# Patient Record
Sex: FEMALE | Race: BLACK OR AFRICAN AMERICAN | Employment: FULL TIME | URBAN - METROPOLITAN AREA
[De-identification: names, ages, dates, MRNs, and addresses within clinical notes are randomized per-mention and may not be internally consistent; named-entity substitution may affect disease eponyms.]

---

## 2017-05-04 DIAGNOSIS — I10 ESSENTIAL HYPERTENSION: ICD-10-CM

## 2017-05-04 RX ORDER — VALSARTAN AND HYDROCHLOROTHIAZIDE 80; 12.5 MG/1; MG/1
TABLET, FILM COATED ORAL
Qty: 90 TAB | Refills: 0 | Status: SHIPPED | OUTPATIENT
Start: 2017-05-04 | End: 2017-12-07 | Stop reason: SDUPTHER

## 2017-05-04 RX ORDER — PROPRANOLOL HYDROCHLORIDE 80 MG/1
CAPSULE, EXTENDED RELEASE ORAL
Qty: 180 CAP | Refills: 0 | Status: SHIPPED | OUTPATIENT
Start: 2017-05-04 | End: 2017-12-07 | Stop reason: SDUPTHER

## 2017-05-04 NOTE — TELEPHONE ENCOUNTER
She was in the office in December and is due for follow in June-this appt needs to be scheduled with Dr Collins

## 2017-06-19 ENCOUNTER — OFFICE VISIT (OUTPATIENT)
Dept: FAMILY MEDICINE CLINIC | Age: 52
End: 2017-06-19

## 2017-06-19 VITALS
TEMPERATURE: 98.5 F | DIASTOLIC BLOOD PRESSURE: 82 MMHG | SYSTOLIC BLOOD PRESSURE: 127 MMHG | HEIGHT: 68 IN | OXYGEN SATURATION: 98 % | HEART RATE: 81 BPM | RESPIRATION RATE: 18 BRPM

## 2017-06-19 DIAGNOSIS — M50.20 CERVICAL HERNIATED DISC: ICD-10-CM

## 2017-06-19 DIAGNOSIS — F51.04 CHRONIC INSOMNIA: ICD-10-CM

## 2017-06-19 DIAGNOSIS — I10 ESSENTIAL HYPERTENSION: ICD-10-CM

## 2017-06-19 DIAGNOSIS — M54.2 NECK PAIN: ICD-10-CM

## 2017-06-19 DIAGNOSIS — R07.89 OTHER CHEST PAIN: ICD-10-CM

## 2017-06-19 DIAGNOSIS — Z82.49 FAMILY HISTORY OF HEART DISEASE IN FEMALE FAMILY MEMBER BEFORE AGE 65: ICD-10-CM

## 2017-06-19 DIAGNOSIS — G43.019 INTRACTABLE MIGRAINE WITHOUT AURA AND WITHOUT STATUS MIGRAINOSUS: ICD-10-CM

## 2017-06-19 DIAGNOSIS — E66.9 OBESITY, CLASS II, BMI 35-39.9: ICD-10-CM

## 2017-06-19 DIAGNOSIS — M54.12 RADICULOPATHY, CERVICAL: Primary | ICD-10-CM

## 2017-06-19 RX ORDER — CLINDAMYCIN HYDROCHLORIDE 150 MG/1
CAPSULE ORAL EVERY 6 HOURS
COMMUNITY
End: 2017-12-07 | Stop reason: ALTCHOICE

## 2017-06-19 RX ORDER — DEXTROMETHORPHAN HYDROBROMIDE, GUAIFENESIN 5; 100 MG/5ML; MG/5ML
1300 LIQUID ORAL
COMMUNITY
End: 2021-07-26

## 2017-06-19 RX ORDER — ZOLPIDEM TARTRATE 10 MG/1
TABLET ORAL
Qty: 30 TAB | Refills: 5 | Status: SHIPPED | OUTPATIENT
Start: 2017-06-19 | End: 2017-12-07 | Stop reason: SINTOL

## 2017-06-19 NOTE — PROGRESS NOTES
Chief Complaint   Patient presents with    Blood Pressure Check    Medication Refill    Medication Evaluation     Gabapentin; caused pain in her legs, and caused her eye to twitch; pt stopped taking.  Neck Pain     3 bulging disks; fell in April 2017; would like an MRI to have neck looked at to see if it has gotten worse    Stiff Neck    Tingling     left and right arm; occurs more in left arm; happened after she fell.  Request For New Medication     muscle relaxer     1. Have you been to the ER, urgent care clinic since your last visit? Hospitalized since your last visit? No    2. Have you seen or consulted any other health care providers outside of the [address] [place] [address] since your last visit? Include any pap smears or colon screening. No    In the event something were to happen to you and you were unable to speak on your behalf, do you have an Advance Directive/ Living Will in place stating your wishes? NO    If yes, do we have a copy on file NO    If no, would you like information:   Pt offered and accepted. Pt refused to have weight taken today.

## 2017-06-19 NOTE — ACP (ADVANCE CARE PLANNING)
Discussed ACP with patient. [facility] an [facility]. Patient prefers to read it on her own. Declines referral to [facility] team at this time. Patient will bring document to her next office visit or attach it to her MyChart record.

## 2017-06-19 NOTE — MR AVS SNAPSHOT
Visit Information Date & Time Provider Department Dept. Phone Encounter #  
 6/19/2017 11:45 AM Refugio Hawkins DO [facility] [phone] [id number] Follow-up Instructions Return in about 3 months (around 9/19/2017) for referral follow up, neck pain, L arm pain, chest pain. Upcoming Health Maintenance Date Due Hepatitis C Screening 7/31/2017* BREAST CANCER SCRN MAMMOGRAM 7/31/2017* PAP AKA CERVICAL CYTOLOGY 7/31/2017* FOBT Q 1 YEAR AGE 50-75 7/31/2017* INFLUENZA AGE 9 TO ADULT 8/1/2017 DTaP/Tdap/Td series (3 - Td) 10/9/2026 *Topic was postponed. The date shown is not the original due date. Allergies as of 6/19/2017  Review Complete On: 6/19/2017 By: Refugio Hawkins DO Severity Noted Reaction Type Reactions Pcn [Penicillins] High 05/27/2010    Hives Celebrex [Celecoxib]  06/19/2017    Other (comments) TIGHT SQUEEZING IN CHEST 
CHEST ACHED Erythromycin  02/22/2011    Nausea and Vomiting Milk Prot-turm-pepper-pineappl  06/20/2014    Other (comments) Multiple food allergies Neurontin [Gabapentin]  06/19/2017    Other (comments) 300 mg SEVERE LEG PAIN 
EYE TWITCHING  
 Nsaids (Non-steroidal Anti-inflammatory Drug)  02/22/2011    Other (comments) GI irritation Current Immunizations  Reviewed on 6/19/2017 Name Date Influenza Vaccine 10/30/2015 Influenza Vaccine Whole 10/1/2010 TDAP Vaccine 8/10/2012 Td, Adsorbed 10/9/2016  6:23 PM  
  
 Reviewed by Refugio Hawkins DO on 6/19/2017 at  2:00 PM  
 Reviewed by Refugio Hawkins DO on 6/19/2017 at  2:06 PM  
You Were Diagnosed With   
  
 Codes Comments Radiculopathy, cervical    -  Primary ICD-10-CM: M54.12 
ICD-9-CM: 723.4 L>R with L arm weakness, BL hand tingling, worse since fall forward in [employer] 04/2017 Other chest pain     ICD-10-CM: R07.89 ICD-9-CM: 786.59   
 Intractable migraine without aura and without status migrainosus     ICD-10-CM: G43.019 
ICD-9-CM: 346.11 stable with Propranolol Essential hypertension     ICD-10-CM: I10 
ICD-9-CM: 401.9 Neck pain     ICD-10-CM: M54.2 ICD-9-CM: 723.1 right with cervical rotation to R, worse since fall in [employer] 04/2017 Cervical herniated disc     ICD-10-CM: M50.20 ICD-9-CM: 722.0 x 3, flared since fall in [place] Chronic insomnia     ICD-10-CM: F51.04 
ICD-9-CM: 780.52 due to Night Eating Disorder vs other Obesity, Class II, BMI 35-39.9 (HCC)     ICD-10-CM: E66.9 ICD-9-CM: 278.00 Family history of heart disease in female family member before age 72     ICD-10-CM: Z80.55 
ICD-9-CM: V17.49 Vitals BP Pulse Temp Resp Height(growth percentile) SpO2  
 127/82 (BP 1 Location: Left arm, BP Patient Position: Sitting) 81 98.5 °F (36.9 °C) (Oral) 18 5' 7.99\" (1.727 m) 98% OB Status Smoking Status Menopause Never Smoker Vitals History Preferred Pharmacy Pharmacy Name Phone [id number], [place] - [address] [phone] Your Updated Medication List  
  
   
This list is accurate as of: 6/19/17  2:14 PM.  Always use your most recent med list.  
  
  
  
  
 alum-mag hydroxide-simeth 200-200-20 mg/5 mL Susp Commonly known as:  MYLANTA Take 30 mL by mouth every four (4) hours as needed. clindamycin 150 mg capsule Commonly known as:  CLEOCIN Take  by mouth every six (6) hours. ergocalciferol 50,000 unit capsule Commonly known as:  ERGOCALCIFEROL  
TAKE 1 CAPSULE BY MOUTH EVERY WEEK  Indications: VITAMIN D DEFICIENCY (HIGH DOSE THERAPY)  
  
 omeprazole 20 mg capsule Commonly known as:  PRILOSEC  
TAKE 1 CAPSULE BY MOUTH TWICE DAILY  Indications: HEARTBURN  
  
 propranolol LA 80 mg SR capsule Commonly known as:  INDERAL LA  
 TAKE 1 TO 2 CAPSULES BY MOUTH EVERY DAY FOR HIGH BLOOD PRESSURE  
  
 TYLENOL ARTHRITIS PAIN 650 mg CR tablet Generic drug:  acetaminophen Take 650 mg by mouth every six (6) hours as needed for Pain.  
  
 valsartan-hydroCHLOROthiazide 80-12.5 mg per tablet Commonly known as:  DIOVAN-HCT  
TAKE 1 TABLET BY MOUTH EVERY DAY FOR BLOOD PRESSURE  
  
 zolpidem 10 mg tablet Commonly known as:  AMBIEN  
TAKE 1 TABLET BY MOUTH EVERY NIGHT AT BEDTIME AS NEEDED FOR INSOMNIA  Indications: SLEEP-ONSET INSOMNIA Prescriptions Printed Refills  
 zolpidem (AMBIEN) 10 mg tablet 5 Sig: TAKE 1 TABLET BY MOUTH EVERY NIGHT AT BEDTIME AS NEEDED FOR INSOMNIA  Indications: SLEEP-ONSET INSOMNIA Class: Print We Performed the Following REFERRAL TO CARDIOLOGY [TMV25 Custom] Comments:  
 [address] [place], [address] [address] [address] Recurrent chest pain, strong fam hx heart disease Follow-up Instructions Return in about 3 months (around 9/19/2017) for referral follow up, neck pain, L arm pain, chest pain. Referral Information Referral ID Referred By Referred To  
  
 [id number] Manoj Vicente [address] [place] [address] Phone: [phone] Fax: [phone] Visits Status Start Date End Date 1 New Request 6/19/17 6/19/18 If your referral has a status of pending review or denied, additional information will be sent to support the outcome of this decision. Introducing [facility]! Dear Coby Kind: Thank you for requesting a [employer] account. Our records indicate that you already have an active [employer] account. You can access your account anytime at [email or web address] [email or web address] Did you know that you can access your hospital and ER discharge instructions at any time in [employer]? You can also review all of your test results from your hospital stay or ER visit. Additional Information If you have questions, please visit the Frequently Asked Questions section of the [employer] website at [email or web address] Remember, [employer] is NOT to be used for urgent needs. For medical emergencies, dial 911. Now available from your iPhone and Android! Please provide this summary of care documentation to your next provider. Your primary care clinician is listed as Demetris Walhs. If you have any questions after today's visit, please call [phone].

## 2017-06-30 RX ORDER — CYCLOBENZAPRINE HCL 10 MG
5 TABLET ORAL
Qty: 30 TAB | Refills: 0 | Status: SHIPPED | OUTPATIENT
Start: 2017-06-30 | End: 2017-12-07 | Stop reason: ALTCHOICE

## 2017-12-07 ENCOUNTER — OFFICE VISIT (OUTPATIENT)
Dept: FAMILY MEDICINE CLINIC | Age: 52
End: 2017-12-07

## 2017-12-07 VITALS
OXYGEN SATURATION: 97 % | HEART RATE: 74 BPM | RESPIRATION RATE: 16 BRPM | HEIGHT: 67 IN | BODY MASS INDEX: 45.99 KG/M2 | DIASTOLIC BLOOD PRESSURE: 88 MMHG | SYSTOLIC BLOOD PRESSURE: 134 MMHG | TEMPERATURE: 98.6 F | WEIGHT: 293 LBS

## 2017-12-07 DIAGNOSIS — E55.9 VITAMIN D DEFICIENCY: ICD-10-CM

## 2017-12-07 DIAGNOSIS — J30.9 ALLERGIC CONJUNCTIVITIS AND RHINITIS, BILATERAL: ICD-10-CM

## 2017-12-07 DIAGNOSIS — D50.9 IRON DEFICIENCY ANEMIA, UNSPECIFIED IRON DEFICIENCY ANEMIA TYPE: ICD-10-CM

## 2017-12-07 DIAGNOSIS — M79.672 HEEL PAIN, BILATERAL: ICD-10-CM

## 2017-12-07 DIAGNOSIS — N93.8 DUB (DYSFUNCTIONAL UTERINE BLEEDING): ICD-10-CM

## 2017-12-07 DIAGNOSIS — R73.02 IMPAIRED GLUCOSE TOLERANCE: ICD-10-CM

## 2017-12-07 DIAGNOSIS — F51.04 CHRONIC INSOMNIA: ICD-10-CM

## 2017-12-07 DIAGNOSIS — Z12.39 BREAST CANCER SCREENING: ICD-10-CM

## 2017-12-07 DIAGNOSIS — M79.671 HEEL PAIN, BILATERAL: ICD-10-CM

## 2017-12-07 DIAGNOSIS — E66.9 OBESITY, CLASS II, BMI 35-39.9: ICD-10-CM

## 2017-12-07 DIAGNOSIS — Z11.59 NEED FOR HEPATITIS C SCREENING TEST: ICD-10-CM

## 2017-12-07 DIAGNOSIS — E78.00 ELEVATED CHOLESTEROL: ICD-10-CM

## 2017-12-07 DIAGNOSIS — I10 ESSENTIAL HYPERTENSION: ICD-10-CM

## 2017-12-07 DIAGNOSIS — N30.00 ACUTE CYSTITIS WITHOUT HEMATURIA: ICD-10-CM

## 2017-12-07 DIAGNOSIS — E04.2 MULTIPLE THYROID NODULES: ICD-10-CM

## 2017-12-07 DIAGNOSIS — Z78.9 ASPIRIN INTOLERANCE: ICD-10-CM

## 2017-12-07 DIAGNOSIS — R07.89 OTHER CHEST PAIN: ICD-10-CM

## 2017-12-07 DIAGNOSIS — K29.00 OTHER ACUTE GASTRITIS WITHOUT HEMORRHAGE: ICD-10-CM

## 2017-12-07 DIAGNOSIS — I10 ESSENTIAL HYPERTENSION: Primary | ICD-10-CM

## 2017-12-07 DIAGNOSIS — T50.905A ADVERSE EFFECT OF DRUG, INITIAL ENCOUNTER: ICD-10-CM

## 2017-12-07 DIAGNOSIS — H10.13 ALLERGIC CONJUNCTIVITIS AND RHINITIS, BILATERAL: ICD-10-CM

## 2017-12-07 RX ORDER — VALSARTAN AND HYDROCHLOROTHIAZIDE 80; 12.5 MG/1; MG/1
1 TABLET, FILM COATED ORAL DAILY
Qty: 90 TAB | Refills: 3 | Status: SHIPPED | OUTPATIENT
Start: 2017-12-07 | End: 2019-04-22 | Stop reason: SDUPTHER

## 2017-12-07 RX ORDER — PROPRANOLOL HYDROCHLORIDE 80 MG/1
80 CAPSULE, EXTENDED RELEASE ORAL 2 TIMES DAILY
Qty: 180 CAP | Refills: 3 | Status: SHIPPED | OUTPATIENT
Start: 2017-12-07 | End: 2019-04-22 | Stop reason: SDUPTHER

## 2017-12-07 NOTE — PROGRESS NOTES
Chief Complaint   Patient presents with    Hypertension     Patient is here for a follow up for her blood presure check     Swelling     Patient stated that she has noticed water retention all over her body upon waking up     1. Have you been to the ER, urgent care clinic since your last visit? Hospitalized since your last visit? No     2. Have you seen or consulted any other health care providers outside of the [address] [place] [address] since your last visit? Include any pap smears or colon screening.   No     Visit Vitals    BP (!) 143/97 (BP 1 Location: Right arm, BP Patient Position: Sitting)    Pulse 74    Temp 98.6 °F (37 °C) (Oral)    Resp 16    Ht 5' 7\" (1.702 m)    SpO2 97%       134/88- Left arm Sitting - Manual Check

## 2017-12-07 NOTE — PATIENT INSTRUCTIONS
Advance Care Planning: Care Instructions  Your Care Instructions    It can be hard to live with an illness that cannot be cured. But if your health is getting worse, you may want to make decisions about end-of-life care. Planning for the end of your life does not mean that you are giving up. It is a way to make sure that your wishes are met. Clearly stating your wishes can make it easier for your loved ones. Making plans while you are still able may also ease your mind and make your final days less stressful and more meaningful. Follow-up care is a key part of your treatment and safety. Be sure to make and go to all appointments, and call your doctor if you are having problems. It's also a good idea to know your test results and keep a list of the medicines you take. What can you do to plan for the end of life? · You can bring these issues up with your doctor. You do not need to wait until your doctor starts the conversation. You might start with \"I would not be willing to live with . Sidney Musa \" When you complete this sentence it helps your doctor understand your wishes. · Talk openly and honestly with your doctor. This is the best way to understand the decisions you will need to make as your health changes. Know that you can always change your mind. · Ask your doctor about commonly used life-support measures. These include tube feedings, breathing machines, and fluids given through a vein (IV). Understanding these treatments will help you decide whether you want them. · You may choose to have these life-supporting treatments for a limited time. This allows a trial period to see whether they will help you. You may also decide that you want your doctor to take only certain measures to keep you alive. It is important to spell out these conditions so that your doctor and family understand them. · Talk to your doctor about how long you are likely to live.  He or she may be able to give you an idea of what usually happens with your specific illness. · Think about preparing papers that state your wishes. This way there will not be any confusion about what you want. You can change your instructions at any time. Which papers should you prepare? Advance directives are legal papers that tell doctors how you want to be cared for at the end of your life. You do not need a  to write these papers. Ask your doctor or your state health department for information on how to write your advance directives. They may have the forms for each of these types of papers. Make sure your doctor has a copy of these on file, and give a copy to a family member or close friend. · Consider a do-not-resuscitate order (DNR). This order asks that no extra treatments be done if your heart stops or you stop breathing. Extra treatments may include cardiopulmonary resuscitation (CPR), electrical shock to restart your heart, or a machine to breathe for you. If you decide to have a DNR order, ask your doctor to explain and write it. Place the order in your home where everyone can easily see it. · Consider a living will. A living will explains your wishes about life support and other treatments at the end of your life if you become unable to speak for yourself. Living [place] tell doctors to use or not use treatments that would keep you alive. You must have one or two witnesses or a notary present when you sign this form. · Consider a durable power of  for health care. This allows you to name a person to make decisions about your care if you are not able to. Most people ask a close friend or family member. Talk to this person about the kinds of treatments you want and those that you do not want. Make sure this person understands your wishes. These legal papers are simple to change. Tell your doctor what you want to change, and ask him or her to make a note in your medical file. Give your family updated copies of the papers.   Where can you learn more?  Go to http://sierra-ghanshyam.info/. Enter P184 in the search box to learn more about \"Advance Care Planning: Care Instructions. \"  Current as of: September 24, 2016  Content Version: 11.4  © 8110-8424 [employer]. Care instructions adapted under license by [employer] (which disclaims liability or warranty for this information). If you have questions about a medical condition or this instruction, always ask your healthcare professional. [address] any warranty or liability for your use of this information. Achilles Tendon: Exercises  Your Care Instructions  Here are some examples of exercises for your achilles tendon. Start each exercise slowly. Ease off the exercise if you start to have pain. Your doctor or physical therapist will tell you when you can start these exercises and which ones will work best for you. How to do the exercises  Toe stretch    1. Sit in a chair, and extend your affected leg so that your heel is on the floor. 2. With your hand, reach down and pull your big toe up and back. Pull toward your ankle and away from the floor. 3. Hold the position for at least 15 to 30 seconds. 4. Repeat 2 to 4 times a session, several times a day. Calf-plantar fascia stretch    1. Sit with your legs extended and knees straight. 2. Place a towel around your foot just under the toes. 3. Hold each end of the towel in each hand, with your hands above your knees. 4. Pull back with the towel so that your foot stretches toward you. 5. Hold the position for at least 15 to 30 seconds. 6. Repeat 2 to 4 times a session, up to 5 sessions a day. Floor stretch    1. Stand about 2 feet from a wall, and place your hands on the wall at about shoulder height. Or you can stand behind a chair, placing your hands on the back of it for balance. 2. Step back with the leg you want to stretch.  Keep the leg straight, and press your heel into the floor with your toe turned slightly in.  3. Lean forward, and bend your other leg slightly. Feel the stretch in the Achilles tendon of your back leg. Hold for at least 15 to 30 seconds. 4. Repeat 2 to 4 times a session, up to 5 sessions a day. Stair stretch    1. Stand with the balls of both feet on the edge of a step or curb (or a medium-sized phone book). With at least one hand, hold onto something solid for balance, such as a banister or handrail. 2. Keeping your affected leg straight, slowly let that heel hang down off of the step or curb until you feel a stretch in the back of your calf and/or Achilles area. Some of your weight should still be on the other leg. 3. Hold this position for at least 15 to 30 seconds. 4. Repeat 2 to 4 times a session, up to 5 times a day or whenever your Achilles tendon starts to feel tight. This stretch can also be done with your knee slightly bent. Strength exercise    1. This exercise will get you started on building strength after an Achilles tendon injury. Your doctor or physical therapist can help you move on to more challenging exercises as you heal and get stronger. 2. Stand on a step with your heel off the edge of the step. Hold on to a handrail or wall for balance. 3. Push up on your toes, then slowly count to 10 as you lower yourself back down until your heel is below the step. If it hurts to push up on your toes, try putting most of your weight on your other foot as you push up, or try using your arms to help you. If you can't do this exercise without causing pain, stop the exercise and talk to your doctor. 4. Repeat the exercise 8 to 12 times, half with the knee straight and half with the knee bent. Follow-up care is a key part of your treatment and safety. Be sure to make and go to all appointments, and call your doctor if you are having problems. It's also a good idea to know your test results and keep a list of the medicines you take.   Where can you learn more?  Go to http://sierra-ghanshyam.info/. Enter Q631 in the search box to learn more about \"Achilles Tendon: Exercises. \"  Current as of: March 21, 2017  Content Version: 11.4  © 9621-5611 [employer]. Care instructions adapted under license by [employer] (which disclaims liability or warranty for this information). If you have questions about a medical condition or this instruction, always ask your healthcare professional. [address] any warranty or liability for your use of this information. Advise patient to start taking Over The Counter allergy medication (Allegra, Zyrtec, Claritin, Xyzal or Alavert) daily. If you have itchy, watery eyes you can try OTC Zaditor or Zyrtec Allergy Eye drops. If you have a stuffy nose, please try OTC Flonase, Flonase Sensimist, Rhinocort, or Nasacort AQ 2 squirts up each nostril once a day (adults) or 1 squirt up each nostril once a day (children). Use allergy free, dye free, fragrance free products on your body and hair. After being outdoors, brush hair vigorously, wash face and arms, rinse nostrils with a nasal saline spray and consider changing your clothing. Dust furniture frequently and wear a mask while doing it. Vacuum floors weekly. Remove stuffed animals or extra pillows from the bed. Clean bedding in hot water weekly. Sometimes allergies can be so severe that 2 nasal sprays and 2 pills may be needed to control symptoms. Allergies: Care Instructions  Your Care Instructions    Allergies occur when your body's defense system (immune system) overreacts to certain substances. The immune system treats a harmless substance as if it were a harmful germ or virus. Many things can cause this overreaction, including pollens, medicine, food, dust, animal dander, and mold. Allergies can be mild or severe. Mild allergies can be managed with home treatment.  But medicine may be needed to prevent problems. Managing your allergies is an important part of staying healthy. Your doctor may suggest that you have allergy testing to help find out what is causing your allergies. When you know what things trigger your symptoms, you can avoid them. This can prevent allergy symptoms and other health problems. For severe allergies that cause reactions that affect your whole body (anaphylactic reactions), your doctor may prescribe a shot of epinephrine to carry with you in case you have a severe reaction. Learn how to give yourself the shot and keep it with you at all times. Make sure it is not . Follow-up care is a key part of your treatment and safety. Be sure to make and go to all appointments, and call your doctor if you are having problems. It's also a good idea to know your test results and keep a list of the medicines you take. How can you care for yourself at home? · If you have been told by your doctor that dust or dust mites are causing your allergy, decrease the dust around your bed:  [facility] [employer] sheets, pillowcases, and other bedding in hot water every week. ¨ Use dust-proof covers for pillows, duvets, and mattresses. Avoid plastic covers because they tear easily and do not \"breathe. \" Wash as instructed on the label. ¨ Do not use any blankets and pillows that you do not need. ¨ Use blankets that you can wash in your washing machine. ¨ Consider removing drapes and carpets, which attract and hold dust, from your bedroom. · If you are allergic to house dust and mites, do not use home humidifiers. Your doctor can suggest ways you can control dust and mites. · Look for signs of cockroaches. Cockroaches cause allergic reactions. Use cockroach baits to get rid of them. Then, clean your home well. Cockroaches like areas where grocery bags, newspapers, empty bottles, or cardboard boxes are stored. Do not keep these inside your home, and keep trash and food containers sealed.  Seal off any spots where cockroaches might enter your home. · If you are allergic to mold, get rid of furniture, rugs, and drapes that smell musty. Check for mold in the bathroom. · If you are allergic to outdoor pollen or mold spores, use air-conditioning. Change or clean all filters every month. Keep windows closed. · If you are allergic to pollen, stay inside when pollen counts are high. Use a vacuum  with a HEPA filter or a double-thickness filter at least two times each week. · Stay inside when air pollution is bad. Avoid paint fumes, perfumes, and other strong odors. · Avoid conditions that make your allergies worse. Stay away from smoke. Do not smoke or let anyone else smoke in your house. Do not use fireplaces or wood-burning stoves. · If you are allergic to your pets, change the air filter in your furnace every month. Use high-efficiency filters. · If you are allergic to pet dander, keep pets outside or out of your bedroom. Old carpet and cloth furniture can hold a lot of animal dander. You may need to replace them. When should you call for help? Give an epinephrine shot if:  ? · You think you are having a severe allergic reaction. ? · You have symptoms in more than one body area, such as mild nausea and an itchy mouth. ? After giving an epinephrine shot call 911, even if you feel better. ?Call 911 if:  ? · You have symptoms of a severe allergic reaction. These may include:  ¨ Sudden raised, red areas (hives) all over your body. ¨ Swelling of the throat, mouth, lips, or tongue. ¨ Trouble breathing. ¨ Passing out (losing consciousness). Or you may feel very lightheaded or suddenly feel weak, confused, or restless. ? · You have been given an epinephrine shot, even if you feel better. ?Call your doctor now or seek immediate medical care if:  ? · You have symptoms of an allergic reaction, such as:  ¨ A rash or hives (raised, red areas on the skin). ¨ Itching. ¨ Swelling. ¨ Belly pain, nausea, or vomiting. ?Watch closely for changes in your health, and be sure to contact your doctor if:  ? · You do not get better as expected. Where can you learn more? Go to http://sierra-ghanshyam.info/. Enter V399 in the search box to learn more about \"Allergies: Care Instructions. \"  Current as of: September 29, 2016  Content Version: 11.4  © 3734-2855 [employer]. Care instructions adapted under license by [employer] (which disclaims liability or warranty for this information). If you have questions about a medical condition or this instruction, always ask your healthcare professional. [address] any warranty or liability for your use of this information. Managing Your Allergies: Care Instructions  Your Care Instructions    Managing your allergies is an important part of staying healthy. Your doctor will help you find out what may be causing the allergies. Common causes of allergy symptoms are house dust and dust mites, animal dander, mold, and pollen. As soon as you know what triggers your symptoms, try to reduce your exposure to your triggers. This can help prevent allergy symptoms, asthma, and other health problems. Ask your doctor about allergy medicine or immunotherapy. These treatments may help reduce or prevent allergy symptoms. Follow-up care is a key part of your treatment and safety. Be sure to make and go to all appointments, and call your doctor if you are having problems. It's also a good idea to know your test results and keep a list of the medicines you take. How can you care for yourself at home? · If you think that dust or dust mites are causing your allergies:  ¨ Wash sheets, pillowcases, and other bedding every week in hot water. ¨ Use airtight, dust-proof covers for pillows, duvets, and mattresses. Avoid plastic covers, because they tend to tear quickly and do not \"breathe. \" Wash according to the instructions.   ¨ Remove extra blankets and pillows that you don't need. ¨ Use blankets that are machine-washable. ¨ Don't use home humidifiers. They can help mites live longer. · Use air-conditioning. Change or clean all filters every month. Keep windows closed. Use high-efficiency air filters. Don't use window or attic fans, which draw dust into the air. · If you're allergic to pet dander, keep pets outside or, at the very least, out of your bedroom. Old carpet and cloth-covered furniture can hold a lot of animal dander. You may need to replace them. · Look for signs of cockroaches. Use cockroach baits to get rid of them. Then clean your home well. · If you're allergic to mold, don't keep indoor plants, because molds can grow in soil. Get rid of furniture, rugs, and drapes that smell musty. Check for mold in the bathroom. · If you're allergic to pollen, stay inside when pollen counts are high. · Don't smoke or let anyone else smoke in your house. Don't use fireplaces or wood-burning stoves. Avoid paint fumes, perfumes, and other strong odors. When should you call for help? Give an epinephrine shot if:  ? · You think you are having a severe allergic reaction. ? After giving an epinephrine shot call 911, even if you feel better. ?Call 911 if:  ? · You have symptoms of a severe allergic reaction. These may include:  ¨ Sudden raised, red areas (hives) all over your body. ¨ Swelling of the throat, mouth, lips, or tongue. ¨ Trouble breathing. ¨ Passing out (losing consciousness). Or you may feel very lightheaded or suddenly feel weak, confused, or restless. ? · You have been given an epinephrine shot, even if you feel better. ?Call your doctor now or seek immediate medical care if:  ? · You have symptoms of an allergic reaction, such as:  ¨ A rash or hives (raised, red areas on the skin). ¨ Itching. ¨ Swelling. ¨ Belly pain, nausea, or vomiting. ? Watch closely for changes in your health, and be sure to contact your doctor if:  ? · Your allergies get worse. ? · You need help controlling your allergies. ? · You have questions about allergy testing. ? · You do not get better as expected. Where can you learn more? Go to http://sierra-ghanshyam.info/. Enter L249 in the search box to learn more about \"Managing Your Allergies: Care Instructions. \"  Current as of: September 29, 2016  Content Version: 11.4  © 4186-5869 [email or web address]. Care instructions adapted under license by [employer] (which disclaims liability or warranty for this information). If you have questions about a medical condition or this instruction, always ask your healthcare professional. [address] any warranty or liability for your use of this information.

## 2017-12-07 NOTE — MR AVS SNAPSHOT
Visit Information Date & Time Provider Department Dept. Phone Encounter #  
 12/7/2017  2:30 PM Tj Hernandez DO [facility] [phone] [id number] Follow-up Instructions Return in about 6 months (around 6/7/2018) for bp, results, weight. Upcoming Health Maintenance Date Due Hepatitis C Screening 4/20/2018* BREAST CANCER SCRN MAMMOGRAM 4/20/2018* PAP AKA CERVICAL CYTOLOGY 4/20/2018* COLONOSCOPY 3/31/2024 DTaP/Tdap/Td series (3 - Td) 10/9/2026 *Topic was postponed. The date shown is not the original due date. Allergies as of 12/7/2017  Review Complete On: 12/7/2017 By: Tj Hernandez DO Severity Noted Reaction Type Reactions Doxylamine Succinate High 12/07/2017    Swelling 12.5-25 MG 
HANDS, FACE/PERIORAL, FEET Pcn [Penicillins] High 05/27/2010    Hives Ambien [Zolpidem]  11/11/2014    Nausea and Vomiting, Other (comments) 5 mg with Ativan 0.5 mg  
TOO GROGGY, SLEPT 24 HOURS 
7.5 MG FORGOT SHE WAS SLEEP EATING Aspirin  12/07/2017    Nausea Only Celebrex [Celecoxib]  06/19/2017    Other (comments) TIGHT SQUEEZING IN CHEST 
CHEST ACHED Erythromycin  02/22/2011    Nausea and Vomiting Milk Prot-turm-pepper-pineappl  06/20/2014    Other (comments) Multiple food allergies Neurontin [Gabapentin]  06/19/2017    Other (comments) 300 mg SEVERE LEG PAIN 
EYE TWITCHING  
 Nsaids (Non-steroidal Anti-inflammatory Drug)  02/22/2011    Other (comments) GI irritation Current Immunizations  Reviewed on 12/7/2017 Name Date Influenza Vaccine 10/31/2017, 10/30/2015 Influenza Vaccine Whole 10/1/2010 TDAP Vaccine 8/10/2012 Td, Adsorbed 10/9/2016  6:23 PM  
  
 Reviewed by Tj Hernandez DO on 12/7/2017 at  3:47 PM  
You Were Diagnosed With   
  
 Codes Comments  Essential hypertension    -  Primary ICD-10-CM: I10 
 ICD-9-CM: 401.9 worse due to stress vs sleep vs missed bp meds vs other Elevated cholesterol     ICD-10-CM: E78.00 ICD-9-CM: 272.0   
 DUB (dysfunctional uterine bleeding)     ICD-10-CM: N93.8 ICD-9-CM: 626.8 due to perimenopause vs stress Iron deficiency anemia, unspecified iron deficiency anemia type     ICD-10-CM: D50.9 ICD-9-CM: 280.9 Heel pain, bilateral     ICD-10-CM: M79.671, U83.100 ICD-9-CM: 729.5 Allergic conjunctivitis and rhinitis, bilateral     ICD-10-CM: H10.13, J30.9 ICD-9-CM: 372.05, 477.9 Impaired glucose tolerance     ICD-10-CM: R73.02 
ICD-9-CM: 790.22 Acute cystitis without hematuria     ICD-10-CM: N30.00 ICD-9-CM: 595.0 Vitamin D deficiency     ICD-10-CM: E55.9 ICD-9-CM: 268.9 Obesity, Class II, BMI 35-39.9     ICD-10-CM: E66.9 ICD-9-CM: 278.00 Multiple thyroid nodules     ICD-10-CM: E04.2 ICD-9-CM: 241.1 Other chest pain     ICD-10-CM: R07.89 ICD-9-CM: 786.59 no recurrence since last ov Aspirin intolerance     ICD-10-CM: Z78.9 ICD-9-CM: 995.27, E935.3 Chronic insomnia     ICD-10-CM: F51.04 
ICD-9-CM: 780.52 Need for hepatitis C screening test     ICD-10-CM: Z11.59 
ICD-9-CM: V73.89 Breast cancer screening     ICD-10-CM: Z12.31 
ICD-9-CM: V76.10 Adverse effect of drug, initial encounter     ICD-10-CM: T88. 7XXA ICD-9-CM: E947.9 equate sleep aid. resolved without recurrence Other acute gastritis without hemorrhage     ICD-10-CM: K29.00 ICD-9-CM: 535.00 Essential hypertension     ICD-10-CM: I10 
ICD-9-CM: 401.9 stable Vitals BP Pulse Temp Resp Height(growth percentile) SpO2  
 (!) 143/97 (BP 1 Location: Right arm, BP Patient Position: Sitting) 74 98.6 °F (37 °C) (Oral) 16 5' 7\" (1.702 m) 97% OB Status Smoking Status Menopause Never Smoker Vitals History Preferred Pharmacy Pharmacy Name Phone [id number]- [place], [place] - [address] [facility] [place] [place] [phone] Your Updated Medication List  
  
   
This list is accurate as of: 12/7/17  4:04 PM.  Always use your most recent med list.  
  
  
  
  
 alum-mag hydroxide-simeth 200-200-20 mg/5 mL Susp Commonly known as:  MYLANTA Take 30 mL by mouth every four (4) hours as needed. omeprazole 20 mg capsule Commonly known as:  PRILOSEC  
TAKE 1 CAPSULE BY MOUTH TWICE DAILY  Indications: HEARTBURN  
  
 propranolol LA 80 mg SR capsule Commonly known as:  INDERAL LA Take 1 Cap by mouth two (2) times a day. Indications: MIGRAINE PREVENTION  
  
 TYLENOL ARTHRITIS PAIN 650 mg Tber Generic drug:  acetaminophen Take 650 mg by mouth every six (6) hours as needed for Pain.  
  
 valsartan-hydroCHLOROthiazide 80-12.5 mg per tablet Commonly known as:  DIOVAN-HCT Take 1 Tab by mouth daily. Indications: hypertension Prescriptions Sent to Pharmacy Refills  
 propranolol LA (INDERAL LA) 80 mg SR capsule 3 Sig: Take 1 Cap by mouth two (2) times a day. Indications: MIGRAINE PREVENTION Class: Normal  
 Pharmacy: [facility]/pharmacy [address] [place] [address] [address] [facility] Ph #: [phone] Route: Oral  
 valsartan-hydroCHLOROthiazide (DIOVAN-HCT) 80-12.5 mg per tablet 3 Sig: Take 1 Tab by mouth daily. Indications: hypertension Class: Normal  
 Pharmacy: [facility]/pharmacy [address] [place] [address] [address] [facility] Ph #: [phone] Route: Oral  
  
We Performed the Following CBC W/O DIFF [87304 CPT(R)] CULTURE, URINE [id number] CPT(R)] HEMOGLOBIN A1C WITH EAG [60228 CPT(R)] LIPID PANEL [68131 CPT(R)] METABOLIC PANEL, COMPREHENSIVE [53988 CPT(R)] MICROALBUMIN, UR, RAND W/ MICROALBUMIN/CREA RATIO [id number] CPT(R)] MICROALBUMIN, UR, RAND [id number] CPT(R)] REFERRAL TO PODIATRY [REF90 Custom] TSH 3RD GENERATION [07585 CPT(R)] URINALYSIS W/ RFLX MICROSCOPIC [56630 CPT(R)] VITAMIN D, 25 HYDROXY [id number] CPT(R)] Follow-up Instructions Return in about 6 months (around 6/7/2018) for bp, results, weight. To-Do List   
 12/07/2017 Lab:  CBC W/O DIFF   
  
 12/07/2017 Lab:  HEMOGLOBIN A1C WITH EAG   
  
 12/07/2017 Lab:  IRON   
  
 12/07/2017 Lab:  LIPID PANEL   
  
 12/07/2017 Lab:  METABOLIC PANEL, COMPREHENSIVE   
  
 12/07/2017 Lab:  TSH 3RD GENERATION   
  
 12/07/2017 Lab:  VITAMIN D, 25 HYDROXY   
  
 01/07/2018 Lab:  HEPATITIS C AB   
  
 01/07/2018 Imaging:  TERI MAMMO BI SCREENING INCL CAD Referral Information Referral ID Referred By Referred To  
  
 [id number] Manoj Orourke, [address] [place] [address],[address] [place] [address] [place], [address] Phone: [phone] Fax: [phone] Visits Status Start Date End Date 1 New Request 12/7/17 12/7/18 If your referral has a status of pending review or denied, additional information will be sent to support the outcome of this decision. Patient Instructions Advance Care Planning: Care Instructions Your Care Instructions It can be hard to live with an illness that cannot be cured. But if your health is getting worse, you may want to make decisions about end-of-life care. Planning for the end of your life does not mean that you are giving up. It is a way to make sure that your wishes are met. Clearly stating your wishes can make it easier for your loved ones. Making plans while you are still able may also ease your mind and make your final days less stressful and more meaningful. Follow-up care is a key part of your treatment and safety. Be sure to make and go to all appointments, and call your doctor if you are having problems. It's also a good idea to know your test results and keep a list of the medicines you take. What can you do to plan for the end of life? · You can bring these issues up with your doctor. You do not need to wait until your doctor starts the conversation. You might start with \"I would not be willing to live with . Bijal Rm Bijal Rm Bijal Rm \" When you complete this sentence it helps your doctor understand your wishes. · Talk openly and honestly with your doctor. This is the best way to understand the decisions you will need to make as your health changes. Know that you can always change your mind. · Ask your doctor about commonly used life-support measures. These include tube feedings, breathing machines, and fluids given through a vein (IV). Understanding these treatments will help you decide whether you want them. · You may choose to have these life-supporting treatments for a limited time. This allows a trial period to see whether they will help you. You may also decide that you want your doctor to take only certain measures to keep you alive. It is important to spell out these conditions so that your doctor and family understand them. · Talk to your doctor about how long you are likely to live. He or she may be able to give you an idea of what usually happens with your specific illness. · Think about preparing papers that state your wishes. This way there will not be any confusion about what you want. You can change your instructions at any time. Which papers should you prepare? Advance directives are legal papers that tell doctors how you want to be cared for at the end of your life. You do not need a  to write these papers. Ask your doctor or your state health department for information on how to write your advance directives. They may have the forms for each of these types of papers. Make sure your doctor has a copy of these on file, and give a copy to a family member or close friend. · Consider a do-not-resuscitate order (DNR).  This order asks that no extra treatments be done if your heart stops or you stop breathing. Extra treatments may include cardiopulmonary resuscitation (CPR), electrical shock to restart your heart, or a machine to breathe for you. If you decide to have a DNR order, ask your doctor to explain and write it. Place the order in your home where everyone can easily see it. · Consider a living will. A living will explains your wishes about life support and other treatments at the end of your life if you become unable to speak for yourself. Living [place] tell doctors to use or not use treatments that would keep you alive. You must have one or two witnesses or a notary present when you sign this form. · Consider a durable power of  for health care. This allows you to name a person to make decisions about your care if you are not able to. Most people ask a close friend or family member. Talk to this person about the kinds of treatments you want and those that you do not want. Make sure this person understands your wishes. These legal papers are simple to change. Tell your doctor what you want to change, and ask him or her to make a note in your medical file. Give your family updated copies of the papers. Where can you learn more? Go to http://sierra-ghanshyam.info/. Enter P184 in the search box to learn more about \"Advance Care Planning: Care Instructions. \" Current as of: September 24, 2016 Content Version: 11.4 © 1793-7306 [employer]. Care instructions adapted under license by [employer] (which disclaims liability or warranty for this information). If you have questions about a medical condition or this instruction, always ask your healthcare professional. [place] [address] any warranty or liability for your use of this information. Achilles Tendon: Exercises Your Care Instructions Here are some examples of exercises for your achilles tendon.  Start each exercise slowly. Ease off the exercise if you start to have pain. Your doctor or physical therapist will tell you when you can start these exercises and which ones will work best for you. How to do the exercises Toe stretch 1. Sit in a chair, and extend your affected leg so that your heel is on the floor. 2. With your hand, reach down and pull your big toe up and back. Pull toward your ankle and away from the floor. 3. Hold the position for at least 15 to 30 seconds. 4. Repeat 2 to 4 times a session, several times a day. Calf-plantar fascia stretch 1. Sit with your legs extended and knees straight. 2. Place a towel around your foot just under the toes. 3. Hold each end of the towel in each hand, with your hands above your knees. 4. Pull back with the towel so that your foot stretches toward you. 5. Hold the position for at least 15 to 30 seconds. 6. Repeat 2 to 4 times a session, up to 5 sessions a day. Floor stretch 1. Stand about 2 feet from a wall, and place your hands on the wall at about shoulder height. Or you can stand behind a chair, placing your hands on the back of it for balance. 2. Step back with the leg you want to stretch. Keep the leg straight, and press your heel into the floor with your toe turned slightly in. 
3. Lean forward, and bend your other leg slightly. Feel the stretch in the Achilles tendon of your back leg. Hold for at least 15 to 30 seconds. 4. Repeat 2 to 4 times a session, up to 5 sessions a day. Stair stretch 1. Stand with the balls of both feet on the edge of a step or curb (or a medium-sized phone book). With at least one hand, hold onto something solid for balance, such as a banister or handrail. 2. Keeping your affected leg straight, slowly let that heel hang down off of the step or curb until you feel a stretch in the back of your calf and/or Achilles area. Some of your weight should still be on the other leg. 3. Hold this position for at least 15 to 30 seconds. 4. Repeat 2 to 4 times a session, up to 5 times a day or whenever your Achilles tendon starts to feel tight. This stretch can also be done with your knee slightly bent. Strength exercise 1. This exercise will get you started on building strength after an Achilles tendon injury. Your doctor or physical therapist can help you move on to more challenging exercises as you heal and get stronger. 2. Stand on a step with your heel off the edge of the step. Hold on to a handrail or wall for balance. 3. Push up on your toes, then slowly count to 10 as you lower yourself back down until your heel is below the step. If it hurts to push up on your toes, try putting most of your weight on your other foot as you push up, or try using your arms to help you. If you can't do this exercise without causing pain, stop the exercise and talk to your doctor. 4. Repeat the exercise 8 to 12 times, half with the knee straight and half with the knee bent. Follow-up care is a key part of your treatment and safety. Be sure to make and go to all appointments, and call your doctor if you are having problems. It's also a good idea to know your test results and keep a list of the medicines you take. Where can you learn more? Go to http://sierra-ghanshyam.info/. Enter I423 in the search box to learn more about \"Achilles Tendon: Exercises. \" Current as of: March 21, 2017 Content Version: 11.4 © 1419-7838 [employer]. Care instructions adapted under license by [employer] (which disclaims liability or warranty for this information). If you have questions about a medical condition or this instruction, always ask your healthcare professional. [address] any warranty or liability for your use of this information.  
Advise patient to start taking Over The Counter allergy medication (Allegra, Zyrtec, Claritin, Xyzal or Alavert) daily. If you have itchy, watery eyes you can try OTC Zaditor or Zyrtec Allergy Eye drops. If you have a stuffy nose, please try OTC Flonase, Flonase Sensimist, Rhinocort, or Nasacort AQ 2 squirts up each nostril once a day (adults) or 1 squirt up each nostril once a day (children). Use allergy free, dye free, fragrance free products on your body and hair. After being outdoors, brush hair vigorously, wash face and arms, rinse nostrils with a nasal saline spray and consider changing your clothing. Dust furniture frequently and wear a mask while doing it. Vacuum floors weekly. Remove stuffed animals or extra pillows from the bed. Clean bedding in hot water weekly. Sometimes allergies can be so severe that 2 nasal sprays and 2 pills may be needed to control symptoms. Allergies: Care Instructions Your Care Instructions Allergies occur when your body's defense system (immune system) overreacts to certain substances. The immune system treats a harmless substance as if it were a harmful germ or virus. Many things can cause this overreaction, including pollens, medicine, food, dust, animal dander, and mold. Allergies can be mild or severe. Mild allergies can be managed with home treatment. But medicine may be needed to prevent problems. Managing your allergies is an important part of staying healthy. Your doctor may suggest that you have allergy testing to help find out what is causing your allergies. When you know what things trigger your symptoms, you can avoid them. This can prevent allergy symptoms and other health problems. For severe allergies that cause reactions that affect your whole body (anaphylactic reactions), your doctor may prescribe a shot of epinephrine to carry with you in case you have a severe reaction. Learn how to give yourself the shot and keep it with you at all times. Make sure it is not . Follow-up care is a key part of your treatment and safety. Be sure to make and go to all appointments, and call your doctor if you are having problems. It's also a good idea to know your test results and keep a list of the medicines you take. How can you care for yourself at home? · If you have been told by your doctor that dust or dust mites are causing your allergy, decrease the dust around your bed: 
[facility] sheets, pillowcases, and other bedding in hot water every week. ¨ Use dust-proof covers for pillows, duvets, and mattresses. Avoid plastic covers because they tear easily and do not \"breathe. \" Wash as instructed on the label. ¨ Do not use any blankets and pillows that you do not need. ¨ Use blankets that you can wash in your washing machine. ¨ Consider removing drapes and carpets, which attract and hold dust, from your bedroom. · If you are allergic to house dust and mites, do not use home humidifiers. Your doctor can suggest ways you can control dust and mites. · Look for signs of cockroaches. Cockroaches cause allergic reactions. Use cockroach baits to get rid of them. Then, clean your home well. Cockroaches like areas where grocery bags, newspapers, empty bottles, or cardboard boxes are stored. Do not keep these inside your home, and keep trash and food containers sealed. Seal off any spots where cockroaches might enter your home. · If you are allergic to mold, get rid of furniture, rugs, and drapes that smell musty. Check for mold in the bathroom. · If you are allergic to outdoor pollen or mold spores, use air-conditioning. Change or clean all filters every month. Keep windows closed. · If you are allergic to pollen, stay inside when pollen counts are high. Use a vacuum  with a HEPA filter or a double-thickness filter at least two times each week. · Stay inside when air pollution is bad. Avoid paint fumes, perfumes, and other strong odors. · Avoid conditions that make your allergies worse. Stay away from smoke. Do not smoke or let anyone else smoke in your house. Do not use fireplaces or wood-burning stoves. · If you are allergic to your pets, change the air filter in your furnace every month. Use high-efficiency filters. · If you are allergic to pet dander, keep pets outside or out of your bedroom. Old carpet and cloth furniture can hold a lot of animal dander. You may need to replace them. When should you call for help? Give an epinephrine shot if: 
? · You think you are having a severe allergic reaction. ? · You have symptoms in more than one body area, such as mild nausea and an itchy mouth. ? After giving an epinephrine shot call 911, even if you feel better. ?Call 911 if: 
? · You have symptoms of a severe allergic reaction. These may include: 
¨ Sudden raised, red areas (hives) all over your body. ¨ Swelling of the throat, mouth, lips, or tongue. ¨ Trouble breathing. ¨ Passing out (losing consciousness). Or you may feel very lightheaded or suddenly feel weak, confused, or restless. ? · You have been given an epinephrine shot, even if you feel better. ?Call your doctor now or seek immediate medical care if: 
? · You have symptoms of an allergic reaction, such as: ¨ A rash or hives (raised, red areas on the skin). ¨ Itching. ¨ Swelling. ¨ Belly pain, nausea, or vomiting. ? Watch closely for changes in your health, and be sure to contact your doctor if: 
? · You do not get better as expected. Where can you learn more? Go to http://sierra-ghanshyam.info/. Enter B877 in the search box to learn more about \"Allergies: Care Instructions. \" Current as of: September 29, 2016 Content Version: 11.4 © 6764-0974 [employer]. Care instructions adapted under license by [employer] (which disclaims liability or warranty for this information).  If you have questions about a medical condition or this instruction, always ask your healthcare professional. [address] any warranty or liability for your use of this information. Managing Your Allergies: Care Instructions Your Care Instructions Managing your allergies is an important part of staying healthy. Your doctor will help you find out what may be causing the allergies. Common causes of allergy symptoms are house dust and dust mites, animal dander, mold, and pollen. As soon as you know what triggers your symptoms, try to reduce your exposure to your triggers. This can help prevent allergy symptoms, asthma, and other health problems. Ask your doctor about allergy medicine or immunotherapy. These treatments may help reduce or prevent allergy symptoms. Follow-up care is a key part of your treatment and safety. Be sure to make and go to all appointments, and call your doctor if you are having problems. It's also a good idea to know your test results and keep a list of the medicines you take. How can you care for yourself at home? · If you think that dust or dust mites are causing your allergies: 
¨ Wash sheets, pillowcases, and other bedding every week in hot water. ¨ Use airtight, dust-proof covers for pillows, duvets, and mattresses. Avoid plastic covers, because they tend to tear quickly and do not \"breathe. \" Wash according to the instructions. ¨ Remove extra blankets and pillows that you don't need. ¨ Use blankets that are machine-washable. ¨ Don't use home humidifiers. They can help mites live longer. · Use air-conditioning. Change or clean all filters every month. Keep windows closed. Use high-efficiency air filters. Don't use window or attic fans, which draw dust into the air. · If you're allergic to pet dander, keep pets outside or, at the very least, out of your bedroom. Old carpet and cloth-covered furniture can hold a lot of animal dander. You may need to replace them. · Look for signs of cockroaches. Use cockroach baits to get rid of them. Then clean your home well. · If you're allergic to mold, don't keep indoor plants, because molds can grow in soil. Get rid of furniture, rugs, and drapes that smell musty. Check for mold in the bathroom. · If you're allergic to pollen, stay inside when pollen counts are high. · Don't smoke or let anyone else smoke in your house. Don't use fireplaces or wood-burning stoves. Avoid paint fumes, perfumes, and other strong odors. When should you call for help? Give an epinephrine shot if: 
? · You think you are having a severe allergic reaction. ? After giving an epinephrine shot call 911, even if you feel better. ?Call 911 if: 
? · You have symptoms of a severe allergic reaction. These may include: 
¨ Sudden raised, red areas (hives) all over your body. ¨ Swelling of the throat, mouth, lips, or tongue. ¨ Trouble breathing. ¨ Passing out (losing consciousness). Or you may feel very lightheaded or suddenly feel weak, confused, or restless. ? · You have been given an epinephrine shot, even if you feel better. ?Call your doctor now or seek immediate medical care if: 
? · You have symptoms of an allergic reaction, such as: ¨ A rash or hives (raised, red areas on the skin). ¨ Itching. ¨ Swelling. ¨ Belly pain, nausea, or vomiting. ? Watch closely for changes in your health, and be sure to contact your doctor if: 
? · Your allergies get worse. ? · You need help controlling your allergies. ? · You have questions about allergy testing. ? · You do not get better as expected. Where can you learn more? Go to http://sierra-ghanshyam.info/. Enter L249 in the search box to learn more about \"Managing Your Allergies: Care Instructions. \" Current as of: September 29, 2016 Content Version: 11.4 © 5707-5880 [employer], [employer].  Care instructions adapted under license by [address] (which disclaims liability or warranty for this information). If you have questions about a medical condition or this instruction, always ask your healthcare professional. [address] any warranty or liability for your use of this information. Introducing [facility]! Dear Willa Magallanes: Thank you for requesting a [employer] account. Our records indicate that you already have an active [employer] account. You can access your account anytime at https://[employer]. [email or web address] Did you know that you can access your hospital and ER discharge instructions at any time in [employer]? You can also review all of your test results from your hospital stay or ER visit. Additional Information If you have questions, please visit the Frequently Asked Questions section of the [employer] website at [email or web address] Remember, [employer] is NOT to be used for urgent needs. For medical emergencies, dial 911. Now available from your iPhone and Android! Please provide this summary of care documentation to your next provider. Your primary care clinician is listed as Claire Saab. If you have any questions after today's visit, please call [phone].

## 2017-12-07 NOTE — PROGRESS NOTES
HISTORY OF Gavin Christianson is a 46 y.o. female presents with Blood Pressure Check; Swelling (Patient stated that she has noticed water retention all over her body upon waking up); Referral Follow Up; Labs; Foot Pain; Sleep Problem; and Medication Refill    Agree with nurse note. Hypertensive pt with elevated cholesterol, impaired glucose tolerance, vit d deficiency, multiple thyroid nodules, aspirin intolerance, and hx of iron deficiency anemia presents to the office with a BP of 143/97. For BP, she takes Diovan-HCT 80-12.5 mg daily and Propanolol  mg prn. She admits to missing some doses of Diovan-HCT. She has refused to be weighed since 05/2016. At her last visit, she had chest pain but this has not recurred. She did not follow up with the cardiology as referred. She has urinary leakage with coughing and sneezing. She had a UTI within the last 6 months. The patient denies fever, chills, abdominal pain, blood in the urine, pain with urination, increased frequency, back pain or other associated symptoms. She had a cold about 2 months ago and has a lingering cough. She also has nasal congestion and itchy eyes. She has been taking vinegar in the mornings and that helps her to cough up phlegm. Her BL heels are sore x weeks. Worse with flat feet. She is active and on her feet at work. She wonders if gaining weight has contributed. Pt with chronic insomnia. She stopped Ambien 10 mg because it caused her to forget that she slept ate. She now takes Equate brand Doxylamine instead. She is concerned because she wakes up with hand, feet, and eye swelling in the mornings. Stressors: grandchildren living with her now, 1 y.o was recently dx'd with autism, and 3 w.o. Grandson    She started her menses this past week and prior had not had once since 05/2017. Her flow is heavy.      Health Maintenance    Pt's most recent colonoscopy was on 03/31/14 with GI, Dr. Chelita Carney [facility].    Written by caty Ceja, as dictated by Dr. Diego Bello DO.    ROS    Review of Systems negative except as noted above in HPI. ALLERGIES:    Allergies   Allergen Reactions    Doxylamine Succinate Swelling     12.5-25 MG  HANDS, FACE/PERIORAL, FEET    Pcn [Penicillins] Hives    Ambien [Zolpidem] Nausea and Vomiting and Other (comments)     5 mg with Ativan 0.5 mg   TOO GROGGY, SLEPT 24 HOURS  7.5 MG FORGOT SHE WAS SLEEP EATING    Aspirin Nausea Only    Celebrex [Celecoxib] Other (comments)     TIGHT SQUEEZING IN CHEST  CHEST ACHED    Erythromycin Nausea and Vomiting    Milk Prot-Turm-Pepper-Pineappl Other (comments)     Multiple food allergies    Neurontin [Gabapentin] Other (comments)     300 mg  SEVERE LEG PAIN  EYE TWITCHING    Nsaids (Non-Steroidal Anti-Inflammatory Drug) Other (comments)     GI irritation         CURRENT MEDICATIONS:    Outpatient Prescriptions Marked as Taking for the 12/7/17 encounter (Office Visit) with Yvonne Mancia DO   Medication Sig Dispense Refill    propranolol LA (INDERAL LA) 80 mg SR capsule Take 1 Cap by mouth two (2) times a day. Indications: MIGRAINE PREVENTION 180 Cap 3    valsartan-hydroCHLOROthiazide (DIOVAN-HCT) 80-12.5 mg per tablet Take 1 Tab by mouth daily. Indications: hypertension 90 Tab 3    acetaminophen (TYLENOL ARTHRITIS PAIN) 650 mg CR tablet Take 650 mg by mouth every six (6) hours as needed for Pain.  omeprazole (PRILOSEC) 20 mg capsule TAKE 1 CAPSULE BY MOUTH TWICE DAILY  Indications: HEARTBURN 60 Cap 5       PAST MEDICAL HISTORY:    Past Medical History:   Diagnosis Date    Allergic rhinitis 5/27/2010    Cervical disc herniation 01/2010    C3-4, C4-5, C5-6. Hemangioma body of C5. Dr. John Gamboa.  Chest pain 08/2011, 02/2012    Dr. Ada Zacarias. Dr. Varsha Geller; denies CP as of 3/27/14    Chronic lower back pain 2010, 03/2016    thoracic DDD and spondylosis. DDD L3-S1. Dr. Kerri Burton. Major Person. Dr. Edmundo Thurston, [facility]. Dr. Sara Guerrero.  Esophagitis, reflux 4/3/2011    Gallstone     Gastritis 2010    Dr. Radha Bryan.  Gastrointestinal food allergy 2014    Multiple. Dr. Charissa Smiley.    Heart palpitations 2012    DUE TO PAC'S AND PVC'S. Dr. Salud Dominguez.  Hiatal hernia 4/3/2011    HTN (hypertension)     Impaired glucose tolerance 10/15/2010    Incidental lung nodule 13    LLL 3.9 mm, RML 3.1 mm;  as of 3/27/14 per pt stable w/o growth    Insomnia     Iron deficiency anemia 2010    Irritable bowel syndrome (IBS) 2014    Dr. Kristin Dobson.    Knee pain     Right. due to severe OA. / chipped bone     Metatarsal bone fracture     Left fifth.  Migraine 2011    Dr. Anaid Salguero, cervical 2010    Left. Dr. Kylee Hutchinson.  Shoulder pain, right     due to [address] X 2. Dr. Ramiro Crews.  Thyroid nodule     Dr. Jose F Bazzi    Toe fracture, left     fifth toe.  Triangular fibrocartilage complex tear     Dr. Dario Ash. Left. PAST SURGICAL HISTORY:    Past Surgical History:   Procedure Laterality Date    HX  SECTION  1995    x 1    HX CHOLECYSTECTOMY      HX COLONOSCOPY  14    Dr. Shakeel Sánchez; 14    due to abnormal PAP.  HX DILATION AND CURETTAGE      due to miscarriage.  HX ENDOSCOPY      HX ORTHOPAEDIC Right 2016    LUMBAR L4-5, L5-S1 ELLY.   Dr. Олег Mccurdy     [address]:    Family History   Problem Relation Age of Onset    Diabetes Mother     Heart Disease Mother 58     2 stents    Hypertension Mother     Diabetes Father     Stroke Maternal Grandmother     Cancer Maternal Uncle      prostate       SOCIAL HISTORY:    Social History     Social History    Marital status:      Spouse name: N/A    Number of children: 3    Years of education: N/A     Occupational History    Medical assistant [employer]     Social History Main Topics    Smoking status: Never Smoker    Smokeless tobacco: Never Used    Alcohol use No    Drug use: No    Sexual activity: Yes     Partners: Male     Other Topics Concern    None     Social History Narrative       IMMUNIZATIONS:    Immunization History   Administered Date(s) Administered    Influenza Vaccine 10/30/2015, 10/31/2017    Influenza Vaccine Whole 10/01/2010    TDAP Vaccine 08/10/2012    Td, Adsorbed 10/09/2016         PHYSICAL EXAMINATION    Vital Signs    Visit Vitals    BP (!) 143/97 (BP 1 Location: Right arm, BP Patient Position: Sitting)    Pulse 74    Temp 98.6 °F (37 °C) (Oral)    Resp 16    Ht 5' 7\" (1.702 m)    SpO2 97%       Weight Metrics 12/7/2017 7/20/2016 5/19/2016 3/16/2016 12/16/2015 9/8/2015 6/3/2015   Weight - - 248 lb 12.8 oz 247 lb 265 lb 3.2 oz 267 lb 268 lb   BMI - - 37.84 kg/m2 37.56 kg/m2 40.33 kg/m2 40.61 kg/m2 40.76 kg/m2       General appearance - Well nourished. Well appearing. Well developed. No acute distress. Obese. Head - Normocephalic. Atraumatic. Eyes - pupils equal and reactive. Extraocular eye movements intact. Sclera anicteric. Mildly injected sclera. Ears - Hearing is grossly normal bilaterally. Nose - normal and patent. No polyps noted. No erythema. No discharge. Mouth - mucous membranes with adequate moisture. Posterior pharynx normal with cobblestone appearance. No erythema, white exudate or obstruction. Neck - supple. Midline trachea. No carotid bruits noted bilaterally. No thyromegaly noted. Chest - clear to auscultation bilaterally anteriorly and posteriorly. No wheezes. No rales or rhonchi. Breath sounds are symmetrical bilaterally. Unlabored respirations. Heart - normal rate. Regular rhythm. Normal S1, S2.  3/6 murmur noted. No rubs, clicks or gallops noted. Abdomen - soft and distended. No masses or organomegaly. No rebound, rigidity or guarding. Bowel sounds normal x 4 quadrants. No tenderness noted. Neurological - awake, alert and oriented to person, place, and time and event. Cranial nerves II through XII intact. Clear speech. Muscle strength is +5/5 x 4 extremities. Sensation is intact to light touch bilaterally. Steady gait. Heme/Lymph - peripheral pulses normal x 4 extremities. No peripheral edema is noted. Musculoskeletal - Intact x 4 extremities. Full ROM x 4 extremities. Mild BL heel pain with movement. Pain on palpation along achilles insertion and at BL calves. Back exam - normal range of motion. No pain on palpation of the spinous processes in the cervical, thoracic, lumbar, sacral regions. No CVA tenderness. Skin - no rashes, erythema, ecchymosis, lacerations, abrasions, suspicious moles noted  Psychological -   normal behavior, dress and thought processes. Good insight. Good eye contact. Normal affect. Appropriate mood. Normal speech. DATA REVIEWED    Lab Results   Component Value Date/Time    WBC 3.9 12/08/2016 08:46 AM    Hemoglobin (POC) 12.9 02/03/2015 02:40 PM    HGB 13.1 12/08/2016 08:46 AM    Hematocrit (POC) 38 02/03/2015 02:40 PM    HCT 38.9 12/08/2016 08:46 AM    PLATELET 159 79/97/6362 08:46 AM    MCV 87 12/08/2016 08:46 AM     Lab Results   Component Value Date/Time    Sodium 139 12/08/2016 08:46 AM    Potassium 4.4 12/08/2016 08:46 AM    Chloride 99 12/08/2016 08:46 AM    CO2 28 12/08/2016 08:46 AM    Anion gap 9 01/16/2014 10:15 AM    Glucose 95 12/08/2016 08:46 AM    BUN 6 12/08/2016 08:46 AM    Creatinine 0.70 12/08/2016 08:46 AM    BUN/Creatinine ratio 9 12/08/2016 08:46 AM    GFR est  12/08/2016 08:46 AM    GFR est non- 12/08/2016 08:46 AM    Calcium 9.2 12/08/2016 08:46 AM    Bilirubin, total <0.2 12/08/2016 08:46 AM    AST (SGOT) 26 12/08/2016 08:46 AM    Alk.  phosphatase 89 12/08/2016 08:46 AM    Protein, total 7.7 12/08/2016 08:46 AM    Albumin 4.3 12/08/2016 08:46 AM    Globulin 4.4 01/16/2014 10:15 AM    A-G Ratio 1.3 12/08/2016 08:46 AM    ALT (SGPT) 20 12/08/2016 08:46 AM     Lab Results   Component Value Date/Time    Cholesterol, total 218 12/08/2016 08:46 AM    HDL Cholesterol 71 12/08/2016 08:46 AM    LDL, calculated 119 12/08/2016 08:46 AM    VLDL, calculated 28 12/08/2016 08:46 AM    Triglyceride 138 12/08/2016 08:46 AM    CHOL/HDL Ratio 3.2 10/15/2010 04:14 PM     Lab Results   Component Value Date/Time    VITAMIN D, 25-HYDROXY 10.4 12/08/2016 08:46 AM       Lab Results   Component Value Date/Time    Hemoglobin A1c 6.0 12/08/2016 08:46 AM     Lab Results   Component Value Date/Time    TSH 0.641 12/08/2016 08:46 AM     Lab Results   Component Value Date/Time    Microalb/Creat ratio (ug/mg creat.) 1.9 12/08/2016 08:46 AM     Lab Results   Component Value Date/Time    Vitamin B12 292 12/08/2016 08:46 AM    Folate 15.0 12/08/2016 08:46 AM       ASSESSMENT and PLAN      ICD-10-CM ICD-9-CM    1. Essential hypertension I10 401.9 MICROALBUMIN, UR, RAND      LIPID PANEL      METABOLIC PANEL, COMPREHENSIVE      TSH 3RD GENERATION      LIPID PANEL      METABOLIC PANEL, COMPREHENSIVE      TSH 3RD GENERATION      MICROALBUMIN, UR, RAND W/ MICROALBUMIN/CREA RATIO      URINALYSIS W/ RFLX MICROSCOPIC      propranolol LA (INDERAL LA) 80 mg SR capsule      valsartan-hydroCHLOROthiazide (DIOVAN-HCT) 80-12.5 mg per tablet      CANCELED: URINALYSIS W/ RFLX MICROSCOPIC      CANCELED: MICROALBUMIN, UR, RAND W/ MICROALBUMIN/CREA RATIO      CANCELED: URINALYSIS W/ RFLX MICROSCOPIC    worse due to stress vs sleep vs missed bp meds vs other   2. Elevated cholesterol E78.00 272.0 LIPID PANEL      METABOLIC PANEL, COMPREHENSIVE      TSH 3RD GENERATION      LIPID PANEL      METABOLIC PANEL, COMPREHENSIVE   3. DUB (dysfunctional uterine bleeding) N93.8 626.8 TSH 3RD GENERATION    due to perimenopause vs stress   4.  Iron deficiency anemia, unspecified iron deficiency anemia type D50.9 280.9 CBC W/O DIFF METABOLIC PANEL, COMPREHENSIVE      CBC W/O DIFF      IRON   5. Heel pain, bilateral M79.671 729.5 REFERRAL TO PODIATRY    M79.672     6. Allergic conjunctivitis and rhinitis, bilateral H10.13 372.05     J30.9 477.9    7. Impaired glucose tolerance R73.02 790.22 HEMOGLOBIN A1C WITH EAG      HEMOGLOBIN A1C WITH EAG   8. Acute cystitis without hematuria N30.00 595.0 URINALYSIS W/ RFLX MICROSCOPIC      CULTURE, URINE   9. Vitamin D deficiency E55.9 268.9 VITAMIN D, 25 HYDROXY      VITAMIN D, 25 HYDROXY   10. Obesity, Class II, BMI 35-39.9 E66.9 278.00    11. Multiple thyroid nodules E04.2 241.1    12. Other chest pain X15.85 835.47 METABOLIC PANEL, COMPREHENSIVE      TSH 3RD GENERATION      CBC W/O DIFF    no recurrence since last ov   13. Aspirin intolerance Z78.9 995.27      E935.3    14. Chronic insomnia F51.04 780.52    15. Need for hepatitis C screening test Z11.59 V73.89 HEPATITIS C AB   16. Breast cancer screening Z12.31 V76.10 HEPATITIS C AB      TERI MAMMO BI SCREENING INCL CAD   16. Adverse effect of drug, initial encounter T88. 7XXA E947.9     equate sleep aid. resolved without recurrence    18. Other acute gastritis without hemorrhage K29.00 535.00    19. Essential hypertension I10 401.9 MICROALBUMIN, UR, RAND      LIPID PANEL      METABOLIC PANEL, COMPREHENSIVE      TSH 3RD GENERATION      LIPID PANEL      METABOLIC PANEL, COMPREHENSIVE      TSH 3RD GENERATION      MICROALBUMIN, UR, RAND W/ MICROALBUMIN/CREA RATIO      URINALYSIS W/ RFLX MICROSCOPIC      propranolol LA (INDERAL LA) 80 mg SR capsule      valsartan-hydroCHLOROthiazide (DIOVAN-HCT) 80-12.5 mg per tablet      CANCELED: URINALYSIS W/ RFLX MICROSCOPIC      CANCELED: MICROALBUMIN, UR, RAND W/ MICROALBUMIN/CREA RATIO      CANCELED: URINALYSIS W/ RFLX MICROSCOPIC    stable       Discussed the patient's BMI with her. The BMI follow up plan is as follows: I have counseled this patient on diet and exercise regimens.   Decrease carbohydrates (white foods, sweet foods, sweet drinks and alcohol), increase green leafy vegetables and protein (lean meats and beans) with each meal.  Avoid fried foods. Eat 3-5 small meals daily. Do not skip meals. Increase water intake. Increase physical activity to 30 minutes daily for health benefit or 60 minutes daily to prevent weight regain, as tolerated. Get 7-8 hours uninterrupted sleep nightly. Chart reviewed and updated. Continue current medications and care. Prescriptions written and sent to pharmacy; medication side effects discussed. Diovan-HCT 80-12.5 mg. Propanolol 80 mg. Mammogram ordered. Most recent tests reviewed from 12/2016. Recheck pertinent labs when fasting. Referrals given; patient urged to keep appointments with specialists. Podiatry. Reprinted cardio referral.   Counseled patient on health concerns:  BP, sleep hygiene, urinary incontinence, allergies, and feet care. Stressors. Relevant handouts given and discussed with patient. Immunizations noted. Offered empathy, support, legitimation, prayers, partnership to patient. Praised patient for progress. Follow-up Disposition:  Return in about 6 months (around 6/7/2018) for bp, results, weight. Patient was offered a choice/choices in the treatment plan today. Patient expresses understanding of the plan and agrees with recommendations. More than 40 mins spent face to face with patient and more than 50% of this time spent in counseling and coordinating care. Written by caty Bernabe, as dictated by Dr. Jaimie Feliciano DO. Documentation True and Accepted by Malou Portillo. Suzanne Kohler. Patient Instructions          Advance Care Planning: Care Instructions  Your Care Instructions    It can be hard to live with an illness that cannot be cured. But if your health is getting worse, you may want to make decisions about end-of-life care. Planning for the end of your life does not mean that you are giving up.  It is a way to make sure that your wishes are met. Clearly stating your wishes can make it easier for your loved ones. Making plans while you are still able may also ease your mind and make your final days less stressful and more meaningful. Follow-up care is a key part of your treatment and safety. Be sure to make and go to all appointments, and call your doctor if you are having problems. It's also a good idea to know your test results and keep a list of the medicines you take. What can you do to plan for the end of life? · You can bring these issues up with your doctor. You do not need to wait until your doctor starts the conversation. You might start with \"I would not be willing to live with . Carmen Lange \" When you complete this sentence it helps your doctor understand your wishes. · Talk openly and honestly with your doctor. This is the best way to understand the decisions you will need to make as your health changes. Know that you can always change your mind. · Ask your doctor about commonly used life-support measures. These include tube feedings, breathing machines, and fluids given through a vein (IV). Understanding these treatments will help you decide whether you want them. · You may choose to have these life-supporting treatments for a limited time. This allows a trial period to see whether they will help you. You may also decide that you want your doctor to take only certain measures to keep you alive. It is important to spell out these conditions so that your doctor and family understand them. · Talk to your doctor about how long you are likely to live. He or she may be able to give you an idea of what usually happens with your specific illness. · Think about preparing papers that state your wishes. This way there will not be any confusion about what you want. You can change your instructions at any time. Which papers should you prepare?   Advance directives are legal papers that tell doctors how you want to be cared for at the end of your life. You do not need a  to write these papers. Ask your doctor or your state [facility] department for information on how to write your advance directives. They may have the forms for each of these types of papers. Make sure your doctor has a copy of these on file, and give a copy to a family member or close friend. · Consider a do-not-resuscitate order (DNR). This order asks that no extra treatments be done if your heart stops or you stop breathing. Extra treatments may include cardiopulmonary resuscitation (CPR), electrical shock to restart your heart, or a machine to breathe for you. If you decide to have a DNR order, ask your doctor to explain and write it. Place the order in your home where everyone can easily see it. · Consider a living will. A living will explains your wishes about life support and other treatments at the end of your life if you become unable to speak for yourself. Living [place] tell doctors to use or not use treatments that would keep you alive. You must have one or two witnesses or a notary present when you sign this form. · Consider a durable power of  for health care. This allows you to name a person to make decisions about your care if you are not able to. Most people ask a close friend or family member. Talk to this person about the kinds of treatments you want and those that you do not want. Make sure this person understands your wishes. These legal papers are simple to change. Tell your doctor what you want to change, and ask him or her to make a note in your medical file. Give your family updated copies of the papers. Where can you learn more? Go to http://sierra-ghanshyam.info/. Enter P184 in the search box to learn more about \"Advance Care Planning: Care Instructions. \"  Current as of: September 24, 2016  Content Version: 11.4  © 2124-8471 [employer], [employer].  Care instructions adapted under license by [facility] (which disclaims liability or warranty for this information). If you have questions about a medical condition or this instruction, always ask your healthcare professional. [place] [address] any warranty or liability for your use of this information. Achilles Tendon: Exercises  Your Care Instructions  Here are some examples of exercises for your achilles tendon. Start each exercise slowly. Ease off the exercise if you start to have pain. Your doctor or physical therapist will tell you when you can start these exercises and which ones will work best for you. How to do the exercises  Toe stretch    1. Sit in a chair, and extend your affected leg so that your heel is on the floor. 2. With your hand, reach down and pull your big toe up and back. Pull toward your ankle and away from the floor. 3. Hold the position for at least 15 to 30 seconds. 4. Repeat 2 to 4 times a session, several times a day. Calf-plantar fascia stretch    1. Sit with your legs extended and knees straight. 2. Place a towel around your foot just under the toes. 3. Hold each end of the towel in each hand, with your hands above your knees. 4. Pull back with the towel so that your foot stretches toward you. 5. Hold the position for at least 15 to 30 seconds. 6. Repeat 2 to 4 times a session, up to 5 sessions a day. Floor stretch    1. Stand about 2 feet from a wall, and place your hands on the wall at about shoulder height. Or you can stand behind a chair, placing your hands on the back of it for balance. 2. Step back with the leg you want to stretch. Keep the leg straight, and press your heel into the floor with your toe turned slightly in.  3. Lean forward, and bend your other leg slightly. Feel the stretch in the Achilles tendon of your back leg. Hold for at least 15 to 30 seconds. 4. Repeat 2 to 4 times a session, up to 5 sessions a day.   Stair stretch    1. Stand with the balls of both feet on the edge of a step or curb (or a medium-sized phone book). With at least one hand, hold onto something solid for balance, such as a banister or handrail. 2. Keeping your affected leg straight, slowly let that heel hang down off of the step or curb until you feel a stretch in the back of your calf and/or Achilles area. Some of your weight should still be on the other leg. 3. Hold this position for at least 15 to 30 seconds. 4. Repeat 2 to 4 times a session, up to 5 times a day or whenever your Achilles tendon starts to feel tight. This stretch can also be done with your knee slightly bent. Strength exercise    1. This exercise will get you started on building strength after an Achilles tendon injury. Your doctor or physical therapist can help you move on to more challenging exercises as you heal and get stronger. 2. Stand on a step with your heel off the edge of the step. Hold on to a handrail or wall for balance. 3. Push up on your toes, then slowly count to 10 as you lower yourself back down until your heel is below the step. If it hurts to push up on your toes, try putting most of your weight on your other foot as you push up, or try using your arms to help you. If you can't do this exercise without causing pain, stop the exercise and talk to your doctor. 4. Repeat the exercise 8 to 12 times, half with the knee straight and half with the knee bent. Follow-up care is a key part of your treatment and safety. Be sure to make and go to all appointments, and call your doctor if you are having problems. It's also a good idea to know your test results and keep a list of the medicines you take. Where can you learn more? Go to http://sierra-ghanshyam.info/. Enter X651 in the search box to learn more about \"Achilles Tendon: Exercises. \"  Current as of: March 21, 2017  Content Version: 11.4  © 2260-6960 Healthwise, Incorporated.  Care instructions adapted under license by [employer] (which disclaims liability or warranty for this information). If you have questions about a medical condition or this instruction, always ask your healthcare professional. [address] any warranty or liability for your use of this information. Advise patient to start taking Over The Counter allergy medication (Allegra, Zyrtec, Claritin, Xyzal or Alavert) daily. If you have itchy, watery eyes you can try OTC Zaditor or Zyrtec Allergy Eye drops. If you have a stuffy nose, please try OTC Flonase, Flonase Sensimist, Rhinocort, or Nasacort AQ 2 squirts up each nostril once a day (adults) or 1 squirt up each nostril once a day (children). Use allergy free, dye free, fragrance free products on your body and hair. After being outdoors, brush hair vigorously, wash face and arms, rinse nostrils with a nasal saline spray and consider changing your clothing. Dust furniture frequently and wear a mask while doing it. Vacuum floors weekly. Remove stuffed animals or extra pillows from the bed. Clean bedding in hot water weekly. Sometimes allergies can be so severe that 2 nasal sprays and 2 pills may be needed to control symptoms. Allergies: Care Instructions  Your Care Instructions    Allergies occur when your body's defense system (immune system) overreacts to certain substances. The immune system treats a harmless substance as if it were a harmful germ or virus. Many things can cause this overreaction, including pollens, medicine, food, dust, animal dander, and mold. Allergies can be mild or severe. Mild allergies can be managed with home treatment. But medicine may be needed to prevent problems. Managing your allergies is an important part of staying healthy. Your doctor may suggest that you have allergy testing to help find out what is causing your allergies. When you know what things trigger your symptoms, you can avoid them. This can prevent allergy symptoms and other health problems.   For severe allergies that cause reactions that affect your whole body (anaphylactic reactions), your doctor may prescribe a shot of epinephrine to carry with you in case you have a severe reaction. Learn how to give yourself the shot and keep it with you at all times. Make sure it is not . Follow-up care is a key part of your treatment and safety. Be sure to make and go to all appointments, and call your doctor if you are having problems. It's also a good idea to know your test results and keep a list of the medicines you take. How can you care for yourself at home? · If you have been told by your doctor that dust or dust mites are causing your allergy, decrease the dust around your bed:  [facility] sheets, pillowcases, and other bedding in hot water every week. ¨ Use dust-proof covers for pillows, duvets, and mattresses. Avoid plastic covers because they tear easily and do not \"breathe. \" Wash as instructed on the label. ¨ Do not use any blankets and pillows that you do not need. ¨ Use blankets that you can wash in your washing machine. ¨ Consider removing drapes and carpets, which attract and hold dust, from your bedroom. · If you are allergic to house dust and mites, do not use home humidifiers. Your doctor can suggest ways you can control dust and mites. · Look for signs of cockroaches. Cockroaches cause allergic reactions. Use cockroach baits to get rid of them. Then, clean your home well. Cockroaches like areas where grocery bags, newspapers, empty bottles, or cardboard boxes are stored. Do not keep these inside your home, and keep trash and food containers sealed. Seal off any spots where cockroaches might enter your home. · If you are allergic to mold, get rid of furniture, rugs, and drapes that smell musty. Check for mold in the bathroom. · If you are allergic to outdoor pollen or mold spores, use air-conditioning. Change or clean all filters every month. Keep windows closed.   · If you are allergic to pollen, stay inside when pollen counts are high. Use a vacuum  with a HEPA filter or a double-thickness filter at least two times each week. · Stay inside when air pollution is bad. Avoid paint fumes, perfumes, and other strong odors. · Avoid conditions that make your allergies worse. Stay away from smoke. Do not smoke or let anyone else smoke in your house. Do not use fireplaces or wood-burning stoves. · If you are allergic to your pets, change the air filter in your furnace every month. Use high-efficiency filters. · If you are allergic to pet dander, keep pets outside or out of your bedroom. Old carpet and cloth furniture can hold a lot of animal dander. You may need to replace them. When should you call for help? Give an epinephrine shot if:  ? · You think you are having a severe allergic reaction. ? · You have symptoms in more than one body area, such as mild nausea and an itchy mouth. ? After giving an epinephrine shot call 911, even if you feel better. ?Call 911 if:  ? · You have symptoms of a severe allergic reaction. These may include:  ¨ Sudden raised, red areas (hives) all over your body. ¨ Swelling of the throat, mouth, lips, or tongue. ¨ Trouble breathing. ¨ Passing out (losing consciousness). Or you may feel very lightheaded or suddenly feel weak, confused, or restless. ? · You have been given an epinephrine shot, even if you feel better. ?Call your doctor now or seek immediate medical care if:  ? · You have symptoms of an allergic reaction, such as:  ¨ A rash or hives (raised, red areas on the skin). ¨ Itching. ¨ Swelling. ¨ Belly pain, nausea, or vomiting. ? Watch closely for changes in your health, and be sure to contact your doctor if:  ? · You do not get better as expected. Where can you learn more? Go to http://sierra-ghanshyam.info/. Enter G677 in the search box to learn more about \"Allergies: Care Instructions. \"  Current as of: September 29, 2016  Content Version: 11.4  © 6566-0644 [employer]. Care instructions adapted under license by [employer] (which disclaims liability or warranty for this information). If you have questions about a medical condition or this instruction, always ask your healthcare professional. [address] any warranty or liability for your use of this information. Managing Your Allergies: Care Instructions  Your Care Instructions    Managing your allergies is an important part of staying healthy. Your doctor will help you find out what may be causing the allergies. Common causes of allergy symptoms are house dust and dust mites, animal dander, mold, and pollen. As soon as you know what triggers your symptoms, try to reduce your exposure to your triggers. This can help prevent allergy symptoms, asthma, and other health problems. Ask your doctor about allergy medicine or immunotherapy. These treatments may help reduce or prevent allergy symptoms. Follow-up care is a key part of your treatment and safety. Be sure to make and go to all appointments, and call your doctor if you are having problems. It's also a good idea to know your test results and keep a list of the medicines you take. How can you care for yourself at home? · If you think that dust or dust mites are causing your allergies:  ¨ Wash sheets, pillowcases, and other bedding every week in hot water. ¨ Use airtight, dust-proof covers for pillows, duvets, and mattresses. Avoid plastic covers, because they tend to tear quickly and do not \"breathe. \" Wash according to the instructions. ¨ Remove extra blankets and pillows that you don't need. ¨ Use blankets that are machine-washable. ¨ Don't use home humidifiers. They can help mites live longer. · Use air-conditioning. Change or clean all filters every month. Keep windows closed. Use high-efficiency air filters.  Don't use window or attic fans, which draw dust into the air. · If you're allergic to pet dander, keep pets outside or, at the very least, out of your bedroom. Old carpet and cloth-covered furniture can hold a lot of animal dander. You may need to replace them. · Look for signs of cockroaches. Use cockroach baits to get rid of them. Then clean your home well. · If you're allergic to mold, don't keep indoor plants, because molds can grow in soil. Get rid of furniture, rugs, and drapes that smell musty. Check for mold in the bathroom. · If you're allergic to pollen, stay inside when pollen counts are high. · Don't smoke or let anyone else smoke in your house. Don't use fireplaces or wood-burning stoves. Avoid paint fumes, perfumes, and other strong odors. When should you call for help? Give an epinephrine shot if:  ? · You think you are having a severe allergic reaction. ? After giving an epinephrine shot call 911, even if you feel better. ?Call 911 if:  ? · You have symptoms of a severe allergic reaction. These may include:  ¨ Sudden raised, red areas (hives) all over your body. ¨ Swelling of the throat, mouth, lips, or tongue. ¨ Trouble breathing. ¨ Passing out (losing consciousness). Or you may feel very lightheaded or suddenly feel weak, confused, or restless. ? · You have been given an epinephrine shot, even if you feel better. ?Call your doctor now or seek immediate medical care if:  ? · You have symptoms of an allergic reaction, such as:  ¨ A rash or hives (raised, red areas on the skin). ¨ Itching. ¨ Swelling. ¨ Belly pain, nausea, or vomiting. ? Watch closely for changes in your health, and be sure to contact your doctor if:  ? · Your allergies get worse. ? · You need help controlling your allergies. ? · You have questions about allergy testing. ? · You do not get better as expected. Where can you learn more? Go to http://sierra-ghanshyam.info/.   Enter [id number] in the search box to learn more about \"Managing Your Allergies: Care Instructions. \"  Current as of: September 29, 2016  Content Version: 11.4  © 3557-1239 [employer]. Care instructions adapted under license by [employer] (which disclaims liability or warranty for this information). If you have questions about a medical condition or this instruction, always ask your healthcare professional. [place] [address] any warranty or liability for your use of this information.

## 2017-12-30 ENCOUNTER — OFFICE VISIT (OUTPATIENT)
Dept: PRIMARY CARE CLINIC | Age: 52
End: 2017-12-30

## 2017-12-30 VITALS
DIASTOLIC BLOOD PRESSURE: 95 MMHG | RESPIRATION RATE: 16 BRPM | HEIGHT: 67 IN | BODY MASS INDEX: 45.99 KG/M2 | OXYGEN SATURATION: 99 % | TEMPERATURE: 98.9 F | WEIGHT: 293 LBS | SYSTOLIC BLOOD PRESSURE: 159 MMHG | HEART RATE: 94 BPM

## 2017-12-30 DIAGNOSIS — J01.00 ACUTE MAXILLARY SINUSITIS, RECURRENCE NOT SPECIFIED: Primary | ICD-10-CM

## 2017-12-30 DIAGNOSIS — R52 BODY ACHES: ICD-10-CM

## 2017-12-30 PROBLEM — E66.01 OBESITY, MORBID (HCC): Status: ACTIVE | Noted: 2017-12-30

## 2017-12-30 LAB
QUICKVUE INFLUENZA TEST: NEGATIVE
VALID INTERNAL CONTROL?: YES

## 2017-12-30 RX ORDER — PROMETHAZINE HYDROCHLORIDE AND DEXTROMETHORPHAN HYDROBROMIDE 6.25; 15 MG/5ML; MG/5ML
5 SYRUP ORAL
Qty: 90 ML | Refills: 0 | Status: SHIPPED | OUTPATIENT
Start: 2017-12-30 | End: 2018-01-06

## 2017-12-30 RX ORDER — DOXYCYCLINE 100 MG/1
100 CAPSULE ORAL 2 TIMES DAILY
Qty: 20 CAP | Refills: 0 | Status: SHIPPED | OUTPATIENT
Start: 2017-12-30 | End: 2018-01-09

## 2017-12-30 NOTE — MR AVS SNAPSHOT
Visit Information Date & Time Provider Department Dept. Phone Encounter #  
 12/30/2017  1:45 PM Davin Sandoval, [phone] Follow-up Instructions Return if symptoms worsen or fail to improve. Your Appointments 6/1/2018  8:30 AM  
Office Visit with DO Olivia Villeda (MATTHEWSANDIE Moreno) Appt Note: 6 mth f/u b/p results-wt [id number] [facility] [address]  
[phone]  
  
   
 [address] [facility] [facility] [address] [place] [address] Upcoming Health Maintenance Date Due Hepatitis C Screening 4/20/2018* PAP AKA CERVICAL CYTOLOGY 4/20/2018* COLONOSCOPY 3/31/2024 DTaP/Tdap/Td series (3 - Td) 10/9/2026 *Topic was postponed. The date shown is not the original due date. Allergies as of 12/30/2017  Review Complete On: 12/30/2017 By: Sharri Rooney LPN Severity Noted Reaction Type Reactions Doxylamine Succinate High 12/07/2017    Swelling 12.5-25 MG 
HANDS, FACE/PERIORAL, FEET Pcn [Penicillins] High 05/27/2010    Hives Ambien [Zolpidem]  11/11/2014    Nausea and Vomiting, Other (comments) 5 mg with Ativan 0.5 mg  
TOO GROGGY, SLEPT 24 HOURS 
7.5 MG FORGOT SHE WAS SLEEP EATING Aspirin  12/07/2017    Nausea Only Celebrex [Celecoxib]  06/19/2017    Other (comments) TIGHT SQUEEZING IN CHEST 
CHEST ACHED Erythromycin  02/22/2011    Nausea and Vomiting Milk Prot-turm-pepper-pineappl  06/20/2014    Other (comments) Multiple food allergies Neurontin [Gabapentin]  06/19/2017    Other (comments) 300 mg SEVERE LEG PAIN 
EYE TWITCHING  
 Nsaids (Non-steroidal Anti-inflammatory Drug)  02/22/2011    Other (comments) GI irritation Current Immunizations  Reviewed on 12/7/2017 Name Date Influenza Vaccine 10/31/2017, 10/30/2015 Influenza Vaccine Whole 10/1/2010 TDAP Vaccine 8/10/2012 Td, Adsorbed 10/9/2016  6:23 PM  
  
 Not reviewed this visit You Were Diagnosed With   
  
 Codes Comments Acute maxillary sinusitis, recurrence not specified    -  Primary ICD-10-CM: J01.00 ICD-9-CM: 461.0 Vitals BP Pulse Temp Resp Height(growth percentile) Weight(growth percentile) (!) 159/95 (BP 1 Location: Right arm, BP Patient Position: Sitting) 94 98.9 °F (37.2 °C) (Oral) 16 5' 7\" (1.702 m) 298 lb 9.6 oz (135.4 kg) SpO2 BMI OB Status Smoking Status 99% 46.77 kg/m2 Menopause Never Smoker Vitals History BMI and BSA Data Body Mass Index Body Surface Area  
 46.77 kg/m 2 2.53 m 2 Preferred Pharmacy Pharmacy Name Phone [id number]- [place], [place] - [address] [phone] Your Updated Medication List  
  
   
This list is accurate as of: 12/30/17  2:14 PM.  Always use your most recent med list.  
  
  
  
  
 doxycycline 100 mg capsule Commonly known as:  Leonarda Skates Take 1 Cap by mouth two (2) times a day for 10 days. omeprazole 20 mg capsule Commonly known as:  PRILOSEC  
TAKE 1 CAPSULE BY MOUTH TWICE DAILY  Indications: HEARTBURN  
  
 promethazine-dextromethorphan 6.25-15 mg/5 mL syrup Commonly known as:  PROMETHAZINE-DM Take 5 mL by mouth four (4) times daily as needed for Cough for up to 7 days. propranolol LA 80 mg SR capsule Commonly known as:  INDERAL LA Take 1 Cap by mouth two (2) times a day. Indications: MIGRAINE PREVENTION  
  
 TYLENOL ARTHRITIS PAIN 650 mg Tber Generic drug:  acetaminophen Take 650 mg by mouth every six (6) hours as needed for Pain.  
  
 valsartan-hydroCHLOROthiazide 80-12.5 mg per tablet Commonly known as:  DIOVAN-HCT Take 1 Tab by mouth daily. Indications: hypertension Prescriptions Sent to Pharmacy Refills  
 doxycycline (MONODOX) 100 mg capsule 0 Sig: Take 1 Cap by mouth two (2) times a day for 10 days. Class: Normal  
 Pharmacy: [facility]pharm[facility] [id number] [facility] [address] Ph #: [phone] Route: Oral  
 promethazine-dextromethorphan (PROMETHAZINE-DM) 6.25-15 mg/5 mL syrup 0 Sig: Take 5 mL by mouth four (4) times daily as needed for Cough for up to 7 days. Class: Normal  
 Pharmacy: [facility]/pharmacy [id number] [facility] [address] Ph #: [phone] Route: Oral  
  
Follow-up Instructions Return if symptoms worsen or fail to improve. Patient Instructions Sinusitis: Care Instructions Your Care Instructions Sinusitis is an infection of the lining of the sinus cavities in your head. Sinusitis often follows a cold. It causes pain and pressure in your head and face. In most cases, sinusitis gets better on its own in 1 to 2 weeks. But some mild symptoms may last for several weeks. Sometimes antibiotics are needed. Follow-up care is a key part of your treatment and safety. Be sure to make and go to all appointments, and call your doctor if you are having problems. It's also a good idea to know your test results and keep a list of the medicines you take. How can you care for yourself at home? · Take an over-the-counter pain medicine, such as acetaminophen (Tylenol), ibuprofen (Advil, Motrin), or naproxen (Aleve). Read and follow all instructions on the label. · If the doctor prescribed antibiotics, take them as directed. Do not stop taking them just because you feel better. You need to take the full course of antibiotics. · Be careful when taking over-the-counter cold or flu medicines and Tylenol at the same time. Many of these medicines have acetaminophen, which is Tylenol. Read the labels to make sure that you are not taking more than the recommended dose. Too much acetaminophen (Tylenol) can be harmful.  
· Breathe warm, moist air from a steamy shower, a hot bath, or a sink filled with hot water. Avoid cold, dry air. Using a humidifier in your home may help. Follow the directions for cleaning the machine. · Use saline (saltwater) nasal washes to help keep your nasal passages open and wash out mucus and bacteria. You can buy saline nose drops at a grocery store or drugstore. Or you can make your own at home by adding 1 teaspoon of salt and 1 teaspoon of baking soda to 2 cups of distilled water. If you make your own, fill a bulb syringe with the solution, insert the tip into your nostril, and squeeze gently. Melvia Creamer your nose. · Put a hot, wet towel or a warm gel pack on your face 3 or 4 times a day for 5 to 10 minutes each time. · Try a decongestant nasal spray like oxymetazoline (Afrin). Do not use it for more than 3 days in a row. Using it for more than 3 days can make your congestion worse. When should you call for help? Call your doctor now or seek immediate medical care if: 
? · You have new or worse swelling or redness in your face or around your eyes. ? · You have a new or higher fever. ? Watch closely for changes in your health, and be sure to contact your doctor if: 
? · You have new or worse facial pain. ? · The mucus from your nose becomes thicker (like pus) or has new blood in it. ? · You are not getting better as expected. Where can you learn more? Go to http://sierra-ghanshyam.info/. Enter V639 in the search box to learn more about \"Sinusitis: Care Instructions. \" Current as of: May 12, 2017 Content Version: 11.4 © 7379-5420 [employer]. Care instructions adapted under license by [employer] (which disclaims liability or warranty for this information). If you have questions about a medical condition or this instruction, always ask your healthcare professional. Christophe Banegas any warranty or liability for your use of this information. Introducing [facility]! Dear [facility]: Thank you for requesting a [employer] account. Our records indicate that you already have an active [employer] account. You can access your account anytime at [email or web address] Did you know that you can access your hospital and ER discharge instructions at any time in [employer]? You can also review all of your test results from your hospital stay or ER visit. Additional Information If you have questions, please visit the Frequently Asked Questions section of the [employer] website at [email or web address] Remember, [employer] is NOT to be used for urgent needs. For medical emergencies, dial 911. Now available from your iPhone and Android! Please provide this summary of care documentation to your next provider. Your primary care clinician is listed as Claire Saab. If you have any questions after today's visit, please call [phone].

## 2017-12-30 NOTE — PATIENT INSTRUCTIONS
Sinusitis: Care Instructions  Your Care Instructions    Sinusitis is an infection of the lining of the sinus cavities in your head. Sinusitis often follows a cold. It causes pain and pressure in your head and face. In most cases, sinusitis gets better on its own in 1 to 2 weeks. But some mild symptoms may last for several weeks. Sometimes antibiotics are needed. Follow-up care is a key part of your treatment and safety. Be sure to make and go to all appointments, and call your doctor if you are having problems. It's also a good idea to know your test results and keep a list of the medicines you take. How can you care for yourself at home? · Take an over-the-counter pain medicine, such as acetaminophen (Tylenol), ibuprofen (Advil, Motrin), or naproxen (Aleve). Read and follow all instructions on the label. · If the doctor prescribed antibiotics, take them as directed. Do not stop taking them just because you feel better. You need to take the full course of antibiotics. · Be careful when taking over-the-counter cold or flu medicines and Tylenol at the same time. Many of these medicines have acetaminophen, which is Tylenol. Read the labels to make sure that you are not taking more than the recommended dose. Too much acetaminophen (Tylenol) can be harmful. · Breathe warm, moist air from a steamy shower, a hot bath, or a sink filled with hot water. Avoid cold, dry air. Using a humidifier in your home may help. Follow the directions for cleaning the machine. · Use saline (saltwater) nasal washes to help keep your nasal passages open and wash out mucus and bacteria. You can buy saline nose drops at a grocery store or drugstore. Or you can make your own at home by adding 1 teaspoon of salt and 1 teaspoon of baking soda to 2 cups of distilled water. If you make your own, fill a bulb syringe with the solution, insert the tip into your nostril, and squeeze gently. Idalia Spindle your nose.   · Put a hot, wet towel or a warm gel pack on your face 3 or 4 times a day for 5 to 10 minutes each time. · Try a decongestant nasal spray like oxymetazoline (Afrin). Do not use it for more than 3 days in a row. Using it for more than 3 days can make your congestion worse. When should you call for help? Call your doctor now or seek immediate medical care if:  ? · You have new or worse swelling or redness in your face or around your eyes. ? · You have a new or higher fever. ? Watch closely for changes in your health, and be sure to contact your doctor if:  ? · You have new or worse facial pain. ? · The mucus from your nose becomes thicker (like pus) or has new blood in it. ? · You are not getting better as expected. Where can you learn more? Go to http://sierra-ghanshyam.info/. Enter C409 in the search box to learn more about \"Sinusitis: Care Instructions. \"  Current as of: May 12, 2017  Content Version: 11.4  © 5489-0330 [employer]. Care instructions adapted under license by [employer] (which disclaims liability or warranty for this information). If you have questions about a medical condition or this instruction, always ask your healthcare professional. [address] any warranty or liability for your use of this information.

## 2017-12-30 NOTE — PROGRESS NOTES
Pt complains of productive cough, body aches, chills. Pt states that she is coughing to the point of vomiting.

## 2017-12-30 NOTE — PROGRESS NOTES
Subjective:   Sosa Haines is a 46 y.o. female who complains of congestion, sore throat, swollen glands, nasal blockage, post nasal drip, dry cough, headache, bilateral sinus pain, fever and chills for 3 days, rapidly worsening since that time. She denies a history of shortness of breath and wheezing. Evaluation to date: none. Treatment to date: OTC products. Patient does not smoke cigarettes. Relevant PMH: No pertinent additional PMH.     Patient Active Problem List   Diagnosis Code    Iron deficiency anemia D50.9    Gastritis K29.70    Allergic rhinitis J30.9    Impaired glucose tolerance R73.02    Hiatal hernia K44.9    Radiculopathy, cervical M54.12    Heart palpitations R00.2    Shoulder pain, right M25.511    Multiple thyroid nodules E04.2    Chronic insomnia F51.04    Vitamin D deficiency E55.9    Incidental lung nodule R91.1    Irritable bowel syndrome (IBS) K58.9    Gastrointestinal food allergy K52.29    Hot flashes R23.2    Nathalie-menopause N95.1    Chronic lower back pain M54.5, G89.29    Essential hypertension I10    Obesity, Class II, BMI 35-39.9 E66.9    Advanced care planning/counseling discussion Z71.89    Intractable migraine without aura and without status migrainosus G43.019    Allergic rhinitis due to allergen J30.9    Aspirin intolerance Z78.9    Obesity, morbid (HCC) E66.01     Patient Active Problem List    Diagnosis Date Noted    Obesity, morbid ([facility] [address] 12/30/2017    Aspirin intolerance 12/07/2017    Allergic rhinitis due to allergen 12/07/2016    Intractable migraine without aura and without status migrainosus 07/20/2016    Advanced care planning/counseling discussion 03/24/2016    Obesity, Class II, BMI 35-39.9 03/16/2016    Essential hypertension 06/03/2015    Chronic lower back pain     Nathalie-menopause 02/04/2015    Hot flashes 11/11/2014    Irritable bowel syndrome (IBS) 03/01/2014    Gastrointestinal food allergy 03/01/2014    Vitamin D deficiency 08/31/2013    Chronic insomnia 08/14/2013    Multiple thyroid nodules 03/05/2013    Shoulder pain, right     Incidental lung nodule 01/08/2013    Heart palpitations 02/01/2012    Hiatal hernia 04/03/2011    Impaired glucose tolerance 10/15/2010    Iron deficiency anemia 05/27/2010    Gastritis 05/27/2010    Allergic rhinitis 05/27/2010    Radiculopathy, cervical 01/01/2010     Current Outpatient Prescriptions   Medication Sig Dispense Refill    doxycycline (MONODOX) 100 mg capsule Take 1 Cap by mouth two (2) times a day for 10 days. 20 Cap 0    promethazine-dextromethorphan (PROMETHAZINE-DM) 6.25-15 mg/5 mL syrup Take 5 mL by mouth four (4) times daily as needed for Cough for up to 7 days. 90 mL 0    propranolol LA (INDERAL LA) 80 mg SR capsule Take 1 Cap by mouth two (2) times a day. Indications: MIGRAINE PREVENTION 180 Cap 3    valsartan-hydroCHLOROthiazide (DIOVAN-HCT) 80-12.5 mg per tablet Take 1 Tab by mouth daily. Indications: hypertension 90 Tab 3    acetaminophen (TYLENOL ARTHRITIS PAIN) 650 mg CR tablet Take 650 mg by mouth every six (6) hours as needed for Pain.       omeprazole (PRILOSEC) 20 mg capsule TAKE 1 CAPSULE BY MOUTH TWICE DAILY  Indications: HEARTBURN 60 Cap 5     Allergies   Allergen Reactions    Doxylamine Succinate Swelling     12.5-25 MG  HANDS, FACE/PERIORAL, FEET    Pcn [Penicillins] Hives    Ambien [Zolpidem] Nausea and Vomiting and Other (comments)     5 mg with Ativan 0.5 mg   TOO GROGGY, SLEPT 24 HOURS  7.5 MG FORGOT SHE WAS SLEEP EATING    Aspirin Nausea Only    Celebrex [Celecoxib] Other (comments)     TIGHT SQUEEZING IN CHEST  CHEST ACHED    Erythromycin Nausea and Vomiting    Milk Prot-Turm-Pepper-Pineappl Other (comments)     Multiple food allergies    Neurontin [Gabapentin] Other (comments)     300 mg  SEVERE LEG PAIN  EYE TWITCHING    Nsaids (Non-Steroidal Anti-Inflammatory Drug) Other (comments)     GI irritation       Past Medical History: Diagnosis Date    Allergic rhinitis 2010    Cervical disc herniation 2010    C3-4, C4-5, C5-6. Hemangioma body of C5. Dr. Gilson Hodge.  Chest pain 2011, 2012    Dr. Ila Reid. Dr. Dayami Gaspar; denies CP as of 3/27/14    Chronic lower back pain , 2016    thoracic DDD and spondylosis. DDD L3-S1. Dr. Carolee Lund. Ivana Mann. Dr. Lázaro Tamayo, [facility]. Dr. Sabina Devi.  Esophagitis, reflux 4/3/2011    Gallstone     Gastritis 2010    Dr. Alondra Hernandez.  Gastrointestinal food allergy 2014    Multiple. Dr. Fern Schirmer.    Heart palpitations 2012    DUE TO PAC'S AND PVC'S. Dr. Kimberly Voss.  Hiatal hernia 4/3/2011    HTN (hypertension)     Impaired glucose tolerance 10/15/2010    Incidental lung nodule 13    LLL 3.9 mm, RML 3.1 mm;  as of 3/27/14 per pt stable w/o growth    Insomnia     Iron deficiency anemia 2010    Irritable bowel syndrome (IBS) 2014    Dr. Radha Rangel.    Knee pain     Right. due to severe OA. / chipped bone     Metatarsal bone fracture     Left fifth.  Migraine 2011    Dr. Mitzie Dance, cervical 2010    Left. Dr. Gilson Hodge.  Shoulder pain, right     due to [address] X 2. Dr. Aguilar Phlegm.  Thyroid nodule     Dr. Stefan Byrd    Toe fracture, left     fifth toe.  Triangular fibrocartilage complex tear     Dr. Tiki Martinez. Left. Past Surgical History:   Procedure Laterality Date    HX  SECTION  1995    x 1    HX CHOLECYSTECTOMY      HX COLONOSCOPY  14    Dr. Jordy Garciaast; 14    due to abnormal PAP.  HX DILATION AND CURETTAGE      due to miscarriage.  HX ENDOSCOPY      HX ORTHOPAEDIC Right 2016    LUMBAR L4-5, L5-S1 ELLY.   Dr. Emerson Swain     [address] [facility]     Family History   Problem Relation Age of Onset    Diabetes Mother     Heart Disease Mother 58     2 stents    Hypertension Mother     Diabetes Father     Stroke Maternal Grandmother     Cancer Maternal Uncle      prostate     Social History   Substance Use Topics    Smoking status: Never Smoker    Smokeless tobacco: Never Used    Alcohol use No        Review of Systems  Pertinent items are noted in HPI. Objective:     Visit Vitals    BP (!) 159/95 (BP 1 Location: Right arm, BP Patient Position: Sitting)    Pulse 94    Temp 98.9 °F (37.2 °C) (Oral)    Resp 16    Ht 5' 7\" (1.702 m)    Wt 298 lb 9.6 oz (135.4 kg)    SpO2 99%    BMI 46.77 kg/m2     General:  alert, cooperative, no distress   Eyes: negative   Ears: abnormal TM AD - erythematous, bulging, abnormal TM AS - erythematous, bulging   Sinuses: tenderness over bilateral maxillary   Mouth:  Lips, mucosa, and tongue normal. Teeth and gums normal and abnormal findings: marked oropharyngeal erythema   Neck: supple, symmetrical, trachea midline and no adenopathy. Heart: S1 and S2 normal, no murmurs noted. Lungs: clear to auscultation bilaterally   Abdomen: soft, non-tender. Bowel sounds normal. No masses,  no organomegaly        Assessment/Plan:   sinusitis  Discussed the dx and tx of sinusitis. Suggested symptomatic OTC remedies. Antibiotics per orders. RTC prn. ICD-10-CM ICD-9-CM    1. Acute maxillary sinusitis, recurrence not specified J01.00 461.0 doxycycline (MONODOX) 100 mg capsule      promethazine-dextromethorphan (PROMETHAZINE-DM) 6.25-15 mg/5 mL syrup   . Elevated blood pressure most likely related to OTC decongestant taken over the last 24 hours. Patient to monitor and f/u with PCP.

## 2018-01-07 DIAGNOSIS — Z11.59 NEED FOR HEPATITIS C SCREENING TEST: ICD-10-CM

## 2018-01-07 DIAGNOSIS — Z12.39 BREAST CANCER SCREENING: ICD-10-CM

## 2018-01-22 ENCOUNTER — OFFICE VISIT (OUTPATIENT)
Dept: PRIMARY CARE CLINIC | Age: 53
End: 2018-01-22

## 2018-01-22 VITALS
WEIGHT: 293 LBS | HEIGHT: 67 IN | OXYGEN SATURATION: 96 % | DIASTOLIC BLOOD PRESSURE: 90 MMHG | BODY MASS INDEX: 45.99 KG/M2 | RESPIRATION RATE: 18 BRPM | SYSTOLIC BLOOD PRESSURE: 151 MMHG | HEART RATE: 72 BPM | TEMPERATURE: 97.5 F

## 2018-01-22 DIAGNOSIS — H65.93 BILATERAL NON-SUPPURATIVE OTITIS MEDIA: Primary | ICD-10-CM

## 2018-01-22 LAB
QUICKVUE INFLUENZA TEST: NEGATIVE
VALID INTERNAL CONTROL?: YES

## 2018-01-22 RX ORDER — FLUCONAZOLE 150 MG/1
150 TABLET ORAL DAILY
Qty: 1 TAB | Refills: 0 | Status: SHIPPED | OUTPATIENT
Start: 2018-01-22 | End: 2018-01-23

## 2018-01-22 RX ORDER — AZITHROMYCIN 250 MG/1
TABLET, FILM COATED ORAL
Qty: 6 TAB | Refills: 0 | Status: SHIPPED | OUTPATIENT
Start: 2018-01-22 | End: 2018-05-24 | Stop reason: ALTCHOICE

## 2018-01-22 NOTE — MR AVS SNAPSHOT
[facility] 
 
 
 [address] [place], [place] [address]margaret [facility] 83. 
[phone] Patient: Virginie Santana MRN: [id number] :1965 Visit Information Date & Time Provider Department Dept. Phone Encounter #  
 2018  7:30 PM Sumaya Borges, [address] [facility] [address] [place] [id number] Follow-up Instructions Return if symptoms worsen or fail to improve. Your Appointments 2018  8:30 AM  
Office Visit with Irma Roys, DO [employer] (MATTHEW Moreno) Appt Note: 6 mth f/u b/p results-wt [address] [facility] [address]  
[phone]  
  
   
 [address] [facility] [place] [address] Upcoming Health Maintenance Date Due Hepatitis C Screening 2018* BREAST CANCER SCRN MAMMOGRAM 2018* PAP AKA CERVICAL CYTOLOGY 2018* COLONOSCOPY 3/31/2024 DTaP/Tdap/Td series (3 - Td) 10/9/2026 *Topic was postponed. The date shown is not the original due date. Allergies as of 2018  Review Complete On: 2018 By: Katrin Villalba LPN Severity Noted Reaction Type Reactions Doxylamine Succinate High 2017    Swelling 12.5-25 MG 
HANDS, FACE/PERIORAL, FEET Pcn [Penicillins] High 2010    Hives Ambien [Zolpidem]  2014    Nausea and Vomiting, Other (comments) 5 mg with Ativan 0.5 mg  
TOO GROGGY, SLEPT 24 HOURS 
7.5 MG FORGOT SHE WAS SLEEP EATING Aspirin  2017    Nausea Only Celebrex [Celecoxib]  2017    Other (comments) TIGHT SQUEEZING IN CHEST 
CHEST ACHED Erythromycin  2011    Nausea and Vomiting Milk Prot-turm-pepper-pineappl  2014    Other (comments) Multiple food allergies Neurontin [Gabapentin]  2017    Other (comments) 300 mg SEVERE LEG PAIN 
EYE TWITCHING  
 Nsaids (Non-steroidal Anti-inflammatory Drug)  2011    Other (comments) GI irritation Current Immunizations  Reviewed on 12/7/2017 Name Date Influenza Vaccine 10/31/2017, 10/30/2015 Influenza Vaccine Whole 10/1/2010 TDAP Vaccine 8/10/2012 Td, Adsorbed 10/9/2016  6:23 PM  
  
 Not reviewed this visit You Were Diagnosed With   
  
 Codes Comments Bilateral non-suppurative otitis media    -  Primary ICD-10-CM: H65.93 
ICD-9-CM: 381. 4 Vitals BP Pulse Temp Resp Height(growth percentile) Weight(growth percentile) 151/90 (BP 1 Location: Left arm, BP Patient Position: Sitting) 72 97.5 °F (36.4 °C) (Oral) 18 5' 7\" (1.702 m) 296 lb 12.8 oz (134.6 kg) SpO2 BMI OB Status Smoking Status 96% 46.49 kg/m2 Menopause Never Smoker Vitals History BMI and BSA Data Body Mass Index Body Surface Area  
 46.49 kg/m 2 2.52 m 2 Preferred Pharmacy Pharmacy Name Phone [facility][id number]- [place], [place] - [address] [phone] Your Updated Medication List  
  
   
This list is accurate as of: 1/22/18  7:40 PM.  Always use your most recent med list.  
  
  
  
  
 azithromycin 250 mg tablet Commonly known as:  [place] Take by mouth, take two tablets today then one tablet daily for 4 more days. fluconazole 150 mg tablet Commonly known as:  DIFLUCAN Take 1 Tab by mouth daily for 1 day. FDA advises cautious prescribing of oral fluconazole in pregnancy. omeprazole 20 mg capsule Commonly known as:  PRILOSEC  
TAKE 1 CAPSULE BY MOUTH TWICE DAILY  Indications: HEARTBURN  
  
 propranolol LA 80 mg SR capsule Commonly known as:  INDERAL LA Take 1 Cap by mouth two (2) times a day. Indications: MIGRAINE PREVENTION  
  
 TYLENOL ARTHRITIS PAIN 650 mg Tber Generic drug:  acetaminophen Take 650 mg by mouth every six (6) hours as needed for Pain. * valsartan-hydroCHLOROthiazide 80-12.5 mg per tablet Commonly known as:  DIOVAN-HCT  
 Take 1 Tab by mouth daily. Indications: hypertension * valsartan-hydroCHLOROthiazide 80-12.5 mg per tablet Commonly known as:  DIOVAN-HCT  
TAKE 1 TABLET BY MOUTH EVERY DAY FOR BLOOD PRESSURE  
  
 * Notice: This list has 2 medication(s) that are the same as other medications prescribed for you. Read the directions carefully, and ask your doctor or other care provider to review them with you. Prescriptions Sent to Pharmacy Refills  
 azithromycin (ZITHROMAX) 250 mg tablet 0 Sig: Take by mouth, take two tablets today then one tablet daily for 4 more days. Class: Normal  
 Pharmacy: [facility]/pharmacy [address] [place] [address] [address] Ph #: [phone]  
 fluconazole (DIFLUCAN) 150 mg tablet 0 Sig: Take 1 Tab by mouth daily for 1 day. FDA advises cautious prescribing of oral fluconazole in pregnancy. Class: Normal  
 Pharmacy: [facility]/pharmacy [address] [place] [address] [address] Ph #: [phone] Route: Oral  
  
Follow-up Instructions Return if symptoms worsen or fail to improve. Patient Instructions Managing Your Allergies: Care Instructions Your Care Instructions Managing your allergies is an important part of staying healthy. Your doctor will help you find out what may be causing the allergies. Common causes of allergy symptoms are house dust and dust mites, animal dander, mold, and pollen. As soon as you know what triggers your symptoms, try to reduce your exposure to your triggers. This can help prevent allergy symptoms, asthma, and other health problems. Ask your doctor about allergy medicine or immunotherapy. These treatments may help reduce or prevent allergy symptoms. Follow-up care is a key part of your treatment and safety.  Be sure to make and go to all appointments, and call your doctor if you are having problems. It's also a good idea to know your test results and keep a list of the medicines you take. How can you care for yourself at home? · If you think that dust or dust mites are causing your allergies: 
¨ Wash sheets, pillowcases, and other bedding every week in hot water. ¨ Use airtight, dust-proof covers for pillows, duvets, and mattresses. Avoid plastic covers, because they tend to tear quickly and do not \"breathe. \" Wash according to the instructions. ¨ Remove extra blankets and pillows that you don't need. ¨ Use blankets that are machine-washable. ¨ Don't use home humidifiers. They can help mites live longer. · Use air-conditioning. Change or clean all filters every month. Keep windows closed. Use high-efficiency air filters. Don't use window or attic fans, which draw dust into the air. · If you're allergic to pet dander, keep pets outside or, at the very least, out of your bedroom. Old carpet and cloth-covered furniture can hold a lot of animal dander. You may need to replace them. · Look for signs of cockroaches. Use cockroach baits to get rid of them. Then clean your home well. · If you're allergic to mold, don't keep indoor plants, because molds can grow in soil. Get rid of furniture, rugs, and drapes that smell musty. Check for mold in the bathroom. · If you're allergic to pollen, stay inside when pollen counts are high. · Don't smoke or let anyone else smoke in your house. Don't use fireplaces or wood-burning stoves. Avoid paint fumes, perfumes, and other strong odors. When should you call for help? Give an epinephrine shot if: 
? · You think you are having a severe allergic reaction. ? After giving an epinephrine shot call 911, even if you feel better. ?Call 911 if: 
? · You have symptoms of a severe allergic reaction. These may include: 
¨ Sudden raised, red areas (hives) all over your body. ¨ Swelling of the throat, mouth, lips, or tongue. ¨ Trouble breathing. ¨ Passing out (losing consciousness). Or you may feel very lightheaded or suddenly feel weak, confused, or restless. ? · You have been given an epinephrine shot, even if you feel better. ?Call your doctor now or seek immediate medical care if: 
? · You have symptoms of an allergic reaction, such as: ¨ A rash or hives (raised, red areas on the skin). ¨ Itching. ¨ Swelling. ¨ Belly pain, nausea, or vomiting. ? Watch closely for changes in your health, and be sure to contact your doctor if: 
? · Your allergies get worse. ? · You need help controlling your allergies. ? · You have questions about allergy testing. ? · You do not get better as expected. Where can you learn more? Go to http://sierra-ghanshyam.info/. Enter L249 in the search box to learn more about \"Managing Your Allergies: Care Instructions. \" Current as of: September 29, 2016 Content Version: 11.4 © 0599-2221 [employer]. Care instructions adapted under license by [employer] (which disclaims liability or warranty for this information). If you have questions about a medical condition or this instruction, always ask your healthcare professional. [address] any warranty or liability for your use of this information. Introducing [facility]! Dear lEsa Herbert: Thank you for requesting a tenfarms account. Our records indicate that you already have an active tenfarms account. You can access your account anytime at [email or web address] Did you know that you can access your hospital and ER discharge instructions at any time in tenfarms? You can also review all of your test results from your hospital stay or ER visit. Additional Information If you have questions, please visit the Frequently Asked Questions section of the tenfarms website at [email or web address] Remember, tenfarms is NOT to be used for urgent needs.  For medical emergencies, dial 911. Now available from your iPhone and Android! Please provide this summary of care documentation to your next provider. Your primary care clinician is listed as Beth Demetris. If you have any questions after today's visit, please call [phone].

## 2018-01-23 NOTE — PROGRESS NOTES
Chief Complaint   Patient presents with    Cold Symptoms   Pt c/o continued ear pain, productive cough and chest congestion, pt states she finished all abx as prescribed, she states she is feeling worse than office on 12/30/17.

## 2018-01-23 NOTE — PATIENT INSTRUCTIONS
Managing Your Allergies: Care Instructions  Your Care Instructions    Managing your allergies is an important part of staying healthy. Your doctor will help you find out what may be causing the allergies. Common causes of allergy symptoms are house dust and dust mites, animal dander, mold, and pollen. As soon as you know what triggers your symptoms, try to reduce your exposure to your triggers. This can help prevent allergy symptoms, asthma, and other health problems. Ask your doctor about allergy medicine or immunotherapy. These treatments may help reduce or prevent allergy symptoms. Follow-up care is a key part of your treatment and safety. Be sure to make and go to all appointments, and call your doctor if you are having problems. It's also a good idea to know your test results and keep a list of the medicines you take. How can you care for yourself at home? · If you think that dust or dust mites are causing your allergies:  ¨ Wash sheets, pillowcases, and other bedding every week in hot water. ¨ Use airtight, dust-proof covers for pillows, duvets, and mattresses. Avoid plastic covers, because they tend to tear quickly and do not \"breathe. \" Wash according to the instructions. ¨ Remove extra blankets and pillows that you don't need. ¨ Use blankets that are machine-washable. ¨ Don't use home humidifiers. They can help mites live longer. · Use air-conditioning. Change or clean all filters every month. Keep windows closed. Use high-efficiency air filters. Don't use window or attic fans, which draw dust into the air. · If you're allergic to pet dander, keep pets outside or, at the very least, out of your bedroom. Old carpet and cloth-covered furniture can hold a lot of animal dander. You may need to replace them. · Look for signs of cockroaches. Use cockroach baits to get rid of them. Then clean your home well. · If you're allergic to mold, don't keep indoor plants, because molds can grow in soil. Get rid of furniture, rugs, and drapes that smell musty. Check for mold in the bathroom. · If you're allergic to pollen, stay inside when pollen counts are high. · Don't smoke or let anyone else smoke in your house. Don't use fireplaces or wood-burning stoves. Avoid paint fumes, perfumes, and other strong odors. When should you call for help? Give an epinephrine shot if:  ? · You think you are having a severe allergic reaction. ? After giving an epinephrine shot call 911, even if you feel better. ?Call 911 if:  ? · You have symptoms of a severe allergic reaction. These may include:  ¨ Sudden raised, red areas (hives) all over your body. ¨ Swelling of the throat, mouth, lips, or tongue. ¨ Trouble breathing. ¨ Passing out (losing consciousness). Or you may feel very lightheaded or suddenly feel weak, confused, or restless. ? · You have been given an epinephrine shot, even if you feel better. ?Call your doctor now or seek immediate medical care if:  ? · You have symptoms of an allergic reaction, such as:  ¨ A rash or hives (raised, red areas on the skin). ¨ Itching. ¨ Swelling. ¨ Belly pain, nausea, or vomiting. ? Watch closely for changes in your health, and be sure to contact your doctor if:  ? · Your allergies get worse. ? · You need help controlling your allergies. ? · You have questions about allergy testing. ? · You do not get better as expected. Where can you learn more? Go to http://sierra-ghanshyam.info/. Enter L249 in the search box to learn more about \"Managing Your Allergies: Care Instructions. \"  Current as of: September 29, 2016  Content Version: 11.4  © 9102-8987 [employer]. Care instructions adapted under license by [employer] (which disclaims liability or warranty for this information).  If you have questions about a medical condition or this instruction, always ask your healthcare professional. Christophe Banegas any warranty or liability for your use of this information.

## 2018-01-23 NOTE — PROGRESS NOTES
Subjective:      Leroy Juárez is a 46 y.o. female who presents for possible ear infection. Symptoms include bilateral ear pain, congestion and cough. Onset of symptoms was 3 days ago, gradually worsening since that time. Associated symptoms include bilateral ear pressure/pain, congestion, nasal congestion and non productive cough, which have been present for 2 days . She is drinking plenty of fluids. She was treated with doxycycline 3-4 weeks ago for sinusitis and did feel better, however she never actually fully recovered. Today she has chills, sweats and increased ear pain and congestion. Problem List:     Patient Active Problem List    Diagnosis Date Noted    Obesity, morbid ([address] 12/30/2017    Aspirin intolerance 12/07/2017    Allergic rhinitis due to allergen 12/07/2016    Intractable migraine without aura and without status migrainosus 07/20/2016    Advanced care planning/counseling discussion 03/24/2016    Obesity, Class II, BMI 35-39.9 03/16/2016    Essential hypertension 06/03/2015    Chronic lower back pain     Nathalie-menopause 02/04/2015    Hot flashes 11/11/2014    Irritable bowel syndrome (IBS) 03/01/2014    Gastrointestinal food allergy 03/01/2014    Vitamin D deficiency 08/31/2013    Chronic insomnia 08/14/2013    Multiple thyroid nodules 03/05/2013    Shoulder pain, right     Incidental lung nodule 01/08/2013    Heart palpitations 02/01/2012    Hiatal hernia 04/03/2011    Impaired glucose tolerance 10/15/2010    Iron deficiency anemia 05/27/2010    Gastritis 05/27/2010    Allergic rhinitis 05/27/2010    Radiculopathy, cervical 01/01/2010     Medical History:     Past Medical History:   Diagnosis Date    Allergic rhinitis 5/27/2010    Cervical disc herniation 01/2010    C3-4, C4-5, C5-6. Hemangioma body of C5. Dr. Crystal Harper.  Chest pain 08/2011, 02/2012    Dr. Eyal Uribe.   Dr. Sid Jones; denies CP as of 3/27/14    Chronic lower back pain 2010, 03/2016    thoracic DDD and spondylosis. DDD L3-S1. Dr. Milena Ryan. [place]. Dr. Marina Perez, [facility]. Dr. Norma Multani.  Esophagitis, reflux 4/3/2011    Gallstone     Gastritis 5/27/2010    Dr. Buck Duarte.  Gastrointestinal food allergy 03/2014    Multiple. Dr. Cinthia Qureshi.    Heart palpitations 02/2012    DUE TO PAC'S AND PVC'S. Dr. Colletta Lions.  Hiatal hernia 4/3/2011    HTN (hypertension) 1987    Impaired glucose tolerance 10/15/2010    Incidental lung nodule 01/08/13    LLL 3.9 mm, RML 3.1 mm;  as of 3/27/14 per pt stable w/o growth    Insomnia 1990s    Iron deficiency anemia 5/27/2010    Irritable bowel syndrome (IBS) 03/2014    Dr. Luiza Saunders.    Knee pain 2012    Right. due to severe OA. / chipped bone     Metatarsal bone fracture 1990    Left fifth.  Migraine 2/22/2011    Dr. Amparo Carmona, cervical 01/2010    Left. Dr. Shahzad Stanley.  Shoulder pain, right 2012    due to [address] X 2. Dr. Shen Jackson.  Thyroid nodule 2011    Dr. Nancy Carpio    Toe fracture, left 2008    fifth toe.  Triangular fibrocartilage complex tear 2012    Dr. Rhonda Talbot. Left. Allergies:      Allergies   Allergen Reactions    Doxylamine Succinate Swelling     12.5-25 MG  HANDS, FACE/PERIORAL, FEET    Pcn [Penicillins] Hives    Ambien [Zolpidem] Nausea and Vomiting and Other (comments)     5 mg with Ativan 0.5 mg   TOO GROGGY, SLEPT 24 HOURS  7.5 MG FORGOT SHE WAS SLEEP EATING    Aspirin Nausea Only    Celebrex [Celecoxib] Other (comments)     TIGHT SQUEEZING IN CHEST  CHEST ACHED    Erythromycin Nausea and Vomiting    Milk Prot-Turm-Pepper-Pineappl Other (comments)     Multiple food allergies    Neurontin [Gabapentin] Other (comments)     300 mg  SEVERE LEG PAIN  EYE TWITCHING    Nsaids (Non-Steroidal Anti-Inflammatory Drug) Other (comments)     GI irritation        Medications:     Current Outpatient Prescriptions   Medication Sig    azithromycin (ZITHROMAX) 250 mg tablet Take by mouth, take two tablets today then one tablet daily for 4 more days.  fluconazole (DIFLUCAN) 150 mg tablet Take 1 Tab by mouth daily for 1 day. FDA advises cautious prescribing of oral fluconazole in pregnancy.  valsartan-hydroCHLOROthiazide (DIOVAN-HCT) 80-12.5 mg per tablet TAKE 1 TABLET BY MOUTH EVERY DAY FOR BLOOD PRESSURE    propranolol LA (INDERAL LA) 80 mg SR capsule Take 1 Cap by mouth two (2) times a day. Indications: MIGRAINE PREVENTION    valsartan-hydroCHLOROthiazide (DIOVAN-HCT) 80-12.5 mg per tablet Take 1 Tab by mouth daily. Indications: hypertension    acetaminophen (TYLENOL ARTHRITIS PAIN) 650 mg CR tablet Take 650 mg by mouth every six (6) hours as needed for Pain.  omeprazole (PRILOSEC) 20 mg capsule TAKE 1 CAPSULE BY MOUTH TWICE DAILY  Indications: HEARTBURN     No current facility-administered medications for this visit. Surgical History:     Past Surgical History:   Procedure Laterality Date    HX  SECTION  1995    x 1    HX CHOLECYSTECTOMY      HX COLONOSCOPY  14    Dr. Van Alarcon; 14    due to abnormal PAP.  HX DILATION AND CURETTAGE      due to miscarriage.  HX ENDOSCOPY      HX ORTHOPAEDIC Right 2016    LUMBAR L4-5, L5-S1 ELLY. Dr. Anabell Lacey     Social History:     Social History     Social History    Marital status:      Spouse name: N/A    Number of children: 3    Years of education: N/A     Occupational History    Medical assistant [employer]     Social History Main Topics    Smoking status: Never Smoker    Smokeless tobacco: Never Used    Alcohol use No    Drug use: No    Sexual activity: Yes     Partners: Male     Other Topics Concern    None     Social History Narrative         Objective:     ROS:   Feeling well. No dyspnea or chest pain on exertion. No abdominal pain, change in bowel habits, black or bloody stools.   No urinary tract symptoms. GYN ROS: normal menses, no abnormal bleeding, pelvic pain or discharge, no breast pain or new or enlarging lumps on self exam. No neurological complaints. OBJECTIVE:   The patient appears well, alert, oriented x 3, in no distress. Visit Vitals    /90 (BP 1 Location: Left arm, BP Patient Position: Sitting)    Pulse 72    Temp 97.5 °F (36.4 °C) (Oral)    Resp 18    Ht 5' 7\" (1.702 m)    Wt 296 lb 12.8 oz (134.6 kg)    SpO2 96%    BMI 46.49 kg/m2     HEENT:ENT  Red and bulging TM's bilaterally. Neck supple. No adenopathy or thyromegaly. MARJORIE. Chest: Lungs are clear, good air entry, no wheezes, rhonchi or rales. Cardiovascular: S1 and S2 normal, no murmurs, regular rate and rhythm. Abdomen: soft without tenderness, guarding, mass or organomegaly. Extremities: show no edema, normal peripheral pulses. Neurological: is normal, no focal findings. Flu swab negative. Assessment/Plan:       ICD-10-CM ICD-9-CM    1. Bilateral non-suppurative otitis media H65.93 381.4 azithromycin (ZITHROMAX) 250 mg tablet      fluconazole (DIFLUCAN) 150 mg tablet   Elevated blood pressure is likely due to OTC decongestants, advised against using them.

## 2018-05-24 ENCOUNTER — OFFICE VISIT (OUTPATIENT)
Dept: CARDIOLOGY CLINIC | Age: 53
End: 2018-05-24

## 2018-05-24 VITALS
BODY MASS INDEX: 45.3 KG/M2 | HEART RATE: 63 BPM | HEIGHT: 67 IN | WEIGHT: 288.6 LBS | SYSTOLIC BLOOD PRESSURE: 138 MMHG | RESPIRATION RATE: 20 BRPM | OXYGEN SATURATION: 98 % | DIASTOLIC BLOOD PRESSURE: 78 MMHG

## 2018-05-24 DIAGNOSIS — E66.01 OBESITY, MORBID (HCC): ICD-10-CM

## 2018-05-24 DIAGNOSIS — R00.2 INTERMITTENT PALPITATIONS: ICD-10-CM

## 2018-05-24 DIAGNOSIS — R07.9 CHEST PAIN, UNSPECIFIED TYPE: Primary | ICD-10-CM

## 2018-05-24 RX ORDER — CETIRIZINE HCL 10 MG
TABLET ORAL DAILY
COMMUNITY
End: 2019-04-22 | Stop reason: SINTOL

## 2018-05-24 NOTE — MR AVS SNAPSHOT
[address] [facility] [address] [place] [address] 
[phone] Patient: Mily Ramon MRN:  [id number] Visit Information Date & Time Provider Department Dept. Phone Encounter #  
 2018  3:20 PM Grabiel German MD [facility] Jozef Mccartney [phone] [id number] Your Appointments 2018  8:30 AM  
Office Visit with DO Surinder Salomon (MATTHEW Moreno) Appt Note: 6 mth f/u b/p results-wt [address] [facility] [address] [facility] [address]  
[phone]  
  
   
 [address] [facility] [address] [facility] [address] [facility] Upcoming Health Maintenance Date Due Hepatitis C Screening 1965 BREAST CANCER SCRN MAMMOGRAM 3/31/2015 PAP AKA CERVICAL CYTOLOGY 10/15/2015 Influenza Age 5 to Adult 2018 COLONOSCOPY 3/31/2024 DTaP/Tdap/Td series (3 - Td) 10/9/2026 Allergies as of 2018  Review Complete On: 2018 By: Waldemar Colon Severity Noted Reaction Type Reactions Doxylamine Succinate High 2017    Swelling 12.5-25 MG 
HANDS, FACE/PERIORAL, FEET Pcn [Penicillins] High 2010    Hives Ambien [Zolpidem]  2014    Nausea and Vomiting, Other (comments) 5 mg with Ativan 0.5 mg  
TOO GROGGY, SLEPT 24 HOURS 
7.5 MG FORGOT SHE WAS SLEEP EATING Aspirin  2017    Nausea Only Celebrex [Celecoxib]  2017    Other (comments) TIGHT SQUEEZING IN CHEST 
CHEST ACHED Erythromycin  2011    Nausea and Vomiting Milk Prot-turm-pepper-pineappl  2014    Other (comments) Multiple food allergies Neurontin [Gabapentin]  2017    Other (comments) 300 mg SEVERE LEG PAIN 
EYE TWITCHING  
 Nsaids (Non-steroidal Anti-inflammatory Drug)  2011    Other (comments) GI irritation Current Immunizations  Reviewed on 2017 Name Date Influenza Vaccine 10/31/2017, 10/30/2015 Influenza Vaccine Whole 10/1/2010 TDAP Vaccine 8/10/2012 Td, Adsorbed 10/9/2016  6:23 PM  
  
 Not reviewed this visit You Were Diagnosed With   
  
 Codes Comments Chest pain, unspecified type    -  Primary ICD-10-CM: R07.9 ICD-9-CM: 786.50 Vitals BP Pulse Resp Height(growth percentile) Weight(growth percentile) SpO2  
 138/78 (BP 1 Location: Left arm) 63 20 5' 7\" (1.702 m) 288 lb 9.6 oz (130.9 kg) 98% BMI OB Status Smoking Status 45.2 kg/m2 Menopause Never Smoker Vitals History BMI and BSA Data Body Mass Index Body Surface Area  
 45.2 kg/m 2 2.49 m 2 Preferred Pharmacy Pharmacy Name Phone [id number]- [place], [place] - [address] [phone] Your Updated Medication List  
  
   
This list is accurate as of 5/24/18  3:33 PM.  Always use your most recent med list.  
  
  
  
  
 omeprazole 20 mg capsule Commonly known as:  PRILOSEC  
TAKE 1 CAPSULE BY MOUTH TWICE DAILY  Indications: HEARTBURN  
  
 propranolol LA 80 mg SR capsule Commonly known as:  INDERAL LA Take 1 Cap by mouth two (2) times a day. Indications: MIGRAINE PREVENTION  
  
 TYLENOL ARTHRITIS PAIN 650 mg Tber Generic drug:  acetaminophen Take 650 mg by mouth as needed for Pain.  
  
 valsartan-hydroCHLOROthiazide 80-12.5 mg per tablet Commonly known as:  DIOVAN-HCT Take 1 Tab by mouth daily. Indications: hypertension ZyrTEC 10 mg tablet Generic drug:  cetirizine Take  by mouth daily. We Performed the Following AMB POC EKG ROUTINE W/ 12 LEADS, INTER & REP [28502 CPT(R)] Introducing [facility]! Dear Reena Vieyra: Thank you for requesting a [id number] account. Our records indicate that you already have an active [id number] account. You can access your account anytime at [email or web address] Did you know that you can access your hospital and ER discharge instructions at any time in [employer] Pulse? You can also review all of your test results from your hospital stay or ER visit. Additional Information If you have questions, please visit the Frequently Asked Questions section of the [employer] Pulse website at [email or web address] Remember, Social Pulse is NOT to be used for urgent needs. For medical emergencies, dial 911. Now available from your iPhone and Android! Please provide this summary of care documentation to your next provider. Your primary care clinician is listed as Myla Finn. If you have any questions after today's visit, please call [phone].

## 2018-05-24 NOTE — PROGRESS NOTES
HISTORY OF PRESENT ILLNESS  Leonor Hall is a 48 y.o. female. She is referred for evaluation of chest pain. She has had chest pain on and off for some time. She had stress testing done about 6 years ago and it was negative. She also had a Holter monitor that showed premature atrial and ventricular contractions. She has palpitations also. In the past, she has been able to take prn Propranolol for chest pain which has helped though more recently the chest pain has been constant and not relieved by the Propranolol even taken twice daily. She does not smoke cigarettes or drink alcohol to excess and has no family history of premature coronary disease. HPI  Patient Active Problem List   Diagnosis Code    Iron deficiency anemia D50.9    Gastritis K29.70    Allergic rhinitis J30.9    Impaired glucose tolerance R73.02    Hiatal hernia K44.9    Radiculopathy, cervical M54.12    Heart palpitations R00.2    Shoulder pain, right M25.511    Multiple thyroid nodules E04.2    Chronic insomnia F51.04    Vitamin D deficiency E55.9    Incidental lung nodule R91.1    Irritable bowel syndrome (IBS) K58.9    Gastrointestinal food allergy K52.29    Hot flashes R23.2    Nathalie-menopause N95.1    Chronic lower back pain M54.5, G89.29    Essential hypertension I10    Obesity, Class II, BMI 35-39.9 E66.9    Advanced care planning/counseling discussion Z71.89    Intractable migraine without aura and without status migrainosus G43.019    Allergic rhinitis due to allergen J30.9    Aspirin intolerance Z78.9    Obesity, morbid (HCC) E66.01     Current Outpatient Prescriptions   Medication Sig Dispense Refill    cetirizine (ZYRTEC) 10 mg tablet Take  by mouth daily.  propranolol LA (INDERAL LA) 80 mg SR capsule Take 1 Cap by mouth two (2) times a day. Indications: MIGRAINE PREVENTION 180 Cap 3    valsartan-hydroCHLOROthiazide (DIOVAN-HCT) 80-12.5 mg per tablet Take 1 Tab by mouth daily.  Indications: hypertension 90 Tab 3    acetaminophen (TYLENOL ARTHRITIS PAIN) 650 mg CR tablet Take 650 mg by mouth as needed for Pain.  omeprazole (PRILOSEC) 20 mg capsule TAKE 1 CAPSULE BY MOUTH TWICE DAILY  Indications: HEARTBURN 60 Cap 5     Past Medical History:   Diagnosis Date    Allergic rhinitis 2010    Cervical disc herniation 2010    C3-4, C4-5, C5-6. Hemangioma body of C5. Dr. Lucio Meadows.  Chest pain 2011, 2012    Dr. Fabrizio Romero. Dr. Ivon Medel; denies CP as of 3/27/14    Chronic lower back pain , 2016    thoracic DDD and spondylosis. DDD L3-S1. Dr. Lexie Thomas. Ann [place]. Dr. Nico Flowers, [facility]. Dr. Milton Bashir.  Esophagitis, reflux 4/3/2011    Gallstone     Gastritis 2010    Dr. Radha Ivan.  Gastrointestinal food allergy 2014    Multiple. Dr. Amber Crowley.    Heart palpitations 2012    DUE TO PAC'S AND PVC'S. Dr. Thong Jaramillo.  Hiatal hernia 4/3/2011    HTN (hypertension)     Impaired glucose tolerance 10/15/2010    Incidental lung nodule 13    LLL 3.9 mm, RML 3.1 mm;  as of 3/27/14 per pt stable w/o growth    Insomnia     Iron deficiency anemia 2010    Irritable bowel syndrome (IBS) 2014    Dr. Felice Driscoll.    Knee pain     Right. due to severe OA. / chipped bone     Metatarsal bone fracture     Left fifth.  Migraine 2011    Dr. Manolo Jordan, cervical 2010    Left. Dr. Lucio Meadows.  Shoulder pain, right     due to [address] X 2. Dr. Wendy Bailey.  Thyroid nodule     Dr. Jerry Cadena    Toe fracture, left     fifth toe.  Triangular fibrocartilage complex tear     Dr. Yael Flor. Left. Past Surgical History:   Procedure Laterality Date    HX  SECTION  1995    x 1    HX CHOLECYSTECTOMY      HX COLONOSCOPY  14    Dr. Oneil Peabody; 14    due to abnormal PAP.     HX DILATION AND CURETTAGE 1990s    due to miscarriage.  HX ENDOSCOPY      HX ORTHOPAEDIC Right 06/2016    LUMBAR L4-5, L5-S1 ELLY. Dr. Irma Flor       Review of Systems   Cardiovascular: Positive for chest pain and palpitations. All other systems reviewed and are negative. Visit Vitals    /78 (BP 1 Location: Left arm)    Pulse 63    Resp 20    Ht 5' 7\" (1.702 m)    Wt 288 lb 9.6 oz (130.9 kg)    SpO2 98%    BMI 45.2 kg/m2       Physical Exam   Constitutional: She is oriented to person, place, and time. She appears well-nourished. HENT:   Head: Atraumatic. Eyes: Conjunctivae are normal.   Neck: Neck supple. Cardiovascular: Normal rate, regular rhythm and normal heart sounds. Exam reveals no gallop and no friction rub. No murmur heard. Pulmonary/Chest: Breath sounds normal. She has no wheezes. She exhibits no tenderness. Abdominal: Bowel sounds are normal. There is no tenderness. Musculoskeletal: She exhibits no tenderness. Neurological: She is oriented to person, place, and time. Skin: Skin is dry. Nursing note and vitals reviewed. ASSESSMENT and PLAN  Her EKG and exam are unremarkable. However, she is very worried about her heart. She apparently has knee issues and cannot walk for a treadmill. I will therefore have her return for a Lexiscan myoview test knowing full well that she could have issues with soft tissue attenuation from her left breast. She asked today about proceeding with cardiac catheterization. She does have a good right radial pulse. I told her this would have to depend on the results of the stress test. We will call her regarding the results.

## 2018-06-01 ENCOUNTER — OFFICE VISIT (OUTPATIENT)
Dept: FAMILY MEDICINE CLINIC | Age: 53
End: 2018-06-01

## 2018-06-01 VITALS
RESPIRATION RATE: 12 BRPM | TEMPERATURE: 98 F | DIASTOLIC BLOOD PRESSURE: 80 MMHG | HEART RATE: 65 BPM | HEIGHT: 67 IN | BODY MASS INDEX: 45.03 KG/M2 | WEIGHT: 286.9 LBS | SYSTOLIC BLOOD PRESSURE: 144 MMHG | OXYGEN SATURATION: 100 %

## 2018-06-01 DIAGNOSIS — E04.2 MULTIPLE THYROID NODULES: ICD-10-CM

## 2018-06-01 DIAGNOSIS — E55.9 VITAMIN D DEFICIENCY: ICD-10-CM

## 2018-06-01 DIAGNOSIS — E66.01 OBESITY, CLASS III, BMI 40-49.9 (MORBID OBESITY) (HCC): ICD-10-CM

## 2018-06-01 DIAGNOSIS — E78.00 HIGH CHOLESTEROL: ICD-10-CM

## 2018-06-01 DIAGNOSIS — R73.9 HYPERGLYCEMIA: ICD-10-CM

## 2018-06-01 DIAGNOSIS — Z78.9 ASPIRIN INTOLERANCE: ICD-10-CM

## 2018-06-01 DIAGNOSIS — R30.0 DYSURIA: ICD-10-CM

## 2018-06-01 DIAGNOSIS — I10 ESSENTIAL HYPERTENSION: Primary | ICD-10-CM

## 2018-06-01 DIAGNOSIS — H65.193 OTHER ACUTE NONSUPPURATIVE OTITIS MEDIA OF BOTH EARS, RECURRENCE NOT SPECIFIED: ICD-10-CM

## 2018-06-01 DIAGNOSIS — M50.20 CERVICAL DISC HERNIATION: ICD-10-CM

## 2018-06-01 DIAGNOSIS — J01.00 ACUTE NON-RECURRENT MAXILLARY SINUSITIS: ICD-10-CM

## 2018-06-01 DIAGNOSIS — M79.601 ARM PAIN, MUSCULOSKELETAL, RIGHT: ICD-10-CM

## 2018-06-01 DIAGNOSIS — D50.9 IRON DEFICIENCY ANEMIA, UNSPECIFIED IRON DEFICIENCY ANEMIA TYPE: ICD-10-CM

## 2018-06-01 DIAGNOSIS — R63.4 WEIGHT LOSS: ICD-10-CM

## 2018-06-01 DIAGNOSIS — G43.019 INTRACTABLE MIGRAINE WITHOUT AURA AND WITHOUT STATUS MIGRAINOSUS: ICD-10-CM

## 2018-06-01 DIAGNOSIS — R07.89 OTHER CHEST PAIN: ICD-10-CM

## 2018-06-01 LAB
25(OH)D3+25(OH)D2 SERPL-MCNC: 13.4 NG/ML (ref 30–100)
ALBUMIN SERPL-MCNC: 4.2 G/DL (ref 3.5–5.5)
ALBUMIN/CREAT UR: 2 MG/G CREAT (ref 0–30)
ALBUMIN/GLOB SERPL: 1.3 {RATIO} (ref 1.2–2.2)
ALP SERPL-CCNC: 95 IU/L (ref 39–117)
ALT SERPL-CCNC: 12 IU/L (ref 0–32)
APPEARANCE UR: CLEAR
AST SERPL-CCNC: 18 IU/L (ref 0–40)
BACTERIA #/AREA URNS HPF: ABNORMAL /[HPF]
BILIRUB SERPL-MCNC: <0.2 MG/DL (ref 0–1.2)
BILIRUB UR QL STRIP: NEGATIVE
BUN SERPL-MCNC: 6 MG/DL (ref 6–24)
BUN/CREAT SERPL: 9 (ref 9–23)
CALCIUM SERPL-MCNC: 9.6 MG/DL (ref 8.7–10.2)
CASTS URNS QL MICRO: ABNORMAL /LPF
CHLORIDE SERPL-SCNC: 99 MMOL/L (ref 96–106)
CHOLEST SERPL-MCNC: 195 MG/DL (ref 100–199)
CO2 SERPL-SCNC: 25 MMOL/L (ref 18–29)
COLOR UR: YELLOW
CREAT SERPL-MCNC: 0.66 MG/DL (ref 0.57–1)
CREAT UR-MCNC: 287.2 MG/DL
EPI CELLS #/AREA URNS HPF: ABNORMAL /HPF
ERYTHROCYTE [DISTWIDTH] IN BLOOD BY AUTOMATED COUNT: 14.2 % (ref 12.3–15.4)
EST. AVERAGE GLUCOSE BLD GHB EST-MCNC: 123 MG/DL
GFR SERPLBLD CREATININE-BSD FMLA CKD-EPI: 101 ML/MIN/1.73
GFR SERPLBLD CREATININE-BSD FMLA CKD-EPI: 117 ML/MIN/1.73
GLOBULIN SER CALC-MCNC: 3.3 G/DL (ref 1.5–4.5)
GLUCOSE SERPL-MCNC: 114 MG/DL (ref 65–99)
GLUCOSE UR QL: NEGATIVE
HBA1C MFR BLD: 5.9 % (ref 4.8–5.6)
HCT VFR BLD AUTO: 38.7 % (ref 34–46.6)
HCV AB S/CO SERPL IA: 0.1 S/CO RATIO (ref 0–0.9)
HDLC SERPL-MCNC: 57 MG/DL
HGB BLD-MCNC: 12.8 G/DL (ref 11.1–15.9)
HGB UR QL STRIP: NEGATIVE
INTERPRETATION, 910389: NORMAL
IRON SERPL-MCNC: 58 UG/DL (ref 27–159)
KETONES UR QL STRIP: ABNORMAL
LDLC SERPL CALC-MCNC: 112 MG/DL (ref 0–99)
LEUKOCYTE ESTERASE UR QL STRIP: NEGATIVE
MCH RBC QN AUTO: 29 PG (ref 26.6–33)
MCHC RBC AUTO-ENTMCNC: 33.1 G/DL (ref 31.5–35.7)
MCV RBC AUTO: 88 FL (ref 79–97)
MICRO URNS: ABNORMAL
MICROALBUMIN UR-MCNC: 5.8 UG/ML
MUCOUS THREADS URNS QL MICRO: PRESENT
NITRITE UR QL STRIP: POSITIVE
PH UR STRIP: 5 [PH] (ref 5–7.5)
PLATELET # BLD AUTO: 315 X10E3/UL (ref 150–379)
POTASSIUM SERPL-SCNC: 3.9 MMOL/L (ref 3.5–5.2)
PROT SERPL-MCNC: 7.5 G/DL (ref 6–8.5)
PROT UR QL STRIP: NEGATIVE
RBC # BLD AUTO: 4.41 X10E6/UL (ref 3.77–5.28)
RBC #/AREA URNS HPF: ABNORMAL /HPF
SODIUM SERPL-SCNC: 141 MMOL/L (ref 134–144)
SP GR UR: 1.03 (ref 1–1.03)
TRIGL SERPL-MCNC: 130 MG/DL (ref 0–149)
TSH SERPL DL<=0.005 MIU/L-ACNC: 0.88 UIU/ML (ref 0.45–4.5)
UROBILINOGEN UR STRIP-MCNC: 0.2 MG/DL (ref 0.2–1)
VLDLC SERPL CALC-MCNC: 26 MG/DL (ref 5–40)
WBC # BLD AUTO: 4.5 X10E3/UL (ref 3.4–10.8)
WBC #/AREA URNS HPF: ABNORMAL /HPF

## 2018-06-01 RX ORDER — ERGOCALCIFEROL 1.25 MG/1
50000 CAPSULE ORAL
Qty: 5 CAP | Refills: 2 | Status: SHIPPED | OUTPATIENT
Start: 2018-06-01 | End: 2019-02-05 | Stop reason: ALTCHOICE

## 2018-06-01 NOTE — PATIENT INSTRUCTIONS
Learning About Low-Carbohydrate Diets for Weight Loss  What is a low-carbohydrate diet? Low-carb diets avoid foods that are high in carbohydrate. These high-carb foods include pasta, bread, rice, cereal, fruits, and starchy vegetables. Instead, these diets usually have you eat foods that are high in fat and protein. Many people lose weight quickly on a low-carb diet. But the early weight loss is water. People on this diet often gain the weight back after they start eating carbs again. Not all diet plans are safe or work well. A lot of the evidence shows that low-carb diets aren't healthy. That's because these diets often don't include healthy foods like fruits and vegetables. Losing weight safely means balancing protein, fat, and carbs with every meal and snack. And low-carb diets don't always provide the vitamins, minerals, and fiber you need. If you have a serious medical condition, talk to your doctor before you try any diet. These conditions include kidney disease, heart disease, type 2 diabetes, high cholesterol, and high blood pressure. If you are pregnant, it may not be safe for your baby if you are on a low-carb diet. How can you lose weight safely? You might have heard that a diet plan helped another person lose weight. But that doesn't mean that it will work for you. It is very hard to stay on a diet that includes lots of big changes in your eating habits. If you want to get to a healthy weight and stay there, making healthy lifestyle changes will often work better than dieting. These steps can help. · Make a plan for change. Work with your doctor to create a plan that is right for you. · See a dietitian. He or she can show you how to make healthy changes in your eating habits. · Manage stress. If you have a lot of stress in your life, it can be hard to focus on making healthy changes to your daily habits. · Track your food and activity.  You are likely to do better at losing weight if you keep track of what you eat and what you do. Follow-up care is a key part of your treatment and safety. Be sure to make and go to all appointments, and call your doctor if you are having problems. It's also a good idea to know your test results and keep a list of the medicines you take. Where can you learn more? Go to http://sierra-ghanshyam.info/. Enter A121 in the search box to learn more about \"Learning About Low-Carbohydrate Diets for Weight Loss. \"  Current as of: May 12, 2017  Content Version: 11.4  © 7478-8879 [employer]. Care instructions adapted under license by [employer] (which disclaims liability or warranty for this information). If you have questions about a medical condition or this instruction, always ask your healthcare professional. [address] any warranty or liability for your use of this information. Starting a Weight Loss Plan: Care Instructions  Your Care Instructions    If you are thinking about losing weight, it can be hard to know where to start. Your doctor can help you set up a weight loss plan that best meets your needs. You may want to take a class on nutrition or exercise, or join a weight loss support group. If you have questions about how to make changes to your eating or exercise habits, ask your doctor about seeing a registered dietitian or an exercise specialist.  It can be a big challenge to lose weight. But you do not have to make huge changes at once. Make small changes, and stick with them. When those changes become habit, add a few more changes. If you do not think you are ready to make changes right now, try to pick a date in the future. Make an appointment to see your doctor to discuss whether the time is right for you to start a plan. Follow-up care is a key part of your treatment and safety. Be sure to make and go to all appointments, and call your doctor if you are having problems.  It's also a good idea to know your test results and keep a list of the medicines you take. How can you care for yourself at home? · Set realistic goals. Many people expect to lose much more weight than is likely. A weight loss of 5% to 10% of your body weight may be enough to improve your health. · Get family and friends involved to provide support. Talk to them about why you are trying to lose weight, and ask them to help. They can help by participating in exercise and having meals with you, even if they may be eating something different. · Find what works best for you. If you do not have time or do not like to cook, a program that offers meal replacement bars or shakes may be better for you. Or if you like to prepare meals, finding a plan that includes daily menus and recipes may be best.  · Ask your doctor about other health professionals who can help you achieve your weight loss goals. ¨ A dietitian can help you make healthy changes in your diet. ¨ An exercise specialist or  can help you develop a safe and effective exercise program.  ¨ A counselor or psychiatrist can help you cope with issues such as depression, anxiety, or family problems that can make it hard to focus on weight loss. · Consider joining a support group for people who are trying to lose weight. Your doctor can suggest groups in your area. Where can you learn more? Go to http://sierra-ghanshyam.info/. Enter A575 in the search box to learn more about \"Starting a Weight Loss Plan: Care Instructions. \"  Current as of: October 13, 2016  Content Version: 11.4  © 8488-5724 [employer]. Care instructions adapted under license by [employer] (which disclaims liability or warranty for this information). If you have questions about a medical condition or this instruction, always ask your healthcare professional. [address] any warranty or liability for your use of this information. Advance Care Planning: Care Instructions  Your Care Instructions    It can be hard to live with an illness that cannot be cured. But if your health is getting worse, you may want to make decisions about end-of-life care. Planning for the end of your life does not mean that you are giving up. It is a way to make sure that your wishes are met. Clearly stating your wishes can make it easier for your loved ones. Making plans while you are still able may also ease your mind and make your final days less stressful and more meaningful. Follow-up care is a key part of your treatment and safety. Be sure to make and go to all appointments, and call your doctor if you are having problems. It's also a good idea to know your test results and keep a list of the medicines you take. What can you do to plan for the end of life? · You can bring these issues up with your doctor. You do not need to wait until your doctor starts the conversation. You might start with \"I would not be willing to live with . [place] Frutoso Golder Frutoso Golder \" When you complete this sentence it helps your doctor understand your wishes. · Talk openly and honestly with your doctor. This is the best way to understand the decisions you will need to make as your health changes. Know that you can always change your mind. · Ask your doctor about commonly used life-support measures. These include tube feedings, breathing machines, and fluids given through a vein (IV). Understanding these treatments will help you decide whether you want them. · You may choose to have these life-supporting treatments for a limited time. This allows a trial period to see whether they will help you. You may also decide that you want your doctor to take only certain measures to keep you alive. It is important to spell out these conditions so that your doctor and family understand them. · Talk to your doctor about how long you are likely to live.  He or she may be able to give you an idea of what usually happens with your specific illness. · Think about preparing papers that state your wishes. This way there will not be any confusion about what you want. You can change your instructions at any time. Which papers should you prepare? Advance directives are legal papers that tell doctors how you want to be cared for at the end of your life. You do not need a  to write these papers. Ask your doctor or your state health department for information on how to write your advance directives. They may have the forms for each of these types of papers. Make sure your doctor has a copy of these on file, and give a copy to a family member or close friend. · Consider a do-not-resuscitate order (DNR). This order asks that no extra treatments be done if your heart stops or you stop breathing. Extra treatments may include cardiopulmonary resuscitation (CPR), electrical shock to restart your heart, or a machine to breathe for you. If you decide to have a DNR order, ask your doctor to explain and write it. Place the order in your home where everyone can easily see it. · Consider a living will. A living will explains your wishes about life support and other treatments at the end of your life if you become unable to speak for yourself. Living [place] tell doctors to use or not use treatments that would keep you alive. You must have one or two witnesses or a notary present when you sign this form. · Consider a durable power of  for health care. This allows you to name a person to make decisions about your care if you are not able to. Most people ask a close friend or family member. Talk to this person about the kinds of treatments you want and those that you do not want. Make sure this person understands your wishes. These legal papers are simple to change. Tell your doctor what you want to change, and ask him or her to make a note in your medical file. Give your family updated copies of the papers.   Where can you learn more?  Go to http://sierra-ghanshyam.info/. Enter P184 in the search box to learn more about \"Advance Care Planning: Care Instructions. \"  Current as of: September 24, 2016  Content Version: 11.4  © 4904-9099 [employer]. Care instructions adapted under license by [employer] (which disclaims liability or warranty for this information). If you have questions about a medical condition or this instruction, always ask your healthcare professional. [address] any warranty or liability for your use of this information.

## 2018-06-01 NOTE — PROGRESS NOTES
HISTORY OF Gavin Christianson is a 48 y.o. female presents with Weight Management; Blood Pressure Check; Results; Arm Pain (R x2 months); Referral Follow Up; and ED Follow[facility]    Agree with nurse note. Hypertensive, hyperglycemic pt with high cholesterol, vit d deficiency, iron deficiency anemia, multiple thyroid nodules, and aspirin intolerance presents to the office with a BP of 144/80. She has not yet taken her BP meds today and thinks she forgot to take them yesterday. For BP, she takes Inderal 80 mg BID and Diovan-HCT 80-12.5 mg daily, tolerating well. Her migraines have been stable. Patient denies vision changes, headaches, dizziness, SOB, or swelling. She has episodes of a \"squeezing\" sensation in her chest and the sensation radiates down her L arm. She saw cardiologist, Dr. Wade Reese on 05/24/18 for chest pain. Holter monitor has shown premature atrial and ventricular contractions. Her EKG and exam were unremarkable. He ordered a [place] test and will consider a cardiac cath depending on the results of the stress test.     R hand dominant pt with hx of cervical disc herniation. She felt a pulling sensation in her R arm a couple months ago when she was lifting a book off her bed. She continues to experience a pulling sensation in her arm whenever she lifts anything. The pain is improving but still present. Worse with applied pressure to her R forearm. She has difficulty with lifting things now and believes she may have torn something. Denies worsening neck pain, shoulder pain, or elbow pain. She weighs 286 lbs, down 12 lbs since 12/2017. She drinks 22 oz of Orange Crush soda daily. She prefers to eat \"junk food\" over normal foods. She skips breakfast. 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 She snacks on cookies or zingers with a soda for dinner. She drinks <16.9 oz of water daily.  Yesterday, she had a hamburger for lunch, hot dog with chips for dinner, and a milkshake. \"I'm a sugar addict. \" She acknowledges she needs accountability to help her lose weight. She had labs on 05/31/18 and would like to discuss. CBC wnl. Glucose 114. Cr 0.66. UA showed ketones and positive nitrites. . Vit D 13.4. Unavailable results include microalbumin, A1c, TSH, Hep C and iron. She has some burning with urination. The patient denies fever, chills, abdominal pain, blood in the urine, increased frequency, back pain or other associated symptoms. She went to the [facility] on 12/30/17 for congestion, sore throat, swollen glands, nasal blockage, PND, dry cough, headache, BL sinus pain, fever, and chills x3 days. Dx'd acute maxillary sinusitis. Rx'd Doxycycline 100 mg BID and Promethazine syrup. She returned on 01/22/18 for cold sxs. Dx'd BL non-suppurative otitis media. Rx'd Z-lupis and Diflucan 150 mg. Her sxs resolved after taking the Z-lupis. Health Maintenance    Pt's most recent colonoscopy was on 03/31/14 with GI, Dr. Manda Pan. She has her mammogram order and plans to schedule this. Written by caty Durbin, as dictated by Dr. Gerri Chiu DO.    ROS    Review of Systems negative except as noted above in HPI.     ALLERGIES:    Allergies   Allergen Reactions    Doxylamine Succinate Swelling     12.5-25 MG  HANDS, FACE/PERIORAL, FEET    Pcn [Penicillins] Hives    Ambien [Zolpidem] Nausea and Vomiting and Other (comments)     5 mg with Ativan 0.5 mg   TOO GROGGY, SLEPT 24 HOURS  7.5 MG FORGOT SHE WAS SLEEP EATING    Aspirin Nausea Only    Celebrex [Celecoxib] Other (comments)     TIGHT SQUEEZING IN CHEST  CHEST ACHED    Erythromycin Nausea and Vomiting    Milk Prot-Turm-Pepper-Pineappl Other (comments)     Multiple food allergies    Neurontin [Gabapentin] Other (comments)     300 mg  SEVERE LEG PAIN  EYE TWITCHING    Nsaids (Non-Steroidal Anti-Inflammatory Drug) Other (comments)     GI irritation         CURRENT MEDICATIONS:    Outpatient Prescriptions Marked as Taking for the 6/1/18 encounter (Office Visit) with Julissa Souza,    Medication Sig Dispense Refill    ergocalciferol (ERGOCALCIFEROL) 50,000 unit capsule Take 1 Cap by mouth every seven (7) days. Indications: Vitamin D Deficiency 5 Cap 2    cetirizine (ZYRTEC) 10 mg tablet Take  by mouth daily.  propranolol LA (INDERAL LA) 80 mg SR capsule Take 1 Cap by mouth two (2) times a day. Indications: MIGRAINE PREVENTION 180 Cap 3    valsartan-hydroCHLOROthiazide (DIOVAN-HCT) 80-12.5 mg per tablet Take 1 Tab by mouth daily. Indications: hypertension 90 Tab 3    acetaminophen (TYLENOL ARTHRITIS PAIN) 650 mg CR tablet Take 650 mg by mouth as needed for Pain.  omeprazole (PRILOSEC) 20 mg capsule TAKE 1 CAPSULE BY MOUTH TWICE DAILY  Indications: HEARTBURN 60 Cap 5       PAST MEDICAL HISTORY:    Past Medical History:   Diagnosis Date    Allergic rhinitis 5/27/2010    Cervical disc herniation 01/2010    C3-4, C4-5, C5-6. Hemangioma body of C5. Dr. Henok Quiñonez.  Chest pain 08/2011, 02/2012    Dr. Maria Victoria Whitaker. Dr. Dony Abarcap; denies CP as of 3/27/14    Chronic lower back pain 2010, 03/2016    thoracic DDD and spondylosis. DDD L3-S1. Dr. Leon Rowe. Jann Gibbons. Dr. Sophia Chandler, [facility]. Dr. Carolyn Ritter.  Esophagitis, reflux 4/3/2011    Gallstone     Gastritis 5/27/2010    Dr. Domonique Godinez.  Gastrointestinal food allergy 03/2014    Multiple. Dr. Thu Hodge.    Heart palpitations 02/2012    DUE TO PAC'S AND PVC'S. Dr. Vern Marcum.  Hiatal hernia 4/3/2011    HTN (hypertension) 1987    Impaired glucose tolerance 10/15/2010    Incidental lung nodule 01/08/13    LLL 3.9 mm, RML 3.1 mm;  as of 3/27/14 per pt stable w/o growth    Insomnia 1990s    Iron deficiency anemia 5/27/2010    Irritable bowel syndrome (IBS) 03/2014    Dr. Leax Pierre.    Knee pain 2012    Right.   due to severe OA. / chipped bone     Metatarsal bone fracture     Left fifth.  Migraine 2011    Dr. Kellee Velazquez, cervical 2010    Left. Dr. Vidya Azar.  Shoulder pain, right     due to [address] X 2. Dr. Kellie Skinner.  Thyroid nodule     Dr. Raya Plummer    Toe fracture, left     fifth toe.  Triangular fibrocartilage complex tear     Dr. Jet Trammell. Left. PAST SURGICAL HISTORY:    Past Surgical History:   Procedure Laterality Date    HX  SECTION  1995    x 1    HX CHOLECYSTECTOMY      HX COLONOSCOPY  14    Dr. Mary Celestin; 14    due to abnormal PAP.  HX DILATION AND CURETTAGE      due to miscarriage.  HX ENDOSCOPY      HX ORTHOPAEDIC Right 2016    LUMBAR L4-5, L5-S1 ELLY.   Dr. Divya Ballard HISTORY:    Family History   Problem Relation Age of Onset    Diabetes Mother     Heart Disease Mother 58     2 stents    Hypertension Mother     Diabetes Father     Stroke Maternal Grandmother     Cancer Maternal Uncle      prostate       SOCIAL HISTORY:    Social History     Social History    Marital status:      Spouse name: N/A    Number of children: 3    Years of education: N/A     Occupational History    Medical assistant [employer]     Social History Main Topics    Smoking status: Never Smoker    Smokeless tobacco: Never Used    Alcohol use No    Drug use: No    Sexual activity: Yes     Partners: Male     Other Topics Concern    None     Social History Narrative       IMMUNIZATIONS:    Immunization History   Administered Date(s) Administered    Influenza Vaccine 10/30/2015, 10/31/2017    Influenza Vaccine Whole 10/01/2010    TDAP Vaccine 08/10/2012    Td, Adsorbed 10/09/2016         PHYSICAL EXAMINATION    Vital Signs    Visit Vitals    /80 (BP 1 Location: Right arm, BP Patient Position: Sitting)    Pulse 65    Temp 98 °F (36.7 °C) (Oral)  Resp 12    Ht 5' 7\" (1.702 m)    Wt 286 lb 14.4 oz (130.1 kg)    SpO2 100%    BMI 44.93 kg/m2       Weight Metrics 6/1/2018 5/24/2018 1/22/2018 12/30/2017 12/7/2017 7/20/2016 5/19/2016   Weight 286 lb 14.4 oz 288 lb 9.6 oz 296 lb 12.8 oz 298 lb 9.6 oz 295 lb - 248 lb 12.8 oz   BMI 44.93 kg/m2 45.2 kg/m2 46.49 kg/m2 46.77 kg/m2 46.2 kg/m2 - 37.84 kg/m2       General appearance - Well nourished. Well appearing. Well developed. No acute distress. Obese. Head - Normocephalic. Atraumatic. Eyes - pupils equal and reactive. Extraocular eye movements intact. Sclera anicteric. Mildly injected sclera. Ears - Hearing is grossly normal bilaterally. Nose - normal and patent. No polyps noted. No erythema. No discharge. Mouth - mucous membranes with adequate moisture. Posterior pharynx normal with cobblestone appearance. No erythema, white exudate or obstruction. Neck - supple. Midline trachea. No carotid bruits noted bilaterally. No thyromegaly noted. Chest - clear to auscultation bilaterally anteriorly and posteriorly. No wheezes. No rales or rhonchi. Breath sounds are symmetrical bilaterally. Unlabored respirations. Heart - normal rate. Regular rhythm. Normal S1, S2. No murmur noted. No rubs, clicks or gallops noted. Abdomen - soft and distended. No masses or organomegaly. No rebound, rigidity or guarding. Bowel sounds normal x 4 quadrants. No tenderness noted. Neurological - awake, alert and oriented to person, place, and time and event. Cranial nerves II through XII intact. Clear speech. Muscle strength is +5/5 x 4 extremities. Sensation is intact to light touch bilaterally. Steady gait. Heme/Lymph - peripheral pulses normal x 4 extremities. No peripheral edema is noted. Musculoskeletal - Intact x 4 extremities. Full ROM x 4 extremities. No pain with movement. No pain on palpation of the bilateral shoulders, elbows, wrists, hands.    Back exam - normal range of motion. No pain on palpation of the spinous processes in the cervical, thoracic, lumbar, sacral regions. No CVA tenderness. Skin - no rashes, erythema, ecchymosis, lacerations, abrasions, suspicious moles noted  Psychological -   normal behavior, dress and thought processes. Good insight. Good eye contact. Normal affect. Appropriate mood. Normal speech. DATA REVIEWED    Lab Results   Component Value Date/Time    WBC 3.9 12/08/2016 08:46 AM    Hemoglobin (POC) 12.9 02/03/2015 02:40 PM    HGB 13.1 12/08/2016 08:46 AM    Hematocrit (POC) 38 02/03/2015 02:40 PM    HCT 38.9 12/08/2016 08:46 AM    PLATELET 765 09/60/8941 08:46 AM    MCV 87 12/08/2016 08:46 AM     Lab Results   Component Value Date/Time    Sodium 139 12/08/2016 08:46 AM    Potassium 4.4 12/08/2016 08:46 AM    Chloride 99 12/08/2016 08:46 AM    CO2 28 12/08/2016 08:46 AM    Anion gap 9 01/16/2014 10:15 AM    Glucose 95 12/08/2016 08:46 AM    BUN 6 12/08/2016 08:46 AM    Creatinine 0.70 12/08/2016 08:46 AM    BUN/Creatinine ratio 9 12/08/2016 08:46 AM    GFR est  12/08/2016 08:46 AM    GFR est non- 12/08/2016 08:46 AM    Calcium 9.2 12/08/2016 08:46 AM    Bilirubin, total <0.2 12/08/2016 08:46 AM    AST (SGOT) 26 12/08/2016 08:46 AM    Alk.  phosphatase 89 12/08/2016 08:46 AM    Protein, total 7.7 12/08/2016 08:46 AM    Albumin 4.3 12/08/2016 08:46 AM    Globulin 4.4 (H) 01/16/2014 10:15 AM    A-G Ratio 1.3 12/08/2016 08:46 AM    ALT (SGPT) 20 12/08/2016 08:46 AM     Lab Results   Component Value Date/Time    Cholesterol, total 218 (H) 12/08/2016 08:46 AM    HDL Cholesterol 71 12/08/2016 08:46 AM    LDL, calculated 119 (H) 12/08/2016 08:46 AM    VLDL, calculated 28 12/08/2016 08:46 AM    Triglyceride 138 12/08/2016 08:46 AM    CHOL/HDL Ratio 3.2 10/15/2010 04:14 PM     Lab Results   Component Value Date/Time    VITAMIN D, 25-HYDROXY 10.4 (L) 12/08/2016 08:46 AM       Lab Results   Component Value Date/Time    Hemoglobin A1c 6.0 (H) 12/08/2016 08:46 AM     Lab Results   Component Value Date/Time    TSH 0.641 12/08/2016 08:46 AM     Lab Results   Component Value Date/Time    Microalb/Creat ratio (ug/mg creat.) 1.9 12/08/2016 08:46 AM       ASSESSMENT and PLAN      ICD-10-CM ICD-9-CM    1. Essential hypertension I10 401.9     worse missed bp meds x 2 days   2. High cholesterol E78.00 272.0    3. Dysuria R30.0 788. 1 CULTURE, URINE   4. Arm pain, musculoskeletal, right M79.601 729.5     with arm weakness, due to musculoskeletal injury vs other, improving slowly   5. Hyperglycemia R73.9 790.29    6. Vitamin D deficiency E55.9 268.9 ergocalciferol (ERGOCALCIFEROL) 50,000 unit capsule   7. Weight loss R63.4 783.21     12# since 12/2017 due to less junk foods x weeks   8. Iron deficiency anemia, unspecified iron deficiency anemia type D50.9 280.9    9. Obesity, Class III, BMI 40-49.9 (morbid obesity) ([facility]) E66.01 278.01    10. Aspirin intolerance Z78.9 995.27      E935.3    11. Multiple thyroid nodules E04.2 241.1    12. Acute non-recurrent maxillary sinusitis J01.00 461.0     improved after Doxycycline   13. Other acute nonsuppurative otitis media of both ears, recurrence not specified H65.193 381.00     resolved after Zpak   14. Other chest pain R07.89 786.59     Left, intermittently due to cardio vs GI vs other   15. Intractable migraine without aura and without status migrainosus G43.019 346.11     stable on Propranolol   16. Cervical disc herniation M50.20 722.0     stable, unchanged with arm injury       Discussed the patient's BMI with her. The BMI follow up plan is as follows: I have counseled this patient on diet and exercise regimens. Decrease carbohydrates (white foods, sweet foods, sweet drinks and alcohol), increase green leafy vegetables and protein (lean meats and beans) with each meal.  Avoid fried foods. Eat 3-5 small meals daily. Do not skip meals. Increase water intake. DECREASE SODA CONSUMPTION.   Increase physical activity to 30 minutes daily for health benefit or 60 minutes daily to prevent weight regain, as tolerated. Get 7-8 hours uninterrupted sleep nightly. Lift with L arm instead of R until evaluated by ortho. Keep a food diary and bring to next appt. Avoid migraine triggers. Chart reviewed and updated. Continue current medications and care. Start Rx Vitamin D 50,000IU weekly x3 months and then take OTC Vitamin D 5,000IU daily. She declines an ortho referral and plans to schedule an appointment on her own. Prescriptions written and sent to pharmacy; medication side effects discussed. Vit D [id number]. Most recent tests reviewed from 05/31/18. Recent office visit notes from Dr. Francine Garcia and [facility] reviewed. Counseled patient on health concerns:  Weight management, BP, advanced care planning, cholesterol, hyperglycemia, vit d deficiency, and arm care. Signs of MI and CVA. To ER or dial 911 if present. Relevant handouts given and discussed with patient. Immunizations noted. Offered empathy, support, legitimation, prayers, partnership to patient. Praised patient for progress. Follow-up Disposition:  Return in about 3 months (around 9/1/2018) for blood pressure, weight, results, referral follow up. Patient was offered a choice/choices in the treatment plan today. Patient expresses understanding of the plan and agrees with recommendations. More than 40 mins spent face to face with patient and more than 50% of this time spent in counseling and coordinating care. Written by caty Roblero, as dictated by Dr. Cindy Blackman DO. Documentation True and Accepted by Faustino Zaragoza. Mario Morton Patient Instructions        Learning About Low-Carbohydrate Diets for Weight Loss  What is a low-carbohydrate diet? Low-carb diets avoid foods that are high in carbohydrate. These high-carb foods include pasta, bread, rice, cereal, fruits, and starchy vegetables.  Instead, these diets usually have you eat foods that are high in fat and protein. Many people lose weight quickly on a low-carb diet. But the early weight loss is water. People on this diet often gain the weight back after they start eating carbs again. Not all diet plans are safe or work well. A lot of the evidence shows that low-carb diets aren't healthy. That's because these diets often don't include healthy foods like fruits and vegetables. Losing weight safely means balancing protein, fat, and carbs with every meal and snack. And low-carb diets don't always provide the vitamins, minerals, and fiber you need. If you have a serious medical condition, talk to your doctor before you try any diet. These conditions include kidney disease, heart disease, type 2 diabetes, high cholesterol, and high blood pressure. If you are pregnant, it may not be safe for your baby if you are on a low-carb diet. How can you lose weight safely? You might have heard that a diet plan helped another person lose weight. But that doesn't mean that it will work for you. It is very hard to stay on a diet that includes lots of big changes in your eating habits. If you want to get to a healthy weight and stay there, making healthy lifestyle changes will often work better than dieting. These steps can help. · Make a plan for change. Work with your doctor to create a plan that is right for you. · See a dietitian. He or she can show you how to make healthy changes in your eating habits. · Manage stress. If you have a lot of stress in your life, it can be hard to focus on making healthy changes to your daily habits. · Track your food and activity. You are likely to do better at losing weight if you keep track of what you eat and what you do. Follow-up care is a key part of your treatment and safety. Be sure to make and go to all appointments, and call your doctor if you are having problems.  It's also a good idea to know your test results and keep a list of the medicines you take. Where can you learn more? Go to http://sierra-ghanshyam.info/. Enter A121 in the search box to learn more about \"Learning About Low-Carbohydrate Diets for Weight Loss. \"  Current as of: May 12, 2017  Content Version: 11.4  © 4579-2129 [employer]. Care instructions adapted under license by [employer] (which disclaims liability or warranty for this information). If you have questions about a medical condition or this instruction, always ask your healthcare professional. [address] any warranty or liability for your use of this information. Starting a Weight Loss Plan: Care Instructions  Your Care Instructions    If you are thinking about losing weight, it can be hard to know where to start. Your doctor can help you set up a weight loss plan that best meets your needs. You may want to take a class on nutrition or exercise, or join a weight loss support group. If you have questions about how to make changes to your eating or exercise habits, ask your doctor about seeing a registered dietitian or an exercise specialist.  It can be a big challenge to lose weight. But you do not have to make huge changes at once. Make small changes, and stick with them. When those changes become habit, add a few more changes. If you do not think you are ready to make changes right now, try to pick a date in the future. Make an appointment to see your doctor to discuss whether the time is right for you to start a plan. Follow-up care is a key part of your treatment and safety. Be sure to make and go to all appointments, and call your doctor if you are having problems. It's also a good idea to know your test results and keep a list of the medicines you take. How can you care for yourself at home? · Set realistic goals. Many people expect to lose much more weight than is likely.  A weight loss of 5% to 10% of your body weight may be enough to improve your health. · Get family and friends involved to provide support. Talk to them about why you are trying to lose weight, and ask them to help. They can help by participating in exercise and having meals with you, even if they may be eating something different. · Find what works best for you. If you do not have time or do not like to cook, a program that offers meal replacement bars or shakes may be better for you. Or if you like to prepare meals, finding a plan that includes daily menus and recipes may be best.  · Ask your doctor about other health professionals who can help you achieve your weight loss goals. ¨ A dietitian can help you make healthy changes in your diet. ¨ An exercise specialist or  can help you develop a safe and effective exercise program.  ¨ A counselor or psychiatrist can help you cope with issues such as depression, anxiety, or family problems that can make it hard to focus on weight loss. · Consider joining a support group for people who are trying to lose weight. Your doctor can suggest groups in your area. Where can you learn more? Go to http://sierra-ghanshyam.info/. Enter I908 in the search box to learn more about \"Starting a Weight Loss Plan: Care Instructions. \"  Current as of: October 13, 2016  Content Version: 11.4  © 9584-4589 [employer]. Care instructions adapted under license by [employer] (which disclaims liability or warranty for this information). If you have questions about a medical condition or this instruction, always ask your healthcare professional. [place] [address] any warranty or liability for your use of this information. Advance Care Planning: Care Instructions  Your Care Instructions    It can be hard to live with an illness that cannot be cured. But if your health is getting worse, you may want to make decisions about end-of-life care.  Planning for the end of your life does not mean that you are giving up. It is a way to make sure that your wishes are met. Clearly stating your wishes can make it easier for your loved ones. Making plans while you are still able may also ease your mind and make your final days less stressful and more meaningful. Follow-up care is a key part of your treatment and safety. Be sure to make and go to all appointments, and call your doctor if you are having problems. It's also a good idea to know your test results and keep a list of the medicines you take. What can you do to plan for the end of life? · You can bring these issues up with your doctor. You do not need to wait until your doctor starts the conversation. You might start with \"I would not be willing to live with . Michaelyn Daily Michaelyn Daily Michaelyn Daily \" When you complete this sentence it helps your doctor understand your wishes. · Talk openly and honestly with your doctor. This is the best way to understand the decisions you will need to make as your health changes. Know that you can always change your mind. · Ask your doctor about commonly used life-support measures. These include tube feedings, breathing machines, and fluids given through a vein (IV). Understanding these treatments will help you decide whether you want them. · You may choose to have these life-supporting treatments for a limited time. This allows a trial period to see whether they will help you. You may also decide that you want your doctor to take only certain measures to keep you alive. It is important to spell out these conditions so that your doctor and family understand them. · Talk to your doctor about how long you are likely to live. He or she may be able to give you an idea of what usually happens with your specific illness. · Think about preparing papers that state your wishes. This way there will not be any confusion about what you want. You can change your instructions at any time. Which papers should you prepare?   Advance directives are legal papers that tell doctors how you want to be cared for at the end of your life. You do not need a  to write these papers. Ask your doctor or your [facility] department for information on how to write your advance directives. They may have the forms for each of these types of papers. Make sure your doctor has a copy of these on file, and give a copy to a family member or close friend. · Consider a do-not-resuscitate order (DNR). This order asks that no extra treatments be done if your heart stops or you stop breathing. Extra treatments may include cardiopulmonary resuscitation (CPR), electrical shock to restart your heart, or a machine to breathe for you. If you decide to have a DNR order, ask your doctor to explain and write it. Place the order in your home where everyone can easily see it. · Consider a living will. A living will explains your wishes about life support and other treatments at the end of your life if you become unable to speak for yourself. [place] tell doctors to use or not use treatments that would keep you alive. You must have one or two witnesses or a notary present when you sign this form. · Consider a durable power of  for health care. This allows you to name a person to make decisions about your care if you are not able to. Most people ask a close friend or family member. Talk to this person about the kinds of treatments you want and those that you do not want. Make sure this person understands your wishes. These legal papers are simple to change. Tell your doctor what you want to change, and ask him or her to make a note in your medical file. Give your family updated copies of the papers. Where can you learn more? Go to http://sierra-hganshyam.info/. Enter P184 in the search box to learn more about \"Advance Care Planning: Care Instructions. \"  Current as of: September 24, 2016  Content Version: 11.4  © 3825-9246 Healthwise, [facility].  Care instructions adapted under license by [employer] (which disclaims liability or warranty for this information). If you have questions about a medical condition or this instruction, always ask your healthcare professional. [address] any warranty or liability for your use of this information.

## 2018-06-01 NOTE — MR AVS SNAPSHOT
[address] [facility] [address] 
 
 
 [address] 
[address] [facility] [address] 
[phone] Patient: Sherin Leavitt MRN:  [id number] Visit Information Date & Time Provider Department Dept. Phone Encounter #  
 2018  8:30 AM DO Olivia Presley [phone] [id number] Follow-up Instructions Return in about 3 months (around 2018) for blood pressure, weight, results, referral follow up. Your Appointments 6/15/2018  1:30 PM  
NUCLEAR MEDICINE with NUCLEAR, [facility] ([facility] SCHEDULING) Appt Note: Opal scan Per Dr. Tyson Duncan for chest pain  
 [address] [facility] [address] [facility] [address] Upcoming Health Maintenance Date Due Hepatitis C Screening 10/19/2018* PAP AKA CERVICAL CYTOLOGY 2018* BREAST CANCER SCRN MAMMOGRAM 2018* Influenza Age 5 to Adult 2018 COLONOSCOPY 3/31/2024 DTaP/Tdap/Td series (3 - Td) 10/9/2026 *Topic was postponed. The date shown is not the original due date. Allergies as of 2018  Review Complete On: 2018 By: Julissa Souza DO Severity Noted Reaction Type Reactions Doxylamine Succinate High 2017    Swelling 12.5-25 MG 
HANDS, FACE/PERIORAL, FEET Pcn [Penicillins] High 2010    Hives Ambien [Zolpidem]  2014    Nausea and Vomiting, Other (comments) 5 mg with Ativan 0.5 mg  
TOO GROGGY, SLEPT 24 HOURS 
7.5 MG FORGOT SHE WAS SLEEP EATING Aspirin  2017    Nausea Only Celebrex [Celecoxib]  2017    Other (comments) TIGHT SQUEEZING IN CHEST 
CHEST ACHED Erythromycin  2011    Nausea and Vomiting Milk Prot-turm-pepper-pineappl  2014    Other (comments) Multiple food allergies Neurontin [Gabapentin]  2017    Other (comments)  300 mg 
 SEVERE LEG PAIN 
EYE TWITCHING  
 Nsaids (Non-steroidal Anti-inflammatory Drug)  02/22/2011    Other (comments) GI irritation Current Immunizations  Reviewed on 6/1/2018 Name Date Influenza Vaccine 10/31/2017, 10/30/2015 Influenza Vaccine Whole 10/1/2010 TDAP Vaccine 8/10/2012 Td, Adsorbed 10/9/2016  6:23 PM  
  
 Reviewed by Shwetha Client, DO on 6/1/2018 at  9:32 AM  
 Reviewed by Shwetha Client, DO on 6/1/2018 at  9:44 AM  
You Were Diagnosed With   
  
 Codes Comments Essential hypertension    -  Primary ICD-10-CM: I10 
ICD-9-CM: 401.9 worse missed bp meds x 2 days High cholesterol     ICD-10-CM: E78.00 ICD-9-CM: 272.0 Dysuria     ICD-10-CM: R30.0 ICD-9-CM: 043. 1 Arm pain, musculoskeletal, right     ICD-10-CM: M79.601 ICD-9-CM: 729.5 with arm weakness, due to musculoskeletal injury vs other, improving slowly Hyperglycemia     ICD-10-CM: R73.9 ICD-9-CM: 790.29 Vitamin D deficiency     ICD-10-CM: E55.9 ICD-9-CM: 268.9 Weight loss     ICD-10-CM: R63.4 ICD-9-CM: 783.21 12# since 12/2017 due to less junk foods x weeks Iron deficiency anemia, unspecified iron deficiency anemia type     ICD-10-CM: D50.9 ICD-9-CM: 280.9 Obesity, Class III, BMI 40-49.9 (morbid obesity) (HCC)     ICD-10-CM: E66.01 
ICD-9-CM: 278.01 Aspirin intolerance     ICD-10-CM: Z78.9 ICD-9-CM: 995.27, E935.3 Multiple thyroid nodules     ICD-10-CM: E04.2 ICD-9-CM: 258. 1 Acute non-recurrent maxillary sinusitis     ICD-10-CM: J01.00 ICD-9-CM: 461.0 improved after Doxycycline Other acute nonsuppurative otitis media of both ears, recurrence not specified     ICD-10-CM: H65.193 ICD-9-CM: 381.00 resolved after Maryagnes Short Other chest pain     ICD-10-CM: R07.89 ICD-9-CM: 786.59 Left, intermittently due to cardio vs GI vs other Intractable migraine without aura and without status migrainosus     ICD-10-CM: G43.019 
ICD-9-CM: 346.11 stable on Propranolol Cervical disc herniation     ICD-10-CM: M50.20 ICD-9-CM: 722.0 stable, unchanged with arm injury Vitals BP Pulse Temp Resp Height(growth percentile) Weight(growth percentile) 144/80 (BP 1 Location: Right arm, BP Patient Position: Sitting) 65 98 °F (36.7 °C) (Oral) 12 5' 7\" (1.702 m) 286 lb 14.4 oz (130.1 kg) SpO2 BMI OB Status Smoking Status 100% 44.93 kg/m2 Menopause Never Smoker Vitals History BMI and BSA Data Body Mass Index Body Surface Area 44.93 kg/m 2 2.48 m 2 Preferred Pharmacy Pharmacy Name Phone [id number]- [place], [place] - [address] [phone] Your Updated Medication List  
  
   
This list is accurate as of 6/1/18  9:52 AM.  Always use your most recent med list.  
  
  
  
  
 ergocalciferol 50,000 unit capsule Commonly known as:  ERGOCALCIFEROL Take 1 Cap by mouth every seven (7) days. Indications: Vitamin D Deficiency  
  
 omeprazole 20 mg capsule Commonly known as:  PRILOSEC  
TAKE 1 CAPSULE BY MOUTH TWICE DAILY  Indications: HEARTBURN  
  
 propranolol LA 80 mg SR capsule Commonly known as:  INDERAL LA Take 1 Cap by mouth two (2) times a day. Indications: MIGRAINE PREVENTION  
  
 TYLENOL ARTHRITIS PAIN 650 mg Tber Generic drug:  acetaminophen Take 650 mg by mouth as needed for Pain.  
  
 valsartan-hydroCHLOROthiazide 80-12.5 mg per tablet Commonly known as:  DIOVAN-HCT Take 1 Tab by mouth daily. Indications: hypertension ZyrTEC 10 mg tablet Generic drug:  cetirizine Take  by mouth daily. Prescriptions Sent to Pharmacy Refills  
 ergocalciferol (ERGOCALCIFEROL) 50,000 unit capsule 2 Sig: Take 1 Cap by mouth every seven (7) days. Indications: Vitamin D Deficiency Class: Normal  
 Pharmacy: [facility] [id number] [facility] [address] Ph #: [phone]  Route: Oral  
  
 We Performed the Following CULTURE, URINE [id number] CPT(R)] Follow-up Instructions Return in about 3 months (around 9/1/2018) for blood pressure, weight, results, referral follow up. Patient Instructions Learning About Low-Carbohydrate Diets for Weight Loss What is a low-carbohydrate diet? Low-carb diets avoid foods that are high in carbohydrate. These high-carb foods include pasta, bread, rice, cereal, fruits, and starchy vegetables. Instead, these diets usually have you eat foods that are high in fat and protein. Many people lose weight quickly on a low-carb diet. But the early weight loss is water. People on this diet often gain the weight back after they start eating carbs again. Not all diet plans are safe or work well. A lot of the evidence shows that low-carb diets aren't healthy. That's because these diets often don't include healthy foods like fruits and vegetables. Losing weight safely means balancing protein, fat, and carbs with every meal and snack. And low-carb diets don't always provide the vitamins, minerals, and fiber you need. If you have a serious medical condition, talk to your doctor before you try any diet. These conditions include kidney disease, heart disease, type 2 diabetes, high cholesterol, and high blood pressure. If you are pregnant, it may not be safe for your baby if you are on a low-carb diet. How can you lose weight safely? You might have heard that a diet plan helped another person lose weight. But that doesn't mean that it will work for you. It is very hard to stay on a diet that includes lots of big changes in your eating habits. If you want to get to a healthy weight and stay there, making healthy lifestyle changes will often work better than dieting. These steps can help. · Make a plan for change. Work with your doctor to create a plan that is right for you. · See a dietitian.  He or she can show you how to make healthy changes in your eating habits. · Manage stress. If you have a lot of stress in your life, it can be hard to focus on making healthy changes to your daily habits. · Track your food and activity. You are likely to do better at losing weight if you keep track of what you eat and what you do. Follow-up care is a key part of your treatment and safety. Be sure to make and go to all appointments, and call your doctor if you are having problems. It's also a good idea to know your test results and keep a list of the medicines you take. Where can you learn more? Go to http://sierra-ghanshyam.info/. Enter A121 in the search box to learn more about \"Learning About Low-Carbohydrate Diets for Weight Loss. \" Current as of: May 12, 2017 Content Version: 11.4 © 4632-0287 [employer]. Care instructions adapted under license by [employer] (which disclaims liability or warranty for this information). If you have questions about a medical condition or this instruction, always ask your healthcare professional. [address] any warranty or liability for your use of this information. Starting a Weight Loss Plan: Care Instructions Your Care Instructions If you are thinking about losing weight, it can be hard to know where to start. Your doctor can help you set up a weight loss plan that best meets your needs. You may want to take a class on nutrition or exercise, or join a weight loss support group. If you have questions about how to make changes to your eating or exercise habits, ask your doctor about seeing a registered dietitian or an exercise specialist. 
It can be a big challenge to lose weight. But you do not have to make huge changes at once. Make small changes, and stick with them. When those changes become habit, add a few more changes.  
If you do not think you are ready to make changes right now, try to pick a date in the future. Make an appointment to see your doctor to discuss whether the time is right for you to start a plan. Follow-up care is a key part of your treatment and safety. Be sure to make and go to all appointments, and call your doctor if you are having problems. It's also a good idea to know your test results and keep a list of the medicines you take. How can you care for yourself at home? · Set realistic goals. Many people expect to lose much more weight than is likely. A weight loss of 5% to 10% of your body weight may be enough to improve your health. · Get family and friends involved to provide support. Talk to them about why you are trying to lose weight, and ask them to help. They can help by participating in exercise and having meals with you, even if they may be eating something different. · Find what works best for you. If you do not have time or do not like to cook, a program that offers meal replacement bars or shakes may be better for you. Or if you like to prepare meals, finding a plan that includes daily menus and recipes may be best. 
· Ask your doctor about other health professionals who can help you achieve your weight loss goals. ¨ A dietitian can help you make healthy changes in your diet. ¨ An exercise specialist or  can help you develop a safe and effective exercise program. 
¨ A counselor or psychiatrist can help you cope with issues such as depression, anxiety, or family problems that can make it hard to focus on weight loss. · Consider joining a support group for people who are trying to lose weight. Your doctor can suggest groups in your area. Where can you learn more? Go to http://iserra-ghanshyam.info/. Enter T184 in the search box to learn more about \"Starting a Weight Loss Plan: Care Instructions. \" Current as of: October 13, 2016 Content Version: 11.4 © 7072-9824 Healthwise, [employer]orporated.  Care instructions adapted under license by [address] (which disclaims liability or warranty for this information). If you have questions about a medical condition or this instruction, always ask your healthcare professional. [address] any warranty or liability for your use of this information. Advance Care Planning: Care Instructions Your Care Instructions It can be hard to live with an illness that cannot be cured. But if your health is getting worse, you may want to make decisions about end-of-life care. Planning for the end of your life does not mean that you are giving up. It is a way to make sure that your wishes are met. Clearly stating your wishes can make it easier for your loved ones. Making plans while you are still able may also ease your mind and make your final days less stressful and more meaningful. Follow-up care is a key part of your treatment and safety. Be sure to make and go to all appointments, and call your doctor if you are having problems. It's also a good idea to know your test results and keep a list of the medicines you take. What can you do to plan for the end of life? · You can bring these issues up with your doctor. You do not need to wait until your doctor starts the conversation. You might start with \"I would not be willing to live with . Verónica Castillo \" When you complete this sentence it helps your doctor understand your wishes. · Talk openly and honestly with your doctor. This is the best way to understand the decisions you will need to make as your health changes. Know that you can always change your mind. · Ask your doctor about commonly used life-support measures. These include tube feedings, breathing machines, and fluids given through a vein (IV). Understanding these treatments will help you decide whether you want them. · You may choose to have these life-supporting treatments for a limited time. This allows a trial period to see whether they will help you.  You may also decide that you want your doctor to take only certain measures to keep you alive. It is important to spell out these conditions so that your doctor and family understand them. · Talk to your doctor about how long you are likely to live. He or she may be able to give you an idea of what usually happens with your specific illness. · Think about preparing papers that state your wishes. This way there will not be any confusion about what you want. You can change your instructions at any time. Which papers should you prepare? Advance directives are legal papers that tell doctors how you want to be cared for at the end of your life. You do not need a  to write these papers. Ask your doctor or your state health department for information on how to write your advance directives. They may have the forms for each of these types of papers. Make sure your doctor has a copy of these on file, and give a copy to a family member or close friend. · Consider a do-not-resuscitate order (DNR). This order asks that no extra treatments be done if your heart stops or you stop breathing. Extra treatments may include cardiopulmonary resuscitation (CPR), electrical shock to restart your heart, or a machine to breathe for you. If you decide to have a DNR order, ask your doctor to explain and write it. Place the order in your home where everyone can easily see it. · Consider a living will. A living will explains your wishes about life support and other treatments at the end of your life if you become unable to speak for yourself. [place] tell doctors to use or not use treatments that would keep you alive. You must have one or two witnesses or a notary present when you sign this form. · Consider a durable power of  for health care. This allows you to name a person to make decisions about your care if you are not able to. Most people ask a close friend or family member.  Talk to this person about the kinds of treatments you want and those that you do not want. Make sure this person understands your wishes. These legal papers are simple to change. Tell your doctor what you want to change, and ask him or her to make a note in your medical file. Give your family updated copies of the papers. Where can you learn more? Go to http://sierra-ghanshyam.info/. Enter P184 in the search box to learn more about \"Advance Care Planning: Care Instructions. \" Current as of: September 24, 2016 Content Version: 11.4 © 0550-5991 [employer]. Care instructions adapted under license by [employer] (which disclaims liability or warranty for this information). If you have questions about a medical condition or this instruction, always ask your healthcare professional. [address] any warranty or liability for your use of this information. Introducing [facility]! Dear Jenna French: Thank you for requesting a BrightSun account. Our records indicate that you already have an active BrightSun account. You can access your account anytime at [email or web address] Did you know that you can access your hospital and ER discharge instructions at any time in BrightSun? You can also review all of your test results from your hospital stay or ER visit. Additional Information If you have questions, please visit the Frequently Asked Questions section of the BrightSun website at [email or web address] Remember, BrightSun is NOT to be used for urgent needs. For medical emergencies, dial 911. Now available from your iPhone and Android! Please provide this summary of care documentation to your next provider. Your primary care clinician is listed as Darrell Jennings. If you have any questions after today's visit, please call [phone].

## 2018-06-01 NOTE — PROGRESS NOTES
Leeanne Parham  Identified pt with two pt identifiers(name and ). Chief Complaint   Patient presents with    Weight Management    Blood Pressure Check    Results    Labs     Xray for right forearm    Arm Pain     right arm       1. Have you been to the ER, urgent care clinic since your last visit? Hospitalized since your last visit? Yes, went to [facility]    2. Have you seen or consulted any other health care providers outside of the [facility] since your last visit? Include any pap smears or colon screening. No    In the event something were to happen to you and you were unable to speak on your behalf, do you have an Advance Directive/ Living Will in place stating your wishes? NO    If yes, do we have a copy on file NO    If no, would you like information:   Pt accepted. Medication reconciliation up to date and corrected with patient at this time. Today's provider has been notified of reason for visit, vitals and flowsheets obtained on patients. Reviewed record in preparation for visit, huddled with provider and have obtained necessary documentation.       Health Maintenance Due   Topic    Hepatitis C Screening     BREAST CANCER SCRN MAMMOGRAM     PAP AKA CERVICAL CYTOLOGY        Wt Readings from Last 3 Encounters:   18 286 lb 14.4 oz (130.1 kg)   18 288 lb 9.6 oz (130.9 kg)   18 296 lb 12.8 oz (134.6 kg)     Temp Readings from Last 3 Encounters:   18 97.5 °F (36.4 °C) (Oral)   17 98.9 °F (37.2 °C) (Oral)   17 98.6 °F (37 °C) (Oral)     BP Readings from Last 3 Encounters:   18 138/78   18 151/90   17 (!) 159/95     Pulse Readings from Last 3 Encounters:   18 63   18 72   17 94     Vitals:    18 0836   BP: 144/80   Pulse: 65   Resp: 12   Temp: 98 °F (36.7 °C)   TempSrc: Oral   SpO2: 100%   Weight: 286 lb 14.4 oz (130.1 kg)   Height: 5' 7\" (1.702 m)   PainSc:   0 - No pain Learning Assessment:  :     Learning Assessment 6/20/2014   PRIMARY LEARNER Patient   HIGHEST LEVEL OF EDUCATION - PRIMARY LEARNER  SOME COLLEGE   BARRIERS PRIMARY LEARNER NONE   PRIMARY LANGUAGE ENGLISH   LEARNER PREFERENCE PRIMARY DEMONSTRATION   ANSWERED BY patient   RELATIONSHIP SELF       Depression Screening:  :     PHQ over the last two weeks 1/22/2018   Little interest or pleasure in doing things Not at all   Feeling down, depressed or hopeless Not at all   Total Score PHQ 2 0       Fall Risk Assessment:  :     Fall Risk Assessment, last 12 mths 12/7/2017   Able to walk? Yes   Fall in past 12 months? No       Abuse Screening:  :     Abuse Screening Questionnaire 12/7/2017   Do you ever feel afraid of your partner? N   Are you in a relationship with someone who physically or mentally threatens you? N   Is it safe for you to go home?  Y       ADL Screening:  :     ADL Assessment 12/7/2017   Feeding yourself No Help Needed   Getting from bed to chair No Help Needed   Getting dressed No Help Needed   Bathing or showering No Help Needed   Walk across the room (includes cane/walker) No Help Needed   Using the telphone No Help Needed   Taking your medications No Help Needed   Preparing meals No Help Needed   Managing money (expenses/bills) No Help Needed   Moderately strenuous housework (laundry) No Help Needed   Shopping for personal items (toiletries/medicines) No Help Needed   Shopping for groceries No Help Needed   Driving No Help Needed   Climbing a flight of stairs No Help Needed   Getting to places beyond walking distances No Help Needed

## 2018-06-01 NOTE — ACP (ADVANCE CARE PLANNING)
Discussed ACP with patient. Gave pt ACP handout and patient will download the patient document on line. Pt declines referral to [facility] team at this time. Patient will bring document to her next office visit or attach it to her MyChart record.

## 2018-06-25 ENCOUNTER — CLINICAL SUPPORT (OUTPATIENT)
Dept: CARDIOLOGY CLINIC | Age: 53
End: 2018-06-25

## 2018-06-25 DIAGNOSIS — R07.9 CHEST PAIN, UNSPECIFIED TYPE: ICD-10-CM

## 2018-06-25 DIAGNOSIS — E78.00 HIGH CHOLESTEROL: ICD-10-CM

## 2018-06-25 DIAGNOSIS — R00.2 HEART PALPITATIONS: ICD-10-CM

## 2018-06-25 DIAGNOSIS — I10 ESSENTIAL HYPERTENSION: Primary | ICD-10-CM

## 2018-06-25 NOTE — PROGRESS NOTES
See scanned document. Ordering Doctor:Dr. Brendan Razo  Reading Doctor:Dr. Washington Serrano    Patient tolerated procedure well.

## 2018-06-28 ENCOUNTER — TELEPHONE (OUTPATIENT)
Dept: CARDIOLOGY CLINIC | Age: 53
End: 2018-06-28

## 2018-06-28 NOTE — TELEPHONE ENCOUNTER
This is a patient of Dr. Kim Lala. Dr. Kira Banks read stress test. Called patient. Verified patient's identity with two identifiers. Notified patient of results. She states even though normal test, she would still like to talk to Dr. Gregg Griffith about having a cardiac cath (she discussed with him in the office). Scheduled appointment for her to come in and discuss with him in the office. She is going to send a message via Lingvist for Dr. Gregg Griffith to read and advise when he is back in a couple of weeks. Patient verbalizes understanding and denies further questions or concerns. Per Dr. Kira Banks:    Summary:  Normal Lexiscan  Gated SPECT myocardial perfusion study. These test results indicate low risk/extent  for the presence of angiographically significant severe CAD.

## 2018-07-12 ENCOUNTER — TELEPHONE (OUTPATIENT)
Dept: CARDIOLOGY CLINIC | Age: 53
End: 2018-07-12

## 2018-07-12 DIAGNOSIS — R07.9 CHEST PAIN, UNSPECIFIED TYPE: ICD-10-CM

## 2018-07-12 DIAGNOSIS — R00.2 HEART PALPITATIONS: Primary | ICD-10-CM

## 2018-07-12 NOTE — TELEPHONE ENCOUNTER
Two patient indentifiers verified. Spoke with Ms Rafaela Runner and she was okay with date and time for cardiac cath. Schedule for 7/19/18 at 9:30 am. Hold Valsartan-hydrochlorothiazide day of procedure. Ordered labs and faxed to patient per he requested and faxed procedure instructions as well.

## 2018-07-13 DIAGNOSIS — R07.9 CHEST PAIN, UNSPECIFIED TYPE: Primary | ICD-10-CM

## 2018-07-13 RX ORDER — SODIUM CHLORIDE 9 MG/ML
75 INJECTION, SOLUTION INTRAVENOUS CONTINUOUS
Status: CANCELLED | OUTPATIENT
Start: 2018-07-19 | End: 2018-07-19

## 2018-07-13 RX ORDER — SODIUM CHLORIDE 0.9 % (FLUSH) 0.9 %
5-10 SYRINGE (ML) INJECTION EVERY 8 HOURS
Status: CANCELLED | OUTPATIENT
Start: 2018-07-19

## 2018-07-13 RX ORDER — DIPHENHYDRAMINE HYDROCHLORIDE 50 MG/ML
50 INJECTION, SOLUTION INTRAMUSCULAR; INTRAVENOUS
Status: CANCELLED | OUTPATIENT
Start: 2018-07-19 | End: 2018-07-20

## 2018-07-19 ENCOUNTER — HOSPITAL ENCOUNTER (OUTPATIENT)
Dept: CARDIAC CATH/INVASIVE PROCEDURES | Age: 53
Discharge: HOME | End: 2018-07-19
Attending: SPECIALIST | Admitting: SPECIALIST
Payer: COMMERCIAL

## 2018-07-19 VITALS
HEART RATE: 56 BPM | RESPIRATION RATE: 16 BRPM | OXYGEN SATURATION: 100 % | SYSTOLIC BLOOD PRESSURE: 122 MMHG | TEMPERATURE: 98.5 F | HEIGHT: 68 IN | WEIGHT: 269.8 LBS | DIASTOLIC BLOOD PRESSURE: 50 MMHG | BODY MASS INDEX: 40.89 KG/M2

## 2018-07-19 DIAGNOSIS — R07.9 CHEST PAIN, UNSPECIFIED TYPE: ICD-10-CM

## 2018-07-19 LAB
BUN SERPL-MCNC: 10 MG/DL (ref 6–24)
BUN/CREAT SERPL: 15 (ref 9–23)
CALCIUM SERPL-MCNC: 9.5 MG/DL (ref 8.7–10.2)
CHLORIDE SERPL-SCNC: 103 MMOL/L (ref 96–106)
CO2 SERPL-SCNC: 20 MMOL/L (ref 20–29)
CREAT SERPL-MCNC: 0.65 MG/DL (ref 0.57–1)
ERYTHROCYTE [DISTWIDTH] IN BLOOD BY AUTOMATED COUNT: 13.3 % (ref 11.5–14.5)
ERYTHROCYTE [DISTWIDTH] IN BLOOD BY AUTOMATED COUNT: 15.3 % (ref 12.3–15.4)
GLUCOSE SERPL-MCNC: 86 MG/DL (ref 65–99)
HCT VFR BLD AUTO: 35.6 % (ref 34–46.6)
HCT VFR BLD AUTO: 36.6 % (ref 35–47)
HGB BLD-MCNC: 11.9 G/DL (ref 11.5–16)
HGB BLD-MCNC: 12.1 G/DL (ref 11.1–15.9)
MCH RBC QN AUTO: 28.5 PG (ref 26–34)
MCH RBC QN AUTO: 28.6 PG (ref 26.6–33)
MCHC RBC AUTO-ENTMCNC: 32.5 G/DL (ref 30–36.5)
MCHC RBC AUTO-ENTMCNC: 34 G/DL (ref 31.5–35.7)
MCV RBC AUTO: 84 FL (ref 79–97)
MCV RBC AUTO: 87.6 FL (ref 80–99)
NRBC # BLD: 0 K/UL (ref 0–0.01)
NRBC BLD-RTO: 0 PER 100 WBC
PLATELET # BLD AUTO: 272 K/UL (ref 150–400)
PLATELET # BLD AUTO: 323 X10E3/UL (ref 150–379)
PMV BLD AUTO: 10.3 FL (ref 8.9–12.9)
POTASSIUM SERPL-SCNC: 4 MMOL/L (ref 3.5–5.2)
RBC # BLD AUTO: 4.18 M/UL (ref 3.8–5.2)
RBC # BLD AUTO: 4.23 X10E6/UL (ref 3.77–5.28)
SODIUM SERPL-SCNC: 140 MMOL/L (ref 134–144)
WBC # BLD AUTO: 3.9 K/UL (ref 3.6–11)
WBC # BLD AUTO: 4.9 X10E3/UL (ref 3.4–10.8)

## 2018-07-19 PROCEDURE — C1769 GUIDE WIRE: HCPCS

## 2018-07-19 PROCEDURE — C1894 INTRO/SHEATH, NON-LASER: HCPCS

## 2018-07-19 PROCEDURE — 85027 COMPLETE CBC AUTOMATED: CPT | Performed by: SPECIALIST

## 2018-07-19 PROCEDURE — 77030004532 HC CATH ANGI DX IMP BSC -A

## 2018-07-19 PROCEDURE — 77030010221 HC SPLNT WR POS TELE -B

## 2018-07-19 PROCEDURE — 74011000250 HC RX REV CODE- 250: Performed by: SPECIALIST

## 2018-07-19 PROCEDURE — 77030015766

## 2018-07-19 PROCEDURE — 74011250637 HC RX REV CODE- 250/637: Performed by: SPECIALIST

## 2018-07-19 PROCEDURE — 74011636320 HC RX REV CODE- 636/320: Performed by: SPECIALIST

## 2018-07-19 PROCEDURE — C1760 CLOSURE DEV, VASC: HCPCS

## 2018-07-19 PROCEDURE — 77030019569 HC BND COMPR RAD TERU -B

## 2018-07-19 PROCEDURE — 36415 COLL VENOUS BLD VENIPUNCTURE: CPT | Performed by: SPECIALIST

## 2018-07-19 PROCEDURE — 74011250636 HC RX REV CODE- 250/636: Performed by: SPECIALIST

## 2018-07-19 PROCEDURE — 99153 MOD SED SAME PHYS/QHP EA: CPT

## 2018-07-19 PROCEDURE — 77030013744

## 2018-07-19 PROCEDURE — 77030004533 HC CATH ANGI DX IMP BSC -B

## 2018-07-19 PROCEDURE — 77030004521 HC CATH ANGI DX COOK -B

## 2018-07-19 RX ORDER — VERAPAMIL HYDROCHLORIDE 2.5 MG/ML
2.5-5 INJECTION, SOLUTION INTRAVENOUS
Status: DISCONTINUED | OUTPATIENT
Start: 2018-07-19 | End: 2018-07-19

## 2018-07-19 RX ORDER — HEPARIN SODIUM 1000 [USP'U]/ML
5000 INJECTION, SOLUTION INTRAVENOUS; SUBCUTANEOUS AS NEEDED
Status: DISCONTINUED | OUTPATIENT
Start: 2018-07-19 | End: 2018-07-19

## 2018-07-19 RX ORDER — SODIUM CHLORIDE 9 MG/ML
75 INJECTION, SOLUTION INTRAVENOUS CONTINUOUS
Status: DISCONTINUED | OUTPATIENT
Start: 2018-07-19 | End: 2018-07-19 | Stop reason: HOSPADM

## 2018-07-19 RX ORDER — SODIUM CHLORIDE 9 MG/ML
1.5 INJECTION, SOLUTION INTRAVENOUS CONTINUOUS
Status: DISPENSED | OUTPATIENT
Start: 2018-07-19 | End: 2018-07-19

## 2018-07-19 RX ORDER — ACETAMINOPHEN 325 MG/1
650 TABLET ORAL ONCE
Status: COMPLETED | OUTPATIENT
Start: 2018-07-19 | End: 2018-07-19

## 2018-07-19 RX ORDER — SODIUM CHLORIDE 9 MG/ML
3 INJECTION, SOLUTION INTRAVENOUS CONTINUOUS
Status: DISPENSED | OUTPATIENT
Start: 2018-07-19 | End: 2018-07-19

## 2018-07-19 RX ORDER — DIPHENHYDRAMINE HYDROCHLORIDE 50 MG/ML
50 INJECTION, SOLUTION INTRAMUSCULAR; INTRAVENOUS
Status: DISCONTINUED | OUTPATIENT
Start: 2018-07-19 | End: 2018-07-19 | Stop reason: HOSPADM

## 2018-07-19 RX ORDER — SODIUM CHLORIDE 0.9 % (FLUSH) 0.9 %
10 SYRINGE (ML) INJECTION AS NEEDED
Status: DISCONTINUED | OUTPATIENT
Start: 2018-07-19 | End: 2018-07-19 | Stop reason: HOSPADM

## 2018-07-19 RX ORDER — MIDAZOLAM HYDROCHLORIDE 1 MG/ML
1-10 INJECTION, SOLUTION INTRAMUSCULAR; INTRAVENOUS
Status: DISCONTINUED | OUTPATIENT
Start: 2018-07-19 | End: 2018-07-19

## 2018-07-19 RX ORDER — FENTANYL CITRATE 50 UG/ML
50 INJECTION, SOLUTION INTRAMUSCULAR; INTRAVENOUS
Status: DISCONTINUED | OUTPATIENT
Start: 2018-07-19 | End: 2018-07-19 | Stop reason: HOSPADM

## 2018-07-19 RX ORDER — SODIUM CHLORIDE 0.9 % (FLUSH) 0.9 %
5-10 SYRINGE (ML) INJECTION EVERY 8 HOURS
Status: DISCONTINUED | OUTPATIENT
Start: 2018-07-19 | End: 2018-07-19

## 2018-07-19 RX ORDER — SODIUM CHLORIDE 0.9 % (FLUSH) 0.9 %
5-10 SYRINGE (ML) INJECTION AS NEEDED
Status: DISCONTINUED | OUTPATIENT
Start: 2018-07-19 | End: 2018-07-19 | Stop reason: HOSPADM

## 2018-07-19 RX ORDER — LIDOCAINE HYDROCHLORIDE 10 MG/ML
5-30 INJECTION INFILTRATION; PERINEURAL AS NEEDED
Status: DISCONTINUED | OUTPATIENT
Start: 2018-07-19 | End: 2018-07-19

## 2018-07-19 RX ORDER — ATROPINE SULFATE 0.1 MG/ML
0.4 INJECTION INTRAVENOUS AS NEEDED
Status: DISCONTINUED | OUTPATIENT
Start: 2018-07-19 | End: 2018-07-19

## 2018-07-19 RX ORDER — SODIUM CHLORIDE 0.9 % (FLUSH) 0.9 %
5-10 SYRINGE (ML) INJECTION EVERY 8 HOURS
Status: DISCONTINUED | OUTPATIENT
Start: 2018-07-19 | End: 2018-07-19 | Stop reason: HOSPADM

## 2018-07-19 RX ORDER — FENTANYL CITRATE 50 UG/ML
25-200 INJECTION, SOLUTION INTRAMUSCULAR; INTRAVENOUS
Status: DISCONTINUED | OUTPATIENT
Start: 2018-07-19 | End: 2018-07-19

## 2018-07-19 RX ORDER — HEPARIN SODIUM 200 [USP'U]/100ML
2000 INJECTION, SOLUTION INTRAVENOUS ONCE
Status: COMPLETED | OUTPATIENT
Start: 2018-07-19 | End: 2018-07-19

## 2018-07-19 RX ADMIN — SODIUM CHLORIDE 1.5 ML/KG/HR: 900 INJECTION, SOLUTION INTRAVENOUS at 09:22

## 2018-07-19 RX ADMIN — LIDOCAINE HYDROCHLORIDE 10 ML: 10 INJECTION, SOLUTION INFILTRATION; PERINEURAL at 10:14

## 2018-07-19 RX ADMIN — MIDAZOLAM HYDROCHLORIDE 2 MG: 1 INJECTION, SOLUTION INTRAMUSCULAR; INTRAVENOUS at 09:41

## 2018-07-19 RX ADMIN — VERAPAMIL HYDROCHLORIDE 5 MG: 2.5 INJECTION, SOLUTION INTRAVENOUS at 09:57

## 2018-07-19 RX ADMIN — IOPAMIDOL 154 ML: 755 INJECTION, SOLUTION INTRAVENOUS at 10:24

## 2018-07-19 RX ADMIN — MIDAZOLAM HYDROCHLORIDE 2 MG: 1 INJECTION, SOLUTION INTRAMUSCULAR; INTRAVENOUS at 09:50

## 2018-07-19 RX ADMIN — FENTANYL CITRATE 25 MCG: 50 INJECTION, SOLUTION INTRAMUSCULAR; INTRAVENOUS at 10:07

## 2018-07-19 RX ADMIN — LIDOCAINE HYDROCHLORIDE 5 ML: 10 INJECTION, SOLUTION INFILTRATION; PERINEURAL at 09:50

## 2018-07-19 RX ADMIN — NITROGLYCERIN 300 MCG: 5 INJECTION, SOLUTION INTRAVENOUS at 10:00

## 2018-07-19 RX ADMIN — FENTANYL CITRATE 50 MCG: 50 INJECTION, SOLUTION INTRAMUSCULAR; INTRAVENOUS at 09:50

## 2018-07-19 RX ADMIN — SODIUM CHLORIDE 1.5 ML/KG/HR: 900 INJECTION, SOLUTION INTRAVENOUS at 10:08

## 2018-07-19 RX ADMIN — HEPARIN SODIUM 2000 UNITS: 200 INJECTION, SOLUTION INTRAVENOUS at 09:37

## 2018-07-19 RX ADMIN — MIDAZOLAM HYDROCHLORIDE 2 MG: 1 INJECTION, SOLUTION INTRAMUSCULAR; INTRAVENOUS at 10:16

## 2018-07-19 RX ADMIN — MIDAZOLAM HYDROCHLORIDE 1 MG: 1 INJECTION, SOLUTION INTRAMUSCULAR; INTRAVENOUS at 10:07

## 2018-07-19 RX ADMIN — SODIUM CHLORIDE 3 ML/KG/HR: 900 INJECTION, SOLUTION INTRAVENOUS at 08:10

## 2018-07-19 RX ADMIN — FENTANYL CITRATE 50 MCG: 50 INJECTION, SOLUTION INTRAMUSCULAR; INTRAVENOUS at 11:07

## 2018-07-19 RX ADMIN — ACETAMINOPHEN 650 MG: 325 TABLET ORAL at 13:24

## 2018-07-19 RX ADMIN — FENTANYL CITRATE 25 MCG: 50 INJECTION, SOLUTION INTRAMUSCULAR; INTRAVENOUS at 09:41

## 2018-07-19 RX ADMIN — FENTANYL CITRATE 25 MCG: 50 INJECTION, SOLUTION INTRAMUSCULAR; INTRAVENOUS at 10:17

## 2018-07-19 RX ADMIN — NITROGLYCERIN 300 MCG: 5 INJECTION, SOLUTION INTRAVENOUS at 10:06

## 2018-07-19 RX ADMIN — HEPARIN SODIUM 2000 UNITS: 1000 INJECTION, SOLUTION INTRAVENOUS; SUBCUTANEOUS at 09:55

## 2018-07-19 NOTE — PROGRESS NOTES
[facility] ambulated @ 1330 (time) approximately 50 feet. Patient tolerated ambulation without adverse advents. Right groin (right/left, groin/arm)  without bleeding or hematoma noted.

## 2018-07-19 NOTE — IP AVS SNAPSHOT
[address] [facility] [address] [facility] 
[phone] Patient: Pan Montes MRN: [id number] :1965 About your hospitalization You were admitted on:  2018 You last received care in the:  [address] [facility], [facility] [address] CATH LAB You were discharged on:  2018 Why you were hospitalized Your primary diagnosis was:  Not on File Follow-up Information Follow up With Details Comments Contact Info Radha Bobby, [address] [facility] 71 
[address] [employer] [facility] [address] 
[phone] Maria Luisa Cohen MD   [facility] [address] [facility] 
[phone] Your Scheduled Appointments 2018  4:00 PM EDT  
ESTABLISHED PATIENT with Maria Luisa Cohen MD  
[facility] ([address]  [facility] [place]  
[phone]   8:30 AM EDT ROUTINE CARE with Radha Chessman, DO [employer] (MATTHEW Moreno) [address] [facility] [address]  
[phone] Discharge Orders None A check shalonda indicates which time of day the medication should be taken. My Medications CONTINUE taking these medications Instructions Each Dose to Equal  
 Morning Noon Evening Bedtime  
 ergocalciferol 50,000 unit capsule Commonly known as:  ERGOCALCIFEROL Your last dose was: Your next dose is: Take 1 Cap by mouth every seven (7) days. Indications: Vitamin D Deficiency 31269 Units  
    
   
   
   
  
 omeprazole 20 mg capsule Commonly known as:  PRILOSEC Your last dose was: Your next dose is: TAKE 1 CAPSULE BY MOUTH TWICE DAILY  Indications: HEARTBURN * propranolol LA 80 mg SR capsule Commonly known as:  INDERAL LA  
   
 Your last dose was: Your next dose is: Take 1 Cap by mouth two (2) times a day. Indications: MIGRAINE PREVENTION 80 mg  
    
   
   
   
  
 * propranolol LA 80 mg SR capsule Commonly known as:  INDERAL LA Your last dose was: Your next dose is: TAKE 1 TO 2 CAPSULES BY MOUTH EVERY DAY FOR HIGH BLOOD PRESSURE  
     
   
   
   
  
 TYLENOL ARTHRITIS PAIN 650 mg Tber Generic drug:  acetaminophen Your last dose was: Your next dose is: Take 650 mg by mouth as needed for Pain. 650 mg  
    
   
   
   
  
 valsartan-hydroCHLOROthiazide 80-12.5 mg per tablet Commonly known as:  DIOVAN-HCT Your last dose was: Your next dose is: Take 1 Tab by mouth daily. Indications: hypertension 1 Tab ZyrTEC 10 mg tablet Generic drug:  cetirizine Your last dose was: Your next dose is: Take  by mouth daily. * Notice: This list has 2 medication(s) that are the same as other medications prescribed for you. Read the directions carefully, and ask your doctor or other care provider to review them with you. Discharge Instructions Patient Discharge Instructions Laura Meyerh / [id number] : 1965 Admitted 2018 Discharged: 2018 CARDIAC CATHETERIZATION/ CATH STENT PLACEMENT  
DISCHARGE INSTRUCTIONS If possible, have someone stay with you for the first night. It is normal to feel tired for the first couple of days. Take it easy and follow your physicians instructions on activity. CHECK THE CATHETER INSERTION SITE DAILY: If bleeding at the cath site occurs, take a clean washcloth and apply direct pressure just above the puncture site for at least 15 minutes. Call 911 immediately if the bleeding is not controlled.   Continue to apply direct pressure until an ambulance gets to your location. Do not try to drive yourself or have someone else drive you to the hospital.  Have the ambulance bring you to the emergency room. You may shower 24 hours after your procedure. Gently remove the bandage during showering. Wash with soap and water and pat dry. To prevent infection, keep the groin area/insertion site clean and dry. Do not apply powders, creams, lotions, or ointments to the site for 5 days. You may cover the site with a fresh Band-Aid each day until well healed. You may notice a small lump at the site. This is normal and may last up to 6 weeks. CALL THE PHYSICIAN: 
? If the site becomes red, swollen, or feels warm to the touch, or is healing poorly ? If you note any large/extending bruise, fever >101.0 or chills ? If your extremity has numbness, tingling, discoloration, abnormal swelling, tightness or pain ? If you have difficulty with urination or develop nausea, vomiting, or severe abdominal pain ACTIVITY for the first 24-48 hours, or as instructed by your physician: 
? No lifting, pushing or pulling over 10 pounds and no straining the insertion site. Do not lift grocery bags or the garbage can; do not run the vacuum  or  for 7 days. ? You may start walking short distances the day of your procedure. Gradually increase as tolerated each day. Current activity recommendations are 30 minutes of exercise at least 5 days a week. Work up to this as you recover. ? Avoid walking outside in extremes of heat or cold. Walk inside (at home, at the mall, or at a large store) when it is cold and windy or hot and humid. THINGS TO KEEP IN MIND:  
? Do not drive, operate any machinery, or sign any legal documents for 24 hours after your procedure, or as directed by your physician. You must have someone drive you home after your procedure.   
? Drink plenty of fluids for 24-48 hours after your procedure to flush the contrast dye from your kidneys. ? Limit the number of times you go up and down the stairs ? Take rests and pace yourself with activity ? Be careful and do not strain with bowel movements MEDICATIONS: 
? Take all medications as prescribed ? Call your physician if you have any questions ? Keep an updated list of your medications with you at all times and give a list to your primary physician and pharmacist 
? You may use Tylenol 325mg 1-2 tablets every 6 hours as needed for pain or discomfort, unless otherwise instructed. If you have significant discomfort more than 48 hours after your procedure, please call your physicians office. SIGNS AND SYMPTOMS: 
? Notify your physician for new or recurrent symptoms of chest discomfort, unusual shortness of breath or fatigue. These could be signs of a problem and should be discussed with your physician. ? For significant chest pain or symptoms of angina not relieved with rest:  if you have been prescribed Nitroglycerin, take as directed (taken under the tongue, one at a time 5 minutes apart for a total of 3 doses, sitting down). If the discomfort is not relieved after the 3rd Nitroglycerin, call 911. If you have not been prescribed Nitroglycerin and your chest discomfort is significant, call 911. Take the ambulance, do not try to drive yourself or have someone else drive you to the hospital.  
 
AFTER CARE: 
? Follow up with your physician as instructed ? Follow a heart healthy diet with proper portion control, daily stress management, daily exercise, blood pressure and cholesterol control, and smoking cessation. The success of your stent, if you had one placed, and the prevention of future catheterizations heavily depends on your lifestyle changes you make now! ? You may start walking short distances the day of your procedure. Gradually increase as tolerated each day. Current activity recommendations are 30 minutes of exercise at least 5 days a week.  Work up to this as you recover. Walk, ride a bike, or choose any other activity you enjoy to reach this activity goal.  
? Avoid walking outside in extremes of heat or cold. Walk inside (at home, at the mall, or at a large store) when it is cold and windy or hot and humid. ? If you had a stent placed, consider Cardiac Wellness as a resource to help you make the needed lifestyle changes to live a heart healthy lifestyle. Discuss your candidacy with your physician. If you have questions, call your physicians office or the Cardiac Cath Lab at [phone]. The Cath Lab is operational from 6:30 a.m. to 5:00 p.m., Monday through Friday. After hours, notify your physician. [address] [place] [address] [address] can be reached at [phone]. Cardiac Wellness is operational Monday-Thursday 8:30 a.m. to 5:00 p.m. and Friday 8:30 a.m. to 12:00 p.m. Remember:  IN CASE OF BLEEDING: KEEP FIRM PRESSURE ON THE PROCEDURE SITE AND CALL 911 IF NOT CONTROLLED Introducing [facility] & [facility] SERVICES! Dear Ion Forrester: Thank you for requesting a [employer] account. Our records indicate that you already have an active [employer] account. You can access your account anytime at [email or web address] Did you know that you can access your hospital and ER discharge instructions at any time in [employer]? You can also review all of your test results from your hospital stay or ER visit. Additional Information If you have questions, please visit the Frequently Asked Questions section of the [employer] website at [email or web address] Remember, [employer] is NOT to be used for urgent needs. For medical emergencies, dial 911. Now available from your iPhone and Android! Introducing Eddie Mendez As a [employer] patient, I wanted to make you aware of our electronic visit tool called Eddie Mendez. [employer] 24/7 allows you to connect within minutes with a medical provider 24 hours a day, seven days a week via a mobile device or tablet or logging into a secure website from your computer. You can access Eddie Owlet [facility] Caremaricruzfin from anywhere in the [place]. A virtual visit might be right for you when you have a simple condition and feel like you just dont want to get out of bed, or cant get away from work for an appointment, when your regular [facility] provider is not available (evenings, weekends or holidays), or when youre out of town and need minor care. Electronic visits cost only $49 and if the [facility] 24/7 provider determines a prescription is needed to treat your condition, one can be electronically transmitted to a nearby pharmacy*. Please take a moment to enroll today if you have not already done so. The enrollment process is free and takes just a few minutes. To enroll, please download the [facility] Single 24/[employer] belén to your tablet or phone, or visit [email or web address] to enroll on your computer. And, as an [address] [place] [address] patient with a [email or web address] account, the results of your visits will be scanned into your electronic medical record and your primary care provider will be able to view the scanned results. We urge you to continue to see your regular [facility] provider for your ongoing medical care. And while your primary care provider may not be the one available when you seek a Eddei Mendez virtual visit, the peace of mind you get from getting a real diagnosis real time can be priceless. For more information on [facility] Owlet [facility] Carefrieda, view our Frequently Asked Questions (FAQs) at [email or web address]. Sincerely, 
 
Tony Mckeon MD 
Chief Medical Officer Gala Duke *:  certain medications cannot be prescribed via Eddie Mendez Providers Seen During Your Hospitalization Provider Specialty Primary office phone Lane Chirinos MD Cardiology [phone] Your Primary Care Physician (PCP) Primary Care Physician Office Phone Office Fax Sue Jama [phone] You are allergic to the following Allergen Reactions Doxylamine Succinate Swelling 12.5-25 MG 
HANDS, FACE/PERIORAL, FEET Pcn (Penicillins) Hives Ambien (Zolpidem) Nausea and Vomiting Other (comments) 5 mg with Ativan 0.5 mg  
TOO GROGGY, SLEPT 24 HOURS 
7.5 MG FORGOT SHE WAS SLEEP EATING Aspirin Nausea Only Celebrex (Celecoxib) Other (comments) TIGHT SQUEEZING IN CHEST 
CHEST ACHED Erythromycin Nausea and Vomiting Milk Prot-Turm-Pepper-Pineappl Other (comments) Multiple food allergies Neurontin (Gabapentin) Other (comments) 300 mg SEVERE LEG PAIN 
EYE TWITCHING  
    
 Nsaids (Non-Steroidal Anti-Inflammatory Drug) Other (comments) GI irritation Recent Documentation Height Weight Breastfeeding? BMI OB Status Smoking Status 1.727 m 122.4 kg No 41.02 kg/m2 Menopause Never Smoker Emergency Contacts Name Discharge Info Relation Home Work Mobile Stephanie Delgado  Parent [1] [phone] [facility] DISCHARGE CAREGIVER [3] Spouse [3] [phone] Patient Belongings The following personal items are in your possession at time of discharge: 
     Visual Aid: Glasses, With patient Please provide this summary of care documentation to your next provider. Signatures-by signing, you are acknowledging that this After Visit Summary has been reviewed with you and you have received a copy. Patient Signature:  ____________________________________________________________ Date:  ____________________________________________________________  
  
Esperanza Tang Provider Signature:  ____________________________________________________________ Date:  ____________________________________________________________

## 2018-07-19 NOTE — IP AVS SNAPSHOT
[address] 
[phone] Patient: Kartik Gonzalez MRN: [id number] :1965 A check shalonda indicates which time of day the medication should be taken. My Medications CONTINUE taking these medications Instructions Each Dose to Equal  
 Morning Noon Evening Bedtime  
 ergocalciferol 50,000 unit capsule Commonly known as:  ERGOCALCIFEROL Your last dose was: Your next dose is: Take 1 Cap by mouth every seven (7) days. Indications: Vitamin D Deficiency 54123 Units  
    
   
   
   
  
 omeprazole 20 mg capsule Commonly known as:  PRILOSEC Your last dose was: Your next dose is: TAKE 1 CAPSULE BY MOUTH TWICE DAILY  Indications: HEARTBURN * propranolol LA 80 mg SR capsule Commonly known as:  INDERAL LA Your last dose was: Your next dose is: Take 1 Cap by mouth two (2) times a day. Indications: MIGRAINE PREVENTION 80 mg  
    
   
   
   
  
 * propranolol LA 80 mg SR capsule Commonly known as:  INDERAL LA Your last dose was: Your next dose is: TAKE 1 TO 2 CAPSULES BY MOUTH EVERY DAY FOR HIGH BLOOD PRESSURE  
     
   
   
   
  
 TYLENOL ARTHRITIS PAIN 650 mg Tber Generic drug:  acetaminophen Your last dose was: Your next dose is: Take 650 mg by mouth as needed for Pain. 650 mg  
    
   
   
   
  
 valsartan-hydroCHLOROthiazide 80-12.5 mg per tablet Commonly known as:  DIOVAN-HCT Your last dose was: Your next dose is: Take 1 Tab by mouth daily. Indications: hypertension 1 Tab ZyrTEC 10 mg tablet Generic drug:  cetirizine Your last dose was: Your next dose is: Take  by mouth daily. * Notice: This list has 2 medication(s) that are the same as other medications prescribed for you. Read the directions carefully, and ask your doctor or other care provider to review them with you.

## 2018-07-19 NOTE — PROGRESS NOTES
TRANSFER - IN REPORT:    Verbal report received from GAUTAM Mohan CVT on Ilia Martinez, Procedure: Cardiac cath with no stent , from the Cardiac Cath lab, for routine progression of care. Report consisted of patients Situation, Background, Assessment and Recommendations(SBAR). Information from the following report(s) Procedure Summary, MAR and Recent Results was reviewed with the receiving clinician. Opportunity for questions and clarification was provided. Assessment completed upon patients arrival to [address] [place] [address] and care assumed. Cardiac Cath Lab Recovery Arrival Note:     Ilia Martinez arrived to [facility] recovery area. Patient procedure= Cardiac cath with no stent. Patient on cardiac monitor, non-invasive blood pressure, Patient status doing well without problems. Patient is A&Ox 4. Patient reports no pain, no chest pain, no n/v. Procedure site without any bleeding and no hematoma.

## 2018-07-19 NOTE — PROCEDURES
Cardiac Catheterization Procedure Note   Patient: Arthsaurabh Kidney  MRN: [id number]  SSN: [id number]   YOB: 1965 Age: 48 y.o.   Sex: female    Date of Procedure: 7/19/2018   Pre-procedure Diagnosis: Coronary Artery Disease  Post-procedure Diagnosis: Non-cardiac Chest Pain  Procedure: Left Heart Cath via right radial and right groin  :  Dr. Meaghan Rolle MD    Assistant(s):  None  Anesthesia: Moderate Sedation   Estimated Blood Loss: Less than 10 mL   Specimens Removed: None  Findings: Normal coronaries, normal LV  Complications: None   Implants:  None  Signed by:  Meaghan Rolle MD  7/19/2018  10:36 AM

## 2018-07-19 NOTE — DISCHARGE INSTRUCTIONS
Patient Discharge Instructions    Jean Griffin / [id number] : 1965    Admitted 2018 Discharged: 2018         CARDIAC CATHETERIZATION/ CATH STENT PLACEMENT   DISCHARGE INSTRUCTIONS    If possible, have someone stay with you for the first night. It is normal to feel tired for the first couple of days. Take it easy and follow your physicians instructions on activity. CHECK THE CATHETER INSERTION SITE DAILY:    If bleeding at the cath site occurs, take a clean washcloth and apply direct pressure just above the puncture site for at least 15 minutes. Call 911 immediately if the bleeding is not controlled. Continue to apply direct pressure until an ambulance gets to your location. Do not try to drive yourself or have someone else drive you to the hospital.  Have the ambulance bring you to the emergency room. You may shower 24 hours after your procedure. Gently remove the bandage during showering. Wash with soap and water and pat dry. To prevent infection, keep the groin area/insertion site clean and dry. Do not apply powders, creams, lotions, or ointments to the site for 5 days. You may cover the site with a fresh Band-Aid each day until well healed. You may notice a small lump at the site. This is normal and may last up to 6 weeks. CALL THE PHYSICIAN:  ? If the site becomes red, swollen, or feels warm to the touch, or is healing poorly    ? If you note any large/extending bruise, fever >101.0 or chills  ? If your extremity has numbness, tingling, discoloration, abnormal swelling, tightness or pain   ? If you have difficulty with urination or develop nausea, vomiting, or severe abdominal pain    ACTIVITY for the first 24-48 hours, or as instructed by your physician:  ? No lifting, pushing or pulling over 10 pounds and no straining the insertion site. Do not lift grocery bags or the garbage can; do not run the vacuum  or  for 7 days.   ? You may start walking short distances the day of your procedure. Gradually increase as tolerated each day. Current activity recommendations are 30 minutes of exercise at least 5 days a week. Work up to this as you recover. ? Avoid walking outside in extremes of heat or cold. Walk inside (at home, at the mall, or at a large store) when it is cold and windy or hot and humid. THINGS TO KEEP IN MIND:   ? Do not drive, operate any machinery, or sign any legal documents for 24 hours after your procedure, or as directed by your physician. You must have someone drive you home after your procedure. ? Drink plenty of fluids for 24-48 hours after your procedure to flush the contrast dye from your kidneys. ? Limit the number of times you go up and down the stairs  ? Take rests and pace yourself with activity  ? Be careful and do not strain with bowel movements    MEDICATIONS:  ? Take all medications as prescribed  ? Call your physician if you have any questions  ? Keep an updated list of your medications with you at all times and give a list to your primary physician and pharmacist  ? You may use Tylenol 325mg 1-2 tablets every 6 hours as needed for pain or discomfort, unless otherwise instructed. If you have significant discomfort more than 48 hours after your procedure, please call your physicians office. SIGNS AND SYMPTOMS:  ? Notify your physician for new or recurrent symptoms of chest discomfort, unusual shortness of breath or fatigue. These could be signs of a problem and should be discussed with your physician. ? For significant chest pain or symptoms of angina not relieved with rest:  if you have been prescribed Nitroglycerin, take as directed (taken under the tongue, one at a time 5 minutes apart for a total of 3 doses, sitting down). If the discomfort is not relieved after the 3rd Nitroglycerin, call 911. If you have not been prescribed Nitroglycerin and your chest discomfort is significant, call 911.  Take the ambulance, do not try to drive yourself or have someone else drive you to the hospital.     AFTER CARE:  ? Follow up with your physician as instructed  ? Follow a heart healthy diet with proper portion control, daily stress management, daily exercise, blood pressure and cholesterol control, and smoking cessation. The success of your stent, if you had one placed, and the prevention of future catheterizations heavily depends on your lifestyle changes you make now! ? You may start walking short distances the day of your procedure. Gradually increase as tolerated each day. Current activity recommendations are 30 minutes of exercise at least 5 days a week. Work up to this as you recover. Walk, ride a bike, or choose any other activity you enjoy to reach this activity goal.   ? Avoid walking outside in extremes of heat or cold. Walk inside (at home, at the mall, or at a large store) when it is cold and windy or hot and humid. ? If you had a stent placed, consider Cardiac Wellness as a resource to help you make the needed lifestyle changes to live a heart healthy lifestyle. Discuss your candidacy with your physician. If you have questions, call your physicians office or the Cardiac Cath Lab at [phone]. The Cath Lab is operational from 6:30 a.m. to 5:00 p.m., Monday through Friday. After hours, notify your physician. [address] [place] [address] can be reached at [phone]. Cardiac Wellness is operational Monday-Thursday 8:30 a.m. to 5:00 p.m. and Friday 8:30 a.m. to 12:00 p.m.       Remember:  IN CASE OF BLEEDING: KEEP FIRM PRESSURE ON THE PROCEDURE SITE AND CALL 911 IF NOT CONTROLLED

## 2018-07-19 NOTE — PROGRESS NOTES
1200: 2 cc of air d/c from TR Band, right wrist with no bleeding and no hematoma, pulses are palpable. 1215: 3 cc of air d/c from TR Band, right wrist with no bleeding and no hematoma, pulses are palpable. 1230: 3 cc of air d/c from TR Band, right wrist with no bleeding and no hematoma, pulses are palpable. 1245: 3 cc of air d/c from TR Band, right wrist with no bleeding and no hematoma, pulses are palpable. 1300: 3 cc of air d/c from TR Band, right wrist with no bleeding and no hematoma, pulses are palpable. 1315: 1 cc of air d/c from TR Band, right wrist with no bleeding and no hematoma, pulses are palpable. 1330: TR Band removed, gauze with transparent dressing applied to site, no bleeding and no hematoma present. I have reviewed discharge instructions with the patient and spouse. The patient and spouse verbalized understanding.

## 2018-07-19 NOTE — PROGRESS NOTES
[id number]-      Cardiac Cath Lab Procedure Area Arrival Note:    More Romero arrived to Cardiac Cath Lab, Procedure Area. Patient identifiers verified with NAME and DATE OF BIRTH. Procedure verified with patient. Consent forms verified. Allergies verified. Patient informed of procedure and plan of care. Questions answered with review. Patient voiced understanding of procedure and plan of care. Patient on cardiac monitor, non-invasive blood pressure, SPO2 monitor. Placed on O2 @ 2 lpm via nasal cannula. IV of normal saline on pump at 184 ml/hr. Patient status doing well without problems. Patient is A&Ox 4. Patient reports no discomfort at this time. Patient medicated during procedure with orders obtained and verified by Dr. Dr. Ministerio Reynoso. Refer to patients Cardiac Cath Lab PROCEDURE REPORT for vital signs, assessment, status, and response during procedure, printed at end of case. Printed report on chart or scanned into chart. 1034-    TRANSFER - OUT REPORT:    Verbal report given to [facility] on More Romero  being transferred to (unit) for routine post - op       Report consisted of patients Situation, Background, Assessment and   Recommendations(SBAR). Information from the following report(s) SBAR, Procedure Summary and MAR was reviewed with the receiving nurse. Lines:   Peripheral IV 07/19/18 Left Antecubital (Active)   Site Assessment Clean, dry, & intact 7/19/2018  8:12 AM   Phlebitis Assessment 0 7/19/2018  8:12 AM   Infiltration Assessment 0 7/19/2018  8:12 AM   Dressing Status Clean, dry, & intact 7/19/2018  8:12 AM   Dressing Type Tape;Transparent 7/19/2018  8:12 AM   Hub Color/Line Status Flushed 7/19/2018  8:12 AM        Opportunity for questions and clarification was provided.       Patient transported with:   [employer]

## 2018-07-19 NOTE — PROGRESS NOTES
Cardiac Cath Lab Recovery Arrival Note:      Aravind Rodriguez arrived to Cardiac Cath Lab, Recovery Area. Staff introduced to patient. Patient identifiers verified with NAME and DATE OF BIRTH. Procedure verified with patient. Consent forms reviewed and signed by patient or authorized representative and verified. Allergies verified. Patient informed of procedure and plan of care. Questions answered with review. Patient prepped for procedure, per orders from physician, prior to arrival.    Patient on cardiac monitor, non-invasive blood pressure, SPO2 monitor. Patient is A&Ox 4. Patient reports no pain, no chest pain, slight nauseated (pt. States it may be her nerves). Patient in stretcher, in low position, with side rails up, call bell within reach, patient instructed to call of assistance as needed. Patient prep in: [facility] Recovery Area, Bed# 3. Family in: : Peterson Zacarias [phone].    Prep by: Son Artis RN

## 2018-07-23 DIAGNOSIS — K29.70 GASTRITIS: ICD-10-CM

## 2018-07-23 DIAGNOSIS — R12 HEARTBURN: Primary | ICD-10-CM

## 2018-07-24 ENCOUNTER — PATIENT OUTREACH (OUTPATIENT)
Dept: OTHER | Age: 53
End: 2018-07-24

## 2018-07-24 NOTE — TELEPHONE ENCOUNTER
From: Cinthia Parham  To: David Gutierrez DO  Sent: 7/23/2018 3:14 PM EDT  Subject: Medication Renewal Request    Original authorizing provider: DO Leeanne Cao would like a refill of the following medications:  omeprazole (PRILOSEC) 20 mg capsule David Gutierrez DO]    Preferred pharmacy: [address] [facility]in [facility]Cuch AT [address] [facility] [address]:

## 2018-07-24 NOTE — TELEPHONE ENCOUNTER
PCP: Tj Hernandez DO    Last appt: 6/1/2018  Future Appointments  Date Time Provider Jana Vizcarra   9/6/2018 8:30 AM DO BERNARDO Henson SCHED   12/4/2018 8:00 AM DO BERNARDO Henson MATTHEW SCHED       Requested Prescriptions     Pending Prescriptions Disp Refills    omeprazole (PRILOSEC) 20 mg capsule 60 Cap 5     Sig: TAKE 1 CAPSULE BY MOUTH TWICE DAILY  Indications: Heartburn       Prior labs and Blood pressures:  BP Readings from Last 3 Encounters:   07/19/18 122/50   06/01/18 144/80   05/24/18 138/78     Lab Results   Component Value Date/Time    Sodium 140 07/18/2018 12:00 AM    Potassium 4.0 07/18/2018 12:00 AM    Chloride 103 07/18/2018 12:00 AM    CO2 20 07/18/2018 12:00 AM    Anion gap 9 01/16/2014 10:15 AM    Glucose 86 07/18/2018 12:00 AM    BUN 10 07/18/2018 12:00 AM    Creatinine 0.65 07/18/2018 12:00 AM    BUN/Creatinine ratio 15 07/18/2018 12:00 AM    GFR est  07/18/2018 12:00 AM    GFR est non- 07/18/2018 12:00 AM    Calcium 9.5 07/18/2018 12:00 AM     Lab Results   Component Value Date/Time    Hemoglobin A1c 5.9 (H) 05/31/2018 12:00 AM     Lab Results   Component Value Date/Time    Cholesterol, total 195 05/31/2018 12:00 AM    HDL Cholesterol 57 05/31/2018 12:00 AM    LDL, calculated 112 (H) 05/31/2018 12:00 AM    VLDL, calculated 26 05/31/2018 12:00 AM    Triglyceride 130 05/31/2018 12:00 AM    CHOL/HDL Ratio 3.2 10/15/2010 04:14 PM     Lab Results   Component Value Date/Time    VITAMIN D, 25-HYDROXY 13.4 (L) 05/31/2018 12:00 AM       Lab Results   Component Value Date/Time    TSH 0.882 05/31/2018 12:00 AM

## 2018-07-24 NOTE — PROGRESS NOTES
Patient on [facility] [facility] discharge report dated 7/23/18. Left message on voicemail. Will attempt to contact again. Need to complete post-discharge assessment.

## 2018-07-27 RX ORDER — OMEPRAZOLE 20 MG/1
CAPSULE, DELAYED RELEASE ORAL
Qty: 60 CAP | Refills: 5 | Status: SHIPPED | OUTPATIENT
Start: 2018-07-27 | End: 2019-04-22 | Stop reason: SDUPTHER

## 2018-07-31 ENCOUNTER — PATIENT OUTREACH (OUTPATIENT)
Dept: OTHER | Age: 53
End: 2018-07-31

## 2018-07-31 NOTE — LETTER
Ms. Laura Trevino [phone] Dear Ms. Parham, My name is Mónica Osorio RN, Employee Care Manager for [employer], and I have been trying to reach you. The Employee Care  is a free-of-charge, confidential service provided to our employees and their family members covered by the [employer]. Part of my job is to follow up with members who have recently been in the hospital or emergency room, to help them coordinate their care and answer questions they may have about their visit. I am able to provide assistance with medication questions, scheduling needed follow-up appointments, and arranging services like home health or home medical equipment. I can also provide education regarding your hospital or ER visit as well as your medical conditions. As healthcare providers, we know that patients do better when they have close follow up with a primary care provider (PCP), especially after a hospital or emergency department visit. If you do not have a PCP, I can help you find one that is convenient to you and covered by your insurance. I can also help you understand any after visit instructions, such as what symptoms to watch out for, or any new or changed medications. Employee Care Management now partners with [employer]. If you are a qualifying employee, you may receive an additional 10 wellness incentive points for every month of active participation with an Employee Care Manager. Remember that you can access your After Visit Summary by logging into your [employer] account. If you do not have a [employer] account, I can help you request access. Our program is designed to provide you with the opportunity to have a [employer] care manager partner with you for your healthcare needs. Please contact me at the below number if I can provide you with assistance for any of the above services.  
 
Sincerely, 
 
 Kaela Guerrero RN, BSN  Employee Care Manager [email or web address]

## 2018-09-05 ENCOUNTER — PATIENT OUTREACH (OUTPATIENT)
Dept: OTHER | Age: 53
End: 2018-09-05

## 2018-09-05 NOTE — LETTER
Ms. Maximino Trevino [phone] Dear Ms. Parham, My name is Rebekah Dukes RN, Employee Care Manager for [employer], and I have been trying to reach you. The Employee Care  is a free-of-charge, confidential service provided to our employees and their family members covered by the [facility]. Part of my job is to follow up with members who have recently been in the hospital or emergency room, to help them coordinate their care and answer questions they may have about their visit. I am able to provide assistance with medication questions, scheduling needed follow-up appointments, and arranging services like home health or home medical equipment. I can also provide education regarding your hospital or ER visit as well as your medical conditions. As healthcare providers, we know that patients do better when they have close follow up with a primary care provider (PCP), especially after a hospital or emergency department visit. If you do not have a PCP, I can help you find one that is convenient to you and covered by your insurance. I can also help you understand any after visit instructions, such as what symptoms to watch out for, or any new or changed medications. Remember that you can access your After Visit Summary by logging into your [employer] account. If you do not have a [employer] account, I can help you request access. Our program is designed to provide you with the opportunity to have a [employer] care manager partner with you for your healthcare needs. Please contact me at the below number if I can provide you with assistance for any of the above services. Sincerely, Rebekah Dukes RN, BSN  Employee Care Manager [email or web address]

## 2018-09-06 ENCOUNTER — OFFICE VISIT (OUTPATIENT)
Dept: FAMILY MEDICINE CLINIC | Age: 53
End: 2018-09-06

## 2018-09-06 VITALS
WEIGHT: 263.4 LBS | BODY MASS INDEX: 39.92 KG/M2 | HEIGHT: 68 IN | HEART RATE: 66 BPM | SYSTOLIC BLOOD PRESSURE: 135 MMHG | RESPIRATION RATE: 15 BRPM | OXYGEN SATURATION: 98 % | TEMPERATURE: 98.1 F | DIASTOLIC BLOOD PRESSURE: 90 MMHG

## 2018-09-06 DIAGNOSIS — R25.2 LEG CRAMPS: ICD-10-CM

## 2018-09-06 DIAGNOSIS — G43.019 INTRACTABLE MIGRAINE WITHOUT AURA AND WITHOUT STATUS MIGRAINOSUS: ICD-10-CM

## 2018-09-06 DIAGNOSIS — F51.04 CHRONIC INSOMNIA: ICD-10-CM

## 2018-09-06 DIAGNOSIS — Z78.9 ASPIRIN INTOLERANCE: ICD-10-CM

## 2018-09-06 DIAGNOSIS — E55.9 VITAMIN D DEFICIENCY: ICD-10-CM

## 2018-09-06 DIAGNOSIS — K29.50 OTHER CHRONIC GASTRITIS WITHOUT HEMORRHAGE: ICD-10-CM

## 2018-09-06 DIAGNOSIS — R63.4 WEIGHT LOSS: ICD-10-CM

## 2018-09-06 DIAGNOSIS — R73.9 HYPERGLYCEMIA: ICD-10-CM

## 2018-09-06 DIAGNOSIS — E66.01 OBESITY, CLASS III, BMI 40-49.9 (MORBID OBESITY) (HCC): ICD-10-CM

## 2018-09-06 DIAGNOSIS — R60.0 EDEMA OF FOOT: ICD-10-CM

## 2018-09-06 DIAGNOSIS — I10 ESSENTIAL HYPERTENSION: ICD-10-CM

## 2018-09-06 DIAGNOSIS — E78.00 HIGH CHOLESTEROL: Primary | ICD-10-CM

## 2018-09-06 DIAGNOSIS — R39.15 URGENCY OF URINATION: ICD-10-CM

## 2018-09-06 NOTE — MR AVS SNAPSHOT
[address] [facility], [place] 
 
 
 [address] 
[address] [place] [address] 
[phone] Patient: Aravind Rodriguez MRN:  [id number] Visit Information Date & Time Provider Department Dept. Phone Encounter #  
 2018 10:30 AM Tacey Po, DO [facility] [phone] [id number] Follow-up Instructions Return in about 3 months (around 2018) for weight, blood pressure, results. Routing History Your Appointments 2018  8:00 AM  
ROUTINE CARE with Tacey Po, DO [facility] ([facility] [place]) Appt Note: 6 mth f/u dm ck [id number] Pending sale to [facility] [address]  
[phone]  
  
   
 [address] Upcoming Health Maintenance Date Due Influenza Age 5 to Adult 10/31/2018* PAP AKA CERVICAL CYTOLOGY 2018* BREAST CANCER SCRN MAMMOGRAM 2018* COLONOSCOPY 3/31/2024 DTaP/Tdap/Td series (3 - Td) 10/9/2026 *Topic was postponed. The date shown is not the original due date. Allergies as of 2018  Review Complete On: 2018 By: Adamaris Ortega Severity Noted Reaction Type Reactions Doxylamine Succinate High 2017    Swelling 12.5-25 MG 
HANDS, FACE/PERIORAL, FEET Pcn [Penicillins] High 2010    Hives Ambien [Zolpidem]  2014    Nausea and Vomiting, Other (comments) 5 mg with Ativan 0.5 mg  
TOO GROGGY, SLEPT 24 HOURS 
7.5 MG FORGOT SHE WAS SLEEP EATING Aspirin  2017    Nausea Only Celebrex [Celecoxib]  2017    Other (comments) TIGHT SQUEEZING IN CHEST 
CHEST ACHED Erythromycin  2011    Nausea and Vomiting Milk Prot-turm-pepper-pineappl  2014    Other (comments) Multiple food allergies Neurontin [Gabapentin]  2017    Other (comments) 300 mg SEVERE LEG PAIN 
EYE TWITCHING  
 Nsaids (Non-steroidal Anti-inflammatory Drug)  02/22/2011    Other (comments) GI irritation Current Immunizations  Reviewed on 6/1/2018 Name Date Influenza Vaccine 10/31/2017, 10/30/2015 Influenza Vaccine Whole 10/1/2010 TDAP Vaccine 8/10/2012 Td, Adsorbed 10/9/2016  6:23 PM  
  
 Not reviewed this visit You Were Diagnosed With   
  
 Codes Comments High cholesterol    -  Primary ICD-10-CM: E78.00 ICD-9-CM: 272.0 Essential hypertension     ICD-10-CM: I10 
ICD-9-CM: 401.9 Hyperglycemia     ICD-10-CM: R73.9 ICD-9-CM: 790.29 Vitamin D deficiency     ICD-10-CM: E55.9 ICD-9-CM: 268.9 Weight loss     ICD-10-CM: R63.4 ICD-9-CM: 783.21 23# since 06/2018, 36# since 12/2018 due to diet changes/keto diet (no Orange Crush, no pastas, no breads) Obesity, Class III, BMI 40-49.9 (morbid obesity) (HCC)     ICD-10-CM: E66.01 
ICD-9-CM: 278.01 Chronic insomnia     ICD-10-CM: F51.04 
ICD-9-CM: 780.52 stable off sleep aids Other chronic gastritis without hemorrhage     ICD-10-CM: K29.50 ICD-9-CM: 535.10 Aspirin intolerance     ICD-10-CM: Z78.9 ICD-9-CM: 995.27, E935.3 Leg cramps     ICD-10-CM: R25.2 ICD-9-CM: 729.82 BL due to mild dehydration from Keto diet vs other Urgency of urination     ICD-10-CM: R39.15 ICD-9-CM: 788.63 due to tilted bladder vs other Intractable migraine without aura and without status migrainosus     ICD-10-CM: G43.019 
ICD-9-CM: 346.11 stable off Propranolol Edema of foot     ICD-10-CM: R60.0 ICD-9-CM: 782.3 BL, stable on HCTZ Vitals BP Pulse Temp Resp Height(growth percentile) 135/90 (BP 1 Location: Left arm, BP Patient Position: Sitting) 66 98.1 °F (36.7 °C) (Temporal) 15 5' 8\" (1.727 m) Weight(growth percentile) SpO2 BMI OB Status Smoking Status 263 lb 6.4 oz (119.5 kg) 98% 40.05 kg/m2 Menopause Never Smoker Vitals History BMI and BSA Data Body Mass Index Body Surface Area  40.05 kg/m 2 2.39 m 2  
 Preferred Pharmacy Pharmacy Name Phone [id number]- [place], [place] - [address] [phone] Your Updated Medication List  
  
   
This list is accurate as of 9/6/18 12:29 PM.  Always use your most recent med list.  
  
  
  
  
 ergocalciferol 50,000 unit capsule Commonly known as:  ERGOCALCIFEROL Take 1 Cap by mouth every seven (7) days. Indications: Vitamin D Deficiency  
  
 omeprazole 20 mg capsule Commonly known as:  PRILOSEC  
TAKE 1 CAPSULE BY MOUTH TWICE DAILY  Indications: Heartburn * propranolol LA 80 mg SR capsule Commonly known as:  INDERAL LA Take 1 Cap by mouth two (2) times a day. Indications: MIGRAINE PREVENTION * propranolol LA 80 mg SR capsule Commonly known as:  INDERAL LA  
TAKE 1 TO 2 CAPSULES BY MOUTH EVERY DAY FOR HIGH BLOOD PRESSURE  
  
 TYLENOL ARTHRITIS PAIN 650 mg Tber Generic drug:  acetaminophen Take 650 mg by mouth as needed for Pain.  
  
 valsartan-hydroCHLOROthiazide 80-12.5 mg per tablet Commonly known as:  DIOVAN-HCT Take 1 Tab by mouth daily. Indications: hypertension ZyrTEC 10 mg tablet Generic drug:  cetirizine Take  by mouth daily. * Notice: This list has 2 medication(s) that are the same as other medications prescribed for you. Read the directions carefully, and ask your doctor or other care provider to review them with you. We Performed the Following LIPID PANEL [28777 CPT(R)] VITAMIN D, 25 HYDROXY [id number] CPT(R)] Follow-up Instructions Return in about 3 months (around 12/6/2018) for weight, blood pressure, results. Patient Instructions Try OTC Magnesium 400 mg daily or Milk of Magnesia or Miralax or Smooth Move Tea for constipation. Please make sure you are consuming 2 liter (67 oz of water minimally). High Blood Pressure: Care Instructions Your Care Instructions If your blood pressure is usually above 130/80, you have high blood pressure, or hypertension. That means the top number is 130 or higher or the bottom number is 80 or higher, or both. Despite what a lot of people think, high blood pressure usually doesn't cause headaches or make you feel dizzy or lightheaded. It usually has no symptoms. But it does increase your risk for heart attack, stroke, and kidney or eye damage. The higher your blood pressure, the more your risk increases. Your doctor will give you a goal for your blood pressure. Your goal will be based on your health and your age. Lifestyle changes, such as eating healthy and being active, are always important to help lower blood pressure. You might also take medicine to reach your blood pressure goal. 
Follow-up care is a key part of your treatment and safety. Be sure to make and go to all appointments, and call your doctor if you are having problems. It's also a good idea to know your test results and keep a list of the medicines you take. How can you care for yourself at home? Medical treatment · If you stop taking your medicine, your blood pressure will go back up. You may take one or more types of medicine to lower your blood pressure. Be safe with medicines. Take your medicine exactly as prescribed. Call your doctor if you think you are having a problem with your medicine. · Talk to your doctor before you start taking aspirin every day. Aspirin can help certain people lower their risk of a heart attack or stroke. But taking aspirin isn't right for everyone, because it can cause serious bleeding. · See your doctor regularly. You may need to see the doctor more often at first or until your blood pressure comes down. · If you are taking blood pressure medicine, talk to your doctor before you take decongestants or anti-inflammatory medicine, such as ibuprofen. Some of these medicines can raise blood pressure. · Learn how to check your blood pressure at home. Lifestyle changes · Stay at a healthy weight. This is especially important if you put on weight around the waist. Losing even 10 pounds can help you lower your blood pressure. · If your doctor recommends it, get more exercise. Walking is a good choice. Bit by bit, increase the amount you walk every day. Try for at least 30 minutes on most days of the week. You also may want to swim, bike, or do other activities. · Avoid or limit alcohol. Talk to your doctor about whether you can drink any alcohol. · Try to limit how much sodium you eat to less than 2,300 milligrams (mg) a day. Your doctor may ask you to try to eat less than 1,500 mg a day. · Eat plenty of fruits (such as bananas and oranges), vegetables, legumes, whole grains, and low-fat dairy products. · Lower the amount of saturated fat in your diet. Saturated fat is found in animal products such as milk, cheese, and meat. Limiting these foods may help you lose weight and also lower your risk for heart disease. · Do not smoke. Smoking increases your risk for heart attack and stroke. If you need help quitting, talk to your doctor about stop-smoking programs and medicines. These can increase your chances of quitting for good. When should you call for help? Call 911 anytime you think you may need emergency care. This may mean having symptoms that suggest that your blood pressure is causing a serious heart or blood vessel problem. Your blood pressure may be over 180/110. 
 For example, call 911 if: 
  · You have symptoms of a heart attack. These may include: ¨ Chest pain or pressure, or a strange feeling in the chest. 
¨ Sweating. ¨ Shortness of breath. ¨ Nausea or vomiting. ¨ Pain, pressure, or a strange feeling in the back, neck, jaw, or upper belly or in one or both shoulders or arms. ¨ Lightheadedness or sudden weakness. ¨ A fast or irregular heartbeat.   · You have symptoms of a stroke. These may include: 
¨ Sudden numbness, tingling, weakness, or loss of movement in your face, arm, or leg, especially on only one side of your body. ¨ Sudden vision changes. ¨ Sudden trouble speaking. ¨ Sudden confusion or trouble understanding simple statements. ¨ Sudden problems with walking or balance. ¨ A sudden, severe headache that is different from past headaches.  
  · You have severe back or belly pain.  
 Do not wait until your blood pressure comes down on its own. Get help right away. 
 Call your doctor now or seek immediate care if: 
  · Your blood pressure is much higher than normal (such as 180/110 or higher), but you don't have symptoms.  
  · You think high blood pressure is causing symptoms, such as: ¨ Severe headache. ¨ Blurry vision.  
 Watch closely for changes in your health, and be sure to contact your doctor if: 
  · Your blood pressure measures 140/90 or higher at least 2 times. That means the top number is 140 or higher or the bottom number is 90 or higher, or both.  
  · You think you may be having side effects from your blood pressure medicine.  
  · Your blood pressure is usually normal, but it goes above normal at least 2 times. Where can you learn more? Go to http://sierra-ghanshyam.info/. Enter O723 in the search box to learn more about \"High Blood Pressure: Care Instructions. \" Current as of: December 6, 2017 Content Version: 11.7 © 8409-2939 [employer]. Care instructions adapted under license by [employer] (which disclaims liability or warranty for this information). If you have questions about a medical condition or this instruction, always ask your healthcare professional. [address] any warranty or liability for your use of this information.  
 
 
Recommend AHA new dietary guidelines to improve weight, cardiovascular and general health. Limit daily added sugar consumption to 6 tsp/25 grams/100 xiomy/daily for women and 9 tsp/37 grams/150 xiomy/daily for men. High Blood Pressure: Care Instructions Your Care Instructions If your blood pressure is usually above 130/80, you have high blood pressure, or hypertension. That means the top number is 130 or higher or the bottom number is 80 or higher, or both. Despite what a lot of people think, high blood pressure usually doesn't cause headaches or make you feel dizzy or lightheaded. It usually has no symptoms. But it does increase your risk for heart attack, stroke, and kidney or eye damage. The higher your blood pressure, the more your risk increases. Your doctor will give you a goal for your blood pressure. Your goal will be based on your health and your age. Lifestyle changes, such as eating healthy and being active, are always important to help lower blood pressure. You might also take medicine to reach your blood pressure goal. 
Follow-up care is a key part of your treatment and safety. Be sure to make and go to all appointments, and call your doctor if you are having problems. It's also a good idea to know your test results and keep a list of the medicines you take. How can you care for yourself at home? Medical treatment · If you stop taking your medicine, your blood pressure will go back up. You may take one or more types of medicine to lower your blood pressure. Be safe with medicines. Take your medicine exactly as prescribed. Call your doctor if you think you are having a problem with your medicine. · Talk to your doctor before you start taking aspirin every day. Aspirin can help certain people lower their risk of a heart attack or stroke. But taking aspirin isn't right for everyone, because it can cause serious bleeding. · See your doctor regularly. You may need to see the doctor more often at first or until your blood pressure comes down. · If you are taking blood pressure medicine, talk to your doctor before you take decongestants or anti-inflammatory medicine, such as ibuprofen. Some of these medicines can raise blood pressure. · Learn how to check your blood pressure at home. Lifestyle changes · Stay at a healthy weight. This is especially important if you put on weight around the waist. Losing even 10 pounds can help you lower your blood pressure. · If your doctor recommends it, get more exercise. Walking is a good choice. Bit by bit, increase the amount you walk every day. Try for at least 30 minutes on most days of the week. You also may want to swim, bike, or do other activities. · Avoid or limit alcohol. Talk to your doctor about whether you can drink any alcohol. · Try to limit how much sodium you eat to less than 2,300 milligrams (mg) a day. Your doctor may ask you to try to eat less than 1,500 mg a day. · Eat plenty of fruits (such as bananas and oranges), vegetables, legumes, whole grains, and low-fat dairy products. · Lower the amount of saturated fat in your diet. Saturated fat is found in animal products such as milk, cheese, and meat. Limiting these foods may help you lose weight and also lower your risk for heart disease. · Do not smoke. Smoking increases your risk for heart attack and stroke. If you need help quitting, talk to your doctor about stop-smoking programs and medicines. These can increase your chances of quitting for good. When should you call for help? Call 911 anytime you think you may need emergency care. This may mean having symptoms that suggest that your blood pressure is causing a serious heart or blood vessel problem. Your blood pressure may be over 180/110. 
 For example, call 911 if: 
  · You have symptoms of a heart attack. These may include: ¨ Chest pain or pressure, or a strange feeling in the chest. 
¨ Sweating. ¨ Shortness of breath. ¨ Nausea or vomiting. ¨ Pain, pressure, or a strange feeling in the back, neck, jaw, or upper belly or in one or both shoulders or arms. ¨ Lightheadedness or sudden weakness. ¨ A fast or irregular heartbeat.  
  · You have symptoms of a stroke. These may include: 
¨ Sudden numbness, tingling, weakness, or loss of movement in your face, arm, or leg, especially on only one side of your body. ¨ Sudden vision changes. ¨ Sudden trouble speaking. ¨ Sudden confusion or trouble understanding simple statements. ¨ Sudden problems with walking or balance. ¨ A sudden, severe headache that is different from past headaches.  
  · You have severe back or belly pain.  
 Do not wait until your blood pressure comes down on its own. Get help right away. 
 Call your doctor now or seek immediate care if: 
  · Your blood pressure is much higher than normal (such as 180/110 or higher), but you don't have symptoms.  
  · You think high blood pressure is causing symptoms, such as: ¨ Severe headache. ¨ Blurry vision.  
 Watch closely for changes in your health, and be sure to contact your doctor if: 
  · Your blood pressure measures 140/90 or higher at least 2 times. That means the top number is 140 or higher or the bottom number is 90 or higher, or both.  
  · You think you may be having side effects from your blood pressure medicine.  
  · Your blood pressure is usually normal, but it goes above normal at least 2 times. Where can you learn more? Go to http://sierra-ghanshyam.info/. Enter G964 in the search box to learn more about \"High Blood Pressure: Care Instructions. \" Current as of: December 6, 2017 Content Version: 11.7 © 7304-9782 [employer]. Care instructions adapted under license by [employer] (which disclaims liability or warranty for this information).  If you have questions about a medical condition or this instruction, always ask your healthcare professional. [employer] disclaims any warranty or liability for your use of this information. Introducing [facility]! Dear Luciana Shanks: Thank you for requesting a Wi3 account. Our records indicate that you already have an active Wi3 account. You can access your account anytime at [email or web address] Did you know that you can access your hospital and ER discharge instructions at any time in Wi3? You can also review all of your test results from your hospital stay or ER visit. Additional Information If you have questions, please visit the Frequently Asked Questions section of the Wi3 website at [email or web address] Remember, Wi3 is NOT to be used for urgent needs. For medical emergencies, dial 911. Now available from your iPhone and Android! Please provide this summary of care documentation to your next provider. Your primary care clinician is listed as Leah Garland. If you have any questions after today's visit, please call [phone].

## 2018-09-06 NOTE — PATIENT INSTRUCTIONS
Try OTC Magnesium 400 mg daily or Milk of Magnesia or Miralax or Smooth Move Tea for constipation. Please make sure you are consuming 2 liter (67 oz of water minimally). High Blood Pressure: Care Instructions  Your Care Instructions    If your blood pressure is usually above 130/80, you have high blood pressure, or hypertension. That means the top number is 130 or higher or the bottom number is 80 or higher, or both. Despite what a lot of people think, high blood pressure usually doesn't cause headaches or make you feel dizzy or lightheaded. It usually has no symptoms. But it does increase your risk for heart attack, stroke, and kidney or eye damage. The higher your blood pressure, the more your risk increases. Your doctor will give you a goal for your blood pressure. Your goal will be based on your health and your age. Lifestyle changes, such as eating healthy and being active, are always important to help lower blood pressure. You might also take medicine to reach your blood pressure goal.  Follow-up care is a key part of your treatment and safety. Be sure to make and go to all appointments, and call your doctor if you are having problems. It's also a good idea to know your test results and keep a list of the medicines you take. How can you care for yourself at home? Medical treatment  · If you stop taking your medicine, your blood pressure will go back up. You may take one or more types of medicine to lower your blood pressure. Be safe with medicines. Take your medicine exactly as prescribed. Call your doctor if you think you are having a problem with your medicine. · Talk to your doctor before you start taking aspirin every day. Aspirin can help certain people lower their risk of a heart attack or stroke. But taking aspirin isn't right for everyone, because it can cause serious bleeding. · See your doctor regularly.  You may need to see the doctor more often at first or until your blood pressure comes down. · If you are taking blood pressure medicine, talk to your doctor before you take decongestants or anti-inflammatory medicine, such as ibuprofen. Some of these medicines can raise blood pressure. · Learn how to check your blood pressure at home. Lifestyle changes  · Stay at a healthy weight. This is especially important if you put on weight around the waist. Losing even 10 pounds can help you lower your blood pressure. · If your doctor recommends it, get more exercise. Walking is a good choice. Bit by bit, increase the amount you walk every day. Try for at least 30 minutes on most days of the week. You also may want to swim, bike, or do other activities. · Avoid or limit alcohol. Talk to your doctor about whether you can drink any alcohol. · Try to limit how much sodium you eat to less than 2,300 milligrams (mg) a day. Your doctor may ask you to try to eat less than 1,500 mg a day. · Eat plenty of fruits (such as bananas and oranges), vegetables, legumes, whole grains, and low-fat dairy products. · Lower the amount of saturated fat in your diet. Saturated fat is found in animal products such as milk, cheese, and meat. Limiting these foods may help you lose weight and also lower your risk for heart disease. · Do not smoke. Smoking increases your risk for heart attack and stroke. If you need help quitting, talk to your doctor about stop-smoking programs and medicines. These can increase your chances of quitting for good. When should you call for help? Call 911 anytime you think you may need emergency care. This may mean having symptoms that suggest that your blood pressure is causing a serious heart or blood vessel problem. Your blood pressure may be over 180/110.   For example, call 911 if:    · You have symptoms of a heart attack. These may include:  ¨ Chest pain or pressure, or a strange feeling in the chest.  ¨ Sweating. ¨ Shortness of breath. ¨ Nausea or vomiting.   ¨ Pain, pressure, or a strange feeling in the back, neck, jaw, or upper belly or in one or both shoulders or arms. ¨ Lightheadedness or sudden weakness. ¨ A fast or irregular heartbeat.     · You have symptoms of a stroke. These may include:  ¨ Sudden numbness, tingling, weakness, or loss of movement in your face, arm, or leg, especially on only one side of your body. ¨ Sudden vision changes. ¨ Sudden trouble speaking. ¨ Sudden confusion or trouble understanding simple statements. ¨ Sudden problems with walking or balance. ¨ A sudden, severe headache that is different from past headaches.     · You have severe back or belly pain.    Do not wait until your blood pressure comes down on its own. Get help right away.   Call your doctor now or seek immediate care if:    · Your blood pressure is much higher than normal (such as 180/110 or higher), but you don't have symptoms.     · You think high blood pressure is causing symptoms, such as:  ¨ Severe headache. ¨ Blurry vision.    Watch closely for changes in your health, and be sure to contact your doctor if:    · Your blood pressure measures 140/90 or higher at least 2 times. That means the top number is 140 or higher or the bottom number is 90 or higher, or both.     · You think you may be having side effects from your blood pressure medicine.     · Your blood pressure is usually normal, but it goes above normal at least 2 times. Where can you learn more? Go to http://sierra-ghanshyam.info/. Enter N754 in the search box to learn more about \"High Blood Pressure: Care Instructions. \"  Current as of: December 6, 2017  Content Version: 11.7  © 9576-6345 [employer]. Care instructions adapted under license by [employer] (which disclaims liability or warranty for this information).  If you have questions about a medical condition or this instruction, always ask your healthcare professional. [employer] disclaims any warranty or liability for your use of this information. Recommend AHA new dietary guidelines to improve weight, cardiovascular and general health. Limit daily added sugar consumption to 6 tsp/25 grams/100 xiomy/daily for women and 9 tsp/37 grams/150 xiomy/daily for men. High Blood Pressure: Care Instructions  Your Care Instructions    If your blood pressure is usually above 130/80, you have high blood pressure, or hypertension. That means the top number is 130 or higher or the bottom number is 80 or higher, or both. Despite what a lot of people think, high blood pressure usually doesn't cause headaches or make you feel dizzy or lightheaded. It usually has no symptoms. But it does increase your risk for heart attack, stroke, and kidney or eye damage. The higher your blood pressure, the more your risk increases. Your doctor will give you a goal for your blood pressure. Your goal will be based on your health and your age. Lifestyle changes, such as eating healthy and being active, are always important to help lower blood pressure. You might also take medicine to reach your blood pressure goal.  Follow-up care is a key part of your treatment and safety. Be sure to make and go to all appointments, and call your doctor if you are having problems. It's also a good idea to know your test results and keep a list of the medicines you take. How can you care for yourself at home? Medical treatment  · If you stop taking your medicine, your blood pressure will go back up. You may take one or more types of medicine to lower your blood pressure. Be safe with medicines. Take your medicine exactly as prescribed. Call your doctor if you think you are having a problem with your medicine. · Talk to your doctor before you start taking aspirin every day. Aspirin can help certain people lower their risk of a heart attack or stroke. But taking aspirin isn't right for everyone, because it can cause serious bleeding.   · See your doctor regularly. You may need to see the doctor more often at first or until your blood pressure comes down. · If you are taking blood pressure medicine, talk to your doctor before you take decongestants or anti-inflammatory medicine, such as ibuprofen. Some of these medicines can raise blood pressure. · Learn how to check your blood pressure at home. Lifestyle changes  · Stay at a healthy weight. This is especially important if you put on weight around the waist. Losing even 10 pounds can help you lower your blood pressure. · If your doctor recommends it, get more exercise. Walking is a good choice. Bit by bit, increase the amount you walk every day. Try for at least 30 minutes on most days of the week. You also may want to swim, bike, or do other activities. · Avoid or limit alcohol. Talk to your doctor about whether you can drink any alcohol. · Try to limit how much sodium you eat to less than 2,300 milligrams (mg) a day. Your doctor may ask you to try to eat less than 1,500 mg a day. · Eat plenty of fruits (such as bananas and oranges), vegetables, legumes, whole grains, and low-fat dairy products. · Lower the amount of saturated fat in your diet. Saturated fat is found in animal products such as milk, cheese, and meat. Limiting these foods may help you lose weight and also lower your risk for heart disease. · Do not smoke. Smoking increases your risk for heart attack and stroke. If you need help quitting, talk to your doctor about stop-smoking programs and medicines. These can increase your chances of quitting for good. When should you call for help? Call 911 anytime you think you may need emergency care. This may mean having symptoms that suggest that your blood pressure is causing a serious heart or blood vessel problem. Your blood pressure may be over 180/110.   For example, call 911 if:    · You have symptoms of a heart attack.  These may include:  ¨ Chest pain or pressure, or a strange feeling in the chest.  ¨ Sweating. ¨ Shortness of breath. ¨ Nausea or vomiting. ¨ Pain, pressure, or a strange feeling in the back, neck, jaw, or upper belly or in one or both shoulders or arms. ¨ Lightheadedness or sudden weakness. ¨ A fast or irregular heartbeat.     · You have symptoms of a stroke. These may include:  ¨ Sudden numbness, tingling, weakness, or loss of movement in your face, arm, or leg, especially on only one side of your body. ¨ Sudden vision changes. ¨ Sudden trouble speaking. ¨ Sudden confusion or trouble understanding simple statements. ¨ Sudden problems with walking or balance. ¨ A sudden, severe headache that is different from past headaches.     · You have severe back or belly pain.    Do not wait until your blood pressure comes down on its own. Get help right away.   Call your doctor now or seek immediate care if:    · Your blood pressure is much higher than normal (such as 180/110 or higher), but you don't have symptoms.     · You think high blood pressure is causing symptoms, such as:  ¨ Severe headache. ¨ Blurry vision.    Watch closely for changes in your health, and be sure to contact your doctor if:    · Your blood pressure measures 140/90 or higher at least 2 times. That means the top number is 140 or higher or the bottom number is 90 or higher, or both.     · You think you may be having side effects from your blood pressure medicine.     · Your blood pressure is usually normal, but it goes above normal at least 2 times. Where can you learn more? Go to http://sierra-ghanshyam.info/. Enter D811 in the search box to learn more about \"High Blood Pressure: Care Instructions. \"  Current as of: December 6, 2017  Content Version: 11.7  © 4892-3482 [employer]. Care instructions adapted under license by [employer] (which disclaims liability or warranty for this information).  If you have questions about a medical condition or this instruction, always ask your healthcare professional. [place] [address] any warranty or liability for your use of this information.

## 2018-09-06 NOTE — PROGRESS NOTES
Leeanne Parham  Identified pt with two pt identifiers(name and ). Chief Complaint   Patient presents with    Weight Management    Blood Pressure Check    Referral Follow Up    Results    Medication Evaluation       1. Have you been to the ER, urgent care clinic since your last visit? Hospitalized since your last visit? No    2. Have you seen or consulted any other health care providers outside of the [address] [place] [address] since your last visit? Include any pap smears or colon screening. No    In the event something were to happen to you and you were unable to speak on your behalf, do you have an Advance Directive/ Living Will in place stating your wishes? NO    If yes, do we have a copy on file NO    If no, would you like information:    Patient declined. Would you like to sign up for [facility] today, if you have not already done so? Yes, patient currently has one. If not, would you like information on MyChart, and how to sign up at a later time? No      Medication reconciliation up to date and corrected with patient at this time. Today's provider has been notified of reason for visit, vitals and flowsheets obtained on patients. Reviewed record in preparation for visit, huddled with provider and have obtained necessary documentation. There are no preventive care reminders to display for this patient.     Wt Readings from Last 3 Encounters:   18 263 lb 6.4 oz (119.5 kg)   18 269 lb 12.8 oz (122.4 kg)   18 286 lb 14.4 oz (130.1 kg)     Temp Readings from Last 3 Encounters:   18 98.5 °F (36.9 °C)   18 98 °F (36.7 °C) (Oral)   18 97.5 °F (36.4 °C) (Oral)     BP Readings from Last 3 Encounters:   18 122/50   18 144/80   18 138/78     Pulse Readings from Last 3 Encounters:   18 (!) 56   18 65   18 63     Vitals:    18 1047   BP: 135/90   Pulse: 66   Resp: 15   Temp: 98.1 °F (36.7 °C)   TempSrc: Temporal SpO2: 98%   Weight: 263 lb 6.4 oz (119.5 kg)   Height: 5' 8\" (1.727 m)   PainSc:   0 - No pain         Learning Assessment:  :     Learning Assessment 6/20/2014   PRIMARY LEARNER Patient   HIGHEST LEVEL OF EDUCATION - PRIMARY LEARNER  SOME COLLEGE   BARRIERS PRIMARY LEARNER NONE   PRIMARY LANGUAGE ENGLISH   LEARNER PREFERENCE PRIMARY DEMONSTRATION   ANSWERED BY patient   RELATIONSHIP SELF       Depression Screening:  :     PHQ over the last two weeks 1/22/2018   Little interest or pleasure in doing things Not at all   Feeling down, depressed, irritable, or hopeless Not at all   Total Score PHQ 2 0       Fall Risk Assessment:  :     Fall Risk Assessment, last 12 mths 12/7/2017   Able to walk? Yes   Fall in past 12 months? No       Abuse Screening:  :     Abuse Screening Questionnaire 12/7/2017   Do you ever feel afraid of your partner? N   Are you in a relationship with someone who physically or mentally threatens you? N   Is it safe for you to go home?  Y       ADL Screening:  :     ADL Assessment 12/7/2017   Feeding yourself No Help Needed   Getting from bed to chair No Help Needed   Getting dressed No Help Needed   Bathing or showering No Help Needed   Walk across the room (includes cane/walker) No Help Needed   Using the telphone No Help Needed   Taking your medications No Help Needed   Preparing meals No Help Needed   Managing money (expenses/bills) No Help Needed   Moderately strenuous housework (laundry) No Help Needed   Shopping for personal items (toiletries/medicines) No Help Needed   Shopping for groceries No Help Needed   Driving No Help Needed   Climbing a flight of stairs No Help Needed   Getting to places beyond walking distances No Help Needed

## 2018-09-06 NOTE — Clinical Note
Filipe Nobles. Thanks for seeing this nice young lady. She is wondering if she should continue her Beta Blocker? She has not taken it in a while and was using it for chest spasm and migraines. She still has chest spasms but no migraines. Please follow up with her regarding your recommendation.   Thanks, Sylvia Silver

## 2018-09-06 NOTE — PROGRESS NOTES
HISTORY OF Gavin Christianson is a 48 y.o. female presents with Weight Management; Blood Pressure Check; Referral Follow Up; Results; Leg Pain; Medication Evaluation; and Labs    Agree with nurse note. Pt with hyperglycemia, high cholesterol, chronic gastritis, aspirin intolerance, hypertension, vit d deficiency presents to the office with a BP of 135/90. She has not used her BP meds today. For BP, she takes Diovan-HCT 80-12.5 mg daily, tolerating well but she has not been using them consistently. Pt requested to review results from 5/31/2018. Hep C neg, Vit D 13, TSH wnl, Hgb A1c 5.9. Pt is still experiencing chest tightness with squeezing sensation in her L arm. Pt had L cardiac cath procedure via right radial and right groin on 7/19/2018 with Dr. Nicolette Gamboa which showed normal coronaries and LV. She had been using Propanol 80 mg everyday for chest spasm and migraines but she is wondering if she still needs it everyday. She still has chest spasms but doesn't have migraines. Weight is 263 lbs, down 23 lbs since 6/1/2018 and 36 lbs since 12/2017. She has dropped from a size 24 to size 20. Her first goal weigh is 235 lbs by her next visit and her long term goal weigh is 170-175 lbs. She has been following the keto diet, limiting herself to 20 g of carbs daily and is in a keto support group. She stopped eating all bread and pasta. She also has stopped drinking orange crush soda but occasionally has a diet Mt. Dew. Since stopping the soda, she has noticed her abd bloating has resolved. She previously used 11 Sammarinese vanilla creamers and 3 splenda packets in 4 oz of coffee, but she now uses 4 Sammarinese vanilla and 3 half and half with 3 splenda packets per 4 oz of coffee. She has 2 cups of coffee a day. She drinks 16-32 oz of water daily. She doesn't fall asleep until 2-3 am each night. She has also been experiencing leg cramps.      Pt sent a Mycart for motion sickness medication when she was going on cruise to [facility]. I did not receive the message in time but patient purchased OTC Dramamine. Pt complains of urinary urgency. Her GYN has told her she has a tilted bladder and recommended she do kegels. She is wondering if she needs the HCTZ medicine but notes her feet swell if she doesn't use the medication. Written by caty Prieto, as dictated by Dr. Eva Obando DO.    ROS    Review of Systems negative except as noted above in HPI. ALLERGIES:    Allergies   Allergen Reactions    Doxylamine Succinate Swelling     12.5-25 MG  HANDS, FACE/PERIORAL, FEET    Pcn [Penicillins] Hives    Ambien [Zolpidem] Nausea and Vomiting and Other (comments)     5 mg with Ativan 0.5 mg   TOO GROGGY, SLEPT 24 HOURS  7.5 MG FORGOT SHE WAS SLEEP EATING    Aspirin Nausea Only    Celebrex [Celecoxib] Other (comments)     TIGHT SQUEEZING IN CHEST  CHEST ACHED    Erythromycin Nausea and Vomiting    Milk Prot-Turm-Pepper-Pineappl Other (comments)     Multiple food allergies    Neurontin [Gabapentin] Other (comments)     300 mg  SEVERE LEG PAIN  EYE TWITCHING    Nsaids (Non-Steroidal Anti-Inflammatory Drug) Other (comments)     GI irritation         CURRENT MEDICATIONS:    Outpatient Prescriptions Marked as Taking for the 9/6/18 encounter (Office Visit) with More Daily DO   Medication Sig Dispense Refill    omeprazole (PRILOSEC) 20 mg capsule TAKE 1 CAPSULE BY MOUTH TWICE DAILY  Indications: Heartburn 60 Cap 5    propranolol LA (INDERAL LA) 80 mg SR capsule TAKE 1 TO 2 CAPSULES BY MOUTH EVERY DAY FOR HIGH BLOOD PRESSURE 180 Cap 1    ergocalciferol (ERGOCALCIFEROL) 50,000 unit capsule Take 1 Cap by mouth every seven (7) days. Indications: Vitamin D Deficiency 5 Cap 2    cetirizine (ZYRTEC) 10 mg tablet Take  by mouth daily.  propranolol LA (INDERAL LA) 80 mg SR capsule Take 1 Cap by mouth two (2) times a day.  Indications: MIGRAINE PREVENTION 180 Cap 3    valsartan-hydroCHLOROthiazide (DIOVAN-HCT) 80-12.5 mg per tablet Take 1 Tab by mouth daily. Indications: hypertension 90 Tab 3    acetaminophen (TYLENOL ARTHRITIS PAIN) 650 mg CR tablet Take 650 mg by mouth as needed for Pain. PAST MEDICAL HISTORY:    Past Medical History:   Diagnosis Date    Allergic rhinitis 2010    Cervical disc herniation 2010    C3-4, C4-5, C5-6. Hemangioma body of C5. Dr. Bjorn Scanlon.  Chest pain 2011, 2012    Dr. Pia Lovett. Dr. Deya Gerard; denies CP as of 3/27/14    Chronic lower back pain , 2016    thoracic DDD and spondylosis. DDD L3-S1. Dr. Charly Dey. Noemi Rao. [place] Vinh [place] [facility].  Esophagitis, reflux 4/3/2011    Gallstone     Gastritis 2010    Dr. Deb Alanis.  Gastrointestinal food allergy 2014    Multiple. Dr. Rah Mercado.    Heart palpitations 2012    DUE TO PAC'S AND PVC'S. Dr. Bill Goodman.  Hiatal hernia 4/3/2011    HTN (hypertension)     Impaired glucose tolerance 10/15/2010    Incidental lung nodule 13    LLL 3.9 mm, RML 3.1 mm;  as of 3/27/14 per pt stable w/o growth    Insomnia     Iron deficiency anemia 2010    Irritable bowel syndrome (IBS) 2014    Dr. Kym Lala.    Knee pain     Right. due to severe OA. / chipped bone     Metatarsal bone fracture     Left fifth.  Migraine 2011    Dr. Kym Belle, cervical 2010    Left. Dr. Bjorn Scanlon.  Shoulder pain, right     due to [address] X 2. Dr. Naomie Walter.  Thyroid nodule     Dr. Danny Frederick    Toe fracture, left     fifth toe.  Triangular fibrocartilage complex tear     Dr. Ian Esquivel. Left.        PAST SURGICAL HISTORY:    Past Surgical History:   Procedure Laterality Date    HX  SECTION  1995    x 1    HX CHOLECYSTECTOMY      HX COLONOSCOPY  14    Dr. Beulah Snell; 14    due to abnormal PAP.  HX DILATION AND CURETTAGE  1990s    due to miscarriage.  HX ENDOSCOPY      HX ORTHOPAEDIC Right 06/2016    LUMBAR L4-5, L5-S1 ELLY. Dr. Vada Lesch     [address]:    Family History   Problem Relation Age of Onset    Diabetes Mother     Heart Disease Mother 58     2 stents    Hypertension Mother     Diabetes Father     Stroke Maternal Grandmother     Cancer Maternal Uncle      prostate       SOCIAL HISTORY:    Social History     Social History    Marital status:      Spouse name: N/A    Number of children: 3    Years of education: N/A     Occupational History    Medical assistant [employer]     Social History Main Topics    Smoking status: Never Smoker    Smokeless tobacco: Never Used    Alcohol use No    Drug use: No    Sexual activity: Yes     Partners: Male     Other Topics Concern    None     Social History Narrative       IMMUNIZATIONS:    Immunization History   Administered Date(s) Administered    Influenza Vaccine 10/30/2015, 10/31/2017    Influenza Vaccine Whole 10/01/2010    TDAP Vaccine 08/10/2012    Td, Adsorbed 10/09/2016         PHYSICAL EXAMINATION    Vital Signs    Visit Vitals    /90 (BP 1 Location: Left arm, BP Patient Position: Sitting)    Pulse 66    Temp 98.1 °F (36.7 °C) (Temporal)    Resp 15    Ht 5' 8\" (1.727 m)    Wt 263 lb 6.4 oz (119.5 kg)    SpO2 98%    BMI 40.05 kg/m2       Weight Metrics 9/6/2018 7/19/2018 6/1/2018 5/24/2018 1/22/2018 12/30/2017 12/7/2017   Weight 263 lb 6.4 oz 269 lb 12.8 oz 286 lb 14.4 oz 288 lb 9.6 oz 296 lb 12.8 oz 298 lb 9.6 oz 295 lb   BMI 40.05 kg/m2 41.02 kg/m2 44.93 kg/m2 45.2 kg/m2 46.49 kg/m2 46.77 kg/m2 46.2 kg/m2       General appearance - Well nourished. Well appearing. Well developed. No acute distress. Obese. Head - Normocephalic. Atraumatic. Eyes - pupils equal and reactive. Extraocular eye movements intact. Sclera anicteric.   Mildly injected sclera. Ears - Hearing is grossly normal bilaterally. Nose - normal and patent. No polyps noted. No erythema. No discharge. Mouth - mucous membranes with adequate moisture. Posterior pharynx normal with cobblestone appearance. No erythema, white exudate or obstruction. Neck - supple. Midline trachea. No carotid bruits noted bilaterally. No thyromegaly noted. Chest - clear to auscultation bilaterally anteriorly and posteriorly. No wheezes. No rales or rhonchi. Breath sounds are symmetrical bilaterally. Unlabored respirations. Heart - normal rate. Regular rhythm. Normal S1, S2. No murmur noted. No rubs, clicks or gallops noted. Abdomen - soft and distended. No masses or organomegaly. No rebound, rigidity or guarding. Bowel sounds normal x 4 quadrants. No tenderness noted. Neurological - awake, alert and oriented to person, place, and time and event. Cranial nerves II through XII intact. Clear speech. Muscle strength is +5/5 x 4 extremities. Sensation is intact to light touch bilaterally. Steady gait. Heme/Lymph - peripheral pulses normal x 4 extremities. No peripheral edema is noted. Musculoskeletal - Intact x 4 extremities. Full ROM x 4 extremities. No pain with movement. Back exam - normal range of motion. No pain on palpation of the spinous processes in the cervical, thoracic, lumbar, sacral regions. No CVA tenderness. Skin - no rashes, erythema, ecchymosis, lacerations, abrasions, suspicious moles noted  Psychological -   normal behavior, dress and thought processes. Good insight. Good eye contact. Normal affect. Appropriate mood. Normal speech.       DATA REVIEWED    Lab Results   Component Value Date/Time    WBC 3.9 07/19/2018 08:15 AM    Hemoglobin (POC) 12.9 02/03/2015 02:40 PM    HGB 11.9 07/19/2018 08:15 AM    Hematocrit (POC) 38 02/03/2015 02:40 PM    HCT 36.6 07/19/2018 08:15 AM    PLATELET 547 94/37/0477 08:15 AM    MCV 87.6 07/19/2018 08:15 AM Lab Results   Component Value Date/Time    Sodium 140 07/18/2018 12:00 AM    Potassium 4.0 07/18/2018 12:00 AM    Chloride 103 07/18/2018 12:00 AM    CO2 20 07/18/2018 12:00 AM    Anion gap 9 01/16/2014 10:15 AM    Glucose 86 07/18/2018 12:00 AM    BUN 10 07/18/2018 12:00 AM    Creatinine 0.65 07/18/2018 12:00 AM    BUN/Creatinine ratio 15 07/18/2018 12:00 AM    GFR est  07/18/2018 12:00 AM    GFR est non- 07/18/2018 12:00 AM    Calcium 9.5 07/18/2018 12:00 AM    Bilirubin, total <0.2 05/31/2018 12:00 AM    AST (SGOT) 18 05/31/2018 12:00 AM    Alk. phosphatase 95 05/31/2018 12:00 AM    Protein, total 7.5 05/31/2018 12:00 AM    Albumin 4.2 05/31/2018 12:00 AM    Globulin 4.4 (H) 01/16/2014 10:15 AM    A-G Ratio 1.3 05/31/2018 12:00 AM    ALT (SGPT) 12 05/31/2018 12:00 AM     Lab Results   Component Value Date/Time    Cholesterol, total 195 05/31/2018 12:00 AM    HDL Cholesterol 57 05/31/2018 12:00 AM    LDL, calculated 112 (H) 05/31/2018 12:00 AM    VLDL, calculated 26 05/31/2018 12:00 AM    Triglyceride 130 05/31/2018 12:00 AM    CHOL/HDL Ratio 3.2 10/15/2010 04:14 PM     Lab Results   Component Value Date/Time    VITAMIN D, 25-HYDROXY 13.4 (L) 05/31/2018 12:00 AM       Lab Results   Component Value Date/Time    Hemoglobin A1c 5.9 (H) 05/31/2018 12:00 AM     Lab Results   Component Value Date/Time    TSH 0.882 05/31/2018 12:00 AM       Lab Results   Component Value Date/Time    Microalb/Creat ratio (ug/mg creat.) 2.0 05/31/2018 12:00 AM         ASSESSMENT and PLAN      ICD-10-CM ICD-9-CM    1. High cholesterol E78.00 272.0 LIPID PANEL   2. Essential hypertension I10 401.9    3. Hyperglycemia R73.9 790.29    4. Vitamin D deficiency E55.9 268.9 VITAMIN D, 25 HYDROXY   5. Weight loss R63.4 783.21     23# since 06/2018, 36# since 12/2018 due to diet changes/keto diet (no Orange Crush, no pastas, no breads)   6. Obesity, Class III, BMI 40-49.9 (morbid obesity) ([facility]) E66.01 278.01    7.  Chronic insomnia F51.04 780.52     stable off sleep aids   8. Other chronic gastritis without hemorrhage K29.50 535.10    9. Aspirin intolerance Z78.9 995.27      E935.3    10. Leg cramps R25.2 729.82     BL due to mild dehydration from Keto diet vs other   11. Urgency of urination R39.15 788.63     due to tilted bladder vs other   12. Intractable migraine without aura and without status migrainosus G43.019 346.11     stable off Propranolol   13. Edema of foot R60.0 782.3     BL, stable on HCTZ       Discussed the patient's BMI with her. The BMI follow up plan is as follows: I have counseled this patient on diet and exercise regimens. Decrease carbohydrates (white foods, sweet foods, sweet drinks and alcohol), increase green leafy vegetables and protein (lean meats and beans) with each meal.  Avoid fried foods. Eat 3-5 small meals daily. Do not skip meals. Increase water intake. Stop using splenda in coffee and reduce to 3 Yi vanilla creamers. Increase physical activity to 30 minutes daily for health benefit or 60 minutes daily to prevent weight regain, as tolerated. Get 7-8 hours uninterrupted sleep nightly. Chart reviewed and updated. Staff message sent to Dr. Garrett Walton regarding use of Propanol. Continue current medications and care. Use BP medication more consistently. Discussed signs and sxs of MI and CVA. Most recent tests reviewed from 5/31/2018. Recheck pertinent labs 11/2018. Recent office visit notes from Dr. Garrett Walton reviewed. Counseled patient on health concerns:  Cholesterol, vit d deficiency, insomnia, weight management, gastritis, leg cramps, urinary urgency, migraines, and foot swelling. Relevant handouts given and discussed with patient. Immunizations noted; Advise flu vaccine between the months September to December. Offered empathy, support, legitimation, prayers, partnership to patient. Praised patient for progress.   Follow-up Disposition:  Return in about 3 months (around 12/6/2018) for weight, blood pressure, results. Patient was offered a choice/choices in the treatment plan today. Patient expresses understanding of the plan and agrees with recommendations. More than 40 mins spent face to face with patient and more than 50% of this time spent in counseling and coordinating care. Written by caty Cosme, as dictated by Dr. Jorge Clayton DO. Documentation True and Accepted by Ligia Marsh. Stefan Cosby. Patient Instructions     Try OTC Magnesium 400 mg daily or Milk of Magnesia or Miralax or Smooth Move Tea for constipation. Please make sure you are consuming 2 liter (67 oz of water minimally). High Blood Pressure: Care Instructions  Your Care Instructions    If your blood pressure is usually above 130/80, you have high blood pressure, or hypertension. That means the top number is 130 or higher or the bottom number is 80 or higher, or both. Despite what a lot of people think, high blood pressure usually doesn't cause headaches or make you feel dizzy or lightheaded. It usually has no symptoms. But it does increase your risk for heart attack, stroke, and kidney or eye damage. The higher your blood pressure, the more your risk increases. Your doctor will give you a goal for your blood pressure. Your goal will be based on your health and your age. Lifestyle changes, such as eating healthy and being active, are always important to help lower blood pressure. You might also take medicine to reach your blood pressure goal.  Follow-up care is a key part of your treatment and safety. Be sure to make and go to all appointments, and call your doctor if you are having problems. It's also a good idea to know your test results and keep a list of the medicines you take. How can you care for yourself at home? Medical treatment  · If you stop taking your medicine, your blood pressure will go back up.  You may take one or more types of medicine to lower your blood pressure. Be safe with medicines. Take your medicine exactly as prescribed. Call your doctor if you think you are having a problem with your medicine. · Talk to your doctor before you start taking aspirin every day. Aspirin can help certain people lower their risk of a heart attack or stroke. But taking aspirin isn't right for everyone, because it can cause serious bleeding. · See your doctor regularly. You may need to see the doctor more often at first or until your blood pressure comes down. · If you are taking blood pressure medicine, talk to your doctor before you take decongestants or anti-inflammatory medicine, such as ibuprofen. Some of these medicines can raise blood pressure. · Learn how to check your blood pressure at home. Lifestyle changes  · Stay at a healthy weight. This is especially important if you put on weight around the waist. Losing even 10 pounds can help you lower your blood pressure. · If your doctor recommends it, get more exercise. Walking is a good choice. Bit by bit, increase the amount you walk every day. Try for at least 30 minutes on most days of the week. You also may want to swim, bike, or do other activities. · Avoid or limit alcohol. Talk to your doctor about whether you can drink any alcohol. · Try to limit how much sodium you eat to less than 2,300 milligrams (mg) a day. Your doctor may ask you to try to eat less than 1,500 mg a day. · Eat plenty of fruits (such as bananas and oranges), vegetables, legumes, whole grains, and low-fat dairy products. · Lower the amount of saturated fat in your diet. Saturated fat is found in animal products such as milk, cheese, and meat. Limiting these foods may help you lose weight and also lower your risk for heart disease. · Do not smoke. Smoking increases your risk for heart attack and stroke. If you need help quitting, talk to your doctor about stop-smoking programs and medicines.  These can increase your chances of quitting for good.  When should you call for help? Call 911 anytime you think you may need emergency care. This may mean having symptoms that suggest that your blood pressure is causing a serious heart or blood vessel problem. Your blood pressure may be over 180/110.   For example, call 911 if:    · You have symptoms of a heart attack. These may include:  ¨ Chest pain or pressure, or a strange feeling in the chest.  ¨ Sweating. ¨ Shortness of breath. ¨ Nausea or vomiting. ¨ Pain, pressure, or a strange feeling in the back, neck, jaw, or upper belly or in one or both shoulders or arms. ¨ Lightheadedness or sudden weakness. ¨ A fast or irregular heartbeat.     · You have symptoms of a stroke. These may include:  ¨ Sudden numbness, tingling, weakness, or loss of movement in your face, arm, or leg, especially on only one side of your body. ¨ Sudden vision changes. ¨ Sudden trouble speaking. ¨ Sudden confusion or trouble understanding simple statements. ¨ Sudden problems with walking or balance. ¨ A sudden, severe headache that is different from past headaches.     · You have severe back or belly pain.    Do not wait until your blood pressure comes down on its own. Get help right away.   Call your doctor now or seek immediate care if:    · Your blood pressure is much higher than normal (such as 180/110 or higher), but you don't have symptoms.     · You think high blood pressure is causing symptoms, such as:  ¨ Severe headache. ¨ Blurry vision.    Watch closely for changes in your health, and be sure to contact your doctor if:    · Your blood pressure measures 140/90 or higher at least 2 times. That means the top number is 140 or higher or the bottom number is 90 or higher, or both.     · You think you may be having side effects from your blood pressure medicine.     · Your blood pressure is usually normal, but it goes above normal at least 2 times. Where can you learn more?   Go to http://sierra-ghanshyam.info/. Enter H884 in the search box to learn more about \"High Blood Pressure: Care Instructions. \"  Current as of: December 6, 2017  Content Version: 11.7  © 2006-2018 [employer]. Care instructions adapted under license by [employer] (which disclaims liability or warranty for this information). If you have questions about a medical condition or this instruction, always ask your healthcare professional. [address] any warranty or liability for your use of this information. Recommend AHA new dietary guidelines to improve weight, cardiovascular and general health. Limit daily added sugar consumption to 6 tsp/25 grams/100 xiomy/daily for women and 9 tsp/37 grams/150 xiomy/daily for men. High Blood Pressure: Care Instructions  Your Care Instructions    If your blood pressure is usually above 130/80, you have high blood pressure, or hypertension. That means the top number is 130 or higher or the bottom number is 80 or higher, or both. Despite what a lot of people think, high blood pressure usually doesn't cause headaches or make you feel dizzy or lightheaded. It usually has no symptoms. But it does increase your risk for heart attack, stroke, and kidney or eye damage. The higher your blood pressure, the more your risk increases. Your doctor will give you a goal for your blood pressure. Your goal will be based on your health and your age. Lifestyle changes, such as eating healthy and being active, are always important to help lower blood pressure. You might also take medicine to reach your blood pressure goal.  Follow-up care is a key part of your treatment and safety. Be sure to make and go to all appointments, and call your doctor if you are having problems. It's also a good idea to know your test results and keep a list of the medicines you take. How can you care for yourself at home?   Medical treatment  · If you stop taking your medicine, your blood pressure will go back up. You may take one or more types of medicine to lower your blood pressure. Be safe with medicines. Take your medicine exactly as prescribed. Call your doctor if you think you are having a problem with your medicine. · Talk to your doctor before you start taking aspirin every day. Aspirin can help certain people lower their risk of a heart attack or stroke. But taking aspirin isn't right for everyone, because it can cause serious bleeding. · See your doctor regularly. You may need to see the doctor more often at first or until your blood pressure comes down. · If you are taking blood pressure medicine, talk to your doctor before you take decongestants or anti-inflammatory medicine, such as ibuprofen. Some of these medicines can raise blood pressure. · Learn how to check your blood pressure at home. Lifestyle changes  · Stay at a healthy weight. This is especially important if you put on weight around the waist. Losing even 10 pounds can help you lower your blood pressure. · If your doctor recommends it, get more exercise. Walking is a good choice. Bit by bit, increase the amount you walk every day. Try for at least 30 minutes on most days of the week. You also may want to swim, bike, or do other activities. · Avoid or limit alcohol. Talk to your doctor about whether you can drink any alcohol. · Try to limit how much sodium you eat to less than 2,300 milligrams (mg) a day. Your doctor may ask you to try to eat less than 1,500 mg a day. · Eat plenty of fruits (such as bananas and oranges), vegetables, legumes, whole grains, and low-fat dairy products. · Lower the amount of saturated fat in your diet. Saturated fat is found in animal products such as milk, cheese, and meat. Limiting these foods may help you lose weight and also lower your risk for heart disease. · Do not smoke. Smoking increases your risk for heart attack and stroke.  If you need help quitting, talk to your doctor about stop-smoking programs and medicines. These can increase your chances of quitting for good. When should you call for help? Call 911 anytime you think you may need emergency care. This may mean having symptoms that suggest that your blood pressure is causing a serious heart or blood vessel problem. Your blood pressure may be over 180/110.   For example, call 911 if:    · You have symptoms of a heart attack. These may include:  ¨ Chest pain or pressure, or a strange feeling in the chest.  ¨ Sweating. ¨ Shortness of breath. ¨ Nausea or vomiting. ¨ Pain, pressure, or a strange feeling in the back, neck, jaw, or upper belly or in one or both shoulders or arms. ¨ Lightheadedness or sudden weakness. ¨ A fast or irregular heartbeat.     · You have symptoms of a stroke. These may include:  ¨ Sudden numbness, tingling, weakness, or loss of movement in your face, arm, or leg, especially on only one side of your body. ¨ Sudden vision changes. ¨ Sudden trouble speaking. ¨ Sudden confusion or trouble understanding simple statements. ¨ Sudden problems with walking or balance. ¨ A sudden, severe headache that is different from past headaches.     · You have severe back or belly pain.    Do not wait until your blood pressure comes down on its own. Get help right away.   Call your doctor now or seek immediate care if:    · Your blood pressure is much higher than normal (such as 180/110 or higher), but you don't have symptoms.     · You think high blood pressure is causing symptoms, such as:  ¨ Severe headache. ¨ Blurry vision.    Watch closely for changes in your health, and be sure to contact your doctor if:    · Your blood pressure measures 140/90 or higher at least 2 times.  That means the top number is 140 or higher or the bottom number is 90 or higher, or both.     · You think you may be having side effects from your blood pressure medicine.     · Your blood pressure is usually normal, but it goes above normal at least 2 times. Where can you learn more? Go to http://sierra-ghanshyam.info/. Enter R351 in the search box to learn more about \"High Blood Pressure: Care Instructions. \"  Current as of: December 6, 2017  Content Version: 11.7  © 2056-6153 [employer]. Care instructions adapted under license by [employer] (which disclaims liability or warranty for this information). If you have questions about a medical condition or this instruction, always ask your healthcare professional. [address] any warranty or liability for your use of this information.

## 2019-02-05 ENCOUNTER — OFFICE VISIT (OUTPATIENT)
Dept: PRIMARY CARE CLINIC | Age: 54
End: 2019-02-05

## 2019-02-05 VITALS
BODY MASS INDEX: 40.32 KG/M2 | RESPIRATION RATE: 18 BRPM | SYSTOLIC BLOOD PRESSURE: 125 MMHG | HEIGHT: 68 IN | DIASTOLIC BLOOD PRESSURE: 83 MMHG | HEART RATE: 73 BPM | WEIGHT: 266 LBS | TEMPERATURE: 98.4 F | OXYGEN SATURATION: 95 %

## 2019-02-05 DIAGNOSIS — M79.672 LEFT FOOT PAIN: Primary | ICD-10-CM

## 2019-02-05 NOTE — PROGRESS NOTES
Subjective:      Ulisses Walton is a 48 y.o. female seen for evaluation and treatment of a left foot injury. This is evaluated as a personal injury. The injury was sustained several years ago, and occurred while unknown, cannot remember. She did not hear or sense a pop or snap at the time of the injury. The patient notes pain and no swelling since the injury.  She has injured this site in the past.    Patient Active Problem List   Diagnosis Code    Iron deficiency anemia D50.9    Gastritis K29.70    Allergic rhinitis J30.9    Hiatal hernia K44.9    Radiculopathy, cervical M54.12    Heart palpitations R00.2    Shoulder pain, right M25.511    Multiple thyroid nodules E04.2    Chronic insomnia F51.04    Vitamin D deficiency E55.9    Incidental lung nodule R91.1    Irritable bowel syndrome (IBS) K58.9    Gastrointestinal food allergy K52.29    Hot flashes R23.2    Nathalie-menopause N95.1    Chronic lower back pain M54.5, G89.29    Essential hypertension I10    Advanced care planning/counseling discussion Z71.89    Intractable migraine without aura and without status migrainosus G43.019    Allergic rhinitis due to allergen J30.9    Aspirin intolerance Z78.9    Obesity, Class III, BMI 40-49.9 (morbid obesity) ([facility]) E66.01    Hyperglycemia R73.9    High cholesterol E78.00    Urgency of urination R39.15     Patient Active Problem List    Diagnosis Date Noted    Urgency of urination 09/06/2018    Obesity, Class III, BMI 40-49.9 (morbid obesity) ([facility]) 06/01/2018    Hyperglycemia 06/01/2018    High cholesterol 06/01/2018    Aspirin intolerance 12/07/2017    Allergic rhinitis due to allergen 12/07/2016    Intractable migraine without aura and without status migrainosus 07/20/2016    Advanced care planning/counseling discussion 03/24/2016    Essential hypertension 06/03/2015    Chronic lower back pain     Nathalie-menopause 02/04/2015    Hot flashes 11/11/2014    Irritable bowel syndrome (IBS) 03/01/2014    Gastrointestinal food allergy 03/01/2014    Vitamin D deficiency 08/31/2013    Chronic insomnia 08/14/2013    Multiple thyroid nodules 03/05/2013    Shoulder pain, right     Incidental lung nodule 01/08/2013    Heart palpitations 02/01/2012    Hiatal hernia 04/03/2011    Iron deficiency anemia 05/27/2010    Gastritis 05/27/2010    Allergic rhinitis 05/27/2010    Radiculopathy, cervical 01/01/2010     Current Outpatient Medications   Medication Sig Dispense Refill    omeprazole (PRILOSEC) 20 mg capsule TAKE 1 CAPSULE BY MOUTH TWICE DAILY  Indications: Heartburn 60 Cap 5    cetirizine (ZYRTEC) 10 mg tablet Take  by mouth daily.  propranolol LA (INDERAL LA) 80 mg SR capsule Take 1 Cap by mouth two (2) times a day. Indications: MIGRAINE PREVENTION 180 Cap 3    valsartan-hydroCHLOROthiazide (DIOVAN-HCT) 80-12.5 mg per tablet Take 1 Tab by mouth daily. Indications: hypertension 90 Tab 3    acetaminophen (TYLENOL ARTHRITIS PAIN) 650 mg CR tablet Take 650 mg by mouth as needed for Pain.       propranolol LA (INDERAL LA) 80 mg SR capsule TAKE 1 TO 2 CAPSULES BY MOUTH EVERY DAY FOR HIGH BLOOD PRESSURE 180 Cap 1     Allergies   Allergen Reactions    Doxylamine Succinate Swelling     12.5-25 MG  HANDS, FACE/PERIORAL, FEET    Pcn [Penicillins] Hives    Ambien [Zolpidem] Nausea and Vomiting and Other (comments)     5 mg with Ativan 0.5 mg   TOO GROGGY, SLEPT 24 HOURS  7.5 MG FORGOT SHE WAS SLEEP EATING    Aspirin Nausea Only    Celebrex [Celecoxib] Other (comments)     TIGHT SQUEEZING IN CHEST  CHEST ACHED    Erythromycin Nausea and Vomiting    Milk Prot-Turm-Pepper-Pineappl Other (comments)     Multiple food allergies    Neurontin [Gabapentin] Other (comments)     300 mg  SEVERE LEG PAIN  EYE TWITCHING    Nsaids (Non-Steroidal Anti-Inflammatory Drug) Other (comments)     GI irritation       Past Medical History:   Diagnosis Date    Allergic rhinitis 5/27/2010    Cervical disc herniation 2010    C3-4, C4-5, C5-6. Hemangioma body of C5. Dr. João Guzman.  Chest pain 2011, 2012    Dr. Ascencion Sutherland. Dr. Rasheed [facility]; denies CP as of 3/27/14    Chronic lower back pain , 2016    thoracic DDD and spondylosis. DDD L3-S1. Dr. Elpidio Burns. [place]. Dr. Solomon Quiroz, [facility] [place]. Cam Beaver.  Esophagitis, reflux 4/3/2011    Gallstone     Gastritis 2010    Dr. Marisa Christine.  Gastrointestinal food allergy 2014    Multiple. Dr. John Strickland.    Heart palpitations 2012    DUE TO PAC'S AND PVC'S. Dr. Maxi Burgos.  Hiatal hernia 4/3/2011    HTN (hypertension)     Impaired glucose tolerance 10/15/2010    Incidental lung nodule 13    LLL 3.9 mm, RML 3.1 mm;  as of 3/27/14 per pt stable w/o growth    Insomnia     Iron deficiency anemia 2010    Irritable bowel syndrome (IBS) 2014    Dr. Jamie Morales.    Knee pain     Right. due to severe OA. / chipped bone     Metatarsal bone fracture     Left fifth.  Migraine 2011    Dr. Mathew Pert, cervical 2010    Left. Dr. João Guzman.  Shoulder pain, right     due to [address] X 2. Dr. Milana Melgar.  Thyroid nodule     Dr. Justine Arzate    Toe fracture, left     fifth toe.  Triangular fibrocartilage complex tear     Dr. Hillary Correa. Left. Past Surgical History:   Procedure Laterality Date    HX  SECTION  1995    x 1    HX CHOLECYSTECTOMY      HX COLONOSCOPY  14    Dr. Caitlin Cárdenas; 14    due to abnormal PAP.  HX DILATION AND CURETTAGE      due to miscarriage.  HX ENDOSCOPY      HX ORTHOPAEDIC Right 2016    LUMBAR L4-5, L5-S1 ELLY.   Dr. Butcher [address] Osteopathy     Family History   Problem Relation Age of Onset    Diabetes Mother     Heart Disease Mother 58        2 stents    Hypertension Mother     Diabetes Father     Stroke Maternal Grandmother     Cancer Maternal Uncle         prostate     Social History     Tobacco Use    Smoking status: Never Smoker    Smokeless tobacco: Never Used   Substance Use Topics    Alcohol use: No        Objective:     Visit Vitals  /83 (BP 1 Location: Left arm, BP Patient Position: Sitting)   Pulse 73   Temp 98.4 °F (36.9 °C) (Oral)   Resp 18   Ht 5' 8\" (1.727 m)   Wt 266 lb (120.7 kg)   SpO2 95%   BMI 40.45 kg/m²      Appearance: alert, well appearing, and in no distress, oriented to person, place, and time, overweight, acyanotic, in no respiratory distress and well hydrated. Musculoskeletal exam: abnormal exam of left lateral foot, there is a minimal amount of swelling noted, no deformity, cap refill less than 2 seconds, patient ambulates with a steady gait. Imaging  is performed, appears normal - no fracture    Assessment/Plan:       ICD-10-CM ICD-9-CM    1. Left foot pain M79.672 729.5 XR FOOT LT MIN 3 V      REFERRAL TO ORTHOPEDICS     The patient is advised to rest and elevate affected extremity. Visit Vitals  /83 (BP 1 Location: Left arm, BP Patient Position: Sitting)   Pulse 73   Temp 98.4 °F (36.9 °C) (Oral)   Resp 18   Ht 5' 8\" (1.727 m)   Wt 266 lb (120.7 kg)   SpO2 95%   BMI 40.45 kg/m²     Spoke with the patient regarding their blood pressure (BP) reading at today's visit. The patient verbalized understanding of need to maintain BP lower than 140/90. The patient will follow up with their primary care physician regarding management and/or medications that may be needed. Drepssion Screening has been completed. The patient isdenies having a history of depression. The patient is nottaking medication and is being followed by their PCP at this time. This patient does  have a primary care physician. Referral was not given a referral at todays visit. Immunizations: The patient is current on their influenza immunization at this time.   The patient does not   want to receive the influenza immunization today. Follow up instructions given at today's visit were verbalized by the patient/parent. The signs of infection are fever > 100.4, increase fatigue, change in mental status, or decrease in urinary output. The patient/parent verbalized understanding of taking medications prescribed during this visit as prescribed.

## 2019-02-05 NOTE — PROGRESS NOTES
Pt c/o L foot pain-outside of L foot x1mo. Pt states pain is worsening. Pt states pain is sharp when ambulating but throbbing at rest.    Pt h/o fx'd L foot-lower than the spot of pain. Pt trying aerobic exercises but it hurts. Pt has tried tylenol and wraps-slight alleviation.

## 2019-02-06 NOTE — PATIENT INSTRUCTIONS
Foot Pain: Care Instructions  Your Care Instructions  Foot injuries that cause pain and swelling are fairly common. Almost all sports or home repair projects can cause a misstep that ends up as foot pain. Normal wear and tear, especially as you get older, also can cause foot pain. Most minor foot injuries will heal on their own, and home treatment is usually all you need to do. If you have a severe injury, you may need tests and treatment. Follow-up care is a key part of your treatment and safety. Be sure to make and go to all appointments, and call your doctor if you are having problems. It's also a good idea to know your test results and keep a list of the medicines you take. How can you care for yourself at home? · Take pain medicines exactly as directed. ? If the doctor gave you a prescription medicine for pain, take it as prescribed. ? If you are not taking a prescription pain medicine, ask your doctor if you can take an over-the-counter medicine. · Rest and protect your foot. Take a break from any activity that may cause pain. · Put ice or a cold pack on your foot for 10 to 20 minutes at a time. Put a thin cloth between the ice and your skin. · Prop up the sore foot on a pillow when you ice it or anytime you sit or lie down during the next 3 days. Try to keep it above the level of your heart. This will help reduce swelling. · Your doctor may recommend that you wrap your foot with an elastic bandage. Keep your foot wrapped for as long as your doctor advises. · If your doctor recommends crutches, use them as directed. · Wear roomy footwear. · As soon as pain and swelling end, begin gentle exercises of your foot. Your doctor can tell you which exercises will help. When should you call for help? Call 911 anytime you think you may need emergency care.  For example, call if:    · Your foot turns pale, white, blue, or cold.    Call your doctor now or seek immediate medical care if:    · You cannot move or stand on your foot.     · Your foot looks twisted or out of its normal position.     · Your foot is not stable when you step down.     · You have signs of infection, such as:  ? Increased pain, swelling, warmth, or redness. ? Red streaks leading from the sore area. ? Pus draining from a place on your foot. ? A fever.     · Your foot is numb or tingly.    Watch closely for changes in your health, and be sure to contact your doctor if:    · You do not get better as expected.     · You have bruises from an injury that last longer than 2 weeks. Where can you learn more? Go to http://sierra-ghanshyam.info/. Enter B766 in the search box to learn more about \"Foot Pain: Care Instructions. \"  Current as of: September 20, 2018  Content Version: 11.9  © 6992-5322 [employer]. Care instructions adapted under license by [employer] (which disclaims liability or warranty for this information). If you have questions about a medical condition or this instruction, always ask your healthcare professional. [place] [address] any warranty or liability for your use of this information.

## 2019-04-06 ENCOUNTER — APPOINTMENT (OUTPATIENT)
Dept: GENERAL RADIOLOGY | Age: 54
End: 2019-04-06
Attending: PHYSICIAN ASSISTANT
Payer: COMMERCIAL

## 2019-04-06 ENCOUNTER — HOSPITAL ENCOUNTER (EMERGENCY)
Age: 54
Discharge: HOME | End: 2019-04-06
Attending: EMERGENCY MEDICINE
Payer: COMMERCIAL

## 2019-04-06 VITALS
OXYGEN SATURATION: 97 % | HEART RATE: 72 BPM | DIASTOLIC BLOOD PRESSURE: 112 MMHG | HEIGHT: 68 IN | BODY MASS INDEX: 40.32 KG/M2 | WEIGHT: 266 LBS | TEMPERATURE: 98.1 F | RESPIRATION RATE: 16 BRPM | SYSTOLIC BLOOD PRESSURE: 159 MMHG

## 2019-04-06 DIAGNOSIS — S61.012A LACERATION OF LEFT THUMB WITHOUT FOREIGN BODY WITHOUT DAMAGE TO NAIL, INITIAL ENCOUNTER: Primary | ICD-10-CM

## 2019-04-06 PROCEDURE — 75810000293 HC SIMP/SUPERF WND  RPR

## 2019-04-06 PROCEDURE — 99283 EMERGENCY DEPT VISIT LOW MDM: CPT

## 2019-04-06 PROCEDURE — 77030031132 HC SUT NYL COVD -A

## 2019-04-06 PROCEDURE — 73140 X-RAY EXAM OF FINGER(S): CPT

## 2019-04-06 PROCEDURE — 74011000250 HC RX REV CODE- 250: Performed by: PHYSICIAN ASSISTANT

## 2019-04-06 PROCEDURE — 90471 IMMUNIZATION ADMIN: CPT

## 2019-04-06 PROCEDURE — 74011250637 HC RX REV CODE- 250/637: Performed by: PHYSICIAN ASSISTANT

## 2019-04-06 PROCEDURE — 74011250636 HC RX REV CODE- 250/636: Performed by: PHYSICIAN ASSISTANT

## 2019-04-06 PROCEDURE — 77030018836 HC SOL IRR NACL ICUM -A

## 2019-04-06 PROCEDURE — 90715 TDAP VACCINE 7 YRS/> IM: CPT | Performed by: PHYSICIAN ASSISTANT

## 2019-04-06 RX ORDER — BACITRACIN 500 UNIT/G
1 PACKET (EA) TOPICAL
Status: COMPLETED | OUTPATIENT
Start: 2019-04-06 | End: 2019-04-06

## 2019-04-06 RX ORDER — CEPHALEXIN 500 MG/1
500 CAPSULE ORAL 3 TIMES DAILY
Qty: 21 CAP | Refills: 0 | Status: SHIPPED | OUTPATIENT
Start: 2019-04-06 | End: 2019-04-13

## 2019-04-06 RX ORDER — HYDROCODONE BITARTRATE AND ACETAMINOPHEN 5; 325 MG/1; MG/1
1 TABLET ORAL
Qty: 5 TAB | Refills: 0 | Status: SHIPPED | OUTPATIENT
Start: 2019-04-06 | End: 2019-04-08

## 2019-04-06 RX ORDER — LIDOCAINE HYDROCHLORIDE 10 MG/ML
10 INJECTION INFILTRATION; PERINEURAL ONCE
Status: DISCONTINUED | OUTPATIENT
Start: 2019-04-06 | End: 2019-04-06 | Stop reason: SDUPTHER

## 2019-04-06 RX ORDER — LIDOCAINE HYDROCHLORIDE 10 MG/ML
10 INJECTION, SOLUTION EPIDURAL; INFILTRATION; INTRACAUDAL; PERINEURAL ONCE
Status: COMPLETED | OUTPATIENT
Start: 2019-04-06 | End: 2019-04-06

## 2019-04-06 RX ORDER — HYDROCODONE BITARTRATE AND ACETAMINOPHEN 5; 325 MG/1; MG/1
1 TABLET ORAL
Status: COMPLETED | OUTPATIENT
Start: 2019-04-06 | End: 2019-04-06

## 2019-04-06 RX ADMIN — HYDROCODONE BITARTRATE AND ACETAMINOPHEN 1 TABLET: 5; 325 TABLET ORAL at 12:29

## 2019-04-06 RX ADMIN — BACITRACIN 1 PACKET: 500 OINTMENT TOPICAL at 13:26

## 2019-04-06 RX ADMIN — LIDOCAINE HYDROCHLORIDE 10 ML: 10 INJECTION, SOLUTION EPIDURAL; INFILTRATION; INTRACAUDAL; PERINEURAL at 12:29

## 2019-04-06 RX ADMIN — TETANUS TOXOID, REDUCED DIPHTHERIA TOXOID AND ACELLULAR PERTUSSIS VACCINE, ADSORBED 0.5 ML: 5; 2.5; 8; 8; 2.5 SUSPENSION INTRAMUSCULAR at 12:29

## 2019-04-06 NOTE — ED PROVIDER NOTES
46 y/o female with PMHx of HTN, impaired glucose tolerance, gastritis, hiatal hernia, anemia, migraines, gallstones, IBS and thyroid nodule, presenting with complaint of laceration. The patient states that she was cutting her hair with a razor just prior to arrival and accidentally cut her left thumb. She immediately put pressure on it and drove to the ED. Her pain is 10/10, sharp, radiating toward her arm. She has not taken anything to relieve the pain. Last tetanus was in 2012. She reports some nausea but denies weakness, numbness or vomiting. The history is provided by the patient. Past Medical History:  
Diagnosis Date  Allergic rhinitis 5/27/2010  Cervical disc herniation 01/2010 C3-4, C4-5, C5-6. Hemangioma body of C5. Dr. Juanjo Coffman.  Chest pain 08/2011, 02/2012 Dr. Татьяна Diego. Dr. Anthony Babb; denies CP as of 3/27/14  Chronic lower back pain 2010, 03/2016 thoracic DDD and spondylosis. DDD L3-S1. Dr. Geoff Louis. Nia William. Dr. Kane Williamson, [facility] [place]. Dr. Puma Huggins.  Esophagitis, reflux 4/3/2011  Gallstone  Gastritis 5/27/2010 Dr. Claudeen Noa.  Gastrointestinal food allergy 03/2014 Multiple. Dr. Nomi Raza.  
 Heart palpitations 02/2012 DUE TO PAC'S AND PVC'S. Dr. Karen Fulton.  Hiatal hernia 4/3/2011  
 HTN (hypertension) 1987  Impaired glucose tolerance 10/15/2010  Incidental lung nodule 01/08/13 LLL 3.9 mm, RML 3.1 mm;  as of 3/27/14 per pt stable w/o growth  Insomnia 1990s  Iron deficiency anemia 5/27/2010  Irritable bowel syndrome (IBS) 03/2014 Dr. Nadja Sharp.  
 Knee pain 2012 Right. due to severe OA. / chipped bone  Metatarsal bone fracture 1990 Left fifth.  Migraine 2/22/2011 Dr. Courtney Flair  Radiculopathy, cervical 01/2010 Left. Dr. Juanjo Coffman.  Shoulder pain, right 2012  
 due to [address] X 2. Dr. Holly Cruz.  Thyroid nodule 2011 Dr. Adriana Carrera  Toe fracture, left 2008 fifth toe.  Triangular fibrocartilage complex tear 2012 Dr. Ruby Lan. Left. Past Surgical History:  
Procedure Laterality Date [address]  
 x [address]  HX COLONOSCOPY  03/31/14 Dr. Lydia Golden; 03/31/14  
 due to abnormal PAP.  HX DILATION AND CURETTAGE  1990s  
 due to miscarriage.  HX ENDOSCOPY    
 HX ORTHOPAEDIC Right 06/2016 LUMBAR L4-5, L5-S1 ELLY. Dr. Di Mooney [facility] Family History:  
Problem Relation Age of Onset  Diabetes Mother  Heart Disease Mother 58  
     2 stents  Hypertension Mother  Diabetes Father  Stroke Maternal Grandmother  Cancer Maternal Uncle   
     prostate Social History Socioeconomic History  Marital status:  Spouse name: Not on file  Number of children: 3  
 Years of education: Not on file  Highest education level: Not on file Occupational History Velta Ganser Occupation: Medical assistant Employer: [employer] Social Needs  Financial resource strain: Not on file  Food insecurity:  
  Worry: Not on file Inability: Not on file  Transportation needs:  
  Medical: Not on file Non-medical: Not on file Tobacco Use  Smoking status: Never Smoker  Smokeless tobacco: Never Used Substance and Sexual Activity  Alcohol use: No  
 Drug use: No  
 Sexual activity: Yes  
  Partners: Male Lifestyle  Physical activity:  
  Days per week: Not on file Minutes per session: Not on file  Stress: Not on file Relationships  Social connections:  
  Talks on phone: Not on file Gets together: Not on file Attends Restorationism service: Not on file Active member of club or organization: Not on file Attends meetings of clubs or organizations: Not on file Relationship status: Not on file  Intimate partner violence: Fear of current or ex partner: Not on file Emotionally abused: Not on file Physically abused: Not on file Forced sexual activity: Not on file Other Topics Concern  Not on file Social History Narrative  Not on file ALLERGIES: Doxylamine succinate; Pcn [penicillins]; Ambien [zolpidem]; Aspirin; Celebrex [celecoxib]; Erythromycin; Milk prot-turm-pepper-pineappl; Neurontin [gabapentin]; and Nsaids (non-steroidal anti-inflammatory drug) Review of Systems Constitutional: Negative for chills and fever. Respiratory: Negative for shortness of breath. Cardiovascular: Negative for chest pain. Gastrointestinal: Positive for nausea. Negative for vomiting. Musculoskeletal: Negative for myalgias. + left thumb pain Skin: Positive for wound. Neurological: Negative for weakness and numbness. All other systems reviewed and are negative. Vitals:  
 04/06/19 1130 04/06/19 1157 BP:  (!) 176/108 Pulse: 82 78 Resp:  16 Temp:  98.1 °F (36.7 °C) SpO2: 99% 96% Weight:  120.7 kg (266 lb) Height:  5' 8\" (1.727 m) Physical Exam  
Constitutional: She is oriented to person, place, and time. She appears well-developed and well-nourished. No distress. HENT:  
Head: Normocephalic and atraumatic. Eyes: Conjunctivae and EOM are normal.  
Neck: Normal range of motion. Neck supple. Cardiovascular: Normal rate. Pulmonary/Chest: Effort normal. No respiratory distress. Neurological: She is alert and oriented to person, place, and time. Skin: Skin is warm and dry. She is not diaphoretic. Left thumb with laceration over dorsal aspect of proximal phalanx, approx 2 cm long, skin edges not well approximated. Tip of thumb is warm with intact light touch sensation. Nursing note and vitals reviewed. MDM Number of Diagnoses or Management Options Laceration of left thumb without foreign body without damage to nail, initial encounter: Amount and/or Complexity of Data Reviewed Tests in the radiology section of CPT®: ordered and reviewed Independent visualization of images, tracings, or specimens: yes (XR left thumb) Patient Progress Patient progress: stable Wound Repair 
Date/Time: 4/6/2019 1:31 PM 
Preparation: skin prepped with Betadine Location details: left thumb Wound length:2.5 cm or less Anesthesia: 
Local Anesthetic: lidocaine 1% without epinephrine Anesthetic total: 3 mL Foreign bodies: no foreign bodies Irrigation solution: saline Irrigation method: syringe Debridement: none Skin closure: 4-0 nylon Number of sutures: 5 Technique: simple and interrupted Approximation: close Dressing: antibiotic ointment (bandaid) Patient tolerance: Patient tolerated the procedure well with no immediate complications My total time at bedside, performing this procedure was 1-15 minutes. 46 y/o female with PMHx of HTN, impaired glucose tolerance, gastritis, hiatal hernia, anemia, migraines, gallstones, IBS and thyroid nodule, presenting with complaint of laceration. XR left thumb, read by radiology and independently visualized and interpreted by myself, reveals no evidence of acute fractures, foreign bodies or traumatic malalignment. No neurovascular compromise. Laceration repair performed, see procedure note above for details. Safe for discharge home with Rx for keflex and norco, instructions for PCP or ED follow up in 7-10 days for suture removal. Immediate ED return precautions discussed and provided in writing at time of discharge. The patient verbalized understanding and agreement with this plan.

## 2019-04-06 NOTE — DISCHARGE INSTRUCTIONS
Patient Education        Cuts on the Hand Closed With Stitches: Care Instructions  Your Care Instructions    A cut on your hand can be on your fingers, your thumb, or the front or back of your hand. Sometimes a cut can injure the tendons, blood vessels, or nerves of your hand. The doctor used stitches to close the cut. Using stitches also helps the cut heal and reduces scarring. The doctor may have given you a splint to help prevent you from moving your hand, fingers, or thumb. If the cut went deep and through the skin, the doctor put in two layers of stitches. The deeper layer brings the deep part of the cut together. These stitches will dissolve and don't need to be removed. The stitches in the upper layer are the ones you see on the cut. You will probably have a bandage. You will need to have the stitches removed, usually in 7 to 14 days. The doctor may suggest that you see a hand specialist if the cut is very deep or if you have trouble moving your fingers or have less feeling in your hand. The doctor has checked you carefully, but problems can develop later. If you notice any problems or new symptoms, get medical treatment right away. Follow-up care is a key part of your treatment and safety. Be sure to make and go to all appointments, and call your doctor if you are having problems. It's also a good idea to know your test results and keep a list of the medicines you take. How can you care for yourself at home? · Keep the cut dry for the first 24 to 48 hours. After this, you can shower if your doctor okays it. Pat the cut dry. · Don't soak the cut, such as in a bathtub. Your doctor will tell you when it's safe to get the cut wet. · If your doctor told you how to care for your cut, follow your doctor's instructions. If you did not get instructions, follow this general advice:  ? After the first 24 to 48 hours, wash around the cut with clean water 2 times a day.  Don't use hydrogen peroxide or alcohol, which can slow healing. ? You may cover the cut with a thin layer of petroleum jelly, such as Vaseline, and a nonstick bandage. ? Apply more petroleum jelly and replace the bandage as needed. · Prop up the sore hand on a pillow anytime you sit or lie down during the next 3 days. Try to keep it above the level of your heart. This will help reduce swelling. · Avoid any activity that could cause your cut to reopen. · Do not remove the stitches on your own. Your doctor will tell you when to come back to have the stitches removed. · Be safe with medicines. Take pain medicines exactly as directed. ? If the doctor gave you a prescription medicine for pain, take it as prescribed. ? If you are not taking a prescription pain medicine, ask your doctor if you can take an over-the-counter medicine. When should you call for help? Call your doctor now or seek immediate medical care if:    · You have new pain, or your pain gets worse.     · The skin near the cut is cold or pale or changes color.     · You have tingling, weakness, or numbness near the cut.     · The cut starts to bleed, and blood soaks through the bandage. Oozing small amounts of blood is normal.     · You have trouble moving the area of the hand near the cut.     · You have symptoms of infection, such as:  ? Increased pain, swelling, warmth, or redness around the cut.  ? Red streaks leading from the cut.  ? Pus draining from the cut.  ? A fever.    Watch closely for changes in your health, and be sure to contact your doctor if:    · You do not get better as expected. Where can you learn more? Go to http://sierra-ghanshyam.info/. Enter T250 in the search box to learn more about \"Cuts on the Hand Closed With Stitches: Care Instructions. \"  Current as of: September 23, 2018  Content Version: 11.9  © 8947-3966 [employer].  Care instructions adapted under license by [employer] (which disclaims liability or warranty for this information). If you have questions about a medical condition or this instruction, always ask your healthcare professional. [place] [address] any warranty or liability for your use of this information.

## 2019-04-06 NOTE — ED TRIAGE NOTES
Pt arrives ambulatory with  d/t left thumb laceration PTA. \"I sliced my thumb with a sharp blade while cutting my hair\". Bleeding controlled.

## 2019-04-18 ENCOUNTER — TELEPHONE (OUTPATIENT)
Dept: FAMILY MEDICINE CLINIC | Age: 54
End: 2019-04-18

## 2019-04-18 NOTE — TELEPHONE ENCOUNTER
Spoke to patient. Verified with two patient identifiers. Patient wanted complete lab panel before her appointment on Monday 4/22/2019. Patient wanted her lab requisitions faxed to her. FAX # [phone]. Patient would like all labs related to her conditions. DM, TSh 3rd gen, CVD report, Hem A1C, Mirocalbumin, CBC w/o diff, Urine w/rflsx, Metabolic panel, vitam d, 25 hydrox, iron. Writer stated to patient a request for labs to be placed will be forwarded to Dr. Barb Patton. Patient verbalized understanding of information, and has no further questions at this time.

## 2019-04-22 ENCOUNTER — OFFICE VISIT (OUTPATIENT)
Dept: FAMILY MEDICINE CLINIC | Age: 54
End: 2019-04-22

## 2019-04-22 VITALS
DIASTOLIC BLOOD PRESSURE: 93 MMHG | HEIGHT: 68 IN | OXYGEN SATURATION: 97 % | BODY MASS INDEX: 40.45 KG/M2 | TEMPERATURE: 98.7 F | RESPIRATION RATE: 18 BRPM | HEART RATE: 73 BPM | SYSTOLIC BLOOD PRESSURE: 137 MMHG

## 2019-04-22 DIAGNOSIS — G89.29 CHRONIC PAIN OF RIGHT KNEE: ICD-10-CM

## 2019-04-22 DIAGNOSIS — I10 ESSENTIAL HYPERTENSION: ICD-10-CM

## 2019-04-22 DIAGNOSIS — R12 HEARTBURN: ICD-10-CM

## 2019-04-22 DIAGNOSIS — R42 DIZZINESS: ICD-10-CM

## 2019-04-22 DIAGNOSIS — D50.9 IRON DEFICIENCY ANEMIA, UNSPECIFIED IRON DEFICIENCY ANEMIA TYPE: ICD-10-CM

## 2019-04-22 DIAGNOSIS — J30.1 SEASONAL ALLERGIC RHINITIS DUE TO POLLEN: ICD-10-CM

## 2019-04-22 DIAGNOSIS — R73.9 HYPERGLYCEMIA: ICD-10-CM

## 2019-04-22 DIAGNOSIS — E55.9 VITAMIN D DEFICIENCY: ICD-10-CM

## 2019-04-22 DIAGNOSIS — I10 ESSENTIAL HYPERTENSION: Primary | ICD-10-CM

## 2019-04-22 DIAGNOSIS — M25.561 CHRONIC PAIN OF RIGHT KNEE: ICD-10-CM

## 2019-04-22 DIAGNOSIS — N93.8 DUB (DYSFUNCTIONAL UTERINE BLEEDING): ICD-10-CM

## 2019-04-22 DIAGNOSIS — E04.2 MULTIPLE THYROID NODULES: ICD-10-CM

## 2019-04-22 DIAGNOSIS — Z12.39 BREAST CANCER SCREENING: ICD-10-CM

## 2019-04-22 DIAGNOSIS — E78.00 HIGH CHOLESTEROL: ICD-10-CM

## 2019-04-22 DIAGNOSIS — Z78.9 ASPIRIN INTOLERANCE: ICD-10-CM

## 2019-04-22 DIAGNOSIS — G43.109 MIGRAINE WITH AURA AND WITHOUT STATUS MIGRAINOSUS, NOT INTRACTABLE: ICD-10-CM

## 2019-04-22 RX ORDER — MONTELUKAST SODIUM 10 MG/1
10 TABLET ORAL DAILY
Qty: 30 TAB | Refills: 11 | Status: SHIPPED | OUTPATIENT
Start: 2019-04-22 | End: 2019-09-20

## 2019-04-22 RX ORDER — MECLIZINE HCL 12.5 MG 12.5 MG/1
12.5 TABLET ORAL
Qty: 30 TAB | Refills: 1 | Status: SHIPPED | OUTPATIENT
Start: 2019-04-22 | End: 2019-05-02

## 2019-04-22 RX ORDER — VALSARTAN AND HYDROCHLOROTHIAZIDE 80; 12.5 MG/1; MG/1
1 TABLET, FILM COATED ORAL DAILY
Qty: 90 TAB | Refills: 3 | Status: SHIPPED | OUTPATIENT
Start: 2019-04-22 | End: 2020-06-02 | Stop reason: SDUPTHER

## 2019-04-22 RX ORDER — OMEPRAZOLE 20 MG/1
CAPSULE, DELAYED RELEASE ORAL
Qty: 60 CAP | Refills: 5 | Status: SHIPPED | OUTPATIENT
Start: 2019-04-22 | End: 2020-06-02 | Stop reason: SDUPTHER

## 2019-04-22 RX ORDER — PROPRANOLOL HYDROCHLORIDE 80 MG/1
80 CAPSULE, EXTENDED RELEASE ORAL DAILY
Qty: 90 CAP | Refills: 3 | Status: SHIPPED | OUTPATIENT
Start: 2019-04-22 | End: 2020-06-02 | Stop reason: SDUPTHER

## 2019-04-22 NOTE — PROGRESS NOTES
HISTORY OF PRESENT ILLNESS Boby Hart is a 47 y.o. female presents with Hypertension (Rm 14/ nonfasting, 6 mos F/U); Labs; Thyroid Problem; Referral Request; Allergies; Dizziness; and Medication Refill Agree with nurse note. Pt with hypertension, hyperglycemia, high cholesterol, iron deficiency anemia, aspirin intolerance, and vit d deficiency presents to the office with a BP of 137/93. For BP, she takes Diovan-HCT 80-12.5 mg daily, tolerating well. She requests a refill today. She would like a hard copy of her lab orders. Pt with multiple thyroid nodules is followed by endocrinologist, Dr. Alexei Will. Pt with migraines. She uses Inderal 80 mg daily and she requests a refill today. Pt with heartburn uses Prilosec 20 mg and she requests a refill today. She had not had her menstrual cycle in 1 year and then had it once in 1/2019. She has not had it since then. She plans to f/u with GYN NP Rachel Bennett. Pt with chronic R knee pain due to bone on bone arthritis. It has been locking up. She requests a referral to ortho today. Pt with seasonal allergies. She had experienced room spinning dizziness x3 days and wonders if it is related. She has itchy and watery eyes, pressure behind eyes, dry cough. She was using Cetirizine hydrochloride 10 mg with relief but wonders if it is making her sleepy. Denies SOB, chest pain, or chest tightness. Health Maintenance Pt's most recent mammogram was on 5/16/2012, normal findings. F/U 1 year. She is agreeable with an order today. Written by caty Choi, as dictated by Dr. Ashley Garay DO. 
ROS Review of Systems negative except as noted above in HPI. ALLERGIES:   
Allergies Allergen Reactions  Doxylamine Succinate Swelling 12.5-25 MG 
HANDS, FACE/PERIORAL, FEET  
 Pcn [Penicillins] Hives  Ambien [Zolpidem] Nausea and Vomiting and Other (comments)   5 mg with Ativan 0.5 mg  
 TOO GROGGY, SLEPT 24 HOURS 
7.5 MG FORGOT SHE WAS SLEEP EATING  Aspirin Nausea Only  Celebrex [Celecoxib] Other (comments) TIGHT SQUEEZING IN CHEST 
CHEST ACHED  Erythromycin Nausea and Vomiting  Milk Prot-Turm-Pepper-Pineappl Other (comments) Multiple food allergies  Neurontin [Gabapentin] Other (comments) 300 mg SEVERE LEG PAIN 
EYE TWITCHING  
 Nsaids (Non-Steroidal Anti-Inflammatory Drug) Other (comments) GI irritation CURRENT MEDICATIONS:   
Outpatient Medications Marked as Taking for the 4/22/19 encounter (Office Visit) with Kinsey Stuyvesant, DO Medication Sig Dispense Refill  propranolol LA (INDERAL LA) 80 mg SR capsule Take 1 Cap by mouth daily. Indications: Migraine Prevention 90 Cap 3  
 valsartan-hydroCHLOROthiazide (DIOVAN-HCT) 80-12.5 mg per tablet Take 1 Tab by mouth daily. Indications: high blood pressure 90 Tab 3  
 meclizine (ANTIVERT) 12.5 mg tablet Take 1 Tab by mouth three (3) times daily as needed for Dizziness or Nausea for up to 10 days. Indications: sensation of spinning or whirling 30 Tab 1  
 montelukast (SINGULAIR) 10 mg tablet Take 1 Tab by mouth daily. Indications: inflammation of the nose due to an allergy 30 Tab 11  
 omeprazole (PRILOSEC) 20 mg capsule TAKE 1 CAPSULE BY MOUTH TWICE DAILY  Indications: heartburn 60 Cap 5  
 acetaminophen (TYLENOL ARTHRITIS PAIN) 650 mg CR tablet Take 650 mg by mouth as needed for Pain. PAST MEDICAL HISTORY:   
Past Medical History:  
Diagnosis Date  Allergic rhinitis 5/27/2010  Cervical disc herniation 01/2010 C3-4, C4-5, C5-6. Hemangioma body of C5. Dr. Kisha Bustamante.  Chest pain 08/2011, 02/2012 Dr. Nadia Arzate. Dr. Tadeo Knee; denies CP as of 3/27/14  Chronic lower back pain 2010, 03/2016 thoracic DDD and spondylosis. DDD L3-S1. Dr. Moises Duran. Kyara Santos. Dr. Marcelina Frost, [facility] [place]. Dr. Page Rangel.  Esophagitis, reflux 4/3/2011  Gallstone  Gastritis 5/27/2010 Dr. Millicent Schneider.  Gastrointestinal food allergy 03/2014 Multiple. Dr. Katia Nassar.  
 Heart palpitations 02/2012 DUE TO PAC'S AND PVC'S. Dr. Scott Chacon.  Hiatal hernia 4/3/2011  
 HTN (hypertension) 1987  Impaired glucose tolerance 10/15/2010  Incidental lung nodule 01/08/13 LLL 3.9 mm, RML 3.1 mm;  as of 3/27/14 per pt stable w/o growth  Insomnia 1990s  Iron deficiency anemia 5/27/2010  Irritable bowel syndrome (IBS) 03/2014 Dr. Connor Campos.  
 Knee pain 2012 Right. due to severe OA. / chipped bone  Metatarsal bone fracture 1990 Left fifth.  Migraine 2/22/2011 Dr. Bea Pierre  Radiculopathy, cervical 01/2010 Left. Dr. Maddy Polo.  Shoulder pain, right 2012  
 due to [address] X 2. Dr. Fransisco Closs.  Thyroid nodule 2011 Dr. Tiff Amaya  Toe fracture, left 2008 fifth toe.  Triangular fibrocartilage complex tear 2012 Dr. Eliot Simon. Left. PAST SURGICAL HISTORY:   
Past Surgical History:  
Procedure Laterality Date [address]  
 x [address]  HX COLONOSCOPY  03/31/14 Dr. Vargas Favorite; 03/31/14  
 due to abnormal PAP.  HX DILATION AND CURETTAGE  1990s  
 due to miscarriage.  HX ENDOSCOPY    
 HX ORTHOPAEDIC Right 06/2016 LUMBAR L4-5, L5-S1 ELLY. Dr. Lawrence Estrella [address] [facility] FAMILY HISTORY:   
Family History Problem Relation Age of Onset  Diabetes Mother  Heart Disease Mother 58  
     2 stents  Hypertension Mother  Diabetes Father  Stroke Maternal Grandmother  Cancer Maternal Uncle   
     prostate SOCIAL HISTORY:   
Social History Socioeconomic History  Marital status:  Spouse name: Not on file  Number of children: 3  
 Years of education: Not on file  Highest education level: Not on file Occupational History [employer] Occupation: Medical assistant Employer: [employer] Tobacco Use  Smoking status: Never Smoker  Smokeless tobacco: Never Used Substance and Sexual Activity  Alcohol use: No  
 Drug use: No  
 Sexual activity: Yes  
  Partners: Male IMMUNIZATIONS:   
Immunization History Administered Date(s) Administered  Influenza Vaccine 10/30/2015, 10/31/2017  Influenza Vaccine Whole 10/01/2010  TDAP Vaccine 08/10/2012  Td, Adsorbed 10/09/2016  Tdap 04/06/2019 PHYSICAL EXAMINATION Vital Signs Visit Vitals BP (!) 137/93 (BP 1 Location: Right arm, BP Patient Position: Sitting) Pulse 73 Temp 98.7 °F (37.1 °C) (Oral) Resp 18 Ht 5' 8\" (1.727 m) SpO2 97% BMI 40.45 kg/m² Weight Metrics 4/22/2019 4/6/2019 2/5/2019 9/6/2018 7/19/2018 6/1/2018 5/24/2018 Weight - 266 lb 266 lb 263 lb 6.4 oz 269 lb 12.8 oz 286 lb 14.4 oz 288 lb 9.6 oz  
BMI 40.45 kg/m2 40.45 kg/m2 40.45 kg/m2 40.05 kg/m2 41.02 kg/m2 44.93 kg/m2 45.2 kg/m2 General appearance - Well nourished. Well appearing. Well developed. No acute distress. Obese. Head - Normocephalic. Atraumatic. Eyes - pupils equal and reactive. Extraocular eye movements intact. Sclera anicteric. Mildly injected sclera. Ears - Hearing is grossly normal bilaterally. Nose - normal and patent. No polyps noted. No erythema. No discharge. Mouth - mucous membranes with adequate moisture. Posterior pharynx normal with cobblestone appearance. No erythema, white exudate or obstruction. Neck - supple. Midline trachea. No carotid bruits noted bilaterally. No thyromegaly noted. Chest - clear to auscultation bilaterally anteriorly and posteriorly. No wheezes. No rales or rhonchi. Breath sounds are symmetrical bilaterally. Unlabored respirations. Heart - normal rate. Regular rhythm. Normal S1, S2. No murmur noted. No rubs, clicks or gallops noted. Abdomen - soft and distended. No masses or organomegaly.   No rebound, rigidity or guarding. Bowel sounds normal x 4 quadrants. No tenderness noted. Neurological - awake, alert and oriented to person, place, and time and event. Cranial nerves II through XII intact. Clear speech. Muscle strength is +5/5 x 4 extremities. Sensation is intact to light touch bilaterally. Steady gait. Heme/Lymph - peripheral pulses normal x 4 extremities. No peripheral edema is noted. Musculoskeletal - Intact x 4 extremities. Full ROM x 4 extremities. No pain with movement. Back exam - normal range of motion. No pain on palpation of the spinous processes in the cervical, thoracic, lumbar, sacral regions. No CVA tenderness. Skin - no rashes, erythema, ecchymosis, lacerations, abrasions, suspicious moles noted Psychological -   normal behavior, dress and thought processes. Good insight. Good eye contact. Normal affect. Appropriate mood. Normal speech. DATA REVIEWED Lab Results Component Value Date/Time WBC 3.9 07/19/2018 08:15 AM  
 Hemoglobin (POC) 12.9 02/03/2015 02:40 PM  
 HGB 11.9 07/19/2018 08:15 AM  
 Hematocrit (POC) 38 02/03/2015 02:40 PM  
 HCT 36.6 07/19/2018 08:15 AM  
 PLATELET 663 45/34/2335 08:15 AM  
 MCV 87.6 07/19/2018 08:15 AM  
 
Lab Results Component Value Date/Time Sodium 140 07/18/2018 12:00 AM  
 Potassium 4.0 07/18/2018 12:00 AM  
 Chloride 103 07/18/2018 12:00 AM  
 CO2 20 07/18/2018 12:00 AM  
 Anion gap 9 01/16/2014 10:15 AM  
 Glucose 86 07/18/2018 12:00 AM  
 BUN 10 07/18/2018 12:00 AM  
 Creatinine 0.65 07/18/2018 12:00 AM  
 BUN/Creatinine ratio 15 07/18/2018 12:00 AM  
 GFR est  07/18/2018 12:00 AM  
 GFR est non- 07/18/2018 12:00 AM  
 Calcium 9.5 07/18/2018 12:00 AM  
 Bilirubin, total <0.2 05/31/2018 12:00 AM  
 AST (SGOT) 18 05/31/2018 12:00 AM  
 Alk.  phosphatase 95 05/31/2018 12:00 AM  
 Protein, total 7.5 05/31/2018 12:00 AM  
 Albumin 4.2 05/31/2018 12:00 AM  
 Globulin 4.4 (H) 01/16/2014 10:15 AM  
 A-G Ratio 1.3 05/31/2018 12:00 AM  
 ALT (SGPT) 12 05/31/2018 12:00 AM  
 
Lab Results Component Value Date/Time Cholesterol, total 195 05/31/2018 12:00 AM  
 HDL Cholesterol 57 05/31/2018 12:00 AM  
 LDL, calculated 112 (H) 05/31/2018 12:00 AM  
 VLDL, calculated 26 05/31/2018 12:00 AM  
 Triglyceride 130 05/31/2018 12:00 AM  
 CHOL/HDL Ratio 3.2 10/15/2010 04:14 PM  
 
Lab Results Component Value Date/Time VITAMIN D, 25-HYDROXY 13.4 (L) 05/31/2018 12:00 AM  
   
Lab Results Component Value Date/Time Hemoglobin A1c 5.9 (H) 05/31/2018 12:00 AM  
 
Lab Results Component Value Date/Time TSH 0.882 05/31/2018 12:00 AM  
 
 
Lab Results Component Value Date/Time Microalb/Creat ratio (ug/mg creat.) 2.0 05/31/2018 12:00 AM  
 
 
 
ASSESSMENT and PLAN 
 
  ICD-10-CM ICD-9-CM 1. Essential hypertension I10 401.9 propranolol LA (INDERAL LA) 80 mg SR capsule  
   valsartan-hydroCHLOROthiazide (DIOVAN-HCT) 80-12.5 mg per tablet TSH 3RD GENERATION  
   MICROALBUMIN, UR, RAND W/ MICROALB/CREAT RATIO  
   URINALYSIS W/ RFLX MICROSCOPIC  
 worse due to stress vs sleep vs missed bp meds vs other 2. Seasonal allergic rhinitis due to pollen J30.1 477.0 montelukast (SINGULAIR) 10 mg tablet 3. Dizziness R42 780.4 meclizine (ANTIVERT) 12.5 mg tablet  
 due to ETD vs other, resolved 4. Vitamin D deficiency E55.9 268.9 VITAMIN D, 25 HYDROXY 5. High cholesterol E78.00 272.0 LIPID PANEL  
   METABOLIC PANEL, COMPREHENSIVE 6. Hyperglycemia R73.9 790.29 HEMOGLOBIN A1C WITH EAG  
7. Chronic pain of right knee M25.561 719.46 REFERRAL TO ORTHOPEDICS  
 G89.29 338.29   
8. Iron deficiency anemia, unspecified iron deficiency anemia type D50.9 280.9 CBC W/O DIFF 9. DUB (dysfunctional uterine bleeding) N93.8 626.8 TSH 3RD GENERATION 10. Aspirin intolerance Z78.9 995.27 E935.3 11. Multiple thyroid nodules E04.2 241.1 TSH 3RD GENERATION  
   T4, FREE Asymptomatic 12. Migraine with aura and without status migrainosus, not intractable G43.109 346.00 propranolol LA (INDERAL LA) 80 mg SR capsule  
 stable with occ Propranolol 13. Breast cancer screening Z12.31 V76.10 TERI MAMMO BI SCREENING INCL CAD 14. Heartburn R12 787.1 omeprazole (PRILOSEC) 20 mg capsule Discussed the patient's BMI with her. The BMI follow up plan is as follows: I have counseled this patient on diet and exercise regimens. Decrease carbohydrates (white foods, sweet foods, sweet drinks and alcohol), increase green leafy vegetables and protein (lean meats and beans) with each meal.  Avoid fried foods. Eat 3-5 small meals daily. Do not skip meals. Increase water intake. Increase physical activity to 30 minutes daily for health benefit or 60 minutes daily to prevent weight regain, as tolerated. Get 7-8 hours uninterrupted sleep nightly. Chart reviewed and updated. Continue current medications and care. Start OTC nasal spray such as Flonase after rinsing with saline spray. Start Singulair 10 mg qhs. Use Meclizine 12.5 mg prn for dizziness. Prescriptions written and sent to pharmacy; medication side effects discussed. Meclizine 12.5 mg. Singulair 10 mg. Diovan 80-12.5 mg. Inderal 80 mg. Prilosec 20 mg.  
Recheck pertinent labs when fasting. Hard copy of orders given. Mammogram ordered. Referrals given; patient urged to keep appointments with specialists. Ortho. Counseled patient on health concerns:  BP, migraines, cholesterol, allergies, dizziness, anemia, vit d deficiency, and knee care. Relevant handouts given and discussed with patient. Immunizations noted. Offered empathy, support, legitimation, prayers, partnership to patient. Praised patient for progress. Follow-up and Dispositions · Return in about 3 months (around 7/22/2019) for blood pressure, referral follow up. Patient was offered a choice/choices in the treatment plan today.   Patient expresses understanding of the plan and agrees with recommendations. More than 30 mins spent face to face with patient and more than 50% of this time spent in counseling and coordinating care. Written by caty Yates, as dictated by Dr. Omari Ortiz DO. Documentation True and Accepted by Paul Ye. Veverjohn Escobar. Patient Instructions Managing Your Allergies: Care Instructions Your Care Instructions Managing your allergies is an important part of staying healthy. Your doctor will help you find out what may be causing the allergies. Common causes of allergy symptoms are house dust and dust mites, animal dander, mold, and pollen. As soon as you know what triggers your symptoms, try to reduce your exposure to your triggers. This can help prevent allergy symptoms, asthma, and other health problems. Ask your doctor about allergy medicine or immunotherapy. These treatments may help reduce or prevent allergy symptoms. Follow-up care is a key part of your treatment and safety. Be sure to make and go to all appointments, and call your doctor if you are having problems. It's also a good idea to know your test results and keep a list of the medicines you take. How can you care for yourself at home? · If you think that dust or dust mites are causing your allergies: 
? Wash sheets, pillowcases, and other bedding every week in hot water. ? Use airtight, dust-proof covers for pillows, duvets, and mattresses. Avoid plastic covers, because they tend to tear quickly and do not \"breathe. \" Wash according to the instructions. ? Remove extra blankets and pillows that you don't need. ? Use blankets that are machine-washable. ? Don't use home humidifiers. They can help mites live longer. · Use air-conditioning. Change or clean all filters every month. Keep windows closed. Use high-efficiency air filters. Don't use window or attic fans, which draw dust into the air. · If you're allergic to pet dander, keep pets outside or, at the very least, out of your bedroom. Old carpet and cloth-covered furniture can hold a lot of animal dander. You may need to replace them. · Look for signs of cockroaches. Use cockroach baits to get rid of them. Then clean your home well. · If you're allergic to mold, don't keep indoor plants, because molds can grow in soil. Get rid of furniture, rugs, and drapes that smell musty. Check for mold in the bathroom. · If you're allergic to pollen, stay inside when pollen counts are high. · Don't smoke or let anyone else smoke in your house. Don't use fireplaces or wood-burning stoves. Avoid paint fumes, perfumes, and other strong odors. When should you call for help? Give an epinephrine shot if: 
  · You think you are having a severe allergic reaction.  
 After giving an epinephrine shot call 911, even if you feel better. 
 Call 911 if: 
  · You have symptoms of a severe allergic reaction. These may include: 
? Sudden raised, red areas (hives) all over your body. ? Swelling of the throat, mouth, lips, or tongue. ? Trouble breathing. ? Passing out (losing consciousness). Or you may feel very lightheaded or suddenly feel weak, confused, or restless.  
  · You have been given an epinephrine shot, even if you feel better.  
 Call your doctor now or seek immediate medical care if: 
  · You have symptoms of an allergic reaction, such as: ? A rash or hives (raised, red areas on the skin). ? Itching. ? Swelling. ? Belly pain, nausea, or vomiting.  
 Watch closely for changes in your health, and be sure to contact your doctor if: 
  · Your allergies get worse.  
  · You need help controlling your allergies.  
  · You have questions about allergy testing.  
  · You do not get better as expected. Where can you learn more? Go to http://sierra-ghanshyam.info/.  
Enter L249 in the search box to learn more about \"Managing Your Allergies: Care Instructions. \" Current as of: June 27, 2018 Content Version: 11.9 © 8670-6243 [employer]. Care instructions adapted under license by [employer] (which disclaims liability or warranty for this information). If you have questions about a medical condition or this instruction, always ask your healthcare professional. [facility] any warranty or liability for your use of this information. Seasonal Allergies: Care Instructions Your Care Instructions Allergies occur when your body's defense system (immune system) overreacts to certain substances. The immune system treats a harmless substance as if it were a harmful germ or virus. Many things can cause this to happen. Examples include pollens, medicine, food, dust, animal dander, and mold. Your allergies are seasonal if you have symptoms just at certain times of the year. In that case, you are probably allergic to pollens from certain trees, grasses, or weeds. Allergies can be mild or severe. Over-the-counter allergy medicine may help with some symptoms. Read and follow all instructions on the label. Managing your allergies is an important part of staying healthy. Your doctor may suggest that you have tests to help find the cause of your allergies. When you know what things trigger your symptoms, you can avoid them. This can prevent allergy symptoms and other health problems. In some cases, immunotherapy might help. For this treatment, you get shots or use pills that have a small amount of certain allergens in them. Your body \"gets used to\" the allergen, so you react less to it over time. This kind of treatment may help prevent or reduce some allergy symptoms. Follow-up care is a key part of your treatment and safety. Be sure to make and go to all appointments, and call your doctor if you are having problems. It's also a good idea to know your test results and keep a list of the medicines you take. How can you care for yourself at home? · Be safe with medicines. Take your medicines exactly as prescribed. Call your doctor if you think you are having a problem with your medicine. · During your allergy season, keep windows closed. If you need to use air-conditioning, change or clean all filters every month. Take a shower and change your clothes after you have been outside. · Stay inside when pollen counts are high. Vacuum once or twice a week. Use a vacuum  with a HEPA filter or a double-thickness filter. When should you call for help? Give an epinephrine shot if: 
  · You think you are having a severe allergic reaction.  
 After giving an epinephrine shot, call 911, even if you feel better. 
 Call 911 if: 
  · You have symptoms of a severe allergic reaction. These may include: 
? Sudden raised, red areas (hives) all over your body. ? Swelling of the throat, mouth, lips, or tongue. ? Trouble breathing. ? Passing out (losing consciousness). Or you may feel very lightheaded or suddenly feel weak, confused, or restless.  
  · You have been given an epinephrine shot, even if you feel better.  
 Call your doctor now or seek immediate medical care if: 
  · You have symptoms of an allergic reaction, such as: ? A rash or hives (raised, red areas on the skin). ? Itching. ? Swelling. ? Belly pain, nausea, or vomiting.  
 Watch closely for changes in your health, and be sure to contact your doctor if: 
  · You do not get better as expected. Where can you learn more? Go to http://sierra-ghanshyam.info/. Enter J912 in the search box to learn more about \"Seasonal Allergies: Care Instructions. \" Current as of: June 27, 2018 Content Version: 11.9 © 4795-7632 [employer]. Care instructions adapted under license by [employer] (which disclaims liability or warranty for this information).  If you have questions about a medical condition or this instruction, always ask your healthcare professional. [place] [address] any warranty or liability for your use of this information. Knee Arthritis: Exercises Your Care Instructions Here are some examples of exercises for knee arthritis. Start each exercise slowly. Ease off the exercise if you start to have pain. Your doctor or physical therapist will tell you when you can start these exercises and which ones will work best for you. How to do the exercises Knee flexion with heel slide 1. Lie on your back with your knees bent. 2. Slide your heel back by bending your affected knee as far as you can. Then hook your other foot around your ankle to help pull your heel even farther back. 3. Hold for about 6 seconds, then rest for up to 10 seconds. 4. Repeat 8 to 12 times. 5. Switch legs and repeat steps 1 through 4, even if only one knee is sore. JRapid 1. Sit with your affected leg straight and supported on the floor or a firm bed. Place a small, rolled-up towel under your knee. Your other leg should be bent, with that foot flat on the floor. 2. Tighten the thigh muscles of your affected leg by pressing the back of your knee down into the towel. 3. Hold for about 6 seconds, then rest for up to 10 seconds. 4. Repeat 8 to 12 times. 5. Switch legs and repeat steps 1 through 4, even if only one knee is sore. Straight-leg raises to the front 1. Lie on your back with your good knee bent so that your foot rests flat on the floor. Your affected leg should be straight. Make sure that your low back has a normal curve. You should be able to slip your hand in between the floor and the small of your back, with your palm touching the floor and your back touching the back of your hand. 2. Tighten the thigh muscles in your affected leg by pressing the back of your knee flat down to the floor. Hold your knee straight. 3. Keeping the thigh muscles tight and your leg straight, lift your affected leg up so that your heel is about 12 inches off the floor. Hold for about 6 seconds, then lower slowly. 4. Relax for up to 10 seconds between repetitions. 5. Repeat 8 to 12 times. 6. Switch legs and repeat steps 1 through 5, even if only one knee is sore. Active knee flexion 1. Lie on your stomach with your knees straight. If your kneecap is uncomfortable, roll up a washcloth and put it under your leg just above your kneecap. 2. Lift the foot of your affected leg by bending the knee so that you bring the foot up toward your buttock. If this motion hurts, try it without bending your knee quite as far. This may help you avoid any painful motion. 3. Slowly move your leg up and down. 4. Repeat 8 to 12 times. 5. Switch legs and repeat steps 1 through 4, even if only one knee is sore. Quadriceps stretch (facedown) 1. Lie flat on your stomach, and rest your face on the floor. 2. Wrap a towel or belt strap around the lower part of your affected leg. Then use the towel or belt strap to slowly pull your heel toward your buttock until you feel a stretch. 3. Hold for about 15 to 30 seconds, then relax your leg against the towel or belt strap. 4. Repeat 2 to 4 times. 5. Switch legs and repeat steps 1 through 4, even if only one knee is sore. Stationary exercise bike 1. If you do not have a stationary exercise bike at home, you can find one to ride at your local health club or community center. 2. Adjust the height of the bike seat so that your knee is slightly bent when your leg is extended downward. If your knee hurts when the pedal reaches the top, you can raise the seat so that your knee does not bend as much. 3. Start slowly. At first, try to do 5 to 10 minutes of cycling with little to no resistance. Then increase your time and the resistance bit by bit until you can do 20 to 30 minutes without pain. 4. If you start to have pain, rest your knee until your pain gets back to the level that is normal for you. Or cycle for less time or with less effort. Follow-up care is a key part of your treatment and safety. Be sure to make and go to all appointments, and call your doctor if you are having problems. It's also a good idea to know your test results and keep a list of the medicines you take. Where can you learn more? Go to http://sierra-ghanshyam.info/. Enter C159 in the search box to learn more about \"Knee Arthritis: Exercises. \" Current as of: September 20, 2018 Content Version: 11.9 © 6610-5079 Home[employer]here, De[employer]. Care instructions adapted under license by [employer] (which disclaims liability or warranty for this information). If you have questions about a medical condition or this instruction, always ask your healthcare professional. [address] any warranty or liability for your use of this information.

## 2019-04-22 NOTE — PATIENT INSTRUCTIONS
Managing Your Allergies: Care Instructions Your Care Instructions Managing your allergies is an important part of staying healthy. Your doctor will help you find out what may be causing the allergies. Common causes of allergy symptoms are house dust and dust mites, animal dander, mold, and pollen. As soon as you know what triggers your symptoms, try to reduce your exposure to your triggers. This can help prevent allergy symptoms, asthma, and other health problems. Ask your doctor about allergy medicine or immunotherapy. These treatments may help reduce or prevent allergy symptoms. Follow-up care is a key part of your treatment and safety. Be sure to make and go to all appointments, and call your doctor if you are having problems. It's also a good idea to know your test results and keep a list of the medicines you take. How can you care for yourself at home? · If you think that dust or dust mites are causing your allergies: 
? Wash sheets, pillowcases, and other bedding every week in hot water. ? Use airtight, dust-proof covers for pillows, duvets, and mattresses. Avoid plastic covers, because they tend to tear quickly and do not \"breathe. \" Wash according to the instructions. ? Remove extra blankets and pillows that you don't need. ? Use blankets that are machine-washable. ? Don't use home humidifiers. They can help mites live longer. · Use air-conditioning. Change or clean all filters every month. Keep windows closed. Use high-efficiency air filters. Don't use window or attic fans, which draw dust into the air. · If you're allergic to pet dander, keep pets outside or, at the very least, out of your bedroom. Old carpet and cloth-covered furniture can hold a lot of animal dander. You may need to replace them. · Look for signs of cockroaches. Use cockroach baits to get rid of them. Then clean your home well.  
· If you're allergic to mold, don't keep indoor plants, because molds can grow in soil. Get rid of furniture, rugs, and drapes that smell musty. Check for mold in the bathroom. · If you're allergic to pollen, stay inside when pollen counts are high. · Don't smoke or let anyone else smoke in your house. Don't use fireplaces or wood-burning stoves. Avoid paint fumes, perfumes, and other strong odors. When should you call for help? Give an epinephrine shot if: 
  · You think you are having a severe allergic reaction.  
 After giving an epinephrine shot call 911, even if you feel better. 
 Call 911 if: 
  · You have symptoms of a severe allergic reaction. These may include: 
? Sudden raised, red areas (hives) all over your body. ? Swelling of the throat, mouth, lips, or tongue. ? Trouble breathing. ? Passing out (losing consciousness). Or you may feel very lightheaded or suddenly feel weak, confused, or restless.  
  · You have been given an epinephrine shot, even if you feel better.  
 Call your doctor now or seek immediate medical care if: 
  · You have symptoms of an allergic reaction, such as: ? A rash or hives (raised, red areas on the skin). ? Itching. ? Swelling. ? Belly pain, nausea, or vomiting.  
 Watch closely for changes in your health, and be sure to contact your doctor if: 
  · Your allergies get worse.  
  · You need help controlling your allergies.  
  · You have questions about allergy testing.  
  · You do not get better as expected. Where can you learn more? Go to http://sierra-ghanshyam.info/. Enter L249 in the search box to learn more about \"Managing Your Allergies: Care Instructions. \" Current as of: June 27, 2018 Content Version: 11.9 © 0334-4898 Shared Spectrum. Care instructions adapted under license by Human[employer] (which disclaims liability or warranty for this information).  If you have questions about a medical condition or this instruction, always ask your healthcare professional. [facility] disclaims any warranty or liability for your use of this information. Seasonal Allergies: Care Instructions Your Care Instructions Allergies occur when your body's defense system (immune system) overreacts to certain substances. The immune system treats a harmless substance as if it were a harmful germ or virus. Many things can cause this to happen. Examples include pollens, medicine, food, dust, animal dander, and mold. Your allergies are seasonal if you have symptoms just at certain times of the year. In that case, you are probably allergic to pollens from certain trees, grasses, or weeds. Allergies can be mild or severe. Over-the-counter allergy medicine may help with some symptoms. Read and follow all instructions on the label. Managing your allergies is an important part of staying healthy. Your doctor may suggest that you have tests to help find the cause of your allergies. When you know what things trigger your symptoms, you can avoid them. This can prevent allergy symptoms and other health problems. In some cases, immunotherapy might help. For this treatment, you get shots or use pills that have a small amount of certain allergens in them. Your body \"gets used to\" the allergen, so you react less to it over time. This kind of treatment may help prevent or reduce some allergy symptoms. Follow-up care is a key part of your treatment and safety. Be sure to make and go to all appointments, and call your doctor if you are having problems. It's also a good idea to know your test results and keep a list of the medicines you take. How can you care for yourself at home? · Be safe with medicines. Take your medicines exactly as prescribed. Call your doctor if you think you are having a problem with your medicine. · During your allergy season, keep windows closed. If you need to use air-conditioning, change or clean all filters every month.  Take a shower and change your clothes after you have been outside. · Stay inside when pollen counts are high. Vacuum once or twice a week. Use a vacuum  with a HEPA filter or a double-thickness filter. When should you call for help? Give an epinephrine shot if: 
  · You think you are having a severe allergic reaction.  
 After giving an epinephrine shot, call 911, even if you feel better. 
 Call 911 if: 
  · You have symptoms of a severe allergic reaction. These may include: 
? Sudden raised, red areas (hives) all over your body. ? Swelling of the throat, mouth, lips, or tongue. ? Trouble breathing. ? Passing out (losing consciousness). Or you may feel very lightheaded or suddenly feel weak, confused, or restless.  
  · You have been given an epinephrine shot, even if you feel better.  
 Call your doctor now or seek immediate medical care if: 
  · You have symptoms of an allergic reaction, such as: ? A rash or hives (raised, red areas on the skin). ? Itching. ? Swelling. ? Belly pain, nausea, or vomiting.  
 Watch closely for changes in your health, and be sure to contact your doctor if: 
  · You do not get better as expected. Where can you learn more? Go to http://sierra-ghanshyam.info/. Enter J912 in the search box to learn more about \"Seasonal Allergies: Care Instructions. \" Current as of: June 27, 2018 Content Version: 11.9 © 3768-4209 [employer]ique. Care instructions adapted under license by PeakStream (which disclaims liability or warranty for this information). If you have questions about a medical condition or this instruction, always ask your healthcare professional. [address] any warranty or liability for your use of this information. Knee Arthritis: Exercises Your Care Instructions Here are some examples of exercises for knee arthritis. Start each exercise slowly. Ease off the exercise if you start to have pain. Your doctor or physical therapist will tell you when you can start these exercises and which ones will work best for you. How to do the exercises Knee flexion with heel slide 1. Lie on your back with your knees bent. 2. Slide your heel back by bending your affected knee as far as you can. Then hook your other foot around your ankle to help pull your heel even farther back. 3. Hold for about 6 seconds, then rest for up to 10 seconds. 4. Repeat 8 to 12 times. 5. Switch legs and repeat steps 1 through 4, even if only one knee is sore. [facility] 1. Sit with your affected leg straight and supported on the floor or a firm bed. Place a small, rolled-up towel under your knee. Your other leg should be bent, with that foot flat on the floor. 2. Tighten the thigh muscles of your affected leg by pressing the back of your knee down into the towel. 3. Hold for about 6 seconds, then rest for up to 10 seconds. 4. Repeat 8 to 12 times. 5. Switch legs and repeat steps 1 through 4, even if only one knee is sore. Straight-leg raises to the front 1. Lie on your back with your good knee bent so that your foot rests flat on the floor. Your affected leg should be straight. Make sure that your low back has a normal curve. You should be able to slip your hand in between the floor and the small of your back, with your palm touching the floor and your back touching the back of your hand. 2. Tighten the thigh muscles in your affected leg by pressing the back of your knee flat down to the floor. Hold your knee straight. 3. Keeping the thigh muscles tight and your leg straight, lift your affected leg up so that your heel is about 12 inches off the floor. Hold for about 6 seconds, then lower slowly. 4. Relax for up to 10 seconds between repetitions. 5. Repeat 8 to 12 times. 6. Switch legs and repeat steps 1 through 5, even if only one knee is sore. Active knee flexion 1. Lie on your stomach with your knees straight. If your kneecap is uncomfortable, roll up a washcloth and put it under your leg just above your kneecap. 2. Lift the foot of your affected leg by bending the knee so that you bring the foot up toward your buttock. If this motion hurts, try it without bending your knee quite as far. This may help you avoid any painful motion. 3. Slowly move your leg up and down. 4. Repeat 8 to 12 times. 5. Switch legs and repeat steps 1 through 4, even if only one knee is sore. Quadriceps stretch (facedown) 1. Lie flat on your stomach, and rest your face on the floor. 2. Wrap a towel or belt strap around the lower part of your affected leg. Then use the towel or belt strap to slowly pull your heel toward your buttock until you feel a stretch. 3. Hold for about 15 to 30 seconds, then relax your leg against the towel or belt strap. 4. Repeat 2 to 4 times. 5. Switch legs and repeat steps 1 through 4, even if only one knee is sore. Stationary exercise bike 1. If you do not have a stationary exercise bike at home, you can find one to ride at your local health club or community center. 2. Adjust the height of the bike seat so that your knee is slightly bent when your leg is extended downward. If your knee hurts when the pedal reaches the top, you can raise the seat so that your knee does not bend as much. 3. Start slowly. At first, try to do 5 to 10 minutes of cycling with little to no resistance. Then increase your time and the resistance bit by bit until you can do 20 to 30 minutes without pain. 4. If you start to have pain, rest your knee until your pain gets back to the level that is normal for you. Or cycle for less time or with less effort. Follow-up care is a key part of your treatment and safety. Be sure to make and go to all appointments, and call your doctor if you are having problems.  It's also a good idea to know your test results and keep a list of the medicines you take. Where can you learn more? Go to http://sierra-ghanshyam.info/. Enter C159 in the search box to learn more about \"Knee Arthritis: Exercises. \" Current as of: September 20, 2018 Content Version: 11.9 © 0206-5206 [employer]. Care instructions adapted under license by [employer] (which disclaims liability or warranty for this information). If you have questions about a medical condition or this instruction, always ask your healthcare professional. [address] any warranty or liability for your use of this information.

## 2019-04-22 NOTE — PROGRESS NOTES
Leeanne Parham  Identified pt with two pt identifiers(name and ). Chief Complaint Patient presents with  Hypertension Rm 14/ nonfasting, 6 mos F/U  
 
 
1. Have you been to the ER, urgent care clinic since your last visit? Yes, [facility] ER, finger cut. Hospitalized since your last visit? No 
 
2. Have you seen or consulted any other health care providers outside of the [address] [place] [address] since your last visit? Include any pap smears or colon screening. no 
 
 
Would you like to sign up for MyChart today, if you have not already done so? yes If not, would you like information on MyChart, and how to sign up at a later time? No 
 
Living Will - No 
Paperwork - given to pt Medication reconciliation up to date and corrected with patient at this time. Today's provider has been notified of reason for visit, vitals and flowsheets obtained on patients. Reviewed record in preparation for visit, huddled with provider and have obtained necessary documentation. Health Maintenance Due Topic  Shingrix Vaccine Age 50> (1 of 2)  BREAST CANCER SCRN MAMMOGRAM   
 PAP AKA CERVICAL CYTOLOGY Wt Readings from Last 3 Encounters:  
19 266 lb (120.7 kg) 19 266 lb (120.7 kg) 18 263 lb 6.4 oz (119.5 kg) Temp Readings from Last 3 Encounters:  
19 98.7 °F (37.1 °C) (Oral) 19 98.1 °F (36.7 °C)  
19 98.4 °F (36.9 °C) (Oral) BP Readings from Last 3 Encounters:  
19 (!) 137/93  
19 (!) 159/112  
19 125/83 Pulse Readings from Last 3 Encounters:  
19 73  
19 72  
19 73 Vitals:  
 19 1550 19 1552 BP: 150/87 (!) 137/93 Pulse: 79 73 Resp: 18 Temp: 98.7 °F (37.1 °C) TempSrc: Oral   
SpO2: 97% Height: 5' 8\" (1.727 m) PainSc:   0 - No pain Learning Assessment: 
:  
 
Learning Assessment 2014 PRIMARY LEARNER Patient HIGHEST LEVEL OF EDUCATION - PRIMARY LEARNER  SOME COLLEGE  
BARRIERS PRIMARY LEARNER NONE PRIMARY LANGUAGE ENGLISH  
LEARNER PREFERENCE PRIMARY DEMONSTRATION  
ANSWERED BY patient RELATIONSHIP SELF Depression Screening: 
:  
 
3 most recent PHQ Screens 4/22/2019 Little interest or pleasure in doing things Not at all Feeling down, depressed, irritable, or hopeless Not at all Total Score PHQ 2 0 Fall Risk Assessment: 
:  
 
Fall Risk Assessment, last 12 mths 12/7/2017 Able to walk? Yes Fall in past 12 months? No  
 
 
Abuse Screening: 
:  
 
Abuse Screening Questionnaire 12/7/2017 Do you ever feel afraid of your partner? Rena Colby Are you in a relationship with someone who physically or mentally threatens you? Rena Colby Is it safe for you to go home? Y  
 
 
ADL Screening: 
:  
 

## 2019-04-22 NOTE — TELEPHONE ENCOUNTER
My apologies. The request was placed on Friday and her appt is today, Monday. I will be happy to order at the time of her appointment today.

## 2019-07-10 ENCOUNTER — HOSPITAL ENCOUNTER (EMERGENCY)
Age: 54
Discharge: HOME | End: 2019-07-10
Attending: EMERGENCY MEDICINE
Payer: COMMERCIAL

## 2019-07-10 ENCOUNTER — OFFICE VISIT (OUTPATIENT)
Dept: FAMILY MEDICINE CLINIC | Age: 54
End: 2019-07-10

## 2019-07-10 ENCOUNTER — APPOINTMENT (OUTPATIENT)
Dept: VASCULAR SURGERY | Age: 54
End: 2019-07-10
Attending: PHYSICIAN ASSISTANT
Payer: COMMERCIAL

## 2019-07-10 VITALS
OXYGEN SATURATION: 97 % | SYSTOLIC BLOOD PRESSURE: 131 MMHG | HEART RATE: 77 BPM | TEMPERATURE: 97.5 F | DIASTOLIC BLOOD PRESSURE: 86 MMHG | RESPIRATION RATE: 16 BRPM

## 2019-07-10 VITALS
BODY MASS INDEX: 41.97 KG/M2 | HEIGHT: 68 IN | SYSTOLIC BLOOD PRESSURE: 130 MMHG | RESPIRATION RATE: 19 BRPM | DIASTOLIC BLOOD PRESSURE: 89 MMHG | TEMPERATURE: 97.5 F | HEART RATE: 85 BPM | WEIGHT: 276.9 LBS | OXYGEN SATURATION: 95 %

## 2019-07-10 DIAGNOSIS — M79.604 PAIN IN BOTH LOWER EXTREMITIES: Primary | ICD-10-CM

## 2019-07-10 DIAGNOSIS — M79.605 PAIN IN BOTH LOWER EXTREMITIES: Primary | ICD-10-CM

## 2019-07-10 DIAGNOSIS — E66.01 OBESITY, CLASS III, BMI 40-49.9 (MORBID OBESITY) (HCC): ICD-10-CM

## 2019-07-10 DIAGNOSIS — M79.604 BILATERAL LEG PAIN: Primary | ICD-10-CM

## 2019-07-10 DIAGNOSIS — M79.605 BILATERAL LEG PAIN: Primary | ICD-10-CM

## 2019-07-10 PROBLEM — M54.16 LUMBAR RADICULOPATHY: Status: ACTIVE | Noted: 2019-07-10

## 2019-07-10 PROBLEM — M54.50 LOW BACK PAIN: Status: ACTIVE | Noted: 2019-07-10

## 2019-07-10 PROBLEM — M25.559 HIP PAIN: Status: ACTIVE | Noted: 2019-07-10

## 2019-07-10 PROBLEM — M51.37 DEGENERATION OF LUMBOSACRAL INTERVERTEBRAL DISC: Status: ACTIVE | Noted: 2019-07-10

## 2019-07-10 LAB
25(OH)D3+25(OH)D2 SERPL-MCNC: 16 NG/ML (ref 30–100)
ALBUMIN SERPL-MCNC: 4.2 G/DL (ref 3.5–5.5)
ALBUMIN/CREAT UR: 1.9 MG/G CREAT (ref 0–30)
ALBUMIN/GLOB SERPL: 1.2 {RATIO} (ref 1.2–2.2)
ALP SERPL-CCNC: 91 IU/L (ref 39–117)
ALT SERPL-CCNC: 16 IU/L (ref 0–32)
ANION GAP SERPL CALC-SCNC: 6 MMOL/L (ref 5–15)
APPEARANCE UR: CLEAR
AST SERPL-CCNC: 21 IU/L (ref 0–40)
BASOPHILS # BLD: 0 K/UL (ref 0–0.1)
BASOPHILS NFR BLD: 0 % (ref 0–1)
BILIRUB SERPL-MCNC: <0.2 MG/DL (ref 0–1.2)
BILIRUB UR QL STRIP: NEGATIVE
BUN SERPL-MCNC: 7 MG/DL (ref 6–24)
BUN SERPL-MCNC: 8 MG/DL (ref 6–20)
BUN/CREAT SERPL: 10 (ref 12–20)
BUN/CREAT SERPL: 9 (ref 9–23)
CALCIUM SERPL-MCNC: 9.8 MG/DL (ref 8.5–10.1)
CALCIUM SERPL-MCNC: 9.8 MG/DL (ref 8.7–10.2)
CHLORIDE SERPL-SCNC: 102 MMOL/L (ref 97–108)
CHLORIDE SERPL-SCNC: 98 MMOL/L (ref 96–106)
CHOLEST SERPL-MCNC: 211 MG/DL (ref 100–199)
CK SERPL-CCNC: 84 U/L (ref 26–192)
CO2 SERPL-SCNC: 27 MMOL/L (ref 20–29)
CO2 SERPL-SCNC: 29 MMOL/L (ref 21–32)
COLOR UR: YELLOW
COMMENT, HOLDF: NORMAL
CREAT SERPL-MCNC: 0.76 MG/DL (ref 0.57–1)
CREAT SERPL-MCNC: 0.81 MG/DL (ref 0.55–1.02)
CREAT UR-MCNC: 207.5 MG/DL
DIFFERENTIAL METHOD BLD: ABNORMAL
EOSINOPHIL # BLD: 0 K/UL (ref 0–0.4)
EOSINOPHIL NFR BLD: 0 % (ref 0–7)
ERYTHROCYTE [DISTWIDTH] IN BLOOD BY AUTOMATED COUNT: 13.4 % (ref 11.5–14.5)
ERYTHROCYTE [DISTWIDTH] IN BLOOD BY AUTOMATED COUNT: 14.8 % (ref 12.3–15.4)
EST. AVERAGE GLUCOSE BLD GHB EST-MCNC: 120 MG/DL
GLOBULIN SER CALC-MCNC: 3.6 G/DL (ref 1.5–4.5)
GLUCOSE SERPL-MCNC: 84 MG/DL (ref 65–100)
GLUCOSE SERPL-MCNC: 89 MG/DL (ref 65–99)
GLUCOSE UR QL: NEGATIVE
HBA1C MFR BLD: 5.8 % (ref 4.8–5.6)
HCT VFR BLD AUTO: 40.2 % (ref 34–46.6)
HCT VFR BLD AUTO: 42.9 % (ref 35–47)
HDLC SERPL-MCNC: 69 MG/DL
HGB BLD-MCNC: 13.3 G/DL (ref 11.1–15.9)
HGB BLD-MCNC: 13.8 G/DL (ref 11.5–16)
HGB UR QL STRIP: NEGATIVE
IMM GRANULOCYTES # BLD AUTO: 0 K/UL
IMM GRANULOCYTES NFR BLD AUTO: 0 %
INTERPRETATION, 910389: NORMAL
KETONES UR QL STRIP: ABNORMAL
LDLC SERPL CALC-MCNC: 115 MG/DL (ref 0–99)
LEUKOCYTE ESTERASE UR QL STRIP: NEGATIVE
LYMPHOCYTES # BLD: 2.7 K/UL (ref 0.8–3.5)
LYMPHOCYTES NFR BLD: 66 % (ref 12–49)
MCH RBC QN AUTO: 29 PG (ref 26.6–33)
MCH RBC QN AUTO: 29.2 PG (ref 26–34)
MCHC RBC AUTO-ENTMCNC: 32.2 G/DL (ref 30–36.5)
MCHC RBC AUTO-ENTMCNC: 33.1 G/DL (ref 31.5–35.7)
MCV RBC AUTO: 88 FL (ref 79–97)
MCV RBC AUTO: 90.9 FL (ref 80–99)
MICRO URNS: ABNORMAL
MICROALBUMIN UR-MCNC: 4 UG/ML
MONOCYTES # BLD: 0.6 K/UL (ref 0–1)
MONOCYTES NFR BLD: 14 % (ref 5–13)
NEUTS SEG # BLD: 0.8 K/UL (ref 1.8–8)
NEUTS SEG NFR BLD: 20 % (ref 32–75)
NITRITE UR QL STRIP: NEGATIVE
NRBC # BLD: 0 K/UL (ref 0–0.01)
NRBC BLD-RTO: 0 PER 100 WBC
PH UR STRIP: 5.5 [PH] (ref 5–7.5)
PLATELET # BLD AUTO: 357 K/UL (ref 150–400)
PLATELET # BLD AUTO: 379 X10E3/UL (ref 150–450)
PMV BLD AUTO: 9.9 FL (ref 8.9–12.9)
POTASSIUM SERPL-SCNC: 3.6 MMOL/L (ref 3.5–5.1)
POTASSIUM SERPL-SCNC: 4.6 MMOL/L (ref 3.5–5.2)
PROT SERPL-MCNC: 7.8 G/DL (ref 6–8.5)
PROT UR QL STRIP: NEGATIVE
RBC # BLD AUTO: 4.59 X10E6/UL (ref 3.77–5.28)
RBC # BLD AUTO: 4.72 M/UL (ref 3.8–5.2)
RBC MORPH BLD: ABNORMAL
SAMPLES BEING HELD,HOLD: NORMAL
SODIUM SERPL-SCNC: 137 MMOL/L (ref 136–145)
SODIUM SERPL-SCNC: 138 MMOL/L (ref 134–144)
SP GR UR: 1.03 (ref 1–1.03)
T4 FREE SERPL-MCNC: 0.88 NG/DL (ref 0.82–1.77)
TRIGL SERPL-MCNC: 136 MG/DL (ref 0–149)
TSH SERPL DL<=0.005 MIU/L-ACNC: 0.97 UIU/ML (ref 0.45–4.5)
UROBILINOGEN UR STRIP-MCNC: 0.2 MG/DL (ref 0.2–1)
VLDLC SERPL CALC-MCNC: 27 MG/DL (ref 5–40)
WBC # BLD AUTO: 4.1 K/UL (ref 3.6–11)
WBC # BLD AUTO: 4.1 X10E3/UL (ref 3.4–10.8)

## 2019-07-10 PROCEDURE — 99282 EMERGENCY DEPT VISIT SF MDM: CPT

## 2019-07-10 PROCEDURE — 74011250636 HC RX REV CODE- 250/636: Performed by: PHYSICIAN ASSISTANT

## 2019-07-10 PROCEDURE — 82550 ASSAY OF CK (CPK): CPT

## 2019-07-10 PROCEDURE — 85025 COMPLETE CBC W/AUTO DIFF WBC: CPT

## 2019-07-10 PROCEDURE — 99283 EMERGENCY DEPT VISIT LOW MDM: CPT

## 2019-07-10 PROCEDURE — 36415 COLL VENOUS BLD VENIPUNCTURE: CPT

## 2019-07-10 PROCEDURE — 80048 BASIC METABOLIC PNL TOTAL CA: CPT

## 2019-07-10 PROCEDURE — 93970 EXTREMITY STUDY: CPT

## 2019-07-10 PROCEDURE — 96360 HYDRATION IV INFUSION INIT: CPT

## 2019-07-10 RX ADMIN — SODIUM CHLORIDE 1000 ML: 900 INJECTION, SOLUTION INTRAVENOUS at 14:06

## 2019-07-10 NOTE — ED PROVIDER NOTES
47year old female presenting for leg pain. Pt reports that for the last 4 weeks she has been having bilateral, constant leg cramps, L>R. Notes moderately severe pain. Went to PCP today and was referred to the ED. Takes Tylenol arthritis with some relief. Constant all day. Pt notes that is is like menstrual cramps of her legs. Feels even more when she sits still. Worse in the calves, but radiates into the posterior thighs.  + hx back problems. Patient denies weakness or numbness in the legs, urinary retention, incontinence of bowel or bladder, perineal numbness, fever, gait disturbance, or history of IV drug abuse. No long-term steroid use. Denies hx VTE. No swelling. Family hx DM. Sometimes has burning. Chronic cough, unchanged. No CP or SOB. No recent immobilization. No hormone use. No hemoptysis. PMHx:  Extensive, please see list           Past Medical History:   Diagnosis Date    Allergic rhinitis 5/27/2010    Cervical disc herniation 01/2010    C3-4, C4-5, C5-6. Hemangioma body of C5. Dr. Arelis Issa.  Chest pain 08/2011, 02/2012    Dr. Anna Arzate. Dr. Valdez Chung; denies CP as of 3/27/14    Chronic lower back pain 2010, 03/2016    thoracic DDD and spondylosis. DDD L3-S1. Dr. Maritza Paul. Ness Valdez. Dr. Lee Viveros, [facility]. Dr. Aldo Kohler.  Esophagitis, reflux 4/3/2011    Gallstone     Gastritis 5/27/2010    Dr. Raimundo Cerna.  Gastrointestinal food allergy 03/2014    Multiple. Dr. Ceferino Valencia.    Heart palpitations 02/2012    DUE TO PAC'S AND PVC'S. Dr. Janice Thurman.  Hiatal hernia 4/3/2011    HTN (hypertension) 1987    Impaired glucose tolerance 10/15/2010    Incidental lung nodule 01/08/13    LLL 3.9 mm, RML 3.1 mm;  as of 3/27/14 per pt stable w/o growth    Insomnia 1990s    Iron deficiency anemia 5/27/2010    Irritable bowel syndrome (IBS) 03/2014    Dr. Marcia Lopez.    Knee pain 2012    Right.   due to severe OA. / chipped bone     Metatarsal bone fracture     Left fifth.  Migraine 2011     Expose, cervical 2010    Left. Dr. Kylee Wasserman.  Shoulder pain, right     due to [address] X 2. Dr. Shelly Maciel.  Thyroid nodule     Dr. Yusra So    Toe fracture, left     fifth toe.  Triangular fibrocartilage complex tear     Dr. Claudeen Casa. Left. Past Surgical History:   Procedure Laterality Date    HX  SECTION  1995    x 1    HX CHOLECYSTECTOMY      HX COLONOSCOPY  14    Dr. Army Fox; 14    due to abnormal PAP.  HX DILATION AND CURETTAGE      due to miscarriage.  HX ENDOSCOPY      HX ORTHOPAEDIC Right 2016    LUMBAR L4-5, L5-S1 ELLY.   Dr. Zayda Fuchs     [address] [place] [address] Osteopathy         Family History:   Problem Relation Age of Onset    Diabetes Mother     Heart Disease Mother 58        2 stents    Hypertension Mother     Diabetes Father     Stroke Maternal Grandmother     Cancer Maternal Uncle         prostate       Social History     Socioeconomic History    Marital status:      Spouse name: Not on file    Number of children: 3    Years of education: Not on file    Highest education level: Not on file   Occupational History    Occupation: Medical assistant     Employer: [address] [place] [address] Financial resource strain: Not on file    Food insecurity:     Worry: Not on file     Inability: Not on file    Transportation needs:     Medical: Not on file     Non-medical: Not on file   Tobacco Use    Smoking status: Never Smoker    Smokeless tobacco: Never Used   Substance and Sexual Activity    Alcohol use: No    Drug use: No    Sexual activity: Yes     Partners: Male   Lifestyle    Physical activity:     Days per week: Not on file     Minutes per session: Not on file    Stress: Not on file   Relationships    Social connections:     Talks on phone: Not on file     Gets together: Not on file     Attends Worship service: Not on file     Active member of club or organization: Not on file     Attends meetings of clubs or organizations: Not on file     Relationship status: Not on file    Intimate partner violence:     Fear of current or ex partner: Not on file     Emotionally abused: Not on file     Physically abused: Not on file     Forced sexual activity: Not on file   Other Topics Concern    Not on file   Social History Narrative    Not on file         ALLERGIES: Doxylamine succinate; Pcn [penicillins]; Ambien [zolpidem]; Aspirin; Celebrex [celecoxib]; Erythromycin; Milk prot-turm-pepper-pineappl; Neurontin [gabapentin]; and Nsaids (non-steroidal anti-inflammatory drug)    Review of Systems   Constitutional: Negative for fever. HENT: Negative for facial swelling. Respiratory: Negative for shortness of breath. Cardiovascular: Negative for chest pain. Gastrointestinal: Negative for vomiting. Musculoskeletal: Positive for myalgias. Skin: Negative for wound. Neurological: Negative for syncope. All other systems reviewed and are negative. Vitals:    07/10/19 1326 07/10/19 1348   BP:  (!) 153/94   Pulse: 85 91   Resp:  16   Temp:  97.5 °F (36.4 °C)   SpO2: 98% 99%            Physical Exam   Constitutional: She is oriented to person, place, and time. She appears well-developed and well-nourished. No distress. Pleasant AA female   HENT:   Head: Normocephalic. Right Ear: External ear normal.   Left Ear: External ear normal.   Eyes: Pupils are equal, round, and reactive to light. Conjunctivae are normal.   Neck: Normal range of motion. Neck supple. Cardiovascular: Normal rate, regular rhythm and normal heart sounds. Exam reveals no gallop and no friction rub. No murmur heard. Pulmonary/Chest: Effort normal and breath sounds normal. No respiratory distress. She has no wheezes. Abdominal: Soft. There is no tenderness. There is no guarding. Musculoskeletal: Normal range of motion. Mild left calf TTP  No peripheral edema  Strong pedal pulses noted, feet warm and well-perfused   Neurological: She is alert and oriented to person, place, and time. Skin: Skin is warm and dry. Psychiatric: She has a normal mood and affect. Nursing note and vitals reviewed. MDM  Number of Diagnoses or Management Options  Bilateral leg pain:   Diagnosis management comments: 47year old female presenting for a month of bilateral leg cramps. Strong pulses, pain not entirely c/w claudication. Normal duplex, labs. Discussed possible neuropathy? No clear radicular symptoms c/f cord compression. Encouraged close PCP f/u.        Amount and/or Complexity of Data Reviewed  Clinical lab tests: ordered and reviewed  Tests in the radiology section of CPT®: ordered and reviewed  Discuss the patient with other providers: yes (Dr. Geoffrey uRiz ED attending)           Procedures

## 2019-07-10 NOTE — ED NOTES
Deyanira Larson gave and reviewed discharge instructions with patient. Patient verbalizes understanding of discharge instructions. Pt alert and oriented, appears in no acute distress, respirations equal and unlabored. Ambulatory upon discharge with steady gait.

## 2019-07-10 NOTE — DISCHARGE INSTRUCTIONS
Patient Education        Leg Pain: Care Instructions  Your Care Instructions  Many things can cause leg pain. Too much exercise or overuse can cause a muscle cramp (or charley horse). You can get leg cramps from not eating a balanced diet that has enough potassium, calcium, and other minerals. If you do not drink enough fluids or are taking certain medicines, you may develop leg cramps. Other causes of leg pain include injuries, blood flow problems, nerve damage, and twisted and enlarged veins (varicose veins). You can usually ease pain with self-care. Your doctor may recommend that you rest your leg and keep it elevated. Follow-up care is a key part of your treatment and safety. Be sure to make and go to all appointments, and call your doctor if you are having problems. It's also a good idea to know your test results and keep a list of the medicines you take. How can you care for yourself at home? · Take pain medicines exactly as directed. ? If the doctor gave you a prescription medicine for pain, take it as prescribed. ? If you are not taking a prescription pain medicine, ask your doctor if you can take an over-the-counter medicine. · Take any other medicines exactly as prescribed. Call your doctor if you think you are having a problem with your medicine. · Rest your leg while you have pain, and avoid standing for long periods of time. · Prop up your leg at or above the level of your heart when possible. · Make sure you are eating a balanced diet that is rich in calcium, potassium, and magnesium, especially if you are pregnant. · If directed by your doctor, put ice or a cold pack on the area for 10 to 20 minutes at a time. Put a thin cloth between the ice and your skin. · Your leg may be in a splint, a brace, or an elastic bandage, and you may have crutches to help you walk. Follow your doctor's directions about how long to wear supports and how to use the crutches.   When should you call for help?  Call 911 anytime you think you may need emergency care. For example, call if:    · You have sudden chest pain and shortness of breath, or you cough up blood.     · Your leg is cool or pale or changes color.    Call your doctor now or seek immediate medical care if:    · You have increasing or severe pain.     · Your leg suddenly feels weak and you cannot move it.     · You have signs of a blood clot, such as:  ? Pain in your calf, back of the knee, thigh, or groin. ? Redness and swelling in your leg or groin.     · You have signs of infection, such as:  ? Increased pain, swelling, warmth, or redness. ? Red streaks leading from the sore area. ? Pus draining from a place on your leg. ? A fever.     · You cannot bear weight on your leg.    Watch closely for changes in your health, and be sure to contact your doctor if:    · You do not get better as expected. Where can you learn more? Go to http://sierra-ghanshyam.info/. Enter G293 in the search box to learn more about \"Leg Pain: Care Instructions. \"  Current as of: September 23, 2018  Content Version: 11.9  © 3624-9584 [employer]. Care instructions adapted under license by [employer] (which disclaims liability or warranty for this information). If you have questions about a medical condition or this instruction, always ask your healthcare professional. [place] [address] any warranty or liability for your use of this information.

## 2019-07-10 NOTE — PROGRESS NOTES
Chief Complaint   Patient presents with    Other     leg cramps/ room 6     HPI:  The patient is a 47 y.o. female who presents today for a follow up appointment. No hospital, ER or specialist visits since last primary care visit except as noted below. Leg Cramps:  Cramps in both legs, has been present for approximately 4 months. She started Magnesium 400 mg and MV consistently x 2 weeks. She is a MA for [employer]. She reports her legs have been throbbing, feels like spasms, described as similar to \"the matrix movie, the computer screen that runs up and down\". The cramping is present bilaterally in the upper calf region and it travels up and down to the bottom of the calf. She has been taking tylenol arthritis with minimal relief, she takes them every 8 hours. She reports that the discomfort is waking her from her sleep nightly. She denies any change that precipitated it. She denies CP, SOB. Denies swelling, redness, warmth in the calf region. The left leg is more painful than the right. She does not take statins. Her fluid intake is poor but this has not been a change. She does take Diovan-HCT but has \"taken this for years\". She has tried massaging, hot compress/cold compression, flexion of her foot, these have improved her pain only minimally and temporarily. She reports walking makes it better. Sitting makes it worse. She denies numbness/tingling. Review of Systems  A comprehensive review of systems was negative except for that written in the HPI.     Patient Active Problem List   Diagnosis Code    Iron deficiency anemia D50.9    Gastritis K29.70    Allergic rhinitis J30.9    Hiatal hernia K44.9    Radiculopathy, cervical M54.12    Heart palpitations R00.2    Shoulder pain, right M25.511    Multiple thyroid nodules E04.2    Chronic insomnia F51.04    Vitamin D deficiency E55.9    Incidental lung nodule R91.1    Irritable bowel syndrome (IBS) K58.9    Gastrointestinal food allergy K52.29    Hot flashes R23.2    Nathalie-menopause N95.1    Chronic lower back pain M54.5, G89.29    Essential hypertension I10    Advanced care planning/counseling discussion Z71.89    Intractable migraine without aura and without status migrainosus G43.019    Allergic rhinitis due to allergen J30.9    Aspirin intolerance Z78.9    Obesity, Class III, BMI 40-49.9 (morbid obesity) ([facility]) E66.01    Hyperglycemia R73.9    High cholesterol E78.00    Urgency of urination R39.15    Degeneration of lumbosacral intervertebral disc M51.37    Hip pain M25.559    Low back pain M54.5    Lumbar radiculopathy M54.16    Pain in both lower extremities M79.604, M79.605       Past Medical History:   Diagnosis Date    Allergic rhinitis 5/27/2010    Cervical disc herniation 01/2010    C3-4, C4-5, C5-6. Hemangioma body of C5. Dr. Danis Jacobo.  Chest pain 08/2011, 02/2012    Dr. Martir Lugo. Dr. Russell Youssef; denies CP as of 3/27/14    Chronic lower back pain 2010, 03/2016    thoracic DDD and spondylosis. DDD L3-S1. Dr. Vitaliy Jean. [facility]. Dr. Raquel Perkins, [facility]. Dr. Summer Cramer.  Esophagitis, reflux 4/3/2011    Gallstone     Gastritis 5/27/2010    Dr. Kierra Griffin.  Gastrointestinal food allergy 03/2014    Multiple. Dr. Jackie Dewitt.    Heart palpitations 02/2012    DUE TO PAC'S AND PVC'S. Dr. Forest Farrell.  Hiatal hernia 4/3/2011    HTN (hypertension) 1987    Impaired glucose tolerance 10/15/2010    Incidental lung nodule 01/08/13    LLL 3.9 mm, RML 3.1 mm;  as of 3/27/14 per pt stable w/o growth    Insomnia 1990s    Iron deficiency anemia 5/27/2010    Irritable bowel syndrome (IBS) 03/2014    Dr. Flori Calderon.    Knee pain 2012    Right. due to severe OA. / chipped bone     Metatarsal bone fracture 1990    Left fifth.  Migraine 2/22/2011    Dr. Gillis Lord, cervical 01/2010    Left. Dr. Danis Jacobo.     Shoulder pain, right 2012    due to [address] X 2.  Dr. Maral Weber.  Thyroid nodule     Dr. Cortney Garcia    Toe fracture, left     fifth toe.  Triangular fibrocartilage complex tear 2012    Dr. Shakila Dey. Left. Past Surgical History:   Procedure Laterality Date    HX  SECTION  1995    x 1    HX CHOLECYSTECTOMY      HX COLONOSCOPY  14    Dr. Thao Vásquez; 14    due to abnormal PAP.  HX DILATION AND CURETTAGE      due to miscarriage.  HX ENDOSCOPY      HX ORTHOPAEDIC Right 2016    LUMBAR L4-5, L5-S1 ELLY. Dr. Marta Sin 43 History     Tobacco Use    Smoking status: Never Smoker    Smokeless tobacco: Never Used   Substance Use Topics    Alcohol use: No    Drug use: No       Family History   Problem Relation Age of Onset    Diabetes Mother     Heart Disease Mother 58        2 stents    Hypertension Mother     Diabetes Father     Stroke Maternal Grandmother     Cancer Maternal Uncle         prostate           Current Outpatient Medications on File Prior to Visit   Medication Sig Dispense Refill    propranolol LA (INDERAL LA) 80 mg SR capsule Take 1 Cap by mouth daily. Indications: Migraine Prevention 90 Cap 3    valsartan-hydroCHLOROthiazide (DIOVAN-HCT) 80-12.5 mg per tablet Take 1 Tab by mouth daily. Indications: high blood pressure 90 Tab 3    montelukast (SINGULAIR) 10 mg tablet Take 1 Tab by mouth daily. Indications: inflammation of the nose due to an allergy 30 Tab 11    omeprazole (PRILOSEC) 20 mg capsule TAKE 1 CAPSULE BY MOUTH TWICE DAILY  Indications: heartburn 60 Cap 5    acetaminophen (TYLENOL ARTHRITIS PAIN) 650 mg CR tablet Take 650 mg by mouth as needed for Pain. No current facility-administered medications on file prior to visit.         Allergies   Allergen Reactions    Doxylamine Succinate Swelling     12.5-25 MG  HANDS, FACE/PERIORAL, FEET    Pcn [Penicillins] Hives    Ambien [Zolpidem] Nausea and Vomiting and Other (comments)     5 mg with Ativan 0.5 mg   TOO GROGGY, SLEPT 24 HOURS  7.5 MG FORGOT SHE WAS SLEEP EATING    Aspirin Nausea Only    Celebrex [Celecoxib] Other (comments)     TIGHT SQUEEZING IN CHEST  CHEST ACHED    Erythromycin Nausea and Vomiting    Milk Prot-Turm-Pepper-Pineappl Other (comments)     Multiple food allergies    Neurontin [Gabapentin] Other (comments)     300 mg  SEVERE LEG PAIN  EYE TWITCHING    Nsaids (Non-Steroidal Anti-Inflammatory Drug) Other (comments)     GI irritation         PE:  Visit Vitals  /89 (BP 1 Location: Left arm, BP Patient Position: Sitting)   Pulse 85   Temp 97.5 °F (36.4 °C) (Oral)   Resp 19   Ht 5' 8\" (1.727 m)   Wt 276 lb 14.4 oz (125.6 kg)   SpO2 95%   BMI 42.10 kg/m²       Gen: alert, oriented, no acute distress  Head: normocephalic, atraumatic  Ears: external auditory canals clear, TMs without erythema or effusion  Eyes: pupils equal round reactive to light, sclera clear, conjunctiva clear  Oral: moist mucus membranes, no oral lesions, no pharyngeal inflammation or exudate  Neck: symmetric normal sized thyroid, no carotid bruits, no jugular vein distention  Resp: no increase work of breathing, lungs clear to ausculation bilaterally, no wheezing, rales or rhonchi  CV: S1, S2 normal.  No murmurs, rubs, or gallops. Abd: soft, not tender, not distended. No hepatosplenomegaly. Normal bowel sounds. No hernias. No abdominal or renal bruits. Neuro: cranial nerves intact, normal strength and movement in all extremities, reflexes and sensation intact and symmetric. Skin: no lesion or rash  Extremities: no cyanosis or edema    No results found for this visit on 07/10/19. Assessment/Plan:    ICD-10-CM ICD-9-CM    1. Pain in both lower extremities M79.604 729.5     M79.605     2. Obesity, Class III, BMI 40-49.9 (morbid obesity) ([facility]) E66.01 278.01      Diagnoses and all orders for this visit:    1. Pain in both lower extremities    2.  Obesity, Class III, BMI 40-49.9 (morbid obesity) ([facility] [address]      Follow-up and Dispositions    · Return if symptoms worsen or fail to improve.       lab results and schedule of future lab studies reviewed with patient  reviewed diet, exercise and weight control  reviewed medications and side effects in detail    Patient referred to ED for further evaluation and treatment, preliminary labs from yesterday reviewed in the office    lose weight, increase physical activity, call if any problems    Health Maintenance reviewed - deferred to PCP. Recommended healthy diet low in carbohydrates, fats, sodium and cholesterol. Recommended regular cardiovascular exercise 3-6 times per week for 30-60 minutes daily. Chart is reviewed and updated today in the office. Records requested for other providers patient has seen and is currently seeing. Patient was offered a choice/choices in the treatment plan today. Patient expresses understanding of the plan and agrees with recommendations. Verbal and written instructions (see AVS) provided. See patient instructions for more. Patient expresses understanding of diagnosis and treatment plan.

## 2019-07-10 NOTE — PATIENT INSTRUCTIONS
Leg Pain: Care Instructions Your Care Instructions Many things can cause leg pain. Too much exercise or overuse can cause a muscle cramp (or charley horse). You can get leg cramps from not eating a balanced diet that has enough potassium, calcium, and other minerals. If you do not drink enough fluids or are taking certain medicines, you may develop leg cramps. Other causes of leg pain include injuries, blood flow problems, nerve damage, and twisted and enlarged veins (varicose veins). You can usually ease pain with self-care. Your doctor may recommend that you rest your leg and keep it elevated. Follow-up care is a key part of your treatment and safety. Be sure to make and go to all appointments, and call your doctor if you are having problems. It's also a good idea to know your test results and keep a list of the medicines you take. How can you care for yourself at home? · Take pain medicines exactly as directed. ? If the doctor gave you a prescription medicine for pain, take it as prescribed. ? If you are not taking a prescription pain medicine, ask your doctor if you can take an over-the-counter medicine. · Take any other medicines exactly as prescribed. Call your doctor if you think you are having a problem with your medicine. · Rest your leg while you have pain, and avoid standing for long periods of time. · Prop up your leg at or above the level of your heart when possible. · Make sure you are eating a balanced diet that is rich in calcium, potassium, and magnesium, especially if you are pregnant. · If directed by your doctor, put ice or a cold pack on the area for 10 to 20 minutes at a time. Put a thin cloth between the ice and your skin. · Your leg may be in a splint, a brace, or an elastic bandage, and you may have crutches to help you walk. Follow your doctor's directions about how long to wear supports and how to use the crutches. When should you call for help? Call 911 anytime you think you may need emergency care. For example, call if: 
  · You have sudden chest pain and shortness of breath, or you cough up blood.  
  · Your leg is cool or pale or changes color.  
 Call your doctor now or seek immediate medical care if: 
  · You have increasing or severe pain.  
  · Your leg suddenly feels weak and you cannot move it.  
  · You have signs of a blood clot, such as: 
? Pain in your calf, back of the knee, thigh, or groin. ? Redness and swelling in your leg or groin.  
  · You have signs of infection, such as: 
? Increased pain, swelling, warmth, or redness. ? Red streaks leading from the sore area. ? Pus draining from a place on your leg. ? A fever.  
  · You cannot bear weight on your leg.  
 Watch closely for changes in your health, and be sure to contact your doctor if: 
  · You do not get better as expected. Where can you learn more? Go to http://sierra-ghanshyam.info/. Enter Y563 in the search box to learn more about \"Leg Pain: Care Instructions. \" Current as of: September 23, 2018 Content Version: 11.9 © 7767-3134 [employer]. Care instructions adapted under license by [employer] (which disclaims liability or warranty for this information). If you have questions about a medical condition or this instruction, always ask your healthcare professional. [place] [address] any warranty or liability for your use of this information.

## 2019-07-10 NOTE — PROGRESS NOTES
Leeanne Parham  Identified pt with two pt identifiers(name and ). Chief Complaint   Patient presents with    Other     leg cramps/ room 6       1. Have you been to the ER, urgent care clinic since your last visit? n  Hospitalized since your last visit? n     2. Have you seen or consulted any other health care providers outside of the [address] [place] [address] since your last visit? Include any pap smears or colon screening. n       Advance Care Planning    In the event something were to happen to you and you were unable to speak on your behalf, do you have an Advance Directive/ Living Will in place stating your wishes? NO    If yes, do we have a copy on file NO    If no, would you like information NO    Medication reconciliation up to date and corrected with patient at this time. Today's provider has been notified of reason for visit, vitals and flowsheets obtained on patients. Reviewed record in preparation for visit, huddled with provider and have obtained necessary documentation.       Health Maintenance Due   Topic    BREAST CANCER SCRN MAMMOGRAM        Wt Readings from Last 3 Encounters:   07/10/19 276 lb 14.4 oz (125.6 kg)   19 266 lb (120.7 kg)   19 266 lb (120.7 kg)     Temp Readings from Last 3 Encounters:   07/10/19 97.5 °F (36.4 °C) (Oral)   19 98.7 °F (37.1 °C) (Oral)   19 98.1 °F (36.7 °C)     BP Readings from Last 3 Encounters:   07/10/19 130/89   19 (!) 137/93   19 (!) 159/112     Pulse Readings from Last 3 Encounters:   07/10/19 85   19 73   19 72     Vitals:    07/10/19 0939   BP: 130/89   Pulse: 85   Resp: 19   Temp: 97.5 °F (36.4 °C)   TempSrc: Oral   SpO2: 95%   Weight: 276 lb 14.4 oz (125.6 kg)   Height: 5' 8\" (1.727 m)   PainSc:   6   PainLoc: Leg         Learning Assessment:  :     Learning Assessment 7/10/2019 2014   PRIMARY LEARNER Patient Patient   HIGHEST LEVEL OF EDUCATION - PRIMARY LEARNER  2 YEARS OF COLLEGE SOME COLLEGE   BARRIERS PRIMARY LEARNER NONE NONE   CO-LEARNER CAREGIVER No -   PRIMARY LANGUAGE ENGLISH ENGLISH   LEARNER PREFERENCE PRIMARY LISTENING DEMONSTRATION   ANSWERED BY patient patient   RELATIONSHIP SELF SELF       Depression Screening:  :     3 most recent PHQ Screens 7/10/2019   Little interest or pleasure in doing things Not at all   Feeling down, depressed, irritable, or hopeless Not at all   Total Score PHQ 2 0       No flowsheet data found. Fall Risk Assessment:  :     Fall Risk Assessment, last 12 mths 4/22/2019   Able to walk? Yes   Fall in past 12 months? No       Abuse Screening:  :     Abuse Screening Questionnaire 4/22/2019 12/7/2017   Do you ever feel afraid of your partner? N N   Are you in a relationship with someone who physically or mentally threatens you? N N   Is it safe for you to go home? Y Y       ADL Screening:  :     ADL Assessment 7/10/2019   Feeding yourself No Help Needed   Getting from bed to chair No Help Needed   Getting dressed No Help Needed   Bathing or showering No Help Needed   Walk across the room (includes cane/walker) No Help Needed   Using the telphone No Help Needed   Taking your medications No Help Needed   Preparing meals No Help Needed   Managing money (expenses/bills) No Help Needed   Moderately strenuous housework (laundry) No Help Needed   Shopping for personal items (toiletries/medicines) No Help Needed   Shopping for groceries No Help Needed   Driving No Help Needed   Climbing a flight of stairs No Help Needed   Getting to places beyond walking distances No Help Needed           BMI:  Weight Metrics 7/10/2019 4/22/2019 4/6/2019 2/5/2019 9/6/2018 7/19/2018 6/1/2018   Weight 276 lb 14.4 oz - 266 lb 266 lb 263 lb 6.4 oz 269 lb 12.8 oz 286 lb 14.4 oz   BMI 42.1 kg/m2 40.45 kg/m2 40.45 kg/m2 40.45 kg/m2 40.05 kg/m2 41.02 kg/m2 44.93 kg/m2           Medication reconciliation up to date and corrected with patient at this time.

## 2019-07-10 NOTE — ED TRIAGE NOTES
Pt presents to ED after visiting her PCP this morning for cramps in her legs, mostly in the left. Pt reports waking up with cramps almost daily. Pt PCP wanted her screened for blood clots. Pt reports the left is worse than the right.  Pt states it radiates up and down both legs however most of the pain is located in the calf

## 2019-09-01 ENCOUNTER — HOSPITAL ENCOUNTER (OUTPATIENT)
Dept: MRI IMAGING | Age: 54
Discharge: HOME | End: 2019-09-01
Attending: NURSE PRACTITIONER
Payer: COMMERCIAL

## 2019-09-01 DIAGNOSIS — M54.50 LOW BACK PAIN: ICD-10-CM

## 2019-09-01 PROCEDURE — 72148 MRI LUMBAR SPINE W/O DYE: CPT

## 2019-09-20 ENCOUNTER — OFFICE VISIT (OUTPATIENT)
Dept: FAMILY MEDICINE CLINIC | Age: 54
End: 2019-09-20

## 2019-09-20 ENCOUNTER — HOSPITAL ENCOUNTER (OUTPATIENT)
Dept: GENERAL RADIOLOGY | Age: 54
Discharge: HOME | End: 2019-09-20
Payer: COMMERCIAL

## 2019-09-20 ENCOUNTER — OFFICE VISIT (OUTPATIENT)
Dept: ORTHOPEDIC SURGERY | Age: 54
End: 2019-09-20

## 2019-09-20 VITALS
BODY MASS INDEX: 43.41 KG/M2 | HEART RATE: 72 BPM | RESPIRATION RATE: 20 BRPM | DIASTOLIC BLOOD PRESSURE: 94 MMHG | HEIGHT: 68 IN | WEIGHT: 286.4 LBS | OXYGEN SATURATION: 98 % | SYSTOLIC BLOOD PRESSURE: 143 MMHG

## 2019-09-20 VITALS
BODY MASS INDEX: 43.35 KG/M2 | HEART RATE: 94 BPM | OXYGEN SATURATION: 96 % | SYSTOLIC BLOOD PRESSURE: 136 MMHG | HEIGHT: 68 IN | TEMPERATURE: 98.6 F | WEIGHT: 286 LBS | DIASTOLIC BLOOD PRESSURE: 78 MMHG

## 2019-09-20 DIAGNOSIS — E55.9 VITAMIN D DEFICIENCY: ICD-10-CM

## 2019-09-20 DIAGNOSIS — G89.29 CHRONIC PAIN OF LEFT KNEE: ICD-10-CM

## 2019-09-20 DIAGNOSIS — G89.29 CHRONIC PAIN OF LEFT KNEE: Primary | ICD-10-CM

## 2019-09-20 DIAGNOSIS — M25.562 CHRONIC PAIN OF LEFT KNEE: Primary | ICD-10-CM

## 2019-09-20 DIAGNOSIS — M25.562 CHRONIC PAIN OF LEFT KNEE: ICD-10-CM

## 2019-09-20 PROCEDURE — 73562 X-RAY EXAM OF KNEE 3: CPT

## 2019-09-20 RX ORDER — ERGOCALCIFEROL 1.25 MG/1
50000 CAPSULE ORAL
Qty: 12 CAP | Refills: 0 | Status: SHIPPED | OUTPATIENT
Start: 2019-09-20 | End: 2020-06-02 | Stop reason: SDUPTHER

## 2019-09-20 RX ORDER — BETAMETHASONE SODIUM PHOSPHATE AND BETAMETHASONE ACETATE 3; 3 MG/ML; MG/ML
6 INJECTION, SUSPENSION INTRA-ARTICULAR; INTRALESIONAL; INTRAMUSCULAR; SOFT TISSUE ONCE
Qty: 1 ML | Refills: 0
Start: 2019-09-20 | End: 2019-09-20

## 2019-09-20 NOTE — PROGRESS NOTES
Leeanne Parham  Identified pt with two pt identifiers(name and ). Chief Complaint   Patient presents with    Knee Pain     left knee; started about a month ago       1. Have you been to the ER, urgent care clinic since your last visit? Hospitalized since your last visit? No    2. Have you seen or consulted any other health care providers outside of the [address] [place] [address] since your last visit? Include any pap smears or colon screening. No      Would you like to sign up for MyChart today, if you have not already done so? Patient has a mychart  If not, would you like information on MyChart, and how to sign up at a later time? No      Medication reconciliation up to date and corrected with patient at this time. Today's provider has been notified of reason for visit, vitals and flowsheets obtained on patients. Reviewed record in preparation for visit, huddled with provider and have obtained necessary documentation.       Health Maintenance Due   Topic    Shingrix Vaccine Age 49> (1 of 2)    BREAST CANCER SCRN MAMMOGRAM     Influenza Age 5 to Adult        Wt Readings from Last 3 Encounters:   19 286 lb 6.4 oz (129.9 kg)   07/10/19 276 lb 14.4 oz (125.6 kg)   19 266 lb (120.7 kg)     Temp Readings from Last 3 Encounters:   07/10/19 97.5 °F (36.4 °C)   07/10/19 97.5 °F (36.4 °C) (Oral)   19 98.7 °F (37.1 °C) (Oral)     BP Readings from Last 3 Encounters:   19 (!) 143/94   07/10/19 131/86   07/10/19 130/89     Pulse Readings from Last 3 Encounters:   19 72   07/10/19 77   07/10/19 85     Vitals:    19 0842   BP: (!) 143/94   Pulse: 72   Resp: 20   SpO2: 98%   Weight: 286 lb 6.4 oz (129.9 kg)   Height: 5' 8\" (1.727 m)   PainSc:   6   PainLoc: Knee         Learning Assessment:  :     Learning Assessment 7/10/2019 2014   PRIMARY LEARNER Patient Patient   HIGHEST LEVEL OF EDUCATION - PRIMARY LEARNER  2 YEARS OF COLLEGE SOME COLLEGE   BARRIERS PRIMARY LEARNER NONE NONE   CO-LEARNER CAREGIVER No -   PRIMARY LANGUAGE ENGLISH ENGLISH   LEARNER PREFERENCE PRIMARY LISTENING DEMONSTRATION   ANSWERED BY patient patient   RELATIONSHIP SELF SELF       Depression Screening:  :     3 most recent PHQ Screens 7/10/2019   Little interest or pleasure in doing things Not at all   Feeling down, depressed, irritable, or hopeless Not at all   Total Score PHQ 2 0       Fall Risk Assessment:  :     Fall Risk Assessment, last 12 mths 4/22/2019   Able to walk? Yes   Fall in past 12 months? No       Abuse Screening:  :     Abuse Screening Questionnaire 4/22/2019 12/7/2017   Do you ever feel afraid of your partner? N N   Are you in a relationship with someone who physically or mentally threatens you? N N   Is it safe for you to go home?  Y Y       ADL Screening:  :     ADL Assessment 7/10/2019   Feeding yourself No Help Needed   Getting from bed to chair No Help Needed   Getting dressed No Help Needed   Bathing or showering No Help Needed   Walk across the room (includes cane/walker) No Help Needed   Using the telphone No Help Needed   Taking your medications No Help Needed   Preparing meals No Help Needed   Managing money (expenses/bills) No Help Needed   Moderately strenuous housework (laundry) No Help Needed   Shopping for personal items (toiletries/medicines) No Help Needed   Shopping for groceries No Help Needed   Driving No Help Needed   Climbing a flight of stairs No Help Needed   Getting to places beyond walking distances No Help Needed

## 2019-09-20 NOTE — PROGRESS NOTES
S: Igor Clinton is a 47 y.o. female who presents for left knee pain    Assessment/Plan:   1. Chronic pain of left knee  -had doppler in July for DVT = negative  -pain escalating, now unstable gait, 6-7/10 constant pain  -McMurrays-positive; Patellar Grind test -positive   - [facility] - Dr. Chuck Estrella to see pt today    2. Vitamin D deficiency  -Vit D = 16 (7/2019), no meds  - ergocalciferol (ERGOCALCIFEROL) 50,000 unit capsule; q7d x12 wks         HPI:  Vit D = 16 (7/2019)  Not on any therapy - states D is always low, willing to start 50k D     Site of pain: left knee pain - started limping this week  Pain:   6 /10; Sharp,shooting, dull, aching  Has tried ice/heat, doesn't help  Pain wakes her up at night   Onset: been going on for several months - saw NP [facility] in July for pain  Went to ED in July to make sure it wasn't a blood clot    Pain radiates down the leg  No Injury/Trauma to area  No popping noise heard  Put weight on it but hurts, gait is off - feels unstable walking now  Really hurts after sitting awhile and gets up to move  Reduced ROM  No numbness/Tingling  Hx of similar injury  No Surgeries  Takes Tylenol arthritis <3200mg daily but doesn't stop pain - just takes the edge off   Was givn steroid that helped back but not so much knee   Dr. 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     I reviewed the following: Past Medical History:   Diagnosis Date    Allergic rhinitis 5/27/2010    Cervical disc herniation 01/2010    C3-4, C4-5, C5-6. Hemangioma body of C5. Dr. Arelis Issa.  Chest pain 08/2011, 02/2012    Dr. Anna Arzate. Dr. Valedz Chung; denies CP as of 3/27/14    Chronic lower back pain 2010, 03/2016    thoracic DDD and spondylosis. DDD L3-S1. Dr. Maritza Paul. [place]. Dr. Lee Viveros, [facility] [place]. Dr. Aldo Kohler.  Esophagitis, reflux 4/3/2011    Gallstone     Gastritis 5/27/2010    Dr. Raimundo Cerna.  Gastrointestinal food allergy 03/2014    Multiple. Dr. Ceferino Valencia.    Heart palpitations 02/2012    DUE TO PAC'S AND PVC'S. Dr. Janice Thurman.  Hiatal hernia 4/3/2011    HTN (hypertension) 1987    Impaired glucose tolerance 10/15/2010    Incidental lung nodule 01/08/13    LLL 3.9 mm, RML 3.1 mm;  as of 3/27/14 per pt stable w/o growth    Insomnia 1990s    Iron deficiency anemia 5/27/2010    Irritable bowel syndrome (IBS) 03/2014    Dr. Marcia Lopez.    Knee pain 2012    Right. due to severe OA. / chipped bone     Metatarsal bone fracture 1990    Left fifth.  Migraine 2/22/2011    Dr. Carlota Montes, cervical 01/2010    Left. Dr. Arelis Issa.  Shoulder pain, right 2012    due to [address] X 2. Dr. Jing Lopez.  Thyroid nodule 2011    Dr. Boone     Toe fracture, left 2008    fifth toe.  Triangular fibrocartilage complex tear 2012    Dr. Gina Arroyo. Left. Current Outpatient Medications   Medication Sig Dispense Refill    propranolol LA (INDERAL LA) 80 mg SR capsule Take 1 Cap by mouth daily. Indications: Migraine Prevention 90 Cap 3    valsartan-hydroCHLOROthiazide (DIOVAN-HCT) 80-12.5 mg per tablet Take 1 Tab by mouth daily. Indications: high blood pressure 90 Tab 3    montelukast (SINGULAIR) 10 mg tablet Take 1 Tab by mouth daily.  Indications: inflammation of the nose due to an allergy 30 Tab 11    omeprazole (PRILOSEC) 20 mg capsule TAKE 1 CAPSULE BY MOUTH TWICE DAILY  Indications: heartburn 60 Cap 5    acetaminophen (TYLENOL ARTHRITIS PAIN) 650 mg CR tablet Take 650 mg by mouth as needed for Pain. Allergies   Allergen Reactions    Doxylamine Succinate Swelling     12.5-25 MG  HANDS, FACE/PERIORAL, FEET    Pcn [Penicillins] Hives    Ambien [Zolpidem] Nausea and Vomiting and Other (comments)     5 mg with Ativan 0.5 mg   TOO GROGGY, SLEPT 24 HOURS  7.5 MG FORGOT SHE WAS SLEEP EATING    Aspirin Nausea Only    Celebrex [Celecoxib] Other (comments)     TIGHT SQUEEZING IN CHEST  CHEST ACHED    Erythromycin Nausea and Vomiting    Milk Prot-Turm-Pepper-Pineappl Other (comments)     Multiple food allergies    Neurontin [Gabapentin] Other (comments)     300 mg  SEVERE LEG PAIN  EYE TWITCHING    Nsaids (Non-Steroidal Anti-Inflammatory Drug) Other (comments)     GI irritation          O: VS:   Visit Vitals  BP (!) 143/94 (BP 1 Location: Left arm, BP Patient Position: Sitting)   Pulse 72   Resp 20   Ht 5' 8\" (1.727 m)   Wt 286 lb 6.4 oz (129.9 kg)   LMP  (LMP Unknown)   SpO2 98%   BMI 43.55 kg/m²       GENERAL Leeannejohn Parham is in no acute distress. Non-toxic. Well nourished. Well developed. Appropriately groomed. RESP: Breath sounds are symmetrical bilaterally. Unlabored without SOB. Speaking in full sentences. Clear to auscultation bilaterally anteriorly and posteriorly. No wheezes. No rales or rhonchi. CV: normal rate. Regular rhythm. S1, S2 audible. No murmur noted. No rubs, clicks or gallops noted. MUSC:  Intact x 4 extremities. Inspection and Palpation: no deformity, muscle atrophy, erythema, edema or ecchymosis noted. Negative Ballotment. Gait: uneven  ROM: No decreased movement with Forward Flexion, Abduction, Adduction, External or Internal rotation. Crepitus noted. Strength: 5/5  Flexion and Extension  Knee:    McMurrays-positive  Lachman's -negative  Patellar Grind test -positive   Valgus-  negative  Varus - negative  NEURO: DTR:   Patella:2   HEME/LYMPH: peripheral pulses palpable 2+ x 4 extremities. SKIN: Skin is warm and dry. Turgor is normal. No petechiae, no purpura, no rash. No cyanosis. No mottling, jaundice or pallor. _______________________________________________________________  Patient education was done. Advised on nutrition, physical activity, tobacco, alcohol and safety. Counseling included discussion of diagnosis, differentials, treatment options, prescribed treatment, warning signs and follow up. Medication risks/benefits, interactions and alternatives discussed with patient.      Patient verbalized understanding and agreed to plan of care. Patient was given an after visit summary which included current diagnoses, medications and vital signs. Follow up as needed or if symptoms progress.

## 2019-09-20 NOTE — PROGRESS NOTES
Identified pt with two pt identifiers(name and ). Reviewed record in preparation for visit and have obtained necessary documentation. Chief Complaint   Patient presents with    Knee Pain     left knee        Health Maintenance Due   Topic    Shingrix Vaccine Age 50> (1 of 2)    BREAST CANCER SCRN MAMMOGRAM     Influenza Age 5 to Adult        Coordination of Care Questionnaire:  :   1) Have you been to an emergency room, urgent care, or hospitalized since your last visit? If yes, where when, and reason for visit? no     2. Have seen or consulted any other health care provider other than [facility] since your last visit? If yes, where when, and reason for visit? NO    3) Do you have an Advanced Directive/ Living Will in place? NO  If yes, do we have a copy on file NO  If no, would you like information NO    Patient is accompanied by self I have received verbal consent from [facility] to discuss any/all medical information while they are present in the room.     Visit Vitals  /78 (BP 1 Location: Right arm, BP Patient Position: Sitting)   Pulse 94   Temp 98.6 °F (37 °C) (Oral)   Ht 5' 8\" (1.727 m)   Wt 286 lb (129.7 kg)   SpO2 96%   BMI 43.49 kg/m²     Umm Bey LPN

## 2019-09-20 NOTE — PATIENT INSTRUCTIONS
1) Left knee pain - possible PCL issue. Will refer to ortho - Dr. Cierra Varghese  Ordered an MRI, but if you see Dr. Kae De Dios today, will cancel my order and let him order test.      2) Osteoarthritis (OA), which is also known as \"wear and tear arthritis,\" non-inflammatory arthritis or mechanical arthritis. OA can be caused by hereditary, predisposition, work activities and/or joint injuries. Every 1 lb of extra weight is approximately 5 lbs of extra weight on the knees a healthy weight helps to slow the progression of osteoarthritis. Exercise to try include: aerobic, aquatic, and/or resistance exercises as well as weight loss for overweight patients    (2) Pain relievers such as acetaminophen as first line, oral and topical NSAIDs as second line, tramadol as third line and intra-articular corticosteroid injections as fourth line. NSAIDs should not be used in patients on blood thinners, chronic kidney disease, high risk coronary artery disease, and inflammatory bowel disease (ulcerative colitis or Crohn's disease)     (3) Joint replacement surgery if all previous fail, knowing that there is a 10 year lifespan per prosthesis. Pain Relievers:   -Tylenol (acetaminophen) has the least GI toxicity. Limit to no more than 6208-5561 mg per day at MAXIMUM, but use the least amount you can to get effect.  -Most effective pain medication has been shown to be diclofenac at 150 mg PO day and currently is the most effective NSAID available in term of improving both pain and function. Celecoxib and naproxen have high efficacy as well. -Topical option is diclofenac - up to 4x day - is also better option long term, as it has less systemic effect  as it is applied right to area of pain and doesn't go through your body systems  -Glucosamine chondroitin - research shows it has some decrease in pain and joint space narrowing but doesn't work for everyone. If it works for you, it is a good OTC option.  There is no great harm found to suggest otherwise. 3) Your Vitamin D level is 16 ng/mL which is very low. (Normal range is 30-100ng/mL.)  Vitamin D is important for absorbing calcium and promoting bone growth. You can get Vitamin D in a few ways: (1) your body can make Vitamin D after exposure to sunlight, or (2) through foods like fatty fish (tuna, mackerel, salmon), food with Vitamin D added (dairy products, orange juice and cereals) and cheese, egg yolks and liver. Vitamin D decreases with age and in the winter time when the sun is not strong. A deficiency in Vitamin D has been linked to certain cancers (breast, prostate, colon) as well as heart disease, depression and weight gain. A prescription strength Vitamin D is needed to help raise your level to an appropriate amount. Consequently, a prescription for vitamin D2 50,000 units has been called into your pharmacy. Please take this medication once a week for 12 weeks. Your prescription bottle will have the exact directions on it. Remember this is a WEEKLY medication,  NOT every day. Once you have completed the prescription Vitamin D2, please begin taking an OTC (over the counter) vitamin D supplement, 2000 international units (IU) daily. There are many different vitamin D on the market, but Vitamin D3 (Cholecalciferol) is best.    Please make a lab appointment in 3-4 months to recheck your Vitamin D level. Knee Pain or Injury: Care Instructions  Your Care Instructions    Injuries are a common cause of knee problems. Sudden (acute) injuries may be caused by a direct blow to the knee. They can also be caused by abnormal twisting, bending, or falling on the knee. Pain, bruising, or swelling may be severe, and may start within minutes of the injury. Overuse is another cause of knee pain. Other causes are climbing stairs, kneeling, and other activities that use the knee. Everyday wear and tear, especially as you get older, also can cause knee pain.   Rest, along with home treatment, often relieves pain and allows your knee to heal. If you have a serious knee injury, you may need tests and treatment. Follow-up care is a key part of your treatment and safety. Be sure to make and go to all appointments, and call your doctor if you are having problems. It's also a good idea to know your test results and keep a list of the medicines you take. How can you care for yourself at home? · Be safe with medicines. Read and follow all instructions on the label. ? If the doctor gave you a prescription medicine for pain, take it as prescribed. ? If you are not taking a prescription pain medicine, ask your doctor if you can take an over-the-counter medicine. · Rest and protect your knee. Take a break from any activity that may cause pain. · Put ice or a cold pack on your knee for 10 to 20 minutes at a time. Put a thin cloth between the ice and your skin. · Prop up a sore knee on a pillow when you ice it or anytime you sit or lie down for the next 3 days. Try to keep it above the level of your heart. This will help reduce swelling. · If your knee is not swollen, you can put moist heat, a heating pad, or a warm cloth on your knee. · If your doctor recommends an elastic bandage, sleeve, or other type of support for your knee, wear it as directed. · Follow your doctor's instructions about how much weight you can put on your leg. Use a cane, crutches, or a walker as instructed. · Follow your doctor's instructions about activity during your healing process. If you can do mild exercise, slowly increase your activity. · Reach and stay at a healthy weight. Extra weight can strain the joints, especially the knees and hips, and make the pain worse. Losing even a few pounds may help. When should you call for help? Call 911 anytime you think you may need emergency care. For example, call if:    · You have symptoms of a blood clot in your lung (called a pulmonary embolism).  These may include:  ? Sudden chest pain. ? Trouble breathing. ? Coughing up blood.    Call your doctor now or seek immediate medical care if:    · You have severe or increasing pain.     · Your leg or foot turns cold or changes color.     · You cannot stand or put weight on your knee.     · Your knee looks twisted or bent out of shape.     · You cannot move your knee.     · You have signs of infection, such as:  ? Increased pain, swelling, warmth, or redness. ? Red streaks leading from the knee. ? Pus draining from a place on your knee. ? A fever.     · You have signs of a blood clot in your leg (called a deep vein thrombosis), such as:  ? Pain in your calf, back of the knee, thigh, or groin. ? Redness and swelling in your leg or groin.    Watch closely for changes in your health, and be sure to contact your doctor if:    · You have tingling, weakness, or numbness in your knee.     · You have any new symptoms, such as swelling.     · You have bruises from a knee injury that last longer than 2 weeks.     · You do not get better as expected. Where can you learn more? Go to http://sierra-ghanshyam.info/. Enter K195 in the search box to learn more about \"Knee Pain or Injury: Care Instructions. \"  Current as of: September 23, 2018  Content Version: 12.1  © 3489-0058 [employer]. Care instructions adapted under license by [employer] (which disclaims liability or warranty for this information). If you have questions about a medical condition or this instruction, always ask your healthcare professional. [place] [address] any warranty or liability for your use of this information. Posterior Cruciate Ligament Sprain: Rehab Exercises  Introduction  Here are some examples of exercises for you to try. The exercises may be suggested for a condition or for rehabilitation. Start each exercise slowly. Ease off the exercises if you start to have pain.   You will be told when to start these exercises and which ones will work best for you. How to do the exercises  Knee flexion with heel slide    1. Lie on your back with your knees bent. 2. Slide your heel back by bending your affected knee as far as you can. Then hook your other foot around your ankle to help pull your heel even farther back. 3. Hold for about 6 seconds, then rest for up to 10 seconds. 4. Repeat 8 to 12 times. Quad sets    1. Sit with your affected leg straight and supported on the floor or a firm bed. Place a small, rolled-up towel under your knee. Your other leg should be bent, with that foot flat on the floor. 2. Tighten the thigh muscles of your affected leg by pressing the back of your knee down into the towel. 3. Hold for about 6 seconds, then rest for up to 10 seconds. 4. Repeat 8 to 12 times. Short-arc quad    1. Lie on your back with your knees bent over a foam roll or large rolled-up towel and your heels on the floor. 2. Lift the lower part of your affected leg until your leg is straight. Keep the back of your knee on the foam roll or towel. 3. Hold your leg straight for about 6 seconds, then slowly bend your knee and lower your heel back to the floor. Rest for up to 10 seconds between repetitions. 4. Repeat 8 to 12 times. Straight-leg raises to the front    1. Lie on your back with your good knee bent so that your foot rests flat on the floor. Your affected leg should be straight. Make sure that your low back has a normal curve. You should be able to slip your hand in between the floor and the small of your back, with your palm touching the floor and your back touching the back of your hand. 2. Tighten the thigh muscles in your affected leg by pressing the back of your knee flat down to the floor. Hold your knee straight. 3. Keeping the thigh muscles tight and your leg straight, lift your affected leg up so that your heel is about 12 inches off the floor.  Hold for about 6 seconds, then lower slowly. 4. Relax for up to 10 seconds between repetitions. 5. Repeat 8 to 12 times. Straight-leg raises to the back    1. Lie on your stomach, and lift your affected leg straight up behind you (toward the ceiling). 2. Lift your toes about 6 inches off the floor. Hold for about 6 seconds, then lower your leg slowly. 3. Repeat 8 to 12 times. Follow-up care is a key part of your treatment and safety. Be sure to make and go to all appointments, and call your doctor if you are having problems. It's also a good idea to know your test results and keep a list of the medicines you take. Where can you learn more? Go to http://sierra-ghanshyam.info/. Enter B059 in the search box to learn more about \"Posterior Cruciate Ligament Sprain: Rehab Exercises. \"  Current as of: June 26, 2019  Content Version: 12.2  © 1051-7272 [employer]. Care instructions adapted under license by "[employer] (which disclaims liability or warranty for this information). If you have questions about a medical condition or this instruction, always ask your healthcare professional. [address] any warranty or liability for your use of this information.

## 2019-09-20 NOTE — PROGRESS NOTES
9/20/2019    Chief Complaint: Left knee pain    HPI: This is a(n) 47 y. o. who complains of left knee pain. Onset was gradual.  The patient has had activity dependent pain for 2 to 3 months. The patient has tried activity modification, she has not attempted physical therapy, injections have not been tried. The pain is in the knee laterally, it is worse when she sits for quite a long time, she states that the knee has never been treated, but she has been treated for sciatica as well as foot tendinitis with injections severe, it is severe in intensity. The pain causes some limitation with ambulation. The patient complains of of feelings of instability in the knee. She has mechanical symptoms as well. Past Medical History:   Diagnosis Date    Allergic rhinitis 5/27/2010    Cervical disc herniation 01/2010    C3-4, C4-5, C5-6. Hemangioma body of C5. Dr. Padmini Larson.  Chest pain 08/2011, 02/2012    Dr. Willam Yip. Dr. Meka Hart; denies CP as of 3/27/14    Chronic lower back pain 2010, 03/2016    thoracic DDD and spondylosis. DDD L3-S1. Dr. Yin Che. Mary Ann Mariscal. Dr. Gabo Coleman, [facility]. Dr. Abhijit Mishra.  Esophagitis, reflux 4/3/2011    Gallstone     Gastritis 5/27/2010    Dr. Irving Betancourt.  Gastrointestinal food allergy 03/2014    Multiple. Dr. Bharat Schwarz.    Heart palpitations 02/2012    DUE TO PAC'S AND PVC'S. Dr. Martir Lau.  Hiatal hernia 4/3/2011    HTN (hypertension) 1987    Impaired glucose tolerance 10/15/2010    Incidental lung nodule 01/08/13    LLL 3.9 mm, RML 3.1 mm;  as of 3/27/14 per pt stable w/o growth    Insomnia 1990s    Iron deficiency anemia 5/27/2010    Irritable bowel syndrome (IBS) 03/2014    Dr. Kyler Lucas.    Knee pain 2012    Right. due to severe OA. / chipped bone     Metatarsal bone fracture 1990    Left fifth.  Migraine 2/22/2011    Dr. Sarah Lindsey, cervical 01/2010    Left. Dr. Padmini Larson.     Shoulder pain, right 2012    due to [address] X 2. Dr. Gilson Marquez.  Thyroid nodule     Dr. Brock Moreno    Toe fracture, left 2008    fifth toe.  Triangular fibrocartilage complex tear     Dr. Janessa Quintanilla. Left. Past Surgical History:   Procedure Laterality Date    HX  SECTION  1995    x 1    HX CHOLECYSTECTOMY      HX COLONOSCOPY  14    Dr. Juanita Leo; 14    due to abnormal PAP.  HX DILATION AND CURETTAGE      due to miscarriage.  HX ENDOSCOPY      HX ORTHOPAEDIC Right 2016    LUMBAR L4-5, L5-S1 ELLY. Dr. Ella Walters     [address] [place] [address] [facility]       Current Outpatient Medications on File Prior to Visit   Medication Sig Dispense Refill    propranolol LA (INDERAL LA) 80 mg SR capsule Take 1 Cap by mouth daily. Indications: Migraine Prevention 90 Cap 3    valsartan-hydroCHLOROthiazide (DIOVAN-HCT) 80-12.5 mg per tablet Take 1 Tab by mouth daily. Indications: high blood pressure 90 Tab 3    omeprazole (PRILOSEC) 20 mg capsule TAKE 1 CAPSULE BY MOUTH TWICE DAILY  Indications: heartburn 60 Cap 5    acetaminophen (TYLENOL ARTHRITIS PAIN) 650 mg CR tablet Take 650 mg by mouth as needed for Pain.  ergocalciferol (ERGOCALCIFEROL) 50,000 unit capsule Take 1 Cap by mouth every seven (7) days. 12 Cap 0     No current facility-administered medications on file prior to visit.         Allergies   Allergen Reactions    Doxylamine Succinate Swelling     12.5-25 MG  HANDS, FACE/PERIORAL, FEET    Pcn [Penicillins] Hives    Ambien [Zolpidem] Nausea and Vomiting and Other (comments)     5 mg with Ativan 0.5 mg   TOO GROGGY, SLEPT 24 HOURS  7.5 MG FORGOT SHE WAS SLEEP EATING    Aspirin Nausea Only    Celebrex [Celecoxib] Other (comments)     TIGHT SQUEEZING IN CHEST  CHEST ACHED    Erythromycin Nausea and Vomiting    Milk Prot-Turm-Pepper-Pineappl Other (comments)     Multiple food allergies    Neurontin [Gabapentin] Other (comments) 300 mg  SEVERE LEG PAIN  EYE TWITCHING    Nsaids (Non-Steroidal Anti-Inflammatory Drug) Other (comments)     GI irritation         Family History   Problem Relation Age of Onset    Diabetes Mother     Heart Disease Mother 58        2 stents    Hypertension Mother     Diabetes Father     Stroke Maternal Grandmother     Cancer Maternal Uncle         prostate       Social History     Socioeconomic History    Marital status:      Spouse name: Not on file    Number of children: 3    Years of education: Not on file    Highest education level: Not on file   Occupational History    Occupation: Medical assistant     Employer: [employer]   Tobacco Use    Smoking status: Never Smoker    Smokeless tobacco: Never Used   Substance and Sexual Activity    Alcohol use: No    Drug use: No    Sexual activity: Yes     Partners: Male         Review of Systems:       General: Denies headache, lethargy, fever, weight loss  Ears/Nose/Throat: Denies ear discharge, drainage, nosebleeds, hoarse voice, dental problems  Cardiovascular: Denies chest pain, shortness of breath  Lungs: Denies chest pain, breathing problems, wheezing, pneumonia  Stomach: Denies stomach pain, heartburn, constipation, irritable bowel  Skin: Denies rash, sores, open wounds  Musculoskeletal: Admits to knee pain, no deformity. Genitourinary: Denies dysuria, hematuria, polyuria  Gastrointestinal: Denies constipation, obstipation, diarrhea  Neurological: Denies changes in sight, smell, hearing, taste, seizures. Denies loss of consciousness.   Psychiatric: Denies depression, sleep pattern changes, anxiety, change in personality  Endocrine: Denies mood swings, heat or cold intolerance  Hematologic/Lymphatic: Denies anemia, purpura, petechia  Allergic/Immunologic: Denies swelling of throat, pain or swelling at lymph nodes      Physical Examination:    Visit Vitals  /78 (BP 1 Location: Right arm, BP Patient Position: Sitting)   Pulse 94   Temp 98.6 °F (37 °C) (Oral)   Ht 5' 8\" (1.727 m)   Wt 286 lb (129.7 kg)   SpO2 96%   BMI 43.49 kg/m²        General: AOX3, no apparent distress  Psychiatric: mood and affect appropriate  Lungs: breathing is symmetric and unlabored bilaterally  Heart: regular rate and rhythm  Abdomen: no guarding  Head: normocephalic, atraumatic  Skin: No significant abnormalities, good turgor  Sensation intact to light touch: L1-S1 dermatomes  Muscular exam: 5/5 strength in all major muscle groups unless noted in specialty exam.    Extremities:      Left upper extremity: Full active and passive range of motion without pain, deformity, no open wound, strength 5/5 in all major muscle groups. Right upper extremity: Full active and passive range of motion without pain, deformity, no open wound, strength 5/5 in all major muscle groups. Right lower extremity: Full active and passive range of motion without pain, deformity, no open wound, strength 5/5 in all major muscle groups. Left lower extremity:  No deformity is noted. Range of motion of the knee is 0-125. Ligamentous testing of the knee indicates stability of the the MCL, LCL, PCL, and ACL. Lachman's, anterior and posterior drawer tests are specifically negative. Positive Mahendra's laterally. There is tenderness to palpation laterally. Popliteal area is unremarkable. Negative for effusion. No patellar crepitus. Patella tracks centrally with a negative apprehension and grind test.  Pivot shift is negative. Strength testing is indicative of 5/5 strength at hip flexion, extension, knee flexion and extension, tibialis anterior, EHL, and FHL. Sensation is intact to light touch in the L1-S1 dermatomes. Capillary refill is less than 2 seconds in the toes. Diagnostics:    Pertinent Xrays:  Xrays are available of the left knee.   These indicate mild degenerative changes, no fractures, dislocations, or osseous abnormalities or soft tissue abnormal    Assessment: Left knee pain likely secondary to meniscus tear    Plan: This patient likely has a meniscus tear, I have injected her after consent today, please see procedure note, patient tolerated well, I have ordered an MRI to rule out internal derangement. She will have her MRI completed and she will follow-up once this is done for MRI review and review of how she did with her injection. Date of Procedure: 9/20/19    PROCEDURE NOTE     Verbal consent was obtained from the patient. The correct site was identified after confirmation with the patient. Following identification and confirmation of the correct site with the patient, the area was prepped in the normal sterile fashion. A local anesthetic of 1% lidocaine without epinephrine was then administered to the local tissues. Following, an injection of a mixture of 6mg celestone and 1% lidocaine without epinephrine was administered to the left knee. The needle was then withdrawn and the injection site dressed with a sterile bandage at the conclusion. The procedure was well tolerated by the patient. No immediate adverse reactions were noted. Ms. Rebecca Mchugh has a reminder for a \"due or due soon\" health maintenance. I have asked that she contact her primary care provider for follow-up on this health maintenance.

## 2019-09-23 ENCOUNTER — HOSPITAL ENCOUNTER (OUTPATIENT)
Dept: MRI IMAGING | Age: 54
Discharge: HOME | End: 2019-09-23
Attending: ORTHOPAEDIC SURGERY
Payer: COMMERCIAL

## 2019-09-23 DIAGNOSIS — M25.562 CHRONIC PAIN OF LEFT KNEE: ICD-10-CM

## 2019-09-23 DIAGNOSIS — G89.29 CHRONIC PAIN OF LEFT KNEE: ICD-10-CM

## 2019-09-23 PROCEDURE — 73721 MRI JNT OF LWR EXTRE W/O DYE: CPT

## 2019-09-26 ENCOUNTER — OFFICE VISIT (OUTPATIENT)
Dept: ORTHOPEDIC SURGERY | Age: 54
End: 2019-09-26

## 2019-09-26 VITALS
BODY MASS INDEX: 39.1 KG/M2 | SYSTOLIC BLOOD PRESSURE: 130 MMHG | RESPIRATION RATE: 18 BRPM | DIASTOLIC BLOOD PRESSURE: 78 MMHG | TEMPERATURE: 98.4 F | WEIGHT: 258 LBS | HEIGHT: 68 IN | OXYGEN SATURATION: 97 % | HEART RATE: 71 BPM

## 2019-09-26 DIAGNOSIS — M17.12 PRIMARY OSTEOARTHRITIS OF LEFT KNEE: Primary | ICD-10-CM

## 2019-09-26 RX ORDER — DICLOFENAC SODIUM 10 MG/G
GEL TOPICAL 4 TIMES DAILY
Qty: 1 EACH | Refills: 2 | Status: SHIPPED | OUTPATIENT
Start: 2019-09-26 | End: 2020-03-18 | Stop reason: SDUPTHER

## 2019-09-26 RX ORDER — TRAMADOL HYDROCHLORIDE 50 MG/1
50 TABLET ORAL
Qty: 50 TAB | Refills: 0 | Status: SHIPPED | OUTPATIENT
Start: 2019-09-26 | End: 2019-09-29

## 2019-09-26 NOTE — PROGRESS NOTES
9/26/2019    Chief Complaint: Left knee pain    HPI: This is a(n) 47 y. o. who complains of left knee pain. Onset was gradual.  The patient has had activity dependent pain for years she has not tried have failed. The patient has tried activity modification, she has not tried physical therapy, injections, I gave her an injection last week, she felt great for a day but then the pain returned. The pain is in the lateral aspect of the knee, it is severe in intensity. The pain causes some limitation with all activities of daily living. The patient complains of of feelings of instability in the knee. Past Medical History:   Diagnosis Date    Allergic rhinitis 5/27/2010    Cervical disc herniation 01/2010    C3-4, C4-5, C5-6. Hemangioma body of C5. Dr. David Martinez.  Chest pain 08/2011, 02/2012    Dr. Kourtney Castro. Dr. Amira Murry; denies CP as of 3/27/14    Chronic lower back pain 2010, 03/2016    thoracic DDD and spondylosis. DDD L3-S1. Dr. Khushi Patterson. Malvin Dietrich. Dr. Madina Fajardo, [facility]. Dr. Milagros Corcoran.  Esophagitis, reflux 4/3/2011    Gallstone     Gastritis 5/27/2010    Dr. Wagner Mustafa.  Gastrointestinal food allergy 03/2014    Multiple. Dr. Son Salazar.    Heart palpitations 02/2012    DUE TO PAC'S AND PVC'S. Dr. Ahmet Cochran.  Hiatal hernia 4/3/2011    HTN (hypertension) 1987    Impaired glucose tolerance 10/15/2010    Incidental lung nodule 01/08/13    LLL 3.9 mm, RML 3.1 mm;  as of 3/27/14 per pt stable w/o growth    Insomnia 1990s    Iron deficiency anemia 5/27/2010    Irritable bowel syndrome (IBS) 03/2014    Dr. Elier Ahn.    Knee pain 2012    Right. due to severe OA. / chipped bone     Metatarsal bone fracture 1990    Left fifth.  Migraine 2/22/2011    Dr. Jeanine Jeong, cervical 01/2010    Left. Dr. David Martinez.  Shoulder pain, right 2012    due to [address] X 2. Dr. Mary Lou Sebastian.     Thyroid nodule 2011    Dr. Jeremy Romero Toe fracture, left     fifth toe.  Triangular fibrocartilage complex tear     Dr. Nalini Messina. Left. Past Surgical History:   Procedure Laterality Date    HX  SECTION  1995    x 1    HX CHOLECYSTECTOMY      HX COLONOSCOPY  14    Dr. Davion Trivedi; 14    due to abnormal PAP.  HX DILATION AND CURETTAGE      due to miscarriage.  HX ENDOSCOPY      HX ORTHOPAEDIC Right 2016    LUMBAR L4-5, L5-S1 ELLY. Dr. Army Kristin Samuel       Current Outpatient Medications on File Prior to Visit   Medication Sig Dispense Refill    ergocalciferol (ERGOCALCIFEROL) 50,000 unit capsule Take 1 Cap by mouth every seven (7) days. 12 Cap 0    propranolol LA (INDERAL LA) 80 mg SR capsule Take 1 Cap by mouth daily. Indications: Migraine Prevention 90 Cap 3    valsartan-hydroCHLOROthiazide (DIOVAN-HCT) 80-12.5 mg per tablet Take 1 Tab by mouth daily. Indications: high blood pressure 90 Tab 3    omeprazole (PRILOSEC) 20 mg capsule TAKE 1 CAPSULE BY MOUTH TWICE DAILY  Indications: heartburn 60 Cap 5    acetaminophen (TYLENOL ARTHRITIS PAIN) 650 mg CR tablet Take 650 mg by mouth as needed for Pain. No current facility-administered medications on file prior to visit.         Allergies   Allergen Reactions    Doxylamine Succinate Swelling     12.5-25 MG  HANDS, FACE/PERIORAL, FEET    Pcn [Penicillins] Hives    Ambien [Zolpidem] Nausea and Vomiting and Other (comments)     5 mg with Ativan 0.5 mg   TOO GROGGY, SLEPT 24 HOURS  7.5 MG FORGOT SHE WAS SLEEP EATING    Aspirin Nausea Only    Celebrex [Celecoxib] Other (comments)     TIGHT SQUEEZING IN CHEST  CHEST ACHED    Erythromycin Nausea and Vomiting    Milk Prot-Turm-Pepper-Pineappl Other (comments)     Multiple food allergies    Neurontin [Gabapentin] Other (comments)     300 mg  SEVERE LEG PAIN  EYE TWITCHING    Nsaids (Non-Steroidal Anti-Inflammatory Drug) Other (comments) GI irritation         Family History   Problem Relation Age of Onset    Diabetes Mother     Heart Disease Mother 58        2 stents    Hypertension Mother     Diabetes Father     Stroke Maternal Grandmother     Cancer Maternal Uncle         prostate       Social History     Socioeconomic History    Marital status:      Spouse name: Not on file    Number of children: 3    Years of education: Not on file    Highest education level: Not on file   Occupational History    Occupation: Medical assistant     Employer: [employer]   Tobacco Use    Smoking status: Never Smoker    Smokeless tobacco: Never Used   Substance and Sexual Activity    Alcohol use: No    Drug use: No    Sexual activity: Yes     Partners: Male         Review of Systems:       General: Denies headache, lethargy, fever, weight loss  Ears/Nose/Throat: Denies ear discharge, drainage, nosebleeds, hoarse voice, dental problems  Cardiovascular: Denies chest pain, shortness of breath  Lungs: Denies chest pain, breathing problems, wheezing, pneumonia  Stomach: Denies stomach pain, heartburn, constipation, irritable bowel  Skin: Denies rash, sores, open wounds  Musculoskeletal: Admits to knee pain, no deformity. Genitourinary: Denies dysuria, hematuria, polyuria  Gastrointestinal: Denies constipation, obstipation, diarrhea  Neurological: Denies changes in sight, smell, hearing, taste, seizures. Denies loss of consciousness.   Psychiatric: Denies depression, sleep pattern changes, anxiety, change in personality  Endocrine: Denies mood swings, heat or cold intolerance  Hematologic/Lymphatic: Denies anemia, purpura, petechia  Allergic/Immunologic: Denies swelling of throat, pain or swelling at lymph nodes      Physical Examination:    Visit Vitals  /78 (BP 1 Location: Left arm, BP Patient Position: Sitting)   Pulse 71   Temp 98.4 °F (36.9 °C) (Oral)   Resp 18   Ht 5' 8\" (1.727 m)   Wt 258 lb (117 kg)   SpO2 97%   BMI 39.23 kg/m² General: AOX3, no apparent distress  Psychiatric: mood and affect appropriate  Lungs: breathing is symmetric and unlabored bilaterally  Heart: regular rate and rhythm  Abdomen: no guarding  Head: normocephalic, atraumatic  Skin: No significant abnormalities, good turgor  Sensation intact to light touch: L1-S1 dermatomes  Muscular exam: 5/5 strength in all major muscle groups unless noted in specialty exam.    Extremities:      Left upper extremity: Full active and passive range of motion without pain, deformity, no open wound, strength 5/5 in all major muscle groups. Right upper extremity: Full active and passive range of motion without pain, deformity, no open wound, strength 5/5 in all major muscle groups. Right lower extremity: Full active and passive range of motion without pain, deformity, no open wound, strength 5/5 in all major muscle groups. Left lower extremity:  No deformity is noted. Range of motion of the knee is 0-125. Ligamentous testing of the knee indicates stability of the the LCL, PCL, and ACL, with mild extra motion at the [facility] indicating instability. Lachman's, anterior and posterior drawer tests are specifically negative. Lateral joint line tenderness to palpation is noted. Popliteal area is unremarkable. Negative for effusion. No patellar crepitus. Patella tracks centrally with a negative apprehension and grind test.  Pivot shift is negative. Strength testing is indicative of 5/5 strength at hip flexion, extension, knee flexion and extension, tibialis anterior, EHL, and FHL. Sensation is intact to light touch in the L1-S1 dermatomes. Capillary refill is less than 2 seconds in the toes. Diagnostics:    Pertinent Xrays: MRI of the knee indicates medial compartment osteoarthritis, Baker's cyst, lateral and medial meniscal tears. Overall degenerative changes of the knee. Assessment: Unilateral primary osteoarthritis of the knee with meniscal tearing    Plan:   This patient does have the above-mentioned issue, I would like to proceed with nonoperative management as much as possible, we have discussed weight loss, physical therapy exercises, pain medications, diclofenac gel, bracing, physical therapy prescriptions have been given. She will work on this and her weight loss and she will follow-up with me on an as-needed basis should any questions or concerns arise, she stated her understanding and satisfaction. Ms. Forest Oswald has a reminder for a \"due or due soon\" health maintenance. I have asked that she contact her primary care provider for follow-up on this health maintenance.

## 2019-09-26 NOTE — PROGRESS NOTES
Pérez Ornelas is a 47 y.o. female  HIPAA verified by two patient identifiers. Chief Complaint   Patient presents with    Follow-up     MRI follow up     Visit Vitals  /78 (BP 1 Location: Left arm, BP Patient Position: Sitting)   Pulse 71   Temp 98.4 °F (36.9 °C) (Oral)   Resp 18   Ht 5' 8\" (1.727 m)   Wt 258 lb (117 kg)   LMP  (LMP Unknown)   SpO2 97%   BMI 39.23 kg/m²       Pain Scale: 6/10  Pain Location: Leg  1. Have you been to the ER, urgent care clinic since your last visit? Hospitalized since your last visit? No    2. Have you seen or consulted any other health care providers outside of the [address] [place] [address] since your last visit? Include any pap smears or colon screening.  No

## 2019-10-09 ENCOUNTER — OFFICE VISIT (OUTPATIENT)
Dept: ORTHOPEDIC SURGERY | Age: 54
End: 2019-10-09

## 2019-10-09 VITALS
BODY MASS INDEX: 39.23 KG/M2 | HEIGHT: 68 IN | SYSTOLIC BLOOD PRESSURE: 158 MMHG | OXYGEN SATURATION: 100 % | RESPIRATION RATE: 18 BRPM | DIASTOLIC BLOOD PRESSURE: 98 MMHG | HEART RATE: 87 BPM | TEMPERATURE: 98.5 F

## 2019-10-09 DIAGNOSIS — M17.12 PRIMARY OSTEOARTHRITIS OF LEFT KNEE: Primary | ICD-10-CM

## 2019-10-09 DIAGNOSIS — R11.0 NAUSEA: ICD-10-CM

## 2019-10-09 RX ORDER — TRAMADOL HYDROCHLORIDE 50 MG/1
50 TABLET ORAL
COMMUNITY
End: 2020-01-02 | Stop reason: ALTCHOICE

## 2019-10-09 RX ORDER — HYDROCODONE BITARTRATE AND ACETAMINOPHEN 5; 325 MG/1; MG/1
1 TABLET ORAL
Qty: 30 TAB | Refills: 0 | Status: SHIPPED | OUTPATIENT
Start: 2019-10-09 | End: 2019-10-12

## 2019-10-09 RX ORDER — ONDANSETRON 4 MG/1
4 TABLET, FILM COATED ORAL
Qty: 20 TAB | Refills: 0 | Status: SHIPPED | OUTPATIENT
Start: 2019-10-09 | End: 2020-06-02 | Stop reason: ALTCHOICE

## 2019-10-09 NOTE — LETTER
NOTIFICATION OF RETURN TO WORK / SCHOOL 
 
10/9/2019 10:49 AM 
 
Ms. Gordon Zuniga [address] [place] [id number] [address] To Whom It May Concern: 
 
Gordon Zuniga was under the care of [facility] from 10/9/19 to present. She will be able to return to work/school on 10/10/19 with seated work only. If there are questions or concerns please have the patient contact our office.  
 
Sincerely, 
 
 
Christal Griffith, DO

## 2019-10-09 NOTE — PROGRESS NOTES
10/9/2019    Chief Complaint: Left knee pain    HPI: This is a(n) 47 y. o. who complains of left knee pain. Onset was sudden last night. The patient has had activity dependent pain for several weeks, however it was made worse yesterday with. The patient has tried activity modification, she has not tried physical therapy, injections of cortisone have given very minimal relief. The pain is in the lateral aspect and posterior aspect of the knee, it is severe in intensity. The pain causes some limitation with all ambulation. The patient complains of feelings of instability in the knee. She has started to walk with a cane. Past Medical History:   Diagnosis Date    Allergic rhinitis 5/27/2010    Cervical disc herniation 01/2010    C3-4, C4-5, C5-6. Hemangioma body of C5. Dr. Simon Ballard.  Chest pain 08/2011, 02/2012    Dr. Jr Vences. Dr. Keerthi Zuniga; denies CP as of 3/27/14    Chronic lower back pain 2010, 03/2016    thoracic DDD and spondylosis. DDD L3-S1. Dr. Hilda Cuenca. Mary Ellen Shaikh. Dr. Wellington Steele, [facility]. [place]. Chika Christian.  Esophagitis, reflux 4/3/2011    Gallstone     Gastritis 5/27/2010    Dr. Shayy Taylor.  Gastrointestinal food allergy 03/2014    Multiple. Dr. Wally Leung.    Heart palpitations 02/2012    DUE TO PAC'S AND PVC'S. Dr. Marlon Clark.  Hiatal hernia 4/3/2011    HTN (hypertension) 1987    Impaired glucose tolerance 10/15/2010    Incidental lung nodule 01/08/13    LLL 3.9 mm, RML 3.1 mm;  as of 3/27/14 per pt stable w/o growth    Insomnia 1990s    Iron deficiency anemia 5/27/2010    Irritable bowel syndrome (IBS) 03/2014    Dr. Terell Goldman.    Knee pain 2012    Right. due to severe OA. / chipped bone     Metatarsal bone fracture 1990    Left fifth.  Migraine 2/22/2011    Dr. Claire Mcclelland, cervical 01/2010    Left. Dr. Simon Ballard.  Shoulder pain, right 2012    due to [address] X 2. Dr. Veda Belle.     Thyroid nodule 2011 Dr. Vigil Aracely    Toe fracture, left     fifth toe.  Triangular fibrocartilage complex tear     Dr. Osvaldo Garza. Left. Past Surgical History:   Procedure Laterality Date    HX  SECTION  1995    x 1    HX CHOLECYSTECTOMY      HX COLONOSCOPY  14    Dr. Minerva Mcfadden; 14    due to abnormal PAP.  HX DILATION AND CURETTAGE      due to miscarriage.  HX ENDOSCOPY      HX ORTHOPAEDIC Right 2016    LUMBAR L4-5, L5-S1 ELLY. Dr. Gilmar Wharton     [address] [place] [address]       Current Outpatient Medications on File Prior to Visit   Medication Sig Dispense Refill    traMADol (ULTRAM) 50 mg tablet Take 50 mg by mouth every six (6) hours as needed for Pain.  diclofenac (VOLTAREN) 1 % gel Apply  to affected area four (4) times daily. 1 Each 2    propranolol LA (INDERAL LA) 80 mg SR capsule Take 1 Cap by mouth daily. Indications: Migraine Prevention 90 Cap 3    valsartan-hydroCHLOROthiazide (DIOVAN-HCT) 80-12.5 mg per tablet Take 1 Tab by mouth daily. Indications: high blood pressure 90 Tab 3    omeprazole (PRILOSEC) 20 mg capsule TAKE 1 CAPSULE BY MOUTH TWICE DAILY  Indications: heartburn 60 Cap 5    acetaminophen (TYLENOL ARTHRITIS PAIN) 650 mg CR tablet Take 650 mg by mouth as needed for Pain.  ergocalciferol (ERGOCALCIFEROL) 50,000 unit capsule Take 1 Cap by mouth every seven (7) days. (Patient not taking: Reported on 10/9/2019) 12 Cap 0     No current facility-administered medications on file prior to visit.         Allergies   Allergen Reactions    Doxylamine Succinate Swelling     12.5-25 MG  HANDS, FACE/PERIORAL, FEET    Pcn [Penicillins] Hives    Ambien [Zolpidem] Nausea and Vomiting and Other (comments)     5 mg with Ativan 0.5 mg   TOO GROGGY, SLEPT 24 HOURS  7.5 MG FORGOT SHE WAS SLEEP EATING    Aspirin Nausea Only    Celebrex [Celecoxib] Other (comments)     TIGHT SQUEEZING IN CHEST  CHEST ACHED    Erythromycin Nausea and Vomiting    Milk Prot-Turm-Pepper-Pineappl Other (comments)     Multiple food allergies    Neurontin [Gabapentin] Other (comments)     300 mg  SEVERE LEG PAIN  EYE TWITCHING    Nsaids (Non-Steroidal Anti-Inflammatory Drug) Other (comments)     GI irritation         Family History   Problem Relation Age of Onset    Diabetes Mother     Heart Disease Mother 58        2 stents    Hypertension Mother     Diabetes Father     Stroke Maternal Grandmother     Cancer Maternal Uncle         prostate       Social History     Socioeconomic History    Marital status:      Spouse name: Not on file    Number of children: 3    Years of education: Not on file    Highest education level: Not on file   Occupational History    Occupation: Medical assistant     Employer: [employer]   Tobacco Use    Smoking status: Never Smoker    Smokeless tobacco: Never Used   Substance and Sexual Activity    Alcohol use: No    Drug use: No    Sexual activity: Yes     Partners: Male         Review of Systems:       General: Denies headache, lethargy, fever, weight loss  Ears/Nose/Throat: Denies ear discharge, drainage, nosebleeds, hoarse voice, dental problems  Cardiovascular: Denies chest pain, shortness of breath  Lungs: Denies chest pain, breathing problems, wheezing, pneumonia  Stomach: Denies stomach pain, heartburn, constipation, irritable bowel  Skin: Denies rash, sores, open wounds  Musculoskeletal: Admits to knee pain, no deformity. Genitourinary: Denies dysuria, hematuria, polyuria  Gastrointestinal: Denies constipation, obstipation, diarrhea  Neurological: Denies changes in sight, smell, hearing, taste, seizures. Denies loss of consciousness.   Psychiatric: Denies depression, sleep pattern changes, anxiety, change in personality  Endocrine: Denies mood swings, heat or cold intolerance  Hematologic/Lymphatic: Denies anemia, purpura, petechia  Allergic/Immunologic: Denies swelling of throat, pain or swelling at lymph nodes      Physical Examination:    Visit Vitals  BP (!) 158/98 (BP 1 Location: Left arm, BP Patient Position: Sitting)   Pulse 87   Temp 98.5 °F (36.9 °C) (Oral)   Resp 18   Ht 5' 8\" (1.727 m)   SpO2 100%   BMI 39.23 kg/m²        General: AOX3, no apparent distress  Psychiatric: mood and affect appropriate  Lungs: breathing is symmetric and unlabored bilaterally  Heart: regular rate and rhythm  Abdomen: no guarding  Head: normocephalic, atraumatic  Skin: No significant abnormalities, good turgor  Sensation intact to light touch: L1-S1 dermatomes  Muscular exam: 5/5 strength in all major muscle groups unless noted in specialty exam.    Extremities:      Left upper extremity: Full active and passive range of motion without pain, deformity, no open wound, strength 5/5 in all major muscle groups. Right upper extremity: Full active and passive range of motion without pain, deformity, no open wound, strength 5/5 in all major muscle groups. Right lower extremity: Full active and passive range of motion without pain, deformity, no open wound, strength 5/5 in all major muscle groups. Left lower extremity:  No deformity is noted. Range of motion of the knee is 0-125. Ligamentous testing of the knee indicates stability of the the MCL, LCL, PCL, and ACL. Lachman's, anterior and posterior drawer tests are specifically negative. Significant medial and lateral joint line tenderness to palpation. Popliteal area is unremarkable. Negative for effusion. No patellar crepitus. Patella tracks centrally with a negative apprehension and grind test.  Pivot shift is negative. Strength testing is indicative of 5/5 strength at hip flexion, extension, knee flexion and extension, tibialis anterior, EHL, and FHL. Sensation is intact to light touch in the L1-S1 dermatomes. Capillary refill is less than 2 seconds in the toes.     Diagnostics:    Pertinent Xrays:  Xrays and MRI are reviewed, there are cartilaginous defects in all 3 compartments of the knee, minor meniscal tearing medially and laterally. Assessment: Unilateral primary osteoarthritis of the knee    Plan: This patient is having an exacerbation of her arthrosis, I have advised her that I would like to maximize nonoperative management prior to any surgical intervention. I did discuss with her that Visco supplementation, physical therapy, advanced pain medications would be the treatment of choice for now. She stated her understanding and satisfaction, we will see her in a month for repeat clinical check. Should she have issues in the meantime, she will call sooner. Ms. Ese Jenkins has a reminder for a \"due or due soon\" health maintenance. I have asked that she contact her primary care provider for follow-up on this health maintenance.

## 2019-10-09 NOTE — PROGRESS NOTES
Chief Complaint   Patient presents with    Knee Pain     left knee     1. Have you been to the ER, urgent care clinic since your last visit? Hospitalized since your last visit? No    2. Have you seen or consulted any other health care providers outside of the [address] [place] [address] since your last visit? Include any pap smears or colon screening.  No

## 2019-10-09 NOTE — LETTER
NOTIFICATION OF RETURN TO WORK / SCHOOL 
 
10/9/2019 11:16 AM 
 
Ms. Gordon Zuniga [address] [place] [id number] Reshma Barrios To Whom It May Concern: 
 
Gordon Zuniga was under the care of [facility] from 10/9/19 to present. She will be able to return to work/school on 10/10/19 with seated only work, for six weeks. If there are questions or concerns please have the patient contact our office.  
 
Sincerely, 
 
 
Eddie Foster, DO

## 2019-10-10 ENCOUNTER — DOCUMENTATION ONLY (OUTPATIENT)
Dept: ORTHOPEDIC SURGERY | Age: 54
End: 2019-10-10

## 2019-10-10 NOTE — PROGRESS NOTES
Called [phone] and transferred to [phone]. , prior Auth Started for synvisc. WVN#[id number]. CPT [id number], Dx M17.12. On recommended list is Orthovisc, Monvisc, Euflexria.

## 2019-10-14 DIAGNOSIS — M17.12 PRIMARY OSTEOARTHRITIS OF LEFT KNEE: Primary | ICD-10-CM

## 2019-10-15 ENCOUNTER — TELEPHONE (OUTPATIENT)
Dept: ORTHOPEDIC SURGERY | Age: 54
End: 2019-10-15

## 2019-10-15 NOTE — TELEPHONE ENCOUNTER
LM on  regarding Rx was approved and the pharmacy should have by tomorrow afternoon. If Pt calls back please let her know Rx for Synvisc has a $75 dollar copay.

## 2019-10-16 ENCOUNTER — OFFICE VISIT (OUTPATIENT)
Dept: ORTHOPEDIC SURGERY | Age: 54
End: 2019-10-16

## 2019-10-16 VITALS
HEIGHT: 69 IN | TEMPERATURE: 98.1 F | SYSTOLIC BLOOD PRESSURE: 144 MMHG | HEART RATE: 86 BPM | OXYGEN SATURATION: 99 % | RESPIRATION RATE: 18 BRPM | WEIGHT: 258 LBS | DIASTOLIC BLOOD PRESSURE: 86 MMHG | BODY MASS INDEX: 38.21 KG/M2

## 2019-10-16 DIAGNOSIS — M17.12 PRIMARY OSTEOARTHRITIS OF LEFT KNEE: Primary | ICD-10-CM

## 2019-10-16 NOTE — PROGRESS NOTES
Indications:   Unilateral primary osteoarthritis, left knee    Procedure:  After consent was obtained, using sterile technique the left knee joint was prepped using Chlorprep. Local anesthetic used: 1% lidocaine. James Rein synvisc prefilled syringe, 2mL was injected into the joint and the needle withdrawn. The procedure was well tolerated. The patient is asked to continue to rest the joint for a few more days before resuming regular activities. It may be more painful for the first 1-2 days. Watch for fever, or increased swelling or persistent pain in the joint. Call or return to clinic prn if such symptoms occur or there is failure to improve as anticipated.

## 2019-10-16 NOTE — PROGRESS NOTES
Chief Complaint   Patient presents with    Joint Or Tendon Injection     1. Have you been to the ER, urgent care clinic since your last visit? Hospitalized since your last visit? No    2. Have you seen or consulted any other health care providers outside of the [address] [place] [address] since your last visit? Include any pap smears or colon screening. No     Pt injected left knee Synvisc HY 20, lot [id number], exp 10/31/21, Nikita   [employer]

## 2019-10-17 ENCOUNTER — HOSPITAL ENCOUNTER (OUTPATIENT)
Dept: PHYSICAL THERAPY | Age: 54
End: 2019-10-17

## 2019-10-21 ENCOUNTER — TELEPHONE (OUTPATIENT)
Dept: ORTHOPEDIC SURGERY | Age: 54
End: 2019-10-21

## 2019-10-21 NOTE — TELEPHONE ENCOUNTER
Pt contacted the office requesting a refill on Tramadol. 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

## 2019-10-21 NOTE — TELEPHONE ENCOUNTER
Pt called back. She is requesting that rx be phoned in to [facility] if approved, as she is leaving work for the day.

## 2019-10-23 ENCOUNTER — OFFICE VISIT (OUTPATIENT)
Dept: ORTHOPEDIC SURGERY | Age: 54
End: 2019-10-23

## 2019-10-23 VITALS
WEIGHT: 258 LBS | TEMPERATURE: 98.1 F | BODY MASS INDEX: 38.21 KG/M2 | HEIGHT: 69 IN | OXYGEN SATURATION: 99 % | HEART RATE: 98 BPM | SYSTOLIC BLOOD PRESSURE: 146 MMHG | RESPIRATION RATE: 16 BRPM | DIASTOLIC BLOOD PRESSURE: 84 MMHG

## 2019-10-23 DIAGNOSIS — M17.12 PRIMARY OSTEOARTHRITIS OF LEFT KNEE: Primary | ICD-10-CM

## 2019-10-23 NOTE — PROGRESS NOTES
Date of Procedure: 10/23/19  PROCEDURE NOTE     Consent was obtained from the patient. The correct site was identified after confirmation with the patient. Following identification and confirmation of the correct site with the patient, the superolateral left knee was prepped in the normal sterile fashion. A local anesthetic of 1% lidocaine without epinephrine was then administered to the local tissues. Following, an injection of Synvisc viscosupplementation prefilled syringe was administered to the left knee. The needle was then withdrawn and the injection site dressed with a sterile bandage at the conclusion. The procedure was well tolerated by the patient. No immediate adverse reactions were noted. Post injection instructions were given.

## 2019-10-23 NOTE — PROGRESS NOTES
Identified pt with two pt identifiers(name and ). Reviewed record in preparation for visit and have obtained necessary documentation. Chief Complaint   Patient presents with    Joint Or Tendon Injection     synvisc, left knee        Coordination of Care Questionnaire:  :   1) Have you been to an emergency room, urgent care, or hospitalized since your last visit? If yes, where when, and reason for visit? no       2. Have seen or consulted any other health care provider since your last visit? If yes, where when, and reason for visit? NO        Patient is accompanied by self I have received verbal consent from [facility] to discuss any/all medical information while they are present in the room.

## 2019-10-30 ENCOUNTER — OFFICE VISIT (OUTPATIENT)
Dept: ORTHOPEDIC SURGERY | Age: 54
End: 2019-10-30

## 2019-10-30 VITALS
HEART RATE: 80 BPM | RESPIRATION RATE: 18 BRPM | TEMPERATURE: 98.2 F | DIASTOLIC BLOOD PRESSURE: 86 MMHG | HEIGHT: 68 IN | WEIGHT: 279 LBS | BODY MASS INDEX: 42.28 KG/M2 | SYSTOLIC BLOOD PRESSURE: 126 MMHG | OXYGEN SATURATION: 99 %

## 2019-10-30 DIAGNOSIS — M17.12 PRIMARY OSTEOARTHRITIS OF LEFT KNEE: Primary | ICD-10-CM

## 2019-10-30 NOTE — PROGRESS NOTES
Chief Complaint   Patient presents with    Joint Or Tendon Injection     left knee, 3rd injection Synvisc. 1. Have you been to the ER, urgent care clinic since your last visit? Hospitalized since your last visit? No    2. Have you seen or consulted any other health care providers outside of the [address] [place] [address] since your last visit? Include any pap smears or colon screening.  No     Pt injected with 3rd Synvisc, Lot [id number], exp 10/31/2021, ndc [address]-01

## 2019-10-31 ENCOUNTER — HOSPITAL ENCOUNTER (OUTPATIENT)
Dept: PHYSICAL THERAPY | Age: 54
Discharge: HOME | End: 2019-10-31
Payer: COMMERCIAL

## 2019-10-31 PROCEDURE — 97110 THERAPEUTIC EXERCISES: CPT | Performed by: PHYSICAL THERAPIST

## 2019-10-31 PROCEDURE — 97162 PT EVAL MOD COMPLEX 30 MIN: CPT | Performed by: PHYSICAL THERAPIST

## 2019-10-31 PROCEDURE — 97016 VASOPNEUMATIC DEVICE THERAPY: CPT | Performed by: PHYSICAL THERAPIST

## 2019-10-31 NOTE — PROGRESS NOTES
PT INITIAL EVALUATION NOTE 2-15    Patient Name: Thao Zafar  Date:10/31/2019  : 1965  [x]  Patient  Verified  Payor: Alpa Sender / Plan: Alka Wellington / Product Type: PPO /    In time: 7:40am  Out time:8:45am  Total Treatment Time (min): 65  Visit #: 1     Treatment Area: Left knee pain [M25.562]    SUBJECTIVE  Pain Level (0-10 scale): 5 (4-10/10)  Any medication changes, allergies to medications, adverse drug reactions, diagnosis change, or new procedure performed?: [] No    [x] Yes (see summary sheet for update)  Subjective: The patient reports that her left knee started hurting several months. She's had 3 synovisc injections. She can only be on her feet for 10-15 minutes. She has a torn meniscus and was told she needs a knee replacement but needs to do therapy first. She had a Baker's cyst that popped. She slipped 3 weeks ago and thinks she tore her meniscus more. She's limping now. She used to wake up at night from sleeping. She has an L4/L5 bulging disc, which radiates down her left leg especially with prolonged sitting. It gave out on her 3 weeks ago. She was using a cane and wore a brace, but stopped using both this week.        OBJECTIVE/EXAMINATION  Posture:  Scapular protraction bilaterally  Other Observations:  Hesitant to move knee due to possible increase in discomfort  Gait and Functional Mobility:  Very antalgic; minimal knee extension on LLE  Palpation: lateral joint line    A/PROM Right Knee: Flex 120/125  Ext 5 hyper/5 hyper  A/PROM Left Knee: Flex 108/118  Ext -8/-3      Joint Mobility Assessment: decreased mobility in left    Flexibility: tight hamstrings/calves on left    LOWER QUARTER   MUSCLE STRENGTH  KEY       R  L  0 - No Contraction  Knee ext  5  3-  1 - Trace            flex  5  4  2 - Poor   Hip ext   nt  nt  3 - Fair          flex   4  4  4 - Good         abd  3+  3-  5 - Normal         add  nt  nt      Ankle DF  5  5                PF  5  5 INV  5  5                EV  5  5      Neurological: Reflexes / Sensations: WFL      Modality rationale: decrease edema, decrease inflammation and decrease pain to improve the patients ability to perform daily activities. Min Type Additional Details    [] Estim: []Att   []Unatt        []TENS instruct                  []IFC  []Premod   []NMES                     []Other:  []w/US   []w/ice   []w/heat  Position:  Location:    []  Traction: [] Cervical       []Lumbar                       [] Prone          []Supine                       []Intermittent   []Continuous Lbs:  [] before manual  [] after manual  []w/heat    []  Ultrasound: []Continuous   [] Pulsed at:                            []1MHz   []3MHz Location:  W/cm2:    []  Paraffin         Location:  []w/heat    []  Ice     []  Heat  []  Ice massage Position:  Location:    []  Laser  []  Other: Position:  Location:   10 [x]  Vasopneumatic Device Pressure:       [] lo [x] med [] hi   Temperature: 34   [x] Skin assessment post-treatment:  [x]intact []redness- no adverse reaction    []redness  adverse reaction:     15 min Therapeutic Exercise:  [x] See flow sheet :   Rationale: increase ROM, increase strength and improve coordination to improve the patients ability to perform daily activities.            With   [x] TE   [] TA   [] neuro   [] other: Patient Education: [x] Review HEP    [x] Progressed/Changed HEP based on:   [x] positioning   [x] body mechanics   [] transfers   [] heat/ice application    [] other:        Other Objective/Functional Measures: FOTO= 33    Pain Level (0-10 scale) post treatment: 6      ASSESSMENT:      [x]  See Plan of [facility], PT 10/31/2019

## 2019-10-31 NOTE — PROGRESS NOTES
[place] [place] [address] (MOB IV), [address] [facility] Shari Cool  Phone: [phone] Fax: [phone]    Plan of Care/Statement of Necessity for Physical Therapy Services  2-15    Patient name: Miya Williamson  : 1965  Provider#: [id number]  Referral source: Ramsey Monique DO      Medical/Treatment Diagnosis: Left knee pain [M25.562]     Prior Hospitalization: see medical history     Comorbidities: arthritis, back pain, BMI over 30, headaches, HTN, prior surgery  Prior Level of Function: 20 min of exercise seldom or never  Medications: Verified on Patient Summary List    Start of Care: 10/31/19      Onset Date: chronic       The Plan of Care and following information is based on the information from the initial evaluation. Assessment/ key information: The patient presents with a chief complaint of chronic left knee pain that has become worse over the past few months with a re-irritation approximately 3 weeks ago. Recent MRI on 19 shows the followin. Tricompartmental osteoarthritis include small high-grade partial-thickness  chondral loss from the medial patellar facet and medial femoral condyle. 2. Mild degeneration and extrusion of the medial meniscus. 3. Nondisplaced horizontal femoral surface tear of the white white zone of the  lateral meniscal body. 4. Trace joint effusion and trace Baker's cyst.  5. Intact cruciates and collaterals. The patient is lacking active knee extension due to poor quad strength and hamstring/calf tightness. She also has weak core and hip musculature (left is weaker than right) which also exacerbates her symptoms during ambulation.  The patient will benefit from guided therapeutic interventions such as therex for strengthening and neuromuscular re-eduction, manual techniques for joint mobility and soft tissue extensibility, and modalities for pain management in order to improve functional mobility with daily activities. As of note, the patient also has lumbar radiculopathy symptoms, along with other co-morbidities, which will inhibit quicker progression with knee rehab. Evaluation Complexity History HIGH Complexity :3+ comorbidities / personal factors will impact the outcome/ POC ; Examination MEDIUM Complexity : 3 Standardized tests and measures addressing body structure, function, activity limitation and / or participation in recreation  ;Presentation MEDIUM Complexity : Evolving with changing characteristics  ; Clinical Decision Making MEDIUM Complexity : FOTO score of 26-74  Overall Complexity Rating: MEDIUM    Problem List: pain affecting function, decrease ROM, decrease strength, edema affecting function, impaired gait/ balance, decrease ADL/ functional abilitiies, decrease activity tolerance, decrease flexibility/ joint mobility and decrease transfer abilities   Treatment Plan may include any combination of the following: Therapeutic exercise, Therapeutic activities, Neuromuscular re-education, Physical agent/modality, Gait/balance training, Manual therapy, Patient education, Self Care training, Functional mobility training, Home safety training and Stair training  Patient / Family readiness to learn indicated by: asking questions, trying to perform skills and interest  Persons(s) to be included in education: patient (P)  Barriers to Learning/Limitations: None  Patient Goal (s): to be able to walk normal again; no limp; no pain  Patient Self Reported Health Status: good  Rehabilitation Potential: good    Short Term Goals: To be accomplished in 2 treatments:                         1.) The patient will be independent with their HEP consistently for at least one week. Long Term Goals: To be accomplished in 16 treatments:                         1.) The patient will have an average of 3/10 pain with daily activities.                          2.) The patient will be able to ambulate for at least 20 min without having to change positions due to pain. 3.) The patient will improve their FOTO score from 33 to at least 40 to show improvements in functional mobility. Frequency / Duration: Patient to be seen 2 times per week for 16 treatments. Patient/ Caregiver education and instruction: self care, activity modification, brace/ splint application and exercises    [x]  Plan of care has been reviewed with FINESSE Crocker, PT 10/31/2019     ________________________________________________________________________    I certify that the above Therapy Services are being furnished while the patient is under my care. I agree with the treatment plan and certify that this therapy is necessary.     [de-identified] Signature:____________________  Date:____________Time: _________

## 2019-11-04 ENCOUNTER — HOSPITAL ENCOUNTER (OUTPATIENT)
Dept: PHYSICAL THERAPY | Age: 54
Discharge: HOME | End: 2019-11-04
Payer: COMMERCIAL

## 2019-11-04 ENCOUNTER — APPOINTMENT (OUTPATIENT)
Dept: PHYSICAL THERAPY | Age: 54
End: 2019-11-04

## 2019-11-04 PROCEDURE — 97035 APP MDLTY 1+ULTRASOUND EA 15: CPT | Performed by: PHYSICAL THERAPIST

## 2019-11-04 PROCEDURE — 97110 THERAPEUTIC EXERCISES: CPT | Performed by: PHYSICAL THERAPIST

## 2019-11-04 PROCEDURE — 97016 VASOPNEUMATIC DEVICE THERAPY: CPT | Performed by: PHYSICAL THERAPIST

## 2019-11-04 NOTE — PROGRESS NOTES
PT DAILY TREATMENT NOTE 2-15    Patient Name: Adan Zamudio  Date:2019  : 1965  [x]  Patient  Verified  Payor: Estela Gitelman / Plan: Amanda Bailon / Product Type: PPO /    In time: 12:30pm  Out time: 1:39pm  Total Treatment Time (min): 71  Visit #:  2    Treatment Area: Left knee pain [M25.562]    SUBJECTIVE  Pain Level (0-10 scale): 3  Any medication changes, allergies to medications, adverse drug reactions, diagnosis change, or new procedure performed?: [x] No    [] Yes (see summary sheet for update)  Subjective functional status/changes:   [] No changes reported  The patient reports that she had one of the best weekends she's had in awhile, but lost her homework sheet and only remembered to do the quad sets. OBJECTIVE    Modality rationale: decrease edema, decrease inflammation and decrease pain to improve the patients ability to perform daily activities.     Min Type Additional Details       [] Estim: []Att   []Unatt    []TENS instruct                  []IFC  []Premod   []NMES                     []Other:  []w/US   []w/ice   []w/heat  Position:  Location:       []  Traction: [] Cervical       []Lumbar                       [] Prone          []Supine                       []Intermittent   []Continuous Lbs:  [] before manual  [] after manual  []w/heat   8 [x]  Ultrasound: [x]Continuous   [] Pulsed                       at: [x]1MHz   []3MHz Location: lateral joint line  W/cm2: 1.2    [] Paraffin         Location:   []w/heat    []  Ice     []  Heat  []  Ice massage Position:  Location:    []  Laser  []  Other: Position:  Location:   10   [x]  Vasopneumatic Device Pressure:       [] lo [x] med [] hi   Temperature: 34     [x] Skin assessment post-treatment:  [x]intact []redness- no adverse reaction    []redness  adverse reaction:         51 min Therapeutic Exercise:  [x] See flow sheet :   Rationale: increase ROM, increase strength and improve coordination to improve the patients ability to perform daily activities. With   [x] TE   [] TA   [] neuro   [] other: Patient Education: [x] Review HEP    [x] Progressed/Changed HEP based on:   [x] positioning   [x] body mechanics   [] transfers   [] heat/ice application    [] other:      Other Objective/Functional Measures: Pt. Tolerated therex well     Pain Level (0-10 scale) post treatment: 3    ASSESSMENT/Changes in Function:   The patient progressed with resistance and new therex as tolerated. Patient will continue to benefit from skilled PT services to modify and progress therapeutic interventions, address functional mobility deficits, address ROM deficits, address strength deficits, analyze and address soft tissue restrictions, analyze and cue movement patterns, analyze and modify body mechanics/ergonomics, assess and modify postural abnormalities, address imbalance/dizziness and instruct in home and community integration to attain remaining goals. [x]  See Plan of Care  []  See progress note/recertification  []  See Discharge Summary         Progress towards goals / Updated goals: The patient is progressing towards goals.     PLAN  [x]  Upgrade activities as tolerated     [x]  Continue plan of care  [x]  Update interventions per flow sheet       []  Discharge due to:_  []  Other:_      [place] Apo, PT 11/4/2019

## 2019-11-06 ENCOUNTER — HOSPITAL ENCOUNTER (OUTPATIENT)
Dept: PHYSICAL THERAPY | Age: 54
Discharge: HOME | End: 2019-11-06
Payer: COMMERCIAL

## 2019-11-06 PROCEDURE — 97110 THERAPEUTIC EXERCISES: CPT | Performed by: PHYSICAL THERAPIST

## 2019-11-06 PROCEDURE — 97035 APP MDLTY 1+ULTRASOUND EA 15: CPT | Performed by: PHYSICAL THERAPIST

## 2019-11-06 PROCEDURE — 97016 VASOPNEUMATIC DEVICE THERAPY: CPT | Performed by: PHYSICAL THERAPIST

## 2019-11-06 NOTE — PROGRESS NOTES
PT DAILY TREATMENT NOTE 2-15    Patient Name: Quintin Pavon  Date:2019  : 1965  [x]  Patient  Verified  Payor: Ayesha Lion / Plan: Palma Iqbal / Product Type: PPO /    In time: 11:30am  Out time: 12:37pm  Total Treatment Time (min): 67  Visit #:  3    Treatment Area: Left knee pain [M25.562]    SUBJECTIVE  Pain Level (0-10 scale): 2  Any medication changes, allergies to medications, adverse drug reactions, diagnosis change, or new procedure performed?: [x] No    [] Yes (see summary sheet for update)  Subjective functional status/changes:   [] No changes reported  The patient reports that the pain is doing better, but it will give out if she gets up quickly. OBJECTIVE    Modality rationale: decrease edema, decrease inflammation and decrease pain to improve the patients ability to perform daily activities. Min Type Additional Details       [] Estim: []Att   []Unatt    []TENS instruct                  []IFC  []Premod   []NMES                     []Other:  []w/US   []w/ice   []w/heat  Position:  Location:       []  Traction: [] Cervical       []Lumbar                       [] Prone          []Supine                       []Intermittent   []Continuous Lbs:  [] before manual  [] after manual  []w/heat   8 [x]  Ultrasound: [x]Continuous   [] Pulsed                       at: [x]1MHz   []3MHz Location: lateral joint line  W/cm2: 1.2    [] Paraffin         Location:   []w/heat    []  Ice     []  Heat  []  Ice massage Position:  Location:    []  Laser  []  Other: Position:  Location:   10   [x]  Vasopneumatic Device Pressure:       [] lo [x] med [] hi   Temperature: 34     [x] Skin assessment post-treatment:  [x]intact []redness- no adverse reaction    []redness  adverse reaction:         49 min Therapeutic Exercise:  [x] See flow sheet :   Rationale: increase ROM, increase strength and improve coordination to improve the patients ability to perform daily activities. With   [x] TE   [] TA   [] neuro   [] other: Patient Education: [x] Review HEP    [x] Progressed/Changed HEP based on:   [x] positioning   [x] body mechanics   [] transfers   [] heat/ice application    [] other:      Other Objective/Functional Measures: Pt. Tolerated therex well     Pain Level (0-10 scale) post treatment: 2    ASSESSMENT/Changes in Function:   The patient progressed with improved strength and mobility overall. Patient will continue to benefit from skilled PT services to modify and progress therapeutic interventions, address functional mobility deficits, address ROM deficits, address strength deficits, analyze and address soft tissue restrictions, analyze and cue movement patterns, analyze and modify body mechanics/ergonomics, assess and modify postural abnormalities, address imbalance/dizziness and instruct in home and community integration to attain remaining goals. [x]  See Plan of Care  []  See progress note/recertification  []  See Discharge Summary         Progress towards goals / Updated goals: The patient is progressing towards goals.     PLAN  [x]  Upgrade activities as tolerated     [x]  Continue plan of care  [x]  Update interventions per flow sheet       []  Discharge due to:_  []  Other:_      Jeb Crocker, PT 11/6/2019

## 2019-11-11 ENCOUNTER — TELEPHONE (OUTPATIENT)
Dept: ORTHOPEDIC SURGERY | Age: 54
End: 2019-11-11

## 2019-11-11 NOTE — TELEPHONE ENCOUNTER
Pt called back.  Says she would like to know if the popping is a side effect from the Synvisc Injections

## 2019-11-11 NOTE — TELEPHONE ENCOUNTER
Pt LVM stating she was walking this weekend and felt like her knee \"kept popping out of alignment\" says she felt like she kept having to pop in it to place. Says she has been going to PT would like to know what else you recommend.

## 2019-11-13 ENCOUNTER — HOSPITAL ENCOUNTER (OUTPATIENT)
Dept: PHYSICAL THERAPY | Age: 54
Discharge: HOME | End: 2019-11-13
Payer: COMMERCIAL

## 2019-11-15 ENCOUNTER — HOSPITAL ENCOUNTER (OUTPATIENT)
Dept: PHYSICAL THERAPY | Age: 54
Discharge: HOME | End: 2019-11-15
Payer: COMMERCIAL

## 2019-11-15 PROCEDURE — 97016 VASOPNEUMATIC DEVICE THERAPY: CPT | Performed by: PHYSICAL THERAPIST

## 2019-11-15 PROCEDURE — 97110 THERAPEUTIC EXERCISES: CPT | Performed by: PHYSICAL THERAPIST

## 2019-11-15 PROCEDURE — 97035 APP MDLTY 1+ULTRASOUND EA 15: CPT | Performed by: PHYSICAL THERAPIST

## 2019-11-15 NOTE — PROGRESS NOTES
PT DAILY TREATMENT NOTE 2-15    Patient Name: Lucero Pabon  Date:11/15/2019  : 1965  [x]  Patient  Verified  Payor: Alhaji Slice / Plan: Maddy Self / Product Type: PPO /    In time: 11:00am  Out time: 12:16pm  Total Treatment Time (min): 76  Visit #:  4    Treatment Area: Left knee pain [M25.562]    SUBJECTIVE  Pain Level (0-10 scale): 0  Any medication changes, allergies to medications, adverse drug reactions, diagnosis change, or new procedure performed?: [x] No    [] Yes (see summary sheet for update)  Subjective functional status/changes:   [] No changes reported  The patient reports that her knee \"popped\" or \"dislocated\" on Saturday and had to pop it back it, it's been clicking more. About 15-20min walking it really irritates. It hurts more with bending. OBJECTIVE    Modality rationale: decrease edema, decrease inflammation and decrease pain to improve the patients ability to perform daily activities.     Min Type Additional Details       [] Estim: []Att   []Unatt    []TENS instruct                  []IFC  []Premod   []NMES                     []Other:  []w/US   []w/ice   []w/heat  Position:  Location:       []  Traction: [] Cervical       []Lumbar                       [] Prone          []Supine                       []Intermittent   []Continuous Lbs:  [] before manual  [] after manual  []w/heat   8 [x]  Ultrasound: [x]Continuous   [] Pulsed                       at: [x]1MHz   []3MHz Location: lateral joint line  W/cm2: 1.2    [] Paraffin         Location:   []w/heat    []  Ice     []  Heat  []  Ice massage Position:  Location:    []  Laser  []  Other: Position:  Location:   10   [x]  Vasopneumatic Device Pressure:       [] lo [x] med [] hi   Temperature: 34     [x] Skin assessment post-treatment:  [x]intact []redness- no adverse reaction    []redness  adverse reaction:         58 min Therapeutic Exercise:  [x] See flow sheet :   Rationale: increase ROM, increase strength and improve coordination to improve the patients ability to perform daily activities. With   [x] TE   [] TA   [] neuro   [] other: Patient Education: [x] Review HEP    [x] Progressed/Changed HEP based on:   [x] positioning   [x] body mechanics   [] transfers   [] heat/ice application    [] other:      Other Objective/Functional Measures: Pt. Tolerated therex well     Pain Level (0-10 scale) post treatment: 4    ASSESSMENT/Changes in Function:   The patient is progressing well with strengthening but may have irritated it over the weekend. Patient will continue to benefit from skilled PT services to modify and progress therapeutic interventions, address functional mobility deficits, address ROM deficits, address strength deficits, analyze and address soft tissue restrictions, analyze and cue movement patterns, analyze and modify body mechanics/ergonomics, assess and modify postural abnormalities, address imbalance/dizziness and instruct in home and community integration to attain remaining goals. [x]  See Plan of Care  []  See progress note/recertification  []  See Discharge Summary         Progress towards goals / Updated goals: The patient is progressing towards goals.     PLAN  [x]  Upgrade activities as tolerated     [x]  Continue plan of care  [x]  Update interventions per flow sheet       []  Discharge due to:_  []  Other:_      Rene Romero, PT 11/15/2019

## 2019-11-19 ENCOUNTER — HOSPITAL ENCOUNTER (OUTPATIENT)
Dept: PHYSICAL THERAPY | Age: 54
Discharge: HOME | End: 2019-11-19
Payer: COMMERCIAL

## 2019-11-19 ENCOUNTER — OFFICE VISIT (OUTPATIENT)
Dept: ORTHOPEDIC SURGERY | Age: 54
End: 2019-11-19

## 2019-11-19 VITALS
BODY MASS INDEX: 42.42 KG/M2 | TEMPERATURE: 98.2 F | RESPIRATION RATE: 18 BRPM | SYSTOLIC BLOOD PRESSURE: 144 MMHG | HEART RATE: 73 BPM | HEIGHT: 68 IN | OXYGEN SATURATION: 98 % | DIASTOLIC BLOOD PRESSURE: 88 MMHG

## 2019-11-19 DIAGNOSIS — M17.12 PRIMARY OSTEOARTHRITIS OF LEFT KNEE: Primary | ICD-10-CM

## 2019-11-19 PROCEDURE — 97110 THERAPEUTIC EXERCISES: CPT

## 2019-11-19 PROCEDURE — 97035 APP MDLTY 1+ULTRASOUND EA 15: CPT

## 2019-11-19 NOTE — PROGRESS NOTES
11/19/2019    Chief Complaint: Left knee pain    HPI: This is a(n) 47 y. o. who complains of left knee pain. Onset was sudden, 6 weeks ago. The patient has had activity dependent pain for the 6 weeks. The patient has tried activity modification, she is currently in physical therapy, injections have not given her full relief. The pain is in the anterior knee as well as medial, it is moderate in intensity. The pain causes some limitation with ambulation. The patient complains of feelings of instability in the knee. She has been wearing a brace, she states that she had a moment where she felt like her knee \" dislocated\" but then she was able to put it back in place, she states that she has had this a few times. She denies any other new issues. She has been on light duties at work. .    Past Medical History:   Diagnosis Date    Allergic rhinitis 5/27/2010    Cervical disc herniation 01/2010    C3-4, C4-5, C5-6. Hemangioma body of C5. Dr. Ruth Ontiveros.  Chest pain 08/2011, 02/2012    Dr. Emmanuel Narayan. Dr. Lauren Lee; denies CP as of 3/27/14    Chronic lower back pain 2010, 03/2016    thoracic DDD and spondylosis. DDD L3-S1. Dr. Lizzie Arndt. Kaylan Brewer. [place] Panchito Noriega[place] [facility] Ever[place]cin[place]ga.  Esophagitis, reflux 4/3/2011    Gallstone     Gastritis 5/27/2010    Dr. Chio Mcleod.  Gastrointestinal food allergy 03/2014    Multiple. Dr. Noemy Singh.    Heart palpitations 02/2012    DUE TO PAC'S AND PVC'S. Dr. Saulo Parham.  Hiatal hernia 4/3/2011    HTN (hypertension) 1987    Impaired glucose tolerance 10/15/2010    Incidental lung nodule 01/08/13    LLL 3.9 mm, RML 3.1 mm;  as of 3/27/14 per pt stable w/o growth    Insomnia 1990s    Iron deficiency anemia 5/27/2010    Irritable bowel syndrome (IBS) 03/2014    Dr. Teodoro Martinez.    Knee pain 2012    Right. due to severe OA. / chipped bone     Metatarsal bone fracture 1990    Left fifth.     Migraine 2/22/2011    Dr. Se Junior Radiculopathy, cervical 2010    Left. Dr. Isak Decker.  Shoulder pain, right     due to [address] X 2. Dr. Deana Sanchez.  Thyroid nodule     Dr. Ayoub Pro    Toe fracture, left     fifth toe.  Triangular fibrocartilage complex tear     Dr. Johnston . Left. Past Surgical History:   Procedure Laterality Date    HX  SECTION  1995    x 1    HX CHOLECYSTECTOMY      HX COLONOSCOPY  14    Dr. Yesenia Mendoza; 14    due to abnormal PAP.  HX DILATION AND CURETTAGE      due to miscarriage.  HX ENDOSCOPY      HX ORTHOPAEDIC Right 2016    LUMBAR L4-5, L5-S1 ELLY. Dr. Cardenas [address]       Current Outpatient Medications on File Prior to Visit   Medication Sig Dispense Refill    traMADol (ULTRAM) 50 mg tablet Take 50 mg by mouth every six (6) hours as needed for Pain.  ondansetron hcl (ZOFRAN) 4 mg tablet Take 1 Tab by mouth every eight (8) hours as needed for Nausea. 20 Tab 0    diclofenac (VOLTAREN) 1 % gel Apply  to affected area four (4) times daily. 1 Each 2    ergocalciferol (ERGOCALCIFEROL) 50,000 unit capsule Take 1 Cap by mouth every seven (7) days. 12 Cap 0    propranolol LA (INDERAL LA) 80 mg SR capsule Take 1 Cap by mouth daily. Indications: Migraine Prevention 90 Cap 3    valsartan-hydroCHLOROthiazide (DIOVAN-HCT) 80-12.5 mg per tablet Take 1 Tab by mouth daily. Indications: high blood pressure 90 Tab 3    omeprazole (PRILOSEC) 20 mg capsule TAKE 1 CAPSULE BY MOUTH TWICE DAILY  Indications: heartburn 60 Cap 5    acetaminophen (TYLENOL ARTHRITIS PAIN) 650 mg CR tablet Take 650 mg by mouth as needed for Pain. No current facility-administered medications on file prior to visit.         Allergies   Allergen Reactions    Doxylamine Succinate Swelling     12.5-25 MG  HANDS, FACE/PERIORAL, FEET    Pcn [Penicillins] Hives    Ambien [Zolpidem] Nausea and Vomiting and Other (comments)     5 mg with Ativan 0.5 mg   TOO GROGGY, SLEPT 24 HOURS  7.5 MG FORGOT SHE WAS SLEEP EATING    Aspirin Nausea Only    Celebrex [Celecoxib] Other (comments)     TIGHT SQUEEZING IN CHEST  CHEST ACHED    Erythromycin Nausea and Vomiting    Milk Prot-Turm-Pepper-Pineappl Other (comments)     Multiple food allergies    Neurontin [Gabapentin] Other (comments)     300 mg  SEVERE LEG PAIN  EYE TWITCHING    Nsaids (Non-Steroidal Anti-Inflammatory Drug) Other (comments)     GI irritation         Family History   Problem Relation Age of Onset    Diabetes Mother     Heart Disease Mother 58        2 stents    Hypertension Mother     Diabetes Father     Stroke Maternal Grandmother     Cancer Maternal Uncle         prostate       Social History     Socioeconomic History    Marital status:      Spouse name: Not on file    Number of children: 3    Years of education: Not on file    Highest education level: Not on file   Occupational History    Occupation: Medical assistant     Employer: [employer]   Tobacco Use    Smoking status: Never Smoker    Smokeless tobacco: Never Used   Substance and Sexual Activity    Alcohol use: No    Drug use: No    Sexual activity: Yes     Partners: Male         Review of Systems:       General: Denies headache, lethargy, fever, weight loss  Ears/Nose/Throat: Denies ear discharge, drainage, nosebleeds, hoarse voice, dental problems  Cardiovascular: Denies chest pain, shortness of breath  Lungs: Denies chest pain, breathing problems, wheezing, pneumonia  Stomach: Denies stomach pain, heartburn, constipation, irritable bowel  Skin: Denies rash, sores, open wounds  Musculoskeletal: Admits to knee pain, no deformity. Genitourinary: Denies dysuria, hematuria, polyuria  Gastrointestinal: Denies constipation, obstipation, diarrhea  Neurological: Denies changes in sight, smell, hearing, taste, seizures. Denies loss of consciousness.   Psychiatric: Denies depression, sleep pattern changes, anxiety, change in personality  Endocrine: Denies mood swings, heat or cold intolerance  Hematologic/Lymphatic: Denies anemia, purpura, petechia  Allergic/Immunologic: Denies swelling of throat, pain or swelling at lymph nodes      Physical Examination:    Visit Vitals  /88 (BP 1 Location: Right arm, BP Patient Position: Sitting)   Pulse 73   Temp 98.2 °F (36.8 °C) (Oral)   Resp 18   Ht 5' 8\" (1.727 m)   SpO2 98%   BMI 42.42 kg/m²        General: AOX3, no apparent distress  Psychiatric: mood and affect appropriate  Lungs: breathing is symmetric and unlabored bilaterally  Heart: regular rate and rhythm  Abdomen: no guarding  Head: normocephalic, atraumatic  Skin: No significant abnormalities, good turgor  Sensation intact to light touch: L1-S1 dermatomes  Muscular exam: 5/5 strength in all major muscle groups unless noted in specialty exam.    Extremities:      Left upper extremity: Full active and passive range of motion without pain, deformity, no open wound, strength 5/5 in all major muscle groups. Right upper extremity: Full active and passive range of motion without pain, deformity, no open wound, strength 5/5 in all major muscle groups. Right lower extremity: Full active and passive range of motion without pain, deformity, no open wound, strength 5/5 in all major muscle groups. Left lower extremity:  No deformity is noted. Range of motion of the knee is 0-125. Ligamentous testing of the knee indicates stability of the the MCL, LCL, PCL, and ACL. Lachman's, anterior and posterior drawer tests are specifically negative. No joint line tenderness to palpation medially or laterally. Popliteal area is unremarkable. Negative for effusion. No patellar crepitus. Patella tracks centrally with a negative apprehension and grind test.  Pivot shift is negative.   Strength testing is indicative of 5/5 strength at hip flexion, extension, knee flexion and extension, tibialis anterior, EHL, and FHL. Sensation is intact to light touch in the L1-S1 dermatomes. Capillary refill is less than 2 seconds in the toes. Diagnostics:    Pertinent Xrays: No new x-rays    Assessment: Osteoarthritis with quadriceps inhibition    Plan: This patient has the above-mentioned issue, I do not see that there is any structural abnormality, her ligaments are stable, the plan would be to have her proceed with physical therapy and aggressive quadricep strengthening. I reassured her that an MRI at this point, 1 month after her most recent MRI would not likely show us any new pathology. I reassured her that likely this is quadriceps inhibition and she should proceed with conservative treatment as such. Plan will be to have her follow-up with me in 1 month. She will remain on light duties at work. Ms. Gianni Schwarz has a reminder for a \"due or due soon\" health maintenance. I have asked that she contact her primary care provider for follow-up on this health maintenance.

## 2019-11-19 NOTE — PROGRESS NOTES
PT DAILY TREATMENT NOTE 2-15    Patient Name: Jose Gonzalez  Date:2019  : 1965  [x]  Patient  Verified  Payor: Meche Alexandra / Plan: Rosana Phillips / Product Type: PPO /    In time:1134  Out time:1240  Total Treatment Time (min): 77  Visit #:  5    Treatment Area: Left knee pain [M25.562]    SUBJECTIVE  Pain Level (0-10 scale): 2/10  Any medication changes, allergies to medications, adverse drug reactions, diagnosis change, or new procedure performed?: [x] No    [] Yes (see summary sheet for update)  Subjective functional status/changes:   [] No changes reported  Patient reports she has been experiencing increased pain along the medial side of her knee because of it buckling. She just saw Dr. Wayne Boothe this morning and he said to continue to focus primarily on strengthening the quad.      OBJECTIVE         Modality rationale: decrease edema, decrease inflammation and decrease pain to improve the patients ability to perform ADLs and reduce pain levels    Min Type Additional Details        [] Estim: []Att   []Unatt    []TENS instruct                  []IFC  []Premod   []NMES                     []Other:  []w/US   []w/ice   []w/heat  Position:  Location:        []  Traction: [] Cervical       []Lumbar                       [] Prone          []Supine                       []Intermittent   []Continuous Lbs:  [] before manual  [] after manual  []w/heat   8 [x]  Ultrasound: [x]Continuous   [] Pulsed                       at: [x]1MHz   []3MHz Location: lateral joint line  W/cm2: 1.2     [] Paraffin         Location:   []w/heat     []  Ice     []  Heat  []  Ice massage Position:  Location:     []  Laser  []  Other: Position:  Location:       []  Vasopneumatic Device Pressure:       [] lo [] med [] hi   Temperature:       [x] Skin assessment post-treatment:  [x]intact []redness- no adverse reaction    []redness  adverse reaction:      58 min Therapeutic Exercise:  [x] See flow sheet : Rationale: increase ROM and increase strength to improve the patients ability to perform ADLs and reduce pain levels    With   [] TE   [] TA   [] neuro   [] other: Patient Education: [x] Review HEP    [] Progressed/Changed HEP based on:   [] positioning   [] body mechanics   [] transfers   [] heat/ice application    [] other:      Other Objective/Functional Measures: none noted     Pain Level (0-10 scale) post treatment: 2/10    ASSESSMENT/Changes in Function:   Patient began to experience slight discomfort with SAQs mis way through the second set. Able to lock out in TKE well with SLR. Will continue to progress as tolerated. Patient will continue to benefit from skilled PT services to modify and progress therapeutic interventions, address functional mobility deficits, address ROM deficits, address strength deficits, analyze and address soft tissue restrictions, analyze and cue movement patterns, analyze and modify body mechanics/ergonomics and assess and modify postural abnormalities to attain remaining goals. [x]  See Plan of Care  []  See progress note/recertification  []  See Discharge Summary         Progress towards goals / Updated goals:  Patient is progressing towards goals.      PLAN  [x]  Upgrade activities as tolerated     []  Continue plan of care  [x]  Update interventions per flow sheet       []  Discharge due to:_  []  Other:_      Chirag Parham 11/19/2019

## 2019-11-19 NOTE — PROGRESS NOTES
Chief Complaint   Patient presents with    Knee Pain     6w f/u     1. Have you been to the ER, urgent care clinic since your last visit? Hospitalized since your last visit? No    2. Have you seen or consulted any other health care providers outside of the [facility] since your last visit? Include any pap smears or colon screening. No    B/p elevated, doctor notified.

## 2019-11-22 ENCOUNTER — HOSPITAL ENCOUNTER (OUTPATIENT)
Dept: PHYSICAL THERAPY | Age: 54
Discharge: HOME | End: 2019-11-22
Payer: COMMERCIAL

## 2019-11-22 PROCEDURE — 97035 APP MDLTY 1+ULTRASOUND EA 15: CPT

## 2019-11-22 PROCEDURE — 97110 THERAPEUTIC EXERCISES: CPT

## 2019-11-22 NOTE — PROGRESS NOTES
PT DAILY TREATMENT NOTE 2-15    Patient Name: Edilberto Hines  Date:2019  : 1965  [x]  Patient  Verified  Payor: John Porter / Plan: José Johnston / Product Type: PPO /    In time:734  Out time:843  Total Treatment Time (min): 71  Visit #:  6    Treatment Area: Left knee pain [M25.562]    SUBJECTIVE  Pain Level (0-10 scale): 0/10  Any medication changes, allergies to medications, adverse drug reactions, diagnosis change, or new procedure performed?: [x] No    [] Yes (see summary sheet for update)  Subjective functional status/changes:   [] No changes reported  Patient reports it is the first day since the tear without any pain. OBJECTIVE                Modality rationale: decrease edema, decrease inflammation and decrease pain to improve the patients ability to perform ADLs and reduce pain levels     Min Type Additional Details        []? Estim: []? Att   []? Unatt    []? TENS instruct                  []? IFC  []? Premod   []? NMES                     []? Other:  []?w/US   []?w/ice   []?w/heat  Position:  Location:        []?  Traction: []? Cervical       []? Lumbar                       []? Prone          []? Supine                       []? Intermittent   []? Continuous Lbs:  []? before manual  []? after manual  []?w/heat   8 [x]?   Ultrasound: [x]? Continuous   []? Pulsed                       at: [x]? 1MHz   []? 3MHz Location: lateral joint line  W/cm2: 1.2     []? Paraffin     Location:   []?w/heat     []?  Ice     []?  Heat  []?  Ice massage Position:  Location:     []?  Laser  []?  Other: Position:  Location:        []?  Vasopneumatic Device Pressure:       []? lo []? med []? hi   Temperature:       [x]? Skin assessment post-treatment:  [x]? intact []? redness- no adverse reaction    []? redness  adverse reaction:      61 min Therapeutic Exercise:  [x] See flow sheet :   Rationale: increase ROM and increase strength to improve the patients ability to perform ADLs and reduce pain levels          With   [] TE   [] TA   [] neuro   [] other: Patient Education: [x] Review HEP    [] Progressed/Changed HEP based on:   [] positioning   [] body mechanics   [] transfers   [] heat/ice application    [] other:      Other Objective/Functional Measures: none noted     Pain Level (0-10 scale) post treatment: 0/10    ASSESSMENT/Changes in Function:   Patient will continue to fatigue relatively quickly with glute strengthening, but is able to tolerate it well. Will continue to progress to more CKC as tolerated. Patient will continue to benefit from skilled PT services to modify and progress therapeutic interventions, address functional mobility deficits, address ROM deficits, address strength deficits, analyze and address soft tissue restrictions, analyze and cue movement patterns, analyze and modify body mechanics/ergonomics and assess and modify postural abnormalities to attain remaining goals. [x]  See Plan of Care  []  See progress note/recertification  []  See Discharge Summary         Progress towards goals / Updated goals:  Patient is progressing towards goals.      PLAN  [x]  Upgrade activities as tolerated     [x]  Continue plan of care  [x]  Update interventions per flow sheet       []  Discharge due to:_  []  Other:_      Jossy Safe 11/22/2019

## 2019-11-25 ENCOUNTER — HOSPITAL ENCOUNTER (OUTPATIENT)
Dept: PHYSICAL THERAPY | Age: 54
Discharge: HOME | End: 2019-11-25
Payer: COMMERCIAL

## 2019-11-25 PROCEDURE — 97110 THERAPEUTIC EXERCISES: CPT | Performed by: PHYSICAL THERAPIST

## 2019-11-25 PROCEDURE — 97035 APP MDLTY 1+ULTRASOUND EA 15: CPT | Performed by: PHYSICAL THERAPIST

## 2019-11-25 NOTE — PROGRESS NOTES
PT DAILY TREATMENT NOTE 2-15    Patient Name: Rosalie Weir  Date:2019  : 1965  [x]  Patient  Verified  Payor: Toyin Miller / Plan: Suzette Deleon / Product Type: PPO /    In time: 12:05pm  Out time: 1:11pm  Total Treatment Time (min): 66  Visit #:  7    Treatment Area: Left knee pain [M25.562]    SUBJECTIVE  Pain Level (0-10 scale): 2  Any medication changes, allergies to medications, adverse drug reactions, diagnosis change, or new procedure performed?: [x] No    [] Yes (see summary sheet for update)  Subjective functional status/changes:   [] No changes reported  The patient reports that her knee martín 1-2x daily, but it's not hurting like it used to. OBJECTIVE                Modality rationale: decrease edema, decrease inflammation and decrease pain to improve the patients ability to perform ADLs and reduce pain levels     Min Type Additional Details        []? Estim: []? Att   []? Unatt    []? TENS instruct                  []? IFC  []? Premod   []? NMES                     []? Other:  []?w/US   []?w/ice   []?w/heat  Position:  Location:        []?  Traction: []? Cervical       []? Lumbar                       []? Prone          []? Supine                       []? Intermittent   []? Continuous Lbs:  []? before manual  []? after manual  []?w/heat   8 [x]?   Ultrasound: [x]? Continuous   []? Pulsed                       at: [x]? 1MHz   []? 3MHz Location: lateral joint line  W/cm2: 1.2     []? Paraffin     Location:   []?w/heat     []?  Ice     []?  Heat  []?  Ice massage Position:  Location:     []?  Laser  []?  Other: Position:  Location:        []?  Vasopneumatic Device Pressure:       []? lo []? med []? hi   Temperature:       [x]? Skin assessment post-treatment:  [x]? intact []? redness- no adverse reaction    []? redness  adverse reaction:      58 min Therapeutic Exercise:  [x] See flow sheet :   Rationale: increase ROM and increase strength to improve the patients ability to perform ADLs and reduce pain levels          With   [] TE   [] TA   [] neuro   [] other: Patient Education: [x] Review HEP    [] Progressed/Changed HEP based on:   [] positioning   [] body mechanics   [] transfers   [] heat/ice application    [] other:      Other Objective/Functional Measures: none noted     Pain Level (0-10 scale) post treatment: 1    ASSESSMENT/Changes in Function:   The patient progressed well with increased resistance as tolerated. Patient will continue to benefit from skilled PT services to modify and progress therapeutic interventions, address functional mobility deficits, address ROM deficits, address strength deficits, analyze and address soft tissue restrictions, analyze and cue movement patterns, analyze and modify body mechanics/ergonomics and assess and modify postural abnormalities to attain remaining goals. [x]  See Plan of Care  []  See progress note/recertification  []  See Discharge Summary         Progress towards goals / Updated goals:  Patient is progressing towards goals.      PLAN  [x]  Upgrade activities as tolerated     [x]  Continue plan of care  [x]  Update interventions per flow sheet       []  Discharge due to:_  []  Other:_      Antonella Garza, PT 11/25/2019

## 2019-11-27 ENCOUNTER — HOSPITAL ENCOUNTER (OUTPATIENT)
Dept: PHYSICAL THERAPY | Age: 54
Discharge: HOME | End: 2019-11-27
Payer: COMMERCIAL

## 2019-11-27 PROCEDURE — 97110 THERAPEUTIC EXERCISES: CPT | Performed by: PHYSICAL THERAPIST

## 2019-11-27 PROCEDURE — 97016 VASOPNEUMATIC DEVICE THERAPY: CPT | Performed by: PHYSICAL THERAPIST

## 2019-11-27 PROCEDURE — 97035 APP MDLTY 1+ULTRASOUND EA 15: CPT | Performed by: PHYSICAL THERAPIST

## 2019-11-27 NOTE — PROGRESS NOTES
PT DAILY TREATMENT NOTE 2-15    Patient Name: Mirza Dry  Date:2019  : 1965  [x]  Patient  Verified  Payor: Jose Merchnat / Plan: Donnie Key / Product Type: PPO /    In time: 12:01pm  Out time: 1:03pm   Total Treatment Time (min): 62   Visit #:  8    Treatment Area: Left knee pain [M25.562]    SUBJECTIVE  Pain Level (0-10 scale): 4   Any medication changes, allergies to medications, adverse drug reactions, diagnosis change, or new procedure performed?: [x] No    [] Yes (see summary sheet for update)  Subjective functional status/changes:   [] No changes reported  The patient reports that she was really flared up yesterday and used an ace wrap, which helped. OBJECTIVE                Modality rationale: decrease edema, decrease inflammation and decrease pain to improve the patients ability to perform ADLs and reduce pain levels     Min Type Additional Details        []? Estim: []? Att   []? Unatt    []? TENS instruct                  []? IFC  []? Premod   []? NMES                     []? Other:  []?w/US   []?w/ice   []?w/heat  Position:  Location:        []?  Traction: []? Cervical       []? Lumbar                       []? Prone          []? Supine                       []? Intermittent   []? Continuous Lbs:  []? before manual  []? after manual  []?w/heat   8  [x]?   Ultrasound: [x]? Continuous   []? Pulsed                       at: [x]? 1MHz   []? 3MHz Location: lateral joint line  W/cm2: 1.2     []? Paraffin     Location:   []?w/heat     []?  Ice     []?  Heat  []?  Ice massage Position:  Location:     []?  Laser  []?  Other: Position:  Location:    10    [x]?   Vasopneumatic Device Pressure:       []? lo [x]? med []? hi   Temperature: 34      [x]? Skin assessment post-treatment:  [x]? intact []? redness- no adverse reaction    []? redness  adverse reaction:      44  min Therapeutic Exercise:  [x] See flow sheet :   Rationale: increase ROM and increase strength to improve the patients ability to perform ADLs and reduce pain levels          With   [x] TE   [] TA   [] neuro   [] other: Patient Education: [x] Review HEP    [x] Progressed/Changed HEP based on:   [x] positioning   [x] body mechanics   [] transfers   [] heat/ice application    [] other:      Other Objective/Functional Measures: FOTO= 38 (33 at eval)     Pain Level (0-10 scale) post treatment: 2     ASSESSMENT/Changes in Function:   The patient is progressing slowly with increased strength and mobility. Patient will continue to benefit from skilled PT services to modify and progress therapeutic interventions, address functional mobility deficits, address ROM deficits, address strength deficits, analyze and address soft tissue restrictions, analyze and cue movement patterns, analyze and modify body mechanics/ergonomics and assess and modify postural abnormalities to attain remaining goals. [x]  See Plan of Care  [x]  See progress note/recertification  []  See Discharge Summary         Progress towards goals / Updated goals:  Patient is progressing towards goals.      PLAN  [x]  Upgrade activities as tolerated     [x]  Continue plan of care  [x]  Update interventions per flow sheet       []  Discharge due to:_  []  Other:_      Emily Linares, PT 11/27/2019

## 2019-12-03 ENCOUNTER — HOSPITAL ENCOUNTER (OUTPATIENT)
Dept: PHYSICAL THERAPY | Age: 54
Discharge: HOME | End: 2019-12-03
Payer: COMMERCIAL

## 2019-12-03 PROCEDURE — 97035 APP MDLTY 1+ULTRASOUND EA 15: CPT | Performed by: PHYSICAL THERAPIST

## 2019-12-03 PROCEDURE — 97016 VASOPNEUMATIC DEVICE THERAPY: CPT | Performed by: PHYSICAL THERAPIST

## 2019-12-03 PROCEDURE — 97110 THERAPEUTIC EXERCISES: CPT | Performed by: PHYSICAL THERAPIST

## 2019-12-05 ENCOUNTER — HOSPITAL ENCOUNTER (OUTPATIENT)
Dept: PHYSICAL THERAPY | Age: 54
Discharge: HOME | End: 2019-12-05
Payer: COMMERCIAL

## 2019-12-05 PROCEDURE — 97016 VASOPNEUMATIC DEVICE THERAPY: CPT

## 2019-12-05 PROCEDURE — 97110 THERAPEUTIC EXERCISES: CPT

## 2019-12-05 PROCEDURE — 97035 APP MDLTY 1+ULTRASOUND EA 15: CPT

## 2019-12-05 NOTE — PROGRESS NOTES
PT DAILY TREATMENT NOTE 2-15    Patient Name: Thao Zafar  Date:2019  : 1965  [x]  Patient  Verified  Payor: Alpa Sender / Plan: Alka Wellington / Product Type: PPO /    In time:1200  Out time:105  Total Treatment Time (min): 65  Visit #:  10    Treatment Area: Left knee pain [M25.562]    SUBJECTIVE  Pain Level (0-10 scale): 0/10  Any medication changes, allergies to medications, adverse drug reactions, diagnosis change, or new procedure performed?: [x] No    [] Yes (see summary sheet for update)  Subjective functional status/changes:   [] No changes reported  Patient reports she continues to feel the clicking and popping as she is walking around it will     OBJECTIVE    Modality rationale: decrease edema, decrease inflammation and decrease pain to improve the patients ability to perform ADLs and reduce pain levels     Min Type Additional Details        []? ? Estim: []??Att   []? ? Unatt    []? ?TENS instruct                  []? ?IFC  []? ?Premod   []? ?NMES                     []? ? Other:  []??w/US   []? ?w/ice   []? ?w/heat  Position:  Location:        []? ?  Traction: []?? Cervical       []? ?Lumbar                       []? ? Prone          []? ?Supine                       []? ?Intermittent   []? ? Continuous Lbs:  []?? before manual  []? ? after manual  []? ?w/heat   8 [x]? ?  Ultrasound: [x]? ? Continuous   []? ? Pulsed                       at: [x]? ? 1MHz   []? ? 3MHz Location: lateral joint line  W/cm2: 1.2     []? ? Paraffin     Location:   []??w/heat     []? ?  Ice     []? ?  Heat  []? ?  Ice massage Position:  Location:     []? ?  Laser  []? ?  Other: Position:  Location:    10    [x]? ?  Vasopneumatic Device Pressure:       []? ? lo [x]? ? med []?? hi   Temperature: 34      [x]? ? Skin assessment post-treatment:  [x]? ? intact []? ?redness- no adverse reaction    []? ?redness  adverse reaction:     47 min Therapeutic Exercise:  [x] See flow sheet :   Rationale: increase ROM and increase strength to improve the patients ability to perform ADLs and reduce pain levels    With   [] TE   [] TA   [] neuro   [] other: Patient Education: [x] Review HEP    [] Progressed/Changed HEP based on:   [] positioning   [] body mechanics   [] transfers   [] heat/ice application    [] other:      Other Objective/Functional Measures: none noted     Pain Level (0-10 scale) post treatment: 0/10    ASSESSMENT/Changes in Function:   Patient continues to experience \"popping\" with open chain activities. Will continue to require ER positioning to limit discomfort. Will continue to progress as tolerated. Patient will continue to benefit from skilled PT services to modify and progress therapeutic interventions, address functional mobility deficits, address ROM deficits, address strength deficits, analyze and address soft tissue restrictions, analyze and cue movement patterns, analyze and modify body mechanics/ergonomics and assess and modify postural abnormalities to attain remaining goals. [x]  See Plan of Care  []  See progress note/recertification  []  See Discharge Summary         Progress towards goals / Updated goals:  Patient is progressing towards goals.      PLAN  [x]  Upgrade activities as tolerated     [x]  Continue plan of care  [x]  Update interventions per flow sheet       []  Discharge due to:_  []  Other:_      Jossy Safe 12/5/2019

## 2019-12-10 ENCOUNTER — HOSPITAL ENCOUNTER (OUTPATIENT)
Dept: PHYSICAL THERAPY | Age: 54
Discharge: HOME | End: 2019-12-10
Payer: COMMERCIAL

## 2019-12-10 PROCEDURE — 97016 VASOPNEUMATIC DEVICE THERAPY: CPT | Performed by: PHYSICAL THERAPIST

## 2019-12-10 PROCEDURE — 97035 APP MDLTY 1+ULTRASOUND EA 15: CPT | Performed by: PHYSICAL THERAPIST

## 2019-12-10 PROCEDURE — 97110 THERAPEUTIC EXERCISES: CPT | Performed by: PHYSICAL THERAPIST

## 2019-12-10 NOTE — PROGRESS NOTES
PT DAILY TREATMENT NOTE 2-15    Patient Name: Jacquelyn Sinclair  Date:12/10/2019  : 1965  [x]  Patient  Verified  Payor: Kaylin Walton / Plan: Alexandra Collier / Product Type: PPO /    In time: 12:02pm  Out time: 1:15pm  Total Treatment Time (min): 73  Visit #:  11    Treatment Area: Left knee pain [M25.562]    SUBJECTIVE  Pain Level (0-10 scale): 0  Any medication changes, allergies to medications, adverse drug reactions, diagnosis change, or new procedure performed?: [x] No    [] Yes (see summary sheet for update)  Subjective functional status/changes:   [] No changes reported  The patient reports that she is doing well, no buckling and no pain, just achey today. OBJECTIVE    Modality rationale: decrease edema, decrease inflammation and decrease pain to improve the patients ability to perform ADLs and reduce pain levels     Min Type Additional Details        []? ? Estim: []??Att   []? ? Unatt    []? ?TENS instruct                  []? ?IFC  []? ?Premod   []? ?NMES                     []? ? Other:  []??w/US   []? ?w/ice   []? ?w/heat  Position:  Location:        []? ?  Traction: []?? Cervical       []? ?Lumbar                       []? ? Prone          []? ?Supine                       []? ?Intermittent   []? ? Continuous Lbs:  []?? before manual  []? ? after manual  []? ?w/heat   8 [x]? ?  Ultrasound: [x]? ? Continuous   []? ? Pulsed                       at: [x]? ? 1MHz   []? ? 3MHz Location: lateral joint line  W/cm2: 1.2     []? ? Paraffin     Location:   []??w/heat     []? ?  Ice     []? ?  Heat  []? ?  Ice massage Position:  Location:     []? ?  Laser  []? ?  Other: Position:  Location:    10    [x]? ?  Vasopneumatic Device Pressure:       []? ? lo [x]? ? med []?? hi   Temperature: 34      [x]? ? Skin assessment post-treatment:  [x]? ? intact []? ?redness- no adverse reaction    []? ?redness  adverse reaction:     55 min Therapeutic Exercise:  [x] See flow sheet :   Rationale: increase ROM and increase strength to improve the patients ability to perform ADLs and reduce pain levels    With   [x] TE   [] TA   [] neuro   [] other: Patient Education: [x] Review HEP    [x] Progressed/Changed HEP based on:   [x] positioning   [x] body mechanics   [] transfers   [] heat/ice application    [] other:      Other Objective/Functional Measures: none noted     Pain Level (0-10 scale) post treatment: 0    ASSESSMENT/Changes in Function:   The patient is progressing well with weightbearing therex as tolerated. Patient will continue to benefit from skilled PT services to modify and progress therapeutic interventions, address functional mobility deficits, address ROM deficits, address strength deficits, analyze and address soft tissue restrictions, analyze and cue movement patterns, analyze and modify body mechanics/ergonomics and assess and modify postural abnormalities to attain remaining goals. [x]  See Plan of Care  []  See progress note/recertification  []  See Discharge Summary         Progress towards goals / Updated goals:  Patient is progressing towards goals.      PLAN  [x]  Upgrade activities as tolerated     [x]  Continue plan of care  [x]  Update interventions per flow sheet       []  Discharge due to:_  []  Other:_      Jose Armando Guzmán, PT 12/10/2019

## 2019-12-12 ENCOUNTER — HOSPITAL ENCOUNTER (OUTPATIENT)
Dept: PHYSICAL THERAPY | Age: 54
Discharge: HOME | End: 2019-12-12
Payer: COMMERCIAL

## 2019-12-12 PROCEDURE — 97110 THERAPEUTIC EXERCISES: CPT

## 2019-12-12 PROCEDURE — 97035 APP MDLTY 1+ULTRASOUND EA 15: CPT

## 2019-12-12 NOTE — PROGRESS NOTES
PT DAILY TREATMENT NOTE 2-15    Patient Name: April Sprain  Date:2019  : 1965  [x]  Patient  Verified  Payor: Prema Posadas / Plan: Obed Golden / Product Type: PPO /    In time:1200  Out time:1245  Total Treatment Time (min): 45  Visit #:  12    Treatment Area: Left knee pain [M25.562]    SUBJECTIVE  Pain Level (0-10 scale): 0/10  Any medication changes, allergies to medications, adverse drug reactions, diagnosis change, or new procedure performed?: [x] No    [] Yes (see summary sheet for update)  Subjective functional status/changes:   [] No changes reported  Patient reports her pain is minimal, but she continues to have the clicking. OBJECTIVE    Modality rationale: decrease edema, decrease inflammation and decrease pain to improve the patients ability to perform ADLs and reduce pain levels     Min Type Additional Details        []??? Estim: []? ? ? Att   []? ??Unatt    []? ??TENS instruct                  []? ??IFC  []? ? ?Premod   []? ??NMES                     []? ??Other:  []???w/US   []? ??w/ice   []? ??w/heat  Position:  Location:        []? ??  Traction: []??? Cervical       []? ? ?Lumbar                       []? ?? Prone          []? ? ?Supine                       []? ? ? Intermittent   []? ?? Continuous Lbs:  []??? before manual  []? ?? after manual  []? ??w/heat   8 [x]? ??  Ultrasound: [x]? ? ? Continuous   []? ?? Pulsed                       at: [x]? ??1MHz   []? ??3MHz Location: lateral joint line  W/cm2: 1.2     []??? Paraffin     Location:   []???w/heat     []? ??  Ice     []? ??  Heat  []? ??  Ice massage Position:  Location:     []? ??  Laser  []? ??  Other: Position:  Location:        []? ??  Vasopneumatic Device Pressure:       []? ?? lo []? ?? med []? ?? hi   Temperature:       [x]? ?? Skin assessment post-treatment:  [x]? ??intact []? ??redness- no adverse reaction    []? ??redness  adverse reaction:     37 min Therapeutic Exercise:  [x] See flow sheet :   Rationale: increase ROM and increase strength to improve the patients ability to perform ADLs and reduce pain levels          With   [] TE   [] TA   [] neuro   [] other: Patient Education: [x] Review HEP    [] Progressed/Changed HEP based on:   [] positioning   [] body mechanics   [] transfers   [] heat/ice application    [] other:      Other Objective/Functional Measures: none noted     Pain Level (0-10 scale) post treatment: 0/10    ASSESSMENT/Changes in Function:   Patient continues to fatigue quickly with glute strengthening therex. She needed to leave therapy early to get back to work in time. Poor static stability with SLS. Will continue to progress as tolerated. Patient will continue to benefit from skilled PT services to modify and progress therapeutic interventions, address functional mobility deficits, address ROM deficits, address strength deficits, analyze and address soft tissue restrictions, analyze and cue movement patterns, analyze and modify body mechanics/ergonomics and assess and modify postural abnormalities to attain remaining goals. [x]  See Plan of Care  []  See progress note/recertification  []  See Discharge Summary         Progress towards goals / Updated goals:  Patient is progressing towards goals.      PLAN  [x]  Upgrade activities as tolerated     [x]  Continue plan of care  [x]  Update interventions per flow sheet       []  Discharge due to:_  []  Other:_      Argenis Jay 12/12/2019

## 2019-12-17 ENCOUNTER — APPOINTMENT (OUTPATIENT)
Dept: PHYSICAL THERAPY | Age: 54
End: 2019-12-17
Payer: COMMERCIAL

## 2019-12-19 ENCOUNTER — HOSPITAL ENCOUNTER (OUTPATIENT)
Dept: PHYSICAL THERAPY | Age: 54
Discharge: HOME | End: 2019-12-19
Payer: COMMERCIAL

## 2019-12-19 PROCEDURE — 97035 APP MDLTY 1+ULTRASOUND EA 15: CPT

## 2019-12-19 PROCEDURE — 97110 THERAPEUTIC EXERCISES: CPT

## 2019-12-19 NOTE — PROGRESS NOTES
PT DAILY TREATMENT NOTE 2-15    Patient Name: Dani Cho  Date:2019  : 1965  [x]  Patient  Verified  Payor: Kuldip Lujan / Plan: Rebecca Cristobal / Product Type: PPO /    In time:1200  Out time:1254  Total Treatment Time (min): 47  Visit #: 13    Treatment Area: Left knee pain [M25.562]     SUBJECTIVE  Pain Level (0-10 scale): 4/10  Any medication changes, allergies to medications, adverse drug reactions, diagnosis change, or new procedure performed?: [x] No    [] Yes (see summary sheet for update)  Subjective functional status/changes:   [] No changes reported  Patient reports she has been having increased pain since Saturday and is not sure what could have caused it    OBJECTIVE         Modality rationale: decrease edema, decrease inflammation and decrease pain to improve the patients ability to perform ADLs and reduce pain levels     Min Type Additional Details        []???? Estim: []?? ?? Att   []????Unatt    []????TENS instruct                  []????IFC  []????Premod   []????NMES                     []?? ??Other:  []????w/US   []? ???w/ice   []? ???w/heat  Position:  Location:        []????  Traction: []???? Cervical       []????Lumbar                       []???? Prone          []????Supine                       []?? ?? Intermittent   []???? Continuous Lbs:  []???? before manual  []???? after manual  []? ???w/heat   8 [x]? ???  Ultrasound: [x]???? Continuous   []???? Pulsed                       at: [x]????1MHz   []????3MHz Location: lateral joint line  W/cm2: 1.2     []???? Paraffin     Location:   []????w/heat     []????  Ice     []????  Heat  []????  Ice massage Position:  Location:     []????  Laser  []????  Other: Position:  Location:        []????  Vasopneumatic Device Pressure:       []???? lo []???? med []???? hi   Temperature:       [x]? ??? Skin assessment post-treatment:  [x]? ???intact []? ???redness- no adverse reaction    []? ???redness  adverse reaction:      46 min Therapeutic Exercise:  [x]? See flow sheet :   Rationale: increase ROM and increase strength to improve the patients ability to perform ADLs and reduce pain levels                            With   [] TE   [] TA   [] neuro   [] other: Patient Education: [x] Review HEP    [] Progressed/Changed HEP based on:   [] positioning   [] body mechanics   [] transfers   [] heat/ice application    [] other:      Other Objective/Functional Measures: none noted     Pain Level (0-10 scale) post treatment: 4/10    ASSESSMENT/Changes in Function:   Patient continues to complain of cracking and popping in her knee during flexion movements. Continues to show fair glute activation. Will continue to progress as tolerated. Patient will continue to benefit from skilled PT services to modify and progress therapeutic interventions, address functional mobility deficits, address ROM deficits, address strength deficits, analyze and address soft tissue restrictions, analyze and cue movement patterns, analyze and modify body mechanics/ergonomics and assess and modify postural abnormalities to attain remaining goals. [x]  See Plan of Care  []  See progress note/recertification  []  See Discharge Summary         Progress towards goals / Updated goals:  Patient is progressing towards goals.      PLAN  [x]  Upgrade activities as tolerated     [x]  Continue plan of care  [x]  Update interventions per flow sheet       []  Discharge due to:_  []  Other:_      Kaylan Mascorro 12/19/2019

## 2019-12-23 ENCOUNTER — HOSPITAL ENCOUNTER (OUTPATIENT)
Dept: PHYSICAL THERAPY | Age: 54
Discharge: HOME | End: 2019-12-23
Payer: COMMERCIAL

## 2019-12-23 PROCEDURE — 97035 APP MDLTY 1+ULTRASOUND EA 15: CPT | Performed by: PHYSICAL THERAPIST

## 2019-12-23 PROCEDURE — 97110 THERAPEUTIC EXERCISES: CPT | Performed by: PHYSICAL THERAPIST

## 2019-12-23 NOTE — ANCILLARY DISCHARGE INSTRUCTIONS
[facility] [address] ([address]), [address] Shari Peguero Phone: [phone] Fax: [phone] Discharge Summary  2-15 Patient name: Irene Vila  : 1965  Provider#: [id number] Referral source: Halie Bobby DO Medical/Treatment Diagnosis: Left knee pain [M25.562] Prior Hospitalization: see medical history Comorbidities: See Plan of Care Prior Level of Function:See Plan of Care Medications: Verified on Patient Summary List 
 
Start of Care: 10/31/19      Onset Date: chronic Visits from Start of Care: 14     Missed Visits: 3 Reporting Period : 10/31/19 to 19 ASSESSMENT/SUMMARY OF CARE: The patient is progressing well with improved strength and mobility with her known left lateral meniscus tear. She hasn't had much pain overall until one flare up since 19. She didn't have any buckling or pain for 2 weeks, but over the past week has had only a few instances, whereas she was buckling a few times per day last month. She also has improved her left knee A/PROM: Flex= 115/125 (108/118 at eval); Ext= 5 hyper/5 hyper (-8/-3 at eval). She can ambulate on average 30 min until she has a significant increase in pain. She has a safe HEP that she will continue to utilize to maintain improvements made thus far. If she is consistent, at least 3x/wk, with her HEP, and continues to have limitations with functional mobility in 2 months, she was told to follow up with her physician for more aggressive interventions. Short Term Goals: To be accomplished in 2 treatments: 
                       2.) The patient will be independent with their HEP consistently for at least one week. - MET Long Term Goals: To be accomplished in 16 treatments: 
                       6.) The patient will have an average of 3/10 pain with daily activities. - MET 
                       1.) The patient will be able to ambulate for at least 20 min without having to change positions due to pain. - MET 
                       6.) The patient will improve their FOTO score from 33 to at least 40 to show improvements in functional mobility.- MET (48 today) RECOMMENDATIONS: 
[x]Discontinue therapy: [x]Patient has reached or is progressing toward set goals []Patient is non-compliant or has abdicated 
    []Due to lack of appreciable progress towards set goals []Other Rene Romero, PT 12/23/2019

## 2019-12-23 NOTE — PROGRESS NOTES
PT DAILY TREATMENT NOTE 2-15    Patient Name: Derick Cornelius  Date:2019  : 1965  [x]  Patient  Verified  Payor: Shameka Colunga / Plan: Solomon Kinds / Product Type: PPO /    In time: 12:30pm  Out time: 1:30pm  Total Treatment Time (min): 60  Visit #: 14    Treatment Area: Left knee pain [M25.562]     SUBJECTIVE  Pain Level (0-10 scale): 3  Any medication changes, allergies to medications, adverse drug reactions, diagnosis change, or new procedure performed?: [x] No    [] Yes (see summary sheet for update)  Subjective functional status/changes:   [] No changes reported  The patient reports that she feels close to 70% better, but has just been flared up for the past week. She can now walk up to 30 minutes until getting a lot of discomfort. OBJECTIVE         Modality rationale: decrease edema, decrease inflammation and decrease pain to improve the patients ability to perform ADLs and reduce pain levels     Min Type Additional Details        []???? Estim: []?? ?? Att   []????Unatt    []????TENS instruct                  []????IFC  []????Premod   []????NMES                     []?? ??Other:  []????w/US   []? ???w/ice   []? ???w/heat  Position:  Location:        []????  Traction: []???? Cervical       []????Lumbar                       []???? Prone          []????Supine                       []?? ?? Intermittent   []???? Continuous Lbs:  []???? before manual  []???? after manual  []? ???w/heat   8 [x]? ???  Ultrasound: [x]???? Continuous   []???? Pulsed                       at: [x]????1MHz   []????3MHz Location: lateral joint line  W/cm2: 1.2     []???? Paraffin     Location:   []????w/heat     []????  Ice     []????  Heat  []????  Ice massage Position:  Location:     []????  Laser  []????  Other: Position:  Location:        []????  Vasopneumatic Device Pressure:       []???? lo []???? med []???? hi   Temperature:       [x]? ??? Skin assessment post-treatment:  [x]? ???intact []? ???redness- no adverse reaction    []? ???redness  adverse reaction:      52 min Therapeutic Exercise:  [x]? See flow sheet :   Rationale: increase ROM and increase strength to improve the patients ability to perform ADLs and reduce pain levels                            With   [x] TE   [] TA   [] neuro   [] other: Patient Education: [x] Review HEP    [x] Progressed/Changed HEP based on:   [x] positioning   [x] body mechanics   [] transfers   [] heat/ice application    [] other:      Other Objective/Functional Measures: FOTO= 48 (33 at eval) ; A/PROM: Flex= 115/125 (108/118 at eval); Ext= 5 hyper/5 hyper (-8/-3 at eval)    Pain Level (0-10 scale) post treatment: 3    ASSESSMENT/Changes in Function:   The patient has progressed with overall ROM, strength, and tolerance to activity and will be discharged to her HEP today. []  See Plan of Care  []  See progress note/recertification  [x]  See Discharge Summary         Progress towards goals / Updated goals:  Patient has MET most goals or is progressing. PLAN  []  Upgrade activities as tolerated     []  Continue plan of care  []  Update interventions per flow sheet       [x]  Discharge due to: The patient has MET goals.    []  Other:Benito Coats 12/19/2019

## 2019-12-26 ENCOUNTER — APPOINTMENT (OUTPATIENT)
Dept: PHYSICAL THERAPY | Age: 54
End: 2019-12-26
Payer: COMMERCIAL

## 2019-12-26 ENCOUNTER — OFFICE VISIT (OUTPATIENT)
Dept: ORTHOPEDIC SURGERY | Age: 54
End: 2019-12-26

## 2019-12-26 VITALS
HEIGHT: 68 IN | SYSTOLIC BLOOD PRESSURE: 166 MMHG | WEIGHT: 279 LBS | DIASTOLIC BLOOD PRESSURE: 103 MMHG | BODY MASS INDEX: 42.28 KG/M2 | HEART RATE: 69 BPM | TEMPERATURE: 98 F | OXYGEN SATURATION: 100 %

## 2019-12-26 DIAGNOSIS — M17.12 PRIMARY OSTEOARTHRITIS OF LEFT KNEE: Primary | ICD-10-CM

## 2019-12-26 NOTE — PROGRESS NOTES
12/26/2019      CC: left knee pain    HPI:      This is a 47y.o. year old female who presents for a follow up visit. The patient was last seen and diagnosed with medial meniscal tear and osteoarthritis. The patient's treatments since the most recent visit have comprised of PT, injections, pain control. The patient has had good relief of the chief complaint. Additionally, some minor symptoms and limp persist.      PMH:  Past Medical History:   Diagnosis Date    Allergic rhinitis 5/27/2010    Cervical disc herniation 01/2010    C3-4, C4-5, C5-6. Hemangioma body of C5. Dr. Suzanne Traylor.  Chest pain 08/2011, 02/2012    Dr. Beryle Caprice. Dr. Sarthak Win; denies CP as of 3/27/14    Chronic lower back pain 2010, 03/2016    thoracic DDD and spondylosis. DDD L3-S1. Dr. Luis Goodson. [place]. Dr. Anderson Suarez, [facility] [place]. Leeanne Romero.  Esophagitis, reflux 4/3/2011    Gallstone     Gastritis 5/27/2010    Dr. Luzma Bella.  Gastrointestinal food allergy 03/2014    Multiple. Dr. Sanket Zaragoza.    Heart palpitations 02/2012    DUE TO PAC'S AND PVC'S. Dr. Maryellen Cardenas.  Hiatal hernia 4/3/2011    HTN (hypertension) 1987    Impaired glucose tolerance 10/15/2010    Incidental lung nodule 01/08/13    LLL 3.9 mm, RML 3.1 mm;  as of 3/27/14 per pt stable w/o growth    Insomnia 1990s    Iron deficiency anemia 5/27/2010    Irritable bowel syndrome (IBS) 03/2014    Dr. Tanisha Power.    Knee pain 2012    Right. due to severe OA. / chipped bone     Metatarsal bone fracture 1990    Left fifth.  Migraine 2/22/2011    Dr. Pam Cramer, cervical 01/2010    Left. Dr. Suzanne Traylor.  Shoulder pain, right 2012    due to [address] X 2. Dr. Maacrena Mendez.  Thyroid nodule 2011    Dr. Castillo Burkett    Toe fracture, left 2008    fifth toe.  Triangular fibrocartilage complex tear 2012    Dr. Chaitanya Alarcon. Left.        PSxHx:  Past Surgical History:   Procedure Laterality Date    HX [address] [facility]    x 1    HX CHOLECYSTECTOMY  1987    HX COLONOSCOPY  03/31/14    Dr. Barbara Wesley; 03/31/14    due to abnormal PAP.  HX DILATION AND CURETTAGE  1990s    due to miscarriage.  HX ENDOSCOPY      HX ORTHOPAEDIC Right 06/2016    LUMBAR L4-5, L5-S1 ELLY. Dr. Rivas Ayala:    Current Outpatient Medications:     diclofenac (VOLTAREN) 1 % gel, Apply  to affected area four (4) times daily. , Disp: 1 Each, Rfl: 2    ergocalciferol (ERGOCALCIFEROL) 50,000 unit capsule, Take 1 Cap by mouth every seven (7) days. , Disp: 12 Cap, Rfl: 0    propranolol LA (INDERAL LA) 80 mg SR capsule, Take 1 Cap by mouth daily. Indications: Migraine Prevention, Disp: 90 Cap, Rfl: 3    valsartan-hydroCHLOROthiazide (DIOVAN-HCT) 80-12.5 mg per tablet, Take 1 Tab by mouth daily. Indications: high blood pressure, Disp: 90 Tab, Rfl: 3    omeprazole (PRILOSEC) 20 mg capsule, TAKE 1 CAPSULE BY MOUTH TWICE DAILY  Indications: heartburn, Disp: 60 Cap, Rfl: 5    acetaminophen (TYLENOL ARTHRITIS PAIN) 650 mg CR tablet, Take 650 mg by mouth as needed for Pain., Disp: , Rfl:     traMADol (ULTRAM) 50 mg tablet, Take 50 mg by mouth every six (6) hours as needed for Pain., Disp: , Rfl:     ondansetron hcl (ZOFRAN) 4 mg tablet, Take 1 Tab by mouth every eight (8) hours as needed for Nausea., Disp: 20 Tab, Rfl: 0    All:   Allergies   Allergen Reactions    Doxylamine Succinate Swelling     12.5-25 MG  HANDS, FACE/PERIORAL, FEET    Pcn [Penicillins] Hives    Ambien [Zolpidem] Nausea and Vomiting and Other (comments)     5 mg with Ativan 0.5 mg   TOO GROGGY, SLEPT 24 HOURS  7.5 MG FORGOT SHE WAS SLEEP EATING    Aspirin Nausea Only    Celebrex [Celecoxib] Other (comments)     TIGHT SQUEEZING IN CHEST  CHEST ACHED    Erythromycin Nausea and Vomiting    Milk Prot-Turm-Pepper-Pineappl Other (comments)     Multiple food allergies    Neurontin [Gabapentin] Other (comments)     300 mg  SEVERE LEG PAIN  EYE TWITCHING    Nsaids (Non-Steroidal Anti-Inflammatory Drug) Other (comments)     GI irritation         Social Hx:  Social History     Socioeconomic History    Marital status:      Spouse name: Not on file    Number of children: 3    Years of education: Not on file    Highest education level: Not on file   Occupational History    Occupation: Medical assistant     Employer: [employer]   Tobacco Use    Smoking status: Never Smoker    Smokeless tobacco: Never Used   Substance and Sexual Activity    Alcohol use: No    Drug use: No    Sexual activity: Yes     Partners: Male       Family Hx:  Family History   Problem Relation Age of Onset    Diabetes Mother     Heart Disease Mother 58        2 stents    Hypertension Mother     Diabetes Father     Stroke Maternal Grandmother     Cancer Maternal Uncle         prostate         Review of Systems:       General: Denies headache, lethargy, fever, weight loss  Ears/Nose/Throat: Denies ear discharge, drainage, nosebleeds, hoarse voice, dental problems  Cardiovascular: Denies chest pain, shortness of breath  Lungs: Denies chest pain, breathing problems, wheezing, pneumonia  Stomach: Denies stomach pain, heartburn, constipation, irritable bowel  Skin: Denies rash, sores, open wounds  Musculoskeletal: left knee pain  Genitourinary: Denies dysuria, hematuria, polyuria  Gastrointestinal: Denies constipation, obstipation, diarrhea  Neurological: Denies changes in sight, smell, hearing, taste, seizures. Denies loss of consciousness.   Psychiatric: Denies depression, sleep pattern changes, anxiety, change in personality  Endocrine: Denies mood swings, heat or cold intolerance  Hematologic/Lymphatic: Denies anemia, purpura, petechia  Allergic/Immunologic: Denies swelling of throat, pain or swelling at lymph nodes      Physical Examination:    Visit Vitals  BP (!) 166/103 (BP 1 Location: Right arm, BP Patient Position: Sitting)   Pulse 69   Temp 98 °F (36.7 °C) (Oral)   Ht 5' 8\" (1.727 m)   Wt 279 lb (126.6 kg)   SpO2 100%   BMI 42.42 kg/m²        General: AOX3, no apparent distress  Psychiatric: mood and affect appropriate  Lungs: breathing is symmetric and unlabored bilaterally  Heart: regular rate and rhythm  Abdomen: no guarding  Head: normocephalic, atraumatic  Skin: No significant abnormalities, good turgor  Sensation intact to light touch: C5-T1 dermatomes  Muscular exam: 5/5 strength in all major muscle groups unless noted in specialty exam.    Extremities      Right upper extremity: no exam  Left upper extremity:  No exam  Right lower extremity: No gross deformity. No restriction to range of motion of the hip, knee, ankle. Muscle bulk is appropriate without wasting. Sensation is intact to light touch in the L1-S1 dermatomes. Capillary refill is less than 2 seconds in the fingers. Strength testing is 5/5 at the major muscle groups of the hip, knee, ankle. Left lower extremity:  No gross deformity. No restriction to range of motion of the hip, knee, ankle. Muscle bulk is appropriate without wasting. Sensation is intact to light touch in the L1-S1 dermatomes. Capillary refill is less than 2 seconds in the fingers. Strength testing is 5/5 at the major muscle groups of the hip, knee, ankle. Diagnostics:    Pertinent Diagnostics: none today    Assessment: Left knee arthritis and   Plan:    Patient and I did discuss at length the pathology of her knee, she feels better, and therapy has helped, but she is not perfect. She was counseled on pain management, continued weight loss, continued therapy, and the physiology of meniscal issues. She stated her understanding well, she will be weight bearing as tolerated without restriction. Ms. Jamari Turner has a reminder for a \"due or due soon\" health maintenance.  I have asked that she contact her primary care provider for follow-up on this health maintenance.

## 2019-12-26 NOTE — PROGRESS NOTES
Quintin Pavon is a 47 y.o. female  Chief Complaint   Patient presents with    Follow-up     LT knee     Visit Vitals  BP (!) 166/103 (BP 1 Location: Right arm, BP Patient Position: Sitting)   Pulse 69   Temp 98 °F (36.7 °C) (Oral)   Ht 5' 8\" (1.727 m)   Wt 279 lb (126.6 kg)   SpO2 100%   BMI 42.42 kg/m²     1. Have you been to the ER, urgent care clinic since your last visit? Hospitalized since your last visit? No    2. Have you seen or consulted any other health care providers outside of the [address] [place] [address] since your last visit? Include any pap smears or colon screening.  No

## 2020-01-02 ENCOUNTER — OFFICE VISIT (OUTPATIENT)
Dept: FAMILY MEDICINE CLINIC | Age: 55
End: 2020-01-02

## 2020-01-02 VITALS
WEIGHT: 276 LBS | HEIGHT: 68 IN | HEART RATE: 68 BPM | DIASTOLIC BLOOD PRESSURE: 83 MMHG | TEMPERATURE: 98.1 F | OXYGEN SATURATION: 98 % | BODY MASS INDEX: 41.83 KG/M2 | RESPIRATION RATE: 18 BRPM | SYSTOLIC BLOOD PRESSURE: 136 MMHG

## 2020-01-02 DIAGNOSIS — M25.561 CHRONIC PAIN OF RIGHT KNEE: ICD-10-CM

## 2020-01-02 DIAGNOSIS — M77.32 HEEL SPUR, LEFT: ICD-10-CM

## 2020-01-02 DIAGNOSIS — F51.04 CHRONIC INSOMNIA: ICD-10-CM

## 2020-01-02 DIAGNOSIS — M54.2 NECK PAIN: ICD-10-CM

## 2020-01-02 DIAGNOSIS — M79.603 UPPER EXTREMITY PAIN, INFERIOR, UNSPECIFIED LATERALITY: ICD-10-CM

## 2020-01-02 DIAGNOSIS — G89.29 CHRONIC PAIN OF LEFT KNEE: ICD-10-CM

## 2020-01-02 DIAGNOSIS — I10 ESSENTIAL HYPERTENSION: ICD-10-CM

## 2020-01-02 DIAGNOSIS — M79.605 BILATERAL LEG PAIN: Primary | ICD-10-CM

## 2020-01-02 DIAGNOSIS — M25.562 CHRONIC PAIN OF LEFT KNEE: ICD-10-CM

## 2020-01-02 DIAGNOSIS — M79.604 BILATERAL LEG PAIN: Primary | ICD-10-CM

## 2020-01-02 DIAGNOSIS — M76.72 PERONEAL TENDINITIS OF LEFT LOWER LEG: ICD-10-CM

## 2020-01-02 DIAGNOSIS — E55.9 VITAMIN D DEFICIENCY: ICD-10-CM

## 2020-01-02 DIAGNOSIS — M89.8X9 BONE PAIN: ICD-10-CM

## 2020-01-02 DIAGNOSIS — G89.29 CHRONIC PAIN OF RIGHT KNEE: ICD-10-CM

## 2020-01-02 DIAGNOSIS — Z78.9 ASPIRIN INTOLERANCE: ICD-10-CM

## 2020-01-02 PROBLEM — M79.672 LEFT FOOT PAIN: Status: ACTIVE | Noted: 2019-08-01

## 2020-01-02 PROBLEM — M76.70: Status: ACTIVE | Noted: 2019-08-07

## 2020-01-02 RX ORDER — METHYLPREDNISOLONE 4 MG/1
TABLET ORAL
COMMUNITY
Start: 2019-11-20 | End: 2020-01-02 | Stop reason: ALTCHOICE

## 2020-01-02 RX ORDER — METHYLPREDNISOLONE 4 MG/1
TABLET ORAL
Qty: 1 DOSE PACK | Refills: 0 | Status: SHIPPED | OUTPATIENT
Start: 2020-01-02 | End: 2020-06-02 | Stop reason: ALTCHOICE

## 2020-01-02 RX ORDER — AMITRIPTYLINE HYDROCHLORIDE 25 MG/1
25 TABLET, FILM COATED ORAL
Qty: 30 TAB | Refills: 1 | Status: SHIPPED | OUTPATIENT
Start: 2020-01-02 | End: 2020-02-04 | Stop reason: SDUPTHER

## 2020-01-02 RX ORDER — HYLAN G-F 20 16MG/2ML
SYRINGE (ML) INTRAARTICULAR
COMMUNITY
Start: 2019-10-15 | End: 2020-01-02 | Stop reason: ALTCHOICE

## 2020-01-02 RX ORDER — HYDROCODONE BITARTRATE AND ACETAMINOPHEN 5; 325 MG/1; MG/1
TABLET ORAL
COMMUNITY
Start: 2019-10-15 | End: 2020-01-02 | Stop reason: ALTCHOICE
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
## 2020-01-02 NOTE — PROGRESS NOTES
Laura Herrera is a 47 y.o. female  Chief Complaint   Patient presents with    Follow-up     6 month f/u     Leg Pain     both legs     Health Maintenance Due   Topic Date Due    Shingrix Vaccine Age 49> (1 of 2) 03/31/2015    BREAST CANCER SCRN MAMMOGRAM  03/31/2015    PAP AKA CERVICAL CYTOLOGY  10/15/2015    Influenza Age 9 to Adult  08/01/2019     There were no vitals taken for this visit. 1. Have you been to the ER, urgent care clinic since your last visit? Hospitalized since your last visit? Yes    2. Have you seen or consulted any other health care providers outside of the [address] [place] [address] since your last visit? Include any pap smears or colon screening.  No

## 2020-01-02 NOTE — PATIENT INSTRUCTIONS
Shin Splints: Care Instructions  Your Care Instructions    Shin splints cause pain in the shin, the front part of the lower leg. They can also cause swelling. The pain is most likely from repeated stress on the shinbone (tibia) and the tissue that connects the muscle to the tibia. Shin splints are common in people who run or jog. Activities where you run or jump on hard surfaces, such as basketball or tennis, can also lead to shin splints. They can also be caused by training too hard or running in shoes that are worn out. Follow-up care is a key part of your treatment and safety. Be sure to make and go to all appointments, and call your doctor if you are having problems. It's also a good idea to know your test results and keep a list of the medicines you take. How can you care for yourself at home? · Stop doing the activity that is causing pain, or do less of it, until you feel better. ? Run or exercise only on soft surfaces, such as dirt or grass. ? Run on level ground, and avoid hills. ? Reduce your speed and distance when you run. · If you have pain, prop up the sore leg on a pillow and ice it. Try to keep your leg above the level of your heart. This will help reduce swelling. ? Put ice or a cold pack on the area for 10 to 20 minutes at a time. Try to do this every 1 to 2 hours (when you are awake) or until the swelling goes down. Put a thin cloth between the ice and your skin. · Take an over-the-counter pain medicine, such as acetaminophen (Tylenol), ibuprofen (Advil, Motrin), or naproxen (Aleve). Be safe with medicines. Read and follow all instructions on the label. · If your doctor gave you stretches or exercises to do, do them exactly as directed. · Get a new pair of shoes. Pick shoes with good arch support and a cushioned sole. Or try shoe inserts (orthotics). Use them in both shoes, even if only one leg hurts. · Don't go back to your old exercise routine too quickly after you feel better. Start slowly. Then, bit by bit, increase how often and how long you work out. If you start out too fast, your pain may come back. When should you call for help? Watch closely for changes in your health, and be sure to contact your doctor if:    · You have new or worse pain in your shin.     · The pain becomes focused in one small area of the shin.     · You are not getting better after 2 weeks. Where can you learn more? Go to http://sierra-ghanshyam.info/. Enter M192 in the search box to learn more about \"Shin Splints: Care Instructions. \"  Current as of: June 26, 2019  Content Version: 12.2  © 6633-9526 [employer]. Care instructions adapted under license by [employer] (which disclaims liability or warranty for this information). If you have questions about a medical condition or this instruction, always ask your healthcare professional. [place] [address] any warranty or liability for your use of this information. Shin Splints: Care Instructions  Your Care Instructions    Shin splints cause pain in the shin, the front part of the lower leg. They can also cause swelling. The pain is most likely from repeated stress on the shinbone (tibia) and the tissue that connects the muscle to the tibia. Shin splints are common in people who run or jog. Activities where you run or jump on hard surfaces, such as basketball or tennis, can also lead to shin splints. They can also be caused by training too hard or running in shoes that are worn out. Follow-up care is a key part of your treatment and safety. Be sure to make and go to all appointments, and call your doctor if you are having problems. It's also a good idea to know your test results and keep a list of the medicines you take. How can you care for yourself at home? · Stop doing the activity that is causing pain, or do less of it, until you feel better.   ? Run or exercise only on soft surfaces, such as dirt or grass. ? Run on level ground, and avoid hills. ? Reduce your speed and distance when you run. · If you have pain, prop up the sore leg on a pillow and ice it. Try to keep your leg above the level of your heart. This will help reduce swelling. ? Put ice or a cold pack on the area for 10 to 20 minutes at a time. Try to do this every 1 to 2 hours (when you are awake) or until the swelling goes down. Put a thin cloth between the ice and your skin. · Take an over-the-counter pain medicine, such as acetaminophen (Tylenol), ibuprofen (Advil, Motrin), or naproxen (Aleve). Be safe with medicines. Read and follow all instructions on the label. · If your doctor gave you stretches or exercises to do, do them exactly as directed. · Get a new pair of shoes. Pick shoes with good arch support and a cushioned sole. Or try shoe inserts (orthotics). Use them in both shoes, even if only one leg hurts. · Don't go back to your old exercise routine too quickly after you feel better. Start slowly. Then, bit by bit, increase how often and how long you work out. If you start out too fast, your pain may come back. When should you call for help? Watch closely for changes in your health, and be sure to contact your doctor if:    · You have new or worse pain in your shin.     · The pain becomes focused in one small area of the shin.     · You are not getting better after 2 weeks. Where can you learn more? Go to http://sierra-ghanshyam.info/. Enter X723 in the search box to learn more about \"Shin Splints: Care Instructions. \"  Current as of: June 26, 2019  Content Version: 12.2  © 0282-5965 [employer]. Care instructions adapted under license by [employer] (which disclaims liability or warranty for this information).  If you have questions about a medical condition or this instruction, always ask your healthcare professional. Parisa Yoder disclaims any warranty or liability for your use of this information.

## 2020-02-04 DIAGNOSIS — F51.04 CHRONIC INSOMNIA: ICD-10-CM

## 2020-02-04 DIAGNOSIS — M79.605 BILATERAL LEG PAIN: ICD-10-CM

## 2020-02-04 DIAGNOSIS — M79.604 BILATERAL LEG PAIN: ICD-10-CM

## 2020-02-04 NOTE — TELEPHONE ENCOUNTER
Pharmacy Comments:  90 day refill request      PCP: DO Giovanny Talamantes appt: 1/2/2020  Future Appointments   Date Time Provider Jana Carmita   3/31/2020  9:00 AM Charity Silver DO [facility]       Requested Prescriptions     Pending Prescriptions Disp Refills    amitriptyline (ELAVIL) 25 mg tablet 30 Tab 1     Sig: Take 1 Tab by mouth nightly. Indications: pain       Ph[facility]acy [facility]    Patient has ? days' supply of medication available.     Prior labs and Blood pressures:  BP Readings from Last 3 Encounters:   01/02/20 136/83   12/26/19 (!) 166/103   11/19/19 144/88     Lab Results   Component Value Date/Time    Sodium 137 07/10/2019 01:35 PM    Potassium 3.6 07/10/2019 01:35 PM    Chloride 102 07/10/2019 01:35 PM    CO2 29 07/10/2019 01:35 PM    Anion gap 6 07/10/2019 01:35 PM    Glucose 84 07/10/2019 01:35 PM    BUN 8 07/10/2019 01:35 PM    Creatinine 0.81 07/10/2019 01:35 PM    BUN/Creatinine ratio 10 (L) 07/10/2019 01:35 PM    GFR est AA >60 07/10/2019 01:35 PM    GFR est non-AA >60 07/10/2019 01:35 PM    Calcium 9.8 07/10/2019 01:35 PM     Lab Results   Component Value Date/Time    Hemoglobin A1c 5.8 (H) 07/09/2019 03:00 AM     Lab Results   Component Value Date/Time    Cholesterol, total 211 (H) 07/09/2019 03:00 AM    HDL Cholesterol 69 07/09/2019 03:00 AM    LDL, calculated 115 (H) 07/09/2019 03:00 AM    VLDL, calculated 27 07/09/2019 03:00 AM    Triglyceride 136 07/09/2019 03:00 AM    CHOL/HDL Ratio 3.2 10/15/2010 04:14 PM     Lab Results   Component Value Date/Time    VITAMIN D, 25-HYDROXY 16.0 (L) 07/09/2019 03:00 AM       Lab Results   Component Value Date/Time    TSH 0.967 07/09/2019 03:00 AM

## 2020-02-10 RX ORDER — AMITRIPTYLINE HYDROCHLORIDE 25 MG/1
25 TABLET, FILM COATED ORAL
Qty: 30 TAB | Refills: 2 | Status: SHIPPED | OUTPATIENT
Start: 2020-02-10 | End: 2020-06-02 | Stop reason: SDUPTHER

## 2020-02-28 ENCOUNTER — OFFICE VISIT (OUTPATIENT)
Dept: ORTHOPEDIC SURGERY | Age: 55
End: 2020-02-28

## 2020-02-28 VITALS
DIASTOLIC BLOOD PRESSURE: 88 MMHG | WEIGHT: 276 LBS | HEIGHT: 68 IN | OXYGEN SATURATION: 100 % | SYSTOLIC BLOOD PRESSURE: 133 MMHG | HEART RATE: 78 BPM | BODY MASS INDEX: 41.83 KG/M2

## 2020-02-28 DIAGNOSIS — M25.561 PAIN AND SWELLING OF RIGHT KNEE: Primary | ICD-10-CM

## 2020-02-28 DIAGNOSIS — M25.561 CHRONIC PAIN OF RIGHT KNEE: ICD-10-CM

## 2020-02-28 DIAGNOSIS — G89.29 CHRONIC PAIN OF RIGHT KNEE: ICD-10-CM

## 2020-02-28 DIAGNOSIS — M25.461 PAIN AND SWELLING OF RIGHT KNEE: Primary | ICD-10-CM

## 2020-02-28 RX ORDER — BETAMETHASONE SODIUM PHOSPHATE AND BETAMETHASONE ACETATE 3; 3 MG/ML; MG/ML
6 INJECTION, SUSPENSION INTRA-ARTICULAR; INTRALESIONAL; INTRAMUSCULAR; SOFT TISSUE ONCE
Qty: 1 ML | Refills: 0
Start: 2020-02-28 | End: 2020-02-28

## 2020-02-28 NOTE — PROGRESS NOTES
2/28/2020    Chief Complaint: Right knee pain    HPI: This is a(n) 47 y.o. female  who complains of Right knee pain. Onset was gradual over months. The patient has had pain for several months. The pain is in the lateral knee, it is moderate to severe in intensity. The patient has tried activity modification, she has been in  physical therapy, injections have not been attempted. The pain causes some limitation with normal walking. The patient complains of mechanical symptoms and of feelings of instability in the knee. .    Past Medical History:   Diagnosis Date    Allergic rhinitis 5/27/2010    Baker cyst, left 10/2019    Dr. Nefatly Reyes    Cervical disc herniation 01/2010    C3-4, C4-5, C5-6. Hemangioma body of C5. Dr. Olman Apodaca.  Chest pain 08/2011, 02/2012    Dr. Christie Jessica. Dr. Valdez Gan; denies CP as of 3/27/14    Chronic lower back pain 2010, 03/2016    thoracic DDD and spondylosis. DDD L3-S1. Dr. Li Solano. Mariettausha Vizcarra. Dr. Ye Petersen, [facility]. [place]. Alana Abreu.  Chronic meniscal tear of knee     Esophagitis, reflux 4/3/2011    Gallstone     Gastritis 5/27/2010    Dr. Valerio Wilkins.  Gastrointestinal food allergy 03/2014    Multiple. Dr. Emely Lopez.    Heart palpitations 02/2012    DUE TO PAC'S AND PVC'S. Dr. Patricia Hudson.  Heel spur, left 02/2019    Hiatal hernia 4/3/2011    HTN (hypertension) 1987    Impaired glucose tolerance 10/15/2010    Incidental lung nodule 01/08/13    LLL 3.9 mm, RML 3.1 mm;  as of 3/27/14 per pt stable w/o growth    Insomnia 1990s    Iron deficiency anemia 5/27/2010    Irritable bowel syndrome (IBS) 03/2014    Dr. Prosper Bush.    Knee pain 2012    Right. due to severe OA. / chipped bone     Left foot pain 08/2019    Dr. Arthur Milner.  Metatarsal bone fracture 1990    Left fifth.  Migraine 2/22/2011    Dr. Alexander Osullivan Peroneal tendonitis of lower leg 08/07/2019    Left.   Dr. Arthur Milner    Radiculopathy, cervical 01/2010 Left.  Dr. Shelly Warner.  Shoulder pain, right     due to [address] X 2. Dr. Obed Velasquez.  Thyroid nodule      [facility] [employer]    Toe fracture, left     fifth toe.  Triangular fibrocartilage complex tear     Dr. Cinda Ocampo. Left. Past Surgical History:   Procedure Laterality Date    HX  SECTION  1995    x 1    HX CHOLECYSTECTOMY      HX COLONOSCOPY  14    Dr. Adrian Gaspar; 14    due to abnormal PAP.  HX DILATION AND CURETTAGE      due to miscarriage.  HX ENDOSCOPY      HX ORTHOPAEDIC Right 2016    LUMBAR L4-5, L5-S1 ELLY. Dr. Russ Nicholson     [address] [place] [address] [facility]       Current Outpatient Medications on File Prior to Visit   Medication Sig Dispense Refill    amitriptyline (ELAVIL) 25 mg tablet Take 1 Tab by mouth nightly. Indications: pain 30 Tab 2    diclofenac (VOLTAREN) 1 % gel Apply  to affected area four (4) times daily. 1 Each 2    ergocalciferol (ERGOCALCIFEROL) 50,000 unit capsule Take 1 Cap by mouth every seven (7) days. 12 Cap 0    propranolol LA (INDERAL LA) 80 mg SR capsule Take 1 Cap by mouth daily. Indications: Migraine Prevention 90 Cap 3    valsartan-hydroCHLOROthiazide (DIOVAN-HCT) 80-12.5 mg per tablet Take 1 Tab by mouth daily. Indications: high blood pressure 90 Tab 3    omeprazole (PRILOSEC) 20 mg capsule TAKE 1 CAPSULE BY MOUTH TWICE DAILY  Indications: heartburn 60 Cap 5    acetaminophen (TYLENOL ARTHRITIS PAIN) 650 mg CR tablet Take 650 mg by mouth as needed for Pain.  methylPREDNISolone (MEDROL DOSEPACK) 4 mg tablet Use as directed 1 Dose Pack 0    ondansetron hcl (ZOFRAN) 4 mg tablet Take 1 Tab by mouth every eight (8) hours as needed for Nausea. 20 Tab 0     No current facility-administered medications on file prior to visit.         Allergies   Allergen Reactions    Doxylamine Succinate Swelling     12.5-25 MG  HANDS, FACE/PERIORAL, FEET    Pcn [Penicillins] Hives    Ambien [Zolpidem] Nausea and Vomiting and Other (comments)     5 mg with Ativan 0.5 mg   TOO GROGGY, SLEPT 24 HOURS  7.5 MG FORGOT SHE WAS SLEEP EATING    Aspirin Nausea Only    Celebrex [Celecoxib] Other (comments)     TIGHT SQUEEZING IN CHEST  CHEST ACHED    Erythromycin Nausea and Vomiting    Milk Prot-Turm-Pepper-Pineappl Other (comments)     Multiple food allergies    Neurontin [Gabapentin] Other (comments)     300 mg  SEVERE LEG PAIN  EYE TWITCHING    Nsaids (Non-Steroidal Anti-Inflammatory Drug) Other (comments)     GI irritation         Family History   Problem Relation Age of Onset    Diabetes Mother     Heart Disease Mother 58        2 stents    Hypertension Mother     Diabetes Father     Stroke Maternal Grandmother     Cancer Maternal Uncle         prostate       Social History     Socioeconomic History    Marital status:      Spouse name: Not on file    Number of children: 3    Years of education: Not on file    Highest education level: Not on file   Occupational History    Occupation: Medical assistant     Employer: [employer]   Tobacco Use    Smoking status: Never Smoker    Smokeless tobacco: Never Used   Substance and Sexual Activity    Alcohol use: No    Drug use: No    Sexual activity: Yes     Partners: Male         Review of Systems:       General: Denies headache, lethargy, fever, weight loss  Ears/Nose/Throat: Denies ear discharge, drainage, nosebleeds, hoarse voice, dental problems  Cardiovascular: Denies chest pain, shortness of breath  Lungs: Denies chest pain, breathing problems, wheezing, pneumonia  Stomach: Denies stomach pain, heartburn, constipation, irritable bowel  Skin: Denies rash, sores, open wounds  Musculoskeletal: Admits to knee pain, no deformity.   Genitourinary: Denies dysuria, hematuria, polyuria  Gastrointestinal: Denies constipation, obstipation, diarrhea  Neurological: Denies changes in sight, smell, hearing, taste, seizures. Denies loss of consciousness. Psychiatric: Denies depression, sleep pattern changes, anxiety, change in personality  Endocrine: Denies mood swings, heat or cold intolerance  Hematologic/Lymphatic: Denies anemia, purpura, petechia  Allergic/Immunologic: Denies swelling of throat, pain or swelling at lymph nodes      Physical Examination:    Visit Vitals  /88 (BP 1 Location: Left arm, BP Patient Position: Sitting)   Pulse 78   Ht 5' 8\" (1.727 m)   Wt 276 lb (125.2 kg)   SpO2 100%   BMI 41.97 kg/m²        General: AOX3, no apparent distress  Psychiatric: mood and affect appropriate  Lungs: breathing is symmetric and unlabored bilaterally  Heart: regular rate and rhythm  Abdomen: no guarding  Head: normocephalic, atraumatic  Skin: No significant abnormalities, good turgor  Sensation intact to light touch: L1-S1 dermatomes  Muscular exam: 5/5 strength in all major muscle groups unless noted in specialty exam.    Extremities:      Left upper extremity: Full active and passive range of motion without pain, deformity, no open wound, strength 5/5 in all major muscle groups. Right upper extremity: Full active and passive range of motion without pain, deformity, no open wound, strength 5/5 in all major muscle groups. Left lower extremity: Full active and passive range of motion without pain, deformity, no open wound, strength 5/5 in all major muscle groups. Right lower extremity:  No deformity is noted. Range of motion of the knee is 0-130. Ligamentous testing of the knee indicates stability of the the MCL, LCL, PCL, and ACL. Lachman's, anterior and posterior drawer tests are specifically negative. Joint line tenderness to palpation laterally. Popliteal area is unremarkable. No effusion. No patellar crepitus. Patella tracks centrally with a negative apprehension and grind test.  Pivot shift is negative.   Strength testing is indicative of 5/5 strength at hip flexion, extension, knee flexion and extension, tibialis anterior, EHL, and FHL. Sensation is intact to light touch in the L1-S1 dermatomes. Capillary refill is less than 2 seconds in the toes. Mahendra's positive laterally    Diagnostics:    Pertinent Diagnostics:   Xrays of the right knee indicate no fractures, osseus lesions, abnormalities, cartilage space is well maintained. Overall alignment is within normal limits, no effusion or other soft tissue abnormality. Assessment: Pain in right knee, possible meniscal tear    Plan: This patient and I did discuss her treatment options, she does have significant pain and does complain of mechanical stability issues, plan will be to obtain an MRI, however for symptomatic purposes, plan will be to for an injection self. She stated her understanding and satisfaction, she will follow-up for MRI review. Date of Procedure: 2/28/2020  PROCEDURE NOTE     Consent was obtained from the patient. The correct site was identified after confirmation with the patient. Following identification and confirmation of the correct site with the patient, the superolateral right knee was prepped in the normal sterile fashion. A local anesthetic of 1% lidocaine without epinephrine was then administered to the local tissues. Following, an injection of a mixture of  6 mg Celestone and 1% lidocaine without epinephrine was administered to the right knee. The needle was then withdrawn and the injection site dressed with a sterile bandage at the conclusion. The procedure was well tolerated by the patient. No immediate adverse reactions were noted. Post injection instructions were given. Ms. Imtiaz Saez has a reminder for a \"due or due soon\" health maintenance. I have asked that she contact her primary care provider for follow-up on this health maintenance.

## 2020-02-28 NOTE — PROGRESS NOTES
Identified pt with two pt identifiers (name and ). Reviewed chart in preparation for visit and have obtained necessary documentation. Lauryn Estrella is a 47 y.o. female  Chief Complaint   Patient presents with    Knee Pain     RT knee     Visit Vitals  /88 (BP 1 Location: Left arm, BP Patient Position: Sitting)   Pulse 78   Ht 5' 8\" (1.727 m)   Wt 276 lb (125.2 kg)   SpO2 100%   BMI 41.97 kg/m²     1. Have you been to the ER, urgent care clinic since your last visit? Hospitalized since your last visit? No    2. Have you seen or consulted any other health care providers outside of the [address] [place] [address] since your last visit? Include any pap smears or colon screening.  No

## 2020-03-09 DIAGNOSIS — R73.02 IMPAIRED GLUCOSE TOLERANCE: Primary | ICD-10-CM

## 2020-03-10 LAB
EST. AVERAGE GLUCOSE BLD GHB EST-MCNC: 123 MG/DL
HBA1C MFR BLD: 5.9 % (ref 4.8–5.6)

## 2020-03-16 ENCOUNTER — HOSPITAL ENCOUNTER (OUTPATIENT)
Dept: MRI IMAGING | Age: 55
Discharge: HOME | End: 2020-03-16
Attending: ORTHOPAEDIC SURGERY
Payer: COMMERCIAL

## 2020-03-16 DIAGNOSIS — M25.561 PAIN AND SWELLING OF RIGHT KNEE: ICD-10-CM

## 2020-03-16 DIAGNOSIS — M25.461 PAIN AND SWELLING OF RIGHT KNEE: ICD-10-CM

## 2020-03-16 PROCEDURE — 73721 MRI JNT OF LWR EXTRE W/O DYE: CPT

## 2020-03-18 ENCOUNTER — TELEPHONE (OUTPATIENT)
Dept: ORTHOPEDIC SURGERY | Age: 55
End: 2020-03-18

## 2020-03-18 DIAGNOSIS — M17.0 BILATERAL PRIMARY OSTEOARTHRITIS OF KNEE: Primary | ICD-10-CM

## 2020-03-18 DIAGNOSIS — M17.12 PRIMARY OSTEOARTHRITIS OF LEFT KNEE: ICD-10-CM

## 2020-03-18 RX ORDER — TRAMADOL HYDROCHLORIDE 50 MG/1
50 TABLET ORAL
Qty: 42 TAB | Refills: 0 | Status: SHIPPED | OUTPATIENT
Start: 2020-03-18 | End: 2020-05-01 | Stop reason: SDUPTHER

## 2020-03-18 RX ORDER — DICLOFENAC SODIUM 10 MG/G
GEL TOPICAL 4 TIMES DAILY
Qty: 1 EACH | Refills: 2 | Status: SHIPPED | OUTPATIENT
Start: 2020-03-18 | End: 2020-12-15

## 2020-03-18 NOTE — TELEPHONE ENCOUNTER
I called the patient today in regards to her MRI, she does have a medial meniscal tear as well as some patellofemoral arthrosis. We did discuss that this is likely an exacerbation and not an acute injury. We discussed the treatment options, pain medications, physical therapy exercises as well as time and injections. She has deferred injections at this time, though she did state that the cortisone injection that I gave her lasted for 7 days and she felt very well. She does have some difficulty with this, however she is excepting of pain medications, therapy exercises as well as reasonable precautions. She will follow-up in a month for repeat clinical check.

## 2020-04-03 DIAGNOSIS — E55.9 VITAMIN D DEFICIENCY: ICD-10-CM

## 2020-04-03 RX ORDER — ERGOCALCIFEROL 1.25 MG/1
CAPSULE ORAL
Qty: 12 CAP | Refills: 0 | OUTPATIENT
Start: 2020-04-03

## 2020-04-13 ENCOUNTER — CLINICAL SUPPORT (OUTPATIENT)
Dept: PRIMARY CARE CLINIC | Age: 55
End: 2020-04-13

## 2020-04-13 DIAGNOSIS — Z23 ENCOUNTER FOR IMMUNIZATION: Primary | ICD-10-CM

## 2020-04-13 NOTE — PROGRESS NOTES
Pt here today for nurse visit only for Tdap     After obtaining informed consent, the immunization is given by Max Gomez LPN    Pt tolerated vaccine with no adverse reaction.

## 2020-05-01 DIAGNOSIS — M17.0 BILATERAL PRIMARY OSTEOARTHRITIS OF KNEE: ICD-10-CM

## 2020-05-01 RX ORDER — TRAMADOL HYDROCHLORIDE 50 MG/1
50 TABLET ORAL
Qty: 42 TAB | Refills: 0 | Status: SHIPPED | OUTPATIENT
Start: 2020-05-01 | End: 2020-05-20 | Stop reason: SDUPTHER

## 2020-05-20 DIAGNOSIS — M17.0 BILATERAL PRIMARY OSTEOARTHRITIS OF KNEE: ICD-10-CM

## 2020-05-21 RX ORDER — TRAMADOL HYDROCHLORIDE 50 MG/1
50 TABLET ORAL
Qty: 42 TAB | Refills: 0 | Status: SHIPPED | OUTPATIENT
Start: 2020-05-21 | End: 2020-06-02 | Stop reason: ALTCHOICE

## 2020-06-02 ENCOUNTER — VIRTUAL VISIT (OUTPATIENT)
Dept: FAMILY MEDICINE CLINIC | Age: 55
End: 2020-06-02

## 2020-06-02 VITALS — SYSTOLIC BLOOD PRESSURE: 150 MMHG | DIASTOLIC BLOOD PRESSURE: 87 MMHG

## 2020-06-02 DIAGNOSIS — R12 HEARTBURN: ICD-10-CM

## 2020-06-02 DIAGNOSIS — I10 ESSENTIAL HYPERTENSION: Primary | ICD-10-CM

## 2020-06-02 DIAGNOSIS — Z78.9 ASPIRIN INTOLERANCE: ICD-10-CM

## 2020-06-02 DIAGNOSIS — E66.01 OBESITY, CLASS III, BMI 40-49.9 (MORBID OBESITY) (HCC): ICD-10-CM

## 2020-06-02 DIAGNOSIS — E55.9 VITAMIN D DEFICIENCY: ICD-10-CM

## 2020-06-02 DIAGNOSIS — D50.9 IRON DEFICIENCY ANEMIA, UNSPECIFIED IRON DEFICIENCY ANEMIA TYPE: ICD-10-CM

## 2020-06-02 DIAGNOSIS — F51.04 CHRONIC INSOMNIA: ICD-10-CM

## 2020-06-02 DIAGNOSIS — M79.605 BILATERAL LEG PAIN: ICD-10-CM

## 2020-06-02 DIAGNOSIS — R73.9 HYPERGLYCEMIA: ICD-10-CM

## 2020-06-02 DIAGNOSIS — M79.604 BILATERAL LEG PAIN: ICD-10-CM

## 2020-06-02 DIAGNOSIS — M25.561 CHRONIC PAIN OF RIGHT KNEE: ICD-10-CM

## 2020-06-02 DIAGNOSIS — E78.00 HIGH CHOLESTEROL: ICD-10-CM

## 2020-06-02 DIAGNOSIS — E04.2 MULTIPLE THYROID NODULES: ICD-10-CM

## 2020-06-02 DIAGNOSIS — G43.109 MIGRAINE WITH AURA AND WITHOUT STATUS MIGRAINOSUS, NOT INTRACTABLE: ICD-10-CM

## 2020-06-02 DIAGNOSIS — G89.29 CHRONIC PAIN OF RIGHT KNEE: ICD-10-CM

## 2020-06-02 RX ORDER — ACETAMINOPHEN 500 MG
1 TABLET ORAL DAILY
Qty: 1 KIT | Refills: 0 | Status: SHIPPED | OUTPATIENT
Start: 2020-06-02 | End: 2020-12-15

## 2020-06-02 RX ORDER — PROPRANOLOL HYDROCHLORIDE 80 MG/1
80 CAPSULE, EXTENDED RELEASE ORAL DAILY
Qty: 90 CAP | Refills: 3 | Status: SHIPPED | OUTPATIENT
Start: 2020-06-02 | End: 2021-07-07

## 2020-06-02 RX ORDER — ERGOCALCIFEROL 1.25 MG/1
50000 CAPSULE ORAL
Qty: 5 CAP | Refills: 5 | Status: SHIPPED | OUTPATIENT
Start: 2020-06-02 | End: 2021-07-26

## 2020-06-02 RX ORDER — VALSARTAN AND HYDROCHLOROTHIAZIDE 80; 12.5 MG/1; MG/1
1 TABLET, FILM COATED ORAL DAILY
Qty: 90 TAB | Refills: 3 | Status: SHIPPED | OUTPATIENT
Start: 2020-06-02 | End: 2021-07-26

## 2020-06-02 RX ORDER — OMEPRAZOLE 20 MG/1
CAPSULE, DELAYED RELEASE ORAL
Qty: 60 CAP | Refills: 5 | Status: SHIPPED | OUTPATIENT
Start: 2020-06-02 | End: 2021-07-29

## 2020-06-02 RX ORDER — AMITRIPTYLINE HYDROCHLORIDE 25 MG/1
25 TABLET, FILM COATED ORAL
Qty: 30 TAB | Refills: 11 | Status: SHIPPED | OUTPATIENT
Start: 2020-06-02 | End: 2021-05-11

## 2020-06-02 NOTE — PROGRESS NOTES
VIRTUAL VISIT      Pursuant to the emergency declaration under the [facility] and [facility], 1135 waiver authority and the [employer] Act, this Virtual Visit was conducted, with patient's consent, to reduce the patient's risk of exposure to COVID-19 and provide continuity of care for an established patient. Services were provided through a video synchronous discussion virtually to substitute for in-person appointment. Consent:  She and/or her healthcare decision maker is aware that this patient-initiated Telehealth encounter is a billable service, with coverage as determined by her insurance carrier. She is aware that she may receive a bill and has provided verbal consent to proceed: Yes   [address] is a 54 y.o. female presents with Leg Pain (follow up from last visit on 1/2/2020); Medication Refill; and Heartburn    Agree with nurse note. Pt with hypertension, BL leg pain, hyperglycemia, high cholesterol, iron deficiency anemia, multiple thyroid nodules, chronic insomnia, vit d deficiency, aspirin intolerance, obesity, and heartburn with a BP of 150/87. She requests med refills today, including vit D. She tolerates them well. Patient denies vision changes, headaches, dizziness, chest pain, SOB, or swelling except her R knee. Pt request to review labs from 7/10/2019. CK 84, Cr 0.81, WBC 4.1, HgB 13.8, hct 42.9, MCV 90.9, trigs 136, HDL 69, , urine microalbumin 4.0, A1C 5.8, TSH 0.96, vit d 16, T4 0.88. To recall, Pt saw NP Tenzin Tyler on 9/20/2019 for chronic L knee pain that prevented her from walking. Referred pt to Dr. Apolonia Phelan who she saw the same day.  MRI of L knee on 9/23/2019 showed tricompartmental osteoarthritis including small high-grade partial-thickness chondral loss from the medial patellar facet and medial femoral condyle, mild degeneration and extrusion of the medial meniscus, nondisplaced horizontal femoral surface tear of the white white zone of the lateral meniscal body, trace joint effusion and trace Baker's cyst. non-displaced. Pt notes she has also been told she has osteoarthritis in the R knee, but pain in L is greater. 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 Dr. Anil Osborn filled out FMLA to take her out of work for 2 months. She was only to be light duty. She just returned to her actual job 12/2019. She completed two months of PT for the L knee pain, which has improved the pain and mobility, but the pain still disables her. Pt reports Dr. Anil Osborn gave her a script for Norco and then changed to Tramadol for a short period of time. Pt with BL leg pain saw Dr. Anil Osborn 2/28/2020 for chronic R knee pain and swelling x 2 months. Dx'd with R knee pain and possible meniscal tear. XR showed no fractures, osseus lesions, or abnormalities, and cartilage space was well maintained. Overall alignment is within normal limits, no effusion or other soft tissue abnormality. An injection of a mixture of  6 mg Celestone and 1% lidocaine without epinephrine was administered to the right knee. MRI on 3/16/2020 showed medial meniscal tear as well as some patellofemoral arthrosis. Pt has chosen to defer cortisone injections at this time, even though she felt better after the last injection. Rx'd voltaren 1% gel and tramadol 50 mg. She notes that she is still having trouble walking. She takes  Elavil 25 mg qhs for BL leg pain and sleep. Some times she will miss 1-2 days then she will take 2. Written by caty Tai, as dictated by Dr. Anusha Chung DO.    ROS    Review of Systems negative except as noted above in HPI.     ALLERGIES:    Allergies   Allergen Reactions    Doxylamine Succinate Swelling     12.5-25 MG  HANDS, FACE/PERIORAL, FEET    Pcn [Penicillins] Hives    Ambien [Zolpidem] Nausea and Vomiting and Other (comments)     5 mg with Ativan 0.5 mg   TOO GROGGY, SLEPT 24 HOURS  7.5 MG FORGOT SHE WAS SLEEP EATING    Aspirin Nausea Only    Celebrex [Celecoxib] Other (comments)     TIGHT SQUEEZING IN CHEST  CHEST ACHED    Erythromycin Nausea and Vomiting    Milk Prot-Turm-Pepper-Pineappl Other (comments)     Multiple food allergies    Neurontin [Gabapentin] Other (comments)     300 mg  SEVERE LEG PAIN  EYE TWITCHING    Nsaids (Non-Steroidal Anti-Inflammatory Drug) Other (comments)     GI irritation         CURRENT MEDICATIONS:    Outpatient Medications Marked as Taking for the 6/2/20 encounter (Virtual Visit) with Chetan Nix, DO   Medication Sig Dispense Refill    ergocalciferol (ERGOCALCIFEROL) 1,250 mcg (50,000 unit) capsule Take 1 Cap by mouth every seven (7) days. Indications: low vitamin D levels 5 Cap 5    amitriptyline (ELAVIL) 25 mg tablet Take 1 Tab by mouth nightly. Indications: pain 30 Tab 11    Blood Pressure Monitor kit 1 Device by Does Not Apply route daily. 1 Kit 0    propranolol LA (INDERAL LA) 80 mg SR capsule Take 1 Cap by mouth daily. Indications: migraine prevention 90 Cap 3    valsartan-hydroCHLOROthiazide (DIOVAN-HCT) 80-12.5 mg per tablet Take 1 Tab by mouth daily. Indications: high blood pressure 90 Tab 3    omeprazole (PRILOSEC) 20 mg capsule TAKE 1 CAPSULE BY MOUTH TWICE DAILY  Indications: heartburn 60 Cap 5    diclofenac (VOLTAREN) 1 % gel Apply  to affected area four (4) times daily. 1 Each 2    acetaminophen (TYLENOL ARTHRITIS PAIN) 650 mg CR tablet Take 1,300 mg by mouth three (3) times daily. PAST MEDICAL HISTORY:    Past Medical History:   Diagnosis Date    Allergic rhinitis 5/27/2010    Baker cyst, left 10/2019    Dr. Katelynn Mckeon    Cervical disc herniation 01/2010    C3-4, C4-5, C5-6. Hemangioma body of C5. Dr. Eliel Shafer.  Chest pain 08/2011, 02/2012    Dr. Saint Solders. Dr. Thea Godoy; denies CP as of 3/27/14    Chronic lower back pain 2010, 03/2016    thoracic DDD and spondylosis. DDD L3-S1. Dr. Toan Posey. Monserrat Jaimes. Dr. Michael Portillo, [facility]. Dr. Sharlene Teague.  Chronic meniscal tear of knee     Esophagitis, reflux 4/3/2011    Gallstone     Gastritis 2010    Dr. Meche Johnston.  Gastrointestinal food allergy 2014    Multiple. Dr. Feroz Lehman.    Heart palpitations 2012    DUE TO PAC'S AND PVC'S. Dr. Deborah Freeman.  Heel spur, left 2019    Hiatal hernia 4/3/2011    HTN (hypertension)     Impaired glucose tolerance 10/15/2010    Incidental lung nodule 13    LLL 3.9 mm, RML 3.1 mm;  as of 3/27/14 per pt stable w/o growth    Insomnia     Iron deficiency anemia 2010    Irritable bowel syndrome (IBS) 2014    Dr. Shania Morales.    Knee pain     Right. due to severe OA. / chipped bone     Left foot pain 2019    Dr. Jose Alberto Guerrero.  Metatarsal bone fracture     Left fifth.  Migraine 2011    Dr. Chuy Mcfadden Peroneal tendonitis of lower leg 2019    Left. Dr. Jose Alberto Guerrero    Radiculopathy, cervical 2010    Left. Dr. Tita Allred.  Shoulder pain, right     due to [address] X 2. Dr. Roland Mayo.  Thyroid nodule     Dr. Elvia Fraser    Toe fracture, left     fifth toe.  Triangular fibrocartilage complex tear     Dr. Guerrero Ahr. Left. PAST SURGICAL HISTORY:    Past Surgical History:   Procedure Laterality Date    HX  SECTION  1995    x 1    HX CHOLECYSTECTOMY      HX COLONOSCOPY  14    Dr. Kiara Elizabeth; 14    due to abnormal PAP.  HX DILATION AND CURETTAGE      due to miscarriage.  HX ENDOSCOPY      HX ORTHOPAEDIC Right 2016    LUMBAR L4-5, L5-S1 ELLY.   Dr. Ginger Hall     HX TONSILLECTOMY         FAMILY HISTORY:    Family History   Problem Relation Age of Onset    Diabetes Mother     Heart Disease Mother 58        2 stents    Hypertension Mother     Diabetes Father     Stroke Maternal Grandmother     Cancer Maternal Uncle         prostate       SOCIAL HISTORY:    Social History     Socioeconomic History    Marital status:      Spouse name: Not on file    Number of children: 3    Years of education: Not on file    Highest education level: Not on file   Occupational History    Occupation: Medical assistant     Employer: [employer]   Tobacco Use    Smoking status: Never Smoker    Smokeless tobacco: Never Used   Substance and Sexual Activity    Alcohol use: No    Drug use: No    Sexual activity: Yes     Partners: Male       IMMUNIZATIONS:    Immunization History   Administered Date(s) Administered    Influenza Vaccine 10/30/2015, 10/31/2017    Influenza Vaccine Whole 10/01/2010    TDAP Vaccine 08/10/2012    Td, Adsorbed 10/09/2016    Tdap 04/06/2019, 04/13/2020         PHYSICAL EXAMINATION (PER VISUAL INSPECTION AND INSTRUCTIONS GIVEN TO PATIENT TO PERFORM)    Due to this being a TeleHealth encounter (During [place] [address] public health emergency), evaluation of the following organ systems was limited to:     Vital Signs    Visit Vitals  /87 (BP 1 Location: Left arm, BP Patient Position: Sitting)       Weight Metrics 2/28/2020 1/2/2020 12/26/2019 11/19/2019 10/30/2019 10/23/2019 10/16/2019   Weight 276 lb 276 lb 279 lb - 279 lb 258 lb 258 lb   BMI 41.97 kg/m2 41.97 kg/m2 42.42 kg/m2 42.42 kg/m2 42.42 kg/m2 38.1 kg/m2 38.1 kg/m2       General appearance - Well nourished. Well appearing. Well developed. No acute distress. Obese. Head - Normocephalic. Atraumatic. Eyes - Extraocular eye movements intact. Sclera anicteric. Mildly injected sclera. Ears - Hearing is grossly normal bilaterally. Nose - normal and patent. No discharge. Chest - No visualized signs of difficulty breathing or respiratory distress. Neurological - awake, alert and oriented to person, place, and time and event. Cranial nerves II through XII intact. Clear speech. Musculoskeletal - Intact x 4 extremities. Psychological -   normal behavior, dress and thought processes. Good insight. Good eye contact. Normal affect. Appropriate mood. Normal speech. DATA REVIEWED    Lab Results   Component Value Date/Time    WBC 4.1 07/10/2019 01:35 PM    Hemoglobin (POC) 12.9 02/03/2015 02:40 PM    HGB 13.8 07/10/2019 01:35 PM    Hematocrit (POC) 38 02/03/2015 02:40 PM    HCT 42.9 07/10/2019 01:35 PM    PLATELET 426 66/81/9232 01:35 PM    MCV 90.9 07/10/2019 01:35 PM     Lab Results   Component Value Date/Time    Sodium 137 07/10/2019 01:35 PM    Potassium 3.6 07/10/2019 01:35 PM    Chloride 102 07/10/2019 01:35 PM    CO2 29 07/10/2019 01:35 PM    Anion gap 6 07/10/2019 01:35 PM    Glucose 84 07/10/2019 01:35 PM    BUN 8 07/10/2019 01:35 PM    Creatinine 0.81 07/10/2019 01:35 PM    BUN/Creatinine ratio 10 (L) 07/10/2019 01:35 PM    GFR est AA >60 07/10/2019 01:35 PM    GFR est non-AA >60 07/10/2019 01:35 PM    Calcium 9.8 07/10/2019 01:35 PM    Bilirubin, total <0.2 07/09/2019 03:00 AM    Alk.  phosphatase 91 07/09/2019 03:00 AM    Protein, total 7.8 07/09/2019 03:00 AM    Albumin 4.2 07/09/2019 03:00 AM    Globulin 4.4 (H) 01/16/2014 10:15 AM    A-G Ratio 1.2 07/09/2019 03:00 AM    ALT (SGPT) 16 07/09/2019 03:00 AM     Lab Results   Component Value Date/Time    Cholesterol, total 211 (H) 07/09/2019 03:00 AM    HDL Cholesterol 69 07/09/2019 03:00 AM    LDL, calculated 115 (H) 07/09/2019 03:00 AM    VLDL, calculated 27 07/09/2019 03:00 AM    Triglyceride 136 07/09/2019 03:00 AM    CHOL/HDL Ratio 3.2 10/15/2010 04:14 PM     Lab Results   Component Value Date/Time    VITAMIN D, 25-HYDROXY 16.0 (L) 07/09/2019 03:00 AM       Lab Results   Component Value Date/Time    Hemoglobin A1c 5.9 (H) 03/09/2020 04:20 PM     Lab Results   Component Value Date/Time    TSH 0.967 07/09/2019 03:00 AM       Lab Results   Component Value Date/Time    Microalb/Creat ratio (ug/mg creat.) 1.9 07/09/2019 03:00 AM         ASSESSMENT and PLAN ICD-10-CM ICD-9-CM    1. Essential hypertension I10 401.9 Blood Pressure Monitor kit      propranolol LA (INDERAL LA) 80 mg SR capsule      valsartan-hydroCHLOROthiazide (DIOVAN-HCT) 80-12.5 mg per tablet      [facility]    worse due to stress vs sleep vs missed bp meds vs other   2. Bilateral leg pain M79.604 729.5 amitriptyline (ELAVIL) 25 mg tablet    M79.605      due to compensation for L knee pain vs L Baker's cyst vs L meniscal tear vs BL severe OA vs chronic low back pain/DD, improving after PT but still debilitating    3. Chronic pain of right knee M25.561 719.46     G89.29 338.29    4. Hyperglycemia R73.9 790.29    5. High cholesterol E78.00 272.0    6. Iron deficiency anemia, unspecified iron deficiency anemia type D50.9 280.9    7. Multiple thyroid nodules E04.2 241.1    8. Chronic insomnia F51.04 780.52 amitriptyline (ELAVIL) 25 mg tablet    due to BL leg pain and after starting Elavil   9. Vitamin D deficiency E55.9 268.9    10. Aspirin intolerance Z78.9 995.27      E935.3    11. Obesity, Class III, BMI 40-49.9 (morbid obesity) (HCC) E66.01 278.01    12. Migraine with aura and without status migrainosus, not intractable G43.109 346.00 propranolol LA (INDERAL LA) 80 mg SR capsule    stable with occ Propranolol   13. Heartburn R12 787.1 omeprazole (PRILOSEC) 20 mg capsule       Discussed the patient's BMI with her. The BMI follow up plan is as follows: I have counseled this patient on diet and exercise regimens. Decrease carbohydrates (white foods, sweet foods, sweet drinks and alcohol), increase green leafy vegetables and protein (lean meats and beans) with each meal.  Avoid fried foods. Eat 3-5 small meals daily. Do not skip meals. Increase water intake. Increase physical activity to 30 minutes daily for health benefit or 60 minutes daily to prevent weight regain, as tolerated. Get 7-8 hours uninterrupted sleep nightly. Chart reviewed and updated.       Continue current medications and care. Prescriptions written and sent to pharmacy; medication side effects discussed. Elavil 25 mg, Prilosec 20 mg, Diovan-hct 80-12.5 mg, Inderal La 80 mg, ergocalciferol 50,000 unit capsule,  BP monitoring kit   Lab orders mailed due to COVID-19. Most recent tests reviewed from 7/10/2019. Recheck pertinent labs today. Recent office visit notes from Dr. Brian Durán reviewed. Referrals given; patient urged to keep appointments with specialists. Counseled patient on health concerns:  BP, BL leg pain, vit d, BL knee pain  Relevant handouts given and discussed with patient. Immunizations noted. Offered empathy, support, legitimation, prayers, partnership to patient. Praised patient for progress. Follow-up and Dispositions    · Return in about 3 months (around 9/2/2020) for blood pressure, results, referral follow up. Encouraged the pt to sign up for MyChart to be able to view results and send me any questions or concerns prior to the next visit where we will go over results in detail. Patient was offered a choice/choices in the treatment plan today. Patient expresses understanding of the plan and agrees with recommendations. 30 mins spent face to face with patient and more than 50% of this time spent in counseling and coordinating care. Written by caty Vega, as dictated by Dr. Dawna Jensen DO. Documentation True and Accepted by [facility].  [place].

## 2020-06-02 NOTE — PATIENT INSTRUCTIONS
Elevated Blood Pressure: Care Instructions Your Care Instructions Blood pressure is a measure of how hard the blood pushes against the walls of your arteries. It's normal for blood pressure to go up and down throughout the day. But if it stays up over time, you have high blood pressure. Two numbers tell you your blood pressure. The first number is the systolic pressure. It shows how hard the blood pushes when your heart is pumping. The second number is the diastolic pressure. It shows how hard the blood pushes between heartbeats, when your heart is relaxed and filling with blood. An ideal blood pressure in adults is less than 120/80 (say \"120 over 80\"). High blood pressure is 140/90 or higher. You have high blood pressure if your top number is 140 or higher or your bottom number is 90 or higher, or both. The main test for high blood pressure is simple, fast, and painless. To diagnose high blood pressure, your doctor will test your blood pressure at different times. After testing your blood pressure, your doctor may ask you to test it again when you are home. If you are diagnosed with high blood pressure, you can work with your doctor to make a long-term plan to manage it. Follow-up care is a key part of your treatment and safety. Be sure to make and go to all appointments, and call your doctor if you are having problems. It's also a good idea to know your test results and keep a list of the medicines you take. How can you care for yourself at home? · Do not smoke. Smoking increases your risk for heart attack and stroke. If you need help quitting, talk to your doctor about stop-smoking programs and medicines. These can increase your chances of quitting for good. · Stay at a healthy weight. · Try to limit how much sodium you eat to less than 2,300 milligrams (mg) a day. Your doctor may ask you to try to eat less than 1,500 mg a day. · Be physically active. Get at least 30 minutes of exercise on most days of the week. Walking is a good choice. You also may want to do other activities, such as running, swimming, cycling, or playing tennis or team sports. · Avoid or limit alcohol. Talk to your doctor about whether you can drink any alcohol. · Eat plenty of fruits, vegetables, and low-fat dairy products. Eat less saturated and total fats. · Learn how to check your blood pressure at home. When should you call for help? Call your doctor now or seek immediate medical care if: 
? · Your blood pressure is much higher than normal (such as 180/110 or higher). ? · You think high blood pressure is causing symptoms such as: ¨ Severe headache. ¨ Blurry vision. ? Watch closely for changes in your health, and be sure to contact your doctor if: 
? · You do not get better as expected. Where can you learn more? Go to http://sierra-ghanshyam.info/. Enter N938 in the search box to learn more about \"Elevated Blood Pressure: Care Instructions. \" Current as of: September 21, 2016 Content Version: 11.4 © 1587-9592 [employer]. Care instructions adapted under license by [employer] (which disclaims liability or warranty for this information). If you have questions about a medical condition or this instruction, always ask your healthcare professional. [address] any warranty or liability for your use of this information.

## 2020-06-02 NOTE — PROGRESS NOTES
Chelsi Harrison is a 54 y.o. female     Chief Complaint   Patient presents with    Leg Pain     follow up from last visit on 1/2/2020    Follow-up     1. Have you been to the ER, urgent care clinic since your last visit? Hospitalized since your last visit? Yes When: April 13th at [facility]    2. Have you seen or consulted any other health care providers outside of the [address] [place] [address] since your last visit? Include any pap smears or colon screening.  No     Patient requesting refill for amitriptyline, vitamin d, and omeprazole

## 2020-06-10 ENCOUNTER — OFFICE VISIT (OUTPATIENT)
Dept: ORTHOPEDIC SURGERY | Age: 55
End: 2020-06-10

## 2020-06-10 VITALS
HEIGHT: 68 IN | TEMPERATURE: 97.9 F | OXYGEN SATURATION: 98 % | DIASTOLIC BLOOD PRESSURE: 102 MMHG | SYSTOLIC BLOOD PRESSURE: 163 MMHG | HEART RATE: 97 BPM | BODY MASS INDEX: 41.97 KG/M2

## 2020-06-10 DIAGNOSIS — M17.0 BILATERAL PRIMARY OSTEOARTHRITIS OF KNEE: Primary | ICD-10-CM

## 2020-06-10 RX ORDER — BETAMETHASONE SODIUM PHOSPHATE AND BETAMETHASONE ACETATE 3; 3 MG/ML; MG/ML
6 INJECTION, SUSPENSION INTRA-ARTICULAR; INTRALESIONAL; INTRAMUSCULAR; SOFT TISSUE ONCE
Qty: 1 ML | Refills: 0
Start: 2020-06-10 | End: 2020-06-10

## 2020-06-10 NOTE — PROGRESS NOTES
Identified pt with two pt identifiers (name and ). Reviewed chart in preparation for visit and have obtained necessary documentation. Lauro Tyson is a 54 y.o. female  Chief Complaint   Patient presents with    Joint Or Tendon Injection     Bilateral Knee     Visit Vitals  BP (!) 163/102 (BP 1 Location: Left arm, BP Patient Position: Sitting)   Pulse 97   Temp 97.9 °F (36.6 °C) (Oral)   Ht 5' 8\" (1.727 m)   SpO2 98%   BMI 41.97 kg/m²     1. Have you been to the ER, urgent care clinic since your last visit? Hospitalized since your last visit? No    2. Have you seen or consulted any other health care providers outside of the [address] [place] [address] since your last visit? Include any pap smears or colon screening.  No

## 2020-06-11 ENCOUNTER — OFFICE VISIT (OUTPATIENT)
Dept: ORTHOPEDIC SURGERY | Age: 55
End: 2020-06-11

## 2020-06-11 NOTE — PROGRESS NOTES
Date of Procedure: 6/10/2020  PROCEDURE NOTE     Consent was obtained from the patient. The correct site was identified after confirmation with the patient. Following identification and confirmation of the correct site with the patient, the superolateral right knee was prepped in the normal sterile fashion. A local anesthetic of 1% lidocaine without epinephrine was then administered to the local tissues. Following, an injection of a mixture of  6 mg Celestone and 1% lidocaine without epinephrine was administered to the right knee. The needle was then withdrawn and the injection site dressed with a sterile bandage at the conclusion. The procedure was well tolerated by the patient. No immediate adverse reactions were noted. Attention was then turned to the left knee. Following identification and confirmation of the correct site with the patient, the superolateral left knee was prepped in the normal sterile fashion. A local anesthetic of 1% lidocaine without epinephrine was then administered to the local tissues. Following, an injection of a mixture of  6 mg Celestone and 1% lidocaine without epinephrine was administered to the left knee. The needle was then withdrawn and the injection site dressed with a sterile bandage at the conclusion. The procedure was well tolerated by the patient. No immediate adverse reactions were noted.   Post injection instructions were given

## 2020-06-18 ENCOUNTER — VIRTUAL VISIT (OUTPATIENT)
Dept: ORTHOPEDIC SURGERY | Age: 55
End: 2020-06-18

## 2020-06-18 DIAGNOSIS — S83.231D COMPLEX TEAR OF MEDIAL MENISCUS OF RIGHT KNEE AS CURRENT INJURY, SUBSEQUENT ENCOUNTER: Primary | ICD-10-CM

## 2020-06-18 PROBLEM — S83.231A COMPLEX TEAR OF MEDIAL MENISCUS OF RIGHT KNEE AS CURRENT INJURY: Status: ACTIVE | Noted: 2020-06-18

## 2020-06-18 NOTE — PROGRESS NOTES
6/18/2020      CC: right knee pain    HPI:      This is a 54y.o. year old female who presents for a follow up visit. The patient was last seen and diagnosed with medial meniscus tear, early osteoarthritis of the right knee. The patient's treatments since the most recent visit have comprised of injections. The patient has had minimal relief of the chief complaint. She continues to have mechanical symptoms on the right knee. PMH:  Past Medical History:   Diagnosis Date    Allergic rhinitis 5/27/2010    Baker cyst, left 10/2019    Dr. Bernarda Cristobal    Cervical disc herniation 01/2010    C3-4, C4-5, C5-6. Hemangioma body of C5. Dr. Griselda Kirkland.  Chest pain 08/2011, 02/2012    Dr. Arik Shields. Dr. Reena Muñoz; denies CP as of 3/27/14    Chronic lower back pain 2010, 03/2016    thoracic DDD and spondylosis. DDD L3-S1. Dr. Bao Malik. [facility]. [place]. Meaghan Este[place] [facility] [place].  Chronic meniscal tear of knee     Esophagitis, reflux 4/3/2011    Gallstone     Gastritis 5/27/2010    Dr. Ladi Walton.  Gastrointestinal food allergy 03/2014    Multiple. Dr. Leoncio Quiles.    Heart palpitations 02/2012    DUE TO PAC'S AND PVC'S. Dr. Lola Grider.  Heel spur, left 02/2019    Hiatal hernia 4/3/2011    HTN (hypertension) 1987    Impaired glucose tolerance 10/15/2010    Incidental lung nodule 01/08/13    LLL 3.9 mm, RML 3.1 mm;  as of 3/27/14 per pt stable w/o growth    Insomnia 1990s    Iron deficiency anemia 5/27/2010    Irritable bowel syndrome (IBS) 03/2014    Dr. Johny Diaz.    Knee pain 2012    Right. due to severe OA. / chipped bone     Left foot pain 08/2019    Dr. Josafat James.  Metatarsal bone fracture 1990    Left fifth.  Migraine 2/22/2011    Dr. Caren Manriquez Peroneal tendonitis of lower leg 08/07/2019    Left. Dr. Josafat James    Radiculopathy, cervical 01/2010    Left. Dr. Griselda Kirkland.     Shoulder pain, right 2012    due to [address] X 2. Dr. Rena Kirkland.  Thyroid nodule     Dr. Uriel Bryant    Toe fracture, left     fifth toe.  Triangular fibrocartilage complex tear     Dr. Josafat Gan. Left. PSxHx:  Past Surgical History:   Procedure Laterality Date    HX  SECTION  1995    x 1    HX CHOLECYSTECTOMY      HX COLONOSCOPY  14    Dr. Susana Dickson; 14    due to abnormal PAP.  HX DILATION AND CURETTAGE      due to miscarriage.  HX ENDOSCOPY      HX ORTHOPAEDIC Right 2016    LUMBAR L4-5, L5-S1 ELLY. Dr. Kelsie Harden:    Current Outpatient Medications:     ergocalciferol (ERGOCALCIFEROL) 1,250 mcg (50,000 unit) capsule, Take 1 Cap by mouth every seven (7) days. Indications: low vitamin D levels, Disp: 5 Cap, Rfl: 5    amitriptyline (ELAVIL) 25 mg tablet, Take 1 Tab by mouth nightly. Indications: pain, Disp: 30 Tab, Rfl: 11    Blood Pressure Monitor kit, 1 Device by Does Not Apply route daily. , Disp: 1 Kit, Rfl: 0    propranolol LA (INDERAL LA) 80 mg SR capsule, Take 1 Cap by mouth daily. Indications: migraine prevention, Disp: 90 Cap, Rfl: 3    valsartan-hydroCHLOROthiazide (DIOVAN-HCT) 80-12.5 mg per tablet, Take 1 Tab by mouth daily. Indications: high blood pressure, Disp: 90 Tab, Rfl: 3    omeprazole (PRILOSEC) 20 mg capsule, TAKE 1 CAPSULE BY MOUTH TWICE DAILY  Indications: heartburn, Disp: 60 Cap, Rfl: 5    diclofenac (VOLTAREN) 1 % gel, Apply  to affected area four (4) times daily. , Disp: 1 Each, Rfl: 2    acetaminophen (TYLENOL ARTHRITIS PAIN) 650 mg CR tablet, Take 1,300 mg by mouth three (3) times daily. , Disp: , Rfl:     All:  Allergies   Allergen Reactions    Doxylamine Succinate Swelling     12.5-25 MG  HANDS, FACE/PERIORAL, FEET    Pcn [Penicillins] Hives    Ambien [Zolpidem] Nausea and Vomiting and Other (comments)     5 mg with Ativan 0.5 mg   TOO GROGGY, SLEPT 24 HOURS  7.5 MG FORGOT SHE WAS SLEEP EATING    Aspirin Nausea Only    Celebrex [Celecoxib] Other (comments)     TIGHT SQUEEZING IN CHEST  CHEST ACHED    Erythromycin Nausea and Vomiting    Milk Prot-Turm-Pepper-Pineappl Other (comments)     Multiple food allergies    Neurontin [Gabapentin] Other (comments)     300 mg  SEVERE LEG PAIN  EYE TWITCHING    Nsaids (Non-Steroidal Anti-Inflammatory Drug) Other (comments)     GI irritation         Social Hx:  Social History     Socioeconomic History    Marital status:      Spouse name: Not on file    Number of children: 3    Years of education: Not on file    Highest education level: Not on file   Occupational History    Occupation: Medical assistant     Employer: [employer]   Tobacco Use    Smoking status: Never Smoker    Smokeless tobacco: Never Used   Substance and Sexual Activity    Alcohol use: No    Drug use: No    Sexual activity: Yes     Partners: Male       Family Hx:  Family History   Problem Relation Age of Onset    Diabetes Mother     Heart Disease Mother 58        2 stents    Hypertension Mother     Diabetes Father     Stroke Maternal Grandmother     Cancer Maternal Uncle         prostate         Review of Systems:       General: Denies headache, lethargy, fever, weight loss  Ears/Nose/Throat: Denies ear discharge, drainage, nosebleeds, hoarse voice, dental problems  Cardiovascular: Denies chest pain, shortness of breath  Lungs: Denies chest pain, breathing problems, wheezing, pneumonia  Stomach: Denies stomach pain, heartburn, constipation, irritable bowel  Skin: Denies rash, sores, open wounds  Musculoskeletal: right knee pain  Genitourinary: Denies dysuria, hematuria, polyuria  Gastrointestinal: Denies constipation, obstipation, diarrhea  Neurological: Denies changes in sight, smell, hearing, taste, seizures. Denies loss of consciousness.   Psychiatric: Denies depression, sleep pattern changes, anxiety, change in personality  Endocrine: Denies mood swings, heat or cold intolerance  Hematologic/Lymphatic: Denies anemia, purpura, petechia  Allergic/Immunologic: Denies swelling of throat, pain or swelling at lymph nodes      Physical Examination:    There were no vitals taken for this visit. General: AOX3, no apparent distress  Psychiatric: mood and affect appropriate        Diagnostics:    Pertinent Diagnostics: MRI of right knee is reviewed, medial meniscus tear is noted, and patellofemoral chondral injury noted. Assessment: Right knee degenerative meniscus tear  Plan: This patient has failed conservative treatment of right knee meniscus tear. She is interested in surgical treatment. We discussed that the combination of right knee oa and meniscus tear does make it difficult to predict the complete outcome from a knee arthroscopy, she is willing to proceed with surgery. We did discuss the risks of surgery which include but are not limited to infection, nerve or blood vessel damage, failure of fixation, failure of any possible implant, need for reoperation, postoperative pain and swelling, intra-or postoperative fracture, postoperative dislocation, leg length inequality, need for reoperation, implant failure, death, disability, organ dysfunction, wound healing issues, DVT, PE, and the need for further procedures. The patient did freely state their understanding and satisfaction with our discussion. We will proceed after medical clearances. The patient was counseled at length about the risks of vamshi Covid-19 during their perioperative period and any recovery window from their procedure. The patient was made aware that vamshi Covid-19  may worsen their prognosis for recovering from their procedure and lend to a higher morbidity and/or mortality risk. All material risks, benefits, and reasonable alternatives including postponing the procedure were discussed. The patient does  wish to proceed with the procedure at this time.     Telemedicine modality: telphone  Location of patient: home  Location of physician: office  Time spent in consultation:8 minutes  The patient has been made aware of the billing practices of telemedicine. Ms. Aretha Yeager has a reminder for a \"due or due soon\" health maintenance. I have asked that she contact her primary care provider for follow-up on this health maintenance.

## 2020-06-24 ENCOUNTER — TELEPHONE (OUTPATIENT)
Dept: FAMILY MEDICINE CLINIC | Age: 55
End: 2020-06-24

## 2020-06-24 NOTE — TELEPHONE ENCOUNTER
I received paperwork on my desk that patient is scheduled for a pre-op exam with me on Monday 7/06/2020 for right knee arthroscopy, partial medial meniscetomy on 7/13/20 by Dr. Odalys Montes at [address] [place] [address] Specialists at [facility]. However, I am scheduled to be off on Mon 7/06/20, so pt will need to be rescheduled.

## 2020-06-30 DIAGNOSIS — S83.231D COMPLEX TEAR OF MEDIAL MENISCUS OF RIGHT KNEE AS CURRENT INJURY, SUBSEQUENT ENCOUNTER: Primary | ICD-10-CM

## 2020-07-01 ENCOUNTER — HOME HEALTH ADMISSION (OUTPATIENT)
Dept: HOME HEALTH SERVICES | Facility: HOME HEALTH | Age: 55
End: 2020-07-01
Payer: COMMERCIAL

## 2020-07-02 ENCOUNTER — HOSPITAL ENCOUNTER (OUTPATIENT)
Dept: PREADMISSION TESTING | Age: 55
Discharge: HOME | End: 2020-07-02
Payer: COMMERCIAL

## 2020-07-02 VITALS
BODY MASS INDEX: 42.63 KG/M2 | HEIGHT: 68 IN | HEART RATE: 77 BPM | WEIGHT: 281.31 LBS | RESPIRATION RATE: 16 BRPM | TEMPERATURE: 98 F | DIASTOLIC BLOOD PRESSURE: 67 MMHG | OXYGEN SATURATION: 97 % | SYSTOLIC BLOOD PRESSURE: 146 MMHG

## 2020-07-02 LAB
ANION GAP SERPL CALC-SCNC: 6 MMOL/L (ref 5–15)
BUN SERPL-MCNC: 6 MG/DL (ref 6–20)
BUN/CREAT SERPL: 7 (ref 12–20)
CALCIUM SERPL-MCNC: 9.3 MG/DL (ref 8.5–10.1)
CHLORIDE SERPL-SCNC: 107 MMOL/L (ref 97–108)
CO2 SERPL-SCNC: 27 MMOL/L (ref 21–32)
CREAT SERPL-MCNC: 0.81 MG/DL (ref 0.55–1.02)
ERYTHROCYTE [DISTWIDTH] IN BLOOD BY AUTOMATED COUNT: 12.8 % (ref 11.5–14.5)
GLUCOSE SERPL-MCNC: 111 MG/DL (ref 65–100)
HCT VFR BLD AUTO: 40.1 % (ref 35–47)
HGB BLD-MCNC: 13.2 G/DL (ref 11.5–16)
MCH RBC QN AUTO: 29.5 PG (ref 26–34)
MCHC RBC AUTO-ENTMCNC: 32.9 G/DL (ref 30–36.5)
MCV RBC AUTO: 89.7 FL (ref 80–99)
NRBC # BLD: 0 K/UL (ref 0–0.01)
NRBC BLD-RTO: 0 PER 100 WBC
PLATELET # BLD AUTO: 357 K/UL (ref 150–400)
PMV BLD AUTO: 9.7 FL (ref 8.9–12.9)
POTASSIUM SERPL-SCNC: 3.5 MMOL/L (ref 3.5–5.1)
RBC # BLD AUTO: 4.47 M/UL (ref 3.8–5.2)
SODIUM SERPL-SCNC: 140 MMOL/L (ref 136–145)
WBC # BLD AUTO: 5.2 K/UL (ref 3.6–11)

## 2020-07-02 PROCEDURE — 93005 ELECTROCARDIOGRAM TRACING: CPT

## 2020-07-02 PROCEDURE — 85027 COMPLETE CBC AUTOMATED: CPT

## 2020-07-02 PROCEDURE — 36415 COLL VENOUS BLD VENIPUNCTURE: CPT

## 2020-07-02 PROCEDURE — 80048 BASIC METABOLIC PNL TOTAL CA: CPT

## 2020-07-02 NOTE — PERIOP NOTES
covid test scheduled for 07/09/20 at 11:15 am. Patient instructed to self isolate after Covid Swab Test per Recommendations.

## 2020-07-02 NOTE — PERIOP NOTES
[facility]  Preoperative Instructions        Surgery Date 07/13/20          Time of Arrival 1030 am Contact # [phone]     Covid Test Scheduled for Thursday, 07/09/20 at 11:15 am     1. On the day of your surgery, please report to the Surgical Services Registration Desk and sign in at your designated time. The Surgery Center is located to the right of the Emergency Room. 2. You must have someone with you to drive you home. You should not drive a car for 24 hours following surgery. Please make arrangements for a friend or family member to stay with you for the first 24 hours after your surgery. 3. Do not have anything to eat or drink (including water, gum, mints, coffee, juice) after midnight ?? .? This may not apply to medications prescribed by your physician. ?(Please note below the special instructions with medications to take the morning of your procedure.)    4. We recommend you do not drink any alcoholic beverages for 24 hours before and after your surgery. 5. Contact your surgeons office for instructions on the following medications: non-steroidal anti-inflammatory drugs (i.e. Advil, Aleve), vitamins, and supplements. (Some surgeons will want you to stop these medications prior to surgery and others may allow you to take them)  **If you are currently taking Plavix, Coumadin, Aspirin and/or other blood-thinning agents, contact your surgeon for instructions. ** Your surgeon will partner with the physician prescribing these medications to determine if it is safe to stop or if you need to continue taking. Please do not stop taking these medications without instructions from your surgeon    6. Wear comfortable clothes. Wear glasses instead of contacts. Do not bring any money or jewelry. Please bring picture ID, insurance card, and any prearranged co-payment or hospital payment. Do not wear make-up, particularly mascara the morning of your surgery.   Do not wear nail polish, particularly if you are having foot /hand surgery. Wear your hair loose or down, no ponytails, buns, janice pins or clips. All body piercings must be removed. Please shower with antibacterial soap for three consecutive days before and on the morning of surgery, but do not apply any lotions, powders or deodorants after the shower on the day of surgery. Please use a fresh towels after each shower. Please sleep in clean clothes and change bed linens the night before surgery. Please do not shave for 48 hours prior to surgery. Shaving of the face is acceptable. 7. You should understand that if you do not follow these instructions your surgery may be cancelled. If your physical condition changes (I.e. fever, cold or flu) please contact your surgeon as soon as possible. 8. It is important that you be on time. If a situation occurs where you may be late, please call [phone] (OR Holding Area). 9. If you have any questions and or problems, please call [phone] (Pre-admission Testing). 10. Your surgery time may be subject to change. You will receive a phone call the evening prior if your time changes. 11.  If having outpatient surgery, you must have someone to drive you here, stay with you during the duration of your stay, and to drive you home at time of discharge. Special Instructions:     TAKE ALL MEDICATIONS DAY OF SURGERY EXCEPT:valsartan/hctz      I understand a pre-operative phone call will be made to verify my surgery time. In the event that I am not available, I give permission for a message to be left on my answering service and/or with another person?   yes         ___________________      __________   _________    (Signature of Patient)             (Witness)                (Date and Time)

## 2020-07-06 ENCOUNTER — OFFICE VISIT (OUTPATIENT)
Dept: INTERNAL MEDICINE CLINIC | Age: 55
End: 2020-07-06

## 2020-07-06 VITALS
OXYGEN SATURATION: 100 % | WEIGHT: 283 LBS | TEMPERATURE: 99.1 F | SYSTOLIC BLOOD PRESSURE: 142 MMHG | RESPIRATION RATE: 16 BRPM | HEIGHT: 68 IN | DIASTOLIC BLOOD PRESSURE: 94 MMHG | HEART RATE: 53 BPM | BODY MASS INDEX: 42.89 KG/M2

## 2020-07-06 DIAGNOSIS — I10 ESSENTIAL HYPERTENSION: ICD-10-CM

## 2020-07-06 DIAGNOSIS — K29.50 OTHER CHRONIC GASTRITIS WITHOUT HEMORRHAGE: ICD-10-CM

## 2020-07-06 DIAGNOSIS — M23.203 OLD COMPLEX TEAR OF MEDIAL MENISCUS OF RIGHT KNEE: ICD-10-CM

## 2020-07-06 DIAGNOSIS — S83.231D COMPLEX TEAR OF MEDIAL MENISCUS OF RIGHT KNEE AS CURRENT INJURY, SUBSEQUENT ENCOUNTER: Primary | ICD-10-CM

## 2020-07-06 DIAGNOSIS — E78.00 HIGH CHOLESTEROL: ICD-10-CM

## 2020-07-06 LAB
ATRIAL RATE: 72 BPM
CALCULATED P AXIS, ECG09: 51 DEGREES
CALCULATED R AXIS, ECG10: 11 DEGREES
CALCULATED T AXIS, ECG11: 20 DEGREES
DIAGNOSIS, 93000: NORMAL
P-R INTERVAL, ECG05: 188 MS
Q-T INTERVAL, ECG07: 406 MS
QRS DURATION, ECG06: 82 MS
QTC CALCULATION (BEZET), ECG08: 444 MS
VENTRICULAR RATE, ECG03: 72 BPM

## 2020-07-06 NOTE — PROGRESS NOTES
This note will not be viewable in [address] Marylene Childs is a 54 y.o. female and presents with Pre-op Exam      Subjective:  Patient comes in today for preop checkup. She is scheduled to have right knee arthroscopy, partial medial meniscectomy on 7/13/2020. Procedure to be performed by Dr. Salinas Grissom. She has chronic right knee pain, 7/10, sharp pain in the right knee, nonradiating. Aggravated on ambulation and alleviated at rest.  She is tried over-the-counter meds without any relief. She followed up with orthopedic who is scheduled her for surgery on 7/13/2020. Hypertension, well controlled on current meds. Gastritis on PPI. Past Medical History:   Diagnosis Date    Allergic rhinitis 5/27/2010    Arthritis     Baker cyst, left 10/2019    Dr. Eboni Benavides cyst, right     Cervical disc herniation 01/2010    C3-4, C4-5, C5-6. Hemangioma body of C5. Dr. Carlos Trujillo.  Chest pain 08/2011, 02/2012    Dr. David Gandhi. Dr. Mary Ellen Zamudio; denies CP as of 3/27/14    Chronic lower back pain 2010, 03/2016    thoracic DDD and spondylosis. DDD L3-S1. Dr. Kennieth Scheuermann. Marva Bryant. [place] [facility] [place] dAa [place].  Chronic meniscal tear of knee     Chronic pain     lower back and knees    Esophagitis, reflux 4/3/2011    Gallstone     Gastritis 5/27/2010    Dr. Rich Mcintosh.  Gastrointestinal food allergy 03/2014    Multiple. Dr. William Park.    Heart palpitations 02/2012    DUE TO PAC'S AND PVC'S. Dr. Al Charles.  Heel spur, left 02/2019    Hiatal hernia 4/3/2011    HTN (hypertension) 1987    Impaired glucose tolerance 10/15/2010    Incidental lung nodule 01/08/13    LLL 3.9 mm, RML 3.1 mm;  as of 3/27/14 per pt stable w/o growth    Insomnia 1990s    Iron deficiency anemia 5/27/2010    Irritable bowel syndrome (IBS) 03/2014    Dr. Hannah Car.    Knee pain 2012    Right.   due to severe OA. / chipped bone     Left foot pain 08/2019    Dr. Anderson Johnston Nasreen Bryant Metatarsal bone fracture     Left fifth.  Migraine 2011    Dr. Black Ly Peroneal tendonitis of lower leg 2019    Left. Dr. Andrew Baez    Radiculopathy, cervical 2010    Left. Dr. Minh Henry.  Shoulder pain, right     due to [address] X 2. Dr. Gaming Blade.  Thyroid nodule     6 nodules- Dr. Omari Hillman    Toe fracture, left     fifth toe.  Triangular fibrocartilage complex tear     Dr. Rosalin Lanes. Left. Past Surgical History:   Procedure Laterality Date    HX  SECTION  1995    x 1    HX CHOLECYSTECTOMY      HX COLONOSCOPY  14    Dr. Chong Valerio; 14    due to abnormal PAP.  HX DILATION AND CURETTAGE      due to miscarriage.  HX ENDOSCOPY      HX HEART CATHETERIZATION      no stents    HX ORTHOPAEDIC Right 2016    LUMBAR L4-5, L5-S1 ELLY. Dr. Fatou Reed     Allergies   Allergen Reactions    Doxylamine Succinate Swelling     12.5-25 MG  HANDS, FACE/PERIORAL, FEET    Pcn [Penicillins] Hives    Ambien [Zolpidem] Nausea and Vomiting and Other (comments)     5 mg with Ativan 0.5 mg   TOO GROGGY, SLEPT 24 HOURS  7.5 MG FORGOT SHE WAS SLEEP EATING    Aspirin Nausea Only    Celebrex [Celecoxib] Other (comments)     TIGHT SQUEEZING IN CHEST  CHEST ACHED    Erythromycin Nausea and Vomiting    Milk Prot-Turm-Pepper-Pineappl Other (comments)     Multiple food allergies    Neurontin [Gabapentin] Other (comments)     300 mg  SEVERE LEG PAIN  EYE TWITCHING    Nsaids (Non-Steroidal Anti-Inflammatory Drug) Other (comments)     GI irritation       Current Outpatient Medications   Medication Sig Dispense Refill    ergocalciferol (ERGOCALCIFEROL) 1,250 mcg (50,000 unit) capsule Take 1 Cap by mouth every seven (7) days. Indications: low vitamin D levels 5 Cap 5    amitriptyline (ELAVIL) 25 mg tablet Take 1 Tab by mouth nightly. Indications: pain 30 Tab 11    Blood Pressure Monitor kit 1 Device by Does Not Apply route daily. 1 Kit 0    propranolol LA (INDERAL LA) 80 mg SR capsule Take 1 Cap by mouth daily. Indications: migraine prevention 90 Cap 3    valsartan-hydroCHLOROthiazide (DIOVAN-HCT) 80-12.5 mg per tablet Take 1 Tab by mouth daily. Indications: high blood pressure 90 Tab 3    omeprazole (PRILOSEC) 20 mg capsule TAKE 1 CAPSULE BY MOUTH TWICE DAILY  Indications: heartburn 60 Cap 5    diclofenac (VOLTAREN) 1 % gel Apply  to affected area four (4) times daily. 1 Each 2    acetaminophen (TYLENOL ARTHRITIS PAIN) 650 mg CR tablet Take 1,300 mg by mouth three (3) times daily. Social History     Socioeconomic History    Marital status:      Spouse name: Not on file    Number of children: 3    Years of education: Not on file    Highest education level: Not on file   Occupational History    Occupation: Medical assistant     Employer: [employer]   Tobacco Use    Smoking status: Never Smoker    Smokeless tobacco: Never Used   Substance and Sexual Activity    Alcohol use: No    Drug use: Never    Sexual activity: Yes     Partners: Male     Family History   Problem Relation Age of Onset    Diabetes Mother     Heart Disease Mother 58        2 stents    Hypertension Mother     Diabetes Father     Stroke Maternal Grandmother     Cancer Maternal Uncle         prostate       Review of Systems  ROS is unremarkable except for ones highlighted in bold.    Constitutional: negative for fevers, chills, anorexia and weight loss  Eyes:   negative for visual disturbance and irritation  ENT:   negative for tinnitus,sore throat,nasal congestion,ear pain,hoarseness  Respiratory:  negative for cough, hemoptysis, dyspnea,wheezing  CV:   negative for chest pain, palpitations, lower extremity edema  GI:   negative for nausea, vomiting, diarrhea, abdominal pain,melena  Endo:               negative for polyuria,polydipsia,polyphagia,heat intolerance  Genitourinary: negative for frequency, dysuria and hematuria  Integumentary: negative for rash and pruritus  Hematologic:  negative for easy bruising and gum/nose bleeding  Musculoskel: negative for myalgias, arthralgias, back pain, muscle weakness, joint pain  Neurological:  negative for headaches, dizziness, vertigo, memory problems and gait   Behavl/Psych: negative for feelings of anxiety, depression, mood changes  ROS otherwise negative     Objective:  Visit Vitals  BP (!) 142/94 (BP 1 Location: Right arm, BP Patient Position: Sitting)   Pulse (!) 53   Temp 99.1 °F (37.3 °C) (Oral)   Resp 16   Ht 5' 8\" (1.727 m)   Wt 283 lb (128.4 kg)   SpO2 100%   BMI 43.03 kg/m²     Physical Exam:   General appearance - alert, well appearing, and in no distress  Mental status - alert, oriented to person, place, and time  EYE-MARJORIE, EOMI, fundi normal, corneas normal, no foreign bodies  ENT-ENT exam normal, no neck nodes or sinus tenderness  Nose - normal and patent, no erythema, discharge or polyps  Mouth - mucous membranes moist, pharynx normal without lesions  Neck - supple, no significant adenopathy   Chest - clear to auscultation, no wheezes, rales or rhonchi, symmetric air entry   Heart - normal rate, regular rhythm, normal S1, S2, no murmurs, rubs, clicks or gallops   Abdomen - soft, nontender, nondistended, no masses or organomegaly  Lymph- no adenopathy palpable  Ext-peripheral pulses normal, no pedal edema, no clubbing or cyanosis  Skin-Warm and dry.  no hyperpigmentation, vitiligo, or suspicious lesions  Neuro -alert, oriented, normal speech, no focal findings or movement disorder noted  Musculoskeletal- FROM, no bony abnormalities, right knee point tenderness    Lab Review:  Results for orders placed or performed during the hospital encounter of 72/12/15   METABOLIC PANEL, BASIC   Result Value Ref Range    Sodium 140 136 - 145 mmol/L    Potassium 3.5 3.5 - 5.1 mmol/L Chloride 107 97 - 108 mmol/L    CO2 27 21 - 32 mmol/L    Anion gap 6 5 - 15 mmol/L    Glucose 111 (H) 65 - 100 mg/dL    BUN 6 6 - 20 MG/DL    Creatinine 0.81 0.55 - 1.02 MG/DL    BUN/Creatinine ratio 7 (L) 12 - 20      GFR est AA >60 >60 ml/min/1.73m2    GFR est non-AA >60 >60 ml/min/1.73m2    Calcium 9.3 8.5 - 10.1 MG/DL   CBC W/O DIFF   Result Value Ref Range    WBC 5.2 3.6 - 11.0 K/uL    RBC 4.47 3.80 - 5.20 M/uL    HGB 13.2 11.5 - 16.0 g/dL    HCT 40.1 35.0 - 47.0 %    MCV 89.7 80.0 - 99.0 FL    MCH 29.5 26.0 - 34.0 PG    MCHC 32.9 30.0 - 36.5 g/dL    RDW 12.8 11.5 - 14.5 %    PLATELET 522 438 - 910 K/uL    MPV 9.7 8.9 - 12.9 FL    NRBC 0.0 0  WBC    ABSOLUTE NRBC 0.00 0.00 - 0.01 K/uL   EKG, 12 LEAD, INITIAL   Result Value Ref Range    Ventricular Rate 72 BPM    Atrial Rate 72 BPM    P-R Interval 188 ms    QRS Duration 82 ms    Q-T Interval 406 ms    QTC Calculation (Bezet) 444 ms    Calculated P Axis 51 degrees    Calculated R Axis 11 degrees    Calculated T Axis 20 degrees    Diagnosis       Normal sinus rhythm  Moderate voltage criteria for LVH, may be normal variant  Cannot rule out Septal infarct , age undetermined    Confirmed by Gustavo Jin P.VTyson ([id number]) on 7/6/2020 9:44:32 AM          Documenation Review:    Assessment/Plan:    Diagnoses and all orders for this visit:    1. Complex tear of medial meniscus of right knee as current injury, subsequent encounter    2. Old complex tear of medial meniscus of right knee    3. High cholesterol    4. Essential hypertension    5. Other chronic gastritis without hemorrhage      Reviewed EKG and lab workunremarkable. Patient is medically cleared for above-stated surgery. ICD-10-CM ICD-9-CM    1. Complex tear of medial meniscus of right knee as current injury, subsequent encounter S83.231D V58.89    2. Old complex tear of medial meniscus of right knee M23.203 717.3    3. High cholesterol E78.00 272.0    4. Essential hypertension I10 401.9    5.  Other chronic gastritis without hemorrhage K29.50 535.10              I have reviewed with the patient details of the assessment and plan and all questions were answered. Relevent patient education was performed. Verbal and/or written instructions (see AVS) provided. The most recent lab findings were reviewed with the patient. Plan was discussed with patient who verbally expressed understanding. An After Visit Summary was printed and given to the patient.     Jesus Underwood MD

## 2020-07-06 NOTE — PROGRESS NOTES
Michele Bustamante is a 54 y.o. female presenting for Pre-op Exam  .     1. Have you been to the ER, urgent care clinic since your last visit? Hospitalized since your last visit? No    2. Have you seen or consulted any other health care providers outside of the [address] [place] [address] since your last visit? Include any pap smears or colon screening. No    Fall Risk Assessment, last 12 mths 4/22/2019   Able to walk? Yes   Fall in past 12 months? No         Abuse Screening Questionnaire 4/22/2019   Do you ever feel afraid of your partner? N   Are you in a relationship with someone who physically or mentally threatens you? N   Is it safe for you to go home? Y       3 most recent PHQ Screens 6/10/2020   Little interest or pleasure in doing things Not at all   Feeling down, depressed, irritable, or hopeless Not at all   Total Score PHQ 2 0       There are no discontinued medications.

## 2020-07-06 NOTE — PERIOP NOTES
Reviewed EKG dated 05/24/18 and 07/02/20 with Dr Marco Amin (anesthesiologist). Also reviewed Cardiac Notes from 2018, Nila, and Cath 2018. Past medical History, Surgery History, Mets score. Per Dr Marco Amin, okay to proceed with surgery.

## 2020-07-09 ENCOUNTER — HOSPITAL ENCOUNTER (OUTPATIENT)
Dept: PREADMISSION TESTING | Age: 55
Discharge: HOME | End: 2020-07-09
Payer: COMMERCIAL

## 2020-07-09 PROCEDURE — 87635 SARS-COV-2 COVID-19 AMP PRB: CPT

## 2020-07-10 LAB
SARS-COV-2, COV2NT: NOT DETECTED
SOURCE, COVRS: NORMAL
SPECIMEN SOURCE, FCOV2M: NORMAL

## 2020-07-12 PROBLEM — S83.231S: Status: ACTIVE | Noted: 2020-07-12

## 2020-07-12 NOTE — H&P
7/12/2020    Chief Complaint: Right knee pain    HPI: This is a 54 y.o. female who complains of right knee pain related to a meniscal tear. The patient failed non-operative management and elected for a knee arthroscopy. The patient has been medically and specialty cleared. The patient denies any numbness, weakness, tingling, fevers, chills, nausea, vomiting, fevers, chills, nausea, vomiting. PMH:  Past Medical History:   Diagnosis Date    Allergic rhinitis 5/27/2010    Arthritis     Baker cyst, left 10/2019    Dr. Enzo Lainez cyst, right     Cervical disc herniation 01/2010    C3-4, C4-5, C5-6. Hemangioma body of C5. Dr. Jay Long.  Chest pain 08/2011, 02/2012    Dr. Rose Mary Santiago. Dr. Tati Lagunas; denies CP as of 3/27/14    Chronic lower back pain 2010, 03/2016    thoracic DDD and spondylosis. DDD L3-S1. Dr. Johanny Lugo. Abbi Lujan. Dr. Lewis Smith, [facility] [place]. Francy Palafox.  Chronic meniscal tear of knee     Chronic pain     lower back and knees    Esophagitis, reflux 4/3/2011    Gallstone     Gastritis 5/27/2010    Dr. Matt Perkins.  Gastrointestinal food allergy 03/2014    Multiple. Dr. Rick Laguerre.    Heart palpitations 02/2012    DUE TO PAC'S AND PVC'S. Dr. Christie Stroud.  Heel spur, left 02/2019    Hiatal hernia 4/3/2011    HTN (hypertension) 1987    Impaired glucose tolerance 10/15/2010    Incidental lung nodule 01/08/13    LLL 3.9 mm, RML 3.1 mm;  as of 3/27/14 per pt stable w/o growth    Insomnia 1990s    Iron deficiency anemia 5/27/2010    Irritable bowel syndrome (IBS) 03/2014    Dr. Sharath Metz.    Knee pain 2012    Right. due to severe OA. / chipped bone     Left foot pain 08/2019    Dr. Hannah Benson.  Metatarsal bone fracture 1990    Left fifth.  Migraine 2/22/2011    Dr. Sean Jimenez Peroneal tendonitis of lower leg 08/07/2019    Left. Dr. Hannah Benson    Radiculopathy, cervical 01/2010    Left. Dr. Jay Long.     Shoulder pain, right     due to [address] X 2. Dr. Diane Fleming.  Thyroid nodule 2011    6 nodules- Dr. Vigil Aracely    Toe fracture, left     fifth toe.  Triangular fibrocartilage complex tear     Dr. Osvaldo Garza. Left. PSxHx:  Past Surgical History:   Procedure Laterality Date    HX  SECTION  1995    x 1    HX CHOLECYSTECTOMY      HX COLONOSCOPY  14    Dr. Minerva Mcfadden; 14    due to abnormal PAP.  HX DILATION AND CURETTAGE      due to miscarriage.  HX ENDOSCOPY      HX HEART CATHETERIZATION      no stents    HX ORTHOPAEDIC Right 2016    LUMBAR L4-5, L5-S1 ELLY. Dr. Emily Ordonez:  No current facility-administered medications for this encounter. Current Outpatient Medications:     ergocalciferol (ERGOCALCIFEROL) 1,250 mcg (50,000 unit) capsule, Take 1 Cap by mouth every seven (7) days. Indications: low vitamin D levels, Disp: 5 Cap, Rfl: 5    amitriptyline (ELAVIL) 25 mg tablet, Take 1 Tab by mouth nightly. Indications: pain, Disp: 30 Tab, Rfl: 11    Blood Pressure Monitor kit, 1 Device by Does Not Apply route daily. , Disp: 1 Kit, Rfl: 0    propranolol LA (INDERAL LA) 80 mg SR capsule, Take 1 Cap by mouth daily. Indications: migraine prevention, Disp: 90 Cap, Rfl: 3    valsartan-hydroCHLOROthiazide (DIOVAN-HCT) 80-12.5 mg per tablet, Take 1 Tab by mouth daily. Indications: high blood pressure, Disp: 90 Tab, Rfl: 3    omeprazole (PRILOSEC) 20 mg capsule, TAKE 1 CAPSULE BY MOUTH TWICE DAILY  Indications: heartburn, Disp: 60 Cap, Rfl: 5    diclofenac (VOLTAREN) 1 % gel, Apply  to affected area four (4) times daily. , Disp: 1 Each, Rfl: 2    acetaminophen (TYLENOL ARTHRITIS PAIN) 650 mg CR tablet, Take 1,300 mg by mouth three (3) times daily. , Disp: , Rfl:     All:  Allergies   Allergen Reactions    Doxylamine Succinate Swelling     12.5-25 MG  HANDS, FACE/PERIORAL, FEET    Pcn [Penicillins] Hives    Ambien [Zolpidem] Nausea and Vomiting and Other (comments)     5 mg with Ativan 0.5 mg   TOO GROGGY, SLEPT 24 HOURS  7.5 MG FORGOT SHE WAS SLEEP EATING    Aspirin Nausea Only    Celebrex [Celecoxib] Other (comments)     TIGHT SQUEEZING IN CHEST  CHEST ACHED    Erythromycin Nausea and Vomiting    Milk Prot-Turm-Pepper-Pineappl Other (comments)     Multiple food allergies    Neurontin [Gabapentin] Other (comments)     300 mg  SEVERE LEG PAIN  EYE TWITCHING    Nsaids (Non-Steroidal Anti-Inflammatory Drug) Other (comments)     GI irritation         Social Hx:  Social History     Socioeconomic History    Marital status:      Spouse name: Not on file    Number of children: 3    Years of education: Not on file    Highest education level: Not on file   Occupational History    Occupation: Medical assistant     Employer: [employer]   Tobacco Use    Smoking status: Never Smoker    Smokeless tobacco: Never Used   Substance and Sexual Activity    Alcohol use: No    Drug use: Never    Sexual activity: Yes     Partners: Male       Family Hx:  Family History   Problem Relation Age of Onset    Diabetes Mother     Heart Disease Mother 58        2 stents    Hypertension Mother     Diabetes Father     Stroke Maternal Grandmother     Cancer Maternal Uncle         prostate         Review of Systems:       General: Denies headache, lethargy, fever, weight loss  Ears/Nose/Throat: Denies ear discharge, drainage, nosebleeds, hoarse voice, dental problems  Cardiovascular: Denies chest pain, shortness of breath  Lungs: Denies chest pain, breathing problems, wheezing, pneumonia  Stomach: Denies stomach pain, heartburn, constipation, irritable bowel  Skin: Denies rash, sores, open wounds  Musculoskeletal: right knee pain  Genitourinary: Denies dysuria, hematuria, polyuria  Gastrointestinal: Denies constipation, obstipation, diarrhea  Neurological: Denies changes in sight, smell, hearing, taste, seizures. Denies loss of consciousness. Psychiatric: Denies depression, sleep pattern changes, anxiety, change in personality  Endocrine: Denies mood swings, heat or cold intolerance  Hematologic/Lymphatic: Denies anemia, purpura, petechia  Allergic/Immunologic: Denies swelling of throat, pain or swelling at lymph nodes      Physical Examination:    There were no vitals taken for this visit. General: AOX3, no apparent distress  Psychiatric: mood and affect appropriate  Lungs: breathing is symmetric and unlabored bilaterally  Heart: regular rate and rhythm  Abdomen: no guarding  Head: normocephalic, atraumatic  Skin: No significant abnormalities, good turgor  Sensation intact to light touch: C5-T1 dermatomes  Muscular exam: 5/5 strength in all major muscle groups unless noted in specialty exam.    Extremities      Right upper extremity: Extremity is examined, no evidence of gross deformity, no gross motor or sensory deficit. Full active and passive range of motion is noted. Muscle tone and bulk is within normal expected limits. Capillary refill is less than 2 seconds distally. Left upper extremity: Extremity is examined, no evidence of gross deformity, no gross motor or sensory deficit. Full active and passive range of motion is noted. Muscle tone and bulk is within normal expected limits. Capillary refill is less than 2 seconds distally. Right lower extremity: Extremity is examined, no evidence of gross deformity, no gross motor or sensory deficit. Full active and passive range of motion is noted. Muscle tone and bulk is within normal expected limits. Capillary refill is less than 2 seconds distally. Left lower extremity: No gross deformity. Knee indicates small effusion. Significant joint line tenderness to palpation medially, not laterally. Positive Mahendra's medially, not laterally. ACL, PCL, MCL, LCL are all intact to ligamentous testing.   Capillary refill is less than 2 seconds distally. Knee flexion and extension is 5 out of 5 strength. Patella tracks centrally, no patellar grind. Diagnostics:    Pertinent Diagnostics: MRI confirms right knee medial degenerative meniscal tear. Assessment: right knee pain, medial meniscal tear    Plan: This patient has the above-mentioned issue, I have had a long discussion with them regarding the treatment options which include nonoperative and operative. Due to the length of symptoms, as well as the clinical scenario, and failure of non-operative management, and in combination with these mechanical symptoms, we have agreed to proceed with operative management in the form of an arthroscopically assisted partial medial meniscectomy. We did discuss the risks of surgery which include but are not limited to infection, nerve or blood vessel damage, failure of fixation, failure of any possible implant, need for reoperation, postoperative pain and swelling, intra-or postoperative fracture, postoperative ligamentous pain, need for reoperation, implant failure, death, disability, organ dysfunction, wound healing issues, DVT, PE, and the need for further procedures. The patient did freely state their understanding and satisfaction with our discussion. We will proceed after medical clearances. We will proceed with an arthroscopically assisted partial medial meniscectomy of the right knee    The patient was counseled at length about the risks of vamshi Covid-19 during their perioperative period and any recovery window from their procedure. The patient was made aware that vamshi Covid-19  may worsen their prognosis for recovering from their procedure and lend to a higher morbidity and/or mortality risk. All material risks, benefits, and reasonable alternatives including postponing the procedure were discussed. The patient does  wish to proceed with the procedure at this time.

## 2020-07-13 ENCOUNTER — ANESTHESIA (OUTPATIENT)
Dept: SURGERY | Age: 55
End: 2020-07-13
Payer: COMMERCIAL

## 2020-07-13 ENCOUNTER — ANESTHESIA EVENT (OUTPATIENT)
Dept: SURGERY | Age: 55
End: 2020-07-13
Payer: COMMERCIAL

## 2020-07-13 ENCOUNTER — HOSPITAL ENCOUNTER (OUTPATIENT)
Age: 55
Discharge: HOME | End: 2020-07-14
Attending: ORTHOPAEDIC SURGERY | Admitting: ORTHOPAEDIC SURGERY
Payer: COMMERCIAL

## 2020-07-13 DIAGNOSIS — S83.231S COMPLEX TEAR OF MEDIAL MENISCUS OF RIGHT KNEE, SEQUELA: Primary | ICD-10-CM

## 2020-07-13 PROBLEM — S83.203A: Status: ACTIVE | Noted: 2020-07-13

## 2020-07-13 PROCEDURE — 76010000153 HC OR TIME 1.5 TO 2 HR: Performed by: ORTHOPAEDIC SURGERY

## 2020-07-13 PROCEDURE — 99218 HC RM OBSERVATION: CPT

## 2020-07-13 PROCEDURE — 77030008684 HC TU ET CUF COVD -B: Performed by: NURSE ANESTHETIST, CERTIFIED REGISTERED

## 2020-07-13 PROCEDURE — 74011250636 HC RX REV CODE- 250/636: Performed by: ORTHOPAEDIC SURGERY

## 2020-07-13 PROCEDURE — 76060000034 HC ANESTHESIA 1.5 TO 2 HR: Performed by: ORTHOPAEDIC SURGERY

## 2020-07-13 PROCEDURE — 77030040361 HC SLV COMPR DVT MDII -B: Performed by: ORTHOPAEDIC SURGERY

## 2020-07-13 PROCEDURE — 77030012711 HC WND ARTHRO ABLT S&N -D: Performed by: ORTHOPAEDIC SURGERY

## 2020-07-13 PROCEDURE — 74011250636 HC RX REV CODE- 250/636: Performed by: ANESTHESIOLOGY

## 2020-07-13 PROCEDURE — 74011250636 HC RX REV CODE- 250/636: Performed by: NURSE ANESTHETIST, CERTIFIED REGISTERED

## 2020-07-13 PROCEDURE — 77030019908 HC STETH ESOPH SIMS -A: Performed by: NURSE ANESTHETIST, CERTIFIED REGISTERED

## 2020-07-13 PROCEDURE — 77030014650 HC SEAL MTRX FLOSEL BAXT -C: Performed by: ORTHOPAEDIC SURGERY

## 2020-07-13 PROCEDURE — 74011250637 HC RX REV CODE- 250/637: Performed by: ORTHOPAEDIC SURGERY

## 2020-07-13 PROCEDURE — 74011250636 HC RX REV CODE- 250/636

## 2020-07-13 PROCEDURE — 77030018835 HC SOL IRR LR ICUM -A: Performed by: ORTHOPAEDIC SURGERY

## 2020-07-13 PROCEDURE — 77030008495 HC TBNG ARTHSC IRR CNMD -B: Performed by: ORTHOPAEDIC SURGERY

## 2020-07-13 PROCEDURE — 77030002916 HC SUT ETHLN J&J -A: Performed by: ORTHOPAEDIC SURGERY

## 2020-07-13 PROCEDURE — 77030006877 HC BLD SHV FLL RAD S&N -B: Performed by: ORTHOPAEDIC SURGERY

## 2020-07-13 PROCEDURE — 77030000032 HC CUF TRNQT ZIMM -B: Performed by: ORTHOPAEDIC SURGERY

## 2020-07-13 PROCEDURE — 76210000000 HC OR PH I REC 2 TO 2.5 HR: Performed by: ORTHOPAEDIC SURGERY

## 2020-07-13 PROCEDURE — 74011000250 HC RX REV CODE- 250: Performed by: NURSE ANESTHETIST, CERTIFIED REGISTERED

## 2020-07-13 RX ORDER — FENTANYL CITRATE 50 UG/ML
INJECTION, SOLUTION INTRAMUSCULAR; INTRAVENOUS
Status: COMPLETED
Start: 2020-07-13 | End: 2020-07-13

## 2020-07-13 RX ORDER — ONDANSETRON 2 MG/ML
4 INJECTION INTRAMUSCULAR; INTRAVENOUS AS NEEDED
Status: DISCONTINUED | OUTPATIENT
Start: 2020-07-13 | End: 2020-07-13 | Stop reason: HOSPADM

## 2020-07-13 RX ORDER — ROPIVACAINE HYDROCHLORIDE 5 MG/ML
INJECTION, SOLUTION EPIDURAL; INFILTRATION; PERINEURAL AS NEEDED
Status: DISCONTINUED | OUTPATIENT
Start: 2020-07-13 | End: 2020-07-13 | Stop reason: HOSPADM

## 2020-07-13 RX ORDER — ONDANSETRON 2 MG/ML
INJECTION INTRAMUSCULAR; INTRAVENOUS AS NEEDED
Status: DISCONTINUED | OUTPATIENT
Start: 2020-07-13 | End: 2020-07-13 | Stop reason: HOSPADM

## 2020-07-13 RX ORDER — DEXAMETHASONE SODIUM PHOSPHATE 4 MG/ML
INJECTION, SOLUTION INTRA-ARTICULAR; INTRALESIONAL; INTRAMUSCULAR; INTRAVENOUS; SOFT TISSUE AS NEEDED
Status: DISCONTINUED | OUTPATIENT
Start: 2020-07-13 | End: 2020-07-13 | Stop reason: HOSPADM

## 2020-07-13 RX ORDER — SODIUM CHLORIDE, SODIUM LACTATE, POTASSIUM CHLORIDE, CALCIUM CHLORIDE 600; 310; 30; 20 MG/100ML; MG/100ML; MG/100ML; MG/100ML
25 INJECTION, SOLUTION INTRAVENOUS CONTINUOUS
Status: DISCONTINUED | OUTPATIENT
Start: 2020-07-13 | End: 2020-07-13 | Stop reason: HOSPADM

## 2020-07-13 RX ORDER — EPHEDRINE SULFATE/0.9% NACL/PF 50 MG/5 ML
5 SYRINGE (ML) INTRAVENOUS AS NEEDED
Status: DISCONTINUED | OUTPATIENT
Start: 2020-07-13 | End: 2020-07-13 | Stop reason: HOSPADM

## 2020-07-13 RX ORDER — MIDAZOLAM HYDROCHLORIDE 1 MG/ML
INJECTION, SOLUTION INTRAMUSCULAR; INTRAVENOUS AS NEEDED
Status: DISCONTINUED | OUTPATIENT
Start: 2020-07-13 | End: 2020-07-13 | Stop reason: HOSPADM

## 2020-07-13 RX ORDER — HYDROMORPHONE HYDROCHLORIDE 1 MG/ML
0.5 INJECTION, SOLUTION INTRAMUSCULAR; INTRAVENOUS; SUBCUTANEOUS
Status: DISCONTINUED | OUTPATIENT
Start: 2020-07-13 | End: 2020-07-14

## 2020-07-13 RX ORDER — MIDAZOLAM HYDROCHLORIDE 1 MG/ML
0.5 INJECTION, SOLUTION INTRAMUSCULAR; INTRAVENOUS
Status: DISCONTINUED | OUTPATIENT
Start: 2020-07-13 | End: 2020-07-13 | Stop reason: HOSPADM

## 2020-07-13 RX ORDER — LIDOCAINE HYDROCHLORIDE 20 MG/ML
INJECTION, SOLUTION EPIDURAL; INFILTRATION; INTRACAUDAL; PERINEURAL AS NEEDED
Status: DISCONTINUED | OUTPATIENT
Start: 2020-07-13 | End: 2020-07-13 | Stop reason: HOSPADM

## 2020-07-13 RX ORDER — SODIUM CHLORIDE 9 MG/ML
125 INJECTION, SOLUTION INTRAVENOUS CONTINUOUS
Status: DISCONTINUED | OUTPATIENT
Start: 2020-07-13 | End: 2020-07-14 | Stop reason: HOSPADM

## 2020-07-13 RX ORDER — EPINEPHRINE 1 MG/ML
INJECTION INTRAMUSCULAR; INTRAVENOUS; SUBCUTANEOUS AS NEEDED
Status: DISCONTINUED | OUTPATIENT
Start: 2020-07-13 | End: 2020-07-13 | Stop reason: HOSPADM

## 2020-07-13 RX ORDER — KETAMINE HYDROCHLORIDE 100 MG/ML
INJECTION, SOLUTION INTRAMUSCULAR; INTRAVENOUS AS NEEDED
Status: DISCONTINUED | OUTPATIENT
Start: 2020-07-13 | End: 2020-07-13 | Stop reason: HOSPADM

## 2020-07-13 RX ORDER — ACETAMINOPHEN 500 MG
1000 TABLET ORAL ONCE
Status: COMPLETED | OUTPATIENT
Start: 2020-07-13 | End: 2020-07-13

## 2020-07-13 RX ORDER — SODIUM CHLORIDE, SODIUM LACTATE, POTASSIUM CHLORIDE, CALCIUM CHLORIDE 600; 310; 30; 20 MG/100ML; MG/100ML; MG/100ML; MG/100ML
125 INJECTION, SOLUTION INTRAVENOUS CONTINUOUS
Status: DISCONTINUED | OUTPATIENT
Start: 2020-07-13 | End: 2020-07-13 | Stop reason: HOSPADM

## 2020-07-13 RX ORDER — OXYCODONE AND ACETAMINOPHEN 5; 325 MG/1; MG/1
1 TABLET ORAL AS NEEDED
Status: DISCONTINUED | OUTPATIENT
Start: 2020-07-13 | End: 2020-07-13 | Stop reason: HOSPADM

## 2020-07-13 RX ORDER — SODIUM CHLORIDE 9 MG/ML
50 INJECTION, SOLUTION INTRAVENOUS CONTINUOUS
Status: DISCONTINUED | OUTPATIENT
Start: 2020-07-13 | End: 2020-07-13 | Stop reason: HOSPADM

## 2020-07-13 RX ORDER — SODIUM CHLORIDE 9 MG/ML
1000 INJECTION, SOLUTION INTRAVENOUS CONTINUOUS
Status: DISCONTINUED | OUTPATIENT
Start: 2020-07-13 | End: 2020-07-13 | Stop reason: HOSPADM

## 2020-07-13 RX ORDER — MIDAZOLAM HYDROCHLORIDE 1 MG/ML
1 INJECTION, SOLUTION INTRAMUSCULAR; INTRAVENOUS AS NEEDED
Status: DISCONTINUED | OUTPATIENT
Start: 2020-07-13 | End: 2020-07-13 | Stop reason: HOSPADM

## 2020-07-13 RX ORDER — DIPHENHYDRAMINE HYDROCHLORIDE 50 MG/ML
12.5 INJECTION, SOLUTION INTRAMUSCULAR; INTRAVENOUS AS NEEDED
Status: DISCONTINUED | OUTPATIENT
Start: 2020-07-13 | End: 2020-07-13 | Stop reason: HOSPADM

## 2020-07-13 RX ORDER — SODIUM CHLORIDE 0.9 % (FLUSH) 0.9 %
5-40 SYRINGE (ML) INJECTION EVERY 8 HOURS
Status: DISCONTINUED | OUTPATIENT
Start: 2020-07-13 | End: 2020-07-13 | Stop reason: HOSPADM

## 2020-07-13 RX ORDER — FENTANYL CITRATE 50 UG/ML
25 INJECTION, SOLUTION INTRAMUSCULAR; INTRAVENOUS
Status: COMPLETED | OUTPATIENT
Start: 2020-07-13 | End: 2020-07-13

## 2020-07-13 RX ORDER — SODIUM CHLORIDE 0.9 % (FLUSH) 0.9 %
5-40 SYRINGE (ML) INJECTION AS NEEDED
Status: DISCONTINUED | OUTPATIENT
Start: 2020-07-13 | End: 2020-07-13 | Stop reason: HOSPADM

## 2020-07-13 RX ORDER — MORPHINE SULFATE 10 MG/ML
INJECTION, SOLUTION INTRAMUSCULAR; INTRAVENOUS
Status: COMPLETED
Start: 2020-07-13 | End: 2020-07-13

## 2020-07-13 RX ORDER — PROPOFOL 10 MG/ML
INJECTION, EMULSION INTRAVENOUS AS NEEDED
Status: DISCONTINUED | OUTPATIENT
Start: 2020-07-13 | End: 2020-07-13 | Stop reason: HOSPADM

## 2020-07-13 RX ORDER — SUCCINYLCHOLINE CHLORIDE 20 MG/ML
INJECTION INTRAMUSCULAR; INTRAVENOUS AS NEEDED
Status: DISCONTINUED | OUTPATIENT
Start: 2020-07-13 | End: 2020-07-13 | Stop reason: HOSPADM

## 2020-07-13 RX ORDER — FENTANYL CITRATE 50 UG/ML
INJECTION, SOLUTION INTRAMUSCULAR; INTRAVENOUS AS NEEDED
Status: DISCONTINUED | OUTPATIENT
Start: 2020-07-13 | End: 2020-07-13 | Stop reason: HOSPADM

## 2020-07-13 RX ORDER — HYDROMORPHONE HYDROCHLORIDE 1 MG/ML
0.2 INJECTION, SOLUTION INTRAMUSCULAR; INTRAVENOUS; SUBCUTANEOUS
Status: DISCONTINUED | OUTPATIENT
Start: 2020-07-13 | End: 2020-07-13 | Stop reason: HOSPADM

## 2020-07-13 RX ORDER — OXYCODONE HYDROCHLORIDE 5 MG/1
5 TABLET ORAL
Status: DISCONTINUED | OUTPATIENT
Start: 2020-07-13 | End: 2020-07-14

## 2020-07-13 RX ORDER — LIDOCAINE HYDROCHLORIDE 10 MG/ML
0.1 INJECTION, SOLUTION EPIDURAL; INFILTRATION; INTRACAUDAL; PERINEURAL AS NEEDED
Status: DISCONTINUED | OUTPATIENT
Start: 2020-07-13 | End: 2020-07-13 | Stop reason: HOSPADM

## 2020-07-13 RX ORDER — DEXAMETHASONE SODIUM PHOSPHATE 4 MG/ML
10 INJECTION, SOLUTION INTRA-ARTICULAR; INTRALESIONAL; INTRAMUSCULAR; INTRAVENOUS; SOFT TISSUE ONCE
Status: DISCONTINUED | OUTPATIENT
Start: 2020-07-13 | End: 2020-07-13 | Stop reason: HOSPADM

## 2020-07-13 RX ORDER — OXYCODONE HYDROCHLORIDE 5 MG/1
5 TABLET ORAL
Qty: 42 TAB | Refills: 0 | Status: SHIPPED | OUTPATIENT
Start: 2020-07-13 | End: 2020-07-20

## 2020-07-13 RX ORDER — FENTANYL CITRATE 50 UG/ML
50 INJECTION, SOLUTION INTRAMUSCULAR; INTRAVENOUS AS NEEDED
Status: DISCONTINUED | OUTPATIENT
Start: 2020-07-13 | End: 2020-07-13 | Stop reason: HOSPADM

## 2020-07-13 RX ORDER — MORPHINE SULFATE 10 MG/ML
2 INJECTION, SOLUTION INTRAMUSCULAR; INTRAVENOUS
Status: DISCONTINUED | OUTPATIENT
Start: 2020-07-13 | End: 2020-07-13 | Stop reason: HOSPADM

## 2020-07-13 RX ORDER — HYDROMORPHONE HYDROCHLORIDE 1 MG/ML
INJECTION, SOLUTION INTRAMUSCULAR; INTRAVENOUS; SUBCUTANEOUS
Status: COMPLETED
Start: 2020-07-13 | End: 2020-07-13

## 2020-07-13 RX ORDER — ACETAMINOPHEN 325 MG/1
650 TABLET ORAL ONCE
Status: DISCONTINUED | OUTPATIENT
Start: 2020-07-13 | End: 2020-07-13 | Stop reason: SDUPTHER

## 2020-07-13 RX ADMIN — PROPOFOL 10 MG: 10 INJECTION, EMULSION INTRAVENOUS at 16:52

## 2020-07-13 RX ADMIN — MORPHINE SULFATE 2 MG: 10 INJECTION INTRAVENOUS at 20:00

## 2020-07-13 RX ADMIN — CEFAZOLIN 3 G: 1 INJECTION, POWDER, FOR SOLUTION INTRAMUSCULAR; INTRAVENOUS; PARENTERAL at 16:28

## 2020-07-13 RX ADMIN — PROPOFOL 30 MG: 10 INJECTION, EMULSION INTRAVENOUS at 17:10

## 2020-07-13 RX ADMIN — MORPHINE SULFATE 2 MG: 10 INJECTION INTRAVENOUS at 19:43

## 2020-07-13 RX ADMIN — MORPHINE SULFATE 2 MG: 10 INJECTION INTRAVENOUS at 19:15

## 2020-07-13 RX ADMIN — MORPHINE SULFATE 2 MG: 10 INJECTION INTRAVENOUS at 19:10

## 2020-07-13 RX ADMIN — FENTANYL CITRATE 25 MCG: 50 INJECTION, SOLUTION INTRAMUSCULAR; INTRAVENOUS at 18:20

## 2020-07-13 RX ADMIN — DEXAMETHASONE SODIUM PHOSPHATE 10 MG: 4 INJECTION, SOLUTION INTRAMUSCULAR; INTRAVENOUS at 16:28

## 2020-07-13 RX ADMIN — ACETAMINOPHEN 1000 MG: 500 TABLET ORAL at 15:03

## 2020-07-13 RX ADMIN — FENTANYL CITRATE 25 MCG: 50 INJECTION, SOLUTION INTRAMUSCULAR; INTRAVENOUS at 18:27

## 2020-07-13 RX ADMIN — FENTANYL CITRATE 25 MCG: 50 INJECTION, SOLUTION INTRAMUSCULAR; INTRAVENOUS at 18:07

## 2020-07-13 RX ADMIN — PROPOFOL 20 MG: 10 INJECTION, EMULSION INTRAVENOUS at 16:37

## 2020-07-13 RX ADMIN — HYDROMORPHONE HYDROCHLORIDE 0.2 MG: 1 INJECTION, SOLUTION INTRAMUSCULAR; INTRAVENOUS; SUBCUTANEOUS at 18:49

## 2020-07-13 RX ADMIN — Medication 3 AMPULE: at 15:08

## 2020-07-13 RX ADMIN — FENTANYL CITRATE 25 MCG: 50 INJECTION, SOLUTION INTRAMUSCULAR; INTRAVENOUS at 17:10

## 2020-07-13 RX ADMIN — PROPOFOL 20 MG: 10 INJECTION, EMULSION INTRAVENOUS at 16:27

## 2020-07-13 RX ADMIN — LIDOCAINE HYDROCHLORIDE 100 MG: 20 INJECTION, SOLUTION INTRAVENOUS at 16:23

## 2020-07-13 RX ADMIN — PROPOFOL 10 MG: 10 INJECTION, EMULSION INTRAVENOUS at 16:53

## 2020-07-13 RX ADMIN — MORPHINE SULFATE 2 MG: 10 INJECTION INTRAVENOUS at 19:20

## 2020-07-13 RX ADMIN — MIDAZOLAM HYDROCHLORIDE 2 MG: 1 INJECTION, SOLUTION INTRAMUSCULAR; INTRAVENOUS at 16:18

## 2020-07-13 RX ADMIN — MORPHINE SULFATE 2 MG: 10 INJECTION INTRAVENOUS at 19:02

## 2020-07-13 RX ADMIN — MORPHINE SULFATE 2 MG: 10 INJECTION INTRAVENOUS at 19:32

## 2020-07-13 RX ADMIN — SUCCINYLCHOLINE CHLORIDE 160 MG: 20 INJECTION, SOLUTION INTRAMUSCULAR; INTRAVENOUS at 16:23

## 2020-07-13 RX ADMIN — KETAMINE HYDROCHLORIDE 20 MG: 100 INJECTION, SOLUTION, CONCENTRATE INTRAMUSCULAR; INTRAVENOUS at 16:39

## 2020-07-13 RX ADMIN — HYDROMORPHONE HYDROCHLORIDE 0.2 MG: 1 INJECTION, SOLUTION INTRAMUSCULAR; INTRAVENOUS; SUBCUTANEOUS at 18:32

## 2020-07-13 RX ADMIN — FENTANYL CITRATE 25 MCG: 50 INJECTION, SOLUTION INTRAMUSCULAR; INTRAVENOUS at 16:30

## 2020-07-13 RX ADMIN — FENTANYL CITRATE 25 MCG: 50 INJECTION, SOLUTION INTRAMUSCULAR; INTRAVENOUS at 16:32

## 2020-07-13 RX ADMIN — FENTANYL CITRATE 50 MCG: 50 INJECTION, SOLUTION INTRAMUSCULAR; INTRAVENOUS at 16:23

## 2020-07-13 RX ADMIN — SODIUM CHLORIDE 125 ML/HR: 900 INJECTION, SOLUTION INTRAVENOUS at 18:28

## 2020-07-13 RX ADMIN — PROPOFOL 140 MG: 10 INJECTION, EMULSION INTRAVENOUS at 16:23

## 2020-07-13 RX ADMIN — OXYCODONE 5 MG: 5 TABLET ORAL at 21:42

## 2020-07-13 RX ADMIN — ONDANSETRON HYDROCHLORIDE 4 MG: 2 INJECTION, SOLUTION INTRAMUSCULAR; INTRAVENOUS at 16:40

## 2020-07-13 RX ADMIN — MORPHINE SULFATE 2 MG: 10 INJECTION INTRAVENOUS at 19:55

## 2020-07-13 RX ADMIN — MORPHINE SULFATE 2 MG: 10 INJECTION INTRAVENOUS at 19:26

## 2020-07-13 RX ADMIN — MORPHINE SULFATE 2 MG: 10 INJECTION INTRAVENOUS at 19:37

## 2020-07-13 RX ADMIN — SODIUM CHLORIDE, SODIUM LACTATE, POTASSIUM CHLORIDE, AND CALCIUM CHLORIDE 25 ML/HR: 600; 310; 30; 20 INJECTION, SOLUTION INTRAVENOUS at 15:03

## 2020-07-13 RX ADMIN — FENTANYL CITRATE 25 MCG: 50 INJECTION, SOLUTION INTRAMUSCULAR; INTRAVENOUS at 18:13

## 2020-07-13 RX ADMIN — Medication 1 AMPULE: at 21:35

## 2020-07-13 NOTE — ANESTHESIA POSTPROCEDURE EVALUATION
Procedure(s):  RIGHT KNEE ARTHROSCOPY, PARTIAL MEDIAL MENISCECTOMY. general    Anesthesia Post Evaluation        Patient location during evaluation: PACU  Note status: Adequate. Level of consciousness: responsive to verbal stimuli and sleepy but conscious  Pain management: satisfactory to patient  Airway patency: patent  Anesthetic complications: no  Cardiovascular status: acceptable  Respiratory status: acceptable  Hydration status: acceptable  Comments: +Post-Anesthesia Evaluation and Assessment    Patient: Rosalie Weir MRN: [id number]  SSN: [id number]   YOB: 1965  Age: 54 y.o. Sex: female      Cardiovascular Function/Vital Signs    /83   Pulse 70   Temp 37 °C (98.6 °F)   Resp 13   Ht 5' 8\" (1.727 m)   Wt 127.2 kg (280 lb 6.8 oz)   SpO2 99%   BMI 42.64 kg/m²     Patient is status post Procedure(s):  RIGHT KNEE ARTHROSCOPY, PARTIAL MEDIAL MENISCECTOMY. Nausea/Vomiting: Controlled. Postoperative hydration reviewed and adequate. Pain:  Pain Scale 1: Numeric (0 - 10) (07/13/20 1932)  Pain Intensity 1: 5 (07/13/20 1932)   Managed. Neurological Status:   Neuro (WDL): Exceptions to WDL (07/13/20 1754)   At baseline. Mental Status and Level of Consciousness: Arousable. Pulmonary Status:   O2 Device: Room air (07/13/20 1915)   Adequate oxygenation and airway patent. Complications related to anesthesia: None    Post-anesthesia assessment completed. No concerns. Signed By: Odalys Cartagena MD    7/13/2020  Post anesthesia nausea and vomiting:  controlled      INITIAL Post-op Vital signs:   Vitals Value Taken Time   /80 7/13/2020  7:30 PM   Temp 37 °C (98.6 °F) 7/13/2020  7:15 PM   Pulse 65 7/13/2020  7:33 PM   Resp 13 7/13/2020  7:33 PM   SpO2 100 % 7/13/2020  7:33 PM   Vitals shown include unvalidated device data.

## 2020-07-13 NOTE — DISCHARGE SUMMARY
Date of Admission: 7/13/2020  Date of Discharge: 7/13/2020  Attending on admission and discharge: Dr. Martins [facility]    Admitting Diagnosis: Right knee medial meniscal tear  Discharge Diagnosis: same  Procedure Performed: Right knee arthroscopy, partial medial meniscectomy    Hospital Course and Treatment:     The patient was admitted through the operating room for a same day surgery. The patient tolerated the procedure well and was taken to the recovery floor for further observation and treatment. The patient was maintained on IV fluids until tolerating a PO diet. They were also maintained on sequential compression devices and DVT prophylaxis. The diet was advanced as tolerated. The patient was mobilized  without issue. There were no complications during the course of the hospital stay, and the patient was deemed medically stable for discharge to home. Discharge instructions: The patient should not be in a pool or tub, or otherwise immerse the wound in water. The patient has been counseled on the importance of mobilizing and moving at least every hour to help prevent blood clots. The patient should call Dr. Ivette Garzon office or go to an emergency department if they note a fever over 101.1 degrees fahrenheit, more or unrelieved pain, more or new swelling at the operative site, or any drainage from the wound. Condition at Discharge: Stable    Discharge medications:  Resume regular home medications  Additional medications to be prescribed on discharge  oxcydone      Follow up instructions: The patient should follow up in 2 weeks for a wound check and xrays with Dr. Radha Underwood.

## 2020-07-13 NOTE — OP NOTES
Date of Procedure: 7/13/20  Preoperative Diagnosis: Degenerative, Complex Medial Meniscal Tear, Right Knee  Postoperative Diagnosis: Degenerative, Complex Medial Meniscal Tear, Right Knee  Procedure Performed: Arthroscopically assisted partial Medial Meniscectomy, Right Knee  Surgeon: Quin Crocker D.O. Assistant: none  Anesthesia: General, LMA  Estimated Blood loss: 5 cc  Complications: None  Drains: None  Specimens: None  Implants: None    Indications: This is a 54year old female who did have knee pain and associated mechanical symptoms which were associated with a meniscal tear. She did fail nonoperative measures and requested surgical intervention. We did discuss the risks of surgery which include, but are not limited to, infection, nerve or blood vessel damage, death, disability, progression of arthritis, persistent knee pain, intra or postoperative fracture, ligament injury, stiffness, chondral injury, DVT, PE, wound healing issues, organ dysfunction. The patient did freely consent for surgery. Description of Procedure in Detail:     After the correct site and side were identified by myself in the holding area, the patient was transported to the surgical suite. Then, after successful induction of anesthesia, the right leg was prepped and draped in the usual sterile orthopaedic fashion. After an appropriate time out, and confirmation that antibiotics had been infused within one hour of incision time, the leg was exsanguinated with an Esmarch bandage, and tourniquet taken to 250 mmHg. A standard anterolateral portal was created with a stab incision lateral to the patellar tendon and inferior to the patella. A blunt trochar was then inserted into the suprapatellar pouch and camera inserted. The joint was infused with saline and with that, a standard anteromedial portal was created under direct visualization, spinal needle insertion, and blunt trochar insertion.   A diagnostic inspection of the joint followed. Positive findings were a degenerative medial meniscal tear, grade II and III changes in the medial compartment. Otherwise, the suprapatellar pouch, medial and lateral gutters, patellar cartilage, trochlear cartilage, medial and lateral femoral condyles, medial and lateral tibial plateaus, ACL, and remainder of the menisci were unremarkable. After joint inspection, a combination of biters and sunita were used to trim the medial meniscal tear back to a stable edge. Care was taken to take only what was necessary to ensure relief of the mechanical symptoms, and leave as much healthy tissue as possible. Once the tear was excised, the meniscus was reinspected and found to be satisfactory. With that, final pictures were taken, final inspection was satisfactory. Tourniquet was then released. The camera was returned to the suprapatellar pouch, excess saline was removed. The soft tissues were infused with 0.5% ropivicaine and the joint infused with 5mg of Duramorph. The skin was closed with 3-0 nylon sutures. A sterile dressing and ice were applied. The patient was then successfully awoken and taken to PACU in stable condition without apparent complication. Postoperative plan: The patient will be weight bearing as tolerated without restriction. They will participate in physical therapy for range of motion and decreased swelling. They will have ecotrin for DVT prophylaxis, Percocet for pain management, and colace for bowel maintenance. They will be seen at 2 weeks then six weeks in clinic for the standard postoperative protocol.

## 2020-07-13 NOTE — ANESTHESIA PREPROCEDURE EVALUATION
Anesthetic History   No history of anesthetic complications            Review of Systems / Medical History  Patient summary reviewed, nursing notes reviewed and pertinent labs reviewed    Pulmonary  Within defined limits                 Neuro/Psych         Headaches     Cardiovascular    Hypertension        Dysrhythmias : PVC      Exercise tolerance: <4 METS     GI/Hepatic/Renal     GERD: well controlled           Endo/Other    Diabetes (\"pre\" diabetic)  Hypothyroidism  Morbid obesity and arthritis (DDD thoracic spine, chronic low back pain. Has cervical disc dz, as well.  (has good ROM withhout pain currently))     Other Findings   Comments: Chronic back and neck pain  Knee and joint pains           Physical Exam    Airway  Mallampati: II  TM Distance: 4 - 6 cm  Neck ROM: normal range of motion   Mouth opening: Normal     Cardiovascular    Rhythm: regular  Rate: normal      Pertinent negatives: No murmur   Dental    Dentition: Caps/crowns and Full upper dentures  Comments: Crown x1   Pulmonary  Breath sounds clear to auscultation               Abdominal  GI exam deferred       Other Findings            Anesthetic Plan    ASA: 3  Anesthesia type: general          Induction: Intravenous  Anesthetic plan and risks discussed with: Patient      Cervical disc herniation/pain  Consider glidescope

## 2020-07-13 NOTE — DISCHARGE INSTRUCTIONS
Date of Admission: 7/13/2020  Date of Discharge: 7/13/2020  Attending on admission and discharge: Dr. Estrella Fisher    Admitting Diagnosis: Right knee medial meniscal tear  Discharge Diagnosis: same  Procedure Performed: Right knee arthroscopy, partial medial meniscectomy    Hospital Course and Treatment:     The patient was admitted through the operating room for a same day surgery. The patient tolerated the procedure well and was taken to the recovery floor for further observation and treatment. The patient was maintained on IV fluids until tolerating a PO diet. They were also maintained on sequential compression devices and DVT prophylaxis. The diet was advanced as tolerated. The patient was mobilized  without issue. There were no complications during the course of the hospital stay, and the patient was deemed medically stable for discharge to home. Discharge instructions: The patient should not be in a pool or tub, or otherwise immerse the wound in water. The patient has been counseled on the importance of mobilizing and moving at least every hour to help prevent blood clots. The patient should call Dr. Devang Hernandez office or go to an emergency department if they note a fever over 101.1 degrees fahrenheit, more or unrelieved pain, more or new swelling at the operative site, or any drainage from the wound. Condition at Discharge: Stable    Discharge medications:  Resume regular home medications  Additional medications to be prescribed on discharge  oxycodone      Follow up instructions: The patient should follow up in 2 weeks for a wound check and xrays with Dr. Lucretia Bates.

## 2020-07-13 NOTE — PERIOP NOTES
Handoff Report from Operating Room to PACU    Report received from [address] [place] [address]  and Leigh Lozada CRNA regarding Lucien Guillen. Surgeon(s):  Eryn Bailey DO  And Procedure(s) (LRB):  RIGHT KNEE ARTHROSCOPY, PARTIAL MEDIAL MENISCECTOMY (Right)  confirmed   with dressings and allergiesdiscussed. Anesthesia type, drugs, patient history, complications, estimated blood loss, vital signs, intake and output, and last pain medication were reviewed.

## 2020-07-13 NOTE — PERIOP NOTES
1400- PT'S COVID TEST RESULTS NEG. PT STATES SHE HAS BEEN QUARANTINING SINCE TESTING. PT DENIES S/S OF COVID - NO FEVER, COLD, COUGH, SOB, N/V OR DIARRHEA.

## 2020-07-14 ENCOUNTER — TELEPHONE (OUTPATIENT)
Dept: ORTHOPEDIC SURGERY | Age: 55
End: 2020-07-14

## 2020-07-14 ENCOUNTER — HOME CARE VISIT (OUTPATIENT)
Dept: SCHEDULING | Facility: HOME HEALTH | Age: 55
End: 2020-07-14
Payer: COMMERCIAL

## 2020-07-14 ENCOUNTER — HOME CARE VISIT (OUTPATIENT)
Dept: HOME HEALTH SERVICES | Facility: HOME HEALTH | Age: 55
End: 2020-07-14
Payer: COMMERCIAL

## 2020-07-14 VITALS
HEIGHT: 68 IN | OXYGEN SATURATION: 96 % | HEART RATE: 80 BPM | RESPIRATION RATE: 16 BRPM | WEIGHT: 280.43 LBS | SYSTOLIC BLOOD PRESSURE: 121 MMHG | BODY MASS INDEX: 42.5 KG/M2 | DIASTOLIC BLOOD PRESSURE: 75 MMHG | TEMPERATURE: 98.4 F

## 2020-07-14 VITALS
RESPIRATION RATE: 16 BRPM | DIASTOLIC BLOOD PRESSURE: 82 MMHG | SYSTOLIC BLOOD PRESSURE: 130 MMHG | OXYGEN SATURATION: 97 % | HEART RATE: 82 BPM | TEMPERATURE: 98.5 F

## 2020-07-14 PROCEDURE — 400013 HH SOC

## 2020-07-14 PROCEDURE — 74011250637 HC RX REV CODE- 250/637: Performed by: NURSE PRACTITIONER

## 2020-07-14 PROCEDURE — 99218 HC RM OBSERVATION: CPT

## 2020-07-14 PROCEDURE — 74011250637 HC RX REV CODE- 250/637: Performed by: ORTHOPAEDIC SURGERY

## 2020-07-14 PROCEDURE — 74011250636 HC RX REV CODE- 250/636: Performed by: ORTHOPAEDIC SURGERY

## 2020-07-14 PROCEDURE — 97116 GAIT TRAINING THERAPY: CPT

## 2020-07-14 PROCEDURE — G0151 HHCP-SERV OF PT,EA 15 MIN: HCPCS

## 2020-07-14 PROCEDURE — 97161 PT EVAL LOW COMPLEX 20 MIN: CPT

## 2020-07-14 RX ORDER — AMOXICILLIN 250 MG
1 CAPSULE ORAL DAILY
Qty: 30 TAB | Refills: 0 | Status: SHIPPED
Start: 2020-07-14 | End: 2020-12-15 | Stop reason: ALTCHOICE

## 2020-07-14 RX ORDER — OXYCODONE HYDROCHLORIDE 5 MG/1
5-10 TABLET ORAL
Status: DISCONTINUED | OUTPATIENT
Start: 2020-07-14 | End: 2020-07-14 | Stop reason: HOSPADM

## 2020-07-14 RX ORDER — ACETAMINOPHEN 325 MG/1
650 TABLET ORAL
Status: DISCONTINUED | OUTPATIENT
Start: 2020-07-14 | End: 2020-07-14 | Stop reason: HOSPADM

## 2020-07-14 RX ADMIN — Medication 1 AMPULE: at 09:09

## 2020-07-14 RX ADMIN — OXYCODONE 5 MG: 5 TABLET ORAL at 01:53

## 2020-07-14 RX ADMIN — SODIUM CHLORIDE 125 ML/HR: 900 INJECTION, SOLUTION INTRAVENOUS at 00:36

## 2020-07-14 RX ADMIN — OXYCODONE 5 MG: 5 TABLET ORAL at 09:08

## 2020-07-14 RX ADMIN — HYDROMORPHONE HYDROCHLORIDE 0.5 MG: 1 INJECTION, SOLUTION INTRAMUSCULAR; INTRAVENOUS; SUBCUTANEOUS at 04:09

## 2020-07-14 RX ADMIN — OXYCODONE 5 MG: 5 TABLET ORAL at 05:21

## 2020-07-14 RX ADMIN — Medication 1 LOZENGE: at 01:54

## 2020-07-14 RX ADMIN — Medication 1 LOZENGE: at 08:03

## 2020-07-14 NOTE — PROGRESS NOTES
Oral and Written notification given to patient and/or caregiver informing them that they are currently an Outpatient receiving care in our facility. Outpatient services include Observation Services.      Liss Redding, [address], [address]

## 2020-07-14 NOTE — PROGRESS NOTES
Bedside and verbal shift change report given to Alma Rosa RN (oncoming nurse) by Stoney Hernandez RN (offgoing nurse). Report included the following information SBAR, Kardex, Procedure Summary, Intake/Output, Recent Results and Quality Measures. Patient afebrile and vitals stable. Dressing to right leg clean, dry, and intact. Pain continues to be an issue on overnight. See MAR for given PO and IV medications.

## 2020-07-14 NOTE — ROUTINE PROCESS
Reviewed discharge instructions with patient, all questions answered. IV access removed. Security to return patients locked up items. Crutches given to patient by primary nurse Dion Nuñez RN. Instructions and belongings packed up by patient.       Rehka Larson RN

## 2020-07-14 NOTE — TELEPHONE ENCOUNTER
Rcvd call from [place]. States that she saw pt for initial eval today. States that she will see patient again this Friday. She will not be seeing her anymore after that as pt is planning on returning to work Monday. Says we can call her directly with any questions. Do you want me to go ahead and set her up for outpatient PT next week?

## 2020-07-14 NOTE — PROGRESS NOTES
Problem: Falls - Risk of  Goal: *Absence of Falls  Description: Document Rico Mayur Fall Risk and appropriate interventions in the flowsheet.   Outcome: Progressing Towards Goal  Note: Fall Risk Interventions:            Medication Interventions: Evaluate medications/consider consulting pharmacy, Patient to call before getting OOB, Teach patient to arise slowly         History of Falls Interventions: Evaluate medications/consider consulting pharmacy         Problem: Patient Education: Go to Patient Education Activity  Goal: Patient/Family Education  Outcome: Progressing Towards Goal

## 2020-07-14 NOTE — PROGRESS NOTES
PHYSICAL THERAPY EVALUATION/DISCHARGE  Patient: Kallie Garza (21 y.o. female)  Date: 7/14/2020  Primary Diagnosis: Complex tear of medial meniscus of right knee, sequela [S83.231S]  Complex tear of meniscus of right knee, unspecified meniscus, unspecified whether old or current tear, sequela [S83.203S]  Procedure(s) (LRB):  RIGHT KNEE ARTHROSCOPY, PARTIAL MEDIAL MENISCECTOMY (Right) 1 Day Post-Op   Precautions:   WBAT      ASSESSMENT  Based on the objective data described below, the patient presents with increased pain levels however good strength, intact balance, and overall independent/modified independent functional mobility. Pt tolerated therapy session well, mobilizing with use of bilateral axillary crutches at a modified independent level. No LOB/balance deficits noted. Pt is safe to discharge home w/ assist of sister and continued use of crutches. Per pt, MD has ordered HHPT x1 week at discharge therefore recommend HHPT. .    Functional Outcome Measure: The patient scored 90/100 on the Barthel Index outcome measure which is indicative of mild impairments in ADLs and functional mobility. Other factors to consider for discharge: pain     Further skilled acute physical therapy is not indicated at this time. PLAN :  Recommendation for discharge: (in order for the patient to meet his/her long term goals)  HH PT - pt states MD ordered [facility] PT for x1 wk and then will transition to OP PT    This discharge recommendation:  Has been made in collaboration with the attending provider and/or case management    IF patient discharges home will need the following DME: axillary crutches       SUBJECTIVE:   Patient stated It just feels TIGHT and stiff.     OBJECTIVE DATA SUMMARY:   HISTORY:    Past Medical History:   Diagnosis Date    Allergic rhinitis 5/27/2010    Arthritis     Baker cyst, left 10/2019    Dr. Zahcariah Watt cyst, right     Cervical disc herniation 01/2010    C3-4, C4-5, C5-6. Hemangioma body of C5. Dr. Rick Nava.  Chest pain 2011, 2012    Dr. Marguerite Babb. Dr. Nicholas Farley; denies CP as of 3/27/14    Chronic lower back pain , 2016    thoracic DDD and spondylosis. DDD L3-S1. Dr. Tatiana Landrum. Herminia Moser. Dr. Lizbeth Bingham, [facility]. Dr. Anh Vazquez.  Chronic meniscal tear of knee     Chronic pain     lower back and knees    Esophagitis, reflux 4/3/2011    Gallstone     Gastritis 2010    Dr. Vic Benavides.  Gastrointestinal food allergy 2014    Multiple. Dr. Chava Norman.    Heart palpitations 2012    DUE TO PAC'S AND PVC'S. Dr. Ruthy Cintron.  Heel spur, left 2019    Hiatal hernia 4/3/2011    HTN (hypertension)     Impaired glucose tolerance 10/15/2010    Incidental lung nodule 13    LLL 3.9 mm, RML 3.1 mm;  as of 3/27/14 per pt stable w/o growth    Insomnia     Iron deficiency anemia 2010    Irritable bowel syndrome (IBS) 2014    Dr. Joanna Acharya.    Knee pain     Right. due to severe OA. / chipped bone     Left foot pain 2019    Dr. Castillo Helms.  Metatarsal bone fracture     Left fifth.  Migraine 2011    Dr. Addy Bautista Peroneal tendonitis of lower leg 2019    Left. Dr. Castillo Helms    Radiculopathy, cervical 2010    Left. Dr. Rick Nava.  Shoulder pain, right     due to [address] X 2. Dr. Ny Cao.  Thyroid nodule     6 nodules- Dr. Carina Umanzor    Toe fracture, left     fifth toe.  Triangular fibrocartilage complex tear     Dr. Dimitry Veronica. Left. Past Surgical History:   Procedure Laterality Date    HX  SECTION  1995    x 1    HX CHOLECYSTECTOMY      HX COLONOSCOPY  14    Dr. Neto Posey; 14    due to abnormal PAP.  HX DILATION AND CURETTAGE      due to miscarriage.     HX ENDOSCOPY      HX HEART CATHETERIZATION      no stents    HX ORTHOPAEDIC Right 2016 LUMBAR L4-5, L5-S1 ELLY. Dr. Willo Bence       Prior level of function: Independent w/ ambulation and ADLs. Denies history of falls. Works full-time. Still driving. Lives w/ sister who will assist at discharge. Personal factors and/or comorbidities impacting plan of care:     Home Situation  Home Environment: Private residence  # Steps to Enter: 0  One/Two Story Residence: One story  Living Alone: No  Support Systems: Family member(s)(sister)  Patient Expects to be Discharged to[de-identified] Private residence  Current DME Used/Available at Home: [address], [place], straight, Walker, rollator  Tub or Shower Type: Tub/Shower combination    EXAMINATION/PRESENTATION/DECISION MAKING:   Critical Behavior:              Hearing: Auditory  Auditory Impairment: None  Skin:  Incision clean, dry, intact  Edema: none noted   Range Of Motion:  AROM: Generally decreased, functional                       Strength:    Strength: Within functional limits                    Tone & Sensation:   Tone: Normal              Sensation: Intact               Coordination:  Coordination: Within functional limits  Vision:      Functional Mobility:  Bed Mobility:  Rolling: Independent  Supine to Sit: Independent     Scooting: Independent  Transfers:  Sit to Stand: Independent  Stand to Sit: Independent        Bed to Chair: Modified independent              Balance:   Sitting: Intact  Standing: Intact; With support(axillary crutches )  Ambulation/Gait Training:  Distance (ft): 160 Feet (ft)  Assistive Device: Gait belt;Crutches  Ambulation - Level of Assistance: Modified independent        Gait Abnormalities: Decreased step clearance              Speed/Emy: Pace decreased (<100 feet/min)                     Functional Measure:  Barthel Index:    Bathin  Bladder: 10  Bowels: 10  Groomin  Dressing: 10  Feeding: 10  Mobility: 15  Stairs: 0  Toilet Use: 10  Transfer (Bed to Chair and Back): 15  Total: 90/100       The Barthel ADL Index: Guidelines  1. The index should be used as a record of what a patient does, not as a record of what a patient could do. 2. The main aim is to establish degree of independence from any help, physical or verbal, however minor and for whatever reason. 3. The need for supervision renders the patient not independent. 4. A patient's performance should be established using the best available evidence. Asking the patient, friends/relatives and nurses are the usual sources, but direct observation and common sense are also important. However direct testing is not needed. 5. Usually the patient's performance over the preceding 24-48 hours is important, but occasionally longer periods will be relevant. 6. Middle categories imply that the patient supplies over 50 per cent of the effort. 7. Use of aids to be independent is allowed. Barak Neil., Barthel, DTysonW. (7171). Functional evaluation: the Barthel Index. [facility] (14)2. TOSHIA rBambila, Ivy Gómez., Prosper Catalan., [place], [address] [place] [address] Ave (1999). Measuring the change indisability after inpatient rehabilitation; comparison of the responsiveness of the Barthel Index and Functional [place] Measure. Journal of Neurology, Neurosurgery, and Psychiatry, 66(4), 940-787. Luann Pineda, N.J.A, LEONIDAS Stewart, & Joel Partida M.A. (2004.) Assessment of post-stroke quality of life in cost-effectiveness studies: The usefulness of the Barthel Index and the EuroQoL-5D.  Quality of Life Research, 15, 546-94          Physical Therapy Evaluation Charge Determination   History Examination Presentation Decision-Making   MEDIUM  Complexity : 1-2 comorbidities / personal factors will impact the outcome/ POC  MEDIUM Complexity : 3 Standardized tests and measures addressing body structure, function, activity limitation and / or participation in recreation  MEDIUM Complexity : Evolving with changing characteristics  MEDIUM Complexity : FOTO score of 26-74      Based on the above components, the patient evaluation is determined to be of the following complexity level: MEDIUM    Pain Rating:  3/10 pain in R knee    Activity Tolerance:   Good  Please refer to the flowsheet for vital signs taken during this treatment. After treatment patient left in no apparent distress:   Sitting in chair and Call bell within reach    COMMUNICATION/EDUCATION:   The patients plan of care was discussed with: Registered nurse, Physician and Case management. Fall prevention education was provided and the patient/caregiver indicated understanding., Patient/family have participated as able in goal setting and plan of care. and Patient/family agree to work toward stated goals and plan of care.     Thank you for this referral.  Maxi Dean, PT, DPT   Time Calculation: 22 mins

## 2020-07-14 NOTE — PERIOP NOTES
TRANSFER - OUT REPORT:    Verbal report given to Lex Leal RN (name) on [facility]  being transferred to [facility](unit) for routine post - op       Report consisted of patients Situation, Background, Assessment and   Recommendations(SBAR). Information from the following report(s) SBAR, Kardex, OR Summary, Intake/Output and MAR was reviewed with the receiving nurse. Opportunity for questions and clarification was provided.       Patient transported with:   O2 @ 2 liters  Registered Nurse

## 2020-07-15 ENCOUNTER — PATIENT OUTREACH (OUTPATIENT)
Dept: OTHER | Age: 55
End: 2020-07-15

## 2020-07-15 NOTE — PROGRESS NOTES
[facility] progress note    Patient eligible for Marcel Amber Rahman 994 care management    [facility]14 meniscus tear, right knee. Contacted patient to discuss post discharge needs and offer care management services. Two patient identifiers verified. Discussed the care management program.  Patient agrees to care management services at this time. PMH:   Past Medical History:   Diagnosis Date    Allergic rhinitis 5/27/2010    Arthritis     Baker cyst, left 10/2019    Dr. Alonso Glass cyst, right     Cervical disc herniation 01/2010    C3-4, C4-5, C5-6. Hemangioma body of C5. Dr. Barbara Alvarado.  Chest pain 08/2011, 02/2012    Dr. Zhanna Zaidi. Dr. Ke Montenegro; denies CP as of 3/27/14    Chronic lower back pain 2010, 03/2016    thoracic DDD and spondylosis. DDD L3-S1. Dr. Tian Garcia. Kelvin Gates. Dr. Willa Muñoz, [facility]. Dr. Kumar Yeboah.  Chronic meniscal tear of knee     Chronic pain     lower back and knees    Esophagitis, reflux 4/3/2011    Gallstone     Gastritis 5/27/2010    Dr. Sara Matias.  Gastrointestinal food allergy 03/2014    Multiple. Dr. Prosper Jacques.    Heart palpitations 02/2012    DUE TO PAC'S AND PVC'S. Dr. Violet Martínez.  Heel spur, left 02/2019    Hiatal hernia 4/3/2011    HTN (hypertension) 1987    Impaired glucose tolerance 10/15/2010    Incidental lung nodule 01/08/13    LLL 3.9 mm, RML 3.1 mm;  as of 3/27/14 per pt stable w/o growth    Insomnia 1990s    Iron deficiency anemia 5/27/2010    Irritable bowel syndrome (IBS) 03/2014    Dr. Bisi Villasenor.    Knee pain 2012    Right. due to severe OA. / chipped bone     Left foot pain 08/2019    Dr. Tere Wright.  Metatarsal bone fracture 1990    Left fifth.  Migraine 2/22/2011    Dr. Madina Mayo Peroneal tendonitis of lower leg 08/07/2019    Left. Dr. Tere Wright    Radiculopathy, cervical 01/2010    Left. Dr. Barbara Alvarado.     Shoulder pain, right 2012 due to [address] X 2. Dr. Diane Fleming.  Thyroid nodule 2011    6 nodules- Dr. Vigil Aracely    Toe fracture, left 2008    fifth toe.  Triangular fibrocartilage complex tear 2012    Dr. Osvaldo Garza. Left. Social History:   Social History     Socioeconomic History    Marital status:      Spouse name: Not on file    Number of children: 3    Years of education: Not on file    Highest education level: Not on file   Occupational History    Occupation: Medical assistant     Employer: [address] [place] [address] Financial resource strain: Not on file    Food insecurity     Worry: Not on file     Inability: Not on file    Transportation needs     Medical: Not on file     Non-medical: Not on file   Tobacco Use    Smoking status: Never Smoker    Smokeless tobacco: Never Used   Substance and Sexual Activity    Alcohol use: No    Drug use: Never    Sexual activity: Yes     Partners: Male   Lifestyle    Physical activity     Days per week: Not on file     Minutes per session: Not on file    Stress: Not on file   Relationships    Social connections     Talks on phone: Not on file     Gets together: Not on file     Attends Jehovah's witness service: Not on file     Active member of club or organization: Not on file     Attends meetings of clubs or organizations: Not on file     Relationship status: Not on file    Intimate partner violence     Fear of current or ex partner: Not on file     Emotionally abused: Not on file     Physically abused: Not on file     Forced sexual activity: Not on file   Other Topics Concern    Not on file   Social History Narrative    Not on file         Patient's primary care provider relationship reviewed with patient and modified, as applicable. Care management assessment completed:    Orthopedic Condition Focused Assessment    Skin- any open wounds or incisions?  Yes, covered with bandaid  Description and location of wound- right knee  Have you recently had any of the following? Any recent changes in activity yes  Does patient have incentive spirometer? no  If yes, how frequently is patient using incentive spirometer? n/a  Mobility or assistive device in place yes  DME needs addressed yes - crutches and can  PT/OT/ST ordered yes - PT  Pain rated as 6/10  described as sharp, throbbing with movement  Numbness or tingling no  Weakness no  Trouble with coordinating your movement, or change in gait yes - crutches   New or worsening pain to calf, back of knee or groin no   Redness, swelling to leg or groin no - doing ankle pumps  Fever no  Nausea no  Vomiting no  Chills or clammy skin no  Change in urine output no  Recent change in urinary or bowel continence no  Change in speech no  Difficulty swallowing no  Dizziness/lightheadedness no  Sleep disturbance no     Visual changes no  Medication changes? yes    Red Flags: The patient should call Dr. Jimmye Dakin office or go to an emergency department if they note a fever over 101.1 degrees fahrenheit, more or unrelieved pain, more or new swelling at the operative site, or any drainage from the wound. Medications:  New Medications at Discharge: oxycodone 1 tab every 4 hours for pain, pericolace prn  Changed Medications at Discharge: none  Discontinued Medications at Discharge: none  Current Outpatient Medications   Medication Sig    senna-docusate (PERICOLACE) 8.6-50 mg per tablet Take 1 Tab by mouth daily.  oxyCODONE IR (ROXICODONE) 5 mg immediate release tablet Take 1 Tab by mouth every four (4) hours as needed for Pain for up to 7 days. Max Daily Amount: 30 mg.    ergocalciferol (ERGOCALCIFEROL) 1,250 mcg (50,000 unit) capsule Take 1 Cap by mouth every seven (7) days. Indications: low vitamin D levels    amitriptyline (ELAVIL) 25 mg tablet Take 1 Tab by mouth nightly. Indications: pain    Blood Pressure Monitor kit 1 Device by Does Not Apply route daily.     propranolol LA (INDERAL LA) 80 mg SR capsule Take 1 Cap by mouth daily. Indications: migraine prevention    valsartan-hydroCHLOROthiazide (DIOVAN-HCT) 80-12.5 mg per tablet Take 1 Tab by mouth daily. Indications: high blood pressure    omeprazole (PRILOSEC) 20 mg capsule TAKE 1 CAPSULE BY MOUTH TWICE DAILY  Indications: heartburn    diclofenac (VOLTAREN) 1 % gel Apply  to affected area four (4) times daily.  acetaminophen (TYLENOL ARTHRITIS PAIN) 650 mg CR tablet Take 1,300 mg by mouth three (3) times daily. No current facility-administered medications for this visit. There are no discontinued medications. Performed medication reconciliation with patient, and patient verbalizes understanding of administration of home medications. There were no barriers to obtaining medications identified at this time. Preventive Care     Health Maintenance   Topic Date Due    Shingrix Vaccine Age 49> (1 of 2) 03/31/2015    Breast Cancer Screen Mammogram  03/31/2015    PAP AKA CERVICAL CYTOLOGY  10/15/2015    Influenza Age 9 to Adult  08/01/2020    A1C test (Diabetic or Prediabetic)  03/09/2021    Colonoscopy  03/31/2024    Lipid Screen  07/09/2024    Bone Densitometry (Dexa) Screening  03/31/2030    DTaP/Tdap/Td series (5 - Td) 04/13/2030    Hepatitis C Screening  Completed    Pneumococcal 0-64 years  Aged Out         Healthy Habits    Nutrition: Regluar    Movement: Not now with knee issues. CM Identified  Problems   1. Risk for altered elimination  2. Impaired Mobility  3.  Potential Knowledge Deficit    Goals      Completes all follow-up appointments       Educate and encourage importance of FU for prevention of complications or disease;   Assess the patient's relationship with a PCP and next FU visit scheduled;  o Had check up with PCP in the last month  o Has follow up scheduled for 8/3 with ortho   Discuss importance of adherence to treatment plan and follow up visits;   Identify any barriers in transportation or access to FU appointments.  Assist patient with making FU appointments as needed;    It's also a good idea to know your test results.  Keep a list of the medicines you take.  Demonstrates safety measures to decrease fall risk       Discussed removal of scatter rugs   Avoid stretching cords across the floor   Working with PT at home      [employer] measures to prevent constipation       Discussed importance in taking colace while on pain medications   Encouraged activity, as much as she can   Encouraged plenty of water and fiber               Goals Patient states: \" I want to walk normal, be pain free. \"  Discussed importance of f/up and reviewed d/c instructions. Barriers/Support system:  patient and sister    Home health/DME in place per discharge orders - PT    Barriers/Challenges to Care: []  Decline in memory    []  Language barrier     []  Emotional                  []  Limited mobility  []  Lack of motivation     [] Vision, hearing or cognitive impairment []  Knowledge [] Financial Barriers []  Lack of support  []  Pain []  Other [x]  None    PCP/Specialist follow up: Patient scheduled to follow up with Dr. Margaret Laurent on 8/3. Future Appointments   Date Time Provider Department Center   7/17/2020 To Be Determined Houston Dumont,  [address]   8/3/2020  3:45 PM DO Tamara Tanner [facility].      Reviewed red flags with patient, and patient verbalizes understanding. Patient given an opportunity to ask questions. No other clinical/social/functional needs noted. The patient agrees to contact the PCP office for questions related to their healthcare. The patient expressed thanks, offered no additional questions and ended the call. Plan for next call: Will call in one week, 7/22/20.

## 2020-07-16 ENCOUNTER — HOME CARE VISIT (OUTPATIENT)
Dept: SCHEDULING | Facility: HOME HEALTH | Age: 55
End: 2020-07-16
Payer: COMMERCIAL

## 2020-07-16 ENCOUNTER — HOME CARE VISIT (OUTPATIENT)
Dept: HOME HEALTH SERVICES | Facility: HOME HEALTH | Age: 55
End: 2020-07-16
Payer: COMMERCIAL

## 2020-07-16 VITALS
SYSTOLIC BLOOD PRESSURE: 150 MMHG | DIASTOLIC BLOOD PRESSURE: 84 MMHG | TEMPERATURE: 98.8 F | HEART RATE: 78 BPM | RESPIRATION RATE: 18 BRPM | OXYGEN SATURATION: 98 %

## 2020-07-16 DIAGNOSIS — S83.231D COMPLEX TEAR OF MEDIAL MENISCUS OF RIGHT KNEE AS CURRENT INJURY, SUBSEQUENT ENCOUNTER: Primary | ICD-10-CM

## 2020-07-16 PROCEDURE — G0151 HHCP-SERV OF PT,EA 15 MIN: HCPCS

## 2020-07-21 ENCOUNTER — OFFICE VISIT (OUTPATIENT)
Dept: ORTHOPEDIC SURGERY | Age: 55
End: 2020-07-21

## 2020-07-21 VITALS
OXYGEN SATURATION: 100 % | SYSTOLIC BLOOD PRESSURE: 150 MMHG | HEART RATE: 95 BPM | BODY MASS INDEX: 42.44 KG/M2 | TEMPERATURE: 97 F | DIASTOLIC BLOOD PRESSURE: 96 MMHG | HEIGHT: 68 IN | WEIGHT: 280 LBS

## 2020-07-21 DIAGNOSIS — S83.231D COMPLEX TEAR OF MEDIAL MENISCUS OF RIGHT KNEE AS CURRENT INJURY, SUBSEQUENT ENCOUNTER: Primary | ICD-10-CM

## 2020-07-21 RX ORDER — CEPHALEXIN 250 MG/1
500 CAPSULE ORAL 3 TIMES DAILY
Qty: 30 CAP | Refills: 0 | Status: SHIPPED | OUTPATIENT
Start: 2020-07-21 | End: 2020-12-15 | Stop reason: ALTCHOICE

## 2020-07-21 NOTE — PROGRESS NOTES
is here for a follow up visit from a right knee arthroscopy. Pain has been appropriate since surgery, no major medical complications since surgery. She has had some lateral portal drainage. Current Outpatient Medications on File Prior to Visit   Medication Sig Dispense Refill    senna-docusate (PERICOLACE) 8.6-50 mg per tablet Take 1 Tab by mouth daily. 30 Tab 0    ergocalciferol (ERGOCALCIFEROL) 1,250 mcg (50,000 unit) capsule Take 1 Cap by mouth every seven (7) days. Indications: low vitamin D levels 5 Cap 5    amitriptyline (ELAVIL) 25 mg tablet Take 1 Tab by mouth nightly. Indications: pain 30 Tab 11    Blood Pressure Monitor kit 1 Device by Does Not Apply route daily. 1 Kit 0    propranolol LA (INDERAL LA) 80 mg SR capsule Take 1 Cap by mouth daily. Indications: migraine prevention 90 Cap 3    valsartan-hydroCHLOROthiazide (DIOVAN-HCT) 80-12.5 mg per tablet Take 1 Tab by mouth daily. Indications: high blood pressure 90 Tab 3    omeprazole (PRILOSEC) 20 mg capsule TAKE 1 CAPSULE BY MOUTH TWICE DAILY  Indications: heartburn 60 Cap 5    diclofenac (VOLTAREN) 1 % gel Apply  to affected area four (4) times daily. (Patient taking differently: Apply 2 g to affected area four (4) times daily. Apply thin layer to affected area) 1 Each 2    acetaminophen (TYLENOL ARTHRITIS PAIN) 650 mg CR tablet Take 1,300 mg by mouth three (3) times daily.  [] oxyCODONE IR (ROXICODONE) 5 mg immediate release tablet Take 1 Tab by mouth every four (4) hours as needed for Pain for up to 7 days. Max Daily Amount: 30 mg. 42 Tab 0     No current facility-administered medications on file prior to visit.         ROS:  General: denies agitation, major chest pain, unexpected weakness  Patient states no issues  Skin: healing wound is draining slightly from lateral portal  Strength: appropriate weakness of involved extremity is resolving since surgery      Physical Examination:    Blood pressure (!) 150/96, pulse 95, temperature 97 °F (36.1 °C), temperature source Tympanic, height 5' 8\" (1.727 m), weight 280 lb (127 kg), SpO2 100 %.     Dressing: band-aid, dry  Skin: small area of skin edge necrosis on lateral portal  Sensation intact to light touch at level of wound and distally  Strength is not tested  Range of motion is not tested  Distal swelling is noted, but appropriate for postoperative course  Distal capillary refill less than 2 seconds      Imaging:    Postoperative imaging: none      Assessment: Status post knee arthroscopy with mild skin necrosis    Plan:  Patient will continue physical therapy  Wound care was reviewed  Weightbearing will be as tolerated through the leg  Deep Venous Thrombosis Prophylaxis: ecotrin  I will place her on prophylactic antibiotics  Follow up will be at 1 weeks from now,  no xrays on follow up

## 2020-07-21 NOTE — PROGRESS NOTES
Identified pt with two pt identifiers (name and ). Reviewed chart in preparation for visit and have obtained necessary documentation. Thao Zafar is a 54 y.o. female  Chief Complaint   Patient presents with    Surgical Follow-up     RT knee drainage     Visit Vitals  BP (!) 150/96 (BP 1 Location: Left arm, BP Patient Position: Sitting)   Pulse 95   Temp 97 °F (36.1 °C) (Tympanic)   Ht 5' 8\" (1.727 m)   Wt 280 lb (127 kg)   SpO2 100%   BMI 42.57 kg/m²     1. Have you been to the ER, urgent care clinic since your last visit? Hospitalized since your last visit? No    2. Have you seen or consulted any other health care providers outside of the [address] [place] [address] since your last visit? Include any pap smears or colon screening.  No

## 2020-07-22 ENCOUNTER — PATIENT OUTREACH (OUTPATIENT)
Dept: OTHER | Age: 55
End: 2020-07-22

## 2020-07-22 ENCOUNTER — HOSPITAL ENCOUNTER (OUTPATIENT)
Dept: PHYSICAL THERAPY | Age: 55
Discharge: HOME | End: 2020-07-22
Payer: COMMERCIAL

## 2020-07-22 PROCEDURE — 97016 VASOPNEUMATIC DEVICE THERAPY: CPT | Performed by: PHYSICAL THERAPIST

## 2020-07-22 PROCEDURE — 97162 PT EVAL MOD COMPLEX 30 MIN: CPT | Performed by: PHYSICAL THERAPIST

## 2020-07-22 PROCEDURE — 97110 THERAPEUTIC EXERCISES: CPT | Performed by: PHYSICAL THERAPIST

## 2020-07-22 NOTE — PROGRESS NOTES
[address] [address] (MOB IV), [address] [facility] Juan Cool  Phone: [phone] Fax: [phone]    Plan of Care/Statement of Necessity for Physical Therapy Services  2-15    Patient name: Irene Vila  : 1965  Provider#: [id number]  Referral source: Halie Bobby DO      Medical/Treatment Diagnosis: Right knee pain [M25.561]     Prior Hospitalization: see medical history     Comorbidities: allergies, arthritis, back pain, BMI over 30, HTN, osteoporosis, prior surgery  Prior Level of Function: 20 min of exercise seldom or never  Medications: Verified on Patient Summary List    Start of Care: 20      Onset Date: s/p right medial meniscectomy       The Plan of Care and following information is based on the information from the initial evaluation. Assessment/ key information: The patient presents with a chief complaint of right knee pain, edema, decreased strength, and decreased ROM that is consistent with being s/p right knee medial meniscectomy. The patient is weightbearing as tolerated, slowly weaning off of one crutch, and has recently returned to work, but is sitting for an hour at a time and is able to prop her leg up. The patient will benefit from guided therapeutic interventions such as therex for strengthening and neuromuscular re-eduction, manual techniques for joint mobility and soft tissue extensibility, and modalities for pain management in order to improve functional mobility with daily activities. Evaluation Complexity History HIGH Complexity :3+ comorbidities / personal factors will impact the outcome/ POC ; Examination MEDIUM Complexity : 3 Standardized tests and measures addressing body structure, function, activity limitation and / or participation in recreation  ;Presentation MEDIUM Complexity : Evolving with changing characteristics  ; Clinical Decision Making MEDIUM Complexity : FOTO score of 26-74  Overall Complexity Rating: MEDIUM    Problem List: pain affecting function, decrease ROM, decrease strength, edema affecting function, impaired gait/ balance, decrease ADL/ functional abilitiies, decrease activity tolerance, decrease flexibility/ joint mobility and decrease transfer abilities   Treatment Plan may include any combination of the following: Therapeutic exercise, Therapeutic activities, Neuromuscular re-education, Physical agent/modality, Gait/balance training, Manual therapy, Patient education, Self Care training, Functional mobility training, Home safety training and Stair training  Patient / Family readiness to learn indicated by: asking questions, trying to perform skills and interest  Persons(s) to be included in education: patient (P)  Barriers to Learning/Limitations: None  Patient Goal (s): to walk normal again  Patient Self Reported Health Status: good  Rehabilitation Potential: good    Short Term Goals: To be accomplished in 2 treatments:                         1.) The patient will be independent with their HEP consistently for at least one week. Long Term Goals: To be accomplished in 16 treatments:                         1.) The patient will have at most 2/10 pain with daily activities. 2.) The patient will be able to ambulate for at least 40 min without having to change positions due to pain. 3.) The patient will improve their FOTO score from 50 to at least 60 to show improvements in functional mobility. Frequency / Duration: Patient to be seen 2 times per week for 16 treatments. Patient/ Caregiver education and instruction: self care, activity modification, brace/ splint application and exercises    [x]  Plan of care has been reviewed with FINESSE August, PT 7/22/2020     ________________________________________________________________________    I certify that the above Therapy Services are being furnished while the patient is under my care.  I agree with the treatment plan and certify that this therapy is necessary.     [de-identified] Signature:____________________  Date:____________Time: _________

## 2020-07-22 NOTE — PROGRESS NOTES
Attempt to reach patient for follow up. Discreet VM left with contact information. Will try again next week, 7/29.

## 2020-07-22 NOTE — PROGRESS NOTES
PT INITIAL EVALUATION NOTE 2-15    Patient Name: Lucero Pabon  Date:2020  : 1965  [x]  Patient  Verified  Payor: [employer] [place] / Plan: [facility] MEDICAL [address] / Product Type: Commerical /    In time: 3:00pm  Out time: 4:05pm  Total Treatment Time (min): 65  Visit #: 1     Treatment Area: Right knee pain [M25.561]    SUBJECTIVE  Pain Level (0-10 scale): 4 (3-10/10)  Any medication changes, allergies to medications, adverse drug reactions, diagnosis change, or new procedure performed?: [] No    [x] Yes (see summary sheet for update)  Subjective: The right knee started to bother her around February (successfully rehabbed the left knee last Fall, but knees were identical with an MRI). She had a medial meniscectomy on 20 on the right side. She started work on Monday, but gets up every hour and elevates her leg on the trash can. She can only be on her feet for 30 minutes. OBJECTIVE/EXAMINATION  Posture:  Scapular protraction bilaterally  Other Observations:  Incisions C/D/I  Gait and Functional Mobility:  Slight antalgic; short step through  Palpation: tender around joint line    A/PROM Right Knee: Flex 110/115  Ext -25/0  A/PROM Left Knee: Flex 120/125  Ext 0/0    Joint Mobility Assessment: hypomobile right knee    Flexibility: tight calves/hamstrings    LOWER QUARTER   MUSCLE STRENGTH  KEY       R  L  0 - No Contraction  Knee ext  2+  5  1 - Trace            flex  3+  5  2 - Poor   Hip ext   nt  nt  3 - Fair          flex   4  4  4 - Good         abd  3+  3+  5 - Normal         add  nt  nt      Ankle DF  nt  nt                PF  nt  nt                INV  nt  nt                EV  nt  nt      Modality rationale: decrease edema, decrease inflammation and decrease pain to improve the patients ability to perform daily activities.     Min Type Additional Details    [] Estim: []Att   []Unatt        []TENS instruct                  []IFC  []Premod   []NMES []Other:  []w/US   []w/ice   []w/heat  Position:  Location:    []  Traction: [] Cervical       []Lumbar                       [] Prone          []Supine                       []Intermittent   []Continuous Lbs:  [] before manual  [] after manual  []w/heat    []  Ultrasound: []Continuous   [] Pulsed at:                            []1MHz   []3MHz Location:  W/cm2:    []  Paraffin         Location:  []w/heat    []  Ice     []  Heat  []  Ice massage Position:  Location:    []  Laser  []  Other: Position:  Location:   10 [x]  Vasopneumatic Device Pressure:       [] lo [x] med [] hi   Temperature: 34   [x] Skin assessment post-treatment:  [x]intact []redness- no adverse reaction    []redness  adverse reaction:     20 min Therapeutic Exercise:  [x] See flow sheet :   Rationale: increase ROM, increase strength and improve coordination to improve the patients ability to perform daily activities.            With   [x] TE   [] TA   [] neuro   [] other: Patient Education: [x] Review HEP    [x] Progressed/Changed HEP based on:   [x] positioning   [x] body mechanics   [] transfers   [] heat/ice application    [] other:        Other Objective/Functional Measures: FOTO=50    Pain Level (0-10 scale) post treatment: 3      ASSESSMENT:      [x]  See Plan of [facility], PT 7/22/2020

## 2020-07-27 ENCOUNTER — HOSPITAL ENCOUNTER (OUTPATIENT)
Dept: PHYSICAL THERAPY | Age: 55
Discharge: HOME | End: 2020-07-27
Payer: COMMERCIAL

## 2020-07-27 PROCEDURE — 97140 MANUAL THERAPY 1/> REGIONS: CPT | Performed by: PHYSICAL THERAPIST

## 2020-07-27 PROCEDURE — 97016 VASOPNEUMATIC DEVICE THERAPY: CPT | Performed by: PHYSICAL THERAPIST

## 2020-07-27 PROCEDURE — 97110 THERAPEUTIC EXERCISES: CPT | Performed by: PHYSICAL THERAPIST

## 2020-07-27 NOTE — PROGRESS NOTES
PT DAILY TREATMENT NOTE 2-15    Patient Name: Kim Bolus  Date:2020  : 1965  [x]  Patient  Verified  Payor: [employer] [place] / Plan: [address] / Product Type: Commerical /    In time: 5:15pm  Out time: 6:27pm  Total Treatment Time (min): 72  Visit #:  2    Treatment Area: Right knee pain [M25.561]    SUBJECTIVE  Pain Level (0-10 scale): 4  Any medication changes, allergies to medications, adverse drug reactions, diagnosis change, or new procedure performed?: [x] No    [] Yes (see summary sheet for update)  Subjective functional status/changes:   [] No changes reported  The patient reports she stopped using the crutch on Friday. It's been feeling okay, she thinks the swelling has decreased.      OBJECTIVE    Modality rationale: decrease edema, decrease inflammation and decrease pain to improve the patients ability to perform daily activities   Min Type Additional Details       [] Estim: []Att   []Unatt    []TENS instruct                  []IFC  []Premod   []NMES                     []Other:  []w/US   []w/ice   []w/heat  Position:  Location:       []  Traction: [] Cervical       []Lumbar                       [] Prone          []Supine                       []Intermittent   []Continuous Lbs:  [] before manual  [] after manual  []w/heat    []  Ultrasound: []Continuous   [] Pulsed                       at: []1MHz   []3MHz Location:  W/cm2:    [] Paraffin         Location:   []w/heat    []  Ice     []  Heat  []  Ice massage Position:  Location:    []  Laser  []  Other: Position:  Location:   15   [x]  Vasopneumatic Device Pressure:       [] lo [x] med [] hi   Temperature: 34     [x] Skin assessment post-treatment:  [x]intact []redness- no adverse reaction    []redness  adverse reaction:         47 min Therapeutic Exercise:  [x] See flow sheet :   Rationale: increase ROM, increase strength and improve coordination to improve the patients ability to perform daily activities. 10 min Manual Therapy:  Tibial distraction and mobs; patellar mobs; PROM    Rationale: decrease pain, increase ROM and increase tissue extensibility  to improve the patients ability to perform daily activities. With   [x] TE   [] TA   [] neuro   [] other: Patient Education: [x] Review HEP    [x] Progressed/Changed HEP based on:   [x] positioning   [x] body mechanics   [] transfers   [] heat/ice application    [] other:      Other Objective/Functional Measures: none noted     Pain Level (0-10 scale) post treatment: 0    ASSESSMENT/Changes in Function:   The patient is progressing with strengthening and mobility as tolerated. Patient will continue to benefit from skilled PT services to modify and progress therapeutic interventions, address functional mobility deficits, address ROM deficits, address strength deficits, analyze and address soft tissue restrictions, analyze and cue movement patterns, analyze and modify body mechanics/ergonomics, assess and modify postural abnormalities, address imbalance/dizziness and instruct in home and community integration to attain remaining goals. [x]  See Plan of Care  []  See progress note/recertification  []  See Discharge Summary         Progress towards goals / Updated goals: The patient is progressing towards goals.      PLAN  [x]  Upgrade activities as tolerated     [x]  Continue plan of care  [x]  Update interventions per flow sheet       []  Discharge due to:_  []  Other:_      Yomaira Brown, PT 7/27/2020

## 2020-07-29 ENCOUNTER — HOSPITAL ENCOUNTER (OUTPATIENT)
Dept: PHYSICAL THERAPY | Age: 55
Discharge: HOME | End: 2020-07-29
Payer: COMMERCIAL

## 2020-07-29 PROCEDURE — 97140 MANUAL THERAPY 1/> REGIONS: CPT | Performed by: PHYSICAL THERAPIST

## 2020-07-29 PROCEDURE — 97110 THERAPEUTIC EXERCISES: CPT | Performed by: PHYSICAL THERAPIST

## 2020-07-29 PROCEDURE — 97016 VASOPNEUMATIC DEVICE THERAPY: CPT | Performed by: PHYSICAL THERAPIST

## 2020-07-29 NOTE — PROGRESS NOTES
PT DAILY TREATMENT NOTE 2-15    Patient Name: Letitia Stanton  Date:2020  : 1965  [x]  Patient  Verified  Payor: [address] / Plan: [facility] MEDICAL [address] / Product Type: Commerical /    In time: 5:15pm  Out time: 6:20pm  Total Treatment Time (min): 65  Visit #:  3    Treatment Area: Right knee pain [M25.561]    SUBJECTIVE  Pain Level (0-10 scale): 2  Any medication changes, allergies to medications, adverse drug reactions, diagnosis change, or new procedure performed?: [x] No    [] Yes (see summary sheet for update)  Subjective functional status/changes:   [] No changes reported  The patient reports that it was pretty swollen after last time, but it's doing better today. OBJECTIVE    Modality rationale: decrease edema, decrease inflammation and decrease pain to improve the patients ability to perform daily activities   Min Type Additional Details       [] Estim: []Att   []Unatt    []TENS instruct                  []IFC  []Premod   []NMES                     []Other:  []w/US   []w/ice   []w/heat  Position:  Location:       []  Traction: [] Cervical       []Lumbar                       [] Prone          []Supine                       []Intermittent   []Continuous Lbs:  [] before manual  [] after manual  []w/heat    []  Ultrasound: []Continuous   [] Pulsed                       at: []1MHz   []3MHz Location:  W/cm2:    [] Paraffin         Location:   []w/heat    []  Ice     []  Heat  []  Ice massage Position:  Location:    []  Laser  []  Other: Position:  Location:   15   [x]  Vasopneumatic Device Pressure:       [] lo [x] med [] hi   Temperature: 34     [x] Skin assessment post-treatment:  [x]intact []redness- no adverse reaction    []redness  adverse reaction:         40 min Therapeutic Exercise:  [x] See flow sheet :   Rationale: increase ROM, increase strength and improve coordination to improve the patients ability to perform daily activities.      10 min Manual Therapy:  Tibial distraction and mobs; patellar mobs; PROM    Rationale: decrease pain, increase ROM and increase tissue extensibility  to improve the patients ability to perform daily activities. With   [x] TE   [] TA   [] neuro   [] other: Patient Education: [x] Review HEP    [x] Progressed/Changed HEP based on:   [x] positioning   [x] body mechanics   [] transfers   [] heat/ice application    [] other:      Other Objective/Functional Measures: none noted     Pain Level (0-10 scale) post treatment: 2    ASSESSMENT/Changes in Function:   The patient progressed with strengthening and ROM as tolerated. Patient will continue to benefit from skilled PT services to modify and progress therapeutic interventions, address functional mobility deficits, address ROM deficits, address strength deficits, analyze and address soft tissue restrictions, analyze and cue movement patterns, analyze and modify body mechanics/ergonomics, assess and modify postural abnormalities, address imbalance/dizziness and instruct in home and community integration to attain remaining goals. [x]  See Plan of Care  []  See progress note/recertification  []  See Discharge Summary         Progress towards goals / Updated goals: The patient is progressing towards goals.      PLAN  [x]  Upgrade activities as tolerated     [x]  Continue plan of care  [x]  Update interventions per flow sheet       []  Discharge due to:_  []  Other:_      Lashonda Decker, PT 7/29/2020

## 2020-07-30 ENCOUNTER — PATIENT OUTREACH (OUTPATIENT)
Dept: OTHER | Age: 55
End: 2020-07-30

## 2020-07-30 NOTE — PROGRESS NOTES
Follow up phone call to patient, two pt identifiers verified. Discussed patient's goals:   Goals      Completes all follow-up appointments       Educate and encourage importance of FU for prevention of complications or disease;   Assess the patient's relationship with a PCP and next FU visit scheduled;  o Had check up with PCP in the last month  o Has follow up scheduled for 8/3 with ortho   Discuss importance of adherence to treatment plan and follow up visits;   Identify any barriers in transportation or access to FU appointments.  Assist patient with making FU appointments as needed;    It's also a good idea to know your test results.  Keep a list of the medicines you take. 7/30: Follow up appointment on 8/6 with Dr. Yemi Lee, ortho.  Demonstrates safety measures to decrease fall risk       Discussed removal of scatter rugs   Avoid stretching cords across the floor   Working with PT at home  7/30: Patient working with PT a few times a week. Has already returned to work. Patient's primary care provider relationship reviewed with patient and modified, as applicable.     Readiness to Change: []  Pre-contemplative    []  Contemplative  []  Preparation               [x]  Action                  []  Maintenance    Barriers/Challenges to Care: []  Decline in memory    []  Language barrier     []  Emotional                  []  Limited mobility  []  Lack of motivation     [] Vision, hearing or cognitive impairment []  Knowledge [] Financial Barriers []  Lack of support  []  Pain []  Other [x]  None    Key pt activities to achieve better health:   []  Weight loss  []  Improved diabetic control  []  Decreased cholesterol levels  []  Decreased blood pressure  []    []    Upcoming appointments:   Future Appointments   Date Time Provider Jana Vizcarra   8/3/2020  5:15 PM José Miguel Galvan, PT [facility]   8/6/2020  3:30 PM Cole Culp DO [facility]   8/7/2020  7:00 AM Cole Ana Montelnogo, PT [facility]   8/10/2020  5:15 PM Climmie Ready R, PT [facility]   8/12/2020  5:30 PM Thierno Sinks [facility]   8/17/2020  5:15 PM Ana Galvan, PT [facility]   8/19/2020  5:15 PM Ana Galvan, PT [facility]   8/24/2020  5:15 PM Ana Galvan, PT [facility]   8/26/2020  5:15 PM VandemereAna gonzalez, PT [facility] for next call: Will check in in three weeks, 8/19.

## 2020-08-03 ENCOUNTER — HOSPITAL ENCOUNTER (OUTPATIENT)
Dept: PHYSICAL THERAPY | Age: 55
Discharge: HOME | End: 2020-08-03
Payer: COMMERCIAL

## 2020-08-03 PROCEDURE — 97016 VASOPNEUMATIC DEVICE THERAPY: CPT | Performed by: PHYSICAL THERAPIST

## 2020-08-03 PROCEDURE — 97140 MANUAL THERAPY 1/> REGIONS: CPT | Performed by: PHYSICAL THERAPIST

## 2020-08-03 PROCEDURE — 97110 THERAPEUTIC EXERCISES: CPT | Performed by: PHYSICAL THERAPIST

## 2020-08-03 NOTE — PROGRESS NOTES
PT DAILY TREATMENT NOTE 2-15    Patient Name: Dani Cho  Date:8/3/2020  : 1965  [x]  Patient  Verified  Payor: [employer] / Plan: [facility] MEDICAL [address] / Product Type: Commerical /    In time: 4:19pm  Out time: 5:17pm  Total Treatment Time (min): 58  Visit #:  4    Treatment Area: Right knee pain [M25.561]    SUBJECTIVE  Pain Level (0-10 scale): 2  Any medication changes, allergies to medications, adverse drug reactions, diagnosis change, or new procedure performed?: [x] No    [] Yes (see summary sheet for update)  Subjective functional status/changes:   [] No changes reported  The patient reports that she only took one tylenol today as today is a good day. OBJECTIVE    Modality rationale: decrease edema, decrease inflammation and decrease pain to improve the patients ability to perform daily activities   Min Type Additional Details       [] Estim: []Att   []Unatt    []TENS instruct                  []IFC  []Premod   []NMES                     []Other:  []w/US   []w/ice   []w/heat  Position:  Location:       []  Traction: [] Cervical       []Lumbar                       [] Prone          []Supine                       []Intermittent   []Continuous Lbs:  [] before manual  [] after manual  []w/heat    []  Ultrasound: []Continuous   [] Pulsed                       at: []1MHz   []3MHz Location:  W/cm2:    [] Paraffin         Location:   []w/heat    []  Ice     []  Heat  []  Ice massage Position:  Location:    []  Laser  []  Other: Position:  Location:   10   [x]  Vasopneumatic Device Pressure:       [] lo [x] med [] hi   Temperature: 34     [x] Skin assessment post-treatment:  [x]intact []redness- no adverse reaction    []redness  adverse reaction:         38 min Therapeutic Exercise:  [x] See flow sheet :   Rationale: increase ROM, increase strength and improve coordination to improve the patients ability to perform daily activities.      10 min Manual Therapy:  Tibial distraction and mobs; patellar mobs; PROM    Rationale: decrease pain, increase ROM and increase tissue extensibility  to improve the patients ability to perform daily activities. With   [x] TE   [] TA   [] neuro   [] other: Patient Education: [x] Review HEP    [x] Progressed/Changed HEP based on:   [x] positioning   [x] body mechanics   [] transfers   [] heat/ice application    [] other:      Other Objective/Functional Measures: none noted     Pain Level (0-10 scale) post treatment: 0    ASSESSMENT/Changes in Function:   The patient is progressing with strengthening and ROM as tolerated. Patient will continue to benefit from skilled PT services to modify and progress therapeutic interventions, address functional mobility deficits, address ROM deficits, address strength deficits, analyze and address soft tissue restrictions, analyze and cue movement patterns, analyze and modify body mechanics/ergonomics, assess and modify postural abnormalities, address imbalance/dizziness and instruct in home and community integration to attain remaining goals. [x]  See Plan of Care  []  See progress note/recertification  []  See Discharge Summary         Progress towards goals / Updated goals: The patient is progressing towards goals.      PLAN  [x]  Upgrade activities as tolerated     [x]  Continue plan of care  [x]  Update interventions per flow sheet       []  Discharge due to:_  []  Other:_      Antionette Mayes, PT 8/3/2020

## 2020-08-06 ENCOUNTER — OFFICE VISIT (OUTPATIENT)
Dept: ORTHOPEDIC SURGERY | Age: 55
End: 2020-08-06
Payer: COMMERCIAL

## 2020-08-06 VITALS
HEART RATE: 90 BPM | HEIGHT: 68 IN | SYSTOLIC BLOOD PRESSURE: 119 MMHG | OXYGEN SATURATION: 100 % | TEMPERATURE: 97.7 F | WEIGHT: 280 LBS | DIASTOLIC BLOOD PRESSURE: 83 MMHG | BODY MASS INDEX: 42.44 KG/M2

## 2020-08-06 DIAGNOSIS — S83.231D COMPLEX TEAR OF MEDIAL MENISCUS OF RIGHT KNEE AS CURRENT INJURY, SUBSEQUENT ENCOUNTER: Primary | ICD-10-CM

## 2020-08-06 PROCEDURE — 99024 POSTOP FOLLOW-UP VISIT: CPT | Performed by: ORTHOPAEDIC SURGERY

## 2020-08-06 NOTE — PROGRESS NOTES
is here for a follow up visit from a knee arthroscopy. Pain has been appropriate since surgery, no major medical complications since surgery. Current Outpatient Medications on File Prior to Visit   Medication Sig Dispense Refill    cephALEXin (Keflex) 250 mg capsule Take 2 Caps by mouth three (3) times daily. 30 Cap 0    senna-docusate (PERICOLACE) 8.6-50 mg per tablet Take 1 Tab by mouth daily. 30 Tab 0    ergocalciferol (ERGOCALCIFEROL) 1,250 mcg (50,000 unit) capsule Take 1 Cap by mouth every seven (7) days. Indications: low vitamin D levels 5 Cap 5    amitriptyline (ELAVIL) 25 mg tablet Take 1 Tab by mouth nightly. Indications: pain 30 Tab 11    Blood Pressure Monitor kit 1 Device by Does Not Apply route daily. 1 Kit 0    propranolol LA (INDERAL LA) 80 mg SR capsule Take 1 Cap by mouth daily. Indications: migraine prevention 90 Cap 3    valsartan-hydroCHLOROthiazide (DIOVAN-HCT) 80-12.5 mg per tablet Take 1 Tab by mouth daily. Indications: high blood pressure 90 Tab 3    omeprazole (PRILOSEC) 20 mg capsule TAKE 1 CAPSULE BY MOUTH TWICE DAILY  Indications: heartburn 60 Cap 5    diclofenac (VOLTAREN) 1 % gel Apply  to affected area four (4) times daily. (Patient taking differently: Apply 2 g to affected area four (4) times daily. Apply thin layer to affected area) 1 Each 2    acetaminophen (TYLENOL ARTHRITIS PAIN) 650 mg CR tablet Take 1,300 mg by mouth three (3) times daily. No current facility-administered medications on file prior to visit.         ROS:  General: denies agitation, major chest pain, unexpected weakness  Patient states no issues  Skin: healing wound is without issue or drainage, she had some early, which is resolved  Strength: appropriate weakness of involved extremity is resolving since surgery      Physical Examination:    Blood pressure 119/83, pulse 90, temperature 97.7 °F (36.5 °C), temperature source Tympanic, height 5' 8\" (1.727 m), weight 280 lb (127 kg), SpO2 100 %.     Dressing: none  Skin: clean, dry, intact  Sensation intact to light touch at level of wound and distally  Strength is full  Range of motion is full  Distal swelling is noted, but appropriate for postoperative course  Distal capillary refill less than 2 seconds      Imaging:    Postoperative imaging: none      Assessment: Status post knee scope, partial medial meniscectomy    Plan:  Patient will continue physical therapy  Wound care was reviewed  Weightbearing will be as tolerated through the right knee  Deep Venous Thrombosis Prophylaxis: none    Follow up will be at 4 weeks from now,  no xrays on follow up

## 2020-08-07 ENCOUNTER — HOSPITAL ENCOUNTER (OUTPATIENT)
Dept: PHYSICAL THERAPY | Age: 55
Discharge: HOME | End: 2020-08-07
Payer: COMMERCIAL

## 2020-08-10 ENCOUNTER — HOSPITAL ENCOUNTER (OUTPATIENT)
Dept: PHYSICAL THERAPY | Age: 55
Discharge: HOME | End: 2020-08-10
Payer: COMMERCIAL

## 2020-08-10 PROCEDURE — 97016 VASOPNEUMATIC DEVICE THERAPY: CPT | Performed by: PHYSICAL THERAPIST

## 2020-08-10 PROCEDURE — 97140 MANUAL THERAPY 1/> REGIONS: CPT | Performed by: PHYSICAL THERAPIST

## 2020-08-10 PROCEDURE — 97110 THERAPEUTIC EXERCISES: CPT | Performed by: PHYSICAL THERAPIST

## 2020-08-10 NOTE — PROGRESS NOTES
PT DAILY TREATMENT NOTE 2-15    Patient Name: Rafy Mondragon  Date:8/10/2020  : 1965  [x]  Patient  Verified  Payor: MEDICAL [facility] / Plan: [facility] MEDICAL [address] / Product Type: Commerical /    In time: 5:15pm  Out time: 6:25pm  Total Treatment Time (min): 70  Visit #:  5    Treatment Area: Right knee pain [M25.561]    SUBJECTIVE  Pain Level (0-10 scale): 70  Any medication changes, allergies to medications, adverse drug reactions, diagnosis change, or new procedure performed?: [x] No    [] Yes (see summary sheet for update)  Subjective functional status/changes:   [] No changes reported  The patient reports that she was on her feet off and on for 2 hours shopping on Saturday and was just sore by the end, but overall is feeling good.       OBJECTIVE    Modality rationale: decrease edema, decrease inflammation and decrease pain to improve the patients ability to perform daily activities   Min Type Additional Details       [] Estim: []Att   []Unatt    []TENS instruct                  []IFC  []Premod   []NMES                     []Other:  []w/US   []w/ice   []w/heat  Position:  Location:       []  Traction: [] Cervical       []Lumbar                       [] Prone          []Supine                       []Intermittent   []Continuous Lbs:  [] before manual  [] after manual  []w/heat    []  Ultrasound: []Continuous   [] Pulsed                       at: []1MHz   []3MHz Location:  W/cm2:    [] Paraffin         Location:   []w/heat    []  Ice     []  Heat  []  Ice massage Position:  Location:    []  Laser  []  Other: Position:  Location:   10   [x]  Vasopneumatic Device Pressure:       [] lo [x] med [] hi   Temperature: 34     [x] Skin assessment post-treatment:  [x]intact []redness- no adverse reaction    []redness  adverse reaction:         50 min Therapeutic Exercise:  [x] See flow sheet :   Rationale: increase ROM, increase strength and improve coordination to improve the patients ability to perform daily activities. 10 min Manual Therapy:  Tibial distraction and mobs; patellar mobs; PROM    Rationale: decrease pain, increase ROM and increase tissue extensibility  to improve the patients ability to perform daily activities. With   [x] TE   [] TA   [] neuro   [] other: Patient Education: [x] Review HEP    [x] Progressed/Changed HEP based on:   [x] positioning   [x] body mechanics   [] transfers   [] heat/ice application    [] other:      Other Objective/Functional Measures: none noted     Pain Level (0-10 scale) post treatment: 0    ASSESSMENT/Changes in Function:   The patient is progressing well with strengthening and stability as tolerated. Patient will continue to benefit from skilled PT services to modify and progress therapeutic interventions, address functional mobility deficits, address ROM deficits, address strength deficits, analyze and address soft tissue restrictions, analyze and cue movement patterns, analyze and modify body mechanics/ergonomics, assess and modify postural abnormalities, address imbalance/dizziness and instruct in home and community integration to attain remaining goals. [x]  See Plan of Care  []  See progress note/recertification  []  See Discharge Summary         Progress towards goals / Updated goals: The patient is progressing towards goals.      PLAN  [x]  Upgrade activities as tolerated     [x]  Continue plan of care  [x]  Update interventions per flow sheet       []  Discharge due to:_  []  Other:_      Magdy Prado, PT 8/10/2020

## 2020-08-12 ENCOUNTER — HOSPITAL ENCOUNTER (OUTPATIENT)
Dept: PHYSICAL THERAPY | Age: 55
Discharge: HOME | End: 2020-08-12
Payer: COMMERCIAL

## 2020-08-12 PROCEDURE — 97140 MANUAL THERAPY 1/> REGIONS: CPT

## 2020-08-12 PROCEDURE — 97110 THERAPEUTIC EXERCISES: CPT

## 2020-08-12 PROCEDURE — 97016 VASOPNEUMATIC DEVICE THERAPY: CPT

## 2020-08-12 NOTE — PROGRESS NOTES
PT DAILY TREATMENT NOTE 2-15    Patient Name: Ana Cristina Poster  Date:2020  : 1965  [x]  Patient  Verified  Payor: [employer] [place] / Plan: [facility] MEDICAL [place] [address] / Product Type: Commerical /    In time:530  Out time:633  Total Treatment Time (min): 61  Visit #:  6    Treatment Area: Right knee pain [M25.561]    SUBJECTIVE  Pain Level (0-10 scale): 3/10  Any medication changes, allergies to medications, adverse drug reactions, diagnosis change, or new procedure performed?: [x] No    [] Yes (see summary sheet for update)  Subjective functional status/changes:   [] No changes reported  Patient reports today is not her best day, she thinks it is mostly due to the rain. OBJECTIVE         Modality rationale: decrease edema, decrease inflammation and decrease pain to improve the patients ability to perform daily activities    Min Type Additional Details        []? Estim: []? Att   []? Unatt    []? TENS instruct                  []?IFC  []? Premod   []? NMES                     []?Other:  []?w/US   []?w/ice   []?w/heat  Position:  Location:        []? Traction: []? Cervical       []? Lumbar                       []? Prone          []? Supine                       []?Intermittent   []? Continuous Lbs:  []? before manual  []? after manual  []?w/heat     []? Ultrasound: []? Continuous   []? Pulsed                       at: []?1MHz   []? 3MHz Location:  W/cm2:     []? Paraffin         Location:   []?w/heat     []? Ice     []? Heat  []? Ice massage Position:  Location:     []? Laser  []? Other: Position:  Location:   10    [x]? Vasopneumatic Device Pressure:       []? lo [x]? med []? hi   Temperature: 34      [x]? Skin assessment post-treatment:  [x]? intact []? redness- no adverse reaction    []? redness  adverse reaction:            43 min Therapeutic Exercise:  [x]?  See flow sheet :   Rationale: increase ROM, increase strength and improve coordination to improve the patients ability to perform daily activities.      10 min Manual Therapy:  Tibial distraction and mobs; patellar mobs; PROM    Rationale: decrease pain, increase ROM and increase tissue extensibility  to improve the patients ability to perform daily activities. With   [] TE   [] TA   [] neuro   [] other: Patient Education: [x] Review HEP    [] Progressed/Changed HEP based on:   [] positioning   [] body mechanics   [] transfers   [] heat/ice application    [] other:      Other Objective/Functional Measures: none noted     Pain Level (0-10 scale) post treatment: 0/10    ASSESSMENT/Changes in Function:   Patient continues to experience discomfort with lateral movement therex. Tolerated progression of therex well, will continue to progress as tolerated. Patient will continue to benefit from skilled PT services to modify and progress therapeutic interventions, address functional mobility deficits, address ROM deficits, address strength deficits, analyze and address soft tissue restrictions, analyze and cue movement patterns, analyze and modify body mechanics/ergonomics and assess and modify postural abnormalities to attain remaining goals. [x]  See Plan of Care  []  See progress note/recertification  []  See Discharge Summary         Progress towards goals / Updated goals:  Patient is progressing towards goals.      PLAN  [x]  Upgrade activities as tolerated     [x]  Continue plan of care  [x]  Update interventions per flow sheet       []  Discharge due to:_  []  Other:_      Argenis Jay 8/12/2020    \

## 2020-08-17 ENCOUNTER — HOSPITAL ENCOUNTER (OUTPATIENT)
Dept: PHYSICAL THERAPY | Age: 55
Discharge: HOME | End: 2020-08-17
Payer: COMMERCIAL

## 2020-08-17 PROCEDURE — 97016 VASOPNEUMATIC DEVICE THERAPY: CPT | Performed by: PHYSICAL THERAPIST

## 2020-08-17 PROCEDURE — 97110 THERAPEUTIC EXERCISES: CPT | Performed by: PHYSICAL THERAPIST

## 2020-08-17 PROCEDURE — 97140 MANUAL THERAPY 1/> REGIONS: CPT | Performed by: PHYSICAL THERAPIST

## 2020-08-17 NOTE — PROGRESS NOTES
PT DAILY TREATMENT NOTE 2-15    Patient Name: Quintin Pavon  Date:2020  : 1965  [x]  Patient  Verified  Payor: [employer] [place] / Plan: [facility] MEDICAL [address] / Product Type: Commerical /    In time: 5:15pm Out time: 6:07pm  Total Treatment Time (min): 52  Visit #:  7    Treatment Area: Right knee pain [M25.561]    SUBJECTIVE  Pain Level (0-10 scale): 5  Any medication changes, allergies to medications, adverse drug reactions, diagnosis change, or new procedure performed?: [x] No    [] Yes (see summary sheet for update)  Subjective functional status/changes:   [] No changes reported  The patient reports that she's been hurting a lot since last visit, her endurance is down, pain is up, and she almost called Dr. Mer Garcia for a shot.     OBJECTIVE    Modality rationale: decrease edema, decrease inflammation and decrease pain to improve the patients ability to perform daily activities   Min Type Additional Details       [] Estim: []Att   []Unatt    []TENS instruct                  []IFC  []Premod   []NMES                     []Other:  []w/US   []w/ice   []w/heat  Position:  Location:       []  Traction: [] Cervical       []Lumbar                       [] Prone          []Supine                       []Intermittent   []Continuous Lbs:  [] before manual  [] after manual  []w/heat    []  Ultrasound: []Continuous   [] Pulsed                       at: []1MHz   []3MHz Location:  W/cm2:    [] Paraffin         Location:   []w/heat    []  Ice     []  Heat  []  Ice massage Position:  Location:    []  Laser  []  Other: Position:  Location:   15   [x]  Vasopneumatic Device Pressure:       [] lo [x] med [] hi   Temperature: 34     [x] Skin assessment post-treatment:  [x]intact []redness- no adverse reaction    []redness  adverse reaction:         22 min Therapeutic Exercise:  [x] See flow sheet :   Rationale: increase ROM, increase strength and improve coordination to improve the patients ability to perform daily activities. 15 min Manual Therapy:  Tibial distraction and mobs; patellar mobs; PROM    Rationale: decrease pain, increase ROM and increase tissue extensibility  to improve the patients ability to perform daily activities. With   [x] TE   [] TA   [] neuro   [] other: Patient Education: [x] Review HEP    [x] Progressed/Changed HEP based on:   [x] positioning   [x] body mechanics   [] transfers   [] heat/ice application    [] other:      Other Objective/Functional Measures: none noted     Pain Level (0-10 scale) post treatment: 2    ASSESSMENT/Changes in Function:   The patient was very limited today due to pain, so therex was modified to minimal weighbearing and wobble board exercise will be held for a few weeks as this may have been an agitation. Patient will continue to benefit from skilled PT services to modify and progress therapeutic interventions, address functional mobility deficits, address ROM deficits, address strength deficits, analyze and address soft tissue restrictions, analyze and cue movement patterns, analyze and modify body mechanics/ergonomics, assess and modify postural abnormalities, address imbalance/dizziness and instruct in home and community integration to attain remaining goals. [x]  See Plan of Care  []  See progress note/recertification  []  See Discharge Summary         Progress towards goals / Updated goals: The patient is progressing towards goals.      PLAN  [x]  Upgrade activities as tolerated     [x]  Continue plan of care  [x]  Update interventions per flow sheet       []  Discharge due to:_  []  Other:_      Michelle Tian, PT 8/17/2020

## 2020-08-19 ENCOUNTER — HOSPITAL ENCOUNTER (OUTPATIENT)
Dept: PHYSICAL THERAPY | Age: 55
Discharge: HOME | End: 2020-08-19
Payer: COMMERCIAL

## 2020-08-19 PROCEDURE — 97110 THERAPEUTIC EXERCISES: CPT | Performed by: PHYSICAL THERAPIST

## 2020-08-19 PROCEDURE — 97140 MANUAL THERAPY 1/> REGIONS: CPT | Performed by: PHYSICAL THERAPIST

## 2020-08-19 NOTE — PROGRESS NOTES
PT DAILY TREATMENT NOTE 2-15    Patient Name: Sreekanth Mooney  Date:2020  : 1965  [x]  Patient  Verified  Payor: [employer] [place] / Plan: [facility] MEDICAL [address] / Product Type: Commerical /    In time: 5:14pm Out time: 6:04pm  Total Treatment Time (min): 50  Visit #:  8    Treatment Area: Right knee pain [M25.561]    SUBJECTIVE  Pain Level (0-10 scale): 0 at rest  Any medication changes, allergies to medications, adverse drug reactions, diagnosis change, or new procedure performed?: [x] No    [] Yes (see summary sheet for update)  Subjective functional status/changes:   [] No changes reported  The patient reports that she's doing better than last time, the outside part of the knee isn't hurting much, and she isn't having pain at rest like she was, just with walking.     OBJECTIVE    Modality rationale: decrease edema, decrease inflammation and decrease pain to improve the patients ability to perform daily activities   Min Type Additional Details       [] Estim: []Att   []Unatt    []TENS instruct                  []IFC  []Premod   []NMES                     []Other:  []w/US   []w/ice   []w/heat  Position:  Location:       []  Traction: [] Cervical       []Lumbar                       [] Prone          []Supine                       []Intermittent   []Continuous Lbs:  [] before manual  [] after manual  []w/heat    []  Ultrasound: []Continuous   [] Pulsed                       at: []1MHz   []3MHz Location:  W/cm2:    [] Paraffin         Location:   []w/heat    []  Ice     []  Heat  []  Ice massage Position:  Location:    []  Laser  []  Other: Position:  Location:   NT   [x]  Vasopneumatic Device Pressure:       [] lo [x] med [] hi   Temperature: 34     [x] Skin assessment post-treatment:  [x]intact []redness- no adverse reaction    []redness  adverse reaction:         35 min Therapeutic Exercise:  [x] See flow sheet :   Rationale: increase ROM, increase strength and improve coordination to improve the patients ability to perform daily activities. 15 min Manual Therapy:  Tibial distraction and mobs; patellar mobs; PROM    Rationale: decrease pain, increase ROM and increase tissue extensibility  to improve the patients ability to perform daily activities. With   [x] TE   [] TA   [] neuro   [] other: Patient Education: [x] Review HEP    [x] Progressed/Changed HEP based on:   [x] positioning   [x] body mechanics   [] transfers   [] heat/ice application    [] other:      Other Objective/Functional Measures: none noted     Pain Level (0-10 scale) post treatment: 0 at rest; 3/10 with walking    ASSESSMENT/Changes in Function:   The patient progressed with modified therex as tolerated. Patient will continue to benefit from skilled PT services to modify and progress therapeutic interventions, address functional mobility deficits, address ROM deficits, address strength deficits, analyze and address soft tissue restrictions, analyze and cue movement patterns, analyze and modify body mechanics/ergonomics, assess and modify postural abnormalities, address imbalance/dizziness and instruct in home and community integration to attain remaining goals. [x]  See Plan of Care  []  See progress note/recertification  []  See Discharge Summary         Progress towards goals / Updated goals: The patient is progressing towards goals.      PLAN  [x]  Upgrade activities as tolerated     [x]  Continue plan of care  [x]  Update interventions per flow sheet       []  Discharge due to:_  []  Other:_      Antionette Mayes, PT 8/19/2020

## 2020-08-23 ENCOUNTER — PATIENT OUTREACH (OUTPATIENT)
Dept: OTHER | Age: 55
End: 2020-08-23

## 2020-08-23 NOTE — PROGRESS NOTES
Attempt to reach patient for follow up. Discreet VM left with contact information. Will reach out in three weeks, 9/14.   Per chart review, patient has been working with PT.

## 2020-08-24 ENCOUNTER — HOSPITAL ENCOUNTER (OUTPATIENT)
Dept: PHYSICAL THERAPY | Age: 55
Discharge: HOME | End: 2020-08-24
Payer: COMMERCIAL

## 2020-08-24 PROCEDURE — 97016 VASOPNEUMATIC DEVICE THERAPY: CPT | Performed by: PHYSICAL THERAPIST

## 2020-08-24 PROCEDURE — 97140 MANUAL THERAPY 1/> REGIONS: CPT | Performed by: PHYSICAL THERAPIST

## 2020-08-24 PROCEDURE — 97110 THERAPEUTIC EXERCISES: CPT | Performed by: PHYSICAL THERAPIST

## 2020-08-24 NOTE — PROGRESS NOTES
PT DAILY TREATMENT NOTE 2-15    Patient Name: Rafy Mondragon  Date:2020  : 1965  [x]  Patient  Verified  Payor: [employer] [place] / Plan: [facility] MEDICAL [address] / Product Type: Commerical /    In time: 5:15pm Out time: 6:15pm  Total Treatment Time (min): 60  Visit #:  9    Treatment Area: Right knee pain [M25.561]    SUBJECTIVE  Pain Level (0-10 scale): at rest/walkin/0  Any medication changes, allergies to medications, adverse drug reactions, diagnosis change, or new procedure performed?: [x] No    [] Yes (see summary sheet for update)  Subjective functional status/changes:   [] No changes reported  The patient reports that she's been feeling much better over the weekend, but she was very flared up for 1-2 days after last session and believes it was because we didn't ice at the end.     OBJECTIVE    Modality rationale: decrease edema, decrease inflammation and decrease pain to improve the patients ability to perform daily activities   Min Type Additional Details       [] Estim: []Att   []Unatt    []TENS instruct                  []IFC  []Premod   []NMES                     []Other:  []w/US   []w/ice   []w/heat  Position:  Location:       []  Traction: [] Cervical       []Lumbar                       [] Prone          []Supine                       []Intermittent   []Continuous Lbs:  [] before manual  [] after manual  []w/heat    []  Ultrasound: []Continuous   [] Pulsed                       at: []1MHz   []3MHz Location:  W/cm2:    [] Paraffin         Location:   []w/heat    []  Ice     []  Heat  []  Ice massage Position:  Location:    []  Laser  []  Other: Position:  Location:   10   [x]  Vasopneumatic Device Pressure:       [] lo [x] med [] hi   Temperature: 34     [x] Skin assessment post-treatment:  [x]intact []redness- no adverse reaction    []redness  adverse reaction:         35 min Therapeutic Exercise:  [x] See flow sheet :   Rationale: increase ROM, increase strength and improve coordination to improve the patients ability to perform daily activities. 15 min Manual Therapy:  Tibial distraction and mobs; patellar mobs; PROM    Rationale: decrease pain, increase ROM and increase tissue extensibility  to improve the patients ability to perform daily activities. With   [x] TE   [] TA   [] neuro   [] other: Patient Education: [x] Review HEP    [x] Progressed/Changed HEP based on:   [x] positioning   [x] body mechanics   [] transfers   [] heat/ice application    [] other:      Other Objective/Functional Measures: FOTO= 55 (50 at eval)     Pain Level (0-10 scale) post treatment: at rest/walkin/1    ASSESSMENT/Changes in Function:   The patient progressed with resistance and weightbearing as tolerated. Patient will continue to benefit from skilled PT services to modify and progress therapeutic interventions, address functional mobility deficits, address ROM deficits, address strength deficits, analyze and address soft tissue restrictions, analyze and cue movement patterns, analyze and modify body mechanics/ergonomics, assess and modify postural abnormalities, address imbalance/dizziness and instruct in home and community integration to attain remaining goals. [x]  See Plan of Care  [x]  See progress note/recertification  []  See Discharge Summary         Progress towards goals / Updated goals: The patient is progressing towards goals.      PLAN  [x]  Upgrade activities as tolerated     [x]  Continue plan of care  [x]  Update interventions per flow sheet       []  Discharge due to:_  []  Other:_      Amelia Lozano, PT 2020

## 2020-08-24 NOTE — PROGRESS NOTES
[facility]  [address] [facility], [address] [place] [address] Drive  Phone: [phone] Fax: [phone]    Progress Note    Name: Jose Gonzalez   : 1965   MD: Hellen Brooke DO       Treatment Diagnosis: Right knee pain [M25.561]  Start of Care: 20    Visits from Start of Care: 9  Missed Visits: 1    Summary of Care: Therapy has included therex, manual, and modalities for pain/edema management s/p  Right medial meniscectomy 20. Assessment / Recommendations: The patient has progressed with the following A/PROM knee: Flex= 117/125 (110/115 at eval); Ext= 0/0 (-25/0 at eval). She had 1-2 weeks in early August where she was essentially having no pain at rest or with short ambulation, but then had a flare up around 20, causing discomfort at rest and with ambulation. She now has minimal to no discomfort at rest, but continues to have some discomfort with ambulation (1/10 today after therapy). Therapy has recently been modified to minimal weightbearing strengthening until she can tolerate more aggressive therapy. She will benefit from continued rehab to progress her functional strength and mobility and decreased pain with daily activities. Short Term Goals: To be accomplished in 2 treatments:                         4.) The patient will be independent with their HEP consistently for at least one week. - MET  Long Term Goals: To be accomplished in 16 treatments:                         5.) The patient will have at most 2/10 pain with daily activities. - Progressing                         2.) The patient will be able to ambulate for at least 40 min without having to change positions due to pain. - Progressing                         5.) The patient will improve their FOTO score from 50 to at least 60 to show improvements in functional mobility.- Progressing (55 today)    Other: Continue 2x/wk for 8 more treatments      Amber Boyer, PT 2020 ________________________________________________________________________  NOTE TO PHYSICIAN:  Please complete the following and fax to: [facility]: [phone]  . Retain this original for your records. If you are unable to process this request in 24 hours, please contact our office.        ____ I have read the above report and request that my patient continue therapy with the following changes/special instructions:  ____ I have read the above report and request that my patient be discharged from therapy    Physician's Signature:_________________ Date:___________Time:__________

## 2020-08-26 ENCOUNTER — HOSPITAL ENCOUNTER (OUTPATIENT)
Dept: PHYSICAL THERAPY | Age: 55
Discharge: HOME | End: 2020-08-26
Payer: COMMERCIAL

## 2020-08-26 PROCEDURE — 97016 VASOPNEUMATIC DEVICE THERAPY: CPT | Performed by: PHYSICAL THERAPIST

## 2020-08-26 PROCEDURE — 97110 THERAPEUTIC EXERCISES: CPT | Performed by: PHYSICAL THERAPIST

## 2020-08-26 PROCEDURE — 97140 MANUAL THERAPY 1/> REGIONS: CPT | Performed by: PHYSICAL THERAPIST

## 2020-08-26 NOTE — PROGRESS NOTES
PT DAILY TREATMENT NOTE 2-15    Patient Name: Wilver Arce  Date:2020  : 1965  [x]  Patient  Verified  Payor: [employer] [place] / Plan: [facility] MEDICAL [address] / Product Type: Commerical /    In time: 5:15pm Out time: 6:13pm  Total Treatment Time (min): 58  Visit #:  10    Treatment Area: Right knee pain [M25.561]    SUBJECTIVE  Pain Level (0-10 scale): at rest/walkin/0  Any medication changes, allergies to medications, adverse drug reactions, diagnosis change, or new procedure performed?: [x] No    [] Yes (see summary sheet for update)  Subjective functional status/changes:   [] No changes reported  The patient reports that she is getting back to feeling how she used to. OBJECTIVE    Modality rationale: decrease edema, decrease inflammation and decrease pain to improve the patients ability to perform daily activities   Min Type Additional Details       [] Estim: []Att   []Unatt    []TENS instruct                  []IFC  []Premod   []NMES                     []Other:  []w/US   []w/ice   []w/heat  Position:  Location:       []  Traction: [] Cervical       []Lumbar                       [] Prone          []Supine                       []Intermittent   []Continuous Lbs:  [] before manual  [] after manual  []w/heat    []  Ultrasound: []Continuous   [] Pulsed                       at: []1MHz   []3MHz Location:  W/cm2:    [] Paraffin         Location:   []w/heat    []  Ice     []  Heat  []  Ice massage Position:  Location:    []  Laser  []  Other: Position:  Location:   10   [x]  Vasopneumatic Device Pressure:       [] lo [x] med [] hi   Temperature: 34     [x] Skin assessment post-treatment:  [x]intact []redness- no adverse reaction    []redness  adverse reaction:         33 min Therapeutic Exercise:  [x] See flow sheet :   Rationale: increase ROM, increase strength and improve coordination to improve the patients ability to perform daily activities.      15 min Manual Therapy:  Tibial distraction and mobs; patellar mobs; PROM    Rationale: decrease pain, increase ROM and increase tissue extensibility  to improve the patients ability to perform daily activities. With   [x] TE   [] TA   [] neuro   [] other: Patient Education: [x] Review HEP    [x] Progressed/Changed HEP based on:   [x] positioning   [x] body mechanics   [] transfers   [] heat/ice application    [] other:      Other Objective/Functional Measures: none noted    Pain Level (0-10 scale) post treatment: at rest/walkin/0    ASSESSMENT/Changes in Function:   The patient progressed well with strengthening and mobility as tolerated. Patient will continue to benefit from skilled PT services to modify and progress therapeutic interventions, address functional mobility deficits, address ROM deficits, address strength deficits, analyze and address soft tissue restrictions, analyze and cue movement patterns, analyze and modify body mechanics/ergonomics, assess and modify postural abnormalities, address imbalance/dizziness and instruct in home and community integration to attain remaining goals. [x]  See Plan of Care  []  See progress note/recertification  []  See Discharge Summary         Progress towards goals / Updated goals: The patient is progressing towards goals.      PLAN  [x]  Upgrade activities as tolerated     [x]  Continue plan of care  [x]  Update interventions per flow sheet       []  Discharge due to:_  []  Other:_      Jcarlos Lamas PT 2020

## 2020-08-31 ENCOUNTER — APPOINTMENT (OUTPATIENT)
Dept: PHYSICAL THERAPY | Age: 55
End: 2020-08-31

## 2020-09-02 ENCOUNTER — HOSPITAL ENCOUNTER (OUTPATIENT)
Dept: PHYSICAL THERAPY | Age: 55
Discharge: HOME | End: 2020-09-02
Payer: COMMERCIAL

## 2020-09-02 PROCEDURE — 97110 THERAPEUTIC EXERCISES: CPT | Performed by: PHYSICAL THERAPIST

## 2020-09-02 PROCEDURE — 97140 MANUAL THERAPY 1/> REGIONS: CPT | Performed by: PHYSICAL THERAPIST

## 2020-09-02 NOTE — PROGRESS NOTES
PT DAILY TREATMENT NOTE 2-15    Patient Name: Derick Cornelius  Date:2020  : 1965  [x]  Patient  Verified  Payor: /    In time: 5:15pm Out time: 6:00pm  Total Treatment Time (min): 45  Visit #:  11    Treatment Area: Right knee pain [M25.561]    SUBJECTIVE  Pain Level (0-10 scale): at rest/walkin/0  Any medication changes, allergies to medications, adverse drug reactions, diagnosis change, or new procedure performed?: [x] No    [] Yes (see summary sheet for update)  Subjective functional status/changes:   [] No changes reported  The patient reports that she felt good over the weekend, she just has discomfort if she turns the wrong way. OBJECTIVE    Modality rationale: decrease edema, decrease inflammation and decrease pain to improve the patients ability to perform daily activities   Min Type Additional Details       [] Estim: []Att   []Unatt    []TENS instruct                  []IFC  []Premod   []NMES                     []Other:  []w/US   []w/ice   []w/heat  Position:  Location:       []  Traction: [] Cervical       []Lumbar                       [] Prone          []Supine                       []Intermittent   []Continuous Lbs:  [] before manual  [] after manual  []w/heat    []  Ultrasound: []Continuous   [] Pulsed                       at: []1MHz   []3MHz Location:  W/cm2:    [] Paraffin         Location:   []w/heat    []  Ice     []  Heat  []  Ice massage Position:  Location:    []  Laser  []  Other: Position:  Location:   NT   [x]  Vasopneumatic Device Pressure:       [] lo [x] med [] hi   Temperature: 34     [x] Skin assessment post-treatment:  [x]intact []redness- no adverse reaction    []redness  adverse reaction:         35 min Therapeutic Exercise:  [x] See flow sheet :   Rationale: increase ROM, increase strength and improve coordination to improve the patients ability to perform daily activities.      10 min Manual Therapy:  Tibial distraction and mobs; PROM    Rationale: decrease pain, increase ROM and increase tissue extensibility  to improve the patients ability to perform daily activities. With   [x] TE   [] TA   [] neuro   [] other: Patient Education: [x] Review HEP    [x] Progressed/Changed HEP based on:   [x] positioning   [x] body mechanics   [] transfers   [] heat/ice application    [] other:      Other Objective/Functional Measures: none noted    Pain Level (0-10 scale) post treatment: at rest/walkin/0    ASSESSMENT/Changes in Function:   The patient progressed with increased resistance and tolerance to weightbearing exercises. Patient will continue to benefit from skilled PT services to modify and progress therapeutic interventions, address functional mobility deficits, address ROM deficits, address strength deficits, analyze and address soft tissue restrictions, analyze and cue movement patterns, analyze and modify body mechanics/ergonomics, assess and modify postural abnormalities, address imbalance/dizziness and instruct in home and community integration to attain remaining goals. [x]  See Plan of Care  []  See progress note/recertification  []  See Discharge Summary         Progress towards goals / Updated goals: The patient is progressing towards goals.      PLAN  [x]  Upgrade activities as tolerated     [x]  Continue plan of care  [x]  Update interventions per flow sheet       []  Discharge due to:_  []  Other:_      Rene Romero, PT 2020

## 2020-09-04 ENCOUNTER — HOSPITAL ENCOUNTER (OUTPATIENT)
Dept: PHYSICAL THERAPY | Age: 55
Discharge: HOME | End: 2020-09-04
Payer: COMMERCIAL

## 2020-09-04 PROCEDURE — 97140 MANUAL THERAPY 1/> REGIONS: CPT | Performed by: PHYSICAL THERAPIST

## 2020-09-04 PROCEDURE — 97110 THERAPEUTIC EXERCISES: CPT | Performed by: PHYSICAL THERAPIST

## 2020-09-04 NOTE — PROGRESS NOTES
PT DAILY TREATMENT NOTE 2-15    Patient Name: Derick Cornelius  Date:2020  : 1965  [x]  Patient  Verified  Payor: [employer] [place] / Plan: [facility] MEDICAL [place] [address] / Product Type: Commerical /    In time: 7:00am Out time: 7:41am  Total Treatment Time (min): 41  Visit #:  12    Treatment Area: Right knee pain [M25.561]    SUBJECTIVE  Pain Level (0-10 scale): at rest/walkin/0  Any medication changes, allergies to medications, adverse drug reactions, diagnosis change, or new procedure performed?: [x] No    [] Yes (see summary sheet for update)  Subjective functional status/changes:   [] No changes reported  The patient reports that she's feeling okay, yesterday was fine. OBJECTIVE    Modality rationale: decrease edema, decrease inflammation and decrease pain to improve the patients ability to perform daily activities   Min Type Additional Details       [] Estim: []Att   []Unatt    []TENS instruct                  []IFC  []Premod   []NMES                     []Other:  []w/US   []w/ice   []w/heat  Position:  Location:       []  Traction: [] Cervical       []Lumbar                       [] Prone          []Supine                       []Intermittent   []Continuous Lbs:  [] before manual  [] after manual  []w/heat    []  Ultrasound: []Continuous   [] Pulsed                       at: []1MHz   []3MHz Location:  W/cm2:    [] Paraffin         Location:   []w/heat    []  Ice     []  Heat  []  Ice massage Position:  Location:    []  Laser  []  Other: Position:  Location:   NT   [x]  Vasopneumatic Device Pressure:       [] lo [x] med [] hi   Temperature: 34     [x] Skin assessment post-treatment:  [x]intact []redness- no adverse reaction    []redness  adverse reaction:         31 min Therapeutic Exercise:  [x] See flow sheet :   Rationale: increase ROM, increase strength and improve coordination to improve the patients ability to perform daily activities.      10 min Manual Therapy:  Tibial distraction and mobs; PROM    Rationale: decrease pain, increase ROM and increase tissue extensibility  to improve the patients ability to perform daily activities. With   [x] TE   [] TA   [] neuro   [] other: Patient Education: [x] Review HEP    [x] Progressed/Changed HEP based on:   [x] positioning   [x] body mechanics   [] transfers   [] heat/ice application    [] other:      Other Objective/Functional Measures: none noted    Pain Level (0-10 scale) post treatment: at rest/walkin/0    ASSESSMENT/Changes in Function:   The patient progressed with strengthening and ROM as tolerated. Patient will continue to benefit from skilled PT services to modify and progress therapeutic interventions, address functional mobility deficits, address ROM deficits, address strength deficits, analyze and address soft tissue restrictions, analyze and cue movement patterns, analyze and modify body mechanics/ergonomics, assess and modify postural abnormalities, address imbalance/dizziness and instruct in home and community integration to attain remaining goals. [x]  See Plan of Care  []  See progress note/recertification  []  See Discharge Summary         Progress towards goals / Updated goals: The patient is progressing towards goals.      PLAN  [x]  Upgrade activities as tolerated     [x]  Continue plan of care  [x]  Update interventions per flow sheet       []  Discharge due to:_  []  Other:_      Lashonda Decker, PT 2020

## 2020-09-09 ENCOUNTER — APPOINTMENT (OUTPATIENT)
Dept: PHYSICAL THERAPY | Age: 55
End: 2020-09-09
Payer: COMMERCIAL

## 2020-09-10 ENCOUNTER — OFFICE VISIT (OUTPATIENT)
Dept: ORTHOPEDIC SURGERY | Age: 55
End: 2020-09-10
Payer: COMMERCIAL

## 2020-09-10 VITALS
TEMPERATURE: 97 F | HEART RATE: 70 BPM | DIASTOLIC BLOOD PRESSURE: 86 MMHG | BODY MASS INDEX: 42.44 KG/M2 | OXYGEN SATURATION: 99 % | SYSTOLIC BLOOD PRESSURE: 129 MMHG | HEIGHT: 68 IN | WEIGHT: 280 LBS

## 2020-09-10 DIAGNOSIS — S83.231D COMPLEX TEAR OF MEDIAL MENISCUS OF RIGHT KNEE AS CURRENT INJURY, SUBSEQUENT ENCOUNTER: Primary | ICD-10-CM

## 2020-09-10 PROCEDURE — 99024 POSTOP FOLLOW-UP VISIT: CPT | Performed by: ORTHOPAEDIC SURGERY

## 2020-09-10 NOTE — PROGRESS NOTES
Identified pt with two pt identifiers (name and ). Reviewed chart in preparation for visit and have obtained necessary documentation. Heraclio Hill is a 54 y.o. female  Chief Complaint   Patient presents with    Surgical Follow-up     RT knee     Visit Vitals  /86 (BP 1 Location: Right arm, BP Patient Position: Sitting)   Pulse 70   Temp 97 °F (36.1 °C) (Tympanic)   Ht 5' 8\" (1.727 m)   Wt 280 lb (127 kg)   SpO2 99%   BMI 42.57 kg/m²     1. Have you been to the ER, urgent care clinic since your last visit? Hospitalized since your last visit? No    2. Have you seen or consulted any other health care providers outside of the [address] [place] [address] since your last visit? Include any pap smears or colon screening.  No

## 2020-09-10 NOTE — PROGRESS NOTES
is here for a follow up visit from a right knee scope. Pain has been appropriate since surgery, no major medical complications since surgery. Current Outpatient Medications on File Prior to Visit   Medication Sig Dispense Refill    cephALEXin (Keflex) 250 mg capsule Take 2 Caps by mouth three (3) times daily. 30 Cap 0    senna-docusate (PERICOLACE) 8.6-50 mg per tablet Take 1 Tab by mouth daily. 30 Tab 0    ergocalciferol (ERGOCALCIFEROL) 1,250 mcg (50,000 unit) capsule Take 1 Cap by mouth every seven (7) days. Indications: low vitamin D levels 5 Cap 5    amitriptyline (ELAVIL) 25 mg tablet Take 1 Tab by mouth nightly. Indications: pain 30 Tab 11    Blood Pressure Monitor kit 1 Device by Does Not Apply route daily. 1 Kit 0    propranolol LA (INDERAL LA) 80 mg SR capsule Take 1 Cap by mouth daily. Indications: migraine prevention 90 Cap 3    valsartan-hydroCHLOROthiazide (DIOVAN-HCT) 80-12.5 mg per tablet Take 1 Tab by mouth daily. Indications: high blood pressure 90 Tab 3    omeprazole (PRILOSEC) 20 mg capsule TAKE 1 CAPSULE BY MOUTH TWICE DAILY  Indications: heartburn 60 Cap 5    diclofenac (VOLTAREN) 1 % gel Apply  to affected area four (4) times daily. (Patient taking differently: Apply 2 g to affected area four (4) times daily. Apply thin layer to affected area) 1 Each 2    acetaminophen (TYLENOL ARTHRITIS PAIN) 650 mg CR tablet Take 1,300 mg by mouth three (3) times daily. No current facility-administered medications on file prior to visit. ROS:  General: denies agitation, major chest pain, unexpected weakness  Patient states no issues  Skin: healing wound is without issue or drainage   Strength: appropriate weakness of involved extremity is resolving since surgery      Physical Examination:    Blood pressure 129/86, pulse 70, temperature 97 °F (36.1 °C), temperature source Tympanic, height 5' 8\" (1.727 m), weight 280 lb (127 kg), SpO2 99 %.     Dressing: none  Skin: intact  Sensation intact to light touch at level of wound and distally  Strength is 5/5 knee extension  Range of motion is full  Distal swelling is noted, but appropriate for postoperative course  Distal capillary refill less than 2 seconds      Imaging:    Postoperative imaging: none      Assessment: Status post successful knee arthroscopy    Plan:  Patient will finish physical therapy  Wound care was reviewed  Weightbearing will be as tolerated through the knee  Deep Venous Thrombosis Prophylaxis: none    Follow up will be prn

## 2020-09-11 ENCOUNTER — HOSPITAL ENCOUNTER (OUTPATIENT)
Dept: PHYSICAL THERAPY | Age: 55
Discharge: HOME | End: 2020-09-11
Payer: COMMERCIAL

## 2020-09-11 PROCEDURE — 97140 MANUAL THERAPY 1/> REGIONS: CPT | Performed by: PHYSICAL THERAPIST

## 2020-09-11 PROCEDURE — 97110 THERAPEUTIC EXERCISES: CPT | Performed by: PHYSICAL THERAPIST

## 2020-09-11 NOTE — PROGRESS NOTES
PT DAILY TREATMENT NOTE 2-15    Patient Name: Dani Cho  Date:2020  : 1965  [x]  Patient  Verified  Payor: [id number] / Plan: [facility] [employer] [address] / Product Type: Commerical /    In time: 7:00am Out time: 7:45am  Total Treatment Time (min): 45  Visit #:  13    Treatment Area: Right knee pain [M25.561]    SUBJECTIVE  Pain Level (0-10 scale): at rest/walkin/0  Any medication changes, allergies to medications, adverse drug reactions, diagnosis change, or new procedure performed?: [x] No    [] Yes (see summary sheet for update)  Subjective functional status/changes:   [] No changes reported  The patient reports that she slipped on a wet floor at work on Friday and her knee hurt all weekend, she stretched and iced, and it felt better around Tuesday.      OBJECTIVE    Modality rationale: decrease edema, decrease inflammation and decrease pain to improve the patients ability to perform daily activities   Min Type Additional Details       [] Estim: []Att   []Unatt    []TENS instruct                  []IFC  []Premod   []NMES                     []Other:  []w/US   []w/ice   []w/heat  Position:  Location:       []  Traction: [] Cervical       []Lumbar                       [] Prone          []Supine                       []Intermittent   []Continuous Lbs:  [] before manual  [] after manual  []w/heat    []  Ultrasound: []Continuous   [] Pulsed                       at: []1MHz   []3MHz Location:  W/cm2:    [] Paraffin         Location:   []w/heat    []  Ice     []  Heat  []  Ice massage Position:  Location:    []  Laser  []  Other: Position:  Location:   NT   [x]  Vasopneumatic Device Pressure:       [] lo [x] med [] hi   Temperature: 34     [x] Skin assessment post-treatment:  [x]intact []redness- no adverse reaction    []redness  adverse reaction:         35 min Therapeutic Exercise:  [x] See flow sheet :   Rationale: increase ROM, increase strength and improve coordination to improve the patients ability to perform daily activities. 10 min Manual Therapy:  Tibial distraction and mobs; PROM    Rationale: decrease pain, increase ROM and increase tissue extensibility  to improve the patients ability to perform daily activities. With   [x] TE   [] TA   [] neuro   [] other: Patient Education: [x] Review HEP    [x] Progressed/Changed HEP based on:   [x] positioning   [x] body mechanics   [] transfers   [] heat/ice application    [] other:      Other Objective/Functional Measures: none noted    Pain Level (0-10 scale) post treatment: at rest/walkin/0    ASSESSMENT/Changes in Function:   The patient progressed with strengthening and mobility as tolerated. Patient will continue to benefit from skilled PT services to modify and progress therapeutic interventions, address functional mobility deficits, address ROM deficits, address strength deficits, analyze and address soft tissue restrictions, analyze and cue movement patterns, analyze and modify body mechanics/ergonomics, assess and modify postural abnormalities, address imbalance/dizziness and instruct in home and community integration to attain remaining goals. [x]  See Plan of Care  []  See progress note/recertification  []  See Discharge Summary         Progress towards goals / Updated goals: The patient is progressing towards goals.      PLAN  [x]  Upgrade activities as tolerated     [x]  Continue plan of care  [x]  Update interventions per flow sheet       []  Discharge due to:_  []  Other:_      Tyrone River, PT 2020

## 2020-09-14 ENCOUNTER — APPOINTMENT (OUTPATIENT)
Dept: PHYSICAL THERAPY | Age: 55
End: 2020-09-14
Payer: COMMERCIAL

## 2020-09-16 ENCOUNTER — HOSPITAL ENCOUNTER (OUTPATIENT)
Dept: PHYSICAL THERAPY | Age: 55
Discharge: HOME | End: 2020-09-16
Payer: COMMERCIAL

## 2020-09-16 PROCEDURE — 97140 MANUAL THERAPY 1/> REGIONS: CPT | Performed by: PHYSICAL THERAPIST

## 2020-09-16 PROCEDURE — 97110 THERAPEUTIC EXERCISES: CPT | Performed by: PHYSICAL THERAPIST

## 2020-09-16 NOTE — PROGRESS NOTES
PT DAILY TREATMENT NOTE 2-15    Patient Name: Ana Cristina Poster  Date:2020  : 1965  [x]  Patient  Verified  Payor: [employer] [place] / Plan: [facility] MEDICAL [address] / Product Type: Commerical /    In time: 5:13pm Out time: 6:06pm  Total Treatment Time (min): 53  Visit #:  14    Treatment Area: Right knee pain [M25.561]    SUBJECTIVE  Pain Level (0-10 scale): at rest/walkin/0  Any medication changes, allergies to medications, adverse drug reactions, diagnosis change, or new procedure performed?: [x] No    [] Yes (see summary sheet for update)  Subjective functional status/changes:   [] No changes reported  The patient reports that it will only hurt if she turns on a daily basis. OBJECTIVE    Modality rationale: decrease edema, decrease inflammation and decrease pain to improve the patients ability to perform daily activities   Min Type Additional Details       [] Estim: []Att   []Unatt    []TENS instruct                  []IFC  []Premod   []NMES                     []Other:  []w/US   []w/ice   []w/heat  Position:  Location:       []  Traction: [] Cervical       []Lumbar                       [] Prone          []Supine                       []Intermittent   []Continuous Lbs:  [] before manual  [] after manual  []w/heat    []  Ultrasound: []Continuous   [] Pulsed                       at: []1MHz   []3MHz Location:  W/cm2:    [] Paraffin         Location:   []w/heat    []  Ice     []  Heat  []  Ice massage Position:  Location:    []  Laser  []  Other: Position:  Location:   NT   [x]  Vasopneumatic Device Pressure:       [] lo [x] med [] hi   Temperature: 34     [x] Skin assessment post-treatment:  [x]intact []redness- no adverse reaction    []redness  adverse reaction:         43 min Therapeutic Exercise:  [x] See flow sheet :   Rationale: increase ROM, increase strength and improve coordination to improve the patients ability to perform daily activities.      10 min Manual Therapy:  Tibial distraction and mobs; PROM    Rationale: decrease pain, increase ROM and increase tissue extensibility  to improve the patients ability to perform daily activities. With   [x] TE   [] TA   [] neuro   [] other: Patient Education: [x] Review HEP    [x] Progressed/Changed HEP based on:   [x] positioning   [x] body mechanics   [] transfers   [] heat/ice application    [] other:      Other Objective/Functional Measures: none noted    Pain Level (0-10 scale) post treatment: at rest/walkin/0    ASSESSMENT/Changes in Function:   The patient progressed with increased resistance with therex as tolerated. Patient will continue to benefit from skilled PT services to modify and progress therapeutic interventions, address functional mobility deficits, address ROM deficits, address strength deficits, analyze and address soft tissue restrictions, analyze and cue movement patterns, analyze and modify body mechanics/ergonomics, assess and modify postural abnormalities, address imbalance/dizziness and instruct in home and community integration to attain remaining goals. [x]  See Plan of Care  []  See progress note/recertification  []  See Discharge Summary         Progress towards goals / Updated goals: The patient is progressing towards goals.      PLAN  [x]  Upgrade activities as tolerated     [x]  Continue plan of care  [x]  Update interventions per flow sheet       []  Discharge due to:_  []  Other:_      Mono Maddox, PT 2020

## 2020-09-18 ENCOUNTER — PATIENT OUTREACH (OUTPATIENT)
Dept: OTHER | Age: 55
End: 2020-09-18

## 2020-09-18 ENCOUNTER — HOSPITAL ENCOUNTER (OUTPATIENT)
Dept: PHYSICAL THERAPY | Age: 55
Discharge: HOME | End: 2020-09-18
Payer: COMMERCIAL

## 2020-09-18 NOTE — LETTER
9/18/2020 2:47 PM 
 
Ms. Gordon Trevino [phone] Dear Ms. Parham,  
 
I have been trying to reach you for a follow up call for services with our Associate Care Management Program. Your wellbeing is very important to us. With continued partnership in the [facility] program, we hope to work with you to optimize your health and increase your quality of life. I look forward to continuing to work with you. Please contact me at your convenience and we can schedule a time that works best for you. Sincerely, Anastasiya Edge, JERILYNN  Associate Care Manager [address] [place] [address] [address] [facility]  [address] [address] [phone]  F [phone]  [email or web address] email: [email or web address]

## 2020-09-18 NOTE — PROGRESS NOTES
Attempt to reach patient for follow up. Unable to leave VM  To send lost to follow up letter. Will resolve on 10/2 if I don't hear back from her. Patient has been participating with PT and had her f/up visit with ortho.

## 2020-09-21 ENCOUNTER — HOSPITAL ENCOUNTER (OUTPATIENT)
Dept: PHYSICAL THERAPY | Age: 55
Discharge: HOME | End: 2020-09-21
Payer: COMMERCIAL

## 2020-09-21 PROCEDURE — 97140 MANUAL THERAPY 1/> REGIONS: CPT | Performed by: PHYSICAL THERAPIST

## 2020-09-21 PROCEDURE — 97110 THERAPEUTIC EXERCISES: CPT | Performed by: PHYSICAL THERAPIST

## 2020-09-21 NOTE — PROGRESS NOTES
PT DAILY TREATMENT NOTE 2-15    Patient Name: Michele Bustamante  Date:2020  : 1965  [x]  Patient  Verified  Payor: [employer] [place] / Plan: [facility] MEDICAL [address] / Product Type: Commerical /    In time: 5:14pm Out time: 6:07pm  Total Treatment Time (min): 53  Visit #:  15    Treatment Area: Right knee pain [M25.561]    SUBJECTIVE  Pain Level (0-10 scale): at rest/walkin/2  Any medication changes, allergies to medications, adverse drug reactions, diagnosis change, or new procedure performed?: [x] No    [] Yes (see summary sheet for update)  Subjective functional status/changes:   [] No changes reported  The patient reports that she was irritated all weekend; it's bothering her in the same spot (anteromedial) when she's walking and turns to the right.       OBJECTIVE    Modality rationale: decrease edema, decrease inflammation and decrease pain to improve the patients ability to perform daily activities   Min Type Additional Details       [] Estim: []Att   []Unatt    []TENS instruct                  []IFC  []Premod   []NMES                     []Other:  []w/US   []w/ice   []w/heat  Position:  Location:       []  Traction: [] Cervical       []Lumbar                       [] Prone          []Supine                       []Intermittent   []Continuous Lbs:  [] before manual  [] after manual  []w/heat    []  Ultrasound: []Continuous   [] Pulsed                       at: []1MHz   []3MHz Location:  W/cm2:    [] Paraffin         Location:   []w/heat    []  Ice     []  Heat  []  Ice massage Position:  Location:    []  Laser  []  Other: Position:  Location:   NT   [x]  Vasopneumatic Device Pressure:       [] lo [x] med [] hi   Temperature: 34     [x] Skin assessment post-treatment:  [x]intact []redness- no adverse reaction    []redness  adverse reaction:         43 min Therapeutic Exercise:  [x] See flow sheet :   Rationale: increase ROM, increase strength and improve coordination to improve the patients ability to perform daily activities. 10 min Manual Therapy:  Tibial distraction and mobs; PROM    Rationale: decrease pain, increase ROM and increase tissue extensibility  to improve the patients ability to perform daily activities. With   [x] TE   [] TA   [] neuro   [] other: Patient Education: [x] Review HEP    [x] Progressed/Changed HEP based on:   [x] positioning   [x] body mechanics   [] transfers   [] heat/ice application    [] other:      Other Objective/Functional Measures: none noted    Pain Level (0-10 scale) post treatment: at rest/walkin/2    ASSESSMENT/Changes in Function:   The patient progressed with modified strengthening as tolerated today, but limited as she was flared up with most weightbearing exercises. Patient will continue to benefit from skilled PT services to modify and progress therapeutic interventions, address functional mobility deficits, address ROM deficits, address strength deficits, analyze and address soft tissue restrictions, analyze and cue movement patterns, analyze and modify body mechanics/ergonomics, assess and modify postural abnormalities, address imbalance/dizziness and instruct in home and community integration to attain remaining goals. [x]  See Plan of Care  []  See progress note/recertification  []  See Discharge Summary         Progress towards goals / Updated goals: The patient is progressing towards goals.      PLAN  [x]  Upgrade activities as tolerated     [x]  Continue plan of care  [x]  Update interventions per flow sheet       []  Discharge due to:_  []  Other:_      Karen Bardales, PT 2020

## 2020-09-23 ENCOUNTER — HOSPITAL ENCOUNTER (OUTPATIENT)
Dept: PHYSICAL THERAPY | Age: 55
Discharge: HOME | End: 2020-09-23
Payer: COMMERCIAL

## 2020-09-23 PROCEDURE — 97110 THERAPEUTIC EXERCISES: CPT | Performed by: PHYSICAL THERAPIST

## 2020-09-23 PROCEDURE — 97140 MANUAL THERAPY 1/> REGIONS: CPT | Performed by: PHYSICAL THERAPIST

## 2020-09-23 NOTE — PROGRESS NOTES
PT DAILY TREATMENT NOTE 2-15    Patient Name: Kallie Garza  Date:2020  : 1965  [x]  Patient  Verified  Payor: [employer] [place] / Plan: [facility] MEDICAL [address] / Product Type: Commerical /    In time: 5:15pm Out time: 6:08pm  Total Treatment Time (min): 53  Visit #:  16    Treatment Area: Right knee pain [M25.561]    SUBJECTIVE  Pain Level (0-10 scale): at rest/walkin/2  Any medication changes, allergies to medications, adverse drug reactions, diagnosis change, or new procedure performed?: [x] No    [] Yes (see summary sheet for update)  Subjective functional status/changes:   [] No changes reported  The patient reports that it's still bothering her when she's walking, but it's slightly better than the other day.      OBJECTIVE    Modality rationale: decrease edema, decrease inflammation and decrease pain to improve the patients ability to perform daily activities   Min Type Additional Details       [] Estim: []Att   []Unatt    []TENS instruct                  []IFC  []Premod   []NMES                     []Other:  []w/US   []w/ice   []w/heat  Position:  Location:       []  Traction: [] Cervical       []Lumbar                       [] Prone          []Supine                       []Intermittent   []Continuous Lbs:  [] before manual  [] after manual  []w/heat    []  Ultrasound: []Continuous   [] Pulsed                       at: []1MHz   []3MHz Location:  W/cm2:    [] Paraffin         Location:   []w/heat    []  Ice     []  Heat  []  Ice massage Position:  Location:    []  Laser  []  Other: Position:  Location:   NT   [x]  Vasopneumatic Device Pressure:       [] lo [x] med [] hi   Temperature: 34     [x] Skin assessment post-treatment:  [x]intact []redness- no adverse reaction    []redness  adverse reaction:         43 min Therapeutic Exercise:  [x] See flow sheet :   Rationale: increase ROM, increase strength and improve coordination to improve the patients ability to perform daily activities. 10 min Manual Therapy:  Tibial distraction and mobs; PROM    Rationale: decrease pain, increase ROM and increase tissue extensibility  to improve the patients ability to perform daily activities. With   [x] TE   [] TA   [] neuro   [] other: Patient Education: [x] Review HEP    [x] Progressed/Changed HEP based on:   [x] positioning   [x] body mechanics   [] transfers   [] heat/ice application    [] other:      Other Objective/Functional Measures: none noted    Pain Level (0-10 scale) post treatment: at rest/walkin/2    ASSESSMENT/Changes in Function:   The patient progressed as tolerated with modified exercises due to decreased WB tolerance. Patient will continue to benefit from skilled PT services to modify and progress therapeutic interventions, address functional mobility deficits, address ROM deficits, address strength deficits, analyze and address soft tissue restrictions, analyze and cue movement patterns, analyze and modify body mechanics/ergonomics, assess and modify postural abnormalities, address imbalance/dizziness and instruct in home and community integration to attain remaining goals. [x]  See Plan of Care  []  See progress note/recertification  []  See Discharge Summary         Progress towards goals / Updated goals: The patient is progressing towards goals.      PLAN  [x]  Upgrade activities as tolerated     [x]  Continue plan of care  [x]  Update interventions per flow sheet       []  Discharge due to:_  []  Other:_      Michelle Tian, PT 2020

## 2020-09-28 ENCOUNTER — HOSPITAL ENCOUNTER (OUTPATIENT)
Dept: PHYSICAL THERAPY | Age: 55
Discharge: HOME | End: 2020-09-28
Payer: COMMERCIAL

## 2020-09-28 PROCEDURE — 97140 MANUAL THERAPY 1/> REGIONS: CPT | Performed by: PHYSICAL THERAPIST

## 2020-09-28 PROCEDURE — 97110 THERAPEUTIC EXERCISES: CPT | Performed by: PHYSICAL THERAPIST

## 2020-09-28 NOTE — PROGRESS NOTES
PT DAILY TREATMENT NOTE 2-15    Patient Name: Kannan Plasencia  Date:2020  : 1965  [x]  Patient  Verified  Payor: [employer] [place] / Plan: [facility] MEDICAL [address] / Product Type: Commerical /    In time: 5:04pm Out time: 5:50pm  Total Treatment Time (min): 46  Visit #:  17    Treatment Area: Right knee pain [M25.561]    SUBJECTIVE  Pain Level (0-10 scale): at rest/walkin/1  Any medication changes, allergies to medications, adverse drug reactions, diagnosis change, or new procedure performed?: [x] No    [] Yes (see summary sheet for update)  Subjective functional status/changes:   [] No changes reported  The patient reports that it feels better than last week; feels 80% better overall, she's ready to graduate; it will still hurt if she turns to the right.      OBJECTIVE    Modality rationale: decrease edema, decrease inflammation and decrease pain to improve the patients ability to perform daily activities   Min Type Additional Details       [] Estim: []Att   []Unatt    []TENS instruct                  []IFC  []Premod   []NMES                     []Other:  []w/US   []w/ice   []w/heat  Position:  Location:       []  Traction: [] Cervical       []Lumbar                       [] Prone          []Supine                       []Intermittent   []Continuous Lbs:  [] before manual  [] after manual  []w/heat    []  Ultrasound: []Continuous   [] Pulsed                       at: []1MHz   []3MHz Location:  W/cm2:    [] Paraffin         Location:   []w/heat    []  Ice     []  Heat  []  Ice massage Position:  Location:    []  Laser  []  Other: Position:  Location:   NT   [x]  Vasopneumatic Device Pressure:       [] lo [x] med [] hi   Temperature: 34     [x] Skin assessment post-treatment:  [x]intact []redness- no adverse reaction    []redness  adverse reaction:         36 min Therapeutic Exercise:  [x] See flow sheet :   Rationale: increase ROM, increase strength and improve coordination to improve the patients ability to perform daily activities. 10 min Manual Therapy:  Tibial distraction and mobs; PROM    Rationale: decrease pain, increase ROM and increase tissue extensibility  to improve the patients ability to perform daily activities. With   [x] TE   [] TA   [] neuro   [] other: Patient Education: [x] Review HEP    [x] Progressed/Changed HEP based on:   [x] positioning   [x] body mechanics   [] transfers   [] heat/ice application    [] other:      Other Objective/Functional Measures: FOTO= 64 (50 at eval)    A/PROM: Ext= -5 hyper/-8; Flex= 120/130    Pain Level (0-10 scale) post treatment: at rest/walkin/1    ASSESSMENT/Changes in Function:   The patient has progressed well and MET most goals and will be discharged to her HEP today. []  See Plan of Care  []  See progress note/recertification  [x]  See Discharge Summary         Progress towards goals / Updated goals: The patient has MET is progressing towards goals. PLAN  []  Upgrade activities as tolerated     []  Continue plan of care  []  Update interventions per flow sheet       [x]  Discharge due to: Pt has MET goals.    []  Other:Benito Patel, PT 2020

## 2020-09-28 NOTE — ANCILLARY DISCHARGE INSTRUCTIONS
[facility] [address]), [address] [place], [facility] Phone: [phone] Fax: [phone] Discharge Summary  2-15 Patient name: Agata Chino  : 1965  Provider#: [id number] Referral source: Kleber Norman DO Medical/Treatment Diagnosis: Right knee pain [M25.561] Prior Hospitalization: see medical history Comorbidities: See Plan of Care Prior Level of Function:See Plan of Care Medications: Verified on Patient Summary List 
 
Start of Care: 20      Onset Date: s/p right medial meniscectomy 20 Visits from Start of Care: 17     Missed Visits: 2 Reporting Period : 20 to 20 ASSESSMENT/SUMMARY OF CARE: The patient has progressed with the following A/PROM knee: Flex= 120/130 (110/115 at eval); Ext= 5 hyperextension/8 hyper (-25/0 at eval). She has minimal to no pain at rest, but for the past 2-3 weeks, she will have some discomfort with ambulation, especially when turning to the right. She did have a fall at work on 20, slipping on a wet floor, which flared her knee up all that weekend but felt better towards the middle of the following week. Overall she feels she is 80 percent better. She has a progressing and safe HEP that she will continue to utilize to maintain improvements made thus far. Short Term Goals: To be accomplished in 2 treatments: 
                       7.) The patient will be independent with their HEP consistently for at least one week. - MET Long Term Goals: To be accomplished in 16 treatments: 
                       8.) The patient will have at most 2/10 pain with daily activities. - MET 
                       5.) The patient will be able to ambulate for at least 40 min without having to change positions due to pain. - Not MET (25-30 minutes at most)                        2.) The patient will improve their FOTO score from 50 to at least 60 to show improvements in functional mobility.- MET (64 today) RECOMMENDATIONS: 
[x]Discontinue therapy: [x]Patient has reached or is progressing toward set goals []Patient is non-compliant or has abdicated 
    []Due to lack of appreciable progress towards set goals []Other Stefani Maria, PT 9/28/2020

## 2020-09-30 ENCOUNTER — APPOINTMENT (OUTPATIENT)
Dept: PHYSICAL THERAPY | Age: 55
End: 2020-09-30
Payer: COMMERCIAL

## 2020-10-02 ENCOUNTER — PATIENT OUTREACH (OUTPATIENT)
Dept: OTHER | Age: 55
End: 2020-10-02

## 2020-10-02 NOTE — PROGRESS NOTES
Resolving current episode for case management due to patient lost to follow up. Patient has not been reached after repeated calls and letters. Final call made today to attempt to contact and discreet message left on voicemail. Letter sent to patient notifying completion of services due to unable to reach. This writer's contact information and information regarding program services included in materials sent. Goals updated to reflect current status as appropriate. Will remain available should patient request re-initiation of case management or transitions of care services.     Per chart review, patient continues to participate in PT.

## 2020-12-10 ENCOUNTER — OFFICE VISIT (OUTPATIENT)
Dept: SURGERY | Age: 55
End: 2020-12-10
Payer: COMMERCIAL

## 2020-12-10 VITALS
WEIGHT: 284 LBS | HEIGHT: 68 IN | BODY MASS INDEX: 43.04 KG/M2 | SYSTOLIC BLOOD PRESSURE: 144 MMHG | TEMPERATURE: 99.1 F | RESPIRATION RATE: 18 BRPM | DIASTOLIC BLOOD PRESSURE: 88 MMHG | HEART RATE: 68 BPM | OXYGEN SATURATION: 98 %

## 2020-12-10 DIAGNOSIS — E66.01 MORBID OBESITY (HCC): Primary | ICD-10-CM

## 2020-12-10 DIAGNOSIS — K21.9 CHRONIC GERD: ICD-10-CM

## 2020-12-10 DIAGNOSIS — I10 ESSENTIAL HYPERTENSION: ICD-10-CM

## 2020-12-10 PROCEDURE — 99204 OFFICE O/P NEW MOD 45 MIN: CPT | Performed by: NURSE PRACTITIONER

## 2020-12-10 NOTE — PROGRESS NOTES
1. Have you been to the ER, urgent care clinic since your last visit? Hospitalized since your last visit? No    2. Have you seen or consulted any other health care providers outside of the [address] [place] [address] since your last visit? Include any pap smears or colon screening.  No     Neck: 16 in  Waist:51 in  Hips: 57 in

## 2020-12-10 NOTE — PATIENT INSTRUCTIONS
NEXT STEPS: 
  
Surgery type: Pending sleeve vs gastric bypass Surgeon: Lloyd Allen MD  
  
 
1. Contact Marlin Hernandez RD, the bariatric dietician to arrange an evaluation and get started with your monthly nutrition visits [email or web address]  
     [phone] Mauricio Westfall requires a certain number of months of supervised counseling for weight loss, exercise and nutrition before approval for surgery. ** See below for your specific insurance: 
  
2 months: [address], [address] [facility], [place] [facility] [address], [employer], [employer] 3 months (90 days): [employer], [address] [facility], [address] [facility], [address] [facility] 4 months: 
Medicare 6 months (180 days): [facility] Mary Ann Franks, [facility] [place], [address], [address] [facility], 253 [facility], [address] [facility], [address], [facility], [address] [facility], [address] [facility], [address] [facility], [address] [facility] [address] ([address] [facility], [address] [place], [place] [address] 1 year (12 months): [address] [facility], [address] [facility] 2. Call to set up your psychological evaluation with any of the providers below: 
  
[address] at [phone] [facility] SPECIALTY [facility] at [phone] Dr. Naa Odonnell. D [phone] 
  
  
3. You will be scheduled for an upper GI xray prior to surgery and central scheduling will contact you to arrange. You can reach them at [phone] if needed. 4.  Plan on an upper endoscopy prior to surgery and they will contact you. After the endoscopy, we will talk after the tests. If you have any insurance questions or questions about the process, you can contact one of the bariatric coordinators Email [email or web address]  
or Email [email or web address]  
or  
call [phone]

## 2020-12-15 NOTE — PROGRESS NOTES
Chief Complaint   Patient presents with    New Patient     Gastric Jan is a new patient seeking surgical treatment for morbid obesity. Patient has had obesity for 30+ years. She is interested in sleeve gastrectomy for treatment of morbid obesity. Body mass index is 43.18 kg/m².      Seminar  On line     Dietary Habits \"carb eater\", skips meals and snacks on chips and candy        Liquids  Juices, coffee (w/ cream and sugar) and orange crush soda         Exercise  None     Previous weight reduction efforts    _x__ Unsupervised diets  ___ Weight Watchers   ___ On line weight loss programs   ___ Medically supervised weight loss   _x__ Low carb (Keto, Atkins)  _x__ Prescription obesity medication (phentermine)      Adult High weight  296 lbs   Adult Low weight  170-180 lbs     Surgical Risk factors:  General     Diabetes  PREDIABETES Insulin NO     Current smoker within past 12 months  NO      Functional Health Status  X Independent   - Partially dependent   - Totally dependent   - Unknown   Pulmonary     COPD  NO   Oxygen dependent  NO     BURKE (requiring CPAP/BiPap) NO      Gastrointestinal     GERD requiring medication past 30 days  YES      Musculoskeletal     Is ambulation limited most of the time or all the time NO     Cardiac    Previous MI NO        Hypertension requiring medication  YES         Hyperlipidemia requiring medication NO     Vascular     History of DVT NO         Venous stasis NO     IVC filter  NO      Renal    Dialysis  NO    Renal insufficiency  NO     Other  Steroids/Immunosuppressants for chronic condition NO   Previous abdominal surgery     YES   Patient Active Problem List   Diagnosis Code    Iron deficiency anemia D50.9    Gastritis K29.70    Allergic rhinitis J30.9    Hiatal hernia K44.9    Radiculopathy, cervical M54.12    Heart palpitations R00.2    Shoulder pain, right M25.511    Multiple thyroid nodules E04.2    Chronic insomnia F51.04    Vitamin D deficiency E55.9    Incidental lung nodule R91.1    Irritable bowel syndrome (IBS) K58.9    Gastrointestinal food allergy K52.29    Chronic lower back pain M54.5, G89.29    Essential hypertension I10    Advanced care planning/counseling discussion Z71.89    Intractable migraine without aura and without status migrainosus G43.019    Allergic rhinitis due to allergen J30.9    Aspirin intolerance Z78.9    Obesity, Class III, BMI 40-49.9 (morbid obesity) ([facility]) E66.01    Hyperglycemia R73.9    High cholesterol E78.00    Urgency of urination R39.15    Degeneration of lumbosacral intervertebral disc M51.37    Hip pain M25.559    Low back pain M54.5    Lumbar radiculopathy M54.16    Pain in both lower extremities M79.604, M79.605    Chronic pain of right knee M25.561, G89.29    Primary osteoarthritis of left knee M17.12    Peroneal tendonitis of lower leg M76.70    Left foot pain M79.672    Heel spur, left M77.32    Chronic meniscal tear of knee M23.209    Bilateral primary osteoarthritis of knee M17.0    Complex tear of medial meniscus of right knee as current injury S83.231A    Complex tear of medial meniscus of right knee, sequela S83.231S    Complex tear of meniscus of right knee J40.205X       Past Surgical History:   Procedure Laterality Date    HX  SECTION  1995    x 1    HX CHOLECYSTECTOMY  1987    OPEN    HX COLONOSCOPY  14    Dr. Liz Khan.    HX COLPOSCOPY  1980s; 14    due to abnormal PAP.  HX DILATION AND CURETTAGE      due to miscarriage.  HX ENDOSCOPY      HX HEART CATHETERIZATION      no stents    HX KNEE ARTHROSCOPY      HX ORTHOPAEDIC Right 2016    LUMBAR L4-5, L5-S1 ELLY.   Dr. Cele Deluca     Past Medical History:   Diagnosis Date    Allergic rhinitis 2010    Arthritis     Baker cyst, left 10/2019    Dr. Ruth Haskins cyst, right     Cervical disc herniation 2010    C3-4, C4-5, C5-6.  Hemangioma body of C5. Dr. Governor Herr.  Chest pain 08/2011, 02/2012    Dr. Ulysses Hake. Dr. Uzair Lemons; denies CP as of 3/27/14    Chronic lower back pain 2010, 03/2016    thoracic DDD and spondylosis. DDD L3-S1. Dr. Alisha Hays. [place]. [place] [facility] [place].  Chronic meniscal tear of knee     Chronic pain     lower back and knees    Esophagitis, reflux 4/3/2011    Gallstone     Gastritis 5/27/2010    Dr. Linette Flanagan.  Gastrointestinal food allergy 03/2014    Multiple. Dr. Valentina Amaya.    Heart palpitations 02/2012    DUE TO PAC'S AND PVC'S. Dr. Angel Garcia.  Heel spur, left 02/2019    Hiatal hernia 4/3/2011    HTN (hypertension) 1987    Impaired glucose tolerance 10/15/2010    Incidental lung nodule 01/08/13    LLL 3.9 mm, RML 3.1 mm;  as of 3/27/14 per pt stable w/o growth    Insomnia 1990s    Iron deficiency anemia 5/27/2010    Irritable bowel syndrome (IBS) 03/2014    Dr. Charleen Monique.    Knee pain 2012    Right. due to severe OA. / chipped bone     Left foot pain 08/2019    Dr. Loren Gomez.  Metatarsal bone fracture 1990    Left fifth.  Migraine 2/22/2011    Dr. Barrera Nip Morbid obesity [facility])     Peroneal tendonitis of lower leg 08/07/2019    Left. Dr. Loren Gomez    Pre-diabetes     Radiculopathy, cervical 01/2010    Left. Dr. Governor Herr.  Shoulder pain, right 2012    due to [address] X 2. Dr. Sukhi Hodge.  Thyroid nodule 2011    6 nodules- Dr. Lobato Dun    Toe fracture, left 2008    fifth toe.  Triangular fibrocartilage complex tear 2012    Dr. Helga Fowler. Left.      Family History   Problem Relation Age of Onset    Diabetes Mother     Heart Disease Mother 58        2 stents    Hypertension Mother     Stroke Mother     Diabetes Father     Hypertension Father     Stroke Maternal Grandmother     Cancer Maternal Uncle         prostate    Diabetes Sister      Social History     Socioeconomic History    Marital status:      Spouse name: Not on file    Number of children: 3    Years of education: Not on file    Highest education level: Not on file   Occupational History    Occupation: Medical assistant     Employer: [address] [place] [address] Financial resource strain: Not on file    Food insecurity     Worry: Not on file     Inability: Not on file    Transportation needs     Medical: Not on file     Non-medical: Not on file   Tobacco Use    Smoking status: Never Smoker    Smokeless tobacco: Never Used   Substance and Sexual Activity    Alcohol use: No    Drug use: Never    Sexual activity: Yes     Partners: Male   Lifestyle    Physical activity     Days per week: Not on file     Minutes per session: Not on file    Stress: Not on file   Relationships    Social connections     Talks on phone: Not on file     Gets together: Not on file     Attends Latter day service: Not on file     Active member of club or organization: Not on file     Attends meetings of clubs or organizations: Not on file     Relationship status: Not on file    Intimate partner violence     Fear of current or ex partner: Not on file     Emotionally abused: Not on file     Physically abused: Not on file     Forced sexual activity: Not on file   Other Topics Concern    Not on file   Social History Narrative    In the home alone     3 adult children     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 Indications: low vitamin D levels, Disp: 5 Cap, Rfl: 5    propranolol LA (INDERAL LA) 80 mg SR capsule, Take 1 Cap by mouth daily. Indications: migraine prevention, Disp: 90 Cap, Rfl: 3    valsartan-hydroCHLOROthiazide (DIOVAN-HCT) 80-12.5 mg per tablet, Take 1 Tab by mouth daily.  Indications: high blood pressure, Disp: 90 Tab, Rfl: 3    omeprazole (PRILOSEC) 20 mg capsule, TAKE 1 CAPSULE BY MOUTH TWICE DAILY  Indications: heartburn, Disp: 60 Cap, Rfl: 5    acetaminophen (TYLENOL ARTHRITIS PAIN) 650 mg CR tablet, Take 1,300 mg by mouth three (3) times daily. , Disp: , Rfl:     amitriptyline (ELAVIL) 25 mg tablet, Take 1 Tab by mouth nightly. Indications: pain, Disp: 30 Tab, Rfl: 11  Allergies   Allergen Reactions    Doxylamine Succinate Swelling     12.5-25 MG  HANDS, FACE/PERIORAL, FEET    Pcn [Penicillins] Hives    Ambien [Zolpidem] Nausea and Vomiting and Other (comments)     5 mg with Ativan 0.5 mg   TOO GROGGY, SLEPT 24 HOURS  7.5 MG FORGOT SHE WAS SLEEP EATING    Aspirin Nausea Only    Celebrex [Celecoxib] Other (comments)     TIGHT SQUEEZING IN CHEST  CHEST ACHED    Erythromycin Nausea and Vomiting    Milk Prot-Turm-Pepper-Pineappl Other (comments)     Multiple food allergies    Neurontin [Gabapentin] Other (comments)     300 mg  SEVERE LEG PAIN  EYE TWITCHING    Nsaids (Non-Steroidal Anti-Inflammatory Drug) Other (comments)     GI irritation       Wileen Favor R, DO    Review of Systems   Constitutional: Positive for malaise/fatigue (PRN). HENT: Negative for hearing loss. Eyes: Negative for blurred vision. Respiratory: Negative for cough, shortness of breath and wheezing. Cardiovascular: Negative for chest pain, palpitations and leg swelling. Gastrointestinal: Positive for constipation, diarrhea (IBS) and heartburn (HX GASTRITIS AND ESOPHAGITIS ). Negative for abdominal pain, blood in stool, melena, nausea and vomiting. IBS     GI -  [address] LAST EVAL 2014 = ESOPHAGITIS   OPEN SUNITA    Genitourinary: Positive for frequency and urgency. Negative for dysuria, flank pain and hematuria. Musculoskeletal: Positive for back pain (HAS HAD INJECTIONS IN BACK WITH MINIMAL RELIEF ), joint pain (KNEE) and myalgias. Neurological: Negative for dizziness, seizures and headaches.    Endo/Heme/Allergies:        HX ANEMIA   Psychiatric/Behavioral: Negative for depression and substance abuse. The patient is not nervous/anxious and does not have insomnia (SLEEPS OK MOST OF THE TIME ). STOPBANG questionnaire     Do you Snore loudly? NO  Do you often feel Tired, fatigued, or sleepy during the daytime? PRN  Has anyone Observed you stop breathing during your sleep? NO  Are you being treated for high blood Pressure? YES  BMI more than 35 kg/m2? YES  Age over 48years old? YES  Neck Circumference >16 inches? NO  Gender male? NO  ______________________________________     SCORE: 3     If YES to 0  2, low risk of sleep apnea  If YES to 3  4 intermediate risk of having sleep apnea  If YES to 5  8 high risk of having sleep apnea (or 2 + BMI 35 or Neck > 17\" or Male)     Physical Exam  Constitutional:       Appearance: She is obese. Comments: BP (!) 144/88 (BP 1 Location: Left arm, BP Patient Position: Sitting)   Pulse 68   Temp 99.1 °F (37.3 °C) (Oral)   Resp 18   Ht 5' 8\" (1.727 m)   Wt 284 lb (128.8 kg)   SpO2 98%   BMI 43.18 kg/m²   AA FEMALE WITH OBESITY    Cardiovascular:      Rate and Rhythm: Normal rate and regular rhythm. Pulmonary:      Effort: Pulmonary effort is normal.      Breath sounds: Normal breath sounds. Abdominal:      Palpations: Abdomen is soft. Tenderness: There is no abdominal tenderness. Comments: OBESE   LARGE RUQ SCAR    Musculoskeletal:      Comments: AMBULATING INDEPENDENTLY    Skin:     General: Skin is warm and dry. Neurological:      General: No focal deficit present. Mental Status: She is alert and oriented to person, place, and time. Psychiatric:         Mood and Affect: Mood normal.         Behavior: Behavior normal.            ICD-10-CM ICD-9-CM    1. Morbid obesity ([facility]  E66.01 278.01 XR UPPER GI SERIES W KUB   2. BMI 40.0-44.9, adult ([facility]  Z68.41 V85.41    3. Essential hypertension  I10 401.9    4. Chronic GERD  K21.9 530.81        [employer] meets criteria established by the NIH. Without weight reduction, co-morbidities will escalate as well as risk of early mortality. Recommendation is patient could be served with surgical weight reduction, the procedure of Sleeve gastrectomy for treatment of morbid obesity; however, have recommended GI evaluation with her history of esophagitis and GERD. Malabsorptive gastric bypass for treatment of morbid obesity would be recommended if severe GERD or esophagitis. I explained to the patient differences between laparoscopic gastric bypass and sleeve gastrectomy procedures. Patient has participated in our informational session either in person or on line. Procedure Pending GI evaluation  Surgeon Kiki Gonsalves MD     Sleeve gastrectomy or vertical gastric resection involves a resection of the vast majority of the stomach usually done with a dilator pressed against the right half of the stomach of the lesser curvature. One preserves the pyloric sphincter at the lower end of the stomach and then sequentially staples and divides all the way up to the gastroesophageal junction leaving a small rim of the stomach to the left better known as the angle of His. This procedure significantly reduces the amount that the stomach can hold while removing the part of the stomach that secretes the hormone grehlin and alters the speed of food emptying from the stomach. Once food leaves the stomach, the remainder of the intestinal tract is completely intact and there is no effect on the digestion or absorption of food nutrients and calories. The procedure is intermediate between the adjustable gastric band and the gastric bypass as far as weight loss, potential complications and resolution of comorbid diseases such as Type 2 diabetes, high blood pressure, sleep apnea, arthritic pain, cholesterol disorders to mention a few. The usual complications are that of any procedure which affects the ability of the stomach to accept food at any given time.  Those are usually nausea and vomiting which either spontaneously resolve or require endoscopic dilatation. The most serious complication from this surgery is a leak from the staple line usually near the  gastroesophageal junction. The frequency of this serious complication is low and usually heals spontaneously although reoperation may be necessary. The other serious complications are those which can occur with any major surgery and include  Infection, bleeding, blood clots and/or cardiopulmonary problems. The procedure of divided gastric bypass with Yelena-en-Y gastrojejunostomy was explained to the patient including the four parts of the operation- 1) loss of appetite, 2) the restrictive phases, 3) the dumping phase, 4) mild malabsorption from the diversion of pancreatic or biliary enzymes. Informed consent was obtained concerning the potential morbidities and mortality of the operation including anastomotic leak, pulmonary embolism, deep vein thrombosis, bleeding, staple- line disruption, stomal stenosis or dilatation, inadequate weight loss, and requirement for life-long vitamin intake. Further information was given concerning a three-day hospitalization with at least one or more nights in a special care unit, protein- enriched liquified diet for two-thee weeks, convalescence for three to six weeks. Information was provided concerning the team approach anesthesia, nursing, and dietary. Pneumatic stockings, Heparin or Lovenox, and wound care management techniques were explained. Abdominal pain, nausea and/or vomiting may occur if eating too fast, too much, or not chewing well enough. With sweets or high fat ingestion, dumping may occur. It was further stressed the approximately three to five percent of patients require endoscopic balloon dilatation of the staple line.  Furthermore, a clear discussion of the imperfections of the operation was discussed including the fact that it not effective in every patient because of patient non-compliance and/or mechanical failure. It was hoped, however, that at approaching a ninety percent level, the patients can achieve a mean of seventy percent loss of excess body weight. This is determined partially by patient compliance and exercise as well as making wise choices for the diet. Recommendations:    _x__ Nutrition Evaluation    _x__ Psychological Evaluation    ___ Cardiac Evaluation    ___ Pulmonary Evaluation    ___ Sleep Medicine     ___ [facility]     _x__ Upper GI    _x__ Upper endoscopy     ___ Smoking cessation x 30 days pre surgery     ___ Committee    ___ 10% weight reduction     I have reinforced without lifestyle change and behavior modification, [facility] may not achieve weight loss goals. I reviewed risks and complications associated with each procedure. I discussed a diet high in protein, low-fat, low- sugar, limited carbohydrates, and discontinuing use of carbonated beverages and all sweet drinks. Importance of life long follow up was discussed as well as the chronic nature of obesity. Obesity is affected by multiple factors, including diet, exercise, stress, sleep, medication, age, hormones, etc.  Surgery is not a cure for obesity, yet can be an effective tool in the management of obesity. 51 minutes spent in face to face with Justice Parham > 50% counseling.     CC Nikki WONG DO

## 2020-12-16 DIAGNOSIS — M25.522 LEFT ELBOW PAIN: Primary | ICD-10-CM

## 2020-12-16 DIAGNOSIS — M79.632 LEFT FOREARM PAIN: ICD-10-CM

## 2020-12-24 ENCOUNTER — VIRTUAL VISIT (OUTPATIENT)
Dept: ORTHOPEDIC SURGERY | Age: 55
End: 2020-12-24
Payer: COMMERCIAL

## 2020-12-24 DIAGNOSIS — M77.12 LEFT LATERAL EPICONDYLITIS: Primary | ICD-10-CM

## 2020-12-24 PROCEDURE — 99213 OFFICE O/P EST LOW 20 MIN: CPT | Performed by: ORTHOPAEDIC SURGERY

## 2020-12-24 NOTE — PROGRESS NOTES
12/24/2020      CC: Left elbow pain    HPI:      This is a 54y.o. year old female who has a 1 month history of left lateral elbow pain, she states there was no injury. She is right-hand dominant. She states it is difficult for her to lift and twist her wrist.      PMH:  Past Medical History:   Diagnosis Date    Allergic rhinitis 5/27/2010    Arthritis     Baker cyst, left 10/2019    Dr. Emeka Boothe cyst, right     Cervical disc herniation 01/2010    C3-4, C4-5, C5-6. Hemangioma body of C5. Dr. Deepak Mejia.  Chest pain 08/2011, 02/2012    Dr. Colten Canales. Dr. Shira Carreon; denies CP as of 3/27/14    Chronic lower back pain 2010, 03/2016    thoracic DDD and spondylosis. DDD L3-S1. Dr. Chepe Hanna. Fernanda Juarez. Dr. Juvencio Hudson, [facility]. Dr. Pankaj Zaldivar.  Chronic meniscal tear of knee     Chronic pain     lower back and knees    Esophagitis, reflux 4/3/2011    Gallstone     Gastritis 5/27/2010    Dr. Jess Engle.  Gastrointestinal food allergy 03/2014    Multiple. Dr. Charlee Garcia.    Heart palpitations 02/2012    DUE TO PAC'S AND PVC'S. Dr. Merlin Varghese.  Heel spur, left 02/2019    Hiatal hernia 4/3/2011    HTN (hypertension) 1987    Impaired glucose tolerance 10/15/2010    Incidental lung nodule 01/08/13    LLL 3.9 mm, RML 3.1 mm;  as of 3/27/14 per pt stable w/o growth    Insomnia 1990s    Iron deficiency anemia 5/27/2010    Irritable bowel syndrome (IBS) 03/2014    Dr. Pau Gordillo.    Knee pain 2012    Right. due to severe OA. / chipped bone     Left foot pain 08/2019    Dr. Wilmer Navarro.  Metatarsal bone fracture 1990    Left fifth.  Migraine 2/22/2011    Dr. Terrence Mendes [facility])     Peroneal tendonitis of lower leg 08/07/2019    Left. Dr. Wilmer Navarro    Pre-diabetes     Radiculopathy, cervical 01/2010    Left. Dr. Deepak Mejia.  Shoulder pain, right 2012    due to [address] X 2. Dr. Vidhi Mooeny.     Thyroid nodule 2011    6 nodules- Dr. Ramirez Stai    Toe fracture, left     fifth toe.  Triangular fibrocartilage complex tear     Dr. Dennis Ch. Left. PSxHx:  Past Surgical History:   Procedure Laterality Date    HX  SECTION  1995    x 1    HX CHOLECYSTECTOMY  1987    OPEN    HX COLONOSCOPY  14    Dr. Thor Shelley; 14    due to abnormal PAP.  HX DILATION AND CURETTAGE      due to miscarriage.  HX ENDOSCOPY      HX HEART CATHETERIZATION      no stents    HX KNEE ARTHROSCOPY      HX ORTHOPAEDIC Right 2016    LUMBAR L4-5, L5-S1 ELLY. Dr. Yvette Sever:    Current Outpatient Medications:     ergocalciferol (ERGOCALCIFEROL) 1,250 mcg (50,000 unit) capsule, Take 1 Cap by mouth every seven (7) days. Indications: low vitamin D levels, Disp: 5 Cap, Rfl: 5    amitriptyline (ELAVIL) 25 mg tablet, Take 1 Tab by mouth nightly. Indications: pain, Disp: 30 Tab, Rfl: 11    propranolol LA (INDERAL LA) 80 mg SR capsule, Take 1 Cap by mouth daily. Indications: migraine prevention, Disp: 90 Cap, Rfl: 3    valsartan-hydroCHLOROthiazide (DIOVAN-HCT) 80-12.5 mg per tablet, Take 1 Tab by mouth daily. Indications: high blood pressure, Disp: 90 Tab, Rfl: 3    omeprazole (PRILOSEC) 20 mg capsule, TAKE 1 CAPSULE BY MOUTH TWICE DAILY  Indications: heartburn, Disp: 60 Cap, Rfl: 5    acetaminophen (TYLENOL ARTHRITIS PAIN) 650 mg CR tablet, Take 1,300 mg by mouth three (3) times daily. , Disp: , Rfl:     All:  Allergies   Allergen Reactions    Doxylamine Succinate Swelling     12.5-25 MG  HANDS, FACE/PERIORAL, FEET    Pcn [Penicillins] Hives    Ambien [Zolpidem] Nausea and Vomiting and Other (comments)     5 mg with Ativan 0.5 mg   TOO GROGGY, SLEPT 24 HOURS  7.5 MG FORGOT SHE WAS SLEEP EATING    Aspirin Nausea Only    Celebrex [Celecoxib] Other (comments)     TIGHT SQUEEZING IN CHEST  CHEST ACHED    Erythromycin Nausea and Vomiting    Milk Prot-Turm-Pepper-Pineappl Other (comments)     Multiple food allergies    Neurontin [Gabapentin] Other (comments)     300 mg  SEVERE LEG PAIN  EYE TWITCHING    Nsaids (Non-Steroidal Anti-Inflammatory Drug) Other (comments)     GI irritation         Social Hx:  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        Family Hx:  Family History   Problem Relation Age of Onset    Diabetes Mother     Heart Disease Mother 58        2 stents    Hypertension Mother     Stroke Mother     Diabetes Father     Hypertension Father     Stroke Maternal Grandmother     Cancer Maternal Uncle         prostate    Diabetes Sister          Review of Systems:       General: Denies headache, lethargy, fever, weight loss  Ears/Nose/Throat: Denies ear discharge, drainage, nosebleeds, hoarse voice, dental problems  Cardiovascular: Denies chest pain, shortness of breath  Lungs: Denies chest pain, breathing problems, wheezing, pneumonia  Stomach: Denies stomach pain, heartburn, constipation, irritable bowel  Skin: Denies rash, sores, open wounds  Musculoskeletal: Left wrist pain  Genitourinary: Denies dysuria, hematuria, polyuria  Gastrointestinal: Denies constipation, obstipation, diarrhea  Neurological: Denies changes in sight, smell, hearing, taste, seizures. Denies loss of consciousness.   Psychiatric: Denies depression, sleep pattern changes, anxiety, change in personality  Endocrine: Denies mood swings, heat or cold intolerance  Hematologic/Lymphatic: Denies anemia, purpura, petechia  Allergic/Immunologic: Denies swelling of throat, pain or swelling at lymph nodes      Physical Examination:    There were no vitals taken for this visit. General: AOX3, no apparent distress  Psychiatric: mood and affect appropriate     Diagnostics:    Pertinent Diagnostics: Xrays of the left forearm indicate no fractures, osseus lesions, abnormalities, cartilage space is well maintained. Overall alignment is within normal limits, no effusion or other soft tissue abnormality. Assessment: Left lateral epicondylitis  Plan:    Based on this patient's history as well as video examination, she more than likely has lateral epicondylitis of the left elbow. We did discuss the natural history of this as well as the reasoning behind why it happens. Additionally, the plan will be to proceed with stretches, bracing, anti-inflammatories, injections into the lateral epicondylar area. She is interested in all of these, we will work to get her scheduled for this at her convenience. She will follow-up when she is available for injection. She stated her understanding and satisfaction. I was in the office while conducting this encounter. Consent:  She and/or her healthcare decision maker is aware that this patient-initiated Telehealth encounter is a billable service, with coverage as determined by her insurance carrier. She is aware that she may receive a bill and has provided verbal consent to proceed: Yes    This virtual visit was conducted via D[employer]. me.   Pursuant to the emergency declaration under the [facility] waiver authority and the [facility] and [employer] General Act, this Virtual  Visit was conducted to reduce the patient's risk of exposure to COVID-19 and provide continuity of care for an established patient. Services were provided through a video synchronous discussion virtually to substitute for in-person clinic visit. Due to this being a TeleHealth evaluation, many elements of the physical examination are unable to be assessed. Total Time: minutes: 20. Ms. Sd Hess has a reminder for a \"due or due soon\" health maintenance. I have asked that she contact her primary care provider for follow-up on this health maintenance.

## 2021-01-07 ENCOUNTER — HOSPITAL ENCOUNTER (OUTPATIENT)
Dept: GENERAL RADIOLOGY | Age: 56
Discharge: HOME | End: 2021-01-07
Attending: NURSE PRACTITIONER
Payer: COMMERCIAL

## 2021-01-07 DIAGNOSIS — E66.01 MORBID OBESITY (HCC): ICD-10-CM

## 2021-01-07 PROCEDURE — 74246 X-RAY XM UPR GI TRC 2CNTRST: CPT

## 2021-01-07 PROCEDURE — 74240 X-RAY XM UPR GI TRC 1CNTRST: CPT

## 2021-01-08 ENCOUNTER — CLINICAL SUPPORT (OUTPATIENT)
Dept: SURGERY | Age: 56
End: 2021-01-08

## 2021-01-08 DIAGNOSIS — E66.01 MORBID OBESITY (HCC): Primary | ICD-10-CM

## 2021-01-08 NOTE — PROGRESS NOTES
Pre-operative Bariatric Nutrition Evaluation (1 of 3)     Date: 2021   Estiven Diego M.D. Name: Linda Red  :  1965  Age:  54  Gender: Female   Type of Surgery: [x]           Gastric Bypass   []           Sleeve Gastrectomy    ASSESSMENT:    Past Medical History:acid reflux     Medications/Supplements:   Prior to Admission medications    Medication Sig Start Date End Date Taking? Authorizing Provider   ergocalciferol (ERGOCALCIFEROL) 1,250 mcg (50,000 unit) capsule Take 1 Cap by mouth every seven (7) days. Indications: low vitamin D levels 20   Shannan Tabares R, DO   amitriptyline (ELAVIL) 25 mg tablet Take 1 Tab by mouth nightly. Indications: pain 20   Shannan Tabares R, DO   propranolol LA (INDERAL LA) 80 mg SR capsule Take 1 Cap by mouth daily. Indications: migraine prevention 20   Shannan Tabares R, DO   valsartan-hydroCHLOROthiazide (DIOVAN-HCT) 80-12.5 mg per tablet Take 1 Tab by mouth daily. Indications: high blood pressure 20   Shannan Tabares R, DO   omeprazole (PRILOSEC) 20 mg capsule TAKE 1 CAPSULE BY MOUTH TWICE DAILY  Indications: heartburn 20   Shannan Tabares R, DO   acetaminophen (TYLENOL ARTHRITIS PAIN) 650 mg CR tablet Take 1,300 mg by mouth three (3) times daily. Provider, Historical       Food Allergies/Intolerances: allergic to milk, wheat, corn, peanuts, soy bean, chocolate, eggs (mild and pt does consume eggs); does tolerate Premier Protein shakes    Anthropometrics:    Ht:68\"   Recent Office Wt: 284#    IBW: 140#    %IBW: 202%     BMI:43    Category: obesity III     Reported wt history: Pt completing pre-op nutrition evaluation for wt loss surgery over the phone d/t social distancing guidelines d/t COVID-19. Reports lowest adult BW of 150# in high school and highest adult BW of 284#.  Attributes wt gain over the years r/t pregnancy, poor eating habits and preference for sugar/sweets and decreased activity d/t knee injuries and surgery. Has attempted wt loss through various methods with most successful wt loss of 20#. Has been unable to maintain long term or significant wt loss and is now seeking approval for weight loss surgery. Pt will need to complete 3 months of supervised weight loss for insurance requirements. Exercise/Physical Activity:some walking at work but minimal exercise and limited d/t knee surgery July 2020    Reported Diet History:various self-directed diets, keto diet and \"cold turkey\" efforts; strong h/o yo-yo dieting d/t mindset/motivation      24 Hour Diet Recall  Breakfast  Skips; eggs and sausage, coffee if at work    [employer] - rice, cheese, hamburger meat or hamburger and french fries - largest meal     Dinner  Boiled veggies, minimal meat intake or just something sweet instead of a meal; does eat seafood/fish   Snacks     Beverages  Water, h/o \"addicted to Pepsi\"        Environment/Psychosocial/Support:Pt lives alone and has 3 adult children and sister nearby as support system. Pt works full time. Pt does her own grocery shopping and cooking, but reports minimal cooking at this time. Currently \"on a health kick\"     NUTRITION DIAGNOSIS:  1. Self-monitoring deficit r/t previous lack of value for this change evidenced by pt skips meals. Lacks routine eating patterns. Reports tendency to skip meals and eat sweets instead. NUTRITION INTERVENTION:  Pt educated on nutrition recommendations for weight loss surgery, specifically gastric bypass. Instructed on consuming 3 meals per day starting now. Use the balanced plate method to plan meals, include 3 oz of lean source of protein, 1/2 cup whole grains, unlimited non-starchy vegetables, 1/2 cup fruit and 1 serving of low fat dairy. Utilize handouts listing healthy snack and meal ideas to limit restaurant meals. After surgery measure all meals to 1/2 cup.  Each meal will contain a 1/4 cup lean protein and 1/4 cup fruit, non-starchy vegetable or starch (limiting to once per day). Aim for 60 g protein per day. Sip on 48-64 oz of sugar free, calorie free, non-carbonated beverages each day. Do not use a straw. Do not consume beverages 30 minutes before, during or 30 minutes after meals. Read all nutrition labels. Demonstrated and emphasized identifying serving size, total fat, sugar and protein content. Defined low fat as </= 3 g per serving. Discussed lean and extra lean sources of protein. Provided list of low fat cooking methods. Avoid foods with sugar listed in the first 3 ingredients and >/15 g sugar per serving. Excess sugar/fat intake may lead to dumping syndrome. Discussed signs and symptoms of dumping syndrome. Practice mindful eating habits; take small bites, chew thoroughly, avoid distractions, utilize hunger/fullness scale. Consume meals over 20-30 minutes. Attend Bariatric Support Group and increase physical activity (approved per MD) for long term weight maintenance. NUTRITION MONITORING AND EVALUATION:    The following goals were established with patient;  1. Eat 3 meals a day. Create a more structured eating routine to ensure adequate protein intake. Eating more consistently will also help prevent overeating at meals and less likely to choose high sugar foods at meals. 2. Include lean protein at each meal. Use protein shakes PRN. 3. Review nutrition education materials provided. Follow up next month for continued nutrition education and supervised weight loss. Specific tips and techniques to facilitate compliance with above recommendations were provided and discussed. Nutrition evaluation reveals important lifestyle changes are indicated. Goals set and recommendations made. Will continue to assess. If further details are desired please feel free to contact me at [phone]. This phone number was also provided to the patient for any further questions or concerns.            Daphne Samuels RD

## 2021-01-15 NOTE — PROGRESS NOTES
Ok to proceed with sleeve procedure. Upper GI was ok and you have a small hiatal hernia that can be repaired at the time of surgery.   Have a great weekend - CHRISTOPHER Velez

## 2021-01-27 ENCOUNTER — OFFICE VISIT (OUTPATIENT)
Dept: INTERNAL MEDICINE CLINIC | Age: 56
End: 2021-01-27
Payer: COMMERCIAL

## 2021-01-27 VITALS
TEMPERATURE: 98 F | OXYGEN SATURATION: 95 % | HEIGHT: 68 IN | WEIGHT: 285 LBS | BODY MASS INDEX: 43.19 KG/M2 | HEART RATE: 72 BPM | SYSTOLIC BLOOD PRESSURE: 122 MMHG | RESPIRATION RATE: 16 BRPM | DIASTOLIC BLOOD PRESSURE: 82 MMHG

## 2021-01-27 DIAGNOSIS — E78.00 HIGH CHOLESTEROL: ICD-10-CM

## 2021-01-27 DIAGNOSIS — E55.9 VITAMIN D DEFICIENCY: ICD-10-CM

## 2021-01-27 DIAGNOSIS — N39.0 URINARY TRACT INFECTION WITHOUT HEMATURIA, SITE UNSPECIFIED: ICD-10-CM

## 2021-01-27 DIAGNOSIS — E55.9 VITAMIN D DEFICIENCY: Primary | ICD-10-CM

## 2021-01-27 DIAGNOSIS — R73.02 IMPAIRED GLUCOSE TOLERANCE: ICD-10-CM

## 2021-01-27 DIAGNOSIS — R53.82 CHRONIC FATIGUE: ICD-10-CM

## 2021-01-27 DIAGNOSIS — M62.830 BACK SPASM: ICD-10-CM

## 2021-01-27 DIAGNOSIS — E66.01 OBESITY, CLASS III, BMI 40-49.9 (MORBID OBESITY) (HCC): ICD-10-CM

## 2021-01-27 DIAGNOSIS — R35.0 INCREASED URINARY FREQUENCY: ICD-10-CM

## 2021-01-27 DIAGNOSIS — K29.50 OTHER CHRONIC GASTRITIS WITHOUT HEMORRHAGE: ICD-10-CM

## 2021-01-27 DIAGNOSIS — I10 ESSENTIAL HYPERTENSION: Primary | ICD-10-CM

## 2021-01-27 LAB
25(OH)D3 SERPL-MCNC: 16 NG/ML (ref 30–96)
A-G RATIO,AGRAT: 1.3 RATIO
ALBUMIN SERPL-MCNC: 4.4 G/DL (ref 3.9–5.4)
ALP SERPL-CCNC: 96 U/L (ref 38–126)
ALT SERPL-CCNC: 15 U/L (ref 0–35)
ANION GAP SERPL CALC-SCNC: 13 MMOL/L
AST SERPL W P-5'-P-CCNC: 25 U/L (ref 14–36)
BACTERIA,BACTU: ABNORMAL
BILIRUB SERPL-MCNC: 0.6 MG/DL (ref 0.2–1.3)
BILIRUB UR QL: NEGATIVE
BUN SERPL-MCNC: 9 MG/DL (ref 7–17)
BUN/CREATININE RATIO,BUCR: 15 RATIO
CALCIUM SERPL-MCNC: 9.9 MG/DL (ref 8.4–10.2)
CHLORIDE SERPL-SCNC: 98 MMOL/L (ref 98–107)
CHOL/HDL RATIO,CHHD: 3 RATIO (ref 0–4)
CHOLEST SERPL-MCNC: 196 MG/DL (ref 0–200)
CLARITY: CLEAR
CO2 SERPL-SCNC: 31 MMOL/L (ref 22–32)
COLOR UR: ABNORMAL
CREAT SERPL-MCNC: 0.6 MG/DL (ref 0.7–1.2)
ERYTHROCYTE [DISTWIDTH] IN BLOOD BY AUTOMATED COUNT: 13.3 %
GLOBULIN,GLOB: 3.3
GLUCOSE 24H UR-MRATE: NEGATIVE G/(24.H)
GLUCOSE SERPL-MCNC: 89 MG/DL (ref 65–105)
HBA1C MFR BLD HPLC: 5.7 % (ref 4–5.7)
HCT VFR BLD AUTO: 42.1 % (ref 37–51)
HDLC SERPL-MCNC: 60 MG/DL (ref 35–130)
HGB BLD-MCNC: 13.5 G/DL (ref 12–18)
HGB UR QL STRIP: NEGATIVE
KETONES UR QL STRIP.AUTO: NEGATIVE
LDL/HDL RATIO,LDHD: 2 RATIO
LDLC SERPL CALC-MCNC: 108 MG/DL (ref 0–130)
LEUKOCYTE ESTERASE: ABNORMAL
MCH RBC QN AUTO: 28.7 PG (ref 26–32)
MCHC RBC AUTO-ENTMCNC: 32.1 G/DL (ref 30–36)
MCV RBC AUTO: 89.6 FL (ref 80–97)
NITRITE UR QL STRIP.AUTO: NEGATIVE
PH UR STRIP: 6 [PH] (ref 5–7)
PLATELET # BLD AUTO: 300 K/UL (ref 140–440)
POTASSIUM SERPL-SCNC: 4.5 MMOL/L (ref 3.6–5)
PROT SERPL-MCNC: 7.7 G/DL (ref 6.3–8.2)
PROT UR STRIP-MCNC: NEGATIVE MG/DL
RBC # BLD AUTO: 4.7 M/UL (ref 4.2–6.3)
RBC #/AREA URNS HPF: ABNORMAL #/HPF
SODIUM SERPL-SCNC: 142 MMOL/L (ref 137–145)
SP GR UR REFRACTOMETRY: 1.02 (ref 1–1.03)
SQUAMOUS EPITHELIAL CELLS: ABNORMAL
TRIGL SERPL-MCNC: 140 MG/DL (ref 0–200)
TSH SERPL DL<=0.05 MIU/L-ACNC: 0.39 UIU/ML (ref 0.34–5.6)
UROBILINOGEN UR QL STRIP.AUTO: NEGATIVE
VLDLC SERPL CALC-MCNC: 28 MG/DL
WBC # BLD AUTO: 4 K/UL (ref 4.1–10.9)
WBC URNS QL MICRO: ABNORMAL #/HPF

## 2021-01-27 PROCEDURE — 80053 COMPREHEN METABOLIC PANEL: CPT | Performed by: INTERNAL MEDICINE

## 2021-01-27 PROCEDURE — 84443 ASSAY THYROID STIM HORMONE: CPT | Performed by: INTERNAL MEDICINE

## 2021-01-27 PROCEDURE — 81001 URINALYSIS AUTO W/SCOPE: CPT | Performed by: INTERNAL MEDICINE

## 2021-01-27 PROCEDURE — 83036 HEMOGLOBIN GLYCOSYLATED A1C: CPT | Performed by: INTERNAL MEDICINE

## 2021-01-27 PROCEDURE — 99204 OFFICE O/P NEW MOD 45 MIN: CPT | Performed by: INTERNAL MEDICINE

## 2021-01-27 PROCEDURE — 82306 VITAMIN D 25 HYDROXY: CPT | Performed by: INTERNAL MEDICINE

## 2021-01-27 PROCEDURE — 85027 COMPLETE CBC AUTOMATED: CPT | Performed by: INTERNAL MEDICINE

## 2021-01-27 PROCEDURE — 80061 LIPID PANEL: CPT | Performed by: INTERNAL MEDICINE

## 2021-01-27 RX ORDER — TIZANIDINE 2 MG/1
2 TABLET ORAL
Qty: 14 TAB | Refills: 0 | Status: SHIPPED | OUTPATIENT
Start: 2021-01-27 | End: 2021-05-25 | Stop reason: SDUPTHER

## 2021-01-27 RX ORDER — ACETAMINOPHEN, DIPHENHYDRAMINE HCL, PHENYLEPHRINE HCL 325; 25; 5 MG/1; MG/1; MG/1
TABLET ORAL
COMMUNITY
End: 2021-03-09

## 2021-01-27 RX ORDER — ERGOCALCIFEROL 1.25 MG/1
50000 CAPSULE ORAL
Qty: 12 CAP | Refills: 0 | Status: SHIPPED | OUTPATIENT
Start: 2021-01-27 | End: 2021-03-09

## 2021-01-27 NOTE — PROGRESS NOTES
Yuliet Bolton is a 54 y.o. female presenting for [facility]  . 1. Have you been to the ER, urgent care clinic since your last visit? Hospitalized since your last visit? No    2. Have you seen or consulted any other health care providers outside of the [address] [place] [address] since your last visit? Include any pap smears or colon screening. No    Fall Risk Assessment, last 12 mths 4/22/2019   Able to walk? Yes   Fall in past 12 months? No         Abuse Screening Questionnaire 1/27/2021   Do you ever feel afraid of your partner? N   Are you in a relationship with someone who physically or mentally threatens you? N   Is it safe for you to go home? Y       3 most recent PHQ Screens 1/27/2021   Little interest or pleasure in doing things Not at all   Feeling down, depressed, irritable, or hopeless Not at all   Total Score PHQ 2 0       There are no discontinued medications.

## 2021-01-27 NOTE — PATIENT INSTRUCTIONS
Starting a Weight Loss Plan: Care Instructions Your Care Instructions If you are thinking about losing weight, it can be hard to know where to start. Your doctor can help you set up a weight loss plan that best meets your needs. You may want to take a class on nutrition or exercise, or join a weight loss support group. If you have questions about how to make changes to your eating or exercise habits, ask your doctor about seeing a registered dietitian or an exercise specialist. 
It can be a big challenge to lose weight. But you do not have to make huge changes at once. Make small changes, and stick with them. When those changes become habit, add a few more changes. If you do not think you are ready to make changes right now, try to pick a date in the future. Make an appointment to see your doctor to discuss whether the time is right for you to start a plan. Follow-up care is a key part of your treatment and safety. Be sure to make and go to all appointments, and call your doctor if you are having problems. It's also a good idea to know your test results and keep a list of the medicines you take. How can you care for yourself at home? · Set realistic goals. Many people expect to lose much more weight than is likely. A weight loss of 5% to 10% of your body weight may be enough to improve your health. · Get family and friends involved to provide support. Talk to them about why you are trying to lose weight, and ask them to help. They can help by participating in exercise and having meals with you, even if they may be eating something different. · Find what works best for you. If you do not have time or do not like to cook, a program that offers meal replacement bars or shakes may be better for you. Or if you like to prepare meals, finding a plan that includes daily menus and recipes may be best. 
· Ask your doctor about other health professionals who can help you achieve your weight loss goals. ?  A dietitian can help you make healthy changes in your diet. ? An exercise specialist or  can help you develop a safe and effective exercise program. 
? A counselor or psychiatrist can help you cope with issues such as depression, anxiety, or family problems that can make it hard to focus on weight loss. · Consider joining a support group for people who are trying to lose weight. Your doctor can suggest groups in your area. Where can you learn more? Go to http://www.gray.com/ Enter E975 in the search box to learn more about \"Starting a Weight Loss Plan: Care Instructions. \" Current as of: December 11, 2019               Content Version: 12.6 © 8453-9785 [employer]. Care instructions adapted under license by [employer] (which disclaims liability or warranty for this information). If you have questions about a medical condition or this instruction, always ask your healthcare professional. [address] any warranty or liability for your use of this information.

## 2021-01-27 NOTE — PROGRESS NOTES
Raleigh Saucedo is a 54 y.o. female and presents with Establish Care      Subjective:  Patient comes in today to establish care. She is a former patient of Dr. Elvira Ibarra. New patient to our practice. 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 Hypertensionwell-controlled on current meds. History of migraines takes propranolol as needed. History of atypical chest pain and palpitations, PACs PVCs reports she had cardiac work-up done including a cath in the past and everything was unremarkable. Dyslipidemiadiet controlled  Morbid obesityBMI greater than 40. Following up with weight loss clinic. Dr. Garcia. Plan for gastric sleeve procedure. Past Medical History:   Diagnosis Date    Allergic rhinitis 5/27/2010    Arthritis     Baker cyst, left 10/2019    Dr. Reuben Tucker cyst, right     Cervical disc herniation 01/2010    C3-4, C4-5, C5-6. Hemangioma body of C5. Dr. Eleanor Irene.  Chest pain 08/2011, 02/2012    Dr. Kylie Montiel. Dr. Felicia Jules; denies CP as of 3/27/14    Chronic lower back pain 2010, 03/2016    thoracic DDD and spondylosis. DDD L3-S1. Dr. Shona Reid. Anne Walsh. Dr. Marguerite Gaytan, [facility]. Dr. Jacy Edwards.  Chronic meniscal tear of knee     Chronic pain     lower back and knees    Esophagitis, reflux 4/3/2011    Gallstone     Gastritis 5/27/2010    Dr. Rosana Enciso.  Gastrointestinal food allergy 03/2014    Multiple. Dr. Ladarius Coppola.    Heart palpitations 02/2012    DUE TO PAC'S AND PVC'S. Dr. Dior Kate.  Heel spur, left 02/2019    Hiatal hernia 4/3/2011    HTN (hypertension) 1987    Impaired glucose tolerance 10/15/2010    Incidental lung nodule 01/08/13    LLL 3.9 mm, RML 3.1 mm;  as of 3/27/14 per pt stable w/o growth    Insomnia 1990s    Iron deficiency anemia 5/27/2010    Irritable bowel syndrome (IBS) 03/2014    Dr. Lety Luu.    Knee pain 2012    Right.   due to severe OA. / chipped bone  Left foot pain 2019    Dr. Austin Eagle.  Metatarsal bone fracture     Left fifth.  Migraine 2011    Dr. Tori Wei Morbid obesity [facility])     Peroneal tendonitis of lower leg 2019    Left. Dr. Austin Eagle    Pre-diabetes     Radiculopathy, cervical 2010    Left. Dr. Sarah Burnette.  Shoulder pain, right     due to [address] X 2. Dr. Alee Fields.  Thyroid nodule     6 nodules- Dr. Ignacio Luis    Toe fracture, left     fifth toe.  Triangular fibrocartilage complex tear     Dr. Mauro Eagle. Left. Past Surgical History:   Procedure Laterality Date    HX  SECTION  1995    x 1    HX CHOLECYSTECTOMY      OPEN    HX COLONOSCOPY  14    Dr. Donavon Castro; 14    due to abnormal PAP.  HX DILATION AND CURETTAGE      due to miscarriage.  HX ENDOSCOPY      HX HEART CATHETERIZATION      no stents    HX KNEE ARTHROSCOPY      HX ORTHOPAEDIC Right 2016    LUMBAR L4-5, L5-S1 ELLY. Dr. Randal Angulo     Allergies   Allergen Reactions    Doxylamine Succinate Swelling     12.5-25 MG  HANDS, FACE/PERIORAL, FEET    Pcn [Penicillins] Hives    Ambien [Zolpidem] Nausea and Vomiting and Other (comments)     5 mg with Ativan 0.5 mg   TOO GROGGY, SLEPT 24 HOURS  7.5 MG FORGOT SHE WAS SLEEP EATING    Aspirin Nausea Only    Celebrex [Celecoxib] Other (comments)     TIGHT SQUEEZING IN CHEST  CHEST ACHED    Erythromycin Nausea and Vomiting    Milk Prot-Turm-Pepper-Pineappl Other (comments)     Multiple food allergies    Neurontin [Gabapentin] Other (comments)     300 mg  SEVERE LEG PAIN  EYE TWITCHING    Nsaids (Non-Steroidal Anti-Inflammatory Drug) Other (comments)     GI irritation       Current Outpatient Medications   Medication Sig Dispense Refill    melatonin 10 mg tab Take  by mouth nightly as needed.       tiZANidine (ZANAFLEX) 2 mg tablet Take 1 Tab by mouth two (2) times daily as needed for Muscle Spasm(s) for up to 7 days. 14 Tab 0    ergocalciferol (ERGOCALCIFEROL) 1,250 mcg (50,000 unit) capsule Take 1 Cap by mouth every seven (7) days. Indications: low vitamin D levels 5 Cap 5    amitriptyline (ELAVIL) 25 mg tablet Take 1 Tab by mouth nightly. Indications: pain 30 Tab 11    propranolol LA (INDERAL LA) 80 mg SR capsule Take 1 Cap by mouth daily. Indications: migraine prevention (Patient taking differently: Take 80 mg by mouth daily. As needed  Indications: migraine prevention) 90 Cap 3    valsartan-hydroCHLOROthiazide (DIOVAN-HCT) 80-12.5 mg per tablet Take 1 Tab by mouth daily. Indications: high blood pressure 90 Tab 3    omeprazole (PRILOSEC) 20 mg capsule TAKE 1 CAPSULE BY MOUTH TWICE DAILY  Indications: heartburn 60 Cap 5    acetaminophen (TYLENOL ARTHRITIS PAIN) 650 mg CR tablet Take 1,300 mg by mouth three (3) times daily.        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      Family History   Problem Relation Age of Onset    Diabetes Mother     Heart Disease Mother 58        2 stents    Hypertension Mother     Stroke Mother     Diabetes Father     Hypertension Father     Stroke Maternal Grandmother     Cancer Maternal Uncle         prostate    Diabetes Sister        Review of Systems  A ten system review of constitutional, cardiovascular, respiratory, musculoskeletal, endocrine, skin, SHEENT, genitourinary, psychiatric and neurologic systems was obtained and is unremarkable with the exception of weight loss    Objective:  Visit Vitals  /82 (BP 1 Location: Left arm, BP Patient Position: Sitting)   Pulse 72   Temp 98 °F (36.7 °C)   Resp 16   Ht 5' 8\" (1.727 m)   Wt 285 lb (129.3 kg)   SpO2 95%   BMI 43.33 kg/m²     Physical Exam:   General appearance - alert, well appearing, and in no distress  Mental status - alert, oriented to person, place, and time  EYE-MARJORIE, EOMI, fundi normal, corneas normal, no foreign bodies  ENT-ENT exam normal, no neck nodes or sinus tenderness  Nose - normal and patent, no erythema, discharge or polyps  Mouth - mucous membranes moist, pharynx normal without lesions  Neck - supple, no significant adenopathy   Chest - clear to auscultation, no wheezes, rales or rhonchi, symmetric air entry   Heart - normal rate, regular rhythm, normal S1, S2, no murmurs, rubs, clicks or gallops   Abdomen - soft, nontender, nondistended, no masses or organomegaly  Lymph- no adenopathy palpable  Ext-peripheral pulses normal, no pedal edema, no clubbing or cyanosis  Skin-Warm and dry.  no hyperpigmentation, vitiligo, or suspicious lesions  Neuro -alert, oriented, normal speech, no focal findings or movement disorder noted  Musculoskeletal- FROM, no bony abnormalities, no point tenderness    Lab Review:  Results for orders placed or performed in visit on 01/27/21   CBC W/O DIFF   Result Value Ref Range    WBC 4.0 (L) 4.1 - 10.9 K/uL    RBC 4.70 4.20 - 6.30 M/uL    HGB 13.5 12.0 - 18.0 g/dL    HCT 42.1 37.0 - 51.0 %    MCH 28.7 26.0 - 32.0 pg    MCHC 32.1 30.0 - 36.0 g/dL    MCV 89.6 80.0 - 97.0 fL    RDW 13.3 %    PLATELET 359.8 403.3 - 440.0 K/uL   URINALYSIS W/MICROSCOPIC   Result Value Ref Range    Color Pale Yellow Pale Yellow - Yellow    CLARITY clear Clear    Glucose urine, 24 hr Negative Negative    Ketone Negative Negative    Bilirubin Negative Negative    Specific gravity 1.025 1.000 - 1.030    pH (UA) 6 5 - 7    Blood Negative Negative    Protein Negative Negative    Urobilinogen Negative Negative    Nitrites Negative Negative    Leukocyte Esterase 2+ (A) Negative    WBC 10-20 (A) 0 #/HPF    RBC 0-2 (A) 0 #/HPF    Bacteria Few (A) None Seen    SQUAMOUS EPITHELIAL CELLS Moderate (A) None Seen        Documenation Review:    Assessment/Plan:    Diagnoses and all orders for this visit:    1. Essential hypertension  -     CBC W/O DIFF  -     METABOLIC PANEL, COMPREHENSIVE    2. Other chronic gastritis without hemorrhage    3. High cholesterol  -     LIPID PANEL    4. Obesity, Class III, BMI 40-49.9 (morbid obesity) ([facility] [address]    5. Vitamin D deficiency  -     VITAMIN D, 25 HYDROXY    6. Chronic fatigue  -     TSH 3RD GENERATION    7. Impaired glucose tolerance  -     HEMOGLOBIN A1C W/O EAG    8. Back spasm  -     tiZANidine (ZANAFLEX) 2 mg tablet; Take 1 Tab by mouth two (2) times daily as needed for Muscle Spasm(s) for up to 7 days. 9. Increased urinary frequency  -     URINALYSIS W/MICROSCOPIC    10. Urinary tract infection without hematuria, site unspecified  -     CULTURE, URINE      Patient reports some back spasm. We will try Zanaflex and see if that helps with the symptoms. UArule out hematuria  She will require preop clearance for gastric sleeve procedure. We will get baseline labs today. Continue current meds. I will call with lab results and make further recommendations or adjustments if necessary. Discussed lifestyle modifications including Na restriction, low carb/fat diet, weight reduction and exercise (at least a walking program). ICD-10-CM ICD-9-CM    1. Essential hypertension  I10 401.9 CBC W/O DIFF      METABOLIC PANEL, COMPREHENSIVE   2. Other chronic gastritis without hemorrhage  K29.50 535.10    3. High cholesterol  E78.00 272.0 LIPID PANEL   4. Obesity, Class III, BMI 40-49.9 (morbid obesity) ([facility])  E66.01 278.01    5.  Vitamin D deficiency  E55.9 268.9 VITAMIN D, 25 HYDROXY   6. Chronic fatigue  R53.82 780.79 TSH 3RD GENERATION   7. Impaired glucose tolerance  R73.02 790.22 HEMOGLOBIN A1C W/O EAG   8. Back spasm  M62.830 724.8 tiZANidine (ZANAFLEX) 2 mg tablet   9. Increased urinary frequency  R35.0 788.41 URINALYSIS W/MICROSCOPIC   10. Urinary tract infection without hematuria, site unspecified  N39.0 599.0 CULTURE, URINE           Follow-up and Dispositions    · Return in about 6 months (around 7/27/2021) for CPE-30mins. I have reviewed with the patient details of the assessment and plan and all questions were answered. Relevent patient education was performed. Verbal and/or written instructions (see AVS) provided. The most recent lab findings were reviewed with the patient. Plan was discussed with patient who verbally expressed understanding. An After Visit Summary was printed and given to the patient.     Dany Chand MD

## 2021-01-27 NOTE — PROGRESS NOTES
Vitamin D low. Prescription sent to preferred pharmacy. Rest of the labs look good. Please let patient know.

## 2021-01-29 LAB — BACTERIA UR CULT: NORMAL

## 2021-02-03 ENCOUNTER — CLINICAL SUPPORT (OUTPATIENT)
Dept: SURGERY | Age: 56
End: 2021-02-03

## 2021-02-03 DIAGNOSIS — E66.01 MORBID OBESITY (HCC): Primary | ICD-10-CM

## 2021-02-08 NOTE — PROGRESS NOTES
[facility] at [facility]  Supervised Weight Loss     Date:   2021    Patient's Name: Ehsan Guo  : 1965    Insurance:  Medical [place]           Session: 2 of  3  Surgery: Gastric Bypass  Surgeon:  Rupesh Padilla M.D. Height: 68\"  Reported Weight:    285      Lbs. BMI: 43   Pounds Lost since last month: 0               Pounds Gained since last month: 1    Starting Weight: 284#   Previous Months Weight: 284#  Overall Pounds Lost: 0  Overall Pounds Gained: 1#    Other Pertinent Information: Today's class was completed in a virtual setting d/t COVID-19. Today's wt was self-reported. Smoking Status:  none  Alcohol Intake: none    I have reviewed with pt the guidelines of the supervised wt loss program.  Pt understands the expectations of some wt loss during the program and that wt gain could delay the process. I have also explained that appointments need to be consecutive and missing an appointment may result in starting over. Pt has received this information in writing. Changes that patient has made since last month include:  Stopped drinking sodas, increased water, eating more veggies and fruit. Eating Habits and Behaviors  General healthy eating guidelines were also discussed. Pts were instructed that their plate should be made up 1/2 plate coming from non-starchy vegetables, 1/4 coming from lean meat, and 1/4 of their plate coming from carbohydrates, including fruits, starches, or milk. We discussed measuring meals to 1/2 cup total per meal after surgery. Drinking only calorie-free, sugar-free and non-carbonated beverages. We discussed the importance of drinking 64 ounces of fluid per day to prevent dehydration post-operatively. Physical Activity/Exercise  We talked about the importance of increasing daily physical activity and beginning to develop an exercise regimen/routine.  We talked about exercise as being an important part of long term weight loss after surgery. Comments:  During class, I discussed with patient the importance of getting into an exercise routine. Behavior Modification       A behavior modification lesson was provided with an emphasis on developing mindful eating behaviors. We talked about how to eat more mindfully and identify emotional eating triggers. Tips and recommendations for how to make these changes were provided. Pt was encouraged to keep a food journal and record what they were taking in daily. Patient's reported eating behaviors: pt reports grazing and night eating are areas for improvement with regard to behavior modification. Overall Assessment: Pt demonstrates appropriate lifestyle changes evidenced by reported changes. Will continue to assess. Patient-Set Goals:   1. Nutrition - practice reading food labels   2. Exercise - walking  3.  Behavior -apply what I'm learning     Inga Mccullough, PURA  2/8/2021

## 2021-03-03 ENCOUNTER — CLINICAL SUPPORT (OUTPATIENT)
Dept: SURGERY | Age: 56
End: 2021-03-03

## 2021-03-03 DIAGNOSIS — E66.01 MORBID OBESITY (HCC): Primary | ICD-10-CM

## 2021-03-05 ENCOUNTER — TRANSCRIBE ORDER (OUTPATIENT)
Dept: SCHEDULING | Age: 56
End: 2021-03-05

## 2021-03-05 DIAGNOSIS — R11.0 NAUSEA: ICD-10-CM

## 2021-03-05 DIAGNOSIS — K21.9 GERD (GASTROESOPHAGEAL REFLUX DISEASE): ICD-10-CM

## 2021-03-05 DIAGNOSIS — R19.4 CHANGE IN BOWEL HABITS: Primary | ICD-10-CM

## 2021-03-05 DIAGNOSIS — R10.9 PAIN, ABDOMINAL: ICD-10-CM

## 2021-03-05 DIAGNOSIS — E66.9 OBESITY, UNSPECIFIED: ICD-10-CM

## 2021-03-05 NOTE — PROGRESS NOTES
[facility] at [facility]  Supervised Weight Loss     Date:   3/3/2021    Patient's Name: Shashank Glover  : 1965    Insurance:  Medical [place]               Session: 3 of  3  Surgery: Gastric Bypass                  Surgeon:  Alexandru Moy M.D.      Height: 68\"                 Reported Weight:    280      Lbs. BMI: 42             Pounds Lost since last month: 5               Pounds Gained since last month: 0     Starting Weight: 284#                       Previous Months Weight: 285#  Overall Pounds Lost: 4                    Overall Pounds Gained: 1#     Other Pertinent Information: Today's class was completed in a virtual setting d/t COVID-19. Today's wt was self-reported. Smoking Status:  none  Alcohol Intake: none    I have reviewed with pt the guidelines of the supervised wt loss program.  Pt understands the expectations of some wt loss during the program and that wt gain could delay the process. I have also explained that appointments need to be consecutive and missing an appointment may result in starting over. Pt has received this information in writing. Changes that patient has made since last month include:  No sodas, drinking more water, meal prepping, grocery shopping. Eating Habits and Behaviors  General healthy eating guidelines were discussed. A nutrition lesson was presented on portion control. Patients were instructed implement portion control now using the balanced plate method (1/2 plate non-starchy vegetables, 1/4 plate lean meat, and 1/4 plate whole grains and to include fruit and/or milk at meals or snack). We discussed measuring meals to 1/2 cup total per meal after surgery and appropriate portion progression long term. Patient's current diet habits include: Pt is eating 1-2 meals per day plus a protein shake. Snack choices include fruit or popcorn.  Pt is eating refined carbohydrate foods (bread, pasta, rice, potatoes) none. Pt is eating sweets/desserts none. Pt is using baked, grilled, broiled cooking methods. Pt is eating meals prepared outside of the home once per week. Pt is drinking water and coffee and occasional fruit smoothies. Pt reports no to emotional eating. Physical Activity/Exercise  We talked about the importance of increasing daily physical activity and beginning to develop an exercise regimen/routine. We talked about exercise as being an important part of long term weight loss after surgery. Comments:  During class, I discussed with patient the importance of getting into an exercise routine. Pt is currently walking for activity. Pt has been encouraged to maintain and increase as tolerated. Behavior Modification       We talked about how to eat more mindfully. Tips and recommendations for how to make these changes were provided. Pt was encouraged to keep a food journal and record what they were taking in daily. Overall Assessment: Pt demonstrates appropriate lifestyle changes evidenced by reported changes and reported wt loss. Demonstrates understanding of basic nutrition guidelines and appears to be an appropriate candidate for surgery at this time. Patient-Set Goals:   1. Nutrition - continue eating healthy   2. Exercise - continue walking  3.  Behavior -stop eating in front of the television     Laya Collins, [address]  3/5/2021

## 2021-03-06 ENCOUNTER — HOSPITAL ENCOUNTER (OUTPATIENT)
Dept: CT IMAGING | Age: 56
Discharge: HOME | End: 2021-03-06
Attending: INTERNAL MEDICINE
Payer: COMMERCIAL

## 2021-03-06 ENCOUNTER — HOSPITAL ENCOUNTER (OUTPATIENT)
Dept: PREADMISSION TESTING | Age: 56
Discharge: HOME | End: 2021-03-06
Payer: COMMERCIAL

## 2021-03-06 DIAGNOSIS — R10.9 PAIN, ABDOMINAL: ICD-10-CM

## 2021-03-06 DIAGNOSIS — K21.9 GERD (GASTROESOPHAGEAL REFLUX DISEASE): ICD-10-CM

## 2021-03-06 DIAGNOSIS — R11.0 NAUSEA: ICD-10-CM

## 2021-03-06 DIAGNOSIS — R19.4 CHANGE IN BOWEL HABITS: ICD-10-CM

## 2021-03-06 DIAGNOSIS — E66.9 OBESITY, UNSPECIFIED: ICD-10-CM

## 2021-03-06 LAB — SARS-COV-2, COV2: NORMAL

## 2021-03-06 PROCEDURE — 74011000636 HC RX REV CODE- 636: Performed by: RADIOLOGY

## 2021-03-06 PROCEDURE — U0003 INFECTIOUS AGENT DETECTION BY NUCLEIC ACID (DNA OR RNA); SEVERE ACUTE RESPIRATORY SYNDROME CORONAVIRUS 2 (SARS-COV-2) (CORONAVIRUS DISEASE [COVID-19]), AMPLIFIED PROBE TECHNIQUE, MAKING USE OF HIGH THROUGHPUT TECHNOLOGIES AS DESCRIBED BY CMS-2020-01-R: HCPCS

## 2021-03-06 PROCEDURE — 74177 CT ABD & PELVIS W/CONTRAST: CPT

## 2021-03-06 RX ADMIN — IOPAMIDOL 100 ML: 755 INJECTION, SOLUTION INTRAVENOUS at 13:52

## 2021-03-07 LAB — SARS-COV-2, COV2NT: NOT DETECTED

## 2021-03-09 ENCOUNTER — TRANSCRIBE ORDER (OUTPATIENT)
Dept: SCHEDULING | Age: 56
End: 2021-03-09

## 2021-03-09 RX ORDER — MELATONIN 10 MG
10 CAPSULE ORAL
COMMUNITY
End: 2021-05-18

## 2021-03-10 ENCOUNTER — ANESTHESIA EVENT (OUTPATIENT)
Dept: ENDOSCOPY | Age: 56
End: 2021-03-10
Payer: COMMERCIAL

## 2021-03-10 ENCOUNTER — HOSPITAL ENCOUNTER (OUTPATIENT)
Age: 56
Setting detail: OUTPATIENT SURGERY
Discharge: HOME | End: 2021-03-10
Attending: INTERNAL MEDICINE | Admitting: INTERNAL MEDICINE
Payer: COMMERCIAL

## 2021-03-10 ENCOUNTER — ANESTHESIA (OUTPATIENT)
Dept: ENDOSCOPY | Age: 56
End: 2021-03-10
Payer: COMMERCIAL

## 2021-03-10 VITALS
DIASTOLIC BLOOD PRESSURE: 77 MMHG | RESPIRATION RATE: 16 BRPM | BODY MASS INDEX: 43.04 KG/M2 | HEIGHT: 68 IN | SYSTOLIC BLOOD PRESSURE: 123 MMHG | OXYGEN SATURATION: 100 % | WEIGHT: 284 LBS | TEMPERATURE: 98 F | HEART RATE: 72 BPM

## 2021-03-10 PROCEDURE — 77030021593 HC FCPS BIOP ENDOSC BSC -A: Performed by: INTERNAL MEDICINE

## 2021-03-10 PROCEDURE — 74011250636 HC RX REV CODE- 250/636: Performed by: NURSE ANESTHETIST, CERTIFIED REGISTERED

## 2021-03-10 PROCEDURE — 77030039825 HC MSK NSL PAP SUPERNO2VA VYRM -B: Performed by: ANESTHESIOLOGY

## 2021-03-10 PROCEDURE — 76040000007: Performed by: INTERNAL MEDICINE

## 2021-03-10 PROCEDURE — 2709999900 HC NON-CHARGEABLE SUPPLY: Performed by: INTERNAL MEDICINE

## 2021-03-10 PROCEDURE — 88305 TISSUE EXAM BY PATHOLOGIST: CPT

## 2021-03-10 PROCEDURE — 74011000250 HC RX REV CODE- 250: Performed by: NURSE ANESTHETIST, CERTIFIED REGISTERED

## 2021-03-10 PROCEDURE — 77030019988 HC FCPS ENDOSC DISP BSC -B: Performed by: INTERNAL MEDICINE

## 2021-03-10 PROCEDURE — 76060000032 HC ANESTHESIA 0.5 TO 1 HR: Performed by: INTERNAL MEDICINE

## 2021-03-10 PROCEDURE — 74011250636 HC RX REV CODE- 250/636: Performed by: INTERNAL MEDICINE

## 2021-03-10 RX ORDER — MIDAZOLAM HYDROCHLORIDE 1 MG/ML
.25-5 INJECTION, SOLUTION INTRAMUSCULAR; INTRAVENOUS
Status: DISCONTINUED | OUTPATIENT
Start: 2021-03-10 | End: 2021-03-10 | Stop reason: HOSPADM

## 2021-03-10 RX ORDER — ONDANSETRON 2 MG/ML
4 INJECTION INTRAMUSCULAR; INTRAVENOUS ONCE
Status: COMPLETED | OUTPATIENT
Start: 2021-03-10 | End: 2021-03-10

## 2021-03-10 RX ORDER — SODIUM CHLORIDE 9 MG/ML
INJECTION, SOLUTION INTRAVENOUS
Status: DISCONTINUED | OUTPATIENT
Start: 2021-03-10 | End: 2021-03-10 | Stop reason: HOSPADM

## 2021-03-10 RX ORDER — SODIUM CHLORIDE 0.9 % (FLUSH) 0.9 %
5-40 SYRINGE (ML) INJECTION AS NEEDED
Status: DISCONTINUED | OUTPATIENT
Start: 2021-03-10 | End: 2021-03-10 | Stop reason: HOSPADM

## 2021-03-10 RX ORDER — SODIUM CHLORIDE 9 MG/ML
75 INJECTION, SOLUTION INTRAVENOUS CONTINUOUS
Status: DISCONTINUED | OUTPATIENT
Start: 2021-03-10 | End: 2021-03-10 | Stop reason: HOSPADM

## 2021-03-10 RX ORDER — FLUMAZENIL 0.1 MG/ML
0.2 INJECTION INTRAVENOUS
Status: DISCONTINUED | OUTPATIENT
Start: 2021-03-10 | End: 2021-03-10 | Stop reason: HOSPADM

## 2021-03-10 RX ORDER — LIDOCAINE HYDROCHLORIDE 20 MG/ML
INJECTION, SOLUTION EPIDURAL; INFILTRATION; INTRACAUDAL; PERINEURAL AS NEEDED
Status: DISCONTINUED | OUTPATIENT
Start: 2021-03-10 | End: 2021-03-10 | Stop reason: HOSPADM

## 2021-03-10 RX ORDER — SODIUM CHLORIDE 0.9 % (FLUSH) 0.9 %
5-40 SYRINGE (ML) INJECTION EVERY 8 HOURS
Status: DISCONTINUED | OUTPATIENT
Start: 2021-03-10 | End: 2021-03-10 | Stop reason: HOSPADM

## 2021-03-10 RX ORDER — ATROPINE SULFATE 0.1 MG/ML
0.5 INJECTION INTRAVENOUS
Status: DISCONTINUED | OUTPATIENT
Start: 2021-03-10 | End: 2021-03-10 | Stop reason: HOSPADM

## 2021-03-10 RX ORDER — DIPHENHYDRAMINE HCL 25 MG
25 CAPSULE ORAL
COMMUNITY
End: 2021-05-18

## 2021-03-10 RX ORDER — NALOXONE HYDROCHLORIDE 0.4 MG/ML
0.4 INJECTION, SOLUTION INTRAMUSCULAR; INTRAVENOUS; SUBCUTANEOUS
Status: DISCONTINUED | OUTPATIENT
Start: 2021-03-10 | End: 2021-03-10 | Stop reason: HOSPADM

## 2021-03-10 RX ORDER — DEXTROMETHORPHAN/PSEUDOEPHED 2.5-7.5/.8
1.2 DROPS ORAL
Status: DISCONTINUED | OUTPATIENT
Start: 2021-03-10 | End: 2021-03-10 | Stop reason: HOSPADM

## 2021-03-10 RX ORDER — EPINEPHRINE 0.1 MG/ML
1 INJECTION INTRACARDIAC; INTRAVENOUS
Status: DISCONTINUED | OUTPATIENT
Start: 2021-03-10 | End: 2021-03-10 | Stop reason: HOSPADM

## 2021-03-10 RX ORDER — PROPOFOL 10 MG/ML
INJECTION, EMULSION INTRAVENOUS AS NEEDED
Status: DISCONTINUED | OUTPATIENT
Start: 2021-03-10 | End: 2021-03-10 | Stop reason: HOSPADM

## 2021-03-10 RX ADMIN — LIDOCAINE HYDROCHLORIDE 100 MG: 20 INJECTION, SOLUTION EPIDURAL; INFILTRATION; INTRACAUDAL; PERINEURAL at 09:20

## 2021-03-10 RX ADMIN — PROPOFOL 50 MG: 10 INJECTION, EMULSION INTRAVENOUS at 09:22

## 2021-03-10 RX ADMIN — PROPOFOL 100 MG: 10 INJECTION, EMULSION INTRAVENOUS at 09:20

## 2021-03-10 RX ADMIN — PROPOFOL 50 MG: 10 INJECTION, EMULSION INTRAVENOUS at 09:36

## 2021-03-10 RX ADMIN — SODIUM CHLORIDE: 900 INJECTION, SOLUTION INTRAVENOUS at 09:16

## 2021-03-10 RX ADMIN — ONDANSETRON 4 MG: 2 INJECTION INTRAMUSCULAR; INTRAVENOUS at 08:49

## 2021-03-10 RX ADMIN — PROPOFOL 50 MG: 10 INJECTION, EMULSION INTRAVENOUS at 09:31

## 2021-03-10 RX ADMIN — PROPOFOL 50 MG: 10 INJECTION, EMULSION INTRAVENOUS at 09:23

## 2021-03-10 NOTE — DISCHARGE INSTRUCTIONS
[place] Office: [phone]    Aracely Piña  [id number]  1965    EGD/COLONOSCOPY DISCHARGE INSTRUCTIONS  Discomfort:  Sore throat- throat lozenges or warm salt water gargle  redness at IV site- apply warm compress to area; if redness or soreness persist- contact your physician  Gaseous discomfort- walking, belching will help relieve any discomfort  You may not operate a vehicle for 12 hours  You may not engage in an occupation involving machinery or appliances for rest of today. You may not drink alcoholic beverages for at least 12 hours  Avoid making any critical decisions for at least 24 hour  DIET  You may resume your regular diet - however -  remember your colon is empty and a heavy meal will produce gas. Avoid these foods:  fried / greasy foods, excessive carbonated drinks or too much caffeine  MEDICATIONS   Regarding Aspirin or Nonsteroidal medications specifically, please see below. ACTIVITY  You may resume your normal daily activities. Spend the remainder of the day resting -  avoid any strenuous activity. CALL M.D. ANY SIGN OF   Increasing pain, nausea, vomiting  Abdominal distension (swelling)  New increased bleeding (oral or rectal)  Fever (chills)  Pain in chest area  Bloody discharge from nose or mouth  Shortness of breath    You may not take any Advil, Aspirin, Ibuprofen, Motrin, Aleve, or Goodys for 7 days, ONLY  Tylenol as needed for pain. Follow-up Instructions:   Call  Ameya Cast MD for any questions or concerns  Results of procedure / biopsy in 7 days   Telephone # [phone]      Follow-up Information    None

## 2021-03-10 NOTE — PROCEDURES
[place] Office: [phone]      Esophagogastroduodenoscopy Procedure Note      Elwronnie Face  1965  [id number]    Indication:  GERD, pre-gastric bypass     : Juvencio Hernandez MD    Referring Provider:  Piper Magallanes MD    Sedation:  MAC anesthesia Propofol    Procedure Details:  After detailed informed consent was obtained for the procedure, with all risks and benefits of procedure explained the patient was taken to the endoscopy suite and placed in the left lateral decubitus position. Following sequential administration of sedation as per above, the endoscope was inserted into the mouth and advanced under direct vision to second portion of the duodenum. A careful inspection was made as the gastroscope was withdrawn, including a retroflexed view of the proximal stomach; findings and interventions are described below. Findings:     Esophagus: The esophageal mucosa in the proximal, mid and distal esophagus is normal.   The squamo-columnar junction is at 40 cm where the Z-line was noted. There is a small sliding 1 cm hiatal hernia. I took GE junction biopsies to r/o short segement Andrea's. Stomach: The gastric mucosa has erythema in the body and antrum: mild bile staining of mucosa is noted. Biopsies are taken. The fundus was found to be normal with no lesions noted on retroflexion. The angularis is normal as well. Duodenum:   The bulb and post bulbar mucosa is normal in appearance. The duodenal folds are normal. Biopsies taken. Therapies:    biopsy of esophagus/SC junction  biopsy of stomach body, antrum  biopsy of duodenal distal bulb, second portion    Specimen:  Specimens were collected as described and send to the laboratory. Complications:   None were encountered during the procedure. EBL:  None. Recommendations:     -Acid suppression with a proton pump inhibitor. ,   -Await pathology. ,   -GERD diet: avoid fried and fatty foods. peppermint, chocolate, alcohol, coffee, citrus fruits and juices, tomoato products; avoid lying down for 2 to 3 hours after eating.,   -Follow up with primary care physician        Mubashir A. Leatha Epley, MD  3/10/2021  9:27 AM

## 2021-03-10 NOTE — ADDENDUM NOTE
Addendum  created 03/10/21 0950 by Oneil Gottron, CRNA    Flowsheet accepted, Intraprocedure Flowsheets edited

## 2021-03-10 NOTE — PROGRESS NOTES
Leah Parham  1965  [id number]    Situation:  Verbal report received from: Mell Dickinson RN  Procedure: Procedure(s):  COLONOSCOPY,EGD  ESOPHAGOGASTRODUODENOSCOPY (EGD)  ESOPHAGOGASTRODUODENAL (EGD) BIOPSY  COLON BIOPSY    Background:    Preoperative diagnosis: PREOP EXAM, CHANGE IN BOWEL HABITS, GERD, NAUSEA, OBESITY, ABDOMINAL PAIN  Postoperative diagnosis: EGD - Hiatal hernia, gastropathy  Colon - Hemorrhoids    :  Dr. Sandhya Mckinney  Assistant(s): Endoscopy Technician-1: Aneesh Ernandez  Endoscopy RN-1: Caitlin Levy RN    Specimens:   ID Type Source Tests Collected by Time Destination   1 : Duodenal biopsy Preservative   Jaqueline Shen MD 3/10/2021 2512 Pathology   2 : Gastric biopsy Preservative Gastric  Jaqueline Shen MD 3/10/2021 3555 Pathology   3 : GE Junction biopsy Preservative   Jqaueline Shen MD 3/10/2021 4330 Pathology   4 : Random colon biopsy Preservative   Jaqueline Shen MD 3/10/2021 2211 Pathology     H. Pylori  no    Assessment:  Intra-procedure medications     Anesthesia gave intra-procedure sedation and medications, see anesthesia flow sheet yes    Intravenous fluids: NS@ KVO     Vital signs stable      Abdominal assessment: round and soft      Recommendation:  Discharge patient per MD order .   Family or Friend    Permission to share finding with family or friend yes

## 2021-03-10 NOTE — PERIOP NOTES
Anesthesia reports 300mg Propofol, 100mg Lidocaine and 200mL NS given during procedure. Received report from anesthesia staff on vital signs and status of patient. Endoscope was pre-cleaned at the bedside immediately following procedure by Tyree. Dentures returned to patient's mouth post procedure. Glasses returned to patient. Srikanth Moody

## 2021-03-10 NOTE — H&P
Pre-endoscopy H and P    The patient was seen and examined in the room/pre-op holding area. The airway was assessed and documented. The problem list, past medical history, and medications were reviewed.      Patient Active Problem List   Diagnosis Code    Iron deficiency anemia D50.9    Gastritis K29.70    Allergic rhinitis J30.9    Hiatal hernia K44.9    Radiculopathy, cervical M54.12    Heart palpitations R00.2    Shoulder pain, right M25.511    Multiple thyroid nodules E04.2    Chronic insomnia F51.04    Vitamin D deficiency E55.9    Incidental lung nodule R91.1    Irritable bowel syndrome (IBS) K58.9    Gastrointestinal food allergy K52.29    Chronic lower back pain M54.5, G89.29    Essential hypertension I10    Advanced care planning/counseling discussion Z71.89    Intractable migraine without aura and without status migrainosus G43.019    Allergic rhinitis due to allergen J30.9    Aspirin intolerance Z78.9    Obesity, Class III, BMI 40-49.9 (morbid obesity) (HCC) E66.01    Hyperglycemia R73.9    High cholesterol E78.00    Urgency of urination R39.15    Degeneration of lumbosacral intervertebral disc M51.37    Hip pain M25.559    Low back pain M54.5    Lumbar radiculopathy M54.16    Pain in both lower extremities M79.604, M79.605    Chronic pain of right knee M25.561, G89.29    Primary osteoarthritis of left knee M17.12    Peroneal tendonitis of lower leg M76.70    Left foot pain M79.672    Heel spur, left M77.32    Chronic meniscal tear of knee M23.209    Bilateral primary osteoarthritis of knee M17.0    Complex tear of medial meniscus of right knee as current injury S83.231A    Complex tear of medial meniscus of right knee, sequela S83.231S    Complex tear of meniscus of right knee S83.203A    Left lateral epicondylitis M77.12     Social History     Socioeconomic History    Marital status:      Spouse name: Not on file    Number of children: 3    Years of education: Not on file    Highest education level: Not on file   Occupational History    Occupation: Medical assistant     Employer: [address] [place] [address] Financial resource strain: Not on file    Food insecurity     Worry: Not on file     Inability: Not on file    Transportation needs     Medical: Not on file     Non-medical: Not on file   Tobacco Use    Smoking status: Never Smoker    Smokeless tobacco: Never Used   Substance and Sexual Activity    Alcohol use: No    Drug use: Never    Sexual activity: Yes     Partners: Male   Lifestyle    Physical activity     Days per week: Not on file     Minutes per session: Not on file    Stress: Not on file   Relationships    Social connections     Talks on phone: Not on file     Gets together: Not on file     Attends [employer] service: Not on file     Active member of club or organization: Not on file     Attends meetings of clubs or organizations: Not on file     Relationship status: Not on file    Intimate partner violence     Fear of current or ex partner: Not on file     Emotionally abused: Not on file     Physically abused: Not on file     Forced sexual activity: Not on file   Other Topics Concern    Not on file   Social History Narrative    In the home alone     3 adult children     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 Ronaldo Cartagena cyst, right     Cervical disc herniation 01/2010    C3-4, C4-5, C5-6. Hemangioma body of C5. Dr. Crissy Levy.  Chest pain 08/2011, 02/2012    Dr. Milly Acosta. Dr. Tre Grossman; denies CP as of 3/27/14    Chronic lower back pain 2010, 03/2016    thoracic DDD and spondylosis. DDD L3-S1. Dr. Harvey Metcalf. Ira Gtz. Dr. Jeannie French, [facility]. Dr. John Pual Mustafa.     Chronic meniscal tear of knee     Chronic pain     lower back and knees    Esophagitis, reflux 4/3/2011    Gallstone     Gastritis 5/27/2010    Dr. Jamila Mota.  Gastrointestinal food allergy 03/2014    Multiple. Dr. Mitali Elizabeth.    Heart palpitations 02/2012    DUE TO PAC'S AND PVC'S. Dr. Rissa Johnson.  Heel spur, left 02/2019    Hiatal hernia 4/3/2011    HTN (hypertension) 1987    Impaired glucose tolerance 10/15/2010    Incidental lung nodule 01/08/13    LLL 3.9 mm, RML 3.1 mm;  as of 3/27/14 per pt stable w/o growth    Insomnia 1990s    Iron deficiency anemia 5/27/2010    Irritable bowel syndrome (IBS) 03/2014    Dr. Anupam Gomez.    Knee pain 2012    Right. due to severe OA. / chipped bone     Left foot pain 08/2019    Dr. Dion Bauman.  Metatarsal bone fracture 1990    Left fifth.  Migraine 2/22/2011    Dr. Morin Ply Morbid obesity [facility])     Peroneal tendonitis of lower leg 08/07/2019    Left. Dr. Dion Bauman    Pre-diabetes     Radiculopathy, cervical 01/2010    Left. Dr. Danny Herbert.  Shoulder pain, right 2012    due to [address] X 2. Dr. Ryan Crawford.  Thyroid nodule 2011    6 nodules- Dr. Isaak Saldana    Toe fracture, left 2008    fifth toe.  Triangular fibrocartilage complex tear 2012    Dr. Monserrat Johnson. Left. Prior to Admission Medications   Prescriptions Last Dose Informant Patient Reported? Taking?   acetaminophen (TYLENOL ARTHRITIS PAIN) 650 mg CR tablet 3/9/2021 at Unknown time  Yes Yes   Sig: Take 1,300 mg by mouth three (3) times daily as needed for Pain. amitriptyline (ELAVIL) 25 mg tablet 1/10/2021  No Yes   Sig: Take 1 Tab by mouth nightly. Indications: pain   Patient taking differently: Take 25 mg by mouth nightly as needed. Indications: pain   diphenhydrAMINE (BenadryL) 25 mg capsule 3/3/2021 at Unknown time  Yes Yes   Sig: Take 25 mg by mouth nightly as needed for Sleep.   ergocalciferol (ERGOCALCIFEROL) 1,250 mcg (50,000 unit) capsule 3/7/2021  No Yes   Sig: Take 1 Cap by mouth every seven (7) days.  Indications: low vitamin D levels   melatonin 10 mg capsule 2/10/2021 at Unknown time  Yes Yes   Sig: Take 10 mg by mouth nightly as needed. omeprazole (PRILOSEC) 20 mg capsule 3/8/2021  No Yes   Sig: TAKE 1 CAPSULE BY MOUTH TWICE DAILY  Indications: heartburn   Patient taking differently: Take 20 mg by mouth two (2) times daily as needed. TAKE 1 CAPSULE BY MOUTH TWICE DAILY  Indications: heartburn   propranolol LA (INDERAL LA) 80 mg SR capsule 2/10/2021 at Unknown time  No Yes   Sig: Take 1 Cap by mouth daily. Indications: migraine prevention   Patient taking differently: Take 80 mg by mouth daily as needed. Indications: migraine prevention   valsartan-hydroCHLOROthiazide (DIOVAN-HCT) 80-12.5 mg per tablet 3/8/2021  No Yes   Sig: Take 1 Tab by mouth daily. Indications: high blood pressure      Facility-Administered Medications: None           The review of systems is:  Negative  for shortness of breath or chest pain      The heart, lungs, and mental status were satisfactory for the administration of deep sedation and for the procedure. I discussed with the patient the objectives, risks, consequences and alternatives to the procedure.       Luiza Guerra MD  3/10/2021  9:11 AM

## 2021-03-10 NOTE — PROCEDURES
Colonoscopy Procedure Note    Fior Salvador [facility]  1965  [id number]    Indications:  Please see below. Pre-operative Diagnosis: PREOP EXAM for gastric bypass, CHANGE IN BOWEL HABITS, ABDOMINAL PAIN NOS    Post-operative Diagnosis: Internal Hemorrhoids      : Ameya Ordoñez MD    Referring Provider: Alfredia Jeans, MD    Sedation:  MAC anesthesia Propofol        Procedure Details:    After detailed informed consent was obtained with all risks and benefits of procedure explained and preoperative exam completed, the patient was taken to the endoscopy suite and placed in the left lateral decubitus position. Upon sequential sedation as per above, a digital rectal exam was performed  And was normal.  The Olympus videocolonoscope  was inserted in the rectum and carefully advanced to the cecum, which was identified by the ileocecal valve and appendiceal orifice, terminal ileum. The quality of preparation was good. The colonoscope was slowly withdrawn with careful evaluation between folds. Retroflexion in the rectum was performed. Findings:   · The Olympus Video High-Definition Colonoscope was advanced to the cecum identified by its typical land marks with ease. · The mucosa of the colon is normal appearing to the cecum with no mucosal pathology (polyp,mass lesion, colitis etc) noted on this colonoscopy. I took mucosal biopsies to r/o  microscopic colitis  · TI is normal to 5 cm. · Small internal hemorrhoids are noted. Therapies:  biopsy of colon : random colonic mucosa (right and left colon)    Specimen: Specimens were collected as described above and sent to pathology. Complications: None were encountered during the procedure. EBL:  None. Recommendations:     -Await pathology.  -High fiber diet.  -Follow up with primary care physician. Ameya Ordoñez MD  3/10/2021  9:39 AM

## 2021-03-10 NOTE — ANESTHESIA PREPROCEDURE EVALUATION
Anesthetic History   No history of anesthetic complications            Review of Systems / Medical History  Patient summary reviewed, nursing notes reviewed and pertinent labs reviewed    Pulmonary  Within defined limits                 Neuro/Psych         Headaches    Comments: Radiculopathy, cervical  Cardiovascular    Hypertension        Dysrhythmias : PVC      Exercise tolerance: <4 METS     GI/Hepatic/Renal     GERD: well controlled      Hiatal hernia    Comments: Irritable bowel syndrome  Endo/Other    Diabetes (\"pre\" diabetic)  Hypothyroidism  Morbid obesity and arthritis (DDD thoracic spine, chronic low back pain. Has cervical disc dz, as well.  (has good ROM withhout pain currently))     Other Findings   Comments: Chronic back and neck pain  Knee and joint pains  DDD           Physical Exam    Airway  Mallampati: II  TM Distance: 4 - 6 cm  Neck ROM: normal range of motion   Mouth opening: Normal     Cardiovascular  Regular rate and rhythm,  S1 and S2 normal,  no murmur, click, rub, or gallop             Dental    Dentition: Caps/crowns and Full upper dentures  Comments: Crown x1   Pulmonary  Breath sounds clear to auscultation               Abdominal  GI exam deferred       Other Findings            Anesthetic Plan    ASA: 3  Anesthesia type: total IV anesthesia          Induction: Intravenous  Anesthetic plan and risks discussed with: Patient

## 2021-03-10 NOTE — ANESTHESIA POSTPROCEDURE EVALUATION
Procedure(s):  COLONOSCOPY,EGD  ESOPHAGOGASTRODUODENOSCOPY (EGD)  ESOPHAGOGASTRODUODENAL (EGD) BIOPSY  COLON BIOPSY. total IV anesthesia    Anesthesia Post Evaluation        Patient location during evaluation: PACU  Note status: Adequate. Level of consciousness: responsive to verbal stimuli and sleepy but conscious  Pain management: satisfactory to patient  Airway patency: patent  Anesthetic complications: no  Cardiovascular status: acceptable  Respiratory status: acceptable  Hydration status: acceptable  Comments: +Post-Anesthesia Evaluation and Assessment    Patient: Kike Ruiz MRN: [id number]  SSN: [id number]   YOB: 1965  Age: 54 y.o. Sex: female      Cardiovascular Function/Vital Signs    BP (!) 143/83   Pulse 79   Temp 36.6 °C (97.8 °F)   Resp 27   Ht 5' 8\" (1.727 m)   Wt 128.8 kg (284 lb)   SpO2 100%   Breastfeeding No   BMI 43.18 kg/m²     Patient is status post Procedure(s):  COLONOSCOPY,EGD  ESOPHAGOGASTRODUODENOSCOPY (EGD)  ESOPHAGOGASTRODUODENAL (EGD) BIOPSY  COLON BIOPSY. Nausea/Vomiting: Controlled. Postoperative hydration reviewed and adequate. Pain:  Pain Scale 1: Numeric (0 - 10) (03/10/21 0834)  Pain Intensity 1: 4 (03/10/21 0834)   Managed. Neurological Status: At baseline. Mental Status and Level of Consciousness: Arousable. Pulmonary Status:   O2 Device: Other (comment) (03/10/21 0941)   Adequate oxygenation and airway patent. Complications related to anesthesia: None    Post-anesthesia assessment completed. No concerns. Signed By: Naomie Contreras MD    3/10/2021  Post anesthesia nausea and vomiting:  controlled      INITIAL Post-op Vital signs: No vitals data found for the desired time range.

## 2021-03-30 ENCOUNTER — OFFICE VISIT (OUTPATIENT)
Dept: SURGERY | Age: 56
End: 2021-03-30
Payer: COMMERCIAL

## 2021-03-30 VITALS
DIASTOLIC BLOOD PRESSURE: 88 MMHG | TEMPERATURE: 98.8 F | WEIGHT: 278.6 LBS | SYSTOLIC BLOOD PRESSURE: 142 MMHG | RESPIRATION RATE: 18 BRPM | OXYGEN SATURATION: 98 % | HEART RATE: 77 BPM | BODY MASS INDEX: 42.22 KG/M2 | HEIGHT: 68 IN

## 2021-03-30 DIAGNOSIS — E66.01 MORBID OBESITY (HCC): Primary | ICD-10-CM

## 2021-03-30 DIAGNOSIS — K29.50 OTHER CHRONIC GASTRITIS WITHOUT HEMORRHAGE: ICD-10-CM

## 2021-03-30 DIAGNOSIS — E78.00 HIGH CHOLESTEROL: ICD-10-CM

## 2021-03-30 DIAGNOSIS — I10 ESSENTIAL HYPERTENSION: ICD-10-CM

## 2021-03-30 DIAGNOSIS — M17.12 PRIMARY OSTEOARTHRITIS OF LEFT KNEE: ICD-10-CM

## 2021-03-30 DIAGNOSIS — K44.9 HIATAL HERNIA: ICD-10-CM

## 2021-03-30 PROCEDURE — 99213 OFFICE O/P EST LOW 20 MIN: CPT | Performed by: SURGERY

## 2021-03-30 NOTE — PROGRESS NOTES
1. Have you been to the ER, urgent care clinic since your last visit? Hospitalized since your last visit? No    2. Have you seen or consulted any other health care providers outside of the [address] [place] [address] since your last visit? Include any pap smears or colon screening. No    Leeanne Parham  Body composition    female  54 y.o. Vitals:    03/30/21 1602   BP: (!) 142/88   Pulse: 77   Resp: 18   Temp: 98.8 °F (37.1 °C)   TempSrc: Oral   SpO2: 98%   Weight: 278 lb 9.6 oz (126.4 kg)   Height: 5' 8\" (1.727 m)     Body mass index is 42.36 kg/m².

## 2021-03-31 NOTE — PATIENT INSTRUCTIONS
Learning About Weight-Loss (Bariatric) Surgery What is weight-loss surgery? Bariatric surgery is surgery to help you lose weight. This type of surgery is only used for people who are very overweight and have not been able to lose weight with diet and exercise. This surgery makes the stomach smaller. Some types of surgery also change the connection between your stomach and intestines. Having weight-loss surgery is a big step. After surgery, you'll need to make new, lifelong changes in how you eat and drink. How is weight-loss surgery done? Bariatric surgery may be either \"open\" or \"laparoscopic. \" Open surgery is done through a large cut (incision) in the belly. Laparoscopic surgery is done through several small cuts. The doctor puts a lighted tube, or scope, and other surgical tools through small cuts in your belly. The doctor is able to see your organs with the scope. There are different types of bariatric surgery. Gastric sleeve surgery The surgery is usually done through several small incisions in the belly. The doctor removes more than half of your stomach. This leaves a thin sleeve, or tube, that is about the size of a banana. Because part of your stomach has been removed, this can't be reversed. Yelena-en-Y gastric bypass surgery Yelena-en-Y (say \"kaylie-en-why\") surgery changes the connection between the stomach and the intestines. The doctor separates a section of your stomach from the rest of your stomach. This makes a small pouch. The new pouch will hold the food you eat. The doctor connects the stomach pouch to the middle part of the small intestine. Gastric banding surgery The surgery is usually done through several small incisions in the belly. The doctor wraps a band around the upper part of the stomach. This creates a small pouch. The small size of the pouch means that you will get full after you eat just a small amount of food.  The doctor can inflate or deflate the band to adjust the size. This lets the doctor adjust how quickly food passes from the new pouch into the stomach. It does not change the connection between the stomach and the intestines. What can you expect after the surgery? You may stay in the hospital for one or more days after the surgery. How long you stay depends on the type of surgery you had. Most people need 2 to 4 weeks before they are ready to get back to their usual routine. For the first 2 to 6 weeks after surgery, you probably will need to follow a liquid or soft diet. Bit by bit, you will be able to eat more solid foods. Your doctor may advise you to work with a dietitian. This way you'll be sure to get enough protein, vitamins, and minerals while you are losing weight. Even with a healthy diet, you may need to take vitamin and mineral supplements. After surgery, you will not be able to eat very much at one time. You will get full quickly. Try not to eat too much at one time or eat foods that are high in fat or sugar. If you do, you may vomit, get stomach pain, or have diarrhea. You probably will lose weight very quickly in the first few months after surgery. As time goes on, your weight loss will slow down. You will have regular doctor visits to check how you are doing. Think of bariatric surgery as a tool to help you lose weight. It isn't an instant fix. You will still need to eat a healthy diet and get regular exercise. This will help you reach your weight goal and avoid regaining the weight you lose. Follow-up care is a key part of your treatment and safety. Be sure to make and go to all appointments, and call your doctor if you are having problems. It's also a good idea to know your test results and keep a list of the medicines you take. Where can you learn more? Go to http://www.gray.com/ Enter G469 in the search box to learn more about \"Learning About Weight-Loss (Bariatric) Surgery. \" Current as of: December 11, 1659               [employer] Version: 12.6 © 5467-9710 [employer]. Care instructions adapted under license by [employer] (which disclaims liability or warranty for this information). If you have questions about a medical condition or this instruction, always ask your healthcare professional. [address] any warranty or liability for your use of this information.

## 2021-03-31 NOTE — PROGRESS NOTES
Subjective: The patient is a 54 y.o. obese female seeking approval for laparoscopic bariatric surgery. Body mass index is 42.36 kg/m². Lay Lozano has tried multiple diets in her  lifetime most recently trying unsupervised diets during which she was able to lose small amounts of weight. Gastroesophageal reflux symptoms continued, especially when she misses dose of omeprazole. No dysphagia. She is walking for exercise. Bariatric comorbidities present are   Past Medical History:   Diagnosis Date    Allergic rhinitis 5/27/2010    Arthritis     Baker cyst, left 10/2019    Dr. Montague Service cyst, right     Cervical disc herniation 01/2010    C3-4, C4-5, C5-6. Hemangioma body of C5. Dr. Sarah Burnette.  Chest pain 08/2011, 02/2012    Dr. Cheyenne Schneider. Dr. Vesta Becker; denies CP as of 3/27/14    Chronic lower back pain 2010, 03/2016    thoracic DDD and spondylosis. DDD L3-S1. Dr. Johana Nielson. Ray How. Dr. Arell[place] Courser[place] [facility] Sonya [place].  Chronic meniscal tear of knee     Chronic pain     lower back and knees    Esophagitis, reflux 4/3/2011    Gallstone     Gastritis 5/27/2010    Dr. Diamond Portillo.  Gastrointestinal food allergy 03/2014    Multiple. Dr. Smooth Mares.    Heart palpitations 02/2012    DUE TO PAC'S AND PVC'S. Dr. Oumou Hart.  Heel spur, left 02/2019    Hiatal hernia 4/3/2011    HTN (hypertension) 1987    Impaired glucose tolerance 10/15/2010    Incidental lung nodule 01/08/13    LLL 3.9 mm, RML 3.1 mm;  as of 3/27/14 per pt stable w/o growth    Insomnia 1990s    Iron deficiency anemia 5/27/2010    Irritable bowel syndrome (IBS) 03/2014    Dr. Annika Perry.    Knee pain 2012    Right. due to severe OA. / chipped bone     Left foot pain 08/2019    Dr. Austin Eagle.  Metatarsal bone fracture 1990    Left fifth.  Migraine 2/22/2011    Dr. Valle Europe Morbid obesity [facility])     Peroneal tendonitis of lower leg 08/07/2019    Left.    Boyd Henderson    Pre-diabetes     Radiculopathy, cervical 01/2010    Left. Dr. Mariela Conte.  Shoulder pain, right 2012    due to [address] X 2. Dr. Ruelas Case.  Thyroid nodule 2011    6 nodules- Dr. Vang Fill    Toe fracture, left 2008    fifth toe.  Triangular fibrocartilage complex tear 2012    Dr. Deana Musa. Left.        Patient Active Problem List    Diagnosis Date Noted    Left lateral epicondylitis 12/24/2020    Complex tear of meniscus of right knee 07/13/2020    Complex tear of medial meniscus of right knee, sequela 07/12/2020    Complex tear of medial meniscus of right knee as current injury 06/18/2020    Bilateral primary osteoarthritis of knee 06/10/2020    Chronic meniscal tear of knee     Primary osteoarthritis of left knee 09/26/2019    Chronic pain of right knee 09/20/2019    Peroneal tendonitis of lower leg 08/07/2019    Left foot pain 08/01/2019    Degeneration of lumbosacral intervertebral disc 07/10/2019    Hip pain 07/10/2019    Low back pain 07/10/2019    Lumbar radiculopathy 07/10/2019    Pain in both lower extremities 07/10/2019    Heel spur, left 02/01/2019    Urgency of urination 09/06/2018    Obesity, Class III, BMI 40-49.9 (morbid obesity) ([facility] 06/01/2018    Hyperglycemia 06/01/2018    High cholesterol 06/01/2018    Aspirin intolerance 12/07/2017    Allergic rhinitis due to allergen 12/07/2016    Intractable migraine without aura and without status migrainosus 07/20/2016    Advanced care planning/counseling discussion 03/24/2016    Essential hypertension 06/03/2015    Chronic lower back pain     Irritable bowel syndrome (IBS) 03/01/2014    Gastrointestinal food allergy 03/01/2014    Vitamin D deficiency 08/31/2013    Chronic insomnia 08/14/2013    Multiple thyroid nodules 03/05/2013    Shoulder pain, right     Incidental lung nodule 01/08/2013    Heart palpitations 02/01/2012    Hiatal hernia 04/03/2011    Iron deficiency anemia 05/27/2010    Gastritis 05/27/2010    Allergic rhinitis 05/27/2010    Radiculopathy, cervical 01/01/2010     Past Medical History:   Diagnosis Date    Allergic rhinitis 5/27/2010    Arthritis     Baker cyst, left 10/2019    Dr. Vela Horse cyst, right     Cervical disc herniation 01/2010    C3-4, C4-5, C5-6. Hemangioma body of C5. Dr. Franklin Hodge.  Chest pain 08/2011, 02/2012    Dr. Orly Ernst. Dr. Jeanine Wright; denies CP as of 3/27/14    Chronic lower back pain 2010, 03/2016    thoracic DDD and spondylosis. DDD L3-S1. Dr. Adrian Raza. Aneudy Dials. Dr. Bobby Laure[place] [facility] Lakeshia Galo.  Chronic meniscal tear of knee     Chronic pain     lower back and knees    Esophagitis, reflux 4/3/2011    Gallstone     Gastritis 5/27/2010    Dr. Todd Murphy.  Gastrointestinal food allergy 03/2014    Multiple. Dr. Miller.    Heart palpitations 02/2012    DUE TO PAC'S AND PVC'S. Dr. Selam Davis.  Heel spur, left 02/2019    Hiatal hernia 4/3/2011    HTN (hypertension) 1987    Impaired glucose tolerance 10/15/2010    Incidental lung nodule 01/08/13    LLL 3.9 mm, RML 3.1 mm;  as of 3/27/14 per pt stable w/o growth    Insomnia 1990s    Iron deficiency anemia 5/27/2010    Irritable bowel syndrome (IBS) 03/2014    Dr. Apryl Rhoades.    Knee pain 2012    Right. due to severe OA. / chipped bone     Left foot pain 08/2019    Dr. Abhijeet Spring.  Metatarsal bone fracture 1990    Left fifth.  Migraine 2/22/2011    Dr. Riley [facility])     Peroneal tendonitis of lower leg 08/07/2019    Left. Dr. Abhijeet Spring    Pre-diabetes     Radiculopathy, cervical 01/2010    Left. Dr. Franklin Hodge.  Shoulder pain, right 2012    due to [address] X 2. Dr. Chetan Ayon.  Thyroid nodule 2011    6 nodules- Dr. Rebeka Becerril    Toe fracture, left 2008    fifth toe.  Triangular fibrocartilage complex tear 2012    Dr. Juliano Ontiveros. Left.       Past Surgical History:   Procedure Laterality Date    COLONOSCOPY N/A 3/10/2021    COLONOSCOPY,EGD performed [facility] Ab Cheng [facility] at [facility] ENDOSCOPY    [address]    x 1    HX CHOLECYSTECTOMY  1987    OPEN    HX COLONOSCOPY  03/31/14    Dr. Kamla Coats; 03/31/14    due to abnormal PAP.  HX DILATION AND CURETTAGE  1990s    due to miscarriage.  HX ENDOSCOPY      HX HEART CATHETERIZATION  2018    no stents    HX KNEE ARTHROSCOPY Right     HX ORTHOPAEDIC Right 06/2016    LUMBAR L4-5, L5-S1 ELLY. Dr. Ko [address] Osteopathy      Social History     Tobacco Use    Smoking status: Never Smoker    Smokeless tobacco: Never Used   Substance Use Topics    Alcohol use: No      Family History   Problem Relation Age of Onset    Diabetes Mother     Heart Disease Mother 58        2 stents    Hypertension Mother     Stroke Mother     Diabetes Father     Hypertension Father     Stroke Maternal Grandmother     Cancer Maternal Uncle         prostate    Diabetes Sister       Prior to Admission medications    Medication Sig Start Date End Date Taking? Authorizing Provider   diphenhydrAMINE (BenadryL) 25 mg capsule Take 25 mg by mouth nightly as needed for Sleep. Yes Provider, Historical   melatonin 10 mg capsule Take 10 mg by mouth nightly as needed. Yes Provider, Historical   ergocalciferol (ERGOCALCIFEROL) 1,250 mcg (50,000 unit) capsule Take 1 Cap by mouth every seven (7) days. Indications: low vitamin D levels 6/2/20  Yes Betty Ren DO   propranolol LA (INDERAL LA) 80 mg SR capsule Take 1 Cap by mouth daily. Indications: migraine prevention  Patient taking differently: Take 80 mg by mouth daily as needed. Indications: migraine prevention 6/2/20  Yes Betty Ren DO   valsartan-hydroCHLOROthiazide (DIOVAN-HCT) 80-12.5 mg per tablet Take 1 Tab by mouth daily.  Indications: high blood pressure 6/2/20  Yes Marguerite Rodas DO omeprazole (PRILOSEC) 20 mg capsule TAKE 1 CAPSULE BY MOUTH TWICE DAILY  Indications: heartburn  Patient taking differently: Take 20 mg by mouth two (2) times daily as needed. TAKE 1 CAPSULE BY MOUTH TWICE DAILY  Indications: heartburn 6/2/20  Yes Betty Cody R, DO   acetaminophen (TYLENOL ARTHRITIS PAIN) 650 mg CR tablet Take 1,300 mg by mouth three (3) times daily as needed for Pain. Yes Provider, Historical   amitriptyline (ELAVIL) 25 mg tablet Take 1 Tab by mouth nightly. Indications: pain  Patient taking differently: Take 25 mg by mouth nightly as needed. Indications: pain 6/2/20   Dontrell Alfredito R, DO     Allergies   Allergen Reactions    Doxylamine Succinate Swelling     12.5-25 MG  HANDS, FACE/PERIORAL, FEET    Pcn [Penicillins] Hives    Ambien [Zolpidem] Nausea and Vomiting and Other (comments)     5 mg with Ativan 0.5 mg   TOO GROGGY, SLEPT 24 HOURS  7.5 MG FORGOT SHE WAS SLEEP EATING    Aspirin Nausea Only    Celebrex [Celecoxib] Other (comments)     TIGHT SQUEEZING IN CHEST  CHEST ACHED    Erythromycin Nausea and Vomiting    Milk Prot-Turm-Pepper-Pineappl Other (comments)     Multiple food allergies    Neurontin [Gabapentin] Other (comments)     300 mg  SEVERE LEG PAIN  EYE TWITCHING    Nsaids (Non-Steroidal Anti-Inflammatory Drug) Other (comments)     GI irritation             Objective:     Visit Vitals  BP (!) 142/88 (BP 1 Location: Right upper arm, BP Patient Position: Sitting, BP Cuff Size: Large adult)   Pulse 77   Temp 98.8 °F (37.1 °C) (Oral)   Resp 18   Ht 5' 8\" (1.727 m)   Wt 278 lb 9.6 oz (126.4 kg)   LMP  (LMP Unknown)   SpO2 98%   BMI 42.36 kg/m²       Data Review: Upper gastrointestinal series: Small hiatal hernia identified. Upper endoscopy: Small hiatal hernia confirmed, chronic gastroesophageal inflammation present (no Andrea's on pathology). Assessment:     Morbid obesity with multiple comorbidities. No success with medical management.   She has completed all preoperative prerequisites and has been found to be a good candidate for weight reduction surgery. While she initially desired sleeve gastrectomy, given reflux symptoms and further research, she desires to proceed with preauthorization for laparoscopic gastric bypass. I believe this is reasonable given the high likelihood of persistent reflux after sleeve gastrectomy. Plan:     Continue low-fat, low carbohydrate diet. Increase intensity, frequency of exercise. We will submit for preauthorization for laparoscopic gastric bypass with hiatal hernia repair, upper endoscopy. 20 minutes spent with patient (greater than 50% of time in face-face consultation reviewing preoperative work-up, choice of procedure, anticipated hospital course, postoperative diet, postoperative activity restrictions).       Signed By: Jose C Zamudio MD     March 30, 2021

## 2021-04-14 ENCOUNTER — CLINICAL SUPPORT (OUTPATIENT)
Dept: SURGERY | Age: 56
End: 2021-04-14

## 2021-04-14 DIAGNOSIS — E66.01 MORBID OBESITY (HCC): Primary | ICD-10-CM

## 2021-04-15 NOTE — PROGRESS NOTES
[employer] [facility]  Supervised Weight Loss     Date:   2021    Patient's Name: Ralph Kraus  : 1965    Insurance:  [employer] [place]               Session: 4 of  3 (90 days completed)  Surgery: Gastric Bypass                  Surgeon: Leonard Wood M.D.      Height: 68\"                 Reported Weight:    275      Lbs.                               BMI: 48             Pounds Lost since last month: 5               Pounds Gained since last month: 0     Starting Weight: 284#                       Previous Months Weight: 280#  Overall Pounds Lost: 9                    Overall Pounds Gained: 0#     Other Pertinent Information: Today's class was completed in a virtual setting d/t COVID[employer]. Today's wt was self-reported.     Smoking Status:  none  Alcohol Intake: none    I have reviewed with pt the guidelines of the supervised wt loss program.  Pt understands the expectations of some wt loss during the program and that wt gain could delay the process. I have also explained that appointments need to be consecutive and missing an appointment may result in starting over. Pt has received this information in writing. Changes that patient has made since last month include: Increased water intake, eating 3 meals a day, using protein shakes, reading food labels. Eating Habits and Behaviors  General healthy eating guidelines were also discussed. Pts were instructed that their plate should be made up 1/2 plate coming from non-starchy vegetables, 1/4 coming from lean meat, and 1/4 of their plate coming from carbohydrates, including fruits, starches, or milk. We discussed measuring meals to 1/2 cup total per meal after surgery. Drinking only calorie-free, sugar-free and non-carbonated beverages. We discussed the importance of drinking 64 ounces of fluid per day to prevent dehydration post-operatively.                        Patient's current diet habits include: Pt is eating 3 meals per day. Snack choices include fruit. Pt is eating refined carbohydrate foods (bread, pasta, rice, potatoes) none. Pt is eating sweets/desserts none. Pt is using baked, grilled, broiled cooking methods. Pt is eating meals prepared outside of the home none. Pt is drinking water, caffeine, Gatorade Zero. Pt reports no to emotional eating. Physical Activity/Exercise  An exercise presentation was provided including information about exercise programs available both before and after surgery. We talked about the importance of increasing daily physical activity and beginning to develop an exercise regimen/routine. We talked about exercise as being an important part of long term weight loss after surgery. Comments:  During class, I discussed with patient the importance of getting into an exercise routine. Pt is currently walking for activity. Pt has been encouraged to maintain and increase as tolerated. Behavior Modification       We talked about how to eat more mindfully. Tips and recommendations for how to make these changes were provided. Pt was encouraged to keep a food journal and record what they were taking in daily. Overall Assessment: Pt has demonstrated appropriate lifestyle changes evidenced by reported changes and reported wt loss. Demonstrates understanding of nutrition guidelines for bariatric surgery. Has completed 90 days of supervised weight loss and appears to be an appropriate candidate at this time. Patient-Set Goals:   1. Nutrition - continue eating healthy   2. Exercise - increase exercise   3.  Behavior -practice all the things I've learned     Matty Goss, RD  4/15/2021

## 2021-05-11 ENCOUNTER — HOSPITAL ENCOUNTER (OUTPATIENT)
Dept: GENERAL RADIOLOGY | Age: 56
Discharge: HOME | End: 2021-05-11
Attending: SURGERY
Payer: COMMERCIAL

## 2021-05-11 ENCOUNTER — HOSPITAL ENCOUNTER (OUTPATIENT)
Dept: PREADMISSION TESTING | Age: 56
Discharge: HOME | End: 2021-05-11
Payer: COMMERCIAL

## 2021-05-11 VITALS
TEMPERATURE: 98.2 F | RESPIRATION RATE: 18 BRPM | BODY MASS INDEX: 41.5 KG/M2 | SYSTOLIC BLOOD PRESSURE: 125 MMHG | DIASTOLIC BLOOD PRESSURE: 86 MMHG | HEART RATE: 71 BPM | HEIGHT: 68 IN | WEIGHT: 273.81 LBS

## 2021-05-11 LAB
25(OH)D3 SERPL-MCNC: 35.1 NG/ML (ref 30–100)
ALBUMIN SERPL-MCNC: 4.1 G/DL (ref 3.5–5)
ALBUMIN/GLOB SERPL: 1 {RATIO} (ref 1.1–2.2)
ALP SERPL-CCNC: 93 U/L (ref 45–117)
ALT SERPL-CCNC: 28 U/L (ref 12–78)
ANION GAP SERPL CALC-SCNC: 6 MMOL/L (ref 5–15)
APPEARANCE UR: CLEAR
AST SERPL-CCNC: 22 U/L (ref 15–37)
ATRIAL RATE: 63 BPM
BACTERIA URNS QL MICRO: NEGATIVE /HPF
BASOPHILS # BLD: 0 K/UL (ref 0–0.1)
BASOPHILS NFR BLD: 1 % (ref 0–1)
BILIRUB SERPL-MCNC: 0.5 MG/DL (ref 0.2–1)
BILIRUB UR QL: NEGATIVE
BUN SERPL-MCNC: 9 MG/DL (ref 6–20)
BUN/CREAT SERPL: 13 (ref 12–20)
CALCIUM SERPL-MCNC: 9.8 MG/DL (ref 8.5–10.1)
CALCULATED P AXIS, ECG09: 59 DEGREES
CALCULATED R AXIS, ECG10: 16 DEGREES
CALCULATED T AXIS, ECG11: 19 DEGREES
CHLORIDE SERPL-SCNC: 102 MMOL/L (ref 97–108)
CHOLEST SERPL-MCNC: 199 MG/DL
CO2 SERPL-SCNC: 28 MMOL/L (ref 21–32)
COLOR UR: ABNORMAL
CREAT SERPL-MCNC: 0.68 MG/DL (ref 0.55–1.02)
DIAGNOSIS, 93000: NORMAL
DIFFERENTIAL METHOD BLD: ABNORMAL
EOSINOPHIL # BLD: 0 K/UL (ref 0–0.4)
EOSINOPHIL NFR BLD: 1 % (ref 0–7)
EPITH CASTS URNS QL MICRO: ABNORMAL /LPF
ERYTHROCYTE [DISTWIDTH] IN BLOOD BY AUTOMATED COUNT: 12.4 % (ref 11.5–14.5)
GLOBULIN SER CALC-MCNC: 4.1 G/DL (ref 2–4)
GLUCOSE SERPL-MCNC: 88 MG/DL (ref 65–100)
GLUCOSE UR STRIP.AUTO-MCNC: NEGATIVE MG/DL
HCT VFR BLD AUTO: 42.4 % (ref 35–47)
HGB BLD-MCNC: 13.9 G/DL (ref 11.5–16)
HGB UR QL STRIP: NEGATIVE
HYALINE CASTS URNS QL MICRO: ABNORMAL /LPF (ref 0–5)
IMM GRANULOCYTES # BLD AUTO: 0 K/UL (ref 0–0.04)
IMM GRANULOCYTES NFR BLD AUTO: 0 % (ref 0–0.5)
KETONES UR QL STRIP.AUTO: ABNORMAL MG/DL
LEUKOCYTE ESTERASE UR QL STRIP.AUTO: NEGATIVE
LYMPHOCYTES # BLD: 1.9 K/UL (ref 0.8–3.5)
LYMPHOCYTES NFR BLD: 50 % (ref 12–49)
MAGNESIUM SERPL-MCNC: 1.9 MG/DL (ref 1.6–2.4)
MCH RBC QN AUTO: 28.8 PG (ref 26–34)
MCHC RBC AUTO-ENTMCNC: 32.8 G/DL (ref 30–36.5)
MCV RBC AUTO: 88 FL (ref 80–99)
MONOCYTES # BLD: 0.4 K/UL (ref 0–1)
MONOCYTES NFR BLD: 9 % (ref 5–13)
NEUTS SEG # BLD: 1.5 K/UL (ref 1.8–8)
NEUTS SEG NFR BLD: 39 % (ref 32–75)
NITRITE UR QL STRIP.AUTO: NEGATIVE
NRBC # BLD: 0 K/UL (ref 0–0.01)
NRBC BLD-RTO: 0 PER 100 WBC
P-R INTERVAL, ECG05: 170 MS
PH UR STRIP: 5 [PH] (ref 5–8)
PLATELET # BLD AUTO: 346 K/UL (ref 150–400)
PMV BLD AUTO: 10.6 FL (ref 8.9–12.9)
POTASSIUM SERPL-SCNC: 3.8 MMOL/L (ref 3.5–5.1)
PROT SERPL-MCNC: 8.2 G/DL (ref 6.4–8.2)
PROT UR STRIP-MCNC: NEGATIVE MG/DL
Q-T INTERVAL, ECG07: 414 MS
QRS DURATION, ECG06: 82 MS
QTC CALCULATION (BEZET), ECG08: 423 MS
RBC # BLD AUTO: 4.82 M/UL (ref 3.8–5.2)
RBC #/AREA URNS HPF: ABNORMAL /HPF (ref 0–5)
SODIUM SERPL-SCNC: 136 MMOL/L (ref 136–145)
SP GR UR REFRACTOMETRY: 1.02 (ref 1–1.03)
T4 FREE SERPL-MCNC: 0.9 NG/DL (ref 0.8–1.5)
TSH SERPL DL<=0.05 MIU/L-ACNC: 0.45 UIU/ML (ref 0.36–3.74)
UA: UC IF INDICATED,UAUC: ABNORMAL
UROBILINOGEN UR QL STRIP.AUTO: 0.2 EU/DL (ref 0.2–1)
VENTRICULAR RATE, ECG03: 63 BPM
WBC # BLD AUTO: 3.8 K/UL (ref 3.6–11)
WBC URNS QL MICRO: ABNORMAL /HPF (ref 0–4)

## 2021-05-11 PROCEDURE — 80053 COMPREHEN METABOLIC PANEL: CPT

## 2021-05-11 PROCEDURE — 84443 ASSAY THYROID STIM HORMONE: CPT

## 2021-05-11 PROCEDURE — 81001 URINALYSIS AUTO W/SCOPE: CPT

## 2021-05-11 PROCEDURE — 84439 ASSAY OF FREE THYROXINE: CPT

## 2021-05-11 PROCEDURE — 82306 VITAMIN D 25 HYDROXY: CPT

## 2021-05-11 PROCEDURE — 93005 ELECTROCARDIOGRAM TRACING: CPT

## 2021-05-11 PROCEDURE — 71046 X-RAY EXAM CHEST 2 VIEWS: CPT

## 2021-05-11 PROCEDURE — 85025 COMPLETE CBC W/AUTO DIFF WBC: CPT

## 2021-05-11 PROCEDURE — 82465 ASSAY BLD/SERUM CHOLESTEROL: CPT

## 2021-05-11 PROCEDURE — 83735 ASSAY OF MAGNESIUM: CPT

## 2021-05-11 RX ORDER — DOCUSATE SODIUM 100 MG/1
100 CAPSULE, LIQUID FILLED ORAL AS NEEDED
COMMUNITY

## 2021-05-11 NOTE — PERIOP NOTES
PRE-OPERATIVE INSTRUCTIONS REVIEWED WITH PATIENT. PT GIVEN TIME TO ASK QUESTIONS   PATIENT GIVEN 2-BOTTLE OF CHG SOAP. REVIEWED  PATIENT GIVEN SSI INFECTION FAQ SHEET. PATIENT CALLED AND MADE AWARE OF COVID-19 TESTING NEEDED TO BE DONE WITHIN 96 HOURS OF SURGERY. COVID-19 TESTING APPOINTMENT MADE FOR PATIENT. FOR June 10TH AT 1400  PATIENT INSTRUCTED ON SELF QUARANTINE BETWEEN TESTING AND ARRIVAL TIME DAY OF SURGERY.       EKG DONE     PT GIVEN INSTRUCTIONS  ON  OBTAINING CHEST XRAY

## 2021-05-18 ENCOUNTER — OFFICE VISIT (OUTPATIENT)
Dept: SURGERY | Age: 56
End: 2021-05-18
Payer: COMMERCIAL

## 2021-05-18 DIAGNOSIS — K21.9 CHRONIC GERD: ICD-10-CM

## 2021-05-18 DIAGNOSIS — E66.01 MORBID OBESITY (HCC): Primary | ICD-10-CM

## 2021-05-18 DIAGNOSIS — K44.9 HIATAL HERNIA: ICD-10-CM

## 2021-05-18 DIAGNOSIS — K29.50 OTHER CHRONIC GASTRITIS WITHOUT HEMORRHAGE: ICD-10-CM

## 2021-05-18 DIAGNOSIS — I10 ESSENTIAL HYPERTENSION: ICD-10-CM

## 2021-05-18 PROCEDURE — 99024 POSTOP FOLLOW-UP VISIT: CPT | Performed by: NURSE PRACTITIONER

## 2021-05-18 RX ORDER — POLYETHYLENE GLYCOL 3350 17 G/17G
17 POWDER, FOR SOLUTION ORAL DAILY
Qty: 510 G | Refills: 0 | Status: ON HOLD | OUTPATIENT
Start: 2021-05-18 | End: 2021-06-20

## 2021-05-18 RX ORDER — ONDANSETRON 4 MG/1
4 TABLET, ORALLY DISINTEGRATING ORAL
Qty: 30 TAB | Refills: 0 | Status: SHIPPED | OUTPATIENT
Start: 2021-05-18

## 2021-05-18 NOTE — PATIENT INSTRUCTIONS
Learning About Weight-Loss (Bariatric) Surgery What is weight-loss surgery? Bariatric surgery is surgery to help you lose weight. This type of surgery is only used for people who are very overweight and have not been able to lose weight with diet and exercise. This surgery makes the stomach smaller. Some types of surgery also change the connection between your stomach and intestines. Having weight-loss surgery is a big step. After surgery, you'll need to make new, lifelong changes in how you eat and drink. How is weight-loss surgery done? Bariatric surgery may be either \"open\" or \"laparoscopic. \" Open surgery is done through a large cut (incision) in the belly. Laparoscopic surgery is done through several small cuts. The doctor puts a lighted tube, or scope, and other surgical tools through small cuts in your belly. The doctor is able to see your organs with the scope. There are different types of bariatric surgery. Gastric sleeve surgery The surgery is usually done through several small incisions in the belly. The doctor removes more than half of your stomach. This leaves a thin sleeve, or tube, that is about the size of a banana. Because part of your stomach has been removed, this can't be reversed. Yelena-en-Y gastric bypass surgery Yelena-en-Y (say \"kaylie-en-why\") surgery changes the connection between the stomach and the intestines. The doctor separates a section of your stomach from the rest of your stomach. This makes a small pouch. The new pouch will hold the food you eat. The doctor connects the stomach pouch to the middle part of the small intestine. Gastric banding surgery The surgery is usually done through several small incisions in the belly. The doctor wraps a band around the upper part of the stomach. This creates a small pouch. The small size of the pouch means that you will get full after you eat just a small amount of food.  The doctor can inflate or deflate the band to adjust the size. This lets the doctor adjust how quickly food passes from the new pouch into the stomach. It does not change the connection between the stomach and the intestines. What can you expect after the surgery? You may stay in the hospital for one or more days after the surgery. How long you stay depends on the type of surgery you had. Most people need 2 to 4 weeks before they are ready to get back to their usual routine. For the first 2 to 6 weeks after surgery, you probably will need to follow a liquid or soft diet. Bit by bit, you will be able to eat more solid foods. Your doctor may advise you to work with a dietitian. This way you'll be sure to get enough protein, vitamins, and minerals while you are losing weight. Even with a healthy diet, you may need to take vitamin and mineral supplements. After surgery, you will not be able to eat very much at one time. You will get full quickly. Try not to eat too much at one time or eat foods that are high in fat or sugar. If you do, you may vomit, get stomach pain, or have diarrhea. You probably will lose weight very quickly in the first few months after surgery. As time goes on, your weight loss will slow down. You will have regular doctor visits to check how you are doing. Think of bariatric surgery as a tool to help you lose weight. It isn't an instant fix. You will still need to eat a healthy diet and get regular exercise. This will help you reach your weight goal and avoid regaining the weight you lose. Follow-up care is a key part of your treatment and safety. Be sure to make and go to all appointments, and call your doctor if you are having problems. It's also a good idea to know your test results and keep a list of the medicines you take. Where can you learn more? Go to http://www.gray.com/ Enter G469 in the search box to learn more about \"Learning About Weight-Loss (Bariatric) Surgery. \" Current as of: September 23, 2020               Content Version: 12.8 © 2505-4599 [employer]. Care instructions adapted under license by [employer] (which disclaims liability or warranty for this information). If you have questions about a medical condition or this instruction, always ask your healthcare professional. [address] any warranty or liability for your use of this information.

## 2021-05-18 NOTE — PROGRESS NOTES
1. Have you been to the ER, urgent care clinic since your last visit? Hospitalized since your last visit? no    2. Have you seen or consulted any other health care providers outside of the [address] [place] [address] since your last visit? Include any pap smears or colon screening.  no

## 2021-05-18 NOTE — PROGRESS NOTES
Visit Vitals  LMP  (LMP Unknown)       ICD-10-CM ICD-9-CM    1. Morbid obesity ([facility]  E66.01 278.01    2. BMI 40.0-44.9, adult ([facility]  Z68.41 V85.41    3. Essential hypertension  I10 401.9    4. Chronic GERD  K21.9 530.81    5. Other chronic gastritis without hemorrhage  K29.50 535.10    6. Hiatal hernia  K44.9 553.3    7. Primary osteoarthritis of left knee  M17.12 715.16        Plan:   1/2. Morbid Obesity- Patient is schedule for Laparoscopic Gastric bypass with hiatal hernia repair with Clyde Denver on 6/14/2021 at [address] patient in regard to post diet restrictions, follow- up office visits, follow- up care. Patient as received educational booklet and vitamin list. Reviewed liver shrinking diet. Post op medications prescribed: Zofran and Miralax. Reviewed ERAS protocol: Take 1000mg of Tylenol with lunch and dinner the day before surgery. You may drink clear liquids up to 1 hours before surgery. Do NOT start medications prescribed until after surgery. Reviewed with patient that lifelong vitamin supplementation is recommended by provider as well as risks of non-compliance. 3. [id number] with primary care 4 weeks postop  4/5/6.  GERD/chronic gastritis without hemorrhage/hiatal hernia-  Upper GI showed -IMPRESSION:  Small hiatal hernia. Otherwise unremarkable upper GI. Patient has an allergy to Neurontin and will be prescribed narcotic pain medication at time of discharge. Pt verbalized understanding and questions were answered to the best of my knowledge and ability.             Signed By: Meaghan Snell NP     May 18, 2021

## 2021-05-25 DIAGNOSIS — M62.830 BACK SPASM: ICD-10-CM

## 2021-05-25 RX ORDER — TIZANIDINE 2 MG/1
2 TABLET ORAL
Qty: 60 TABLET | Refills: 0 | Status: SHIPPED | OUTPATIENT
Start: 2021-05-25 | End: 2021-06-24

## 2021-05-27 ENCOUNTER — OFFICE VISIT (OUTPATIENT)
Dept: INTERNAL MEDICINE CLINIC | Age: 56
End: 2021-05-27
Payer: COMMERCIAL

## 2021-05-27 ENCOUNTER — TELEPHONE (OUTPATIENT)
Dept: SURGERY | Age: 56
End: 2021-05-27

## 2021-05-27 VITALS
BODY MASS INDEX: 41.83 KG/M2 | HEIGHT: 68 IN | HEART RATE: 79 BPM | WEIGHT: 276 LBS | OXYGEN SATURATION: 98 % | SYSTOLIC BLOOD PRESSURE: 140 MMHG | TEMPERATURE: 97.2 F | RESPIRATION RATE: 16 BRPM | DIASTOLIC BLOOD PRESSURE: 92 MMHG

## 2021-05-27 DIAGNOSIS — E66.01 OBESITY, CLASS III, BMI 40-49.9 (MORBID OBESITY) (HCC): ICD-10-CM

## 2021-05-27 DIAGNOSIS — R05.9 COUGH: ICD-10-CM

## 2021-05-27 DIAGNOSIS — J40 BRONCHITIS: Primary | ICD-10-CM

## 2021-05-27 DIAGNOSIS — R06.00 PND (PAROXYSMAL NOCTURNAL DYSPNEA): ICD-10-CM

## 2021-05-27 DIAGNOSIS — E78.00 HIGH CHOLESTEROL: ICD-10-CM

## 2021-05-27 DIAGNOSIS — I10 ESSENTIAL HYPERTENSION: ICD-10-CM

## 2021-05-27 PROCEDURE — 99214 OFFICE O/P EST MOD 30 MIN: CPT | Performed by: INTERNAL MEDICINE

## 2021-05-27 RX ORDER — BENZONATATE 100 MG/1
100 CAPSULE ORAL
Qty: 21 CAPSULE | Refills: 0 | Status: SHIPPED | OUTPATIENT
Start: 2021-05-27 | End: 2021-06-03

## 2021-05-27 RX ORDER — CEFUROXIME AXETIL 500 MG/1
500 TABLET ORAL 2 TIMES DAILY
Qty: 14 TABLET | Refills: 0 | Status: SHIPPED | OUTPATIENT
Start: 2021-05-27 | End: 2021-06-03

## 2021-05-27 NOTE — PROGRESS NOTES
Marlene Villatoro is a 64 y.o. female presenting for Cough, Chest Congestion, and Back Pain  . 1. Have you been to the ER, urgent care clinic since your last visit? Hospitalized since your last visit? No    2. Have you seen or consulted any other health care providers outside of the [address] [place] [address] since your last visit? Include any pap smears or colon screening. No    Fall Risk Assessment, last 12 mths 5/27/2021   Able to walk? Yes   Fall in past 12 months? 0   Do you feel unsteady? 0   Are you worried about falling 0         Abuse Screening Questionnaire 1/27/2021   Do you ever feel afraid of your partner? N   Are you in a relationship with someone who physically or mentally threatens you? N   Is it safe for you to go home? Y       3 most recent PHQ Screens 3/30/2021   Little interest or pleasure in doing things Not at all   Feeling down, depressed, irritable, or hopeless Not at all   Total Score PHQ 2 0       There are no discontinued medications.

## 2021-05-27 NOTE — TELEPHONE ENCOUNTER
I called the patient and I let her know that I need a new blank form from her that has not been partially filled out on the portions that I need to fill out sent to me. She said she will fax it and I also told her I need a release of information from her in order to fax her form, she said she will look to see if she has one if not she will sign one of our when she comes into the office on Tuesday. Pt in agreement.

## 2021-05-27 NOTE — PATIENT INSTRUCTIONS
Bronchitis: Care Instructions Your Care Instructions Bronchitis is inflammation of the bronchial tubes, which carry air to the lungs. The tubes swell and produce mucus, or phlegm. The mucus and inflamed bronchial tubes make you cough. You may have trouble breathing. Most cases of bronchitis are caused by viruses like those that cause colds. Antibiotics usually do not help and they may be harmful. Bronchitis usually develops rapidly and lasts about 2 to 3 weeks in otherwise healthy people. Follow-up care is a key part of your treatment and safety. Be sure to make and go to all appointments, and call your doctor if you are having problems. It's also a good idea to know your test results and keep a list of the medicines you take. How can you care for yourself at home? · Take all medicines exactly as prescribed. Call your doctor if you think you are having a problem with your medicine. · Get some extra rest. 
· Take an over-the-counter pain medicine, such as acetaminophen (Tylenol), ibuprofen (Advil, Motrin), or naproxen (Aleve) to reduce fever and relieve body aches. Read and follow all instructions on the label. · Do not take two or more pain medicines at the same time unless the doctor told you to. Many pain medicines have acetaminophen, which is Tylenol. Too much acetaminophen (Tylenol) can be harmful. · Take an over-the-counter cough medicine that contains dextromethorphan to help quiet a dry, hacking cough so that you can sleep. Avoid cough medicines that have more than one active ingredient. Read and follow all instructions on the label. · Breathe moist air from a humidifier, hot shower, or sink filled with hot water. The heat and moisture will thin mucus so you can cough it out. · Do not smoke. Smoking can make bronchitis worse. If you need help quitting, talk to your doctor about stop-smoking programs and medicines. These can increase your chances of quitting for good.  
When should you call for help? Call 911 anytime you think you may need emergency care. For example, call if: 
  · You have severe trouble breathing. Call your doctor now or seek immediate medical care if: 
  · You have new or worse trouble breathing.  
  · You cough up dark brown or bloody mucus (sputum).  
  · You have a new or higher fever.  
  · You have a new rash. Watch closely for changes in your health, and be sure to contact your doctor if: 
  · You cough more deeply or more often, especially if you notice more mucus or a change in the color of your mucus.  
  · You are not getting better as expected. Where can you learn more? Go to http://www.gray.com/ Enter H333 in the search box to learn more about \"Bronchitis: Care Instructions. \" Current as of: October 26, 2020               Content Version: 12.8 © 2977-1976 [employer]. Care instructions adapted under license by [employer] (which disclaims liability or warranty for this information). If you have questions about a medical condition or this instruction, always ask your healthcare professional. [address] any warranty or liability for your use of this information.

## 2021-05-27 NOTE — PROGRESS NOTES
Gaby Castillo is a 64 y.o. female and presents with Cough, Chest Congestion, and Back Pain      Subjective:  She works as a [employer]. 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 Patient comes in today for acute care visit. Reports for the past 2 weeks she has been having productive cough with phlegm production, yellowish mucus discharge, chills but denies fever. She denies shortness of breath or wheezing. She reports the cough has been interrupting her sleep. She has had this issue in the past and antibiotics is helped. She is already taking NyQuil, DayQuil and Allegra without any relief. Hypertensionwell-controlled on current meds. Dyslipidemiadiet controlled. Denies muscle cramps or myalgia. Morbid obesityBMI greater than 40. Following up with weight loss clinic. She is due for gastric bypass surgery With Dr. Mika Galicia in June 21. Recap-  History of migraines takes propranolol as needed. History of atypical chest pain and palpitations, PACs PVCs reports she had cardiac work-up done including a cath in the past and everything was unremarkable. Past Medical History:   Diagnosis Date    Allergic rhinitis 5/27/2010    Arthritis     Baker cyst, left 10/2019    Dr. Everette Mercer cyst, right     Cervical disc herniation 01/2010    C3-4, C4-5, C5-6. Hemangioma body of C5. Dr. Nkechi Lozano.  Chest pain 08/2011, 02/2012    Dr. Priyank Rodriges. Dr. Lata Contreras; denies CP as of 3/27/14    Chronic lower back pain 2010, 03/2016    thoracic DDD and spondylosis. DDD L3-S1. Dr. Elaine Guerra. Marie Paez. [place]ar [place] [facility] [place] .  Chronic meniscal tear of knee     Chronic pain     lower back and knees    Esophagitis, reflux 4/3/2011    Gallstone     Gastritis 5/27/2010    Dr. Luz Garcia.  Gastrointestinal food allergy 03/2014    Multiple. Dr. Savana Sánchez.    Heart palpitations 02/2012    DUE TO PAC'S AND PVC'S. Dr. Savage Plascencia.     Heel spur, left 02/2019    Hiatal hernia 4/3/2011    HTN (hypertension) 1987    Impaired glucose tolerance 10/15/2010    Incidental lung nodule 01/08/13    LLL 3.9 mm, RML 3.1 mm;  as of 3/27/14 per pt stable w/o growth    Insomnia 1990s    Iron deficiency anemia 5/27/2010    Irritable bowel syndrome (IBS) 03/2014    Dr. Esmer Stanley.    Knee pain 2012    Right. due to severe OA. / chipped bone     Left foot pain 08/2019    Dr. Viv Chacon.  Metatarsal bone fracture 1990    Left fifth.  Migraine 2/22/2011    Dr. Mimi Quinn Morbid obesity [facility])     Peroneal tendonitis of lower leg 08/07/2019    Left. Dr. Viv Chacon    Pre-diabetes     Radiculopathy, cervical 01/2010    Left. Dr. Iogr Carter.  Shoulder pain, right 2012    due to [address] X 2. Dr. Esa Avila.  Thyroid nodule 2011    6 nodules- Dr. Jeremie Wilson    Toe fracture, left 2008    fifth toe.  Triangular fibrocartilage complex tear 2012    Dr. Yariel Ennis. Left. Past Surgical History:   Procedure Laterality Date    COLONOSCOPY N/A 3/10/2021    COLONOSCOPY,EGD performed by Nick Kwok [facility] at [facility] ENDOSCOPY    [address]    x 1    HX CHOLECYSTECTOMY  1987    OPEN    HX COLONOSCOPY  03/31/14    Dr. Lilian Jj; 03/31/14    due to abnormal PAP.  HX DILATION AND CURETTAGE  1990s    due to miscarriage.  HX ENDOSCOPY      HX HEART CATHETERIZATION  2018    no stents    HX KNEE ARTHROSCOPY Right     HX ORTHOPAEDIC Right 06/2016    LUMBAR L4-5, L5-S1 ELLY.   Dr. Gagnon Manasquan     Allergies   Allergen Reactions    Doxylamine Succinate Swelling     12.5-25 MG  HANDS, FACE/PERIORAL, FEET    Pcn [Penicillins] Hives    Ambien [Zolpidem] Nausea and Vomiting and Other (comments)     5 mg with Ativan 0.5 mg   TOO GROGGY, SLEPT 24 HOURS  7.5 MG FORGOT SHE WAS SLEEP EATING    Aspirin Nausea Only    Celebrex [Celecoxib] Other (comments)     TIGHT SQUEEZING IN CHEST  CHEST ACHED    Erythromycin Nausea and Vomiting    Milk Prot-Turm-Pepper-Pineappl Other (comments)     Multiple food allergies    Neurontin [Gabapentin] Other (comments)     300 mg  SEVERE LEG PAIN  EYE TWITCHING    Nsaids (Non-Steroidal Anti-Inflammatory Drug) Other (comments)     GI irritation       Current Outpatient Medications   Medication Sig Dispense Refill    cefUROXime (CEFTIN) 500 mg tablet Take 1 Tablet by mouth two (2) times a day for 7 days. 14 Tablet 0    benzonatate (TESSALON) 100 mg capsule Take 1 Capsule by mouth three (3) times daily as needed for Cough for up to 7 days. 21 Capsule 0    tiZANidine (ZANAFLEX) 2 mg tablet Take 1 Tablet by mouth two (2) times daily as needed for Muscle Spasm(s) for up to 30 days. 60 Tablet 0    ondansetron (ZOFRAN ODT) 4 mg disintegrating tablet Take 1 Tab by mouth every eight (8) hours as needed for Nausea or Vomiting. 30 Tab 0    polyethylene glycol (MIRALAX) 17 gram/dose powder Take 17 g by mouth daily for 30 days. 510 g 0    PSYLLIUM HUSK PO Take 1 Tab by mouth daily.  docusate sodium (COLACE) 100 mg capsule Take 100 mg by mouth as needed for Constipation.  Lactobacillus acidophilus (PROBIOTIC PO) Take 1 Tab by mouth nightly.  ergocalciferol (ERGOCALCIFEROL) 1,250 mcg (50,000 unit) capsule Take 1 Cap by mouth every seven (7) days. Indications: low vitamin D levels (Patient taking differently: Take 50,000 Units by mouth every seven (7) days. LAST DATE TAKEN 05/04/21  Indications: low vitamin D levels) 5 Cap 5    propranolol LA (INDERAL LA) 80 mg SR capsule Take 1 Cap by mouth daily. Indications: migraine prevention (Patient taking differently: Take 80 mg by mouth daily as needed. Indications: migraine prevention) 90 Cap 3    valsartan-hydroCHLOROthiazide (DIOVAN-HCT) 80-12.5 mg per tablet Take 1 Tab by mouth daily. Indications: high blood pressure (Patient taking differently: Take 1 Tab by mouth nightly.  Indications: high blood pressure) 90 Tab 3    omeprazole (PRILOSEC) 20 mg capsule TAKE 1 CAPSULE BY MOUTH TWICE DAILY  Indications: heartburn (Patient taking differently: Take 20 mg by mouth two (2) times daily as needed. TAKE 1 CAPSULE BY MOUTH TWICE DAILY  Indications: heartburn) 60 Cap 5    acetaminophen (TYLENOL ARTHRITIS PAIN) 650 mg CR tablet Take 1,300 mg by mouth three (3) times daily as needed for Pain. 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      Social Determinants of Health     Financial Resource Strain:     Difficulty of Paying Living Expenses:    Food Insecurity:     Worried About [address] [employer] [employer] in the Last Year:     [address] in the Last Year:    Transportation Needs:     Lack of Transportation (Medical):      Lack of Transportation (Non-Medical):    Physical Activity:     Days of Exercise per Week:     Minutes of Exercise per Session:    Stress:     Feeling of Stress :    Social Connections:     Frequency of Communication with Friends and Family:     Frequency of Social Gatherings with Friends and Family:     Attends Zoroastrianism Services:     Active Member of Clubs or Organizations:     Attends Club or Organization Meetings:     Marital Status:      Family History   Problem Relation Age of Onset    Diabetes Mother     Heart Disease Mother 58        2 stents    Hypertension Mother     Stroke Mother     Diabetes Father     Hypertension Father     Stroke Maternal Grandmother     Cancer Maternal Uncle         prostate    Diabetes Sister     No Known Problems Sister     No Known Problems Sister     Anesth Problems Neg Hx        Review of Systems  A ten system review of constitutional, cardiovascular, respiratory, musculoskeletal, endocrine, skin, SHEENT, genitourinary, psychiatric and neurologic systems was obtained and is unremarkable with the exception of weight loss    Objective:  Visit Vitals  BP (!) 140/92 (BP 1 Location: Left upper arm, BP Patient Position: Sitting, BP Cuff Size: Adult)   Pulse 79   Temp 97.2 °F (36.2 °C)   Resp 16   Ht 5' 8\" (1.727 m)   Wt 276 lb (125.2 kg)   LMP  (LMP Unknown)   SpO2 98%   BMI 41.97 kg/m²     Physical Exam:   General appearance - alert, well appearing, and in no distress  Mental status - alert, oriented to person, place, and time  EYE-MARJORIE, EOMI, fundi normal, corneas normal, no foreign bodies  ENT-ENT exam normal, no neck nodes or sinus tenderness  Nose - normal and patent, no erythema, discharge or polyps  Mouth - mucous membranes moist, pharynx normal without lesions  Neck - supple, no significant adenopathy   Chest - clear to auscultation, no wheezes, rales or rhonchi, symmetric air entry   Heart - normal rate, regular rhythm, normal S1, S2, no murmurs, rubs, clicks or gallops   Abdomen - soft, nontender, nondistended, no masses or organomegaly  Lymph- no adenopathy palpable  Ext-peripheral pulses normal, no pedal edema, no clubbing or cyanosis  Skin-Warm and dry.  no hyperpigmentation, vitiligo, or suspicious lesions  Neuro -alert, oriented, normal speech, no focal findings or movement disorder noted  Musculoskeletal- FROM, no bony abnormalities, no point tenderness    Lab Review:  Results for orders placed or performed during the hospital encounter of 05/11/21   CBC WITH AUTOMATED DIFF   Result Value Ref Range    WBC 3.8 3.6 - 11.0 K/uL    RBC 4.82 3.80 - 5.20 M/uL    HGB 13.9 11.5 - 16.0 g/dL    HCT 42.4 35.0 - 47.0 %    MCV 88.0 80.0 - 99.0 FL    MCH 28.8 26.0 - 34.0 PG    MCHC 32.8 30.0 - 36.5 g/dL    RDW 12.4 11.5 - 14.5 %    PLATELET 600 720 - 629 K/uL    MPV 10.6 8.9 - 12.9 FL    NRBC 0.0 0  WBC    ABSOLUTE NRBC 0.00 0.00 - 0.01 K/uL    NEUTROPHILS 39 32 - 75 %    LYMPHOCYTES 50 (H) 12 - 49 %    MONOCYTES 9 5 - 13 %    EOSINOPHILS 1 0 - 7 %    BASOPHILS 1 0 - 1 %    IMMATURE GRANULOCYTES 0 0.0 - 0.5 %    ABS. NEUTROPHILS 1.5 (L) 1.8 - 8.0 K/UL    ABS. LYMPHOCYTES 1.9 0.8 - 3.5 K/UL    ABS. MONOCYTES 0.4 0.0 - 1.0 K/UL    ABS. EOSINOPHILS 0.0 0.0 - 0.4 K/UL    ABS. BASOPHILS 0.0 0.0 - 0.1 K/UL    ABS. IMM. GRANS. 0.0 0.00 - 0.04 K/UL    DF AUTOMATED     CHOLESTEROL, TOTAL   Result Value Ref Range    Cholesterol, total 199 <200 MG/DL   MAGNESIUM   Result Value Ref Range    Magnesium 1.9 1.6 - 2.4 mg/dL   METABOLIC PANEL, COMPREHENSIVE   Result Value Ref Range    Sodium 136 136 - 145 mmol/L    Potassium 3.8 3.5 - 5.1 mmol/L    Chloride 102 97 - 108 mmol/L    CO2 28 21 - 32 mmol/L    Anion gap 6 5 - 15 mmol/L    Glucose 88 65 - 100 mg/dL    BUN 9 6 - 20 MG/DL    Creatinine 0.68 0.55 - 1.02 MG/DL    BUN/Creatinine ratio 13 12 - 20      GFR est AA >60 >60 ml/min/1.73m2    GFR est non-AA >60 >60 ml/min/1.73m2    Calcium 9.8 8.5 - 10.1 MG/DL    Bilirubin, total 0.5 0.2 - 1.0 MG/DL    ALT (SGPT) 28 12 - 78 U/L    AST (SGOT) 22 15 - 37 U/L    Alk.  phosphatase 93 45 - 117 U/L    Protein, total 8.2 6.4 - 8.2 g/dL    Albumin 4.1 3.5 - 5.0 g/dL    Globulin 4.1 (H) 2.0 - 4.0 g/dL    A-G Ratio 1.0 (L) 1.1 - 2.2     T4, FREE   Result Value Ref Range    T4, Free 0.9 0.8 - 1.5 NG/DL   TSH 3RD GENERATION   Result Value Ref Range    TSH 0.45 0.36 - 3.74 uIU/mL   URINALYSIS W/ REFLEX CULTURE    Specimen: Urine   Result Value Ref Range    Color YELLOW/STRAW      Appearance CLEAR CLEAR      Specific gravity 1.020 1.003 - 1.030      pH (UA) 5.0 5.0 - 8.0      Protein Negative NEG mg/dL    Glucose Negative NEG mg/dL    Ketone TRACE (A) NEG mg/dL    Bilirubin Negative NEG      Blood Negative NEG      Urobilinogen 0.2 0.2 - 1.0 EU/dL    Nitrites Negative NEG      Leukocyte Esterase Negative NEG      UA:UC IF INDICATED CULTURE NOT INDICATED BY UA RESULT CNI      WBC 0-4 0 - 4 /hpf    RBC 0-5 0 - 5 /hpf    Epithelial cells FEW FEW /lpf    Bacteria Negative NEG /hpf    Hyaline cast 0-2 0 - 5 /lpf   VITAMIN D, 25 HYDROXY   Result Value Ref Range    Vitamin D 25-Hydroxy 35.1 30 - 100 ng/mL   EKG, 12 LEAD, INITIAL   Result Value Ref Range    Ventricular Rate 63 BPM    Atrial Rate 63 BPM    P-R Interval 170 ms    QRS Duration 82 ms    Q-T Interval 414 ms    QTC Calculation (Bezet) 423 ms    Calculated P Axis 59 degrees    Calculated R Axis 16 degrees    Calculated T Axis 19 degrees    Diagnosis       Normal sinus rhythm    When compared with ECG of 02-JUL-2020 15:56,  No significant change  Confirmed by Lorna Mercado M.D., [employer] ([id number]) on 5/11/2021 11:15:13 PM          Documenation Review:    Assessment/Plan:    Diagnoses and all orders for this visit:    1. Bronchitis  -     cefUROXime (CEFTIN) 500 mg tablet; Take 1 Tablet by mouth two (2) times a day for 7 days. 2. Cough  -     benzonatate (TESSALON) 100 mg capsule; Take 1 Capsule by mouth three (3) times daily as needed for Cough for up to 7 days. 3. PND (paroxysmal nocturnal dyspnea)    4. Essential hypertension    5. High cholesterol    6. Obesity, Class III, BMI 40-49.9 (morbid obesity) ([facility] [address]      Start Ceftin. Prescription sent to preferred pharmacy. Benzoate as needed for cough. Warm saline gargles and steam inhalation. Daily Zyrtec. Noted patient has gastric bypass surgery scheduled in June 21. Discussed lifestyle modifications including Na restriction, low carb/fat diet, weight reduction and exercise (at least a walking program). ICD-10-CM ICD-9-CM    1. Bronchitis  J40 490 cefUROXime (CEFTIN) 500 mg tablet   2. Cough  R05 786.2 benzonatate (TESSALON) 100 mg capsule   3. PND (paroxysmal nocturnal dyspnea)  R06.00 786.09    4. Essential hypertension  I10 401.9    5. High cholesterol  E78.00 272.0    6. Obesity, Class III, BMI 40-49.9 (morbid obesity) (HCC)  E66.01 278.01                I have reviewed with the patient details of the assessment and plan and all questions were answered. Relevent patient education was performed. Verbal and/or written instructions (see AVS) provided. The most recent lab findings were reviewed with the patient. Plan was discussed with patient who verbally expressed understanding. An After Visit Summary was printed and given to the patient.     Gisele Davis MD

## 2021-06-01 ENCOUNTER — OFFICE VISIT (OUTPATIENT)
Dept: SURGERY | Age: 56
End: 2021-06-01

## 2021-06-01 VITALS
DIASTOLIC BLOOD PRESSURE: 92 MMHG | SYSTOLIC BLOOD PRESSURE: 141 MMHG | HEART RATE: 77 BPM | TEMPERATURE: 99.2 F | RESPIRATION RATE: 20 BRPM | OXYGEN SATURATION: 97 % | WEIGHT: 274 LBS | HEIGHT: 68 IN | BODY MASS INDEX: 41.52 KG/M2

## 2021-06-01 DIAGNOSIS — E66.01 MORBID OBESITY (HCC): Primary | ICD-10-CM

## 2021-06-01 DIAGNOSIS — G43.019 INTRACTABLE MIGRAINE WITHOUT AURA AND WITHOUT STATUS MIGRAINOSUS: ICD-10-CM

## 2021-06-01 DIAGNOSIS — I10 ESSENTIAL HYPERTENSION: ICD-10-CM

## 2021-06-01 DIAGNOSIS — E78.00 HIGH CHOLESTEROL: ICD-10-CM

## 2021-06-01 DIAGNOSIS — K44.9 PARAESOPHAGEAL HERNIA: ICD-10-CM

## 2021-06-01 DIAGNOSIS — B37.9 YEAST INFECTION: Primary | ICD-10-CM

## 2021-06-01 RX ORDER — FLUCONAZOLE 150 MG/1
150 TABLET ORAL DAILY
Qty: 1 TABLET | Refills: 0 | Status: SHIPPED | OUTPATIENT
Start: 2021-06-01 | End: 2021-06-02

## 2021-06-01 NOTE — PROGRESS NOTES
1. Have you been to the ER, urgent care clinic since your last visit? Hospitalized since your last visit? No    2. Have you seen or consulted any other health care providers outside of the [address] [place] [address] since your last visit? Include any pap smears or colon screening.  No

## 2021-06-01 NOTE — LETTER
6/1/2021 9:05 AM    Ms. [id number] [facility] [id number]      To Whom it may concern:      Michelle Ontiveros was seen in our office today. If you have any questions please have the patient call our office.           Sincerely,      Maria E Alexander MD/gill

## 2021-06-03 ENCOUNTER — DOCUMENTATION ONLY (OUTPATIENT)
Dept: SURGERY | Age: 56
End: 2021-06-03

## 2021-06-03 NOTE — PATIENT INSTRUCTIONS
Learning About Weight-Loss (Bariatric) Surgery What is weight-loss surgery? Bariatric surgery is surgery to help you lose weight. This type of surgery is only used for people who are very overweight and have not been able to lose weight with diet and exercise. This surgery makes the stomach smaller. Some types of surgery also change the connection between your stomach and intestines. Having weight-loss surgery is a big step. After surgery, you'll need to make new, lifelong changes in how you eat and drink. How is weight-loss surgery done? Bariatric surgery may be either \"open\" or \"laparoscopic. \" Open surgery is done through a large cut (incision) in the belly. Laparoscopic surgery is done through several small cuts. The doctor puts a lighted tube, or scope, and other surgical tools through small cuts in your belly. The doctor is able to see your organs with the scope. There are different types of bariatric surgery. Gastric sleeve surgery The surgery is usually done through several small incisions in the belly. The doctor removes more than half of your stomach. This leaves a thin sleeve, or tube, that is about the size of a banana. Because part of your stomach has been removed, this can't be reversed. Yelena-en-Y gastric bypass surgery Yelena-en-Y (say \"kaylie-en-why\") surgery changes the connection between the stomach and the intestines. The doctor separates a section of your stomach from the rest of your stomach. This makes a small pouch. The new pouch will hold the food you eat. The doctor connects the stomach pouch to the middle part of the small intestine. Gastric banding surgery The surgery is usually done through several small incisions in the belly. The doctor wraps a band around the upper part of the stomach. This creates a small pouch. The small size of the pouch means that you will get full after you eat just a small amount of food.  The doctor can inflate or deflate the band to adjust the size. This lets the doctor adjust how quickly food passes from the new pouch into the stomach. It does not change the connection between the stomach and the intestines. What can you expect after the surgery? You may stay in the hospital for one or more days after the surgery. How long you stay depends on the type of surgery you had. Most people need 2 to 4 weeks before they are ready to get back to their usual routine. For the first 2 to 6 weeks after surgery, you probably will need to follow a liquid or soft diet. Bit by bit, you will be able to eat more solid foods. Your doctor may advise you to work with a dietitian. This way you'll be sure to get enough protein, vitamins, and minerals while you are losing weight. Even with a healthy diet, you may need to take vitamin and mineral supplements. After surgery, you will not be able to eat very much at one time. You will get full quickly. Try not to eat too much at one time or eat foods that are high in fat or sugar. If you do, you may vomit, get stomach pain, or have diarrhea. You probably will lose weight very quickly in the first few months after surgery. As time goes on, your weight loss will slow down. You will have regular doctor visits to check how you are doing. Think of bariatric surgery as a tool to help you lose weight. It isn't an instant fix. You will still need to eat a healthy diet and get regular exercise. This will help you reach your weight goal and avoid regaining the weight you lose. Follow-up care is a key part of your treatment and safety. Be sure to make and go to all appointments, and call your doctor if you are having problems. It's also a good idea to know your test results and keep a list of the medicines you take. Where can you learn more? Go to http://www.gray.com/ Enter G469 in the search box to learn more about \"Learning About Weight-Loss (Bariatric) Surgery. \" Current as of: September 23, 2020               Content Version: 12.8 © 7838-8360 [employer]. Care instructions adapted under license by [employer] (which disclaims liability or warranty for this information). If you have questions about a medical condition or this instruction, always ask your healthcare professional. [address] any warranty or liability for your use of this information.

## 2021-06-03 NOTE — PROGRESS NOTES
Subjective: The patient is a 64 y.o. obese female scheduled for laparoscopic gastric bypass with paraesophageal hernia repair  Body mass index is 41.66 kg/m². Ignacio Webb has tried multiple diets in her  lifetime most recently trying unsupervised diets during which she was able to lose small amounts of weight and then gained it back plus more. She started her preoperative diet today. Bariatric comorbidities present are   Past Medical History:   Diagnosis Date    Allergic rhinitis 5/27/2010    Arthritis     Baker cyst, left 10/2019    Dr. Kerrie De Paz cyst, right     Cervical disc herniation 01/2010    C3-4, C4-5, C5-6. Hemangioma body of C5. Dr. Madan Pedersen.  Chest pain 08/2011, 02/2012    Dr. Ronnie Waters. Dr. Mcmillan; denies CP as of 3/27/14    Chronic lower back pain 2010, 03/2016    thoracic DDD and spondylosis. DDD L3-S1. Dr. Irma Hudson. Fior Ghislaine. Dr. Ulisses Salamanca, [facility] [place]. [place]. Manolo Juan.  Chronic meniscal tear of knee     Chronic pain     lower back and knees    Esophagitis, reflux 4/3/2011    Gallstone     Gastritis 5/27/2010    Dr. Emily Calabrese.  Gastrointestinal food allergy 03/2014    Multiple. Dr. Geovanna Haskins.    Heart palpitations 02/2012    DUE TO PAC'S AND PVC'S. Dr. Lisa Kearney.  Heel spur, left 02/2019    Hiatal hernia 4/3/2011    HTN (hypertension) 1987    Impaired glucose tolerance 10/15/2010    Incidental lung nodule 01/08/13    LLL 3.9 mm, RML 3.1 mm;  as of 3/27/14 per pt stable w/o growth    Insomnia 1990s    Iron deficiency anemia 5/27/2010    Irritable bowel syndrome (IBS) 03/2014    Dr. Yusra Iniguez.    Knee pain 2012    Right. due to severe OA. / chipped bone     Left foot pain 08/2019    Dr. Liss Martino.  Metatarsal bone fracture 1990    Left fifth.  Migraine 2/22/2011    Dr. Baumann Kid Morbid obesity [facility])     Peroneal tendonitis of lower leg 08/07/2019    Left.   Dr. Fabiola Garcia Radiculopathy, cervical 01/2010    Left. Dr. Javed Irizarry.  Shoulder pain, right 2012    due to [address] X 2. Dr. Amy Monreal.  Thyroid nodule 2011    6 nodules- Dr. Kunal Perkins    Toe fracture, left 2008    fifth toe.  Triangular fibrocartilage complex tear 2012    Dr. Angela Villafuerte. Left.        Patient Active Problem List    Diagnosis Date Noted    Left lateral epicondylitis 12/24/2020    Complex tear of meniscus of right knee 07/13/2020    Complex tear of medial meniscus of right knee, sequela 07/12/2020    Complex tear of medial meniscus of right knee as current injury 06/18/2020    Bilateral primary osteoarthritis of knee 06/10/2020    Chronic meniscal tear of knee     Primary osteoarthritis of left knee 09/26/2019    Chronic pain of right knee 09/20/2019    Peroneal tendonitis of lower leg 08/07/2019    Left foot pain 08/01/2019    Degeneration of lumbosacral intervertebral disc 07/10/2019    Hip pain 07/10/2019    Low back pain 07/10/2019    Lumbar radiculopathy 07/10/2019    Pain in both lower extremities 07/10/2019    Heel spur, left 02/01/2019    Urgency of urination 09/06/2018    Obesity, Class III, BMI 40-49.9 (morbid obesity) ([facility] [address] 06/01/2018    Hyperglycemia 06/01/2018    High cholesterol 06/01/2018    Aspirin intolerance 12/07/2017    Allergic rhinitis due to allergen 12/07/2016    Intractable migraine without aura and without status migrainosus 07/20/2016    Advanced care planning/counseling discussion 03/24/2016    Essential hypertension 06/03/2015    Chronic lower back pain     Irritable bowel syndrome (IBS) 03/01/2014    Gastrointestinal food allergy 03/01/2014    Vitamin D deficiency 08/31/2013    Chronic insomnia 08/14/2013    Multiple thyroid nodules 03/05/2013    Shoulder pain, right     Incidental lung nodule 01/08/2013    Heart palpitations 02/01/2012    Hiatal hernia 04/03/2011    Iron deficiency anemia 05/27/2010    Gastritis 05/27/2010    Allergic rhinitis 05/27/2010    Radiculopathy, cervical 01/01/2010     Past Medical History:   Diagnosis Date    Allergic rhinitis 5/27/2010    Arthritis     Baker cyst, left 10/2019    Dr. Elaine Oneil cyst, right     Cervical disc herniation 01/2010    C3-4, C4-5, C5-6. Hemangioma body of C5. Dr. Bria Stokes.  Chest pain 08/2011, 02/2012    Dr. Mya Griffith. Dr. Kendal Barajas; denies CP as of 3/27/14    Chronic lower back pain 2010, 03/2016    thoracic DDD and spondylosis. DDD L3-S1. Dr. Leisa Bowens. Olivia Pal. Dr. Jayce Saleh, [facility] [place]. Dr. Lavonne Schlatter.  Chronic meniscal tear of knee     Chronic pain     lower back and knees    Esophagitis, reflux 4/3/2011    Gallstone     Gastritis 5/27/2010    Dr. Richy Solis.  Gastrointestinal food allergy 03/2014    Multiple. Dr. Danielle Kate.    Heart palpitations 02/2012    DUE TO PAC'S AND PVC'S. Dr. Arabella Lucero.  Heel spur, left 02/2019    Hiatal hernia 4/3/2011    HTN (hypertension) 1987    Impaired glucose tolerance 10/15/2010    Incidental lung nodule 01/08/13    LLL 3.9 mm, RML 3.1 mm;  as of 3/27/14 per pt stable w/o growth    Insomnia 1990s    Iron deficiency anemia 5/27/2010    Irritable bowel syndrome (IBS) 03/2014    Dr. Scott Calhoun.    Knee pain 2012    Right. due to severe OA. / chipped bone     Left foot pain 08/2019    Dr. Santos Steele.  Metatarsal bone fracture 1990    Left fifth.  Migraine 2/22/2011    Dr. Elvin Mcmahan [facility]bid [facility])     Peroneal tendonitis of lower leg 08/07/2019    Left. Dr. Santos Steele    Pre-diabetes     Radiculopathy, cervical 01/2010    Left. Dr. Bria Stokes.  Shoulder pain, right 2012    due to [address] X 2. Dr. Rockne Galeazzi.  Thyroid nodule 2011    6 nodules- Dr. Alverto Brink    Toe fracture, left 2008    fifth toe.  Triangular fibrocartilage complex tear 2012    Dr. Samara Diaz. Left.       Past Surgical History:   Procedure Laterality Date    COLONOSCOPY N/A 3/10/2021    COLONOSCOPY,EGD performed by Dayron Benitez MD at [facility] ENDOSCOPY    [address]    x 1    [address]    HX COLONOSCOPY  03/31/14    Dr. Jihan Mae; 03/31/14    due to abnormal PAP.  HX DILATION AND CURETTAGE  1990s    due to miscarriage.  HX ENDOSCOPY      HX HEART CATHETERIZATION  2018    no stents    HX KNEE ARTHROSCOPY Right     HX ORTHOPAEDIC Right 06/2016    LUMBAR L4-5, L5-S1 ELLY. Dr. Marysue Epley     [facility] Osteopathy      Social History     Tobacco Use    Smoking status: Never Smoker    Smokeless tobacco: Never Used   Substance Use Topics    Alcohol use: No      Family History   Problem Relation Age of Onset    Diabetes Mother     Heart Disease Mother 58        2 stents    Hypertension Mother     Stroke Mother     Diabetes Father     Hypertension Father     Stroke Maternal Grandmother     Cancer Maternal Uncle         prostate    Diabetes Sister     No Known Problems Sister     No Known Problems Sister     Anesth Problems Neg Hx       Prior to Admission medications    Medication Sig Start Date End Date Taking? Authorizing Provider   fluconazole (DIFLUCAN) 150 mg tablet Take 1 Tablet by mouth daily for 1 day. FDA advises cautious prescribing of oral fluconazole in pregnancy. 6/1/21 6/2/21  Mary Lou Maria MD   cefUROXime (CEFTIN) 500 mg tablet Take 1 Tablet by mouth two (2) times a day for 7 days. 5/27/21 6/3/21 Yes Mary Lou Maria MD   benzonatate (TESSALON) 100 mg capsule Take 1 Capsule by mouth three (3) times daily as needed for Cough for up to 7 days. 5/27/21 6/3/21 Yes Mary Lou Maria MD   tiZANidine (ZANAFLEX) 2 mg tablet Take 1 Tablet by mouth two (2) times daily as needed for Muscle Spasm(s) for up to 30 days. 5/25/21 6/24/21 Yes Mary Lou Maria MD   docusate sodium (COLACE) 100 mg capsule Take 100 mg by mouth as needed for Constipation.    Yes Provider, Historical   Lactobacillus acidophilus (PROBIOTIC PO) Take 1 Tab by mouth nightly. Yes Provider, Historical   ergocalciferol (ERGOCALCIFEROL) 1,250 mcg (50,000 unit) capsule Take 1 Cap by mouth every seven (7) days. Indications: low vitamin D levels  Patient taking differently: Take 50,000 Units by mouth every seven (7) days. LAST DATE TAKEN 05/04/21  Indications: low vitamin D levels 6/2/20  Yes Betty Singh DO   valsartan-hydroCHLOROthiazide (DIOVAN-HCT) 80-12.5 mg per tablet Take 1 Tab by mouth daily. Indications: high blood pressure  Patient taking differently: Take 1 Tab by mouth nightly. Indications: high blood pressure 6/2/20  Yes Betty Singh DO   acetaminophen (TYLENOL ARTHRITIS PAIN) 650 mg CR tablet Take 1,300 mg by mouth three (3) times daily as needed for Pain. Yes Provider, Historical   ondansetron (ZOFRAN ODT) 4 mg disintegrating tablet Take 1 Tab by mouth every eight (8) hours as needed for Nausea or Vomiting. Patient not taking: Reported on 6/1/2021 5/18/21   Jose Crowder NP   polyethylene glycol [facility]) 17 gram/dose powder Take 17 g by mouth daily for 30 days. Patient not taking: Reported on 6/1/2021 5/18/21 6/17/21  Jose Crowder NP   PSYLLIUM HUSK PO Take 1 Tab by mouth daily. Patient not taking: Reported on 6/1/2021    Provider, Historical   propranolol LA (INDERAL LA) 80 mg SR capsule Take 1 Cap by mouth daily.  Indications: migraine prevention  Patient not taking: Reported on 6/1/2021 6/2/20   Nory WONG DO   omeprazole (PRILOSEC) 20 mg capsule TAKE 1 CAPSULE BY MOUTH TWICE DAILY  Indications: heartburn  Patient not taking: Reported on 6/1/2021 6/2/20   Josue Cruz DO     Allergies   Allergen Reactions    Doxylamine Succinate Swelling     12.5-25 MG  HANDS, FACE/PERIORAL, FEET    Pcn [Penicillins] Hives    Ambien [Zolpidem] Nausea and Vomiting and Other (comments)     5 mg with Ativan 0.5 mg   TOO GROGGY, SLEPT 24 HOURS  7.5 MG FORGOT SHE WAS SLEEP EATING    Aspirin Nausea Only    Celebrex [Celecoxib] Other (comments)     TIGHT SQUEEZING IN CHEST  CHEST ACHED    Erythromycin Nausea and Vomiting    Milk Prot-Turm-Pepper-Pineappl Other (comments)     Multiple food allergies    Neurontin [Gabapentin] Other (comments)     300 mg  SEVERE LEG PAIN  EYE TWITCHING    Nsaids (Non-Steroidal Anti-Inflammatory Drug) Other (comments)     GI irritation           Objective:     Visit Vitals  BP (!) 141/92   Pulse 77   Temp 99.2 °F (37.3 °C)   Resp 20   Ht 5' 8\" (1.727 m)   Wt 274 lb (124.3 kg)   LMP  (LMP Unknown)   SpO2 97%   BMI 41.66 kg/m²       Data Review: Upper gastrointestinal series: Paraesophageal hernia. Upper endoscopy: Hiatal hernia with esophagitis. Assessment:     Morbid obesity with multiple comorbidities. No success with medical management. Plan:     Laparoscopic gastric bypass with paraesophageal hernia repair and upper endoscopy. Technical aspects of procedure reviewed along with risks (to include but not limited to bleeding, wound infection, VTE, open procedure, leak, stricture, ulcer, recurrent hernia, poor weight loss/weight regain). Also reviewed anticipated hospital course, postoperative diet, activity restrictions, expected results. She understands and desires to proceed. All questions answered. 27 minutes spent with patient (greater than 50% of time in face-face consultation reviewing potential risks, anticipated hospital course, postoperative diet, and activity restrictions).       Signed By: Desi Cortés MD     June 2, 2021

## 2021-06-10 ENCOUNTER — TRANSCRIBE ORDER (OUTPATIENT)
Dept: REGISTRATION | Age: 56
End: 2021-06-10

## 2021-06-10 ENCOUNTER — HOSPITAL ENCOUNTER (OUTPATIENT)
Dept: PREADMISSION TESTING | Age: 56
Discharge: HOME | End: 2021-06-10
Payer: COMMERCIAL

## 2021-06-10 DIAGNOSIS — Z01.812 PRE-PROCEDURE LAB EXAM: Primary | ICD-10-CM

## 2021-06-10 DIAGNOSIS — Z01.812 PRE-PROCEDURE LAB EXAM: ICD-10-CM

## 2021-06-10 PROCEDURE — U0005 INFEC AGEN DETEC AMPLI PROBE: HCPCS

## 2021-06-11 ENCOUNTER — ANESTHESIA EVENT (OUTPATIENT)
Dept: SURGERY | Age: 56
DRG: 620 | End: 2021-06-11
Payer: COMMERCIAL

## 2021-06-11 LAB
SARS-COV-2, XPLCVT: NOT DETECTED
SOURCE, COVRS: NORMAL

## 2021-06-14 ENCOUNTER — ANESTHESIA (OUTPATIENT)
Dept: SURGERY | Age: 56
DRG: 620 | End: 2021-06-14
Payer: COMMERCIAL

## 2021-06-14 ENCOUNTER — HOSPITAL ENCOUNTER (INPATIENT)
Age: 56
LOS: 6 days | Discharge: HOME | DRG: 620 | End: 2021-06-20
Attending: SURGERY | Admitting: SURGERY
Payer: COMMERCIAL

## 2021-06-14 DIAGNOSIS — E66.01 MORBID OBESITY (HCC): ICD-10-CM

## 2021-06-14 DIAGNOSIS — R10.13 ABDOMINAL PAIN, EPIGASTRIC: ICD-10-CM

## 2021-06-14 DIAGNOSIS — Z98.84 S/P GASTRIC BYPASS: Primary | ICD-10-CM

## 2021-06-14 LAB
BASOPHILS # BLD: 0 K/UL (ref 0–0.1)
BASOPHILS NFR BLD: 0 % (ref 0–1)
DIFFERENTIAL METHOD BLD: ABNORMAL
EOSINOPHIL # BLD: 0 K/UL (ref 0–0.4)
EOSINOPHIL NFR BLD: 0 % (ref 0–7)
ERYTHROCYTE [DISTWIDTH] IN BLOOD BY AUTOMATED COUNT: 12.4 % (ref 11.5–14.5)
GLUCOSE BLD STRIP.AUTO-MCNC: 192 MG/DL (ref 65–117)
HCT VFR BLD AUTO: 38.4 % (ref 35–47)
HGB BLD-MCNC: 12.5 G/DL (ref 11.5–16)
IMM GRANULOCYTES # BLD AUTO: 0 K/UL
IMM GRANULOCYTES NFR BLD AUTO: 0 %
LYMPHOCYTES # BLD: 1.1 K/UL (ref 0.8–3.5)
LYMPHOCYTES NFR BLD: 9 % (ref 12–49)
MCH RBC QN AUTO: 28.9 PG (ref 26–34)
MCHC RBC AUTO-ENTMCNC: 32.6 G/DL (ref 30–36.5)
MCV RBC AUTO: 88.9 FL (ref 80–99)
MONOCYTES # BLD: 0.1 K/UL (ref 0–1)
MONOCYTES NFR BLD: 1 % (ref 5–13)
NEUTS BAND NFR BLD MANUAL: 1 % (ref 0–6)
NEUTS SEG # BLD: 10.9 K/UL (ref 1.8–8)
NEUTS SEG NFR BLD: 89 % (ref 32–75)
NRBC # BLD: 0 K/UL (ref 0–0.01)
NRBC BLD-RTO: 0 PER 100 WBC
PLATELET # BLD AUTO: 263 K/UL (ref 150–400)
PMV BLD AUTO: 10.9 FL (ref 8.9–12.9)
RBC # BLD AUTO: 4.32 M/UL (ref 3.8–5.2)
RBC MORPH BLD: ABNORMAL
SERVICE CMNT-IMP: ABNORMAL
WBC # BLD AUTO: 12.1 K/UL (ref 3.6–11)

## 2021-06-14 PROCEDURE — 74011250636 HC RX REV CODE- 250/636: Performed by: NURSE ANESTHETIST, CERTIFIED REGISTERED

## 2021-06-14 PROCEDURE — 77030038552 HC DRN WND MDII -A: Performed by: SURGERY

## 2021-06-14 PROCEDURE — 76060000037 HC ANESTHESIA 3 TO 3.5 HR: Performed by: SURGERY

## 2021-06-14 PROCEDURE — 77030037032 HC INSRT SCIS CLICKLLINE DISP STOR -B: Performed by: SURGERY

## 2021-06-14 PROCEDURE — 77030020263 HC SOL INJ SOD CL0.9% LFCR 1000ML: Performed by: SURGERY

## 2021-06-14 PROCEDURE — 77030013079 HC BLNKT BAIR HGGR 3M -A: Performed by: ANESTHESIOLOGY

## 2021-06-14 PROCEDURE — 74011000250 HC RX REV CODE- 250: Performed by: NURSE ANESTHETIST, CERTIFIED REGISTERED

## 2021-06-14 PROCEDURE — 77030012770 HC TRCR OPT FX AMR -B: Performed by: SURGERY

## 2021-06-14 PROCEDURE — 77030040361 HC SLV COMPR DVT MDII -B: Performed by: SURGERY

## 2021-06-14 PROCEDURE — 77030009957 HC RELD ENDOSTCH COVD -C: Performed by: SURGERY

## 2021-06-14 PROCEDURE — 77030008756 HC TU IRR SUC STRY -B: Performed by: SURGERY

## 2021-06-14 PROCEDURE — 77030016151 HC PROTCTR LNS DFOG COVD -B: Performed by: SURGERY

## 2021-06-14 PROCEDURE — 74011250636 HC RX REV CODE- 250/636: Performed by: SURGERY

## 2021-06-14 PROCEDURE — 76210000000 HC OR PH I REC 2 TO 2.5 HR: Performed by: SURGERY

## 2021-06-14 PROCEDURE — 43644 LAP GASTRIC BYPASS/ROUX-EN-Y: CPT | Performed by: PHYSICIAN ASSISTANT

## 2021-06-14 PROCEDURE — 77030037366 HC STPLR ENDO TRI-STPLR COVD -C: Performed by: SURGERY

## 2021-06-14 PROCEDURE — 85025 COMPLETE CBC W/AUTO DIFF WBC: CPT

## 2021-06-14 PROCEDURE — 77030038157 HC DEV PWR CNTR DISP SIGNIA COVD -C: Performed by: SURGERY

## 2021-06-14 PROCEDURE — 36415 COLL VENOUS BLD VENIPUNCTURE: CPT

## 2021-06-14 PROCEDURE — 77030008023 HC RELD SUT ENDOSC COVD -B: Performed by: SURGERY

## 2021-06-14 PROCEDURE — 77030009965 HC RELD STPLR ENDOS COVD -D: Performed by: SURGERY

## 2021-06-14 PROCEDURE — 77030008684 HC TU ET CUF COVD -B: Performed by: ANESTHESIOLOGY

## 2021-06-14 PROCEDURE — 65660000000 HC RM CCU STEPDOWN

## 2021-06-14 PROCEDURE — 77030009956 HC RELD ENDOSTCH COVD -B: Performed by: SURGERY

## 2021-06-14 PROCEDURE — 74011250636 HC RX REV CODE- 250/636: Performed by: ANESTHESIOLOGY

## 2021-06-14 PROCEDURE — 77030026438 HC STYL ET INTUB CARD -A: Performed by: ANESTHESIOLOGY

## 2021-06-14 PROCEDURE — 74011250637 HC RX REV CODE- 250/637: Performed by: SURGERY

## 2021-06-14 PROCEDURE — 77030008608 HC TRCR ENDOSC SMTH AMR -B: Performed by: SURGERY

## 2021-06-14 PROCEDURE — 77030018831 HC SOL IRR H20 BAXT -A: Performed by: SURGERY

## 2021-06-14 PROCEDURE — 77030002933 HC SUT MCRYL J&J -A: Performed by: SURGERY

## 2021-06-14 PROCEDURE — 43644 LAP GASTRIC BYPASS/ROUX-EN-Y: CPT | Performed by: SURGERY

## 2021-06-14 PROCEDURE — 2709999900 HC NON-CHARGEABLE SUPPLY: Performed by: SURGERY

## 2021-06-14 PROCEDURE — 74011000250 HC RX REV CODE- 250: Performed by: SURGERY

## 2021-06-14 PROCEDURE — 82962 GLUCOSE BLOOD TEST: CPT

## 2021-06-14 PROCEDURE — 77030039895 HC SYST SMK EVAC LAP COVD -B: Performed by: SURGERY

## 2021-06-14 PROCEDURE — 77030002895 HC DEV VASC CLOSR COVD -B: Performed by: SURGERY

## 2021-06-14 PROCEDURE — 77030010507 HC ADH SKN DERMBND J&J -B: Performed by: SURGERY

## 2021-06-14 PROCEDURE — 88300 SURGICAL PATH GROSS: CPT

## 2021-06-14 PROCEDURE — 74011000258 HC RX REV CODE- 258: Performed by: SURGERY

## 2021-06-14 PROCEDURE — 77030008728 HC TU GAST LAPSCP APOL -C: Performed by: SURGERY

## 2021-06-14 PROCEDURE — 77030010513 HC APPL CLP LIG J&J -C: Performed by: SURGERY

## 2021-06-14 PROCEDURE — 77030008437 HC REINF STRP REINF SEMGD WLGO -C: Performed by: SURGERY

## 2021-06-14 PROCEDURE — 0D164ZA BYPASS STOMACH TO JEJUNUM, PERCUTANEOUS ENDOSCOPIC APPROACH: ICD-10-PCS | Performed by: SURGERY

## 2021-06-14 PROCEDURE — 77030040956 HC DSCTR SONICISION MEDT -I: Performed by: SURGERY

## 2021-06-14 PROCEDURE — 74011000250 HC RX REV CODE- 250: Performed by: ANESTHESIOLOGY

## 2021-06-14 PROCEDURE — 77030007955 HC PCH ENDOSC SPEC J&J -B: Performed by: SURGERY

## 2021-06-14 PROCEDURE — 76010000132 HC OR TIME 2.5 TO 3 HR: Performed by: SURGERY

## 2021-06-14 PROCEDURE — 77030020829: Performed by: SURGERY

## 2021-06-14 PROCEDURE — 77030002916 HC SUT ETHLN J&J -A: Performed by: SURGERY

## 2021-06-14 PROCEDURE — 77030037367 HC STPLR ENDO TRI-STPLR COVD -D: Performed by: SURGERY

## 2021-06-14 PROCEDURE — 77030031139 HC SUT VCRL2 J&J -A: Performed by: SURGERY

## 2021-06-14 PROCEDURE — 77030011992 HC AIRWY NASOPHGL TELE -A: Performed by: ANESTHESIOLOGY

## 2021-06-14 RX ORDER — SCOLOPAMINE TRANSDERMAL SYSTEM 1 MG/1
1 PATCH, EXTENDED RELEASE TRANSDERMAL ONCE
Status: COMPLETED | OUTPATIENT
Start: 2021-06-14 | End: 2021-06-17

## 2021-06-14 RX ORDER — PROPOFOL 10 MG/ML
INJECTION, EMULSION INTRAVENOUS AS NEEDED
Status: DISCONTINUED | OUTPATIENT
Start: 2021-06-14 | End: 2021-06-14 | Stop reason: HOSPADM

## 2021-06-14 RX ORDER — LIDOCAINE HYDROCHLORIDE 20 MG/ML
INJECTION, SOLUTION EPIDURAL; INFILTRATION; INTRACAUDAL; PERINEURAL AS NEEDED
Status: DISCONTINUED | OUTPATIENT
Start: 2021-06-14 | End: 2021-06-14 | Stop reason: HOSPADM

## 2021-06-14 RX ORDER — DEXAMETHASONE SODIUM PHOSPHATE 4 MG/ML
INJECTION, SOLUTION INTRA-ARTICULAR; INTRALESIONAL; INTRAMUSCULAR; INTRAVENOUS; SOFT TISSUE AS NEEDED
Status: DISCONTINUED | OUTPATIENT
Start: 2021-06-14 | End: 2021-06-14 | Stop reason: HOSPADM

## 2021-06-14 RX ORDER — LIDOCAINE HYDROCHLORIDE ANHYDROUS AND DEXTROSE MONOHYDRATE .8; 5 G/100ML; G/100ML
INJECTION, SOLUTION INTRAVENOUS
Status: DISCONTINUED | OUTPATIENT
Start: 2021-06-14 | End: 2021-06-14 | Stop reason: HOSPADM

## 2021-06-14 RX ORDER — SODIUM CHLORIDE, SODIUM LACTATE, POTASSIUM CHLORIDE, CALCIUM CHLORIDE 600; 310; 30; 20 MG/100ML; MG/100ML; MG/100ML; MG/100ML
150 INJECTION, SOLUTION INTRAVENOUS CONTINUOUS
Status: DISCONTINUED | OUTPATIENT
Start: 2021-06-14 | End: 2021-06-20 | Stop reason: HOSPADM

## 2021-06-14 RX ORDER — FENTANYL CITRATE 50 UG/ML
50 INJECTION, SOLUTION INTRAMUSCULAR; INTRAVENOUS AS NEEDED
Status: DISCONTINUED | OUTPATIENT
Start: 2021-06-14 | End: 2021-06-14 | Stop reason: HOSPADM

## 2021-06-14 RX ORDER — SODIUM CHLORIDE 0.9 % (FLUSH) 0.9 %
5-40 SYRINGE (ML) INJECTION AS NEEDED
Status: DISCONTINUED | OUTPATIENT
Start: 2021-06-14 | End: 2021-06-14 | Stop reason: HOSPADM

## 2021-06-14 RX ORDER — DIPHENHYDRAMINE HYDROCHLORIDE 50 MG/ML
12.5 INJECTION, SOLUTION INTRAMUSCULAR; INTRAVENOUS AS NEEDED
Status: DISCONTINUED | OUTPATIENT
Start: 2021-06-14 | End: 2021-06-14 | Stop reason: HOSPADM

## 2021-06-14 RX ORDER — SODIUM CHLORIDE 9 MG/ML
25 INJECTION, SOLUTION INTRAVENOUS CONTINUOUS
Status: DISCONTINUED | OUTPATIENT
Start: 2021-06-14 | End: 2021-06-14 | Stop reason: HOSPADM

## 2021-06-14 RX ORDER — SODIUM CHLORIDE 0.9 % (FLUSH) 0.9 %
5-40 SYRINGE (ML) INJECTION EVERY 8 HOURS
Status: DISCONTINUED | OUTPATIENT
Start: 2021-06-14 | End: 2021-06-14 | Stop reason: HOSPADM

## 2021-06-14 RX ORDER — EPHEDRINE SULFATE/0.9% NACL/PF 50 MG/5 ML
SYRINGE (ML) INTRAVENOUS AS NEEDED
Status: DISCONTINUED | OUTPATIENT
Start: 2021-06-14 | End: 2021-06-14 | Stop reason: HOSPADM

## 2021-06-14 RX ORDER — ACETAMINOPHEN 500 MG
1000 TABLET ORAL EVERY 6 HOURS
Status: DISCONTINUED | OUTPATIENT
Start: 2021-06-14 | End: 2021-06-18

## 2021-06-14 RX ORDER — MORPHINE SULFATE 2 MG/ML
2 INJECTION, SOLUTION INTRAMUSCULAR; INTRAVENOUS
Status: DISCONTINUED | OUTPATIENT
Start: 2021-06-14 | End: 2021-06-14 | Stop reason: HOSPADM

## 2021-06-14 RX ORDER — PHENYLEPHRINE HCL IN 0.9% NACL 0.4MG/10ML
SYRINGE (ML) INTRAVENOUS AS NEEDED
Status: DISCONTINUED | OUTPATIENT
Start: 2021-06-14 | End: 2021-06-14 | Stop reason: HOSPADM

## 2021-06-14 RX ORDER — SODIUM CHLORIDE, SODIUM LACTATE, POTASSIUM CHLORIDE, CALCIUM CHLORIDE 600; 310; 30; 20 MG/100ML; MG/100ML; MG/100ML; MG/100ML
500 INJECTION, SOLUTION INTRAVENOUS CONTINUOUS
Status: DISCONTINUED | OUTPATIENT
Start: 2021-06-14 | End: 2021-06-14

## 2021-06-14 RX ORDER — ACETAMINOPHEN 325 MG/1
650 TABLET ORAL ONCE
Status: DISCONTINUED | OUTPATIENT
Start: 2021-06-14 | End: 2021-06-14 | Stop reason: HOSPADM

## 2021-06-14 RX ORDER — LIDOCAINE HYDROCHLORIDE 10 MG/ML
0.1 INJECTION, SOLUTION EPIDURAL; INFILTRATION; INTRACAUDAL; PERINEURAL AS NEEDED
Status: DISCONTINUED | OUTPATIENT
Start: 2021-06-14 | End: 2021-06-14 | Stop reason: HOSPADM

## 2021-06-14 RX ORDER — NALOXONE HYDROCHLORIDE 0.4 MG/ML
0.4 INJECTION, SOLUTION INTRAMUSCULAR; INTRAVENOUS; SUBCUTANEOUS AS NEEDED
Status: DISCONTINUED | OUTPATIENT
Start: 2021-06-14 | End: 2021-06-20 | Stop reason: HOSPADM

## 2021-06-14 RX ORDER — SODIUM CHLORIDE, SODIUM LACTATE, POTASSIUM CHLORIDE, CALCIUM CHLORIDE 600; 310; 30; 20 MG/100ML; MG/100ML; MG/100ML; MG/100ML
125 INJECTION, SOLUTION INTRAVENOUS CONTINUOUS
Status: DISCONTINUED | OUTPATIENT
Start: 2021-06-14 | End: 2021-06-14 | Stop reason: HOSPADM

## 2021-06-14 RX ORDER — GLYCOPYRROLATE 0.2 MG/ML
INJECTION INTRAMUSCULAR; INTRAVENOUS AS NEEDED
Status: DISCONTINUED | OUTPATIENT
Start: 2021-06-14 | End: 2021-06-14 | Stop reason: HOSPADM

## 2021-06-14 RX ORDER — ONDANSETRON 2 MG/ML
INJECTION INTRAMUSCULAR; INTRAVENOUS AS NEEDED
Status: DISCONTINUED | OUTPATIENT
Start: 2021-06-14 | End: 2021-06-14 | Stop reason: HOSPADM

## 2021-06-14 RX ORDER — KETAMINE HYDROCHLORIDE 10 MG/ML
INJECTION, SOLUTION INTRAMUSCULAR; INTRAVENOUS AS NEEDED
Status: DISCONTINUED | OUTPATIENT
Start: 2021-06-14 | End: 2021-06-14 | Stop reason: HOSPADM

## 2021-06-14 RX ORDER — OXYCODONE AND ACETAMINOPHEN 5; 325 MG/1; MG/1
1 TABLET ORAL AS NEEDED
Status: DISCONTINUED | OUTPATIENT
Start: 2021-06-14 | End: 2021-06-14 | Stop reason: HOSPADM

## 2021-06-14 RX ORDER — FENTANYL CITRATE 50 UG/ML
INJECTION, SOLUTION INTRAMUSCULAR; INTRAVENOUS AS NEEDED
Status: DISCONTINUED | OUTPATIENT
Start: 2021-06-14 | End: 2021-06-14 | Stop reason: HOSPADM

## 2021-06-14 RX ORDER — HYDROMORPHONE HYDROCHLORIDE 1 MG/ML
1 INJECTION, SOLUTION INTRAMUSCULAR; INTRAVENOUS; SUBCUTANEOUS
Status: DISCONTINUED | OUTPATIENT
Start: 2021-06-14 | End: 2021-06-17

## 2021-06-14 RX ORDER — LORAZEPAM 2 MG/ML
1 INJECTION INTRAMUSCULAR
Status: DISCONTINUED | OUTPATIENT
Start: 2021-06-14 | End: 2021-06-20 | Stop reason: HOSPADM

## 2021-06-14 RX ORDER — BUPIVACAINE HYDROCHLORIDE 5 MG/ML
50 INJECTION, SOLUTION EPIDURAL; INTRACAUDAL ONCE
Status: COMPLETED | OUTPATIENT
Start: 2021-06-14 | End: 2021-06-14

## 2021-06-14 RX ORDER — METRONIDAZOLE 500 MG/100ML
500 INJECTION, SOLUTION INTRAVENOUS
Status: COMPLETED | OUTPATIENT
Start: 2021-06-14 | End: 2021-06-14

## 2021-06-14 RX ORDER — LABETALOL HYDROCHLORIDE 5 MG/ML
5 INJECTION, SOLUTION INTRAVENOUS ONCE
Status: COMPLETED | OUTPATIENT
Start: 2021-06-14 | End: 2021-06-14

## 2021-06-14 RX ORDER — MIDAZOLAM HYDROCHLORIDE 1 MG/ML
1 INJECTION, SOLUTION INTRAMUSCULAR; INTRAVENOUS AS NEEDED
Status: DISCONTINUED | OUTPATIENT
Start: 2021-06-14 | End: 2021-06-14 | Stop reason: HOSPADM

## 2021-06-14 RX ORDER — ROCURONIUM BROMIDE 10 MG/ML
INJECTION, SOLUTION INTRAVENOUS AS NEEDED
Status: DISCONTINUED | OUTPATIENT
Start: 2021-06-14 | End: 2021-06-14 | Stop reason: HOSPADM

## 2021-06-14 RX ORDER — HYOSCYAMINE SULFATE 0.12 MG/1
0.12 TABLET SUBLINGUAL
Status: DISCONTINUED | OUTPATIENT
Start: 2021-06-14 | End: 2021-06-20 | Stop reason: HOSPADM

## 2021-06-14 RX ORDER — SUCCINYLCHOLINE CHLORIDE 20 MG/ML
INJECTION INTRAMUSCULAR; INTRAVENOUS AS NEEDED
Status: DISCONTINUED | OUTPATIENT
Start: 2021-06-14 | End: 2021-06-14 | Stop reason: HOSPADM

## 2021-06-14 RX ORDER — MIDAZOLAM HYDROCHLORIDE 1 MG/ML
INJECTION, SOLUTION INTRAMUSCULAR; INTRAVENOUS AS NEEDED
Status: DISCONTINUED | OUTPATIENT
Start: 2021-06-14 | End: 2021-06-14 | Stop reason: HOSPADM

## 2021-06-14 RX ORDER — SODIUM CHLORIDE 0.9 % (FLUSH) 0.9 %
5-40 SYRINGE (ML) INJECTION EVERY 8 HOURS
Status: DISCONTINUED | OUTPATIENT
Start: 2021-06-14 | End: 2021-06-20 | Stop reason: HOSPADM

## 2021-06-14 RX ORDER — SODIUM CHLORIDE, SODIUM LACTATE, POTASSIUM CHLORIDE, CALCIUM CHLORIDE 600; 310; 30; 20 MG/100ML; MG/100ML; MG/100ML; MG/100ML
INJECTION, SOLUTION INTRAVENOUS
Status: DISCONTINUED | OUTPATIENT
Start: 2021-06-14 | End: 2021-06-14 | Stop reason: HOSPADM

## 2021-06-14 RX ORDER — FENTANYL CITRATE 50 UG/ML
25 INJECTION, SOLUTION INTRAMUSCULAR; INTRAVENOUS
Status: DISCONTINUED | OUTPATIENT
Start: 2021-06-14 | End: 2021-06-14 | Stop reason: HOSPADM

## 2021-06-14 RX ORDER — MIDAZOLAM HYDROCHLORIDE 1 MG/ML
0.5 INJECTION, SOLUTION INTRAMUSCULAR; INTRAVENOUS
Status: DISCONTINUED | OUTPATIENT
Start: 2021-06-14 | End: 2021-06-14 | Stop reason: HOSPADM

## 2021-06-14 RX ORDER — METRONIDAZOLE 500 MG/100ML
500 INJECTION, SOLUTION INTRAVENOUS EVERY 8 HOURS
Status: COMPLETED | OUTPATIENT
Start: 2021-06-14 | End: 2021-06-15

## 2021-06-14 RX ORDER — HYDROMORPHONE HYDROCHLORIDE 1 MG/ML
0.2 INJECTION, SOLUTION INTRAMUSCULAR; INTRAVENOUS; SUBCUTANEOUS
Status: COMPLETED | OUTPATIENT
Start: 2021-06-14 | End: 2021-06-14

## 2021-06-14 RX ORDER — SODIUM CHLORIDE 0.9 % (FLUSH) 0.9 %
5-40 SYRINGE (ML) INJECTION AS NEEDED
Status: DISCONTINUED | OUTPATIENT
Start: 2021-06-14 | End: 2021-06-20 | Stop reason: HOSPADM

## 2021-06-14 RX ORDER — ONDANSETRON 2 MG/ML
4 INJECTION INTRAMUSCULAR; INTRAVENOUS AS NEEDED
Status: DISCONTINUED | OUTPATIENT
Start: 2021-06-14 | End: 2021-06-14 | Stop reason: HOSPADM

## 2021-06-14 RX ORDER — HYDROMORPHONE HYDROCHLORIDE 1 MG/ML
0.5 INJECTION, SOLUTION INTRAMUSCULAR; INTRAVENOUS; SUBCUTANEOUS
Status: DISCONTINUED | OUTPATIENT
Start: 2021-06-14 | End: 2021-06-20 | Stop reason: HOSPADM

## 2021-06-14 RX ORDER — ONDANSETRON 2 MG/ML
4 INJECTION INTRAMUSCULAR; INTRAVENOUS
Status: DISCONTINUED | OUTPATIENT
Start: 2021-06-14 | End: 2021-06-20 | Stop reason: HOSPADM

## 2021-06-14 RX ORDER — MAGNESIUM SULFATE HEPTAHYDRATE 40 MG/ML
INJECTION, SOLUTION INTRAVENOUS AS NEEDED
Status: DISCONTINUED | OUTPATIENT
Start: 2021-06-14 | End: 2021-06-14 | Stop reason: HOSPADM

## 2021-06-14 RX ORDER — NEOSTIGMINE METHYLSULFATE 1 MG/ML
INJECTION, SOLUTION INTRAVENOUS AS NEEDED
Status: DISCONTINUED | OUTPATIENT
Start: 2021-06-14 | End: 2021-06-14 | Stop reason: HOSPADM

## 2021-06-14 RX ADMIN — KETAMINE HYDROCHLORIDE 25 MG: 10 INJECTION, SOLUTION INTRAMUSCULAR; INTRAVENOUS at 10:58

## 2021-06-14 RX ADMIN — Medication 80 MCG: at 11:47

## 2021-06-14 RX ADMIN — HYOSCYAMINE SULFATE 0.12 MG: 0.12 TABLET ORAL; SUBLINGUAL at 21:36

## 2021-06-14 RX ADMIN — HYDROMORPHONE HYDROCHLORIDE 0.5 MG: 1 INJECTION, SOLUTION INTRAMUSCULAR; INTRAVENOUS; SUBCUTANEOUS at 14:20

## 2021-06-14 RX ADMIN — Medication 120 MCG: at 11:57

## 2021-06-14 RX ADMIN — HYDROMORPHONE HYDROCHLORIDE 0.3 MG: 1 INJECTION, SOLUTION INTRAMUSCULAR; INTRAVENOUS; SUBCUTANEOUS at 15:22

## 2021-06-14 RX ADMIN — MAGNESIUM SULFATE 2 G: 2 INJECTION INTRAVENOUS at 11:10

## 2021-06-14 RX ADMIN — HYDROMORPHONE HYDROCHLORIDE 0.2 MG: 1 INJECTION, SOLUTION INTRAMUSCULAR; INTRAVENOUS; SUBCUTANEOUS at 14:55

## 2021-06-14 RX ADMIN — GENTAMICIN SULFATE 320 MG: 40 INJECTION, SOLUTION INTRAMUSCULAR; INTRAVENOUS at 11:12

## 2021-06-14 RX ADMIN — SODIUM CHLORIDE, POTASSIUM CHLORIDE, SODIUM LACTATE AND CALCIUM CHLORIDE 150 ML/HR: 600; 310; 30; 20 INJECTION, SOLUTION INTRAVENOUS at 21:36

## 2021-06-14 RX ADMIN — ACETAMINOPHEN 1000 MG: 500 TABLET ORAL at 23:33

## 2021-06-14 RX ADMIN — LIDOCAINE HYDROCHLORIDE 100 MG: 20 INJECTION, SOLUTION EPIDURAL; INFILTRATION; INTRACAUDAL; PERINEURAL at 10:57

## 2021-06-14 RX ADMIN — LABETALOL HYDROCHLORIDE 5 MG: 5 INJECTION INTRAVENOUS at 15:35

## 2021-06-14 RX ADMIN — ONDANSETRON HYDROCHLORIDE 4 MG: 2 INJECTION, SOLUTION INTRAMUSCULAR; INTRAVENOUS at 21:36

## 2021-06-14 RX ADMIN — ROCURONIUM BROMIDE 5 MG: 10 SOLUTION INTRAVENOUS at 10:57

## 2021-06-14 RX ADMIN — Medication 25 MG: at 12:09

## 2021-06-14 RX ADMIN — FENTANYL CITRATE 50 MCG: 50 INJECTION, SOLUTION INTRAMUSCULAR; INTRAVENOUS at 13:26

## 2021-06-14 RX ADMIN — SODIUM CHLORIDE, POTASSIUM CHLORIDE, SODIUM LACTATE AND CALCIUM CHLORIDE 150 ML/HR: 600; 310; 30; 20 INJECTION, SOLUTION INTRAVENOUS at 14:28

## 2021-06-14 RX ADMIN — SUCCINYLCHOLINE CHLORIDE 140 MG: 20 INJECTION, SOLUTION INTRAMUSCULAR; INTRAVENOUS at 10:57

## 2021-06-14 RX ADMIN — GLYCOPYRROLATE 0.4 MG: 0.2 INJECTION, SOLUTION INTRAMUSCULAR; INTRAVENOUS at 13:24

## 2021-06-14 RX ADMIN — NEOSTIGMINE METHYLSULFATE 3 MG: 1 INJECTION, SOLUTION INTRAVENOUS at 13:24

## 2021-06-14 RX ADMIN — MIDAZOLAM 2 MG: 1 INJECTION INTRAMUSCULAR; INTRAVENOUS at 10:50

## 2021-06-14 RX ADMIN — ACETAMINOPHEN ORAL SOLUTION 1000 MG: 650 SOLUTION ORAL at 18:43

## 2021-06-14 RX ADMIN — ACETAMINOPHEN ORAL SOLUTION 1000 MG: 650 SOLUTION ORAL at 10:13

## 2021-06-14 RX ADMIN — SODIUM CHLORIDE, POTASSIUM CHLORIDE, SODIUM LACTATE AND CALCIUM CHLORIDE: 600; 310; 30; 20 INJECTION, SOLUTION INTRAVENOUS at 10:35

## 2021-06-14 RX ADMIN — HYDROMORPHONE HYDROCHLORIDE 1 MG: 1 INJECTION, SOLUTION INTRAMUSCULAR; INTRAVENOUS; SUBCUTANEOUS at 20:13

## 2021-06-14 RX ADMIN — METRONIDAZOLE 500 MG: 500 INJECTION, SOLUTION INTRAVENOUS at 18:43

## 2021-06-14 RX ADMIN — DEXAMETHASONE SODIUM PHOSPHATE 8 MG: 4 INJECTION, SOLUTION INTRAMUSCULAR; INTRAVENOUS at 11:05

## 2021-06-14 RX ADMIN — HYDROMORPHONE HYDROCHLORIDE 1 MG: 1 INJECTION, SOLUTION INTRAMUSCULAR; INTRAVENOUS; SUBCUTANEOUS at 23:33

## 2021-06-14 RX ADMIN — ONDANSETRON 4 MG: 2 INJECTION INTRAMUSCULAR; INTRAVENOUS at 14:27

## 2021-06-14 RX ADMIN — Medication 80 MCG: at 11:51

## 2021-06-14 RX ADMIN — ROCURONIUM BROMIDE 20 MG: 10 SOLUTION INTRAVENOUS at 11:55

## 2021-06-14 RX ADMIN — ONDANSETRON HYDROCHLORIDE 4 MG: 2 INJECTION, SOLUTION INTRAMUSCULAR; INTRAVENOUS at 13:24

## 2021-06-14 RX ADMIN — PROPOFOL 200 MG: 10 INJECTION, EMULSION INTRAVENOUS at 10:57

## 2021-06-14 RX ADMIN — Medication 10 ML: at 20:13

## 2021-06-14 RX ADMIN — Medication 120 MCG: at 12:01

## 2021-06-14 RX ADMIN — HYDROMORPHONE HYDROCHLORIDE 1 MG: 1 INJECTION, SOLUTION INTRAMUSCULAR; INTRAVENOUS; SUBCUTANEOUS at 16:44

## 2021-06-14 RX ADMIN — Medication 120 MCG: at 12:06

## 2021-06-14 RX ADMIN — Medication 10 ML: at 21:36

## 2021-06-14 RX ADMIN — LIDOCAINE HYDROCHLORIDE 1.5 MG/KG/HR: 8 INJECTION, SOLUTION INTRAVENOUS at 11:34

## 2021-06-14 RX ADMIN — ONDANSETRON HYDROCHLORIDE 4 MG: 2 INJECTION, SOLUTION INTRAMUSCULAR; INTRAVENOUS at 16:45

## 2021-06-14 RX ADMIN — METRONIDAZOLE 500 MG: 500 SOLUTION INTRAVENOUS at 11:16

## 2021-06-14 RX ADMIN — ROCURONIUM BROMIDE 10 MG: 10 SOLUTION INTRAVENOUS at 13:02

## 2021-06-14 RX ADMIN — FENTANYL CITRATE 50 MCG: 50 INJECTION, SOLUTION INTRAMUSCULAR; INTRAVENOUS at 10:56

## 2021-06-14 RX ADMIN — SODIUM CHLORIDE, POTASSIUM CHLORIDE, SODIUM LACTATE AND CALCIUM CHLORIDE: 600; 310; 30; 20 INJECTION, SOLUTION INTRAVENOUS at 12:02

## 2021-06-14 RX ADMIN — SODIUM CHLORIDE, POTASSIUM CHLORIDE, SODIUM LACTATE AND CALCIUM CHLORIDE 500 ML/HR: 600; 310; 30; 20 INJECTION, SOLUTION INTRAVENOUS at 10:28

## 2021-06-14 RX ADMIN — ROCURONIUM BROMIDE 45 MG: 10 SOLUTION INTRAVENOUS at 11:05

## 2021-06-14 NOTE — ANESTHESIA POSTPROCEDURE EVALUATION
Post-Anesthesia Evaluation and Assessment    Patient: Joy Francois MRN: [id number]  SSN: [id number]    YOB: 1965  Age: 64 y.o. Sex: female      I have evaluated the patient and they are stable and ready for discharge from the PACU. Cardiovascular Function/Vital Signs  Visit Vitals  BP (!) 147/87   Pulse 75   Temp 36.7 °C (98.1 °F)   Resp 20   Ht 5' 7.99\" (1.727 m)   Wt 119.1 kg (262 lb 9.6 oz)   SpO2 99%   BMI 39.94 kg/m²       Patient is status post General anesthesia for Procedure(s):  LAPAROSCOPIC GASTRIC BYPASS WITH HIATAL HERNIA REPAIR & EGD (E R A S)  ESOPHAGOGASTRODUODENOSCOPY. Nausea/Vomiting: None    Postoperative hydration reviewed and adequate. Pain:  Pain Scale 1: Visual (06/14/21 1352)  Pain Intensity 1: 0 (06/14/21 1352)   Managed    Neurological Status:   Neuro (WDL): Exceptions to WDL (06/14/21 1352)  Neuro  Neurologic State: Drowsy; Anesthetized (06/14/21 1352)   At baseline    Mental Status, Level of Consciousness: Alert and  oriented to person, place, and time    Pulmonary Status:   O2 Device: Nasal cannula (06/14/21 1352)   Adequate oxygenation and airway patent    Complications related to anesthesia: None    Post-anesthesia assessment completed. No concerns    Signed By: Alisia Stahl MD     June 14, 2021              Procedure(s):  LAPAROSCOPIC GASTRIC BYPASS WITH HIATAL HERNIA REPAIR & EGD (E R A S)  ESOPHAGOGASTRODUODENOSCOPY. general    <[email or web address]>    INITIAL Post-op Vital signs:   Vitals Value Taken Time   /87 06/14/21 1430   Temp 36.7 °C (98.1 °F) 06/14/21 1352   Pulse 78 06/14/21 1445   Resp 22 06/14/21 1445   SpO2 98 % 06/14/21 1445   Vitals shown include unvalidated device data.

## 2021-06-14 NOTE — H&P
Bariatric Surgery History and Physical    Subjective: The patient is a 64 y.o. obese female scheduled for laparoscopic gastric bypass. Body mass index is 39.94 kg/m². Peter Schaefer has tried multiple diets in her  lifetime most recently trying our preoperative diet during which she was able to lose small amounts of weight (12 pounds). No fever, chills, or cough. Bariatric comorbidities present are   Past Medical History:   Diagnosis Date    Allergic rhinitis 5/27/2010    Arthritis     Baker cyst, left 10/2019    Dr. Orestes Yo cyst, right     Cervical disc herniation 01/2010    C3-4, C4-5, C5-6. Hemangioma body of C5. Dr. Lilliana Campoverde.  Chest pain 08/2011, 02/2012    Dr. Jasmyn Leyav. Dr. Candida Brand; denies CP as of 3/27/14    Chronic lower back pain 2010, 03/2016    thoracic DDD and spondylosis. DDD L3-S1. Dr. Chun Salmon. Emi Paz. Dr. Martir Harmon, [facility]. Dr. Johanna Montes.  Chronic meniscal tear of knee     Chronic pain     lower back and knees    Esophagitis, reflux 4/3/2011    Gallstone     Gastritis 5/27/2010    Dr. Jaida Sheth.  Gastrointestinal food allergy 03/2014    Multiple. Dr. Parker Comp.    Heart palpitations 02/2012    DUE TO PAC'S AND PVC'S. Dr. Jaye Yuan.  Heel spur, left 02/2019    Hiatal hernia 4/3/2011    HTN (hypertension) 1987    Impaired glucose tolerance 10/15/2010    Incidental lung nodule 01/08/13    LLL 3.9 mm, RML 3.1 mm;  as of 3/27/14 per pt stable w/o growth    Insomnia 1990s    Iron deficiency anemia 5/27/2010    Irritable bowel syndrome (IBS) 03/2014    Dr. Sharron Qureshi.    Knee pain 2012    Right. due to severe OA. / chipped bone     Left foot pain 08/2019    Dr. Mark Anthony Brannon.  Metatarsal bone fracture 1990    Left fifth.  Migraine 2/22/2011    Dr. Ballard Ban [facility])     Peroneal tendonitis of lower leg 08/07/2019    Left.   Dr. Mark Anthony Brannon    Pre-diabetes     Radiculopathy, cervical 01/2010    Left. Dr. Sarah Owens.  Shoulder pain, right 2012    due to [address] X 2. Dr. Rosangela Rodney.  Thyroid nodule 2011    6 nodules- Dr. Fernandez custodial    Toe fracture, left 2008    fifth toe.  Triangular fibrocartilage complex tear 2012    Dr. Gricelda Montiel. Left.        Patient Active Problem List    Diagnosis Date Noted    Morbid obesity ([facility] 06/14/2021    Left lateral epicondylitis 12/24/2020    Complex tear of meniscus of right knee 07/13/2020    Complex tear of medial meniscus of right knee, sequela 07/12/2020    Complex tear of medial meniscus of right knee as current injury 06/18/2020    Bilateral primary osteoarthritis of knee 06/10/2020    Chronic meniscal tear of knee     Primary osteoarthritis of left knee 09/26/2019    Chronic pain of right knee 09/20/2019    Peroneal tendonitis of lower leg 08/07/2019    Left foot pain 08/01/2019    Degeneration of lumbosacral intervertebral disc 07/10/2019    Hip pain 07/10/2019    Low back pain 07/10/2019    Lumbar radiculopathy 07/10/2019    Pain in both lower extremities 07/10/2019    Heel spur, left 02/01/2019    Urgency of urination 09/06/2018    Obesity, Class III, BMI 40-49.9 (morbid obesity) ([facility] 06/01/2018    Hyperglycemia 06/01/2018    High cholesterol 06/01/2018    Aspirin intolerance 12/07/2017    Allergic rhinitis due to allergen 12/07/2016    Intractable migraine without aura and without status migrainosus 07/20/2016    Advanced care planning/counseling discussion 03/24/2016    Essential hypertension 06/03/2015    Chronic lower back pain     Irritable bowel syndrome (IBS) 03/01/2014    Gastrointestinal food allergy 03/01/2014    Vitamin D deficiency 08/31/2013    Chronic insomnia 08/14/2013    Multiple thyroid nodules 03/05/2013    Shoulder pain, right     Incidental lung nodule 01/08/2013    Heart palpitations 02/01/2012    Hiatal hernia 04/03/2011    Iron deficiency anemia 05/27/2010    Gastritis 05/27/2010    Allergic rhinitis 05/27/2010    Radiculopathy, cervical 01/01/2010     Past Medical History:   Diagnosis Date    Allergic rhinitis 5/27/2010    Arthritis     Baker cyst, left 10/2019    Dr. Holman Neither cyst, right     Cervical disc herniation 01/2010    C3-4, C4-5, C5-6. Hemangioma body of C5. Dr. Sarah Owens.  Chest pain 08/2011, 02/2012    Dr. Don Mao. Dr. Bigg Mason; denies CP as of 3/27/14    Chronic lower back pain 2010, 03/2016    thoracic DDD and spondylosis. DDD L3-S1. Dr. Vishnu Angela. Leroy Foil. Dr. Hector Lagunas, [facility]. Dr. Ryan Guerra.  Chronic meniscal tear of knee     Chronic pain     lower back and knees    Esophagitis, reflux 4/3/2011    Gallstone     Gastritis 5/27/2010    Dr. Emilia De Paz.  Gastrointestinal food allergy 03/2014    Multiple. Dr. Jim Chandler.    Heart palpitations 02/2012    DUE TO PAC'S AND PVC'S. Dr. Bolden Proffer.  Heel spur, left 02/2019    Hiatal hernia 4/3/2011    HTN (hypertension) 1987    Impaired glucose tolerance 10/15/2010    Incidental lung nodule 01/08/13    LLL 3.9 mm, RML 3.1 mm;  as of 3/27/14 per pt stable w/o growth    Insomnia 1990s    Iron deficiency anemia 5/27/2010    Irritable bowel syndrome (IBS) 03/2014    Dr. Bria Andino.    Knee pain 2012    Right. due to severe OA. / chipped bone     Left foot pain 08/2019    Dr. Kristi Ryan.  Metatarsal bone fracture 1990    Left fifth.  Migraine 2/22/2011    Dr. Perdomo Nan [facility]bid [facility])     Peroneal tendonitis of lower leg 08/07/2019    Left. Dr. Kristi Ryan    Pre-diabetes     Radiculopathy, cervical 01/2010    Left. Dr. Sarah Owens.  Shoulder pain, right 2012    due to [address] [place] [address] X 2. Dr. Rosangela Rodney.  Thyroid nodule 2011    6 nodules- Dr. Fernandez nursing home    Toe fracture, left 2008    fifth toe.  Triangular fibrocartilage complex tear 2012    Dr. Gricelda Montiel. Left.       Past Surgical History: Procedure Laterality Date    COLONOSCOPY N/A 3/10/2021    COLONOSCOPY,EGD performed by Torri Khan MD at [facility] ENDOSCOPY    [address]    x 1    [address]    HX COLONOSCOPY  03/31/14    Dr. Micheline Rangel; 03/31/14    due to abnormal PAP.  HX DILATION AND CURETTAGE  1990s    due to miscarriage.  HX ENDOSCOPY      HX HEART CATHETERIZATION  2018    no stents    HX KNEE ARTHROSCOPY Right     HX ORTHOPAEDIC Right 06/2016    LUMBAR L4-5, L5-S1 ELLY. Dr. Isaac Myles     [address] [place] [address] Osteopathy      Social History     Tobacco Use    Smoking status: Never Smoker    Smokeless tobacco: Never Used   Substance Use Topics    Alcohol use: No      Family History   Problem Relation Age of Onset    Diabetes Mother     Heart Disease Mother 58        2 stents    Hypertension Mother     Stroke Mother     Diabetes Father     Hypertension Father     Stroke Maternal Grandmother     Cancer Maternal Uncle         prostate    Diabetes Sister     No Known Problems Sister     No Known Problems Sister     Anesth Problems Neg Hx       Prior to Admission medications    Medication Sig Start Date End Date Taking? Authorizing Provider   polyethylene glycol (MIRALAX) 17 gram/dose powder Take 17 g by mouth daily for 30 days. 5/18/21 6/17/21 Yes Carmen Saez NP   Lactobacillus acidophilus (PROBIOTIC PO) Take 1 Tab by mouth nightly. Yes Provider, Historical   propranolol LA (INDERAL LA) 80 mg SR capsule Take 1 Cap by mouth daily. Indications: migraine prevention 6/2/20  Yes Betty Dale,    valsartan-hydroCHLOROthiazide (DIOVAN-HCT) 80-12.5 mg per tablet Take 1 Tab by mouth daily. Indications: high blood pressure  Patient taking differently: Take 1 Tab by mouth nightly.  Indications: high blood pressure 6/2/20  Yes Betty Swain DO   omeprazole (PRILOSEC) 20 mg capsule TAKE 1 CAPSULE BY MOUTH TWICE DAILY  Indications: heartburn 6/2/20 Yes Betty Veras DO   acetaminophen (TYLENOL ARTHRITIS PAIN) 650 mg CR tablet Take 1,300 mg by mouth three (3) times daily as needed for Pain. Yes Provider, Historical   tiZANidine (ZANAFLEX) 2 mg tablet Take 1 Tablet by mouth two (2) times daily as needed for Muscle Spasm(s) for up to 30 days. 5/25/21 6/24/21  Fox Downs MD   ondansetron (ZOFRAN ODT) 4 mg disintegrating tablet Take 1 Tab by mouth every eight (8) hours as needed for Nausea or Vomiting. Patient not taking: Reported on 6/1/2021 5/18/21   Rosa Ocampo NP   PSYLLIUM HUSK PO Take 1 Tab by mouth daily. Patient not taking: Reported on 6/1/2021    Provider, Historical   docusate sodium (COLACE) 100 mg capsule Take 100 mg by mouth as needed for Constipation. Provider, Historical   ergocalciferol (ERGOCALCIFEROL) 1,250 mcg (50,000 unit) capsule Take 1 Cap by mouth every seven (7) days. Indications: low vitamin D levels  Patient taking differently: Take 50,000 Units by mouth every seven (7) days. LAST DATE TAKEN 05/04/21  Indications: low vitamin D levels 6/2/20   Lisandra Velasco RDO     Allergies   Allergen Reactions    Doxylamine Succinate Swelling     12.5-25 MG  HANDS, FACE/PERIORAL, FEET    Pcn [Penicillins] Hives    Ambien [Zolpidem] Nausea and Vomiting and Other (comments)     5 mg with Ativan 0.5 mg   TOO GROGGY, SLEPT 24 HOURS  7.5 MG FORGOT SHE WAS SLEEP EATING    Aspirin Nausea Only    Celebrex [Celecoxib] Other (comments)     TIGHT SQUEEZING IN CHEST  CHEST ACHED    Erythromycin Nausea and Vomiting    Milk Prot-Turm-Pepper-Pineappl Other (comments)     Multiple food allergies    Neurontin [Gabapentin] Other (comments)     300 mg  SEVERE LEG PAIN  EYE TWITCHING    Nsaids (Non-Steroidal Anti-Inflammatory Drug) Other (comments)     GI irritation           Review of Systems:    Review of Systems   Constitutional: Positive for weight loss. Negative for chills and fever. HENT: Negative. Eyes: Negative. Respiratory: Positive for shortness of breath. Negative for cough. Cardiovascular: Negative for chest pain and palpitations. Gastrointestinal: Positive for heartburn. Negative for constipation, nausea and vomiting. Genitourinary: Negative. Musculoskeletal: Positive for back pain, joint pain and neck pain. Skin: Negative for rash. Neurological: Negative for sensory change. Endo/Heme/Allergies: Negative. Psychiatric/Behavioral: Negative. Objective:     Visit Vitals  /75 (BP 1 Location: Right upper arm, BP Patient Position: At rest)   Pulse 74   Temp 98 °F (36.7 °C)   Resp 17   Ht 5' 7.99\" (1.727 m)   Wt 262 lb 9.6 oz (119.1 kg)   LMP  (LMP Unknown)   SpO2 99%   BMI 39.94 kg/m²       Physical Exam:  GENERAL: alert, cooperative, no distress, appears stated age, morbidly obese, EYE: negative, THROAT & NECK: normal, LUNG: clear to auscultation bilaterally, HEART: regular rate and rhythm, S1, S2 normal, no murmur. ABDOMEN: Obese, nondistended, soft. No pain with palpation, mass, or hernia. EXTREMITIES:  extremities normal, atraumatic, no cyanosis or edema, SKIN: Normal., NEUROLOGIC: negative    Data Review: Upper gastrointestinal series: Small hiatal hernia without spontaneous reflux. Assessment:     Morbid obesity with multiple comorbidities. No success with medical management. Plan:     Laparoscopic gastric bypass with paraesophageal hernia repair and upper endoscopy. Technical aspects of procedure reviewed along with risks (to include but not limited to bleeding, wound infection, VTE, open procedure, leak, stricture, ulcer, recurrent hernia, poor weight loss/weight regain). Also reviewed anticipated hospital course, postoperative diet, activity restrictions, expected results. She understands and desires to proceed. All questions answered.     Signed By: Cher Goodell, MD     June 14, 2021

## 2021-06-14 NOTE — BRIEF OP NOTE
Brief Postoperative Note    Patient: Eric Mcconnell  YOB: 1965  MRN: [id number]    Date of Procedure: 6/14/2021     Pre-Op Diagnosis:  MORBID OBESITY    Post-Op Diagnosis: Same as preoperative diagnosis. Procedure(s):  LAPAROSCOPIC GASTRIC BYPASS WITH HIATAL HERNIA REPAIR & EGD (E R A S)  ESOPHAGOGASTRODUODENOSCOPY    Surgeon(s):  Geovany Cárdenas MD    Surgical Assistant: Physician Assistant: GEE Moran    Anesthesia: General     Estimated Blood Loss (mL): 099 mL    Complications: None    Specimens:   ID Type Source Tests Collected by Time Destination   1 : segment of small intestine Fresh Small Bowel  Geovany Cárdenas MD 6/14/2021 1317 Pathology        Implants:   Implant Name Type Inv. Item Serial No.  Lot No. LRB No. Used Action   seam guard   NA  [id number] N/A 3 Implanted       Drains:   Juan Luis-Garcia Drain 06/14/21 Left Abdomen (Active)       Orogastric Tube 06/14/21 (Active)       [REMOVED] Orogastric Tube 06/14/21 (Removed)       Findings: Right upper quadrant adhesions, lysed. No significant hiatal hernia identified. Creation of a 30 cc gastric pouch with 246 cm antecolic, antigastric Yelena limb. Linear stapled gastrojejunostomy. No intraluminal hemorrhage or insufflation air leak on upper endoscopy.     Electronically Signed by Rustam Dyer MD on 6/14/2021 at 2:02 PM

## 2021-06-14 NOTE — ANESTHESIA PREPROCEDURE EVALUATION
Relevant Problems   CARDIOVASCULAR   (+) Essential hypertension      GASTROINTESTINAL   (+) Hiatal hernia      ENDOCRINE   (+) Left lateral epicondylitis   (+) Obesity, Class III, BMI 40-49.9 (morbid obesity) (HCC)      HEMATOLOGY   (+) Iron deficiency anemia       Anesthetic History   No history of anesthetic complications            Review of Systems / Medical History  Patient summary reviewed, nursing notes reviewed and pertinent labs reviewed    Pulmonary  Within defined limits                 Neuro/Psych   Within defined limits           Cardiovascular  Within defined limits  Hypertension              Exercise tolerance: >4 METS     GI/Hepatic/Renal  Within defined limits   GERD           Endo/Other      Hypothyroidism: well controlled  Obesity and arthritis     Other Findings              Physical Exam    Airway  Mallampati: II  TM Distance: > 6 cm  Neck ROM: normal range of motion   Mouth opening: Normal     Cardiovascular  Regular rate and rhythm,  S1 and S2 normal,  no murmur, click, rub, or gallop             Dental  No notable dental hx       Pulmonary  Breath sounds clear to auscultation               Abdominal  GI exam deferred       Other Findings            Anesthetic Plan    ASA: 3  Anesthesia type: general          Induction: Intravenous  Anesthetic plan and risks discussed with: Patient

## 2021-06-14 NOTE — PERIOP NOTES
Patient: Herbie Hays MRN: [id number]  SSN: [id number]   YOB: 1965  Age: 64 y.o. Sex: female     Patient is status post Procedure(s):  LAPAROSCOPIC GASTRIC BYPASS WITH HIATAL HERNIA REPAIR & EGD (E R A S)  ESOPHAGOGASTRODUODENOSCOPY.     Surgeon(s) and Role:     Rene Gamino MD - Primary    Local/Dose/Irrigation:  See [facility]                  Peripheral IV 06/14/21 Left;Posterior Hand (Active)          Juan Luis-Garcia Drain 06/14/21 Left Abdomen (Active)       Orogastric Tube 06/14/21 (Active)      Airway - Endotracheal Tube 06/14/21 Oral (Active)                   Dressing/Packing:  Incision 06/14/21 Abdomen-Dressing/Treatment: Surgical glue (06/14/21 1319)    Splint/Cast:  ]    Other:

## 2021-06-14 NOTE — PERIOP NOTES
TRANSFER - OUT REPORT:    Verbal report given to [address] [place] [address] (name) on [facility]  being transferred to [facility](unit) for routine post - op       Report consisted of patients Situation, Background, Assessment and   Recommendations(SBAR). Time Pre op antibiotic given:FLAGYL, GENTAMYCIN 11:16  Anesthesia Stop time: 0321  Reese Present on Transfer to [address] for Reese on Chart:NO  Discharge Prescriptions with Chart:NO    Information from the following report(s) SBAR, OR Summary, Intake/Output, MAR, Recent Results, Cardiac Rhythm NSR and Procedure Verification was reviewed with the receiving nurse. Opportunity for questions and clarification was provided. Is the patient on 02? YES       L/Min 2       Is the patient on a monitor? NO    Is the nurse transporting with the patient? NO    Surgical Waiting Area notified of patient's transfer from PACU? YES      The following personal items collected during your admission accompanied patient upon transfer:   Dental Appliance: Dental Appliances: Uppers (returned to patient in pacu)  Vision:    Hearing Aid:    Jewelry:    Clothing: Clothing: Other (comment) (clothing and shoes returned to patient in pacu)  Other Valuables:  Other Valuables: Eyeglasses (returned to patient in pacu)  Valuables sent to safe:

## 2021-06-15 LAB
ANION GAP SERPL CALC-SCNC: 8 MMOL/L (ref 5–15)
BUN SERPL-MCNC: 7 MG/DL (ref 6–20)
BUN/CREAT SERPL: 9 (ref 12–20)
CALCIUM SERPL-MCNC: 9.5 MG/DL (ref 8.5–10.1)
CHLORIDE SERPL-SCNC: 100 MMOL/L (ref 97–108)
CO2 SERPL-SCNC: 26 MMOL/L (ref 21–32)
COMMENT, HOLDF: NORMAL
CREAT SERPL-MCNC: 0.74 MG/DL (ref 0.55–1.02)
GLUCOSE BLD STRIP.AUTO-MCNC: 108 MG/DL (ref 65–117)
GLUCOSE BLD STRIP.AUTO-MCNC: 139 MG/DL (ref 65–117)
GLUCOSE SERPL-MCNC: 148 MG/DL (ref 65–100)
POTASSIUM SERPL-SCNC: 3.7 MMOL/L (ref 3.5–5.1)
SAMPLES BEING HELD,HOLD: NORMAL
SERVICE CMNT-IMP: ABNORMAL
SERVICE CMNT-IMP: NORMAL
SODIUM SERPL-SCNC: 134 MMOL/L (ref 136–145)

## 2021-06-15 PROCEDURE — 36415 COLL VENOUS BLD VENIPUNCTURE: CPT

## 2021-06-15 PROCEDURE — 74011250637 HC RX REV CODE- 250/637: Performed by: SURGERY

## 2021-06-15 PROCEDURE — 65660000000 HC RM CCU STEPDOWN

## 2021-06-15 PROCEDURE — 82962 GLUCOSE BLOOD TEST: CPT

## 2021-06-15 PROCEDURE — 80048 BASIC METABOLIC PNL TOTAL CA: CPT

## 2021-06-15 PROCEDURE — 74011250636 HC RX REV CODE- 250/636: Performed by: SURGERY

## 2021-06-15 RX ORDER — HYDROCHLOROTHIAZIDE 25 MG/1
12.5 TABLET ORAL DAILY
Status: DISCONTINUED | OUTPATIENT
Start: 2021-06-16 | End: 2021-06-18

## 2021-06-15 RX ORDER — HYDROMORPHONE HYDROCHLORIDE 2 MG/1
2-4 TABLET ORAL
Status: DISCONTINUED | OUTPATIENT
Start: 2021-06-15 | End: 2021-06-17

## 2021-06-15 RX ORDER — LOSARTAN POTASSIUM 50 MG/1
50 TABLET ORAL DAILY
Status: DISCONTINUED | OUTPATIENT
Start: 2021-06-16 | End: 2021-06-18

## 2021-06-15 RX ADMIN — HYDROMORPHONE HYDROCHLORIDE 4 MG: 2 TABLET ORAL at 10:47

## 2021-06-15 RX ADMIN — HYDROMORPHONE HYDROCHLORIDE 1 MG: 1 INJECTION, SOLUTION INTRAMUSCULAR; INTRAVENOUS; SUBCUTANEOUS at 02:38

## 2021-06-15 RX ADMIN — SODIUM CHLORIDE, POTASSIUM CHLORIDE, SODIUM LACTATE AND CALCIUM CHLORIDE 150 ML/HR: 600; 310; 30; 20 INJECTION, SOLUTION INTRAVENOUS at 02:38

## 2021-06-15 RX ADMIN — SODIUM CHLORIDE, POTASSIUM CHLORIDE, SODIUM LACTATE AND CALCIUM CHLORIDE 150 ML/HR: 600; 310; 30; 20 INJECTION, SOLUTION INTRAVENOUS at 10:48

## 2021-06-15 RX ADMIN — ACETAMINOPHEN 1000 MG: 500 TABLET ORAL at 06:47

## 2021-06-15 RX ADMIN — ONDANSETRON HYDROCHLORIDE 4 MG: 2 INJECTION, SOLUTION INTRAMUSCULAR; INTRAVENOUS at 06:47

## 2021-06-15 RX ADMIN — ACETAMINOPHEN 1000 MG: 500 TABLET ORAL at 12:20

## 2021-06-15 RX ADMIN — HYOSCYAMINE SULFATE 0.12 MG: 0.12 TABLET ORAL; SUBLINGUAL at 06:47

## 2021-06-15 RX ADMIN — ONDANSETRON HYDROCHLORIDE 4 MG: 2 INJECTION, SOLUTION INTRAMUSCULAR; INTRAVENOUS at 02:38

## 2021-06-15 RX ADMIN — ONDANSETRON HYDROCHLORIDE 4 MG: 2 INJECTION, SOLUTION INTRAMUSCULAR; INTRAVENOUS at 10:47

## 2021-06-15 RX ADMIN — HYDROMORPHONE HYDROCHLORIDE 0.5 MG: 1 INJECTION, SOLUTION INTRAMUSCULAR; INTRAVENOUS; SUBCUTANEOUS at 20:33

## 2021-06-15 RX ADMIN — HYDROMORPHONE HYDROCHLORIDE 4 MG: 2 TABLET ORAL at 06:47

## 2021-06-15 RX ADMIN — HYDROMORPHONE HYDROCHLORIDE 0.5 MG: 1 INJECTION, SOLUTION INTRAMUSCULAR; INTRAVENOUS; SUBCUTANEOUS at 13:15

## 2021-06-15 RX ADMIN — ONDANSETRON HYDROCHLORIDE 4 MG: 2 INJECTION, SOLUTION INTRAMUSCULAR; INTRAVENOUS at 22:18

## 2021-06-15 RX ADMIN — HYOSCYAMINE SULFATE 0.12 MG: 0.12 TABLET ORAL; SUBLINGUAL at 10:47

## 2021-06-15 RX ADMIN — HYOSCYAMINE SULFATE 0.12 MG: 0.12 TABLET ORAL; SUBLINGUAL at 02:37

## 2021-06-15 RX ADMIN — METRONIDAZOLE 500 MG: 500 INJECTION, SOLUTION INTRAVENOUS at 02:36

## 2021-06-15 RX ADMIN — ONDANSETRON HYDROCHLORIDE 4 MG: 2 INJECTION, SOLUTION INTRAMUSCULAR; INTRAVENOUS at 18:09

## 2021-06-15 RX ADMIN — HYDROMORPHONE HYDROCHLORIDE 4 MG: 2 TABLET ORAL at 18:10

## 2021-06-15 RX ADMIN — HYOSCYAMINE SULFATE 0.12 MG: 0.12 TABLET ORAL; SUBLINGUAL at 18:09

## 2021-06-15 RX ADMIN — LORAZEPAM 1 MG: 2 INJECTION INTRAMUSCULAR; INTRAVENOUS at 22:26

## 2021-06-15 NOTE — PROGRESS NOTES
Progress Note    Patient: Benito Arrington MRN: [id number]  SSN: [id number]    YOB: 1965  Age: 64 y.o. Sex: female      Admit Date: 2021    1 Day Post-Op    Procedure:  Procedure(s):  LAPAROSCOPIC GASTRIC BYPASS & EGD (E R A S)    Subjective:     Patient complains of incisional pain. She is starting her bariatric liquids. She has voided spontaneously, ambulated. She is performing spirometry. Objective:     Visit Vitals  /74 (BP 1 Location: Left upper arm, BP Patient Position: At rest;Sitting)   Pulse 86   Temp 99.8 °F (37.7 °C)   Resp 17   Ht 5' 7.99\" (1.727 m)   Wt 262 lb 9.6 oz (119.1 kg)   SpO2 97%   BMI 39.94 kg/m²       Temp (24hrs), Av.5 °F (36.9 °C), Min:97.3 °F (36.3 °C), Max:99.8 °F (37.7 °C)    Date 21 07 - 06/15/21 0659 06/15/21 07 - 21 0659   Shift 7762-9549 9618-2854 24 Hour Total 1311-5826 9446-0936 24 Hour Total   INTAKE   P.O.    240  240     P. O.    240  240   I. V.(mL/kg/hr) 1450(1)  1450(0.5)        Volume (lactated Ringers infusion) 1200  1200        Volume (magnesium sulfate 2 g/50 ml IVPB (premix or compounded)) 50  50        Volume (gentamicin (GARAMYCIN) 320 mg in 0.9% sodium chloride 100 mL IVPB) 100  100        Volume (metroNIDAZOLE (FLAGYL) IVPB premix 500 mg) 100  100      Shift Total(mL/kg) 1450(12.2)  1450(12.2) 240(2)  240(2)   OUTPUT   Urine(mL/kg/hr)  1050(0.7) 1050(0.4)        Urine Voided  1050 1050      Drains  40 40        Output (ml) (Juan Luis-Garcia Drain 21 Left Abdomen)  40 40      Shift Total(mL/kg)  1090(9.2) 1090(9.2)      NET 1450 -1090 360 240  240   Weight (kg) 119.1 119.1 119.1 119.1 119.1 119.1       Physical Exam:    LUNG: diminished breath sounds R base, L base, HEART: regular rate and rhythm, S1, S2 normal, no murmur. ABDOMEN: Obese, nondistended, soft. Serous drain fluid. Laparoscopic wounds dry and intact. Appropriate incisional pain with palpation.     Data Review: Vital signs, ins/outs, labs    Lab Review:   Recent Results (from the past 12 hour(s))   METABOLIC PANEL, BASIC    Collection Time: 06/15/21  2:57 AM   Result Value Ref Range    Sodium 134 (L) 136 - 145 mmol/L    Potassium 3.7 3.5 - 5.1 mmol/L    Chloride 100 97 - 108 mmol/L    CO2 26 21 - 32 mmol/L    Anion gap 8 5 - 15 mmol/L    Glucose 148 (H) 65 - 100 mg/dL    BUN 7 6 - 20 MG/DL    Creatinine 0.74 0.55 - 1.02 MG/DL    BUN/Creatinine ratio 9 (L) 12 - 20      GFR est AA >60 >60 ml/min/1.73m2    GFR est non-AA >60 >60 ml/min/1.73m2    Calcium 9.5 8.5 - 10.1 MG/DL   SAMPLES BEING HELD    Collection Time: 06/15/21  2:57 AM   Result Value Ref Range    SAMPLES BEING HELD 1LAV     COMMENT        Add-on orders for these samples will be processed based on acceptable specimen integrity and analyte stability, which may vary by analyte. GLUCOSE, POC    Collection Time: 06/15/21  6:31 AM   Result Value Ref Range    Glucose (POC) 139 (H) 65 - 117 mg/dL    Performed by Salma Bhagat   PCT    GLUCOSE, POC    Collection Time: 06/15/21 11:50 AM   Result Value Ref Range    Glucose (POC) 108 65 - 117 mg/dL    Performed by Elisha Daniel (CON)          Assessment:     Hospital Problems  Date Reviewed: 6/14/2021        Codes Class Noted POA    Morbid obesity ([facility] [address] ICD-10-CM: E66.01  ICD-9-CM: 278.01  6/14/2021 Unknown              Plan/Recommendations/Medical Decision Making:     Bariatric liquids-goal 4 ounces per hour. Oral analgesics, PTA medications. Increase spirometry, ambulation. Mechanical DVT prophylaxis.

## 2021-06-15 NOTE — CONSULTS
NUTRITION     Chart reviewed. Post-op bariatric diet instruction completed. Pt with complaints of pain and nausea. Pt recently given pain and nausea meds by RN. Pt just drinking water at this time. Pt holding off on Ensure HP due to \"thickness. \" Discussed adding Nikita (14g amino acid, 8g CHO) instead. Pt willing to try. Added to meals. Will gladly follow up for additional questions as needed. Thank you.      Lashawn Zamora RD

## 2021-06-15 NOTE — OP NOTES
[address]   OPERATIVE REPORT    Name:  Urban Kelly  MR#:  [id number]  :  1965  ACCOUNT #:  [de-identified]  DATE OF SERVICE:  2021      PRIMARY PREOPERATIVE DIAGNOSIS:  Morbid obesity. SECONDARY PREOPERATIVE DIAGNOSES:  1. Gastroesophageal reflux disease. 2.  Hypertension. 3.  Impaired glucose tolerance. 4.  Degenerative joint disease. POSTOPERATIVE DIAGNOSES:  1.  Morbid obesity. 2.  Gastroesophageal reflux disease. 3.  Hypertension. 4.  Impaired glucose tolerance. 5.  Degenerative joint disease. PROCEDURES PERFORMED:  1. Laparoscopic Yelena-en-Y gastric bypass (CPT 33830). 2.  Intraoperative upper endoscopy. SURGEON:  Bernard Bucio MD    ASSISTANT:  GEE Stephens    ANESTHESIA:  General endotracheal.    COMPLICATIONS:  None. SPECIMENS REMOVED:  Segment of small intestine. IMPLANTS:  None. ESTIMATED BLOOD LOSS:  100 mL. DRAIN:  A 19-mm Tino drain. COUNTS:  Sponge count correct. Needle count correct. INDICATIONS:  The patient is a 75-year-old [facility] American female with a height of 67 inches, weight of 262 pounds, with resultant body mass index of 39.4 kg/m2 on a medium frame. She has the above-listed obesity-related conditions. All medical efforts at weight loss have been unsuccessful. After extensive preoperative counseling, patient education, and medical screening, it was felt she would be a good candidate for weight reduction surgery. She presents to [facility] today for laparoscopic gastric bypass. I have asked GEE Stephens, to assist with the procedure given the technical complexity of laparoscopic bariatric surgery. She will run the laparoscope, assist in creation of the jejunojejunostomy, assist in creation of the gastric pouch, and assist in creation of the gastrojejunostomy. FINDINGS:  1. Right upper quadrant adhesions, lysed. 2.  No significant hiatal hernia identified.   3.  Creation of a 30-mL gastric pouch with 537-DF antecolic, antegastric Yelena limb. Linear stapled gastrojejunostomy. 4.  No intraluminal hemorrhage or insufflation air leak on upper endoscopy. PROCEDURE:  The patient was identified as the correct patient in the preoperative holding area and informed consent was confirmed. After answering the patient's remaining questions, she was taken to the operating room and placed on the operating room table in supine position. Sequential compression devices were placed on both lower extremities. Following the uneventful initiation of general anesthesia, she was carefully secured to the operating room table with footboard and safety strap in place. All potential pressure points were padded with eggcrate. Her abdomen was prepped and draped in the usual sterile fashion. Final time-out was performed, and it was confirmed she had received intravenous antibiotics. A 5-mm trocar was inserted through a small left subcostal skin incision using an Optiview technique. After confirming intraperitoneal location of the trocar tip, insufflation with carbon dioxide gas was initiated. Once adequate working sites had been developed, a 5-mm, 30-degree laparoscope was inserted. No signs of trocar injury were present. Right upper quadrant adhesions from prior open cholecystectomy were identified. A 5-mm epigastric trocar was inserted through a small incision using visual guidance with the laparoscope. Lysis of adhesions was initiated using a combination of blunt dissection and the Harmonic scalpel. This allowed clearance of these right upper quadrant adhesions and placement of 2 right upper quadrant trocars, one 5 mm and the other 12 mm, using visual guidance with the laparoscope. The omentum was retracted into the upper abdomen, and the omentum was serially ligated and divided from its free edge to the antimesenteric border of the transverse colon.   With the transverse colon elevated anteriorly and cephalad, the ligament of Treitz was identified. 50 cm of bowel was measured distal to this landmark. The jejunum was divided at this point using a tan load linear stapler firing. The distal edge of the transected bowel was marked with a 0 Surgidac suture. The small-bowel mesentery was mobilized using the Harmonic scalpel. Once adequate mobility of the alimentary limb had been achieved, an additional 160 cm of bowel was measured distal to this transection site. This segment of bowel was brought alongside the biliopancreatic limb in side-to-side fashion. The Harmonic scalpel was used to make an opening in each segment of bowel, and through these openings, a 60-mm tan load linear stapler was carefully inserted. After confirming correct insertion of the stapler, the stapler was closed, fired, and removed. The staple line was noted to be hemostatic. The remaining opening in the bowel was closed with a running 2-0 Polysorb suture. A tension-relieving, anti-obstruction suture was placed at the distal edge of the staple line. The mesenteric defect was closed with a running 0 Surgidac suture. The Yelena limb was delivered into the upper abdomen with care to avoid twisting of the bowel or its blood supply. The patient was placed in a steep reverse Trendelenburg position. The [facility] liver retractor was inserted through a small subxiphoid incision. With the left lobe of the liver retracted anteriorly and cephalad, the esophageal hiatus was visualized. No signs of significant hiatal hernia were present, with the fat pad and gastroesophageal junction identified within the abdominal space. The fat pad and angle of His were mobilized from the left margo of the diaphragm using the Harmonic scalpel and blunt dissection. The pars flaccida portion of the gastrohepatic ligament was incised, allowing atraumatic entry into the lesser sac and retrogastric area.   A site 4 cm distal to the gastroesophageal junction was chosen. The gastrohepatic ligament was serially ligated and divided at this level using the Harmonic scalpel. A 30-mL gastric pouch was created with multiple purple load linear stapler firings, utilizing Seamguard reinforcement material.  The calibration tube was used to size pouch construction. Following pouch construction, the bleeding points along the lesser curve aspect of the gastric pouch were controlled with titanium clips. The left upper quadrant was irrigated with sterile saline and no signs of ongoing bleeding were identified. The Yelena limb was delivered into the left upper quadrant, and although the end appeared initially somewhat dusky, the bowel now appeared normal in color. A running suture was placed between the antimesenteric border of the Yelena limb and the lateral aspect of the gastric pouch with a running 0 Surgidac suture. The Harmonic scalpel was used to make an opening in both the gastric pouch and the Yelena limb, and through these openings, a 30-mm tan load linear stapler was carefully inserted. After confirming correct insertion of the stapler, the stapler was closed, fired, and removed. The staple line was noted to be hemostatic. The remaining opening in the bowel was closed with a running 2-0 Polysorb suture, completed over a  standard Olympus endoscope which had been passed through the anastomosis into the efferent portion of the Yelena limb. This suture line was imbricated with a running 0 Surgidac suture. The redundant afferent Yelena segment was resected with a tan load stapler firing, placed within a retrieval bag, and removed from the patient's body. The patient was placed in the supine position. Sterile saline was instilled into the upper abdomen. An intestinal clamp was placed on the Yelena limb distal to the gastroscope. Intraoperative endoscopy was continued.   The scope was withdrawn into the pouch, and with insufflation using the gastroscope, adequate pouch and Yelena limb distentions were noted. No intraluminal hemorrhage was identified. No insufflation air leak occurred. The mucosa appeared somewhat pale, at even a distance of 30 cm distal to the anastomosis at an obviously viable segment of bowel. The bowel was decompressed and the gastroscope was removed. Sterile saline was evacuated from the upper abdomen. The intestinal clamp was removed. Julian's space was closed with a running 0 Surgidac suture. A hitch suture between the antimesenteric border of the Yelena limb and excluded stomach was placed. A 19-mm Tino drain was inserted into the abdominal space. It was allowed to lie adjacent to the gastrojejunal anastomosis and brought out through the left subcostal 5-mm trocar site. It was secured to the skin with a 2-0 nylon suture. After confirming adequate hemostasis, the bowel was re-inspected and noted to be normal in appearance with no signs of tension or ischemia. The [facility] liver retractor was removed, followed by closure of the 12-mm fascial defect using a 0 Vicryl suture with a laparoscopic suture passer. Pneumoperitoneum was released, and all trocars were removed. All wounds were infiltrated with 0.5% Marcaine without epinephrine. All skin edges were reapproximated with a combination of subcuticular 4-0 Monocryl suture and Dermabond. The patient tolerated the procedure well. She was extubated in the operating room and transported to the recovery area in stable condition. The attending surgeon, Dr. Kiel Jordan, was scrubbed and present for the entire procedure.         MD TARAH [id number]  D:  06/14/2021 14:25  T:  06/15/2021 1:14  JOB #:  [id number]

## 2021-06-15 NOTE — ROUTINE PROCESS
2300: patient assisted out of bed and into standing position to walk to bathroom. After initially standing, patient was breathing shallow and rapid. Patient began wheezing and this nurse instructed patient to sit back down on bed and take deep breathes through her nose and out her mouth. 2L of O2 NC was applied and patient given a sip of water. Patient stated \"I felt like my throat was so dry I couldn't breathe. \" patient used bedside commode during this time instead of walking to bathroom. After voiding, patient sat up in chair for about an hour and stated that helped. 2L NC left on the patient overnight. O2 sats never dropped before 93%. 0300: patient able to walk to bathroom, slowly while deep breathing. Bedside shift change report given to [address] [place] [address] (oncoming nurse) by Tatiana Diego RN (offgoing nurse). Report included the following information SBAR and Kardex.

## 2021-06-15 NOTE — PROGRESS NOTES
Bedside shift change report given to Serena Mcgregor (oncoming nurse) by Bruce Ruffin (offgoing nurse). Report included the following information SBAR, Kardex, Intake/Output, MAR and Recent Results.

## 2021-06-15 NOTE — PROGRESS NOTES
Transition of Care: likely home with f/u with specialist/pcp    Transport Plan: in car with family    RUR: 8%    Main contact- Flakita Chavez- daughter- [phone]    Discharge pending:  -patient is POD#1 of gastric bypass  -pending medical progress    1947: this CM met with pleasant patient at bedside; she is alert and oriented x 4; she lives at stated address and is normally independent in her ADLs; she states her daughter will likely transport home at discharge; she confirms her insurance ad medical [employer] of [employer]; per pcp is Dr. Josh Hubbard and preferred pharmacy [facility]    Reason for Admission:  Morbid obesity                     RUR Score:       8%              Plan for utilizing home health:   Likely none       PCP: First and Last name:  Wanda Nageotte, MD     Name of Practice:    Are you a current patient: Yes/No: yes   Approximate date of last visit:    Can you participate in a virtual visit with your PCP: unknown                    Current Advanced Directive/Advance Care Plan: Prior                 Transition of Care Plan: likely home with f/u with specialist/pcp; transport in car with family    Care Management Interventions  PCP Verified by CM: Yes (Dr. Josh Hubbard)  Mode of Transport at Discharge: Other (see comment) (in car with family)  Transition of Care Consult (CM Consult): Other (TBD; likely home with f/u with specialist/pcp)  Physical Therapy Consult: No  Occupational Therapy Consult: No  Speech Therapy Consult: No  Current Support Network:  Other, Own Home (lives with daughter)  Discharge Location  Discharge Placement: Other: (TBD; likely home with f/u with specialist/pcp)     CM following  Ferny Ruiz RN, CRM

## 2021-06-16 ENCOUNTER — TELEPHONE (OUTPATIENT)
Dept: SURGERY | Age: 56
End: 2021-06-16

## 2021-06-16 LAB
ANION GAP SERPL CALC-SCNC: 11 MMOL/L (ref 5–15)
BASOPHILS # BLD: 0 K/UL (ref 0–0.1)
BASOPHILS NFR BLD: 0 % (ref 0–1)
BUN SERPL-MCNC: 5 MG/DL (ref 6–20)
BUN/CREAT SERPL: 8 (ref 12–20)
CALCIUM SERPL-MCNC: 8.8 MG/DL (ref 8.5–10.1)
CHLORIDE SERPL-SCNC: 102 MMOL/L (ref 97–108)
CO2 SERPL-SCNC: 24 MMOL/L (ref 21–32)
CREAT SERPL-MCNC: 0.65 MG/DL (ref 0.55–1.02)
DIFFERENTIAL METHOD BLD: ABNORMAL
EOSINOPHIL # BLD: 0 K/UL (ref 0–0.4)
EOSINOPHIL NFR BLD: 0 % (ref 0–7)
ERYTHROCYTE [DISTWIDTH] IN BLOOD BY AUTOMATED COUNT: 13.2 % (ref 11.5–14.5)
GLUCOSE SERPL-MCNC: 98 MG/DL (ref 65–100)
HCT VFR BLD AUTO: 32.8 % (ref 35–47)
HGB BLD-MCNC: 10.8 G/DL (ref 11.5–16)
IMM GRANULOCYTES # BLD AUTO: 0.1 K/UL (ref 0–0.04)
IMM GRANULOCYTES NFR BLD AUTO: 0 % (ref 0–0.5)
LYMPHOCYTES # BLD: 1.5 K/UL (ref 0.8–3.5)
LYMPHOCYTES NFR BLD: 13 % (ref 12–49)
MCH RBC QN AUTO: 28.8 PG (ref 26–34)
MCHC RBC AUTO-ENTMCNC: 32.9 G/DL (ref 30–36.5)
MCV RBC AUTO: 87.5 FL (ref 80–99)
MONOCYTES # BLD: 0.7 K/UL (ref 0–1)
MONOCYTES NFR BLD: 6 % (ref 5–13)
NEUTS SEG # BLD: 9.2 K/UL (ref 1.8–8)
NEUTS SEG NFR BLD: 81 % (ref 32–75)
NRBC # BLD: 0 K/UL (ref 0–0.01)
NRBC BLD-RTO: 0 PER 100 WBC
PLATELET # BLD AUTO: 239 K/UL (ref 150–400)
PMV BLD AUTO: 10.8 FL (ref 8.9–12.9)
POTASSIUM SERPL-SCNC: 3.2 MMOL/L (ref 3.5–5.1)
RBC # BLD AUTO: 3.75 M/UL (ref 3.8–5.2)
SODIUM SERPL-SCNC: 137 MMOL/L (ref 136–145)
WBC # BLD AUTO: 11.4 K/UL (ref 3.6–11)

## 2021-06-16 PROCEDURE — 65660000000 HC RM CCU STEPDOWN

## 2021-06-16 PROCEDURE — 80048 BASIC METABOLIC PNL TOTAL CA: CPT

## 2021-06-16 PROCEDURE — 74011250636 HC RX REV CODE- 250/636: Performed by: SURGERY

## 2021-06-16 PROCEDURE — 36415 COLL VENOUS BLD VENIPUNCTURE: CPT

## 2021-06-16 PROCEDURE — 85025 COMPLETE CBC W/AUTO DIFF WBC: CPT

## 2021-06-16 PROCEDURE — 74011250637 HC RX REV CODE- 250/637: Performed by: SURGERY

## 2021-06-16 RX ORDER — DEXAMETHASONE SODIUM PHOSPHATE 4 MG/ML
10 INJECTION, SOLUTION INTRA-ARTICULAR; INTRALESIONAL; INTRAMUSCULAR; INTRAVENOUS; SOFT TISSUE ONCE
Status: COMPLETED | OUTPATIENT
Start: 2021-06-16 | End: 2021-06-16

## 2021-06-16 RX ADMIN — HYDROMORPHONE HYDROCHLORIDE 4 MG: 2 TABLET ORAL at 17:58

## 2021-06-16 RX ADMIN — DEXAMETHASONE SODIUM PHOSPHATE 10 MG: 4 INJECTION, SOLUTION INTRAMUSCULAR; INTRAVENOUS at 11:01

## 2021-06-16 RX ADMIN — HYDROMORPHONE HYDROCHLORIDE 4 MG: 2 TABLET ORAL at 00:19

## 2021-06-16 RX ADMIN — SODIUM CHLORIDE, POTASSIUM CHLORIDE, SODIUM LACTATE AND CALCIUM CHLORIDE 150 ML/HR: 600; 310; 30; 20 INJECTION, SOLUTION INTRAVENOUS at 16:13

## 2021-06-16 RX ADMIN — LOSARTAN POTASSIUM 50 MG: 50 TABLET, FILM COATED ORAL at 10:58

## 2021-06-16 RX ADMIN — ONDANSETRON HYDROCHLORIDE 4 MG: 2 INJECTION, SOLUTION INTRAMUSCULAR; INTRAVENOUS at 13:28

## 2021-06-16 RX ADMIN — ACETAMINOPHEN 1000 MG: 500 TABLET ORAL at 13:20

## 2021-06-16 RX ADMIN — ACETAMINOPHEN 1000 MG: 500 TABLET ORAL at 17:58

## 2021-06-16 RX ADMIN — SODIUM CHLORIDE, POTASSIUM CHLORIDE, SODIUM LACTATE AND CALCIUM CHLORIDE 150 ML/HR: 600; 310; 30; 20 INJECTION, SOLUTION INTRAVENOUS at 02:38

## 2021-06-16 RX ADMIN — ONDANSETRON HYDROCHLORIDE 4 MG: 2 INJECTION, SOLUTION INTRAMUSCULAR; INTRAVENOUS at 02:35

## 2021-06-16 RX ADMIN — ACETAMINOPHEN 1000 MG: 500 TABLET ORAL at 00:19

## 2021-06-16 RX ADMIN — ONDANSETRON HYDROCHLORIDE 4 MG: 2 INJECTION, SOLUTION INTRAMUSCULAR; INTRAVENOUS at 23:43

## 2021-06-16 RX ADMIN — ONDANSETRON HYDROCHLORIDE 4 MG: 2 INJECTION, SOLUTION INTRAMUSCULAR; INTRAVENOUS at 07:21

## 2021-06-16 RX ADMIN — SODIUM CHLORIDE, POTASSIUM CHLORIDE, SODIUM LACTATE AND CALCIUM CHLORIDE 150 ML/HR: 600; 310; 30; 20 INJECTION, SOLUTION INTRAVENOUS at 23:33

## 2021-06-16 RX ADMIN — HYDROMORPHONE HYDROCHLORIDE 0.5 MG: 1 INJECTION, SOLUTION INTRAMUSCULAR; INTRAVENOUS; SUBCUTANEOUS at 20:41

## 2021-06-16 RX ADMIN — HYDROMORPHONE HYDROCHLORIDE 2 MG: 2 TABLET ORAL at 07:21

## 2021-06-16 RX ADMIN — HYOSCYAMINE SULFATE 0.12 MG: 0.12 TABLET ORAL; SUBLINGUAL at 07:21

## 2021-06-16 RX ADMIN — ACETAMINOPHEN 1000 MG: 500 TABLET ORAL at 06:04

## 2021-06-16 RX ADMIN — HYDROCHLOROTHIAZIDE 12.5 MG: 25 TABLET ORAL at 11:00

## 2021-06-16 RX ADMIN — HYOSCYAMINE SULFATE 0.12 MG: 0.12 TABLET ORAL; SUBLINGUAL at 16:12

## 2021-06-16 NOTE — PROGRESS NOTES
Bedside shift change report given to [address] (oncoming nurse) by Tami Stevens (offgoing nurse). Report included the following information SBAR, Kardex, Intake/Output, MAR and Recent Results.

## 2021-06-16 NOTE — PROGRESS NOTES
Bedside shift change report given to Earl Sylvetser (oncoming nurse) by Clarence Doll RN (offgoing nurse). Report included the following information SBAR, Procedure Summary, Intake/Output and Recent Results.

## 2021-06-16 NOTE — PROGRESS NOTES
Progress Note    Patient: Luis Maya MRN: [id number]  SSN: [id number]    YOB: 1965  Age: 64 y.o. Sex: female      Admit Date: 2021    2 Days Post-Op    Procedure:  Procedure(s):  LAPAROSCOPIC GASTRIC BYPASS WITH EGD (E R A S)    Subjective:     Patient notes improvement in incisional pain. She is tolerating 3 ounces per hour bariatric liquids. She is ambulating. She can only reach 700 cc with spirometry. Objective:     Visit Vitals  /78 (BP 1 Location: Right upper arm, BP Patient Position: At rest;Reclining)   Pulse 98   Temp 100.3 °F (37.9 °C)   Resp 16   Ht 5' 7.99\" (1.727 m)   Wt 262 lb 9.6 oz (119.1 kg)   SpO2 94%   BMI 39.94 kg/m²       Temp (24hrs), Av.7 °F (37.6 °C), Min:99.2 °F (37.3 °C), Max:100.5 °F (38.1 °C)    Date 06/15/21 07 - 21 0659 21 07 - 21 0659   Shift 1790-8638 5004-0210 24 Hour Total 5197-5969 3811-0586 24 Hour Total   INTAKE   P.O. 360 120 480        P. O. 360 120 480      Shift Total(mL/kg) 360(3) 120(1) 480(4)      OUTPUT   Shift Total(mL/kg)          120 480      Weight (kg) 119.1 119.1 119.1 119.1 119.1 119.1       Physical Exam:    ABDOMEN: Obese, nondistended, soft. Laparoscopic wounds dry and intact. Serosanguineous drain fluid. Appropriate incisional pain with palpation.     Data Review: Signs, ins/outs, labs    Lab Review:   Recent Results (from the past 12 hour(s))   CBC WITH AUTOMATED DIFF    Collection Time: 21  2:43 AM   Result Value Ref Range    WBC 11.4 (H) 3.6 - 11.0 K/uL    RBC 3.75 (L) 3.80 - 5.20 M/uL    HGB 10.8 (L) 11.5 - 16.0 g/dL    HCT 32.8 (L) 35.0 - 47.0 %    MCV 87.5 80.0 - 99.0 FL    MCH 28.8 26.0 - 34.0 PG    MCHC 32.9 30.0 - 36.5 g/dL    RDW 13.2 11.5 - 14.5 %    PLATELET 127 913 - 833 K/uL    MPV 10.8 8.9 - 12.9 FL    NRBC 0.0 0  WBC    ABSOLUTE NRBC 0.00 0.00 - 0.01 K/uL    NEUTROPHILS 81 (H) 32 - 75 %    LYMPHOCYTES 13 12 - 49 %    MONOCYTES 6 5 - 13 %    EOSINOPHILS 0 0 - 7 % BASOPHILS 0 0 - 1 %    IMMATURE GRANULOCYTES 0 0.0 - 0.5 %    ABS. NEUTROPHILS 9.2 (H) 1.8 - 8.0 K/UL    ABS. LYMPHOCYTES 1.5 0.8 - 3.5 K/UL    ABS. MONOCYTES 0.7 0.0 - 1.0 K/UL    ABS. EOSINOPHILS 0.0 0.0 - 0.4 K/UL    ABS. BASOPHILS 0.0 0.0 - 0.1 K/UL    ABS. IMM. GRANS. 0.1 (H) 0.00 - 0.04 K/UL    DF AUTOMATED     METABOLIC PANEL, BASIC    Collection Time: 06/16/21  2:43 AM   Result Value Ref Range    Sodium 137 136 - 145 mmol/L    Potassium 3.2 (L) 3.5 - 5.1 mmol/L    Chloride 102 97 - 108 mmol/L    CO2 24 21 - 32 mmol/L    Anion gap 11 5 - 15 mmol/L    Glucose 98 65 - 100 mg/dL    BUN 5 (L) 6 - 20 MG/DL    Creatinine 0.65 0.55 - 1.02 MG/DL    BUN/Creatinine ratio 8 (L) 12 - 20      GFR est AA >60 >60 ml/min/1.73m2    GFR est non-AA >60 >60 ml/min/1.73m2    Calcium 8.8 8.5 - 10.1 MG/DL         Assessment:     Hospital Problems  Date Reviewed: 6/14/2021        Codes Class Noted POA    Morbid obesity ([facility] [address] ICD-10-CM: E66.01  ICD-9-CM: 278.01  6/14/2021 Unknown            Not yet able to tolerate goal hourly volume of liquids. White blood cell count normalizing, normal differential. Pain management improved. Still working on spirometry. Plan/Recommendations/Medical Decision Making:     Decadron 10 mg IV x1 now. Analgesics as needed. Spirometry, increase ambulation. Encourage oral intake, goal 4 ounces per hour. Mechanical DVT prophylaxis.

## 2021-06-16 NOTE — TELEPHONE ENCOUNTER
I called the patient and I let her know that I have completed her [employer] Life Form but I need a signed release of information form signed before I can fax her form. She said she did everything on line and she will see if she can get me the form she signed electronically. Will await form.

## 2021-06-17 ENCOUNTER — APPOINTMENT (OUTPATIENT)
Dept: GENERAL RADIOLOGY | Age: 56
DRG: 620 | End: 2021-06-17
Attending: SURGERY
Payer: COMMERCIAL

## 2021-06-17 ENCOUNTER — ANESTHESIA (OUTPATIENT)
Dept: SURGERY | Age: 56
DRG: 620 | End: 2021-06-17
Payer: COMMERCIAL

## 2021-06-17 ENCOUNTER — ANESTHESIA EVENT (OUTPATIENT)
Dept: SURGERY | Age: 56
DRG: 620 | End: 2021-06-17
Payer: COMMERCIAL

## 2021-06-17 LAB
ANION GAP SERPL CALC-SCNC: 7 MMOL/L (ref 5–15)
BASOPHILS # BLD: 0 K/UL (ref 0–0.1)
BASOPHILS NFR BLD: 0 % (ref 0–1)
BUN SERPL-MCNC: 7 MG/DL (ref 6–20)
BUN/CREAT SERPL: 12 (ref 12–20)
CALCIUM SERPL-MCNC: 9.5 MG/DL (ref 8.5–10.1)
CHLORIDE SERPL-SCNC: 103 MMOL/L (ref 97–108)
CO2 SERPL-SCNC: 29 MMOL/L (ref 21–32)
CREAT SERPL-MCNC: 0.58 MG/DL (ref 0.55–1.02)
DIFFERENTIAL METHOD BLD: ABNORMAL
EOSINOPHIL # BLD: 0 K/UL (ref 0–0.4)
EOSINOPHIL NFR BLD: 0 % (ref 0–7)
ERYTHROCYTE [DISTWIDTH] IN BLOOD BY AUTOMATED COUNT: 13 % (ref 11.5–14.5)
GLUCOSE SERPL-MCNC: 121 MG/DL (ref 65–100)
HCT VFR BLD AUTO: 33.8 % (ref 35–47)
HGB BLD-MCNC: 11 G/DL (ref 11.5–16)
IMM GRANULOCYTES # BLD AUTO: 0.1 K/UL (ref 0–0.04)
IMM GRANULOCYTES NFR BLD AUTO: 1 % (ref 0–0.5)
LACTATE SERPL-SCNC: 1 MMOL/L (ref 0.4–2)
LYMPHOCYTES # BLD: 1.2 K/UL (ref 0.8–3.5)
LYMPHOCYTES NFR BLD: 8 % (ref 12–49)
MCH RBC QN AUTO: 29.1 PG (ref 26–34)
MCHC RBC AUTO-ENTMCNC: 32.5 G/DL (ref 30–36.5)
MCV RBC AUTO: 89.4 FL (ref 80–99)
MONOCYTES # BLD: 0.4 K/UL (ref 0–1)
MONOCYTES NFR BLD: 3 % (ref 5–13)
NEUTS SEG # BLD: 12.8 K/UL (ref 1.8–8)
NEUTS SEG NFR BLD: 88 % (ref 32–75)
NRBC # BLD: 0 K/UL (ref 0–0.01)
NRBC BLD-RTO: 0 PER 100 WBC
PLATELET # BLD AUTO: 240 K/UL (ref 150–400)
PMV BLD AUTO: 11.3 FL (ref 8.9–12.9)
POTASSIUM SERPL-SCNC: 3.9 MMOL/L (ref 3.5–5.1)
RBC # BLD AUTO: 3.78 M/UL (ref 3.8–5.2)
SODIUM SERPL-SCNC: 139 MMOL/L (ref 136–145)
WBC # BLD AUTO: 14.6 K/UL (ref 3.6–11)

## 2021-06-17 PROCEDURE — 36573 INSJ PICC RS&I 5 YR+: CPT | Performed by: SURGERY

## 2021-06-17 PROCEDURE — 77030020365 HC SOL INJ SOD CL 0.9% 50ML

## 2021-06-17 PROCEDURE — 76010000149 HC OR TIME 1 TO 1.5 HR: Performed by: SURGERY

## 2021-06-17 PROCEDURE — 77030026438 HC STYL ET INTUB CARD -A: Performed by: NURSE ANESTHETIST, CERTIFIED REGISTERED

## 2021-06-17 PROCEDURE — 77030008684 HC TU ET CUF COVD -B: Performed by: NURSE ANESTHETIST, CERTIFIED REGISTERED

## 2021-06-17 PROCEDURE — 77030039895 HC SYST SMK EVAC LAP COVD -B: Performed by: SURGERY

## 2021-06-17 PROCEDURE — 77030031139 HC SUT VCRL2 J&J -A: Performed by: SURGERY

## 2021-06-17 PROCEDURE — 74011250636 HC RX REV CODE- 250/636: Performed by: NURSE ANESTHETIST, CERTIFIED REGISTERED

## 2021-06-17 PROCEDURE — 77030040955 HC DSCTR SONICISION MEDT -H1: Performed by: SURGERY

## 2021-06-17 PROCEDURE — 02HV33Z INSERTION OF INFUSION DEVICE INTO SUPERIOR VENA CAVA, PERCUTANEOUS APPROACH: ICD-10-PCS | Performed by: SURGERY

## 2021-06-17 PROCEDURE — 74011000250 HC RX REV CODE- 250: Performed by: NURSE ANESTHETIST, CERTIFIED REGISTERED

## 2021-06-17 PROCEDURE — 77030040361 HC SLV COMPR DVT MDII -B: Performed by: SURGERY

## 2021-06-17 PROCEDURE — 74011250636 HC RX REV CODE- 250/636: Performed by: SURGERY

## 2021-06-17 PROCEDURE — 77030002933 HC SUT MCRYL J&J -A: Performed by: SURGERY

## 2021-06-17 PROCEDURE — 74240 X-RAY XM UPR GI TRC 1CNTRST: CPT

## 2021-06-17 PROCEDURE — 74011250636 HC RX REV CODE- 250/636: Performed by: ANESTHESIOLOGY

## 2021-06-17 PROCEDURE — 77030008756 HC TU IRR SUC STRY -B: Performed by: SURGERY

## 2021-06-17 PROCEDURE — 0DJW4ZZ INSPECTION OF PERITONEUM, PERCUTANEOUS ENDOSCOPIC APPROACH: ICD-10-PCS | Performed by: SURGERY

## 2021-06-17 PROCEDURE — 74011000636 HC RX REV CODE- 636: Performed by: RADIOLOGY

## 2021-06-17 PROCEDURE — 74011250637 HC RX REV CODE- 250/637: Performed by: NURSE ANESTHETIST, CERTIFIED REGISTERED

## 2021-06-17 PROCEDURE — 76210000016 HC OR PH I REC 1 TO 1.5 HR: Performed by: SURGERY

## 2021-06-17 PROCEDURE — 74011000250 HC RX REV CODE- 250: Performed by: SURGERY

## 2021-06-17 PROCEDURE — 74011250637 HC RX REV CODE- 250/637: Performed by: SURGERY

## 2021-06-17 PROCEDURE — 65660000000 HC RM CCU STEPDOWN

## 2021-06-17 PROCEDURE — C1751 CATH, INF, PER/CENT/MIDLINE: HCPCS

## 2021-06-17 PROCEDURE — 80048 BASIC METABOLIC PNL TOTAL CA: CPT

## 2021-06-17 PROCEDURE — 76060000034 HC ANESTHESIA 1.5 TO 2 HR: Performed by: SURGERY

## 2021-06-17 PROCEDURE — 99024 POSTOP FOLLOW-UP VISIT: CPT | Performed by: NURSE PRACTITIONER

## 2021-06-17 PROCEDURE — 0DJ08ZZ INSPECTION OF UPPER INTESTINAL TRACT, VIA NATURAL OR ARTIFICIAL OPENING ENDOSCOPIC: ICD-10-PCS | Performed by: SURGERY

## 2021-06-17 PROCEDURE — 2709999900 HC NON-CHARGEABLE SUPPLY: Performed by: SURGERY

## 2021-06-17 PROCEDURE — 49320 DIAG LAPARO SEPARATE PROC: CPT | Performed by: SURGERY

## 2021-06-17 PROCEDURE — 77030010507 HC ADH SKN DERMBND J&J -B: Performed by: SURGERY

## 2021-06-17 PROCEDURE — 76937 US GUIDE VASCULAR ACCESS: CPT

## 2021-06-17 PROCEDURE — 85025 COMPLETE CBC W/AUTO DIFF WBC: CPT

## 2021-06-17 PROCEDURE — C1769 GUIDE WIRE: HCPCS

## 2021-06-17 PROCEDURE — 77030040922 HC BLNKT HYPOTHRM STRY -A

## 2021-06-17 PROCEDURE — 77030020829: Performed by: SURGERY

## 2021-06-17 PROCEDURE — 36415 COLL VENOUS BLD VENIPUNCTURE: CPT

## 2021-06-17 PROCEDURE — 77030020263 HC SOL INJ SOD CL0.9% LFCR 1000ML: Performed by: SURGERY

## 2021-06-17 PROCEDURE — 77030012770 HC TRCR OPT FX AMR -B: Performed by: SURGERY

## 2021-06-17 PROCEDURE — 77030002895 HC DEV VASC CLOSR COVD -B: Performed by: SURGERY

## 2021-06-17 PROCEDURE — 83605 ASSAY OF LACTIC ACID: CPT

## 2021-06-17 RX ORDER — MIDAZOLAM HYDROCHLORIDE 1 MG/ML
1 INJECTION, SOLUTION INTRAMUSCULAR; INTRAVENOUS AS NEEDED
Status: DISCONTINUED | OUTPATIENT
Start: 2021-06-17 | End: 2021-06-17 | Stop reason: HOSPADM

## 2021-06-17 RX ORDER — OXYCODONE HYDROCHLORIDE 5 MG/1
5 TABLET ORAL AS NEEDED
Status: DISCONTINUED | OUTPATIENT
Start: 2021-06-17 | End: 2021-06-17

## 2021-06-17 RX ORDER — SCOLOPAMINE TRANSDERMAL SYSTEM 1 MG/1
PATCH, EXTENDED RELEASE TRANSDERMAL AS NEEDED
Status: DISCONTINUED | OUTPATIENT
Start: 2021-06-17 | End: 2021-06-17 | Stop reason: HOSPADM

## 2021-06-17 RX ORDER — KETAMINE HYDROCHLORIDE 10 MG/ML
INJECTION, SOLUTION INTRAMUSCULAR; INTRAVENOUS AS NEEDED
Status: DISCONTINUED | OUTPATIENT
Start: 2021-06-17 | End: 2021-06-17 | Stop reason: HOSPADM

## 2021-06-17 RX ORDER — DEXTROSE, SODIUM CHLORIDE, SODIUM LACTATE, POTASSIUM CHLORIDE, AND CALCIUM CHLORIDE 5; .6; .31; .03; .02 G/100ML; G/100ML; G/100ML; G/100ML; G/100ML
125 INJECTION, SOLUTION INTRAVENOUS CONTINUOUS
Status: DISCONTINUED | OUTPATIENT
Start: 2021-06-17 | End: 2021-06-17 | Stop reason: HOSPADM

## 2021-06-17 RX ORDER — MORPHINE SULFATE 2 MG/ML
2 INJECTION, SOLUTION INTRAMUSCULAR; INTRAVENOUS
Status: DISCONTINUED | OUTPATIENT
Start: 2021-06-17 | End: 2021-06-17 | Stop reason: HOSPADM

## 2021-06-17 RX ORDER — METRONIDAZOLE 500 MG/100ML
500 INJECTION, SOLUTION INTRAVENOUS
Status: COMPLETED | OUTPATIENT
Start: 2021-06-17 | End: 2021-06-17

## 2021-06-17 RX ORDER — LEVOFLOXACIN 5 MG/ML
500 INJECTION, SOLUTION INTRAVENOUS
Status: COMPLETED | OUTPATIENT
Start: 2021-06-17 | End: 2021-06-17

## 2021-06-17 RX ORDER — ROCURONIUM BROMIDE 10 MG/ML
INJECTION, SOLUTION INTRAVENOUS AS NEEDED
Status: DISCONTINUED | OUTPATIENT
Start: 2021-06-17 | End: 2021-06-17 | Stop reason: HOSPADM

## 2021-06-17 RX ORDER — DEXMEDETOMIDINE HYDROCHLORIDE 100 UG/ML
INJECTION, SOLUTION INTRAVENOUS AS NEEDED
Status: DISCONTINUED | OUTPATIENT
Start: 2021-06-17 | End: 2021-06-17 | Stop reason: HOSPADM

## 2021-06-17 RX ORDER — FENTANYL CITRATE 50 UG/ML
25 INJECTION, SOLUTION INTRAMUSCULAR; INTRAVENOUS
Status: DISCONTINUED | OUTPATIENT
Start: 2021-06-17 | End: 2021-06-17 | Stop reason: HOSPADM

## 2021-06-17 RX ORDER — BUPIVACAINE HYDROCHLORIDE 5 MG/ML
50 INJECTION, SOLUTION EPIDURAL; INTRACAUDAL ONCE
Status: COMPLETED | OUTPATIENT
Start: 2021-06-17 | End: 2021-06-17

## 2021-06-17 RX ORDER — ONDANSETRON 2 MG/ML
4 INJECTION INTRAMUSCULAR; INTRAVENOUS AS NEEDED
Status: DISCONTINUED | OUTPATIENT
Start: 2021-06-17 | End: 2021-06-17 | Stop reason: HOSPADM

## 2021-06-17 RX ORDER — HYDROMORPHONE HYDROCHLORIDE 2 MG/ML
1.5 INJECTION, SOLUTION INTRAMUSCULAR; INTRAVENOUS; SUBCUTANEOUS
Status: DISCONTINUED | OUTPATIENT
Start: 2021-06-17 | End: 2021-06-20 | Stop reason: HOSPADM

## 2021-06-17 RX ORDER — DEXAMETHASONE SODIUM PHOSPHATE 4 MG/ML
INJECTION, SOLUTION INTRA-ARTICULAR; INTRALESIONAL; INTRAMUSCULAR; INTRAVENOUS; SOFT TISSUE AS NEEDED
Status: DISCONTINUED | OUTPATIENT
Start: 2021-06-17 | End: 2021-06-17 | Stop reason: HOSPADM

## 2021-06-17 RX ORDER — PROPOFOL 10 MG/ML
INJECTION, EMULSION INTRAVENOUS AS NEEDED
Status: DISCONTINUED | OUTPATIENT
Start: 2021-06-17 | End: 2021-06-17 | Stop reason: HOSPADM

## 2021-06-17 RX ORDER — SODIUM CHLORIDE, SODIUM LACTATE, POTASSIUM CHLORIDE, CALCIUM CHLORIDE 600; 310; 30; 20 MG/100ML; MG/100ML; MG/100ML; MG/100ML
INJECTION, SOLUTION INTRAVENOUS
Status: DISCONTINUED | OUTPATIENT
Start: 2021-06-17 | End: 2021-06-17 | Stop reason: HOSPADM

## 2021-06-17 RX ORDER — SODIUM CHLORIDE, SODIUM LACTATE, POTASSIUM CHLORIDE, CALCIUM CHLORIDE 600; 310; 30; 20 MG/100ML; MG/100ML; MG/100ML; MG/100ML
125 INJECTION, SOLUTION INTRAVENOUS CONTINUOUS
Status: DISCONTINUED | OUTPATIENT
Start: 2021-06-17 | End: 2021-06-17 | Stop reason: HOSPADM

## 2021-06-17 RX ORDER — ONDANSETRON 2 MG/ML
INJECTION INTRAMUSCULAR; INTRAVENOUS AS NEEDED
Status: DISCONTINUED | OUTPATIENT
Start: 2021-06-17 | End: 2021-06-17 | Stop reason: HOSPADM

## 2021-06-17 RX ORDER — SODIUM CHLORIDE 0.9 % (FLUSH) 0.9 %
5-40 SYRINGE (ML) INJECTION EVERY 8 HOURS
Status: DISCONTINUED | OUTPATIENT
Start: 2021-06-17 | End: 2021-06-17 | Stop reason: HOSPADM

## 2021-06-17 RX ORDER — ACETAMINOPHEN 325 MG/1
650 TABLET ORAL ONCE
Status: DISCONTINUED | OUTPATIENT
Start: 2021-06-17 | End: 2021-06-17 | Stop reason: HOSPADM

## 2021-06-17 RX ORDER — DIPHENHYDRAMINE HYDROCHLORIDE 50 MG/ML
12.5 INJECTION, SOLUTION INTRAMUSCULAR; INTRAVENOUS AS NEEDED
Status: DISCONTINUED | OUTPATIENT
Start: 2021-06-17 | End: 2021-06-17 | Stop reason: HOSPADM

## 2021-06-17 RX ORDER — FENTANYL CITRATE 50 UG/ML
50 INJECTION, SOLUTION INTRAMUSCULAR; INTRAVENOUS AS NEEDED
Status: DISCONTINUED | OUTPATIENT
Start: 2021-06-17 | End: 2021-06-17 | Stop reason: HOSPADM

## 2021-06-17 RX ORDER — HYDROMORPHONE HYDROCHLORIDE 2 MG/ML
INJECTION, SOLUTION INTRAMUSCULAR; INTRAVENOUS; SUBCUTANEOUS AS NEEDED
Status: DISCONTINUED | OUTPATIENT
Start: 2021-06-17 | End: 2021-06-17 | Stop reason: HOSPADM

## 2021-06-17 RX ORDER — SODIUM CHLORIDE 0.9 % (FLUSH) 0.9 %
5-40 SYRINGE (ML) INJECTION AS NEEDED
Status: DISCONTINUED | OUTPATIENT
Start: 2021-06-17 | End: 2021-06-17 | Stop reason: HOSPADM

## 2021-06-17 RX ORDER — LIDOCAINE HYDROCHLORIDE 20 MG/ML
INJECTION, SOLUTION EPIDURAL; INFILTRATION; INTRACAUDAL; PERINEURAL AS NEEDED
Status: DISCONTINUED | OUTPATIENT
Start: 2021-06-17 | End: 2021-06-17 | Stop reason: HOSPADM

## 2021-06-17 RX ORDER — LIDOCAINE HYDROCHLORIDE 10 MG/ML
0.5 INJECTION, SOLUTION EPIDURAL; INFILTRATION; INTRACAUDAL; PERINEURAL AS NEEDED
Status: DISCONTINUED | OUTPATIENT
Start: 2021-06-17 | End: 2021-06-17 | Stop reason: HOSPADM

## 2021-06-17 RX ORDER — MIDAZOLAM HYDROCHLORIDE 1 MG/ML
1 INJECTION, SOLUTION INTRAMUSCULAR; INTRAVENOUS
Status: DISCONTINUED | OUTPATIENT
Start: 2021-06-17 | End: 2021-06-17 | Stop reason: HOSPADM

## 2021-06-17 RX ORDER — FENTANYL CITRATE 50 UG/ML
INJECTION, SOLUTION INTRAMUSCULAR; INTRAVENOUS AS NEEDED
Status: DISCONTINUED | OUTPATIENT
Start: 2021-06-17 | End: 2021-06-17 | Stop reason: HOSPADM

## 2021-06-17 RX ORDER — MIDAZOLAM HYDROCHLORIDE 1 MG/ML
INJECTION, SOLUTION INTRAMUSCULAR; INTRAVENOUS AS NEEDED
Status: DISCONTINUED | OUTPATIENT
Start: 2021-06-17 | End: 2021-06-17 | Stop reason: HOSPADM

## 2021-06-17 RX ORDER — SUCCINYLCHOLINE CHLORIDE 20 MG/ML
INJECTION INTRAMUSCULAR; INTRAVENOUS AS NEEDED
Status: DISCONTINUED | OUTPATIENT
Start: 2021-06-17 | End: 2021-06-17 | Stop reason: HOSPADM

## 2021-06-17 RX ADMIN — METRONIDAZOLE 500 MG: 500 SOLUTION INTRAVENOUS at 11:07

## 2021-06-17 RX ADMIN — HYOSCYAMINE SULFATE 0.12 MG: 0.12 TABLET ORAL; SUBLINGUAL at 22:23

## 2021-06-17 RX ADMIN — PROPOFOL 300 MG: 10 INJECTION, EMULSION INTRAVENOUS at 11:02

## 2021-06-17 RX ADMIN — LORAZEPAM 1 MG: 2 INJECTION INTRAMUSCULAR; INTRAVENOUS at 23:44

## 2021-06-17 RX ADMIN — MEPERIDINE HYDROCHLORIDE 12.5 MG: 25 INJECTION INTRAMUSCULAR; INTRAVENOUS; SUBCUTANEOUS at 13:30

## 2021-06-17 RX ADMIN — FENTANYL CITRATE 75 MCG: 50 INJECTION, SOLUTION INTRAMUSCULAR; INTRAVENOUS at 10:57

## 2021-06-17 RX ADMIN — ROCURONIUM BROMIDE 5 MG: 10 SOLUTION INTRAVENOUS at 11:02

## 2021-06-17 RX ADMIN — ACETAMINOPHEN ORAL SOLUTION 1000 MG: 650 SOLUTION ORAL at 23:46

## 2021-06-17 RX ADMIN — HYDROMORPHONE HYDROCHLORIDE 0.5 MG: 2 INJECTION, SOLUTION INTRAMUSCULAR; INTRAVENOUS; SUBCUTANEOUS at 12:24

## 2021-06-17 RX ADMIN — MIDAZOLAM 2 MG: 1 INJECTION INTRAMUSCULAR; INTRAVENOUS at 10:54

## 2021-06-17 RX ADMIN — FENTANYL CITRATE 25 MCG: 50 INJECTION, SOLUTION INTRAMUSCULAR; INTRAVENOUS at 13:35

## 2021-06-17 RX ADMIN — KETAMINE HYDROCHLORIDE 50 MG: 10 INJECTION, SOLUTION INTRAMUSCULAR; INTRAVENOUS at 11:21

## 2021-06-17 RX ADMIN — LIDOCAINE HYDROCHLORIDE 100 MG: 20 INJECTION, SOLUTION EPIDURAL; INFILTRATION; INTRACAUDAL; PERINEURAL at 11:02

## 2021-06-17 RX ADMIN — SODIUM CHLORIDE, POTASSIUM CHLORIDE, SODIUM LACTATE AND CALCIUM CHLORIDE: 600; 310; 30; 20 INJECTION, SOLUTION INTRAVENOUS at 10:54

## 2021-06-17 RX ADMIN — LORAZEPAM 1 MG: 2 INJECTION INTRAMUSCULAR; INTRAVENOUS at 03:48

## 2021-06-17 RX ADMIN — SUCCINYLCHOLINE CHLORIDE 200 MG: 20 INJECTION, SOLUTION INTRAMUSCULAR; INTRAVENOUS at 11:02

## 2021-06-17 RX ADMIN — HYDROMORPHONE HYDROCHLORIDE 1.5 MG: 2 INJECTION INTRAMUSCULAR; INTRAVENOUS; SUBCUTANEOUS at 22:23

## 2021-06-17 RX ADMIN — ONDANSETRON HYDROCHLORIDE 4 MG: 2 INJECTION, SOLUTION INTRAMUSCULAR; INTRAVENOUS at 11:53

## 2021-06-17 RX ADMIN — DEXMEDETOMIDINE HYDROCHLORIDE 6 MCG: 100 INJECTION, SOLUTION, CONCENTRATE INTRAVENOUS at 11:53

## 2021-06-17 RX ADMIN — LORAZEPAM 1 MG: 2 INJECTION INTRAMUSCULAR; INTRAVENOUS at 17:13

## 2021-06-17 RX ADMIN — SODIUM CHLORIDE, POTASSIUM CHLORIDE, SODIUM LACTATE AND CALCIUM CHLORIDE 150 ML/HR: 600; 310; 30; 20 INJECTION, SOLUTION INTRAVENOUS at 10:46

## 2021-06-17 RX ADMIN — HYOSCYAMINE SULFATE 0.12 MG: 0.12 TABLET ORAL; SUBLINGUAL at 15:36

## 2021-06-17 RX ADMIN — IOHEXOL 100 ML: 350 INJECTION, SOLUTION INTRAVENOUS at 09:01

## 2021-06-17 RX ADMIN — HYDROMORPHONE HYDROCHLORIDE 1.5 MG: 2 INJECTION INTRAMUSCULAR; INTRAVENOUS; SUBCUTANEOUS at 15:35

## 2021-06-17 RX ADMIN — FENTANYL CITRATE 25 MCG: 50 INJECTION, SOLUTION INTRAMUSCULAR; INTRAVENOUS at 13:30

## 2021-06-17 RX ADMIN — ROCURONIUM BROMIDE 45 MG: 10 SOLUTION INTRAVENOUS at 11:07

## 2021-06-17 RX ADMIN — ONDANSETRON 4 MG: 2 INJECTION INTRAMUSCULAR; INTRAVENOUS at 12:34

## 2021-06-17 RX ADMIN — SUGAMMADEX 200 MG: 100 INJECTION, SOLUTION INTRAVENOUS at 12:04

## 2021-06-17 RX ADMIN — HYDROMORPHONE HYDROCHLORIDE 1 MG: 1 INJECTION, SOLUTION INTRAMUSCULAR; INTRAVENOUS; SUBCUTANEOUS at 07:56

## 2021-06-17 RX ADMIN — ACETAMINOPHEN 1000 MG: 500 TABLET ORAL at 00:27

## 2021-06-17 RX ADMIN — DEXAMETHASONE SODIUM PHOSPHATE 4 MG: 4 INJECTION, SOLUTION INTRAMUSCULAR; INTRAVENOUS at 11:12

## 2021-06-17 RX ADMIN — HYDROMORPHONE HYDROCHLORIDE 4 MG: 2 TABLET ORAL at 02:16

## 2021-06-17 RX ADMIN — DEXMEDETOMIDINE HYDROCHLORIDE 4 MCG: 100 INJECTION, SOLUTION, CONCENTRATE INTRAVENOUS at 11:46

## 2021-06-17 RX ADMIN — FENTANYL CITRATE 25 MCG: 50 INJECTION, SOLUTION INTRAMUSCULAR; INTRAVENOUS at 10:54

## 2021-06-17 RX ADMIN — ACETAMINOPHEN ORAL SOLUTION 1000 MG: 650 SOLUTION ORAL at 15:36

## 2021-06-17 RX ADMIN — LEVOFLOXACIN 500 MG: 5 INJECTION, SOLUTION INTRAVENOUS at 11:07

## 2021-06-17 RX ADMIN — ONDANSETRON HYDROCHLORIDE 4 MG: 2 INJECTION, SOLUTION INTRAMUSCULAR; INTRAVENOUS at 22:23

## 2021-06-17 RX ADMIN — SCOPALAMINE 1 PATCH: 1 PATCH, EXTENDED RELEASE TRANSDERMAL at 10:54

## 2021-06-17 RX ADMIN — DEXMEDETOMIDINE HYDROCHLORIDE 10 MCG: 100 INJECTION, SOLUTION, CONCENTRATE INTRAVENOUS at 11:31

## 2021-06-17 RX ADMIN — ONDANSETRON HYDROCHLORIDE 4 MG: 2 INJECTION, SOLUTION INTRAMUSCULAR; INTRAVENOUS at 07:56

## 2021-06-17 NOTE — ANESTHESIA PREPROCEDURE EVALUATION
Relevant Problems   CARDIOVASCULAR   (+) Essential hypertension      GASTROINTESTINAL   (+) Hiatal hernia      ENDOCRINE   (+) Left lateral epicondylitis   (+) Obesity, Class III, BMI 40-49.9 (morbid obesity) (HCC)      HEMATOLOGY   (+) Iron deficiency anemia       Anesthetic History   No history of anesthetic complications            Review of Systems / Medical History  Patient summary reviewed, nursing notes reviewed and pertinent labs reviewed    Pulmonary  Within defined limits                 Neuro/Psych   Within defined limits           Cardiovascular  Within defined limits  Hypertension              Exercise tolerance: >4 METS     GI/Hepatic/Renal  Within defined limits   GERD           Endo/Other      Hypothyroidism: well controlled  Obesity and arthritis     Other Findings              Physical Exam    Airway  Mallampati: II  TM Distance: > 6 cm  Neck ROM: normal range of motion   Mouth opening: Normal     Cardiovascular  Regular rate and rhythm,  S1 and S2 normal,  no murmur, click, rub, or gallop             Dental  No notable dental hx       Pulmonary  Breath sounds clear to auscultation               Abdominal  GI exam deferred       Other Findings            Anesthetic Plan    ASA: 3, emergent  Anesthesia type: general          Induction: Intravenous  Anesthetic plan and risks discussed with: Patient

## 2021-06-17 NOTE — BRIEF OP NOTE
Brief Postoperative Note    Patient: Ignacio Webb  YOB: 1965  MRN: [id number]    Date of Procedure: 6/17/2021     Pre-Op Diagnosis: Abdominal pain post gastric bypass    Post-Op Diagnosis: Abdominal pain post gastric bypass; mucosal ischemia of proximal Yelena limb. Procedure(s):  LAPAROSCOPY GENERAL DIAGNOSTIC  ESOPHAGOGASTRODUODENOSCOPY (EGD)    Surgeon(s):  Kendal Dey MD    Surgical Assistant: Surg Asst-1: Dorcas Benson    Anesthesia: General     Estimated Blood Loss (mL): Minimal    Complications: None    Specimens: * No specimens in log *     Implants: * No implants in log *    Drains:   Juan Luis-Garcia Drain 06/14/21 Left Abdomen (Active)   Site Assessment Clean, dry, & intact 06/16/21 2029   Dressing Status Clean, dry, & intact 06/16/21 2029   Status Patent; Charged;Draining 06/16/21 2029   Drainage Color Serosanguinous 06/17/21 0945   Output (ml) 50 ml 06/17/21 0945       [REMOVED] Orogastric Tube 06/14/21 (Removed)       [REMOVED] Orogastric Tube 06/14/21 (Removed)       Findings: Mild dilation of Yelena limb with healthy serosa, mesentery. No signs of mechanical small bowel obstruction. No signs of peritoneal inflammation, no rind. On endoscopy, patent gastrojejunal anastomosis with patchy mucosal ischemia/necrosis extending to 30 cm distally. No insufflation air leak.     Electronically Signed by Jacquie Sánchez MD on 6/17/2021 at 11:59 AM

## 2021-06-17 NOTE — ROUTINE PROCESS
Bedside shift change report given to Dora Morales (oncoming nurse) by [facility] RN (offgoing nurse). Report included the following information SBAR, Kardex, Intake/Output and MAR.

## 2021-06-17 NOTE — PROGRESS NOTES
1350: TRANSFER - OUT REPORT:    Verbal report given to [address] RN(name) on [facility]  being transferred to (unit) for routine post - op       Report consisted of patients Situation, Background, Assessment and   Recommendations(SBAR). Time Pre op antibiotic given:1107  Anesthesia Stop time: 8991  Reese Present on Transfer to floor:no  Order for Reese on Chart:no  Discharge Prescriptions with Chart:no    Information from the following report(s) SBAR, Kardex, OR Summary, Intake/Output and MAR was reviewed with the receiving nurse. Opportunity for questions and clarification was provided. Is the patient on 02? YES       L/Min 2       Is the patient on a monitor? NO    Is the nurse transporting with the patient? NO    Surgical Waiting Area notified of patient's transfer from PACU? YES      The following personal items collected during your admission accompanied patient upon transfer:   Dental Appliance: Dental Appliances: Uppers (returned to patient in pacu)  Vision: Visual Aid: Glasses, At bedside  Hearing Aid:    Jewelry:    Clothing: Clothing: Other (comment) (clothing and shoes returned to patient in pacu)  Other Valuables:  Other Valuables: Eyeglasses (returned to patient in pacu)  Valuables sent to safe:

## 2021-06-17 NOTE — PROGRESS NOTES
.  Surgery Progress Note  Subjective:     Pt is POD#3  after laparoscopic gastric bypass procedure. Patient states that she 2 sugar-free popsicles last night. After having the popsicles, she started having nausea and vomiting at the mouth. She then complained of left upper quadrant pain which radiated to the right. She thought it was gas so she got up and walked the hallways. Pain did not subside. She received Dilaudid p.o. at 2 AM this morning without much relief. She then received Dilaudid IV this morning at 8 AM still without relief. T-max last night at 100.5. White blood cell count 14.6 this morning up from 11.4 yesterday. She has received Zofran for nausea. She is able to get her incentive spirometer to 750. Objective:     Patient Vitals for the past 8 hrs:   BP Temp Pulse Resp SpO2   06/17/21 0732 130/78 98.7 °F (37.1 °C) 100 16 96 %   06/17/21 0405 136/83 98.7 °F (37.1 °C) 96 18 96 %     No intake/output data recorded. 06/15 1901 - 06/17 0700  In: 2040 [P.O.:840; I.V.:1200]  Out: 3020 [Urine:3000; Drains:20]ip  Physical Exam:    General: alert, cooperative, no distress,   Cardiac: normal S1 and S2  Lungs: Normal chest wall and respirations. Clear to auscultation. Abdomen: soft, nondistended, tender LUQ  Wounds:clean, dry DARCI drain with serosanguineous drainage.         CBC:   Lab Results   Component Value Date/Time    WBC 14.6 (H) 06/17/2021 12:28 AM    RBC 3.78 (L) 06/17/2021 12:28 AM    HGB 11.0 (L) 06/17/2021 12:28 AM    HCT 33.8 (L) 06/17/2021 12:28 AM    PLATELET 111 29/21/6127 12:28 AM     BMP:   Lab Results   Component Value Date/Time    Glucose 121 (H) 06/17/2021 12:28 AM    Sodium 139 06/17/2021 12:28 AM    Potassium 3.9 06/17/2021 12:28 AM    Chloride 103 06/17/2021 12:28 AM    CO2 29 06/17/2021 12:28 AM    BUN 7 06/17/2021 12:28 AM    Creatinine 0.58 06/17/2021 12:28 AM    Calcium 9.5 06/17/2021 12:28 AM     CMP:  Lab Results   Component Value Date/Time    Glucose 121 (H) 06/17/2021 12:28 AM    Sodium 139 06/17/2021 12:28 AM    Potassium 3.9 06/17/2021 12:28 AM    Chloride 103 06/17/2021 12:28 AM    CO2 29 06/17/2021 12:28 AM    BUN 7 06/17/2021 12:28 AM    Creatinine 0.58 06/17/2021 12:28 AM    Calcium 9.5 06/17/2021 12:28 AM    Anion gap 7 06/17/2021 12:28 AM    BUN/Creatinine ratio 12 06/17/2021 12:28 AM    Alk. phosphatase 93 05/11/2021 09:41 AM    Protein, total 8.2 05/11/2021 09:41 AM    Albumin 4.1 05/11/2021 09:41 AM    Globulin 4.1 (H) 05/11/2021 09:41 AM    A-G Ratio 1.0 (L) 05/11/2021 09:41 AM           Assessment:   Pt is POD #3 after Laparoscopic RNY Gastric Bypass    With left upper quadrant pain, low-grade fever, white blood cell count 14.6. Plan:   Upper GI today-imaging reviewed by Dr. Shirin Burns for diagnostic laparoscopy with endoscopy today with Dr. Mika Galicia. Annie Boyer NP     Patient independently interviewed and examined. Agree with NP assessment. She complains of worsening pain radiating from left abdomen across to the right upper quadrant, worse with movement. She describes low-grade fever and now has mild tachycardia. On exam, she has upper abdominal pain with palpation. Drain fluid is serosanguineous and nonmalodorous. White blood cell count is increased to 14.8. Upper gastrointestinal series without abnormality. Despite radiographic findings, her clinical findings suggest an intra-abdominal process. Recommended urgent diagnostic laparoscopy with endoscopy. Technical aspects of procedure reviewed along with risks (to include but not limited to bleeding, wound infection, negative findings, leak, conversion open procedure, need to revise/reconstruct her bariatric anatomy). She understands and desires to proceed. All questions answered.   Remain n.p.o.

## 2021-06-17 NOTE — PROGRESS NOTES
Transition of Care: likely home with f/u with specialist/pcp     Transport Plan: in car with family     RUR: 8%     Main contact- Jose Campos- daughter- [phone]     Discharge pending:  -patient is POD3 of gastric bypass  -pending likely diagnostic procedure in OR today 6/17  -pending medical progress    CM noted from chart    CM following  Seb Manuel RN, CRM

## 2021-06-17 NOTE — PROGRESS NOTES
Bedside shift change report given to Mariluz Benitez RN (oncoming nurse) by Bj Richardson (offgoing nurse). Report included the following information SBAR and Kardex.

## 2021-06-17 NOTE — ROUTINE PROCESS
TRANSFER - IN REPORT: 
 
Verbal report received from Eileen on [facility]  being received from [facility](unit) for ordered procedure Report consisted of patients Situation, Background, Assessment and  
Recommendations(SBAR). Information from the following report(s) SBAR was reviewed with the receiving nurse. Opportunity for questions and clarification was provided. Assessment completed upon patients arrival to unit and care assumed.

## 2021-06-17 NOTE — ANESTHESIA POSTPROCEDURE EVALUATION
Post-Anesthesia Evaluation and Assessment    Patient: Tushar Meade MRN: [id number]  SSN: [id number]    YOB: 1965  Age: 64 y.o. Sex: female      I have evaluated the patient and they are stable and ready for discharge from the PACU. Cardiovascular Function/Vital Signs  Visit Vitals  /83   Pulse 79   Temp 36.7 °C (98.1 °F)   Resp 20   Ht 5' 7.99\" (1.727 m)   Wt 125 kg (275 lb 9.6 oz)   SpO2 99%   BMI 41.91 kg/m²       Patient is status post General anesthesia for Procedure(s):  LAPAROSCOPY GENERAL DIAGNOSTIC  ESOPHAGOGASTRODUODENOSCOPY (EGD). Nausea/Vomiting: None    Postoperative hydration reviewed and adequate. Pain:  Pain Scale 1: FLACC (06/17/21 1223)  Pain Intensity 1: 0 (06/17/21 1223)   Managed    Neurological Status:   Neuro (WDL): Exceptions to WDL (06/17/21 1223)  Neuro  Neurologic State: Drowsy; Eyes open to voice (06/17/21 1223)  LUE Motor Response: Purposeful (06/17/21 1223)  LLE Motor Response: Purposeful (06/17/21 1223)  RUE Motor Response: Purposeful (06/17/21 1223)  RLE Motor Response: Purposeful (06/17/21 1223)   At baseline    Mental Status, Level of Consciousness: Alert and  oriented to person, place, and time    Pulmonary Status:   O2 Device: CO2 nasal cannula (06/17/21 1223)   Adequate oxygenation and airway patent    Complications related to anesthesia: None    Post-anesthesia assessment completed. No concerns    Signed By: Vahe Tellez MD     June 17, 2021              Procedure(s):  LAPAROSCOPY GENERAL DIAGNOSTIC  ESOPHAGOGASTRODUODENOSCOPY (EGD). general    <BSHSIANPOST>    INITIAL Post-op Vital signs:   Vitals Value Taken Time   /83 06/17/21 1250   Temp 36.7 °C (98.1 °F) 06/17/21 1223   Pulse 72 06/17/21 1257   Resp 20 06/17/21 1257   SpO2 100 % 06/17/21 1257   Vitals shown include unvalidated device data.

## 2021-06-18 LAB
ANION GAP SERPL CALC-SCNC: 6 MMOL/L (ref 5–15)
BASOPHILS # BLD: 0 K/UL (ref 0–0.1)
BASOPHILS NFR BLD: 0 % (ref 0–1)
BUN SERPL-MCNC: 8 MG/DL (ref 6–20)
BUN/CREAT SERPL: 14 (ref 12–20)
CALCIUM SERPL-MCNC: 8.5 MG/DL (ref 8.5–10.1)
CHLORIDE SERPL-SCNC: 107 MMOL/L (ref 97–108)
CO2 SERPL-SCNC: 27 MMOL/L (ref 21–32)
CREAT SERPL-MCNC: 0.58 MG/DL (ref 0.55–1.02)
DIFFERENTIAL METHOD BLD: ABNORMAL
EOSINOPHIL # BLD: 0 K/UL (ref 0–0.4)
EOSINOPHIL NFR BLD: 0 % (ref 0–7)
ERYTHROCYTE [DISTWIDTH] IN BLOOD BY AUTOMATED COUNT: 13.3 % (ref 11.5–14.5)
GLUCOSE SERPL-MCNC: 88 MG/DL (ref 65–100)
HCT VFR BLD AUTO: 29.6 % (ref 35–47)
HGB BLD-MCNC: 9.6 G/DL (ref 11.5–16)
IMM GRANULOCYTES # BLD AUTO: 0.1 K/UL (ref 0–0.04)
IMM GRANULOCYTES NFR BLD AUTO: 1 % (ref 0–0.5)
LACTATE SERPL-SCNC: 0.9 MMOL/L (ref 0.4–2)
LYMPHOCYTES # BLD: 1.6 K/UL (ref 0.8–3.5)
LYMPHOCYTES NFR BLD: 13 % (ref 12–49)
MCH RBC QN AUTO: 29.1 PG (ref 26–34)
MCHC RBC AUTO-ENTMCNC: 32.4 G/DL (ref 30–36.5)
MCV RBC AUTO: 89.7 FL (ref 80–99)
MONOCYTES # BLD: 0.7 K/UL (ref 0–1)
MONOCYTES NFR BLD: 6 % (ref 5–13)
NEUTS SEG # BLD: 9.5 K/UL (ref 1.8–8)
NEUTS SEG NFR BLD: 80 % (ref 32–75)
NRBC # BLD: 0 K/UL (ref 0–0.01)
NRBC BLD-RTO: 0 PER 100 WBC
PLATELET # BLD AUTO: 229 K/UL (ref 150–400)
PMV BLD AUTO: 11.1 FL (ref 8.9–12.9)
POTASSIUM SERPL-SCNC: 3.2 MMOL/L (ref 3.5–5.1)
RBC # BLD AUTO: 3.3 M/UL (ref 3.8–5.2)
SODIUM SERPL-SCNC: 140 MMOL/L (ref 136–145)
WBC # BLD AUTO: 11.8 K/UL (ref 3.6–11)

## 2021-06-18 PROCEDURE — 36415 COLL VENOUS BLD VENIPUNCTURE: CPT

## 2021-06-18 PROCEDURE — 65660000000 HC RM CCU STEPDOWN

## 2021-06-18 PROCEDURE — 85025 COMPLETE CBC W/AUTO DIFF WBC: CPT

## 2021-06-18 PROCEDURE — 83605 ASSAY OF LACTIC ACID: CPT

## 2021-06-18 PROCEDURE — 74011250636 HC RX REV CODE- 250/636: Performed by: SURGERY

## 2021-06-18 PROCEDURE — 74011250637 HC RX REV CODE- 250/637: Performed by: SURGERY

## 2021-06-18 PROCEDURE — 80048 BASIC METABOLIC PNL TOTAL CA: CPT

## 2021-06-18 RX ORDER — METRONIDAZOLE 500 MG/100ML
500 INJECTION, SOLUTION INTRAVENOUS EVERY 12 HOURS
Status: DISCONTINUED | OUTPATIENT
Start: 2021-06-18 | End: 2021-06-20 | Stop reason: HOSPADM

## 2021-06-18 RX ORDER — LEVOFLOXACIN 5 MG/ML
500 INJECTION, SOLUTION INTRAVENOUS EVERY 24 HOURS
Status: DISCONTINUED | OUTPATIENT
Start: 2021-06-18 | End: 2021-06-20 | Stop reason: HOSPADM

## 2021-06-18 RX ADMIN — HYDROMORPHONE HYDROCHLORIDE 1.5 MG: 2 INJECTION INTRAMUSCULAR; INTRAVENOUS; SUBCUTANEOUS at 09:59

## 2021-06-18 RX ADMIN — HYDROMORPHONE HYDROCHLORIDE 1.5 MG: 2 INJECTION INTRAMUSCULAR; INTRAVENOUS; SUBCUTANEOUS at 15:05

## 2021-06-18 RX ADMIN — METRONIDAZOLE 500 MG: 500 INJECTION, SOLUTION INTRAVENOUS at 20:16

## 2021-06-18 RX ADMIN — HYDROMORPHONE HYDROCHLORIDE 1.5 MG: 2 INJECTION INTRAMUSCULAR; INTRAVENOUS; SUBCUTANEOUS at 20:15

## 2021-06-18 RX ADMIN — LORAZEPAM 1 MG: 2 INJECTION INTRAMUSCULAR; INTRAVENOUS at 17:56

## 2021-06-18 RX ADMIN — HYOSCYAMINE SULFATE 0.12 MG: 0.12 TABLET ORAL; SUBLINGUAL at 10:05

## 2021-06-18 RX ADMIN — LEVOFLOXACIN 500 MG: 5 INJECTION, SOLUTION INTRAVENOUS at 11:41

## 2021-06-18 RX ADMIN — HYOSCYAMINE SULFATE 0.12 MG: 0.12 TABLET ORAL; SUBLINGUAL at 23:23

## 2021-06-18 RX ADMIN — ONDANSETRON HYDROCHLORIDE 4 MG: 2 INJECTION, SOLUTION INTRAMUSCULAR; INTRAVENOUS at 09:59

## 2021-06-18 RX ADMIN — ACETAMINOPHEN ORAL SOLUTION 1000 MG: 650 SOLUTION ORAL at 15:04

## 2021-06-18 RX ADMIN — ONDANSETRON HYDROCHLORIDE 4 MG: 2 INJECTION, SOLUTION INTRAMUSCULAR; INTRAVENOUS at 15:04

## 2021-06-18 RX ADMIN — HYOSCYAMINE SULFATE 0.12 MG: 0.12 TABLET ORAL; SUBLINGUAL at 15:05

## 2021-06-18 RX ADMIN — HYDROMORPHONE HYDROCHLORIDE 1.5 MG: 2 INJECTION INTRAMUSCULAR; INTRAVENOUS; SUBCUTANEOUS at 23:23

## 2021-06-18 RX ADMIN — ACETAMINOPHEN ORAL SOLUTION 1000 MG: 650 SOLUTION ORAL at 05:02

## 2021-06-18 RX ADMIN — LORAZEPAM 1 MG: 2 INJECTION INTRAMUSCULAR; INTRAVENOUS at 11:40

## 2021-06-18 RX ADMIN — ONDANSETRON HYDROCHLORIDE 4 MG: 2 INJECTION, SOLUTION INTRAMUSCULAR; INTRAVENOUS at 03:00

## 2021-06-18 RX ADMIN — HYOSCYAMINE SULFATE 0.12 MG: 0.12 TABLET ORAL; SUBLINGUAL at 05:03

## 2021-06-18 RX ADMIN — HYDROMORPHONE HYDROCHLORIDE 1.5 MG: 2 INJECTION INTRAMUSCULAR; INTRAVENOUS; SUBCUTANEOUS at 05:03

## 2021-06-18 RX ADMIN — METRONIDAZOLE 500 MG: 500 INJECTION, SOLUTION INTRAVENOUS at 07:03

## 2021-06-18 RX ADMIN — ACETAMINOPHEN ORAL SOLUTION 1000 MG: 650 SOLUTION ORAL at 23:22

## 2021-06-18 RX ADMIN — ONDANSETRON HYDROCHLORIDE 4 MG: 2 INJECTION, SOLUTION INTRAMUSCULAR; INTRAVENOUS at 20:15

## 2021-06-18 RX ADMIN — Medication 10 ML: at 23:28

## 2021-06-18 NOTE — PROGRESS NOTES
At change of AM shift pt was having severe GI pain. Dr. Nina Zhu came in and spoke w her and ordered an upper GI series and KUB. D/T pain and symptoms Dr decided to go back in and do an exploratory lap w Egd. Pt came back from this procedure awake and alert. Pt still experiencing high pain, but was ordered higher pain medications. Nurse was instructed to hold BP meds as to not drop her BP and place a PICC if poor access. OR had a lot of trouble placing IVs and the one pt came up with infiltrated. Pt has limited and poor access and PICC was placed. Pt places on strict clear sips diet. Bedside shift change report given to Zenaida Wilson RN (oncoming nurse) by Rodolfo Wilburn RN (offgoing nurse). Report included the following information SBAR and Kardex.

## 2021-06-18 NOTE — PROGRESS NOTES
Progress Note    Patient: Paige Alcala MRN: [id number]  SSN: [id number]    YOB: 1965  Age: 64 y.o. Sex: female      Admit Date: 2021    1 Day Post-Op    Procedure:  Procedure(s):  LAPAROSCOPY GENERAL DIAGNOSTIC  ESOPHAGOGASTRODUODENOSCOPY (EGD)    Subjective:     Patient notes improved pain control. She denies fever chills. She is tolerating sips of clear liquids without problems. No nausea or vomiting. She has ambulated in the melendez since diagnostic laparoscopy. Objective:     Visit Vitals  /68   Pulse 88   Temp 97.8 °F (36.6 °C)   Resp 16   Ht 5' 7.99\" (1.727 m)   Wt 275 lb 9.6 oz (125 kg)   SpO2 97%   BMI 41.91 kg/m²       Temp (24hrs), Av.4 °F (36.9 °C), Min:97.8 °F (36.6 °C), Max:99.1 °F (37.3 °C)    Date 21 07 - 21 0659 21 07 - 21 0659   Shift 5769-4460 1563-6331 24 Hour Total 8610-2718 9515-0409 24 Hour Total   INTAKE   P.O. 360  360        P. O. 360  360      I. V.(mL/kg/hr) 500(0.3)  500(0.2)        Volume (lactated Ringers infusion) 500  500      Shift Total(mL/kg) 860(6.9)  860(6.9)      OUTPUT   Urine(mL/kg/hr)  800(0.5) 800(0.3)        Urine Voided  800 800      Drains 150 40 190        Output (ml) (Juan Luis-Garcia Drain 21 Left Abdomen) 150 40 190      Blood 5  5        Estimated Blood Loss 5  5      Shift Total(mL/kg) 155(1.2) 840(6.7) 995(8)       -840 -135      Weight (kg) 125 125 125 125 125 125       Physical Exam:    ABDOMEN: Obese, nondistended, soft. Laparoscopic wounds dry and intact. Serous drain fluid. Appropriate incisional pain with palpation.     Data Review: VS, I/O's, Labs    Lab Review:   Recent Results (from the past 12 hour(s))   CBC WITH AUTOMATED DIFF    Collection Time: 21  5:25 AM   Result Value Ref Range    WBC 11.8 (H) 3.6 - 11.0 K/uL    RBC 3.30 (L) 3.80 - 5.20 M/uL    HGB 9.6 (L) 11.5 - 16.0 g/dL    HCT 29.6 (L) 35.0 - 47.0 %    MCV 89.7 80.0 - 99.0 FL    MCH 29.1 26.0 - 34.0 PG    MCHC 32.4 30.0 - 36.5 g/dL    RDW 13.3 11.5 - 14.5 %    PLATELET 915 917 - 807 K/uL    MPV 11.1 8.9 - 12.9 FL    NRBC 0.0 0  WBC    ABSOLUTE NRBC 0.00 0.00 - 0.01 K/uL    NEUTROPHILS 80 (H) 32 - 75 %    LYMPHOCYTES 13 12 - 49 %    MONOCYTES 6 5 - 13 %    EOSINOPHILS 0 0 - 7 %    BASOPHILS 0 0 - 1 %    IMMATURE GRANULOCYTES 1 (H) 0.0 - 0.5 %    ABS. NEUTROPHILS 9.5 (H) 1.8 - 8.0 K/UL    ABS. LYMPHOCYTES 1.6 0.8 - 3.5 K/UL    ABS. MONOCYTES 0.7 0.0 - 1.0 K/UL    ABS. EOSINOPHILS 0.0 0.0 - 0.4 K/UL    ABS. BASOPHILS 0.0 0.0 - 0.1 K/UL    ABS. IMM. GRANS. 0.1 (H) 0.00 - 0.04 K/UL    DF AUTOMATED     METABOLIC PANEL, BASIC    Collection Time: 06/18/21  5:25 AM   Result Value Ref Range    Sodium 140 136 - 145 mmol/L    Potassium 3.2 (L) 3.5 - 5.1 mmol/L    Chloride 107 97 - 108 mmol/L    CO2 27 21 - 32 mmol/L    Anion gap 6 5 - 15 mmol/L    Glucose 88 65 - 100 mg/dL    BUN 8 6 - 20 MG/DL    Creatinine 0.58 0.55 - 1.02 MG/DL    BUN/Creatinine ratio 14 12 - 20      GFR est AA >60 >60 ml/min/1.73m2    GFR est non-AA >60 >60 ml/min/1.73m2    Calcium 8.5 8.5 - 10.1 MG/DL   LACTIC ACID    Collection Time: 06/18/21  5:25 AM   Result Value Ref Range    Lactic acid 0.9 0.4 - 2.0 MMOL/L         Assessment:     Hospital Problems  Date Reviewed: 6/14/2021        Codes Class Noted POA    Morbid obesity ([address] ICD-10-CM: E66.01  ICD-9-CM: 278.01  6/14/2021 Unknown          Feeling better, no fever or tachycardia; white blood cell count has improved along with differential.  Normal lactate x2. Plan/Recommendations/Medical Decision Making:     Allow ad ranjana. clear liquids. Continue maintenance IV fluids, empiric antibiotics. Analgesics, antiemetics as needed. Spirometry, ambulation. Mechanical DVT prophylaxis.

## 2021-06-18 NOTE — PROGRESS NOTES
This morning, patient's PICC line is extremely positional, occludes with patient bending arm. This RN placed a 24G IV in patient's Left Hand for continuous IV fluids. This morning, patient consumed 4 cups of clear liquids over approximately 20 minutes. This RN then re-educated patient regarding sipping a cup of fluid more slowly, over the course of 15 minutes. Bedside shift change report given to [address] [place] [address] Trang (oncoming nurse) by Cas Lombardi (offgoing nurse). Report included the following information SBAR.

## 2021-06-19 LAB
ANION GAP SERPL CALC-SCNC: 8 MMOL/L (ref 5–15)
BASOPHILS # BLD: 0 K/UL (ref 0–0.1)
BASOPHILS NFR BLD: 0 % (ref 0–1)
BUN SERPL-MCNC: 5 MG/DL (ref 6–20)
BUN/CREAT SERPL: 8 (ref 12–20)
CALCIUM SERPL-MCNC: 9 MG/DL (ref 8.5–10.1)
CHLORIDE SERPL-SCNC: 105 MMOL/L (ref 97–108)
CO2 SERPL-SCNC: 27 MMOL/L (ref 21–32)
CREAT SERPL-MCNC: 0.65 MG/DL (ref 0.55–1.02)
DIFFERENTIAL METHOD BLD: ABNORMAL
EOSINOPHIL # BLD: 0 K/UL (ref 0–0.4)
EOSINOPHIL NFR BLD: 0 % (ref 0–7)
ERYTHROCYTE [DISTWIDTH] IN BLOOD BY AUTOMATED COUNT: 12.9 % (ref 11.5–14.5)
GLUCOSE SERPL-MCNC: 96 MG/DL (ref 65–100)
HCT VFR BLD AUTO: 31.5 % (ref 35–47)
HGB BLD-MCNC: 10.3 G/DL (ref 11.5–16)
IMM GRANULOCYTES # BLD AUTO: 0.1 K/UL (ref 0–0.04)
IMM GRANULOCYTES NFR BLD AUTO: 1 % (ref 0–0.5)
LYMPHOCYTES # BLD: 2.2 K/UL (ref 0.8–3.5)
LYMPHOCYTES NFR BLD: 19 % (ref 12–49)
MCH RBC QN AUTO: 29.3 PG (ref 26–34)
MCHC RBC AUTO-ENTMCNC: 32.7 G/DL (ref 30–36.5)
MCV RBC AUTO: 89.5 FL (ref 80–99)
MONOCYTES # BLD: 0.9 K/UL (ref 0–1)
MONOCYTES NFR BLD: 8 % (ref 5–13)
NEUTS SEG # BLD: 8.2 K/UL (ref 1.8–8)
NEUTS SEG NFR BLD: 72 % (ref 32–75)
NRBC # BLD: 0 K/UL (ref 0–0.01)
NRBC BLD-RTO: 0 PER 100 WBC
PLATELET # BLD AUTO: 270 K/UL (ref 150–400)
PMV BLD AUTO: 11 FL (ref 8.9–12.9)
POTASSIUM SERPL-SCNC: 2.8 MMOL/L (ref 3.5–5.1)
RBC # BLD AUTO: 3.52 M/UL (ref 3.8–5.2)
SODIUM SERPL-SCNC: 140 MMOL/L (ref 136–145)
WBC # BLD AUTO: 11.4 K/UL (ref 3.6–11)

## 2021-06-19 PROCEDURE — 85025 COMPLETE CBC W/AUTO DIFF WBC: CPT

## 2021-06-19 PROCEDURE — 94762 N-INVAS EAR/PLS OXIMTRY CONT: CPT

## 2021-06-19 PROCEDURE — 65660000000 HC RM CCU STEPDOWN

## 2021-06-19 PROCEDURE — 74011250637 HC RX REV CODE- 250/637: Performed by: SURGERY

## 2021-06-19 PROCEDURE — 80048 BASIC METABOLIC PNL TOTAL CA: CPT

## 2021-06-19 PROCEDURE — 74011250636 HC RX REV CODE- 250/636: Performed by: SURGERY

## 2021-06-19 PROCEDURE — 99024 POSTOP FOLLOW-UP VISIT: CPT | Performed by: SURGERY

## 2021-06-19 PROCEDURE — 36415 COLL VENOUS BLD VENIPUNCTURE: CPT

## 2021-06-19 RX ORDER — DOCUSATE SODIUM 100 MG/1
100 CAPSULE, LIQUID FILLED ORAL DAILY
Status: DISCONTINUED | OUTPATIENT
Start: 2021-06-19 | End: 2021-06-20 | Stop reason: HOSPADM

## 2021-06-19 RX ADMIN — LEVOFLOXACIN 500 MG: 5 INJECTION, SOLUTION INTRAVENOUS at 09:32

## 2021-06-19 RX ADMIN — HYOSCYAMINE SULFATE 0.12 MG: 0.12 TABLET ORAL; SUBLINGUAL at 03:56

## 2021-06-19 RX ADMIN — HYDROMORPHONE HYDROCHLORIDE 1.5 MG: 2 INJECTION INTRAMUSCULAR; INTRAVENOUS; SUBCUTANEOUS at 03:56

## 2021-06-19 RX ADMIN — LORAZEPAM 1 MG: 2 INJECTION INTRAMUSCULAR; INTRAVENOUS at 07:09

## 2021-06-19 RX ADMIN — METRONIDAZOLE 500 MG: 500 INJECTION, SOLUTION INTRAVENOUS at 18:59

## 2021-06-19 RX ADMIN — ONDANSETRON HYDROCHLORIDE 4 MG: 2 INJECTION, SOLUTION INTRAMUSCULAR; INTRAVENOUS at 20:43

## 2021-06-19 RX ADMIN — ACETAMINOPHEN ORAL SOLUTION 1000 MG: 650 SOLUTION ORAL at 17:07

## 2021-06-19 RX ADMIN — HYOSCYAMINE SULFATE 0.12 MG: 0.12 TABLET ORAL; SUBLINGUAL at 20:43

## 2021-06-19 RX ADMIN — HYDROMORPHONE HYDROCHLORIDE 0.5 MG: 1 INJECTION, SOLUTION INTRAMUSCULAR; INTRAVENOUS; SUBCUTANEOUS at 15:29

## 2021-06-19 RX ADMIN — ONDANSETRON HYDROCHLORIDE 4 MG: 2 INJECTION, SOLUTION INTRAMUSCULAR; INTRAVENOUS at 15:30

## 2021-06-19 RX ADMIN — SODIUM CHLORIDE, POTASSIUM CHLORIDE, SODIUM LACTATE AND CALCIUM CHLORIDE 150 ML/HR: 600; 310; 30; 20 INJECTION, SOLUTION INTRAVENOUS at 17:12

## 2021-06-19 RX ADMIN — HYOSCYAMINE SULFATE 0.12 MG: 0.12 TABLET ORAL; SUBLINGUAL at 17:17

## 2021-06-19 RX ADMIN — ONDANSETRON HYDROCHLORIDE 4 MG: 2 INJECTION, SOLUTION INTRAMUSCULAR; INTRAVENOUS at 04:43

## 2021-06-19 RX ADMIN — HYDROMORPHONE HYDROCHLORIDE 1.5 MG: 2 INJECTION INTRAMUSCULAR; INTRAVENOUS; SUBCUTANEOUS at 22:28

## 2021-06-19 RX ADMIN — ONDANSETRON HYDROCHLORIDE 4 MG: 2 INJECTION, SOLUTION INTRAMUSCULAR; INTRAVENOUS at 00:49

## 2021-06-19 RX ADMIN — ACETAMINOPHEN ORAL SOLUTION 1000 MG: 650 SOLUTION ORAL at 12:34

## 2021-06-19 RX ADMIN — DOCUSATE SODIUM 100 MG: 100 CAPSULE, LIQUID FILLED ORAL at 09:33

## 2021-06-19 RX ADMIN — LORAZEPAM 1 MG: 2 INJECTION INTRAMUSCULAR; INTRAVENOUS at 00:49

## 2021-06-19 RX ADMIN — HYOSCYAMINE SULFATE 0.12 MG: 0.12 TABLET ORAL; SUBLINGUAL at 07:09

## 2021-06-19 RX ADMIN — Medication 10 ML: at 22:28

## 2021-06-19 RX ADMIN — Medication 10 ML: at 07:09

## 2021-06-19 RX ADMIN — METRONIDAZOLE 500 MG: 500 INJECTION, SOLUTION INTRAVENOUS at 07:09

## 2021-06-19 RX ADMIN — ACETAMINOPHEN ORAL SOLUTION 1000 MG: 650 SOLUTION ORAL at 07:09

## 2021-06-19 RX ADMIN — HYDROMORPHONE HYDROCHLORIDE 0.5 MG: 1 INJECTION, SOLUTION INTRAMUSCULAR; INTRAVENOUS; SUBCUTANEOUS at 19:05

## 2021-06-19 RX ADMIN — SODIUM CHLORIDE, POTASSIUM CHLORIDE, SODIUM LACTATE AND CALCIUM CHLORIDE 150 ML/HR: 600; 310; 30; 20 INJECTION, SOLUTION INTRAVENOUS at 07:16

## 2021-06-19 NOTE — PROGRESS NOTES
Bedside and Verbal shift change report given to Sridhar Tena RN (oncoming nurse) by Alisa Lange RN (offgoing nurse).  Report included the following information SBAR, Kardex, ED Summary, Intake/Output, MAR and Recent Results

## 2021-06-19 NOTE — PROGRESS NOTES
Ms. Braxton Pan reports right sided abdominal pain when she bends over. Tm 98.8 HR: 81 BP: 120/85 Resp Rate: 16 92% sat. Intake/Output Summary (Last 24 hours) at 6/19/2021 0920  Last data filed at 6/19/2021 0356  Gross per 24 hour   Intake 1110 ml   Output 1055 ml   Net 55 ml   Exam: Cor: RRR. Lungs: Bilateral breath sounds. Clear to auscultation. Abd: Soft. Non distended. Non tender. No guarding or rebound. Incisions are clean. Drain output is serosanguinous. Labs:   Recent Results (from the past 12 hour(s))   CBC WITH AUTOMATED DIFF    Collection Time: 06/19/21 12:53 AM   Result Value Ref Range    WBC 11.4 (H) 3.6 - 11.0 K/uL    RBC 3.52 (L) 3.80 - 5.20 M/uL    HGB 10.3 (L) 11.5 - 16.0 g/dL    HCT 31.5 (L) 35.0 - 47.0 %    MCV 89.5 80.0 - 99.0 FL    MCH 29.3 26.0 - 34.0 PG    MCHC 32.7 30.0 - 36.5 g/dL    RDW 12.9 11.5 - 14.5 %    PLATELET 832 774 - 688 K/uL    MPV 11.0 8.9 - 12.9 FL    NRBC 0.0 0  WBC    ABSOLUTE NRBC 0.00 0.00 - 0.01 K/uL    NEUTROPHILS 72 32 - 75 %    LYMPHOCYTES 19 12 - 49 %    MONOCYTES 8 5 - 13 %    EOSINOPHILS 0 0 - 7 %    BASOPHILS 0 0 - 1 %    IMMATURE GRANULOCYTES 1 (H) 0.0 - 0.5 %    ABS. NEUTROPHILS 8.2 (H) 1.8 - 8.0 K/UL    ABS. LYMPHOCYTES 2.2 0.8 - 3.5 K/UL    ABS. MONOCYTES 0.9 0.0 - 1.0 K/UL    ABS. EOSINOPHILS 0.0 0.0 - 0.4 K/UL    ABS. BASOPHILS 0.0 0.0 - 0.1 K/UL    ABS. IMM.  GRANS. 0.1 (H) 0.00 - 0.04 K/UL    DF AUTOMATED     METABOLIC PANEL, BASIC    Collection Time: 06/19/21 12:53 AM   Result Value Ref Range    Sodium 140 136 - 145 mmol/L    Potassium 2.8 (L) 3.5 - 5.1 mmol/L    Chloride 105 97 - 108 mmol/L    CO2 27 21 - 32 mmol/L    Anion gap 8 5 - 15 mmol/L    Glucose 96 65 - 100 mg/dL    BUN 5 (L) 6 - 20 MG/DL    Creatinine 0.65 0.55 - 1.02 MG/DL    BUN/Creatinine ratio 8 (L) 12 - 20      GFR est AA >60 >60 ml/min/1.73m2    GFR est non-AA >60 >60 ml/min/1.73m2    Calcium 9.0 8.5 - 10.1 MG/DL   Ms. Prasad is doing well post-operatively. Bariatric clear liquid diet as tolerated. Continue IVF as ordered. IV antibiotics - Levaquin and Flagyl. Colace for constipation. OOB, Ambulate.    Labs in AM.

## 2021-06-19 NOTE — OP NOTES
[address] [facility]  OPERATIVE REPORT    Name:  Emilia Moore  MR#:  [id number]  :  1965  ACCOUNT #:  [de-identified]  DATE OF SERVICE:  2021    PREOPERATIVE DIAGNOSIS:  Abdominal pain following laparoscopic gastric bypass. POSTOPERATIVE DIAGNOSES:  1. Abdominal pain following laparoscopic gastric bypass. 2.  Mucosal ischemia/necrosis, proximal Yelena limb. PROCEDURE PERFORMED:  1. Diagnostic laparoscopy (CPT 56654). 2.  Intraoperative upper endoscopy. SURGEON:  Geoff Hyman MD    ASSISTANT:  Abhi Yan SA    ANESTHESIA:  General endotracheal.    COMPLICATIONS:  None. SPECIMENS REMOVED:  None. IMPLANTS:  None. ESTIMATED BLOOD LOSS:  Minimal.    DRAINS:  19 mm Tino drain. COUNTS:  Sponge count correct. Needle count correct. INDICATIONS:  The patient is a 77-year-old [facility] American female with a history of morbid obesity, managed through laparoscopic gastric bypass on 2021. While she progressed in a normal fashion on postoperative day 1 and postop day 2, overnight she developed worsening abdominal pain with low-grade fever and mild tachycardia. White blood cell count has elevated to 14,800. She complains of worsening pain radiating from her left upper quadrant across to the right upper quadrant. Although upper gastrointestinal series is normal and without signs of leak or obstruction, her findings are worrisome for intra-abdominal process. She is urgently taken to the operating room today for diagnostic laparoscopy with upper endoscopy. FINDINGS:  1.  Mild dilation of Yelena limb with healthy serosa and mesentery. 2.  No signs of mechanical small-bowel obstruction. 3.  No signs of peritoneal inflammation/rind. 4.  Patent gastrojejunal anastomosis on endoscopy with patchy mucosal ischemia with necrosis extending 30 cm distal to the gastrojejunostomy. 5.  No insufflation air leak on endoscopy.     PROCEDURE:  The patient was identified as the correct patient in the preoperative holding area, and informed consent was confirmed. After answering the patient's remaining questions, she was taken to the operating room and placed on the operating room table in the supine position. Sequential compression devices were placed on both lower extremities. Following the uneventful initiation of general anesthesia, she was carefully secured to the operating room table with footboard and safety strap in place. All potential pressure points were padded with eggcrate. Her abdomen was prepped and draped in usual sterile fashion. Final time-out was performed, and it was confirmed she had received intravenous antibiotics. A 5 mm trocar was inserted through a small right upper quadrant prior scar from recent gastric bypass using an Optiview technique. After confirming intraperitoneal location of the trocar tip, insufflation with carbon dioxide gas was initiated. Once adequate working sites had been developed, the 5 mm, 30-degree laparoscope was inserted. No signs of trocar injury were present. No signs of peritoneal irritation or rind were present. The proximal Yelena limb was mildly dilated, but healthy in appearance. An epigastric 5 mm trocar was inserted through a prior laparoscopic site using visual guidance with the laparoscope. A left subcostal 5 mm trocar was inserted using similar technique. The right upper quadrant 5 mm trocar was inserted using visual guidance with the laparoscope. The patient was placed in a steep reverse Trendelenburg position. The [facility] liver retractor was inserted through a previously used subcostal/subxiphoid incision. With the left lobe of liver retracted anteriorly and cephalad, the area of the gastric pouch and gastrojejunal anastomosis was visualized. No signs of anastomotic leak were present.   The Yelena limb was run from the gastrojejunostomy, past the area of Julian's closure, through mildly dilated loops of bowel, to the jejunojejunostomy which was patent and without signs of leak or obstruction. The mesenteric defect closure was noted to be intact. The common channel was relatively decompressed. The biliopancreatic limb was normal in caliber. Julian's space was reinspected and noted to be closed. Intraoperative upper endoscopy was then performed after applying an intestinal clamp to the Yelena limb approximately 4 cm distal to the gastrojejunal anastomosis. The patient was returned to the supine position. Sterile saline was instilled into the upper abdomen. The gastroscope was passed transorally, through the gastroesophageal junction and into the gastric pouch. Insufflation was then initiated. With insufflation using the gastroscope, adequate pouch and Yelena limb distention was noted. No insufflation air leak occurred. On inspection of the Yelena limb, patchy necrosis and ischemia of the mucosa with some sloughing was present, extending 30 cm beyond the gastrojejunal anastomosis. As this process was not full-thickness, the decision was made to medically manage her. The Yelena limb was decompressed, and the gastroscope was removed. Sterile saline was evacuated from the upper abdomen. The intestinal clamp was removed. The drain was left in place. The [facility] liver retractor was removed followed by release of pneumoperitoneum. All wounds were infiltrated with 0.5% Marcaine after removing all trocars. All wounds were infiltrated with 0.5% Marcaine without epinephrine. All skin edges were reapproximated with a combination of subcuticular 4-0 Monocryl suture and Dermabond. The patient tolerated the procedure well. She was extubated in the operating room and transported to the recovery area in stable condition. The attending surgeon, Dr. Darius Medina, was scrubbed and present for the entire procedure.       Dixie Aggarwal MD      [id number]  D:  06/18/2021 19:46  T:  06/18/2021 21:07  JOB #: [id number]

## 2021-06-19 NOTE — PROGRESS NOTES
Problem: Laparoscopic Gastric Bypass:Discharge Outcomes  Goal: *Active bowel sounds  Outcome: Progressing Towards Goal  Goal: *Absence of signs and symptoms of DVT  Outcome: Progressing Towards Goal  Goal: *Hemodynamically stable  Outcome: Progressing Towards Goal  Goal: *Lungs clear or at baseline  Outcome: Progressing Towards Goal     Problem: Falls - Risk of  Goal: *Absence of Falls  Description: Document Tyler Fall Risk and appropriate interventions in the flowsheet.   Outcome: Progressing Towards Goal  Note: Fall Risk Interventions:            Medication Interventions: Patient to call before getting OOB, Teach patient to arise slowly    Elimination Interventions: Call light in reach, Patient to call for help with toileting needs, Toileting schedule/hourly rounds

## 2021-06-19 NOTE — PROGRESS NOTES
Bedside and Verbal shift change report given to Nyasia RN (oncoming nurse) by Taty Kelly RN (offgoing nurse). Report included the following information SBAR, Kardex, Intake/Output, MAR and Recent Results.

## 2021-06-20 VITALS
BODY MASS INDEX: 41.77 KG/M2 | TEMPERATURE: 98.3 F | SYSTOLIC BLOOD PRESSURE: 125 MMHG | OXYGEN SATURATION: 95 % | HEART RATE: 76 BPM | HEIGHT: 68 IN | RESPIRATION RATE: 16 BRPM | WEIGHT: 275.6 LBS | DIASTOLIC BLOOD PRESSURE: 78 MMHG

## 2021-06-20 LAB
ANION GAP SERPL CALC-SCNC: 5 MMOL/L (ref 5–15)
BASOPHILS # BLD: 0 K/UL (ref 0–0.1)
BASOPHILS NFR BLD: 0 % (ref 0–1)
BUN SERPL-MCNC: 3 MG/DL (ref 6–20)
BUN/CREAT SERPL: 5 (ref 12–20)
CALCIUM SERPL-MCNC: 8.8 MG/DL (ref 8.5–10.1)
CHLORIDE SERPL-SCNC: 104 MMOL/L (ref 97–108)
CO2 SERPL-SCNC: 31 MMOL/L (ref 21–32)
CREAT SERPL-MCNC: 0.59 MG/DL (ref 0.55–1.02)
DIFFERENTIAL METHOD BLD: ABNORMAL
EOSINOPHIL # BLD: 0.1 K/UL (ref 0–0.4)
EOSINOPHIL NFR BLD: 1 % (ref 0–7)
ERYTHROCYTE [DISTWIDTH] IN BLOOD BY AUTOMATED COUNT: 13.2 % (ref 11.5–14.5)
GLUCOSE SERPL-MCNC: 85 MG/DL (ref 65–100)
HCT VFR BLD AUTO: 30.7 % (ref 35–47)
HGB BLD-MCNC: 9.9 G/DL (ref 11.5–16)
IMM GRANULOCYTES # BLD AUTO: 0 K/UL (ref 0–0.04)
IMM GRANULOCYTES NFR BLD AUTO: 1 % (ref 0–0.5)
LYMPHOCYTES # BLD: 2.3 K/UL (ref 0.8–3.5)
LYMPHOCYTES NFR BLD: 27 % (ref 12–49)
MCH RBC QN AUTO: 28.7 PG (ref 26–34)
MCHC RBC AUTO-ENTMCNC: 32.2 G/DL (ref 30–36.5)
MCV RBC AUTO: 89 FL (ref 80–99)
MONOCYTES # BLD: 0.8 K/UL (ref 0–1)
MONOCYTES NFR BLD: 9 % (ref 5–13)
NEUTS SEG # BLD: 5.4 K/UL (ref 1.8–8)
NEUTS SEG NFR BLD: 62 % (ref 32–75)
NRBC # BLD: 0.02 K/UL (ref 0–0.01)
NRBC BLD-RTO: 0.2 PER 100 WBC
PLATELET # BLD AUTO: 254 K/UL (ref 150–400)
PMV BLD AUTO: 10.5 FL (ref 8.9–12.9)
POTASSIUM SERPL-SCNC: 2.8 MMOL/L (ref 3.5–5.1)
RBC # BLD AUTO: 3.45 M/UL (ref 3.8–5.2)
SODIUM SERPL-SCNC: 140 MMOL/L (ref 136–145)
WBC # BLD AUTO: 8.6 K/UL (ref 3.6–11)

## 2021-06-20 PROCEDURE — 36415 COLL VENOUS BLD VENIPUNCTURE: CPT

## 2021-06-20 PROCEDURE — 74011250636 HC RX REV CODE- 250/636: Performed by: SURGERY

## 2021-06-20 PROCEDURE — 74011250637 HC RX REV CODE- 250/637: Performed by: SURGERY

## 2021-06-20 PROCEDURE — 85025 COMPLETE CBC W/AUTO DIFF WBC: CPT

## 2021-06-20 PROCEDURE — 80048 BASIC METABOLIC PNL TOTAL CA: CPT

## 2021-06-20 RX ORDER — POTASSIUM CHLORIDE 20 MEQ/1
20 TABLET, EXTENDED RELEASE ORAL 3 TIMES DAILY
Qty: 6 TABLET | Refills: 0 | Status: SHIPPED | OUTPATIENT
Start: 2021-06-20 | End: 2021-06-22

## 2021-06-20 RX ORDER — HYDROMORPHONE HYDROCHLORIDE 2 MG/1
2-4 TABLET ORAL
Qty: 25 TABLET | Refills: 0 | Status: SHIPPED | OUTPATIENT
Start: 2021-06-20 | End: 2021-06-23

## 2021-06-20 RX ORDER — POLYETHYLENE GLYCOL 3350 17 G/17G
17 POWDER, FOR SOLUTION ORAL DAILY
Qty: 510 G | Refills: 0 | Status: SHIPPED | OUTPATIENT
Start: 2021-06-20 | End: 2021-07-20

## 2021-06-20 RX ADMIN — METRONIDAZOLE 500 MG: 500 INJECTION, SOLUTION INTRAVENOUS at 06:28

## 2021-06-20 RX ADMIN — ONDANSETRON HYDROCHLORIDE 4 MG: 2 INJECTION, SOLUTION INTRAMUSCULAR; INTRAVENOUS at 06:28

## 2021-06-20 RX ADMIN — HYDROMORPHONE HYDROCHLORIDE 1.5 MG: 2 INJECTION INTRAMUSCULAR; INTRAVENOUS; SUBCUTANEOUS at 01:28

## 2021-06-20 RX ADMIN — ACETAMINOPHEN ORAL SOLUTION 1000 MG: 650 SOLUTION ORAL at 05:23

## 2021-06-20 RX ADMIN — ACETAMINOPHEN ORAL SOLUTION 1000 MG: 650 SOLUTION ORAL at 01:28

## 2021-06-20 RX ADMIN — ONDANSETRON HYDROCHLORIDE 4 MG: 2 INJECTION, SOLUTION INTRAMUSCULAR; INTRAVENOUS at 01:28

## 2021-06-20 RX ADMIN — Medication 10 ML: at 06:28

## 2021-06-20 RX ADMIN — HYOSCYAMINE SULFATE 0.12 MG: 0.12 TABLET ORAL; SUBLINGUAL at 06:28

## 2021-06-20 RX ADMIN — SODIUM CHLORIDE, POTASSIUM CHLORIDE, SODIUM LACTATE AND CALCIUM CHLORIDE 150 ML/HR: 600; 310; 30; 20 INJECTION, SOLUTION INTRAVENOUS at 01:28

## 2021-06-20 RX ADMIN — HYDROMORPHONE HYDROCHLORIDE 1.5 MG: 2 INJECTION INTRAMUSCULAR; INTRAVENOUS; SUBCUTANEOUS at 05:20

## 2021-06-20 RX ADMIN — HYOSCYAMINE SULFATE 0.12 MG: 0.12 TABLET ORAL; SUBLINGUAL at 01:28

## 2021-06-20 NOTE — PROGRESS NOTES
Bedside and Verbal shift change report given to Velasquez Lott RN  (oncoming nurse) by Harvey Ribeiro RN  (offgoing nurse). Report included the following information SBAR.

## 2021-06-20 NOTE — PROGRESS NOTES
Progress Note    Patient: Leopoldo Pulido MRN: [id number]  SSN: [id number]    YOB: 1965  Age: 64 y.o. Sex: female      Admit Date: 2021    3 Days Post-Op    Procedure:  Procedure(s):  LAPAROSCOPY GENERAL DIAGNOSTIC  ESOPHAGOGASTRODUODENOSCOPY (EGD)    Subjective:     Patient notes improved pain control. She denies fever chills. She is tolerating 4 oz/hr bariatric liquids. Objective:     Visit Vitals  /78 (BP 1 Location: Left upper arm, BP Patient Position: At rest)   Pulse 76   Temp 98.3 °F (36.8 °C)   Resp 16   Ht 5' 7.99\" (1.727 m)   Wt 275 lb 9.6 oz (125 kg)   SpO2 95%   BMI 41.91 kg/m²       Temp (24hrs), Av.6 °F (37 °C), Min:98.2 °F (36.8 °C), Max:99.4 °F (37.4 °C)    Date 21 07 - 21 0659 21 07 - 21 0659   Shift 5093-5654 3439-2104 24 Hour Total 2122-9321 0196-9335 24 Hour Total   INTAKE   P.O.  510 510        P. O.  510 510      Shift Total(mL/kg)  510(4.1) 510(4.1)      OUTPUT   Urine(mL/kg/hr) 1200(0.8)  1200(0.4)        Urine Voided 1200  1200      Drains 75 50 125        Output (ml) (Juan Luis-Garcia Drain 21 Left Abdomen) 75 50 125      Shift Total(mL/kg) 1275(10.2) 50(0.4) 1325(10.6)      NET -1275 460 -815      Weight (kg) 125 125 125 125 125 125       Physical Exam:    ABDOMEN: Obese, nondistended, soft. Laparoscopic wounds dry and intact. Serous drain fluid. Appropriate incisional pain with palpation.     Data Review: VS, I/O's, Labs    Lab Review:   Recent Results (from the past 12 hour(s))   CBC WITH AUTOMATED DIFF    Collection Time: 21  1:40 AM   Result Value Ref Range    WBC 8.6 3.6 - 11.0 K/uL    RBC 3.45 (L) 3.80 - 5.20 M/uL    HGB 9.9 (L) 11.5 - 16.0 g/dL    HCT 30.7 (L) 35.0 - 47.0 %    MCV 89.0 80.0 - 99.0 FL    MCH 28.7 26.0 - 34.0 PG    MCHC 32.2 30.0 - 36.5 g/dL    RDW 13.2 11.5 - 14.5 %    PLATELET 269 522 - 360 K/uL    MPV 10.5 8.9 - 12.9 FL    NRBC 0.2 (H) 0  WBC    ABSOLUTE NRBC 0.02 (H) 0.00 - 0.01 K/uL    NEUTROPHILS 62 32 - 75 %    LYMPHOCYTES 27 12 - 49 %    MONOCYTES 9 5 - 13 %    EOSINOPHILS 1 0 - 7 %    BASOPHILS 0 0 - 1 %    IMMATURE GRANULOCYTES 1 (H) 0.0 - 0.5 %    ABS. NEUTROPHILS 5.4 1.8 - 8.0 K/UL    ABS. LYMPHOCYTES 2.3 0.8 - 3.5 K/UL    ABS. MONOCYTES 0.8 0.0 - 1.0 K/UL    ABS. EOSINOPHILS 0.1 0.0 - 0.4 K/UL    ABS. BASOPHILS 0.0 0.0 - 0.1 K/UL    ABS. IMM. GRANS. 0.0 0.00 - 0.04 K/UL    DF AUTOMATED     METABOLIC PANEL, BASIC    Collection Time: 06/20/21  1:40 AM   Result Value Ref Range    Sodium 140 136 - 145 mmol/L    Potassium 2.8 (L) 3.5 - 5.1 mmol/L    Chloride 104 97 - 108 mmol/L    CO2 31 21 - 32 mmol/L    Anion gap 5 5 - 15 mmol/L    Glucose 85 65 - 100 mg/dL    BUN 3 (L) 6 - 20 MG/DL    Creatinine 0.59 0.55 - 1.02 MG/DL    BUN/Creatinine ratio 5 (L) 12 - 20      GFR est AA >60 >60 ml/min/1.73m2    GFR est non-AA >60 >60 ml/min/1.73m2    Calcium 8.8 8.5 - 10.1 MG/DL         Assessment:     Hospital Problems  Date Reviewed: 6/14/2021        Codes Class Noted POA    Morbid obesity ([facility] ICD-10-CM: E66.01  ICD-9-CM: 278.01  6/14/2021 Unknown            Plan/Recommendations/Medical Decision Making:     Discharge to home on bariatric liquids, potassium repletion.

## 2021-06-20 NOTE — DISCHARGE INSTRUCTIONS
[employer] [facility]  Bariatric Surgery Discharge Instructions     Procedure Laparoscopic gastric bypass     Future Appointments   Date Time Provider Jana Carmita   6/29/2021  1:20 PM JANIE Osborne AMB   7/15/2021  3:40 PM JANIE Rivera JERILYN AMB   7/27/2021  9:00 AM JANIE Osborne AMB   9/15/2021  9:00 AM Mary Lou Maria MD [facility] BS AMB         Contact Information:    [employer] [facility], [address], [address]  [phone]    After Hours and Weekends  [phone] On Call Surgeon    Non Emergent Medical Needs  Call during office hours or send a message via My Chart   (messages returned during business hours)    DIET    Please remember that you are on ONLY LIQUIDS for the first 2 weeks after surgery. Do not advance to the next phase until advised by your surgeon or Nurse Practitioner. Refer to the Bariatric Handbook for detailed information. TO PREVENT DEHYDRATION:  consume 48-64 ounces of liquids daily. At least 48 ounces of that should come from water, Crystal Light, sugar free popsicles, sugar free gelatin or other calorie-free, sugar-free, caffeine free and noncarbonated beverages. Do not drink with a straw.  Sip, sip, sip throughout the day   Main priority is to stay hydrated   Aim for 60 grams of protein every day. Most of your protein will come from shakes. Refer to the Bariatric Handbook for detailed information.    Add additional protein supplements to meet protein needs (protein powder, clear protein such as protein water, non-fat dry milk powder, NO protein bars at this at this stage)     MEDICATIONS & VITAMINS     Pre-surgery medications should be reviewed with your Bariatric provider and taken as prescribed    Take no more than 2 pills at a time and wait 15-20 minutes between pills       Pain Medication  The first few days home, you may require narcotic pain medication to manage your pain. Take this medication only as prescribed. If your pain is mild to moderate, try taking Acetaminophen (Tylenol) 500 mg 1-2 tablets every 8 hours or as directed by your provider. Avoid taking antiinflammatory medications (NSAID'S) such as Ibuprofen (Motrin, Advil) or Naproxen (Aleve). These medications can be harmful to your stomach and cause bleeding and ulcers. There is a complete list of NSAID medications to AVOID in your handbook. Abdominal support (Spanx or body shaper) and heat (heating pad on low setting) are very helpful in managing pain after surgery. Acid Reducing (\"heartburn/reflux\") Medication   Acid reducing medicine should have been prescribed at your pre-surgery visit. It is recommended you take this medication every day even if you have no symptoms of reflux or heartburn. If you were previously on a medication for reflux/heartburn you should continue the medication daily. *It is common to experience reflux or heartburn after sleeve gastrectomy. These symptoms can usually be managed with medication, diet and behavior changes. In most cases symptoms improve or resolve after a few weeks to a couple of months. Nausea Medication  You should have been prescribed medication for nausea at your pre-surgery visit. If you are experiencing nausea, please take the medication as prescribed to try and get relief. If the nausea medication is not effective, please call your surgeon's office. Constipation   Constipation can be caused by pain medication and reduced food and water intake. Drink at least 64 oz. fluid. OK to use OTC medications such as Benefiber, Milk of Magnesia, Dulcolax, Miralax, senna       Vitamins   Calcium Citrate with Vitamin D-3 - Take 1200--1500 mg  each day. Divide doses throughout the day. Do not take more than 600 mg at one time.    Take at least 2 hours before or after your multivitamin and/or iron supplement. Multivitamin containing Iron - 2 multivitamins with 100% Daily Value of Iron, Folic Acid and Thiamine   Vitamin D-3 - Take 3000 IU  per day  Vitamin B-12 - Oral or Sublingual: 350-500 mcg/day OR 1000 mcg Monthly intramuscular shot        ACTIVITY     Be active. Sit up as much as possible. Walk often. Walking and/or foot exercises will help prevent blood clots.  Continue to sip liquids throughout the day   Continue to use your incentive spirometer 4 to 5 times per day   Keep your incisions clean and dry to prevent infection.  Showering is ok. No submersion in water for 2 weeks (No tubs, pools, etc.)   Weight lifting restrictions:  10 lbs. for the first 2 weeks, 20 lbs. for the next 4 to 6 weeks    TOP REASONS TO CONTACT YOUR SURGEONS'S OFFICE     You have severe pain or discomfort unrelieved by pain medication.  You have been vomiting for more than 24 hours. Call sooner if you are unable to drink any fluids.  Temperature rises above 101 degrees.  You have persistent nausea and/or vomiting.  You are unable to swallow liquids    Increased swelling, redness, or drainage from your incision sites.

## 2021-06-20 NOTE — PROGRESS NOTES
Bedside and Verbal shift change report given to Vane Meek RN (oncoming nurse) by Vincenzo Ambriz RN (offgoing nurse).  Report included the following information SBAR, Kardex, ED Summary, Intake/Output, MAR and Recent Results

## 2021-06-21 ENCOUNTER — TELEPHONE (OUTPATIENT)
Dept: SURGERY | Age: 56
End: 2021-06-21

## 2021-06-21 ENCOUNTER — PATIENT OUTREACH (OUTPATIENT)
Dept: OTHER | Age: 56
End: 2021-06-21

## 2021-06-21 RX ORDER — ACETAMINOPHEN 500 MG
500 TABLET ORAL
Status: ON HOLD | COMMUNITY
End: 2021-08-11 | Stop reason: CLARIF

## 2021-06-21 NOTE — PROGRESS NOTES
[facility]    Patient: Leopoldo Pulido Patient : 1965 MRN: [id number]    Last Discharge [address]       Complaint Diagnosis Description Type Department Provider    21  S/P gastric bypass . .. Admission (Discharged) Trudy Kapoor MD          Was this an external facility discharge? No Discharge Facility: n/a    Challenges to be reviewed by the provider   Additional needs identified to be addressed with provider: no  none         Method of communication with provider : face to face, chart routing, staff message, phone, none    Surgical/Wound Condition Focused Assessment    Skin- any open wounds or incisions? yes  Description and location of wound- incisions look dry, lumpy, no signs of infection  In the last 24 hour have you experienced; Fever no    Low body temperature no    Chills or shaking yes  (cold overnight)   Sweating no    Fast heart rate no    Fast breathing no    Dizziness/lightheadedness no    Confusion or unusual change in mental status no    Diarrhea no    Nausea no    Vomiting no    Shortness of breath or difficulty breathing no    Less urine output no    Cold, clammy, and pale skin no     Skin rash or skin color changes no  New or worsening pain? no  If yes, pain rated 0-10: 4 Location/pain characteristics: abdomen - surgical pain  New or worsening numbness or tingling? no  If yes, location of numbness and tingling: n/a    Activity level- moving several times a day, or as recommended? yes  Abnormal activity level reported: no   Bathing and showering instructions? yes  Nutrition- prescribed diet? yes -liquid diet for 2 weeks  Tolerating food? yes  Hydration- (how much in ounces per day) 64 ounces  Medications- new antibiotic? no             Pain medication?  yes  Does patient understand how and when to take their medications? no  If no, instructed patient on proper medication use? n/a  Does patient have incentive spirometer? yes  If yes, how frequently is patient using incentive spirometer? 5-6 times day    Advance Care Planning:   Does patient have an Advance Directive: not on file; education provided. Inpatient Readmission Risk score: Unplanned Readmit Risk Score: 10    Was this a readmission? no   Patient stated reason for the admission: n/a    Patients top risk factors for readmission: post op   Interventions to address risk factors: Scheduled appointment with Specialist- and Obtained and reviewed discharge summary and/or continuity of care documents    Care Transition Nurse (CTN) contacted the patient by telephone to perform post hospital discharge assessment. Verified name and  with patient as identifiers. Provided introduction to self, and explanation of the CTN role. CTN reviewed discharge instructions, medical action plan and red flags with patient who verbalized understanding. Were discharge instructions available to patient? yes. Reviewed appropriate site of care based on symptoms and resources available to patient including: Specialist, Benefits related nurse triage line and When to call 911. Patient given an opportunity to ask questions and does not have any further questions or concerns at this time. The patient agrees to contact the PCP office for questions related to their healthcare. Red Flags:  Contact your surgeon if:  You have severe pain or discomfort unrelieved by pain medication. You have been vomiting for more than 24 hours. Call sooner if you are unable to  drink any fluids. Temperature rises above 101 degrees. You have persistent nausea and/or vomiting. You are unable to swallow liquids  Increased swelling, redness, or drainage from your incision sites. Discussed s/s of blood clot      Medication reconciliation was performed with patient, who verbalizes understanding of administration of home medications. Advised obtaining a 90-day supply of all daily and as-needed medications.    Referral to Pharm D needed: no     Home Health/Outpatient orders at discharge: [address]: n/a  Date of initial visit: [facility] ordered at discharge: None  [address] received: n/a      Discussed follow-up appointments. If no appointment was previously scheduled, appointment scheduling offered: yes. 6/29. [address] follow up appointment(s):   Future Appointments   Date Time Provider Jana Vizcarra   6/29/2021  1:20 PM Kelly Sung NP [facility] BS AMB   7/15/2021  3:40 PM Gaetano Wise NP [facility] BS AMB   7/27/2021  9:00 AM Kelly Sung NP [facility] BS AMB   9/15/2021  9:00 AM MD SOBEIDA Winn BS AMB     Non-BS follow up appointment(s): n/a    Plan for follow-up call in 5-7 days based on severity of symptoms and risk factors. 6/25. Plan for next call: self management-post op care  CTN provided contact information for future needs. Goals Addressed                 This Visit's Progress     Completes all follow-up appointments within 30 days of ED visit         Educate and encourage importance of FU for prevention of complications or disease;   Assess the patient's relationship with a PCP and next FU visit scheduled;  o All follow ups have been made   Discuss importance of adherence to treatment plan and follow up visits;   Identify any barriers in transportation or access to FU appointments.  Assist patient with making FU appointments as needed;    It's also a good idea to know your test results.  Keep a list of the medicines you take.        Demonstrates no return to ED or red flags within 30 days        · Assess patient knowledge of how to contact the provider for questions if needed;  · Educate patient on importance of monitoring for any red flags;  · Review importance of reporting any changes, red flags to the provider and/or PCP;  · Assess patient's knowledge of discharge instructions and any restrictions;  · Educate patient when to call 363;  · Assess for any barriers with safety at home  · Discuss use of nurse access line and location of number on front of insurance card.   · Supportive Resources:  · [facility] - # [phone]  · [employer] 24/7 (virtual visits 24/7)  · [employer] # [phone] PW: [facility] HORIZON for associates  · Nurse Access line 24/7 # [phone]  · [employer] (www.[email or web address])  · [address] [phone]  · [address] [facility] [phone]  · [employer] Service Now  [facility]   · [employer] Workday  · IT - [phone]  · [employer] Help - 1- [phone]  · Associate Services for advice and direction, by calling [phone] and pressing 1

## 2021-06-21 NOTE — TELEPHONE ENCOUNTER
Bariatric Post-Operative Phone Calls: 48 hour phone call    Diet:Question of any nausea and/or vomiting. Protein intake (goal is 60 grams of protein daily)   Poor____Fair____Good_x___Great____     Comment:______________________________________________________________      ______________________________________________________________________    Hydration:Less than 32 ounces of water daily is fair to poor (Goal is 64 ounces per day)   Poor____ Fair____ Good__x__Great____    Comment:______________________________________________________________    ______________________________________________________________________      Ambulation:( walking at least 3 x week, for 15- 20 minutes)     Poor______ Fair______ Good___x___     Great______ Comment:__________________________________________________    ______________________________________________________________________      Urine Color: Question of any odor and color(should be polo, pale, and clear) Dark______ Amber__x____ Pale______      Clear______ Comment:___________________________________________________                           ________________________________________________________________    Bowel movements: Question of any constipation- haven't had any bowel movements for more than 3 days. This could be related to protein intake and/or narcotic pain medication usage. Comment:                                                                                                                         JESSICA 06/20/21 while in hospital      Pain: Left sided abdominal pain is normal (should be less than 3)  Question if pain medication is helpful.  10___ 9___ 8___ 7___ 6___ 5___ 4___ 3x___     2___1___0___Comment:_________________________________________________    ______________________________________________________________________      Incision: (No redness, pain, swelling or fever) Healing Well__x____     Healed______Redness_________ Pain_________ Swelling_________ Fever__________(greater than 101 needs evaluation)    Comment:___________________________________________________________    ______________________________________________________________________  Use of incentive spirometer: Yes x____       No           Next Appointment:_06/29/21___________                 Support Group: Yes______No__x____    Additional Comments:____________________________________________________________    ____________________________________________________________________      If more than one parameter is not met or considered poor, nurse needs to discuss with provider recommend for patient to be seen in the office as soon as possible or refer to the provider for follow-up. Reinforce to patient to use bariatric educational booklet as guide. It is appropriate to refer patient to the nutritionist to discuss more in detail of diet and nutrition.

## 2021-06-23 NOTE — DISCHARGE SUMMARY
Physician Discharge Summary     Patient ID:  Parker Nunn  [id number]  64 y.o.  1965    Allergies: Doxylamine succinate, Pcn [penicillins], Ambien [zolpidem], Aspirin, Celebrex [celecoxib], Erythromycin, Milk prot-turm-pepper-pineappl, Neurontin [gabapentin], and Nsaids (non-steroidal anti-inflammatory drug)    Admit Date: 6/14/2021    Discharge Date: 6/20/2021    * Admission Diagnoses: Morbid obesity ([facility] [E66.01]    * Discharge Diagnoses:    Hospital Problems as of 6/20/2021 Date Reviewed: 6/14/2021        Codes Class Noted - Resolved POA    Morbid obesity ([facility] ICD-10-CM: E66.01  ICD-9-CM: 278.01  6/14/2021 - Present Unknown               Admission Condition: Good    * Discharge Condition: good    * Procedures: Procedure(s):  LAPAROSCOPY GENERAL DIAGNOSTIC  ESOPHAGOGASTRODUODENOSCOPY (EGD)    [facility] Course:   She underwent laparoscopic gastric bypass on 6/14. Initial postoperative course was within normal limits. She had some difficulty with pain control and was not drinking enough to allow discharge on 6/16. On 6/17, she had slight increase in white blood cell count and pain level. Therefore, should she was taken to the operating room for diagnostic laparoscopy. No significant findings were identified on diagnostic laparoscopy, but intraoperative endoscopy revealed mucosal sloughing/necrosis which was not full-thickness. This was managed conservatively, with high rate intravenous fluids, n.p.o. status, and intravenous antibiotics. Pain, leukocytosis resolved, and her diet was gradually advanced to goal rate of 4 ounces per hour. She was discharged home in good condition on 6/20.     Consults: None    Significant Diagnostic Studies: endoscopy: gastroscopy: Ischemia of Yelena limb mucosa    * Disposition: Home    Discharge Medications:   Discharge Medication List as of 6/20/2021  9:46 AM      START taking these medications    Details   HYDROmorphone (DILAUDID) 2 mg tablet Take 1-2 Tablets by mouth every six (6) hours as needed for Pain for up to 3 days. Max Daily Amount: 16 mg., Normal, Disp-25 Tablet, R-0      potassium chloride (K-DUR, KLOR-CON) 20 mEq tablet Take 1 Tablet by mouth three (3) times daily for 6 doses. , Normal, Disp-6 Tablet, R-0         CONTINUE these medications which have CHANGED    Details   polyethylene glycol (MIRALAX) 17 gram/dose powder Take 17 g by mouth daily for 30 days. , Normal, Disp-510 g, R-0         CONTINUE these medications which have NOT CHANGED    Details   tiZANidine (ZANAFLEX) 2 mg tablet Take 1 Tablet by mouth two (2) times daily as needed for Muscle Spasm(s) for up to 30 days. , Normal, Disp-60 Tablet, R-0      ondansetron (ZOFRAN ODT) 4 mg disintegrating tablet Take 1 Tab by mouth every eight (8) hours as needed for Nausea or Vomiting., Normal, Disp-30 Tab, R-0      PSYLLIUM HUSK PO Take 1 Tab by mouth daily. , Historical Med      docusate sodium (COLACE) 100 mg capsule Take 100 mg by mouth as needed for Constipation. , Historical Med      Lactobacillus acidophilus (PROBIOTIC PO) Take 1 Tab by mouth nightly., Historical Med      ergocalciferol (ERGOCALCIFEROL) 1,250 mcg (50,000 unit) capsule Take 1 Cap by mouth every seven (7) days. Indications: low vitamin D levels, Normal, Disp-5 Cap, R-5      propranolol LA (INDERAL LA) 80 mg SR capsule Take 1 Cap by mouth daily. Indications: migraine prevention, Normal, Disp-90 Cap, R-3      valsartan-hydroCHLOROthiazide (DIOVAN-HCT) 80-12.5 mg per tablet Take 1 Tab by mouth daily. Indications: high blood pressure, Normal, Disp-90 Tab, R-3      omeprazole (PRILOSEC) 20 mg capsule TAKE 1 CAPSULE BY MOUTH TWICE DAILY  Indications: heartburn, Normal, Disp-60 Cap, R-5      acetaminophen (TYLENOL ARTHRITIS PAIN) 650 mg CR tablet Take 1,300 mg by mouth three (3) times daily as needed for Pain., Historical Med             * Follow-up Care/Patient Instructions:   Activity: No heavy lifting, pushing, pulling x 4 weeks  Diet: Bariatric liquids  Wound Care: Keep wounds clean and dry    Follow-up Information     Follow up With Specialties Details Why Contact Info    Kirt Gottron, MD Internal Medicine   [facility]  [address] [address] [phone]            Future Appointments   Date Time Provider Jana Vizcarra   6/29/2021  1:20 PM Lety Vazquez NP [facility] BS AMB   7/15/2021  3:40 PM Dennis Omalley NP [facility] BS AMB   7/27/2021  9:00 AM Lety Vazquez NP [facility] BS AMB   9/15/2021  9:00 AM Kirt Gottron, MD [facility] BS AMB         Signed:  Yuriy Claudio MD  6/23/2021  2:58 PM

## 2021-06-25 ENCOUNTER — PATIENT OUTREACH (OUTPATIENT)
Dept: OTHER | Age: 56
End: 2021-06-25

## 2021-06-25 NOTE — PROGRESS NOTES
Attempt to reach patient for follow up. Discreet VM left with contact information. Per chart review, patient has her f/up on 6/29. Will attempt to reach again in one week, 7/2.

## 2021-06-27 ENCOUNTER — APPOINTMENT (OUTPATIENT)
Dept: CT IMAGING | Age: 56
End: 2021-06-27
Attending: EMERGENCY MEDICINE
Payer: COMMERCIAL

## 2021-06-27 ENCOUNTER — HOSPITAL ENCOUNTER (EMERGENCY)
Age: 56
Discharge: HOME | End: 2021-06-28
Attending: EMERGENCY MEDICINE
Payer: COMMERCIAL

## 2021-06-27 DIAGNOSIS — E86.0 DEHYDRATION: ICD-10-CM

## 2021-06-27 DIAGNOSIS — R10.10 UPPER ABDOMINAL PAIN: Primary | ICD-10-CM

## 2021-06-27 DIAGNOSIS — Z98.84 S/P GASTRIC BYPASS: ICD-10-CM

## 2021-06-27 DIAGNOSIS — R06.09 DYSPNEA ON EXERTION: ICD-10-CM

## 2021-06-27 LAB
ALBUMIN SERPL-MCNC: 3.4 G/DL (ref 3.5–5)
ALBUMIN/GLOB SERPL: 0.6 {RATIO} (ref 1.1–2.2)
ALP SERPL-CCNC: 94 U/L (ref 45–117)
ALT SERPL-CCNC: 23 U/L (ref 12–78)
ANION GAP SERPL CALC-SCNC: 11 MMOL/L (ref 5–15)
AST SERPL-CCNC: 22 U/L (ref 15–37)
BASOPHILS # BLD: 0 K/UL (ref 0–0.1)
BASOPHILS NFR BLD: 0 % (ref 0–1)
BILIRUB SERPL-MCNC: 0.4 MG/DL (ref 0.2–1)
BUN SERPL-MCNC: 8 MG/DL (ref 6–20)
BUN/CREAT SERPL: 11 (ref 12–20)
CALCIUM SERPL-MCNC: 10.3 MG/DL (ref 8.5–10.1)
CHLORIDE SERPL-SCNC: 98 MMOL/L (ref 97–108)
CO2 SERPL-SCNC: 26 MMOL/L (ref 21–32)
COMMENT, HOLDF: NORMAL
CREAT SERPL-MCNC: 0.71 MG/DL (ref 0.55–1.02)
DIFFERENTIAL METHOD BLD: ABNORMAL
EOSINOPHIL # BLD: 0.1 K/UL (ref 0–0.4)
EOSINOPHIL NFR BLD: 1 % (ref 0–7)
ERYTHROCYTE [DISTWIDTH] IN BLOOD BY AUTOMATED COUNT: 13.1 % (ref 11.5–14.5)
GLOBULIN SER CALC-MCNC: 5.4 G/DL (ref 2–4)
GLUCOSE SERPL-MCNC: 82 MG/DL (ref 65–100)
HCT VFR BLD AUTO: 41.6 % (ref 35–47)
HGB BLD-MCNC: 13.5 G/DL (ref 11.5–16)
IMM GRANULOCYTES # BLD AUTO: 0 K/UL (ref 0–0.04)
IMM GRANULOCYTES NFR BLD AUTO: 0 % (ref 0–0.5)
LIPASE SERPL-CCNC: 164 U/L (ref 73–393)
LYMPHOCYTES # BLD: 2.2 K/UL (ref 0.8–3.5)
LYMPHOCYTES NFR BLD: 40 % (ref 12–49)
MCH RBC QN AUTO: 28.9 PG (ref 26–34)
MCHC RBC AUTO-ENTMCNC: 32.5 G/DL (ref 30–36.5)
MCV RBC AUTO: 89.1 FL (ref 80–99)
MONOCYTES # BLD: 0.7 K/UL (ref 0–1)
MONOCYTES NFR BLD: 12 % (ref 5–13)
NEUTS SEG # BLD: 2.6 K/UL (ref 1.8–8)
NEUTS SEG NFR BLD: 46 % (ref 32–75)
NRBC # BLD: 0 K/UL (ref 0–0.01)
NRBC BLD-RTO: 0 PER 100 WBC
PLATELET # BLD AUTO: 511 K/UL (ref 150–400)
PMV BLD AUTO: 9.7 FL (ref 8.9–12.9)
POTASSIUM SERPL-SCNC: 4.1 MMOL/L (ref 3.5–5.1)
PROT SERPL-MCNC: 8.8 G/DL (ref 6.4–8.2)
RBC # BLD AUTO: 4.67 M/UL (ref 3.8–5.2)
SAMPLES BEING HELD,HOLD: NORMAL
SODIUM SERPL-SCNC: 135 MMOL/L (ref 136–145)
TROPONIN I SERPL-MCNC: <0.05 NG/ML
WBC # BLD AUTO: 5.6 K/UL (ref 3.6–11)

## 2021-06-27 PROCEDURE — 85025 COMPLETE CBC W/AUTO DIFF WBC: CPT

## 2021-06-27 PROCEDURE — 96374 THER/PROPH/DIAG INJ IV PUSH: CPT

## 2021-06-27 PROCEDURE — 71275 CT ANGIOGRAPHY CHEST: CPT

## 2021-06-27 PROCEDURE — 84484 ASSAY OF TROPONIN QUANT: CPT

## 2021-06-27 PROCEDURE — 36415 COLL VENOUS BLD VENIPUNCTURE: CPT

## 2021-06-27 PROCEDURE — 99284 EMERGENCY DEPT VISIT MOD MDM: CPT

## 2021-06-27 PROCEDURE — 96375 TX/PRO/DX INJ NEW DRUG ADDON: CPT

## 2021-06-27 PROCEDURE — 96361 HYDRATE IV INFUSION ADD-ON: CPT

## 2021-06-27 PROCEDURE — 74011000636 HC RX REV CODE- 636: Performed by: EMERGENCY MEDICINE

## 2021-06-27 PROCEDURE — 93005 ELECTROCARDIOGRAM TRACING: CPT

## 2021-06-27 PROCEDURE — 74011250636 HC RX REV CODE- 250/636: Performed by: EMERGENCY MEDICINE

## 2021-06-27 PROCEDURE — 80053 COMPREHEN METABOLIC PANEL: CPT

## 2021-06-27 PROCEDURE — 74177 CT ABD & PELVIS W/CONTRAST: CPT

## 2021-06-27 PROCEDURE — 83690 ASSAY OF LIPASE: CPT

## 2021-06-27 RX ORDER — ONDANSETRON 2 MG/ML
4 INJECTION INTRAMUSCULAR; INTRAVENOUS
Status: DISCONTINUED | OUTPATIENT
Start: 2021-06-27 | End: 2021-06-28 | Stop reason: HOSPADM

## 2021-06-27 RX ORDER — FENTANYL CITRATE 50 UG/ML
50 INJECTION, SOLUTION INTRAMUSCULAR; INTRAVENOUS
Status: DISCONTINUED | OUTPATIENT
Start: 2021-06-27 | End: 2021-06-28 | Stop reason: HOSPADM

## 2021-06-27 RX ADMIN — IOHEXOL 50 ML: 240 INJECTION, SOLUTION INTRATHECAL; INTRAVASCULAR; INTRAVENOUS; ORAL at 23:46

## 2021-06-27 RX ADMIN — IOPAMIDOL 100 ML: 755 INJECTION, SOLUTION INTRAVENOUS at 23:46

## 2021-06-27 RX ADMIN — SODIUM CHLORIDE 1000 ML: 9 INJECTION, SOLUTION INTRAVENOUS at 22:28

## 2021-06-27 RX ADMIN — ONDANSETRON 4 MG: 2 INJECTION INTRAMUSCULAR; INTRAVENOUS at 22:27

## 2021-06-27 RX ADMIN — FENTANYL CITRATE 50 MCG: 50 INJECTION, SOLUTION INTRAMUSCULAR; INTRAVENOUS at 22:28

## 2021-06-28 VITALS
TEMPERATURE: 96.8 F | OXYGEN SATURATION: 97 % | HEART RATE: 91 BPM | SYSTOLIC BLOOD PRESSURE: 124 MMHG | DIASTOLIC BLOOD PRESSURE: 70 MMHG | RESPIRATION RATE: 16 BRPM

## 2021-06-28 LAB — LACTATE SERPL-SCNC: 1 MMOL/L (ref 0.4–2)

## 2021-06-28 PROCEDURE — 96375 TX/PRO/DX INJ NEW DRUG ADDON: CPT

## 2021-06-28 PROCEDURE — 36415 COLL VENOUS BLD VENIPUNCTURE: CPT

## 2021-06-28 PROCEDURE — 74011250637 HC RX REV CODE- 250/637: Performed by: EMERGENCY MEDICINE

## 2021-06-28 PROCEDURE — 74011250636 HC RX REV CODE- 250/636: Performed by: EMERGENCY MEDICINE

## 2021-06-28 PROCEDURE — 83605 ASSAY OF LACTIC ACID: CPT

## 2021-06-28 RX ORDER — SUCRALFATE 1 G/1
1 TABLET ORAL 4 TIMES DAILY
Qty: 40 TABLET | Refills: 0 | Status: SHIPPED | OUTPATIENT
Start: 2021-06-28 | End: 2021-07-08

## 2021-06-28 RX ORDER — SUCRALFATE 1 G/1
1 TABLET ORAL
Status: COMPLETED | OUTPATIENT
Start: 2021-06-28 | End: 2021-06-28

## 2021-06-28 RX ADMIN — FENTANYL CITRATE 50 MCG: 50 INJECTION, SOLUTION INTRAMUSCULAR; INTRAVENOUS at 01:00

## 2021-06-28 RX ADMIN — SUCRALFATE 1 G: 1 TABLET ORAL at 01:44

## 2021-06-28 NOTE — ED PROVIDER NOTES
The history is provided by the patient. Shortness of Breath  This is a new problem. The average episode lasts 1 day. The problem occurs continuously. The current episode started 12 to 24 hours ago. The problem has been gradually worsening. Associated symptoms include chest pain (upper and sternal) and abdominal pain. Pertinent negatives include no fever, no cough, no syncope and no vomiting. She has tried nothing for the symptoms. She has had prior hospitalizations. Associated medical issues include recent surgery. Abdominal Pain   This is a new problem. The current episode started 12 to 24 hours ago. The problem occurs constantly. The problem has been gradually worsening. The pain is associated with an unknown factor. The pain is located in the LUQ, epigastric region and RUQ. The quality of the pain is aching and cramping. The pain is moderate. Associated symptoms include anorexia, nausea and chest pain (upper and sternal). Pertinent negatives include no fever, no diarrhea, no vomiting and no constipation. Nothing worsens the pain. The pain is relieved by nothing. Past workup includes surgery. The patient's surgical history includes gastric bypass. Past Medical History:   Diagnosis Date    Allergic rhinitis 5/27/2010    Arthritis     Baker cyst, left 10/2019    Dr. Oda Lefort cyst, right     Cervical disc herniation 01/2010    C3-4, C4-5, C5-6. Hemangioma body of C5. Dr. Sharda Hansen.  Chest pain 08/2011, 02/2012    Dr. Selvin Pritchett. Dr. Enrique Bravo; denies CP as of 3/27/14    Chronic lower back pain 2010, 03/2016    thoracic DDD and spondylosis. DDD L3-S1. Dr. John Bermeo. Yanet Hernandez. Dr. Rekha Ziegler, [facility]. Dr. Lilly Crabtree.  Chronic meniscal tear of knee     Chronic pain     lower back and knees    Esophagitis, reflux 4/3/2011    Gallstone     Gastritis 5/27/2010    Dr. Boris Kamara.  Gastrointestinal food allergy 03/2014    Multiple.   Dr. Jefry Ladd.   Lety Dunne palpitations 02/2012    DUE TO PAC'S AND PVC'S. Dr. Lisa Kearney.  Heel spur, left 02/2019    Hiatal hernia 4/3/2011    HTN (hypertension) 1987    Impaired glucose tolerance 10/15/2010    Incidental lung nodule 01/08/13    LLL 3.9 mm, RML 3.1 mm;  as of 3/27/14 per pt stable w/o growth    Insomnia 1990s    Iron deficiency anemia 5/27/2010    Irritable bowel syndrome (IBS) 03/2014    Dr. Yusra Iniguez.    Knee pain 2012    Right. due to severe OA. / chipped bone     Left foot pain 08/2019    Dr. Liss Martino.  Metatarsal bone fracture 1990    Left fifth.  Migraine 2/22/2011    Dr. Baumann [facility] Morbid obesity [facility])     Peroneal tendonitis of lower leg 08/07/2019    Left. Dr. Liss Martino    Pre-diabetes     Radiculopathy, cervical 01/2010    Left. Dr. Madan Pedersen.  Shoulder pain, right 2012    due to [address] X 2. Dr. Carlton Sanchez.  Thyroid nodule 2011    6 nodules- Dr. Yvette Lopez    Toe fracture, left 2008    fifth toe.  Triangular fibrocartilage complex tear 2012    Dr. Adam Castellon. Left. Past Surgical History:   Procedure Laterality Date    COLONOSCOPY N/A 3/10/2021    COLONOSCOPY,EGD performed by [facility] at [facility] ENDOSCOPY    [facility]    x 1    HX CHOLECYSTECTOMY  1987    OPEN    HX COLONOSCOPY  03/31/14    Dr. Akiko Winkler; 03/31/14    due to abnormal PAP.  HX DILATION AND CURETTAGE  1990s    due to miscarriage.  HX ENDOSCOPY      HX HEART CATHETERIZATION  2018    no stents    HX KNEE ARTHROSCOPY Right     HX ORTHOPAEDIC Right 06/2016    LUMBAR L4-5, L5-S1 ELLY.   Dr. Hodgson Bias     [facility]         Family History:   Problem Relation Age of Onset    Diabetes Mother     Heart Disease Mother 58        2 stents    Hypertension Mother     Stroke Mother     Diabetes Father     Hypertension Father     Stroke Maternal Grandmother     Cancer Maternal Uncle         prostate    Diabetes Sister     No Known Problems Sister     No Known Problems Sister     Anesth Problems Neg Hx        Social History     Socioeconomic History    Marital status:      Spouse name: Not on file    Number of children: 3    Years of education: Not on file    Highest education level: Not on file   Occupational History    Occupation: Medical assistant     Employer: [employer]   Tobacco Use    Smoking status: Never Smoker    Smokeless tobacco: Never Used   Vaping Use    Vaping Use: Never used   Substance and Sexual Activity    Alcohol use: No    Drug use: Never    Sexual activity: Yes     Partners: Male   Other Topics Concern    Not on file   Social History Narrative    In the home alone     3 adult children     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      Social Determinants of Health     Financial Resource Strain:     Difficulty of Paying Living Expenses:    Food Insecurity:     Worried About [employer] in the Last Year:     [address] [employer] [address] in the Last Year:    Transportation Needs:     Lack of Transportation (Medical):  Lack of Transportation (Non-Medical):    Physical Activity:     Days of Exercise per Week:     Minutes of Exercise per Session:    Stress:     Feeling of Stress :    Social Connections:     Frequency of Communication with Friends and Family:     Frequency of Social Gatherings with Friends and Family:     Attends [employer] Services:     Active Member of Clubs or Organizations:     Attends Club or Organization Meetings:     Marital Status:    Intimate Partner Violence:     Fear of Current or Ex-Partner:     Emotionally Abused:     Physically Abused:     Sexually Abused:           ALLERGIES: Doxylamine succinate, Pcn [penicillins], Ambien [zolpidem], Aspirin, Celebrex [celecoxib], Erythromycin, Milk prot-turm-pepper-pineappl, Neurontin [gabapentin], and Nsaids (non-steroidal anti-inflammatory drug)    Review of Systems   Constitutional: Negative for fever. Respiratory: Positive for shortness of breath. Negative for cough. Cardiovascular: Positive for chest pain (upper and sternal). Negative for syncope. Gastrointestinal: Positive for abdominal pain, anorexia and nausea. Negative for constipation, diarrhea and vomiting. All other systems reviewed and are negative. Vitals:    06/27/21 2126   BP: 112/78   Pulse: 91   Resp: 16   Temp: 96.8 °F (36 °C)   SpO2: 100%            Physical Exam  Vitals and nursing note reviewed. Constitutional:       General: She is not in acute distress. Appearance: She is well-developed. HENT:      Head: Normocephalic and atraumatic. Eyes:      Conjunctiva/sclera: Conjunctivae normal.   Cardiovascular:      Rate and Rhythm: Normal rate and regular rhythm. Heart sounds: Normal heart sounds. Pulmonary:      Effort: Pulmonary effort is normal. No respiratory distress. Breath sounds: Normal breath sounds. Abdominal:      General: There is no distension. Palpations: Abdomen is soft. Tenderness: There is abdominal tenderness in the left upper quadrant. There is no guarding or rebound. Hernia: No hernia is present. Musculoskeletal:         General: No deformity. Normal range of motion. Cervical back: Neck supple. Skin:     General: Skin is warm and dry. Neurological:      Mental Status: She is alert. Cranial Nerves: No cranial nerve deficit. Psychiatric:         Behavior: Behavior normal.          [facility]  ED Course as of Jun 27 2244   Sun Jun 27, 2021 2215 EKG 2141: Rate 87, Normal sinus rhythm, No ST segment or T wave abnormalities. Normal EKG. [DK]      ED Course User Index  [DK] Sudha Hart MD     This is a 43-year-old female status post laparoscopic gastric bypass on 6/14 followed by diagnostic laparoscopy on 6/17 significant for partial thickness mucosal ischemia of the Yelena limb which was treated conservatively.   She presents tonight with left upper abdominal pain that is extending over the right side of her abdomen. She also notes increasing fatigue and shortness of breath with minimal activity. Afebrile with stable vital signs and pain is not unbearable. At this stage in postoperative recovery she is at increased risk for PE, anastomotic leak, internal hernia, bowel obstruction, or extension of previously ischemic area. Plan for definitive CT rule out with chest abdomen pelvis to include oral and IV contrast. No significant hematologic or metabolic abnormalities to explain symptoms. Chest pain is atypical with negative troponin and nonischemic EKG, doubt ACS and suspect this is more related to current GI concerns. Imaging negative for emergent findings. Patient feeling better after supportive care here. I recommended she call her surgery team in the morning for reevaluation and to discuss next steps in management. She was unable to tolerate Carafate solution so I have provided her with pills which may help soothe any gastric irritation that could be contributing to her symptoms.     Procedures

## 2021-06-28 NOTE — ED TRIAGE NOTES
Triage: Pt arrives ambulatory from home with CC of shortness of breath particularly on exertion, abdominal pain, and chest pain. She had a gastric bypass on 6/14 by Dr. Mike Townsend. Following that surgery she had ischemic colitis and she had a surgical repair also by Julia. Denies pain on inspiration.

## 2021-06-29 ENCOUNTER — PATIENT OUTREACH (OUTPATIENT)
Dept: OTHER | Age: 56
End: 2021-06-29

## 2021-06-29 ENCOUNTER — OFFICE VISIT (OUTPATIENT)
Dept: SURGERY | Age: 56
End: 2021-06-29
Payer: COMMERCIAL

## 2021-06-29 VITALS
SYSTOLIC BLOOD PRESSURE: 115 MMHG | DIASTOLIC BLOOD PRESSURE: 77 MMHG | OXYGEN SATURATION: 98 % | BODY MASS INDEX: 39.43 KG/M2 | WEIGHT: 251.2 LBS | RESPIRATION RATE: 18 BRPM | TEMPERATURE: 99.6 F | HEART RATE: 80 BPM | HEIGHT: 67 IN

## 2021-06-29 DIAGNOSIS — E66.01 MORBID OBESITY (HCC): Primary | ICD-10-CM

## 2021-06-29 DIAGNOSIS — Z09 SURGICAL FOLLOWUP: ICD-10-CM

## 2021-06-29 LAB
ATRIAL RATE: 87 BPM
CALCULATED P AXIS, ECG09: 56 DEGREES
CALCULATED R AXIS, ECG10: 17 DEGREES
CALCULATED T AXIS, ECG11: 29 DEGREES
DIAGNOSIS, 93000: NORMAL
P-R INTERVAL, ECG05: 160 MS
Q-T INTERVAL, ECG07: 408 MS
QRS DURATION, ECG06: 82 MS
QTC CALCULATION (BEZET), ECG08: 490 MS
VENTRICULAR RATE, ECG03: 87 BPM

## 2021-06-29 PROCEDURE — 99024 POSTOP FOLLOW-UP VISIT: CPT | Performed by: NURSE PRACTITIONER

## 2021-06-29 NOTE — PROGRESS NOTES
2weeks status post gastric bypass and diagnostic lap. Pt reports doing fair on liquids  Patient complains of left upper quadrant pain, which is better today. She was seen in the ER 2 days ago with a negative work-up. With a large bowel movement her pain has eased up quite a bit. Pt reports occasional nausea, no vomiting  Sheis drinking approximately 30-32 oz of water daily  + BM  She is drinking and eating 0 grams of protein daily. She does not like the protein shakes. She has been focusing on clear liquids since being discharged from the hospital.  She just recently bought whey protein. She is taking bariatric vitamins without issue. Total weight loss since surgery 23 lbs  Weight loss since last visit 23 lbs  Visit Vitals  /77   Pulse 80   Temp 99.6 °F (37.6 °C) (Oral)   Resp 18   Ht 5' 7\" (1.702 m)   Wt 251 lb 3.2 oz (113.9 kg)   LMP  (LMP Unknown)   SpO2 98%   BMI 39.34 kg/m²              Ms. Jackson Cabrera has a reminder for a \"due or due soon\" health maintenance. I have asked that she contact her primary care provider for follow-up on this health maintenance. Physical Examination: General appearance - alert, well appearing, and in no distress,  Chest - clear to auscultation bilaterally  Heart - normal rate, regular rhythm, normal S1, S2, no murmurs, rubs, clicks or gallops  Abdomen - soft, nontender, nondistended  scars from previous incisions healing without erythema or induration    A/P    2 weeks status post laparoscopic Gastric Bypass  Diet advanced to liquids and a few soft foods. Advised patient to get diet V8 splash and mix some whey protein in it to increase protein intake. She may also try cream soups and yogurt. Her main focus is to increase her fluid intake to a minimum of at least 48 ounces of clear liquids   Supplement with unflavored protein powder daily. Continue PPI  No lifting greater than 20 lbs. Follow up in 2 weeks.    RTW in 2 weeks  She will phone follow-up on Thursday to report how well she is done on the protein powder in her diet V8 splash, yogurt, clear liquids and cream soups to discuss if we are going to advance to soft foods. Pt verbalized understanding and questions were answered to the best of my knowledge and ability.           Classie Pallas, NP

## 2021-06-29 NOTE — PROGRESS NOTES
1. Have you been to the ER, urgent care clinic since your last visit? Hospitalized since your last visit? Yes, [facility] ED 6/27/21, shortness of breath, abdominal pain, chest discomfort    2. Have you seen or consulted any other health care providers outside of the [address] [place] [address] since your last visit? Include any pap smears or colon screening.  no

## 2021-06-29 NOTE — LETTER
6/29/2021 11:16 AM    [facility] [id number]          Dear Marielos Nicole,     I have been trying to reach you for a follow up call for services with our [facility]. Your wellbeing is very important to us. With continued partnership in the [facility] program, we hope to work with you to optimize your health and increase your quality of life. I look forward to continuing to work with you. Please contact me at your convenience and we can schedule a time that works best for you. Sincerely,    Anastasiya Barrett RN, [address] [place] [address] [phone]   [phone]  [email or web address]  [facility] email: [email or web address]

## 2021-06-29 NOTE — PATIENT INSTRUCTIONS
Constipation: Care Instructions  Your Care Instructions     Constipation means that you have a hard time passing stools (bowel movements). People pass stools from 3 times a day to once every 3 days. What is normal for you may be different. Constipation may occur with pain in the rectum and cramping. The pain may get worse when you try to pass stools. Sometimes there are small amounts of bright red blood on toilet paper or the surface of stools. This is because of enlarged veins near the rectum (hemorrhoids). A few changes in your diet and lifestyle may help you avoid ongoing constipation. Your doctor may also prescribe medicine to help loosen your stool. Some medicines can cause constipation. These include pain medicines and antidepressants. Tell your doctor about all the medicines you take. Your doctor may want to make a medicine change to ease your symptoms. Follow-up care is a key part of your treatment and safety. Be sure to make and go to all appointments, and call your doctor if you are having problems. It's also a good idea to know your test results and keep a list of the medicines you take. How can you care for yourself at home? · Drink plenty of fluids. If you have kidney, heart, or liver disease and have to limit fluids, talk with your doctor before you increase the amount of fluids you drink. · Include high-fiber foods in your diet each day. These include fruits, vegetables, beans, and whole grains. · Get at least 30 minutes of exercise on most days of the week. Walking is a good choice. You also may want to do other activities, such as running, swimming, cycling, or playing tennis or team sports. · Take a fiber supplement, such as Citrucel or Metamucil, every day. Read and follow all instructions on the label. · Schedule time each day for a bowel movement. A daily routine may help. Take your time having your bowel movement.   · Support your feet with a small step stool when you sit on the toilet. This helps flex your hips and places your pelvis in a squatting position. · Your doctor may recommend an over-the-counter laxative to relieve your constipation. Examples are Milk of Magnesia and MiraLax. Read and follow all instructions on the label. Do not use laxatives on a long-term basis. When should you call for help? Call your doctor now or seek immediate medical care if:    · You have new or worse belly pain.     · You have new or worse nausea or vomiting.     · You have blood in your stools. Watch closely for changes in your health, and be sure to contact your doctor if:    · Your constipation is getting worse.     · You do not get better as expected. Where can you learn more? Go to http://www.yuan.com/  Enter P343 in the search box to learn more about \"Constipation: Care Instructions. \"  Current as of: February 26, 2020               Content Version: 12.8  © 1396-2736 [employer]. Care instructions adapted under license by [employer] (which disclaims liability or warranty for this information). If you have questions about a medical condition or this instruction, always ask your healthcare professional. [place] [address] any warranty or liability for your use of this information.

## 2021-06-29 NOTE — PROGRESS NOTES
Attempt to reach patient for follow up. Discreet VM left with contact information. To send lost to follow up letter. Per chart review, patient had an ED visit to [facility] on 6/27 for shortness of breath, abdominal pian, & chest discomfort. Give script for Carafate and advised to contact her surgeon. Post op appointment is scheduled for today, 6/29.

## 2021-06-30 ENCOUNTER — DOCUMENTATION ONLY (OUTPATIENT)
Dept: SURGERY | Age: 56
End: 2021-06-30

## 2021-06-30 NOTE — PROGRESS NOTES
Patient in to office yesterday. Signed release of information for [facility] form. [facility] Fax confirmation received.

## 2021-07-01 ENCOUNTER — TELEPHONE (OUTPATIENT)
Dept: SURGERY | Age: 56
End: 2021-07-01

## 2021-07-01 NOTE — TELEPHONE ENCOUNTER
Nephrology Progress Note  February 17, 2020      Vel Espana MRN:[id number] YOB: 1943  Date of Admission:2/9/2020    ASSESSMENT AND RECOMMENDATIONS:   76 yr male with Type A  Aortic dissection, S/p repair , AVR ,CABG, Left greater saphenous vein harvest, Circulatory arrest, Left femoral artery exploration on 2/10. Has NSVT. Nephrology consulted for HNANA.     Non Oliguric HANNA   Unknown baseline  Cr   Cr on admission 1.24  Etiology felt to be hypoperfusion on presentation with IV contrast use  resulting in ATN.  --  Overall, renal function relatively stable and he is non oliguric. He was net negative 1L yesterday. He is negative 300cc today so far.     At this time, will continue to use diuretics as needed for volume management, but otherwise stable kidney function.     Volume /BP  BP's improved, off pressors. CXR reviewed, suspect some worsening of volume status.  Recommend one time dose IV Lasix 60mg x 1 and evaluate response and hemodynamics.    Anemia   Hb stable ~ 8-9.     Acid Base,Electrolytes :   Hypernatremia - recommend increasing Free Water to 125cc/hr via NGT and trend Na q6 hrs. The free water will distribute mostly intracellularly, and have minimal impact in terms of volume overload.    Recommendations were communicated to primary team via this note.    Patient seen and discussed with Dr Sara Puente MD  Neph Fellow  [phone]    INTERVAL HISTORY   Remains intubated and sedated, but opens eyes to voice. Bronch yesterday. No major events overnight, remains in A Fib. No fevers overnight. Off pressors. Remains non oliguric.     REVIEW OF SYSTEMS:  A review of systems was not obtained due to intubated status    Current Meds    acetylcysteine  2 mL Nebulization Q4H KAYLA     albuterol  2.5 mg Nebulization Q4H     aspirin  81 mg Oral Daily     atorvastatin  40 mg Oral QPM     heparin lock flush  5-10 mL Intracatheter Q24H     insulin aspart  1-6 Units Subcutaneous Q4H      Spoke to patient on phone. Patient states that she is able to drink 38 ounces of fluids. She has tolerated soft foods with protein powder in them. She has eaten cream soups and yogurt and tolerated well. She is urinating. She complains of constipation. And wants to discuss which medication she can take for her constipation. Advised patient that she could take MiraLAX but half the dose to help with bowels. She could also take senna or could take Colace. Advised patient to continue working on her clear liquids. She may advance to soft foods with supplementation of protein powder in her foods. Patient in agreement will call if any other questions. "lidocaine  1-3 patch Transdermal Q24H     lidocaine  5 mL Topical Once     lidocaine   Transdermal Q8H     multivitamins w/minerals  15 mL Per Feeding Tube Daily     pantoprazole  40 mg Oral or Feeding Tube Daily     piperacillin-tazobactam  3.375 g Intravenous Q6H     polyethylene glycol  17 g Oral or Feeding Tube Daily     protein modular  1 packet Per Feeding Tube TID     senna-docusate  1 tablet Oral or Feeding Tube BID    Or     senna-docusate  2 tablet Oral or Feeding Tube BID     sodium chloride (PF)  3 mL Intracatheter Q8H     Infusion Meds    amiodarone 0.5 mg/min (20)     dexmedetomidine       dextrose       DOBUTamine Stopped (20)     fentaNYL 100 mcg/hr (20)     heparin 500 Units/hr (20)     propofol (DIPRIVAN) infusion 15 mcg/kg/min (20)     BETA BLOCKER NOT PRESCRIBED       ALLERGIES:    No Known Allergies    PHYSICAL EXAM:   Temp  Av  F (37.8  C)  Min: 97.8  F (36.6  C)  Max: 102.4  F (39.1  C)  Arterial Line BP  Min: 69/43  Max: 152/57  Arterial Line MAP (mmHg)  Av.7 mmHg  Min: 50 mmHg  Max: 115 mmHg      Pulse  Av.9  Min: 71  Max: 96 Resp  Av.9  Min: 11  Max: 48  FiO2 (%)  Av.1 %  Min: 40 %  Max: 100 %  SpO2  Av.6 %  Min: 87 %  Max: 100 %    CVP (mmHg): 12 mmHg  BP 91/74   Pulse 96   Temp 99.6  F (37.6  C)   Resp 24   Ht 1.753 m (5' 9\")   Wt 138.9 kg (306 lb 3.5 oz)   SpO2 95%   BMI 45.22 kg/m     Date 20 07 - 20 0659   Shift 4127-8236 1626-3045 6540-2360 24 Hour Total   INTAKE   I.V. 316.64   316.64   NG/GT 70   70   IV Piggyback 500   500   Enteral 490   490   Blood Components 300   300   Shift Total(mL/kg) 1676.64(12.02)   1676.64(12.02)   OUTPUT   Urine 1470   1470   Chest Tube(mL/kg) 60(0.43)   60(0.43)   Shift Total(mL/kg) 1530(10.97)   1530(10.97)   Weight (kg) 139.5 139.5 139.5 139.5      Admit Weight: 131.9 kg (290 lb 11.2 oz)(bed scale)     GENERAL APPEARANCE: intubated and sedated "   EYES: no scleral icterus  Pulmonary:  Decreased air entry in bases with basal rales   CV: irr rhythm, no rub   - JVD no   - Edema 2+   GI: soft, nontender, normal bowel sounds  : has quintero  SKIN: no rash, warm, dry, no cyanosis  NEURO: sedated      LABS:   CMP  Recent Labs   Lab 02/17/20  0332 02/16/20 1955 02/16/20 0336 02/15/20  2157 02/15/20  1213 02/15/20  0337 02/14/20  2030  02/13/20  1119  02/13/20  0353 02/12/20  0343  02/11/20  0346   * 149* 146* 148* 146* 147*  --    < > 144  --  144 144   < > 148*   POTASSIUM 3.7 3.9 4.2  --  4.5 4.4 4.5   < > 5.1   < > 5.2 4.8   < > 4.5   CHLORIDE 115*  --  113*  --  112* 115*  --    < > 115*  --  114* 114*   < > 115*   CO2 30  --  30  --  29 28  --    < > 27  --  28 26   < > 24   ANIONGAP 3  --  3  --  5 4  --    < > 2*  --  2* 4   < > 9   *  --  170*  --  194* 169*  --    < > 119*  --  166* 152*   < > 146*   *  --  113*  --  107* 110*  --    < > 97*  --  92* 52*   < > 32*   CR 2.28*  --  2.16*  --  2.09* 2.07*  --    < > 2.77*  --  2.89* 2.77*   < > 2.42*   GFRESTIMATED 27*  --  29*  --  30* 30*  --    < > 21*  --  20* 21*   < > 25*   GFRESTBLACK 31*  --  33*  --  34* 35*  --    < > 24*  --  23* 24*   < > 29*   ELMER 8.4*  --  8.2*  --  7.8* 7.8*  --    < > 7.8*  --  7.9* 7.4*   < > 7.9*   MAG 2.9* 2.7*  --   --   --   --   --   --  2.8*  --  2.9* 2.5*  --  2.5*   PHOS 3.4  --   --   --   --   --   --   --  3.6  --  3.8 4.7*  --  3.3   PROTTOTAL  --   --   --   --  5.8*  --   --   --   --   --  5.4* 5.1*  --  5.3*   ALBUMIN  --   --   --   --  2.3*  --   --   --   --   --  2.8* 2.7*  --  3.1*   BILITOTAL  --   --   --   --  0.5  --   --   --   --   --  0.5 0.6  --  0.9   ALKPHOS  --   --   --   --  66  --   --   --   --   --  42 35*  --  36*   AST  --   --   --   --  32  --  44  --   --   --  58* 91*  --  147*   ALT  --   --   --   --  17  --  17  --   --   --  16 20  --  30    < > = values in this interval not displayed.     CBC  Recent Labs    Lab 02/17/20  0332 02/16/20  0336 02/15/20  1416 02/15/20  0337   HGB 8.4* 8.5* 8.9* 8.8*   WBC 11.1* 9.5 12.8* 9.1   RBC 2.75* 2.71* 2.78* 2.82*   HCT 28.2* 28.3* 28.9* 28.9*   * 104* 104* 103*   MCH 30.5 31.4 32.0 31.2   MCHC 29.8* 30.0* 30.8* 30.4*   RDW 16.2* 15.9* 15.9* 15.8*    105* 109* 92*     INR  Recent Labs   Lab 02/17/20 0332 02/16/20  0336 02/13/20  0353 02/10/20  2052 02/10/20  1408   INR  --   --  1.29* 1.12 1.05   PTT 42* 48*  --   --   --      ABG  Recent Labs   Lab 02/17/20 0332 02/16/20  1516 02/16/20  0336 02/15/20  2157   PH 7.47* 7.48* 7.37 7.37   PCO2 38 38 33* 50*   PO2 98 82 115* 113*   HCO3 28 28 19* 29*   O2PER 60.0 60 60 60.0      URINE STUDIES  Recent Labs   Lab Test 02/14/20  2031 02/13/20  1447   COLOR Light Yellow Yellow   APPEARANCE Slightly Cloudy Clear   URINEGLC Negative Negative   URINEBILI Negative Negative   URINEKETONE Negative Negative   SG 1.009 1.012   UBLD Small* Small*   URINEPH 5.0 5.0   PROTEIN Negative Negative   NITRITE Negative Negative   LEUKEST Small* Small*   RBCU 1 9*   WBCU 3 4     IMAGING:  Pertinent test results reviewed    Westley Puente MD

## 2021-07-01 NOTE — TELEPHONE ENCOUNTER
Call made to Ms Jia Moffett to follow up on how she is doing as per Narinder Samuel NP. I verified all PHI. Patient states she has tolerated some of the cream of chicken soup with protein powder. She can tolerate the whey protein in her yogurt. She is able to consume 38 oz water. Patient wanted to know if its another laxative she could take besides the miralex. She does not want to have explosive BM's. I suggested trying OTC senna tabs. Ms Parham requested Juli Moder call her back.

## 2021-07-04 ENCOUNTER — HOSPITAL ENCOUNTER (OUTPATIENT)
Age: 56
Setting detail: OBSERVATION
Discharge: HOME | End: 2021-07-07
Attending: STUDENT IN AN ORGANIZED HEALTH CARE EDUCATION/TRAINING PROGRAM | Admitting: SURGERY
Payer: COMMERCIAL

## 2021-07-04 ENCOUNTER — APPOINTMENT (OUTPATIENT)
Dept: CT IMAGING | Age: 56
End: 2021-07-04
Attending: STUDENT IN AN ORGANIZED HEALTH CARE EDUCATION/TRAINING PROGRAM
Payer: COMMERCIAL

## 2021-07-04 DIAGNOSIS — R10.31 ABDOMINAL PAIN, ACUTE, RIGHT LOWER QUADRANT: Primary | ICD-10-CM

## 2021-07-04 PROBLEM — R11.2 NAUSEA AND VOMITING: Status: ACTIVE | Noted: 2021-07-04

## 2021-07-04 LAB
ALBUMIN SERPL-MCNC: 3.6 G/DL (ref 3.5–5)
ALBUMIN/GLOB SERPL: 0.7 {RATIO} (ref 1.1–2.2)
ALP SERPL-CCNC: 83 U/L (ref 45–117)
ALT SERPL-CCNC: 28 U/L (ref 12–78)
ANION GAP SERPL CALC-SCNC: 11 MMOL/L (ref 5–15)
APPEARANCE UR: CLEAR
AST SERPL-CCNC: 31 U/L (ref 15–37)
ATRIAL RATE: 61 BPM
BACTERIA URNS QL MICRO: NEGATIVE /HPF
BASOPHILS # BLD: 0 K/UL (ref 0–0.1)
BASOPHILS NFR BLD: 1 % (ref 0–1)
BILIRUB SERPL-MCNC: 0.5 MG/DL (ref 0.2–1)
BILIRUB UR QL: NEGATIVE
BUN SERPL-MCNC: 8 MG/DL (ref 6–20)
BUN/CREAT SERPL: 10 (ref 12–20)
CALCIUM SERPL-MCNC: 10.3 MG/DL (ref 8.5–10.1)
CALCULATED P AXIS, ECG09: 58 DEGREES
CALCULATED R AXIS, ECG10: 34 DEGREES
CALCULATED T AXIS, ECG11: 35 DEGREES
CHLORIDE SERPL-SCNC: 97 MMOL/L (ref 97–108)
CO2 SERPL-SCNC: 27 MMOL/L (ref 21–32)
COLOR UR: ABNORMAL
COMMENT, HOLDF: NORMAL
CREAT SERPL-MCNC: 0.81 MG/DL (ref 0.55–1.02)
DIAGNOSIS, 93000: NORMAL
DIFFERENTIAL METHOD BLD: ABNORMAL
EOSINOPHIL # BLD: 0.1 K/UL (ref 0–0.4)
EOSINOPHIL NFR BLD: 2 % (ref 0–7)
EPITH CASTS URNS QL MICRO: ABNORMAL /LPF
ERYTHROCYTE [DISTWIDTH] IN BLOOD BY AUTOMATED COUNT: 13 % (ref 11.5–14.5)
GLOBULIN SER CALC-MCNC: 5 G/DL (ref 2–4)
GLUCOSE SERPL-MCNC: 94 MG/DL (ref 65–100)
GLUCOSE UR STRIP.AUTO-MCNC: NEGATIVE MG/DL
HCT VFR BLD AUTO: 41 % (ref 35–47)
HGB BLD-MCNC: 13.7 G/DL (ref 11.5–16)
HGB UR QL STRIP: NEGATIVE
IMM GRANULOCYTES # BLD AUTO: 0 K/UL (ref 0–0.04)
IMM GRANULOCYTES NFR BLD AUTO: 0 % (ref 0–0.5)
KETONES UR QL STRIP.AUTO: 40 MG/DL
LACTATE SERPL-SCNC: 1.9 MMOL/L (ref 0.4–2)
LEUKOCYTE ESTERASE UR QL STRIP.AUTO: NEGATIVE
LIPASE SERPL-CCNC: 213 U/L (ref 73–393)
LYMPHOCYTES # BLD: 1.9 K/UL (ref 0.8–3.5)
LYMPHOCYTES NFR BLD: 53 % (ref 12–49)
MCH RBC QN AUTO: 28.8 PG (ref 26–34)
MCHC RBC AUTO-ENTMCNC: 33.4 G/DL (ref 30–36.5)
MCV RBC AUTO: 86.1 FL (ref 80–99)
MONOCYTES # BLD: 0.6 K/UL (ref 0–1)
MONOCYTES NFR BLD: 17 % (ref 5–13)
NEUTS SEG # BLD: 1 K/UL (ref 1.8–8)
NEUTS SEG NFR BLD: 27 % (ref 32–75)
NITRITE UR QL STRIP.AUTO: NEGATIVE
NRBC # BLD: 0 K/UL (ref 0–0.01)
NRBC BLD-RTO: 0 PER 100 WBC
P-R INTERVAL, ECG05: 200 MS
PH UR STRIP: 5.5 [PH] (ref 5–8)
PLATELET # BLD AUTO: 341 K/UL (ref 150–400)
PMV BLD AUTO: 10.7 FL (ref 8.9–12.9)
POTASSIUM SERPL-SCNC: 3.6 MMOL/L (ref 3.5–5.1)
PROT SERPL-MCNC: 8.6 G/DL (ref 6.4–8.2)
PROT UR STRIP-MCNC: NEGATIVE MG/DL
Q-T INTERVAL, ECG07: 440 MS
QRS DURATION, ECG06: 82 MS
QTC CALCULATION (BEZET), ECG08: 442 MS
RBC # BLD AUTO: 4.76 M/UL (ref 3.8–5.2)
RBC #/AREA URNS HPF: ABNORMAL /HPF (ref 0–5)
RBC MORPH BLD: ABNORMAL
SAMPLES BEING HELD,HOLD: NORMAL
SODIUM SERPL-SCNC: 135 MMOL/L (ref 136–145)
SP GR UR REFRACTOMETRY: <1.005
TROPONIN I SERPL-MCNC: <0.05 NG/ML
UR CULT HOLD, URHOLD: NORMAL
UROBILINOGEN UR QL STRIP.AUTO: 0.2 EU/DL (ref 0.2–1)
VENTRICULAR RATE, ECG03: 61 BPM
WBC # BLD AUTO: 3.6 K/UL (ref 3.6–11)
WBC URNS QL MICRO: ABNORMAL /HPF (ref 0–4)

## 2021-07-04 PROCEDURE — 51701 INSERT BLADDER CATHETER: CPT

## 2021-07-04 PROCEDURE — 74011000250 HC RX REV CODE- 250: Performed by: SURGERY

## 2021-07-04 PROCEDURE — 80053 COMPREHEN METABOLIC PANEL: CPT

## 2021-07-04 PROCEDURE — 74011000636 HC RX REV CODE- 636: Performed by: STUDENT IN AN ORGANIZED HEALTH CARE EDUCATION/TRAINING PROGRAM

## 2021-07-04 PROCEDURE — 99218 HC RM OBSERVATION: CPT

## 2021-07-04 PROCEDURE — 96376 TX/PRO/DX INJ SAME DRUG ADON: CPT

## 2021-07-04 PROCEDURE — 83605 ASSAY OF LACTIC ACID: CPT

## 2021-07-04 PROCEDURE — 85025 COMPLETE CBC W/AUTO DIFF WBC: CPT

## 2021-07-04 PROCEDURE — 84484 ASSAY OF TROPONIN QUANT: CPT

## 2021-07-04 PROCEDURE — 36415 COLL VENOUS BLD VENIPUNCTURE: CPT

## 2021-07-04 PROCEDURE — 99285 EMERGENCY DEPT VISIT HI MDM: CPT

## 2021-07-04 PROCEDURE — 96361 HYDRATE IV INFUSION ADD-ON: CPT

## 2021-07-04 PROCEDURE — 93005 ELECTROCARDIOGRAM TRACING: CPT

## 2021-07-04 PROCEDURE — 74011250636 HC RX REV CODE- 250/636: Performed by: SURGERY

## 2021-07-04 PROCEDURE — C9113 INJ PANTOPRAZOLE SODIUM, VIA: HCPCS | Performed by: SURGERY

## 2021-07-04 PROCEDURE — 96374 THER/PROPH/DIAG INJ IV PUSH: CPT

## 2021-07-04 PROCEDURE — 96375 TX/PRO/DX INJ NEW DRUG ADDON: CPT

## 2021-07-04 PROCEDURE — 99024 POSTOP FOLLOW-UP VISIT: CPT | Performed by: SURGERY

## 2021-07-04 PROCEDURE — 83690 ASSAY OF LIPASE: CPT

## 2021-07-04 PROCEDURE — 74011250636 HC RX REV CODE- 250/636: Performed by: STUDENT IN AN ORGANIZED HEALTH CARE EDUCATION/TRAINING PROGRAM

## 2021-07-04 PROCEDURE — 81001 URINALYSIS AUTO W/SCOPE: CPT

## 2021-07-04 PROCEDURE — 74177 CT ABD & PELVIS W/CONTRAST: CPT

## 2021-07-04 PROCEDURE — 96372 THER/PROPH/DIAG INJ SC/IM: CPT

## 2021-07-04 RX ORDER — PROPRANOLOL HYDROCHLORIDE 80 MG/1
80 CAPSULE, EXTENDED RELEASE ORAL DAILY
Status: DISCONTINUED | OUTPATIENT
Start: 2021-07-04 | End: 2021-07-07 | Stop reason: HOSPADM

## 2021-07-04 RX ORDER — MORPHINE SULFATE 2 MG/ML
2-4 INJECTION, SOLUTION INTRAMUSCULAR; INTRAVENOUS
Status: DISCONTINUED | OUTPATIENT
Start: 2021-07-04 | End: 2021-07-07 | Stop reason: HOSPADM

## 2021-07-04 RX ORDER — DIPHENHYDRAMINE HYDROCHLORIDE 50 MG/ML
12.5 INJECTION, SOLUTION INTRAMUSCULAR; INTRAVENOUS
Status: DISCONTINUED | OUTPATIENT
Start: 2021-07-04 | End: 2021-07-07 | Stop reason: HOSPADM

## 2021-07-04 RX ORDER — SODIUM CHLORIDE 0.9 % (FLUSH) 0.9 %
5-40 SYRINGE (ML) INJECTION EVERY 8 HOURS
Status: DISCONTINUED | OUTPATIENT
Start: 2021-07-04 | End: 2021-07-07 | Stop reason: HOSPADM

## 2021-07-04 RX ORDER — SODIUM CHLORIDE 0.9 % (FLUSH) 0.9 %
5-40 SYRINGE (ML) INJECTION AS NEEDED
Status: DISCONTINUED | OUTPATIENT
Start: 2021-07-04 | End: 2021-07-07 | Stop reason: HOSPADM

## 2021-07-04 RX ORDER — ONDANSETRON 2 MG/ML
4 INJECTION INTRAMUSCULAR; INTRAVENOUS
Status: DISCONTINUED | OUTPATIENT
Start: 2021-07-04 | End: 2021-07-07 | Stop reason: HOSPADM

## 2021-07-04 RX ORDER — ACETAMINOPHEN 325 MG/1
650 TABLET ORAL
Status: DISCONTINUED | OUTPATIENT
Start: 2021-07-04 | End: 2021-07-07 | Stop reason: HOSPADM

## 2021-07-04 RX ORDER — ONDANSETRON 2 MG/ML
4 INJECTION INTRAMUSCULAR; INTRAVENOUS
Status: COMPLETED | OUTPATIENT
Start: 2021-07-04 | End: 2021-07-04

## 2021-07-04 RX ORDER — HYDROCHLOROTHIAZIDE 25 MG/1
12.5 TABLET ORAL DAILY
Status: DISCONTINUED | OUTPATIENT
Start: 2021-07-05 | End: 2021-07-07 | Stop reason: HOSPADM

## 2021-07-04 RX ORDER — SODIUM CHLORIDE, SODIUM LACTATE, POTASSIUM CHLORIDE, CALCIUM CHLORIDE 600; 310; 30; 20 MG/100ML; MG/100ML; MG/100ML; MG/100ML
100 INJECTION, SOLUTION INTRAVENOUS CONTINUOUS
Status: DISCONTINUED | OUTPATIENT
Start: 2021-07-04 | End: 2021-07-07 | Stop reason: HOSPADM

## 2021-07-04 RX ORDER — HYDROMORPHONE HYDROCHLORIDE 2 MG/ML
0.5 INJECTION, SOLUTION INTRAMUSCULAR; INTRAVENOUS; SUBCUTANEOUS
Status: DISCONTINUED | OUTPATIENT
Start: 2021-07-04 | End: 2021-07-04 | Stop reason: SDUPTHER

## 2021-07-04 RX ORDER — LOSARTAN POTASSIUM 25 MG/1
50 TABLET ORAL DAILY
Status: DISCONTINUED | OUTPATIENT
Start: 2021-07-05 | End: 2021-07-07 | Stop reason: HOSPADM

## 2021-07-04 RX ORDER — NALOXONE HYDROCHLORIDE 0.4 MG/ML
0.4 INJECTION, SOLUTION INTRAMUSCULAR; INTRAVENOUS; SUBCUTANEOUS AS NEEDED
Status: DISCONTINUED | OUTPATIENT
Start: 2021-07-04 | End: 2021-07-07 | Stop reason: HOSPADM

## 2021-07-04 RX ORDER — ENOXAPARIN SODIUM 100 MG/ML
40 INJECTION SUBCUTANEOUS EVERY 24 HOURS
Status: DISCONTINUED | OUTPATIENT
Start: 2021-07-04 | End: 2021-07-07 | Stop reason: HOSPADM

## 2021-07-04 RX ADMIN — MORPHINE SULFATE 2 MG: 2 INJECTION, SOLUTION INTRAMUSCULAR; INTRAVENOUS at 11:01

## 2021-07-04 RX ADMIN — HYDROMORPHONE HYDROCHLORIDE 0.5 MG: 2 INJECTION INTRAMUSCULAR; INTRAVENOUS; SUBCUTANEOUS at 07:31

## 2021-07-04 RX ADMIN — MORPHINE SULFATE 2 MG: 2 INJECTION, SOLUTION INTRAMUSCULAR; INTRAVENOUS at 18:37

## 2021-07-04 RX ADMIN — SODIUM CHLORIDE, POTASSIUM CHLORIDE, SODIUM LACTATE AND CALCIUM CHLORIDE 100 ML/HR: 600; 310; 30; 20 INJECTION, SOLUTION INTRAVENOUS at 11:01

## 2021-07-04 RX ADMIN — SODIUM CHLORIDE 1000 ML: 9 INJECTION, SOLUTION INTRAVENOUS at 09:50

## 2021-07-04 RX ADMIN — ENOXAPARIN SODIUM 40 MG: 100 INJECTION SUBCUTANEOUS at 11:00

## 2021-07-04 RX ADMIN — FOLIC ACID: 5 INJECTION, SOLUTION INTRAMUSCULAR; INTRAVENOUS; SUBCUTANEOUS at 10:52

## 2021-07-04 RX ADMIN — SODIUM CHLORIDE 1000 ML: 9 INJECTION, SOLUTION INTRAVENOUS at 07:29

## 2021-07-04 RX ADMIN — ONDANSETRON 4 MG: 2 INJECTION INTRAMUSCULAR; INTRAVENOUS at 11:12

## 2021-07-04 RX ADMIN — SODIUM CHLORIDE, POTASSIUM CHLORIDE, SODIUM LACTATE AND CALCIUM CHLORIDE 100 ML/HR: 600; 310; 30; 20 INJECTION, SOLUTION INTRAVENOUS at 20:38

## 2021-07-04 RX ADMIN — IOPAMIDOL 100 ML: 755 INJECTION, SOLUTION INTRAVENOUS at 08:59

## 2021-07-04 RX ADMIN — ONDANSETRON 4 MG: 2 INJECTION INTRAMUSCULAR; INTRAVENOUS at 07:32

## 2021-07-04 RX ADMIN — ONDANSETRON 4 MG: 2 INJECTION INTRAMUSCULAR; INTRAVENOUS at 15:44

## 2021-07-04 RX ADMIN — SODIUM CHLORIDE 40 MG: 9 INJECTION INTRAMUSCULAR; INTRAVENOUS; SUBCUTANEOUS at 11:01

## 2021-07-04 RX ADMIN — ONDANSETRON 4 MG: 2 INJECTION INTRAMUSCULAR; INTRAVENOUS at 21:55

## 2021-07-04 RX ADMIN — PROMETHAZINE HYDROCHLORIDE 12.5 MG: 25 INJECTION INTRAMUSCULAR; INTRAVENOUS at 09:46

## 2021-07-04 NOTE — ED PROVIDER NOTES
Chief Complaint   Patient presents with    Post-Op Problem     This is a 78-year-old female with hypertension who underwent a laparoscopic gastric bypass on 6/14/2021 (surgeon-Julia) requiring subsequent diagnostic laparoscopy with upper endoscopy on 6/20/21 for severe abdominal pain at which time she was found to have mucosal ischemia with necrosis distal to her gastrojejunal anastomosis, presenting today with severe right lower quadrant abdominal pain that started 3 days ago. It radiates up through her epigastric region, no radiation to the back. She was seen in the department 6 days ago for left upper quadrant abdominal pain and had a reassuring abdominal CT scan with oral and IV contrast without evidence of any emergent or surgical pathology, CTA chest was negative for pulmonary embolism at that time as well. She was told to continue carafate going forward but over the last several days has not been able to tolerate it due to intense pain and nausea. She used Tylenol for pain control yesterday and took a dose of Miralax, continues to have small stools, no associated fevers or vomiting. Has not been able to keep much liquid down due to pain and says her urine output since yesterday has been minimal.  She called the on call surgeon Kim Harris) who referred her to the emergency department to be evaluated. Past Medical History:   Diagnosis Date    Allergic rhinitis 5/27/2010    Arthritis     Baker cyst, left 10/2019    Dr. Richardson Ko cyst, right     Cervical disc herniation 01/2010    C3-4, C4-5, C5-6. Hemangioma body of C5. Dr. Fidel Shaw.  Chest pain 08/2011, 02/2012    Dr. Kristine Lyles. Dr. Queen Hernandez; denies CP as of 3/27/14    Chronic lower back pain 2010, 03/2016    thoracic DDD and spondylosis. DDD L3-S1. Dr. Ethan Mata. Shahida Villafuerte. [place] Gasper Lewis[place] [facility] [place].     Chronic meniscal tear of knee     Chronic pain     lower back and knees    Esophagitis, reflux 4/3/2011    Gallstone     Gastritis 5/27/2010    Dr. Glory Martinez.  Gastrointestinal food allergy 03/2014    Multiple. Dr. José Rodriguez.    Heart palpitations 02/2012    DUE TO PAC'S AND PVC'S. Dr. Junior Rojas.  Heel spur, left 02/2019    Hiatal hernia 4/3/2011    HTN (hypertension) 1987    Impaired glucose tolerance 10/15/2010    Incidental lung nodule 01/08/13    LLL 3.9 mm, RML 3.1 mm;  as of 3/27/14 per pt stable w/o growth    Insomnia 1990s    Iron deficiency anemia 5/27/2010    Irritable bowel syndrome (IBS) 03/2014    Dr. Althea Galeano.    Knee pain 2012    Right. due to severe OA. / chipped bone     Left foot pain 08/2019    Dr. Marylene Prows.  Metatarsal bone fracture 1990    Left fifth.  Migraine 2/22/2011    Dr. Anand Valiente [facility])     Peroneal tendonitis of lower leg 08/07/2019    Left. Dr. Marylene Prows    Pre-diabetes     Radiculopathy, cervical 01/2010    Left. Dr. Erling Fleischer.  Shoulder pain, right 2012    due to [address] X 2. Dr. Samantha Taylor.  Thyroid nodule 2011    6 nodules- Dr. Hussein Scott    Toe fracture, left 2008    fifth toe.  Triangular fibrocartilage complex tear 2012    Dr. Juve Chavez. Left. Past Surgical History:   Procedure Laterality Date    COLONOSCOPY N/A 3/10/2021    COLONOSCOPY,EGD performed by Avila Machuca MD at [facility] ENDOSCOPY    [address]    x 1    HX CHOLECYSTECTOMY  1987    OPEN    HX COLONOSCOPY  03/31/14    Dr. Yariel Grajeda; 03/31/14    due to abnormal PAP.  HX DILATION AND CURETTAGE  1990s    due to miscarriage.  HX ENDOSCOPY      HX HEART CATHETERIZATION  2018    no stents    HX KNEE ARTHROSCOPY Right     HX LAP GASTRIC BYPASS  06/14/2021    lap gastric bypass with [facility]atal hernia [facility]    [address] Right 06/2016    LUMBAR L4-5, L5-S1 ELLY.   Dr. Demetris Teague     [address] [facility] History:   Problem Relation Age of Onset    Diabetes Mother     Heart Disease Mother 58        2 stents    Hypertension Mother     Stroke Mother     Diabetes Father     Hypertension Father     Stroke Maternal Grandmother     Cancer Maternal Uncle         prostate    Diabetes Sister     No Known Problems Sister     No Known Problems Sister     Anesth Problems Neg Hx        Social History     Socioeconomic History    Marital status:      Spouse name: Not on file    Number of children: 3    Years of education: Not on file    Highest education level: Not on file   Occupational History    Occupation: Medical assistant     Employer: [employer]   Tobacco Use    Smoking status: Never Smoker    Smokeless tobacco: Never Used   Vaping Use    Vaping Use: Never used   Substance and Sexual Activity    Alcohol use: No    Drug use: Never    Sexual activity: Yes     Partners: Male   Other Topics Concern    Not on file   Social History Narrative    In the home alone     3 adult children     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      Social Determinants of Health     Financial Resource Strain:     Difficulty of Paying Living Expenses:    Food Insecurity:     Worried About Running Out of Food in the Last Year:     [address] in the Last Year:    Transportation Needs:     Lack of Transportation (Medical):      Lack of Transportation (Non-Medical):    Physical Activity:     Days of Exercise per Week:     Minutes of Exercise per Session:    Stress:     Feeling of Stress :    Social Connections:     Frequency of Communication with Friends and Family:     Frequency of Social Gatherings with Friends and Family:     Attends Congregation Services:     Active Member of Clubs or Organizations:     Attends Club or Organization Meetings:     Marital Status:    Intimate Partner Violence:     Fear of Current or Ex-Partner:     Emotionally Abused:     Physically Abused:     Sexually Abused: ALLERGIES: Doxylamine succinate, Pcn [penicillins], Ambien [zolpidem], Aspirin, Celebrex [celecoxib], Erythromycin, Milk prot-turm-pepper-pineappl, Neurontin [gabapentin], and Nsaids (non-steroidal anti-inflammatory drug)    Review of Systems   Constitutional: Negative for fever. HENT: Negative for facial swelling. Eyes: Negative for redness. Respiratory: Negative for shortness of breath. Cardiovascular: Positive for chest pain. Gastrointestinal: Positive for abdominal pain and nausea. Negative for vomiting. Genitourinary: Positive for decreased urine volume. Musculoskeletal: Negative for back pain. Skin: Positive for wound. Neurological: Negative for headaches. Psychiatric/Behavioral: Negative for confusion.        Vitals:    07/04/21 0710   BP: 112/78   Pulse: 87   Resp: 16   Temp: 97.2 °F (36.2 °C)   SpO2: 98%   Weight: 112 kg (246 lb 14.6 oz)   Height: 5' 8\" (1.727 m)            Physical Exam  General:  Awake and alert, NAD  HEENT:  NC/AT, equal pupils  Neck:   Normal inspection, full range of motion  Cardiac:  RRR, no murmurs  Respiratory:  Clear bilaterally, no wheezes, rales, rhonchi  Abdomen:  Soft and obese, healed surgical scars, +tender in the right lower quadrant without guarding  Extremities: Warm and well perfused, no peripheral edema  Neuro:  Moving all extremities symmetrically without gross motor deficit  Skin:   No rashes or pallor    RESULTS  Recent Results (from the past 12 hour(s))   CBC WITH AUTOMATED DIFF    Collection Time: 07/04/21  7:20 AM   Result Value Ref Range    WBC 3.6 3.6 - 11.0 K/uL    RBC 4.76 3.80 - 5.20 M/uL    HGB 13.7 11.5 - 16.0 g/dL    HCT 41.0 35.0 - 47.0 %    MCV 86.1 80.0 - 99.0 FL    MCH 28.8 26.0 - 34.0 PG    MCHC 33.4 30.0 - 36.5 g/dL    RDW 13.0 11.5 - 14.5 %    PLATELET 334 292 - 537 K/uL    MPV 10.7 8.9 - 12.9 FL    NRBC 0.0 0  WBC    ABSOLUTE NRBC 0.00 0.00 - 0.01 K/uL    NEUTROPHILS 27 (L) 32 - 75 %    LYMPHOCYTES 53 (H) 12 - 49 %    MONOCYTES 17 (H) 5 - 13 %    EOSINOPHILS 2 0 - 7 %    BASOPHILS 1 0 - 1 %    IMMATURE GRANULOCYTES 0 0.0 - 0.5 %    ABS. NEUTROPHILS 1.0 (L) 1.8 - 8.0 K/UL    ABS. LYMPHOCYTES 1.9 0.8 - 3.5 K/UL    ABS. MONOCYTES 0.6 0.0 - 1.0 K/UL    ABS. EOSINOPHILS 0.1 0.0 - 0.4 K/UL    ABS. BASOPHILS 0.0 0.0 - 0.1 K/UL    ABS. IMM. GRANS. 0.0 0.00 - 0.04 K/UL    DF SMEAR SCANNED      RBC COMMENTS NORMOCYTIC, NORMOCHROMIC     METABOLIC PANEL, COMPREHENSIVE    Collection Time: 07/04/21  7:20 AM   Result Value Ref Range    Sodium 135 (L) 136 - 145 mmol/L    Potassium 3.6 3.5 - 5.1 mmol/L    Chloride 97 97 - 108 mmol/L    CO2 27 21 - 32 mmol/L    Anion gap 11 5 - 15 mmol/L    Glucose 94 65 - 100 mg/dL    BUN 8 6 - 20 MG/DL    Creatinine 0.81 0.55 - 1.02 MG/DL    BUN/Creatinine ratio 10 (L) 12 - 20      GFR est AA >60 >60 ml/min/1.73m2    GFR est non-AA >60 >60 ml/min/1.73m2    Calcium 10.3 (H) 8.5 - 10.1 MG/DL    Bilirubin, total 0.5 0.2 - 1.0 MG/DL    ALT (SGPT) 28 12 - 78 U/L    AST (SGOT) 31 15 - 37 U/L    Alk. phosphatase 83 45 - 117 U/L    Protein, total 8.6 (H) 6.4 - 8.2 g/dL    Albumin 3.6 3.5 - 5.0 g/dL    Globulin 5.0 (H) 2.0 - 4.0 g/dL    A-G Ratio 0.7 (L) 1.1 - 2.2     SAMPLES BEING HELD    Collection Time: 07/04/21  7:20 AM   Result Value Ref Range    SAMPLES BEING HELD 1red,1blu     COMMENT        Add-on orders for these samples will be processed based on acceptable specimen integrity and analyte stability, which may vary by analyte.    LIPASE    Collection Time: 07/04/21  7:20 AM   Result Value Ref Range    Lipase 213 73 - 393 U/L   TROPONIN I    Collection Time: 07/04/21  7:20 AM   Result Value Ref Range    Troponin-I, Qt. <0.05 <0.05 ng/mL   LACTIC ACID    Collection Time: 07/04/21  7:38 AM   Result Value Ref Range    Lactic acid 1.9 0.4 - 2.0 MMOL/L   EKG, 12 LEAD, INITIAL    Collection Time: 07/04/21  8:28 AM   Result Value Ref Range    Ventricular Rate 61 BPM    Atrial Rate 61 BPM    P-R Interval 200 ms    QRS Duration 82 ms    Q-T Interval 440 ms    QTC Calculation (Bezet) 442 ms    Calculated P Axis 58 degrees    Calculated R Axis 34 degrees    Calculated T Axis 35 degrees    Diagnosis       Normal sinus rhythm  When compared with ECG of 27-JUN-2021 21:41,  No significant change was found     URINALYSIS W/MICROSCOPIC    Collection Time: 07/04/21  9:59 AM   Result Value Ref Range    Color YELLOW/STRAW      Appearance CLEAR CLEAR      Specific gravity <1.005     pH (UA) 5.5 5.0 - 8.0      Protein Negative NEG mg/dL    Glucose Negative NEG mg/dL    Ketone 40 (A) NEG mg/dL    Bilirubin Negative NEG      Blood Negative NEG      Urobilinogen 0.2 0.2 - 1.0 EU/dL    Nitrites Negative NEG      Leukocyte Esterase Negative NEG      WBC 0-4 0 - 4 /hpf    RBC 0-5 0 - 5 /hpf    Epithelial cells FEW FEW /lpf    Bacteria Negative NEG /hpf   URINE CULTURE HOLD SAMPLE    Collection Time: 07/04/21  9:59 AM    Specimen: Serum; Urine   Result Value Ref Range    Urine culture hold        Urine on hold in Microbiology dept for 2 days. If unpreserved urine is submitted, it cannot be used for addtional testing after 24 hours, recollection will be required. IMAGING  CT ABD PELV W CONT    Result Date: 7/4/2021  No evidence of acute process      Procedures - none unless documented below  EKG as interpreted by me:  Normal sinus rhythm at a rate of 61, normal axis and intervals, grossly no ST or T wave changes suggesting acute ischemia. ED course: Labs, EKG and imaging reviewed. Receiving IV fluids, Dilaudid and Zofran, Phenergan, with persistent symptoms. No bowel obstruction or acute surgical pathology on her abdominal CT. Discussed with surgery (Christopher), will admit to his service for continued management.     Impression: Acute abdominal pain, nausea  Disposition: Admitted

## 2021-07-04 NOTE — H&P
Surgery History and Physical    Subjective:      Jessenia Justin is a 64 y.o. female who underwent laparoscopic gastric bypass about 3 weeks ago. She returned to the hospital after that with some mucosal ischemia at her anastomosis. Since that time she has been back to the emergency department at least once and to the office. Today she comes in complaining of right-sided abdominal pain and decreased p.o. intake. She states is been getting worse over the past 4 days. It has been getting a little better with the Dilaudid that she got here in the hospital.  She states that she is able to drink about 20 ounces of water and a little bit of Jell-O.       Patient Active Problem List    Diagnosis Date Noted    Nausea and vomiting 07/04/2021    Morbid obesity ([address] 06/14/2021    Left lateral epicondylitis 12/24/2020    Complex tear of meniscus of right knee 07/13/2020    Complex tear of medial meniscus of right knee, sequela 07/12/2020    Complex tear of medial meniscus of right knee as current injury 06/18/2020    Bilateral primary osteoarthritis of knee 06/10/2020    Chronic meniscal tear of knee     Primary osteoarthritis of left knee 09/26/2019    Chronic pain of right knee 09/20/2019    Peroneal tendonitis of lower leg 08/07/2019    Left foot pain 08/01/2019    Degeneration of lumbosacral intervertebral disc 07/10/2019    Hip pain 07/10/2019    Low back pain 07/10/2019    Lumbar radiculopathy 07/10/2019    Pain in both lower extremities 07/10/2019    Heel spur, left 02/01/2019    Urgency of urination 09/06/2018    Obesity, Class III, BMI 40-49.9 (morbid obesity) ([address] 06/01/2018    Hyperglycemia 06/01/2018    High cholesterol 06/01/2018    Aspirin intolerance 12/07/2017    Allergic rhinitis due to allergen 12/07/2016    Intractable migraine without aura and without status migrainosus 07/20/2016    Advanced care planning/counseling discussion 03/24/2016    Essential hypertension 06/03/2015    Chronic lower back pain     Irritable bowel syndrome (IBS) 03/01/2014    Gastrointestinal food allergy 03/01/2014    Vitamin D deficiency 08/31/2013    Chronic insomnia 08/14/2013    Multiple thyroid nodules 03/05/2013    Shoulder pain, right     Incidental lung nodule 01/08/2013    Heart palpitations 02/01/2012    Hiatal hernia 04/03/2011    Iron deficiency anemia 05/27/2010    Gastritis 05/27/2010    Allergic rhinitis 05/27/2010    Radiculopathy, cervical 01/01/2010     Past Medical History:   Diagnosis Date    Allergic rhinitis 5/27/2010    Arthritis     Baker cyst, left 10/2019    Dr. Rebollra Me cyst, right     Cervical disc herniation 01/2010    C3-4, C4-5, C5-6. Hemangioma body of C5. Dr. Bambi Robin.  Chest pain 08/2011, 02/2012    Dr. Genia Hood. Dr. Summer Chaves; denies CP as of 3/27/14    Chronic lower back pain 2010, 03/2016    thoracic DDD and spondylosis. DDD L3-S1. Dr. Lam Jean. Jed George. Dr. Jose Manuel Lacey, [facility]. Dr. Jena Camacho.  Chronic meniscal tear of knee     Chronic pain     lower back and knees    Esophagitis, reflux 4/3/2011    Gallstone     Gastritis 5/27/2010    Dr. Ruth Garcia.  Gastrointestinal food allergy 03/2014    Multiple. Dr. Criss Sands.    Heart palpitations 02/2012    DUE TO PAC'S AND PVC'S. Dr. Jazmine Patton.  Heel spur, left 02/2019    Hiatal hernia 4/3/2011    HTN (hypertension) 1987    Impaired glucose tolerance 10/15/2010    Incidental lung nodule 01/08/13    LLL 3.9 mm, RML 3.1 mm;  as of 3/27/14 per pt stable w/o growth    Insomnia 1990s    Iron deficiency anemia 5/27/2010    Irritable bowel syndrome (IBS) 03/2014    Dr. Coty Mix.    Knee pain 2012    Right. due to severe OA. / chipped bone     Left foot pain 08/2019    Dr. Siobhan Willams.  Metatarsal bone fracture 1990    Left fifth.     Migraine 2/22/2011    Dr. Shanae Grande obesity [facility])     Peroneal tendonitis of lower leg 08/07/2019    Left. Dr. Zach iJmenez    Pre-diabetes     Radiculopathy, cervical 01/2010    Left. Dr. Deja Medley.  Shoulder pain, right 2012    due to [address] X 2. Dr. Lee Choi.  Thyroid nodule 2011    6 nodules- Dr. Pita Ryan    Toe fracture, left 2008    fifth toe.  Triangular fibrocartilage complex tear 2012    Dr. Jae Vickers. Left. Past Surgical History:   Procedure Laterality Date    COLONOSCOPY N/A 3/10/2021    COLONOSCOPY,EGD performed by Reg[facility] Otto [facility] at [facility] ENDOSCOPY    [address]    x 1    HX CHOLECYSTECTOMY  1987    OPEN    HX COLONOSCOPY  03/31/14    Dr. Jamie Daly; 03/31/14    due to abnormal PAP.  HX DILATION AND CURETTAGE  1990s    due to miscarriage.  HX ENDOSCOPY      HX HEART CATHETERIZATION  2018    no stents    HX KNEE ARTHROSCOPY Right     HX LAP GASTRIC BYPASS  06/14/2021    lap gastric bypass with hiatal hernia [facility]    [address] Right 06/2016    LUMBAR L4-5, L5-S1 ELLY. Dr. Yin Plascencia     [address] [place] [address] [facility]      Social History     Tobacco Use    Smoking status: Never Smoker    Smokeless tobacco: Never Used   Substance Use Topics    Alcohol use: No      Family History   Problem Relation Age of Onset    Diabetes Mother     Heart Disease Mother 58        2 stents    Hypertension Mother     Stroke Mother     Diabetes Father     Hypertension Father     Stroke Maternal Grandmother     Cancer Maternal Uncle         prostate    Diabetes Sister     No Known Problems Sister     No Known Problems Sister     Anesth Problems Neg Hx       Prior to Admission medications    Medication Sig Start Date End Date Taking? Authorizing Provider   sucralfate (Carafate) 1 gram tablet Take 1 Tablet by mouth four (4) times daily for 10 days.  6/28/21 7/8/21  Forest Ramirez MD   acetaminophen (Tylenol Extra Strength) 500 mg tablet Take 500 mg by mouth every six (6) hours as needed for Pain. Patient not taking: Reported on 6/29/2021    Provider, Historical   polyethylene glycol (MIRALAX) 17 gram/dose powder Take 17 g by mouth daily for 30 days. 6/20/21 7/20/21  Adela Garland MD   ondansetron (ZOFRAN ODT) 4 mg disintegrating tablet Take 1 Tab by mouth every eight (8) hours as needed for Nausea or Vomiting. 5/18/21   Cait España NP   PSYLLIUM HUSK PO Take 1 Tablet by mouth daily. Provider, Historical   docusate sodium (COLACE) 100 mg capsule Take 100 mg by mouth as needed for Constipation. Provider, Historical   Lactobacillus acidophilus (PROBIOTIC PO) Take 1 Tab by mouth nightly. Provider, Historical   ergocalciferol (ERGOCALCIFEROL) 1,250 mcg (50,000 unit) capsule Take 1 Cap by mouth every seven (7) days. Indications: low vitamin D levels  Patient taking differently: Take 50,000 Units by mouth every seven (7) days. LAST DATE TAKEN 05/04/21  Indications: low vitamin D levels 6/2/20   Anabelle WONG DO   propranolol LA (INDERAL LA) 80 mg SR capsule Take 1 Cap by mouth daily. Indications: migraine prevention 6/2/20   Anabelle WONG DO   valsartan-hydroCHLOROthiazide (DIOVAN-HCT) 80-12.5 mg per tablet Take 1 Tab by mouth daily. Indications: high blood pressure  Patient taking differently: Take 1 Tab by mouth nightly. Indications: high blood pressure 6/2/20   Anabelle Clifford R DO   omeprazole (PRILOSEC) 20 mg capsule TAKE 1 CAPSULE BY MOUTH TWICE DAILY  Indications: heartburn 6/2/20   Anabelle WONG DO   acetaminophen (TYLENOL ARTHRITIS PAIN) 650 mg CR tablet Take 1,300 mg by mouth three (3) times daily as needed for Pain.   Patient not taking: Reported on 6/29/2021    Provider, Historical     Allergies   Allergen Reactions    Doxylamine Succinate Swelling     12.5-25 MG  HANDS, FACE/PERIORAL, FEET    Pcn [Penicillins] Hives    Ambien [Zolpidem] Nausea and Vomiting and Other (comments)     5 mg with Ativan 0.5 mg   TOO GROGGY, SLEPT 24 HOURS  7.5 MG FORGOT SHE WAS SLEEP EATING    Aspirin Nausea Only    Celebrex [Celecoxib] Other (comments)     TIGHT SQUEEZING IN CHEST  CHEST ACHED    Erythromycin Nausea and Vomiting    Milk Prot-Turm-Pepper-Pineappl Other (comments)     Multiple food allergies    Neurontin [Gabapentin] Other (comments)     300 mg  SEVERE LEG PAIN  EYE TWITCHING    Nsaids (Non-Steroidal Anti-Inflammatory Drug) Other (comments)     GI irritation          Review of Systems:    Pertinent items are noted in the History of Present Illness. Objective:     Visit Vitals  /78 (BP 1 Location: Left upper arm)   Pulse 87   Temp 97.2 °F (36.2 °C)   Resp 16   Ht 5' 8\" (1.727 m)   Wt 246 lb 14.6 oz (112 kg)   LMP  (LMP Unknown)   SpO2 98%   BMI 37.54 kg/m²       Physical Exam:    GENERAL: alert, cooperative, no distress, appears stated age, EYE: negative, THROAT & NECK: normal, LUNG: clear to auscultation bilaterally, HEART: regular rate and rhythm, ABDOMEN: Soft minimal right sided abdominal tenderness no rebound no guarding this is at the incision which is healing well., EXTREMITIES:  no edema, SKIN: Normal., NEUROLOGIC: negative, PSYCH: non focal    Imaging:  images and reports reviewed  CT- no acute process  Lab Review:    Recent Results (from the past 24 hour(s))   CBC WITH AUTOMATED DIFF    Collection Time: 07/04/21  7:20 AM   Result Value Ref Range    WBC 3.6 3.6 - 11.0 K/uL    RBC 4.76 3.80 - 5.20 M/uL    HGB 13.7 11.5 - 16.0 g/dL    HCT 41.0 35.0 - 47.0 %    MCV 86.1 80.0 - 99.0 FL    MCH 28.8 26.0 - 34.0 PG    MCHC 33.4 30.0 - 36.5 g/dL    RDW 13.0 11.5 - 14.5 %    PLATELET 846 144 - 657 K/uL    MPV 10.7 8.9 - 12.9 FL    NRBC 0.0 0  WBC    ABSOLUTE NRBC 0.00 0.00 - 0.01 K/uL    NEUTROPHILS 27 (L) 32 - 75 %    LYMPHOCYTES 53 (H) 12 - 49 %    MONOCYTES 17 (H) 5 - 13 %    EOSINOPHILS 2 0 - 7 %    BASOPHILS 1 0 - 1 %    IMMATURE GRANULOCYTES 0 0.0 - 0.5 %    ABS.  NEUTROPHILS 1.0 (L) 1.8 - 8.0 K/UL ABS. LYMPHOCYTES 1.9 0.8 - 3.5 K/UL    ABS. MONOCYTES 0.6 0.0 - 1.0 K/UL    ABS. EOSINOPHILS 0.1 0.0 - 0.4 K/UL    ABS. BASOPHILS 0.0 0.0 - 0.1 K/UL    ABS. IMM. GRANS. 0.0 0.00 - 0.04 K/UL    DF SMEAR SCANNED      RBC COMMENTS NORMOCYTIC, NORMOCHROMIC     METABOLIC PANEL, COMPREHENSIVE    Collection Time: 07/04/21  7:20 AM   Result Value Ref Range    Sodium 135 (L) 136 - 145 mmol/L    Potassium 3.6 3.5 - 5.1 mmol/L    Chloride 97 97 - 108 mmol/L    CO2 27 21 - 32 mmol/L    Anion gap 11 5 - 15 mmol/L    Glucose 94 65 - 100 mg/dL    BUN 8 6 - 20 MG/DL    Creatinine 0.81 0.55 - 1.02 MG/DL    BUN/Creatinine ratio 10 (L) 12 - 20      GFR est AA >60 >60 ml/min/1.73m2    GFR est non-AA >60 >60 ml/min/1.73m2    Calcium 10.3 (H) 8.5 - 10.1 MG/DL    Bilirubin, total 0.5 0.2 - 1.0 MG/DL    ALT (SGPT) 28 12 - 78 U/L    AST (SGOT) 31 15 - 37 U/L    Alk. phosphatase 83 45 - 117 U/L    Protein, total 8.6 (H) 6.4 - 8.2 g/dL    Albumin 3.6 3.5 - 5.0 g/dL    Globulin 5.0 (H) 2.0 - 4.0 g/dL    A-G Ratio 0.7 (L) 1.1 - 2.2     SAMPLES BEING HELD    Collection Time: 07/04/21  7:20 AM   Result Value Ref Range    SAMPLES BEING HELD 1red,1blu     COMMENT        Add-on orders for these samples will be processed based on acceptable specimen integrity and analyte stability, which may vary by analyte.    LIPASE    Collection Time: 07/04/21  7:20 AM   Result Value Ref Range    Lipase 213 73 - 393 U/L   TROPONIN I    Collection Time: 07/04/21  7:20 AM   Result Value Ref Range    Troponin-I, Qt. <0.05 <0.05 ng/mL   LACTIC ACID    Collection Time: 07/04/21  7:38 AM   Result Value Ref Range    Lactic acid 1.9 0.4 - 2.0 MMOL/L   EKG, 12 LEAD, INITIAL    Collection Time: 07/04/21  8:28 AM   Result Value Ref Range    Ventricular Rate 61 BPM    Atrial Rate 61 BPM    P-R Interval 200 ms    QRS Duration 82 ms    Q-T Interval 440 ms    QTC Calculation (Bezet) 442 ms    Calculated P Axis 58 degrees    Calculated R Axis 34 degrees    Calculated T Axis 35 degrees    Diagnosis       Normal sinus rhythm  When compared with ECG of 27-JUN-2021 21:41,  No significant change was found     URINALYSIS W/MICROSCOPIC    Collection Time: 07/04/21  9:59 AM   Result Value Ref Range    Color YELLOW/STRAW      Appearance CLEAR CLEAR      Specific gravity <1.005     pH (UA) 5.5 5.0 - 8.0      Protein Negative NEG mg/dL    Glucose Negative NEG mg/dL    Ketone 40 (A) NEG mg/dL    Bilirubin Negative NEG      Blood Negative NEG      Urobilinogen 0.2 0.2 - 1.0 EU/dL    Nitrites Negative NEG      Leukocyte Esterase Negative NEG      WBC 0-4 0 - 4 /hpf    RBC 0-5 0 - 5 /hpf    Epithelial cells FEW FEW /lpf    Bacteria Negative NEG /hpf   URINE CULTURE HOLD SAMPLE    Collection Time: 07/04/21  9:59 AM    Specimen: Serum; Urine   Result Value Ref Range    Urine culture hold        Urine on hold in Microbiology dept for 2 days. If unpreserved urine is submitted, it cannot be used for addtional testing after 24 hours, recollection will be required. Assessment:Plan    Dehydration nausea and vomiting S/p gastric bypass. As she is not able to tolerate enough liquids to keep herself hydrated she will be admitted to the hospital for at least observation. We will try to get an upper GI series to see if there is any sign of stricture.   Protonix  Multivitamin bag  Bariatric clear liquids  IV fluids

## 2021-07-04 NOTE — ED NOTES
TRANSFER - OUT REPORT:    Verbal report given to POORNIMA Salter (name) on [facility]  being transferred to [facility] (unit) for routine progression of care       Report consisted of patients Situation, Background, Assessment and   Recommendations(SBAR). Information from the following report(s) SBAR, Kardex, ED Summary, Intake/Output, MAR and Recent Results was reviewed with the receiving nurse. Lines:   Peripheral IV 07/04/21 Left Antecubital (Active)   Site Assessment Clean, dry, & intact 07/04/21 0724   Phlebitis Assessment 0 07/04/21 0724   Infiltration Assessment 0 07/04/21 0724   Dressing Status Clean, dry, & intact 07/04/21 0724   Hub Color/Line Status Pink 07/04/21 0724   Action Taken Blood drawn 07/04/21 1580        Opportunity for questions and clarification was provided.

## 2021-07-04 NOTE — ED TRIAGE NOTES
Patient arrives with CC of abdominal pain. She had gastric bypass 6/14 by Dr. Kyler Fleming. On June 17th she was admitted for ischemic bowel surgery. She stated that she currently has a pain on her right side of her abdomen. It is more painful when she walks than when she is sitting still. Addendum: patient endorses nausea and vomiting. She has been unable to keep her suggested fluid intake down.  She informed Dr. Julieta Teixeira and he instructed her to return to the ED

## 2021-07-05 ENCOUNTER — APPOINTMENT (OUTPATIENT)
Dept: GENERAL RADIOLOGY | Age: 56
End: 2021-07-05
Attending: SURGERY
Payer: COMMERCIAL

## 2021-07-05 LAB
ANION GAP SERPL CALC-SCNC: 8 MMOL/L (ref 5–15)
BASOPHILS # BLD: 0 K/UL (ref 0–0.1)
BASOPHILS NFR BLD: 0 % (ref 0–1)
BUN SERPL-MCNC: 5 MG/DL (ref 6–20)
BUN/CREAT SERPL: 8 (ref 12–20)
CALCIUM SERPL-MCNC: 8.6 MG/DL (ref 8.5–10.1)
CHLORIDE SERPL-SCNC: 106 MMOL/L (ref 97–108)
CO2 SERPL-SCNC: 26 MMOL/L (ref 21–32)
CREAT SERPL-MCNC: 0.63 MG/DL (ref 0.55–1.02)
DIFFERENTIAL METHOD BLD: ABNORMAL
EOSINOPHIL # BLD: 0.1 K/UL (ref 0–0.4)
EOSINOPHIL NFR BLD: 2 % (ref 0–7)
ERYTHROCYTE [DISTWIDTH] IN BLOOD BY AUTOMATED COUNT: 13.1 % (ref 11.5–14.5)
GLUCOSE BLD STRIP.AUTO-MCNC: 79 MG/DL (ref 65–117)
GLUCOSE SERPL-MCNC: 73 MG/DL (ref 65–100)
HCT VFR BLD AUTO: 31.5 % (ref 35–47)
HGB BLD-MCNC: 10.3 G/DL (ref 11.5–16)
IMM GRANULOCYTES # BLD AUTO: 0 K/UL (ref 0–0.04)
IMM GRANULOCYTES NFR BLD AUTO: 0 % (ref 0–0.5)
LYMPHOCYTES # BLD: 1.8 K/UL (ref 0.8–3.5)
LYMPHOCYTES NFR BLD: 56 % (ref 12–49)
MCH RBC QN AUTO: 28.5 PG (ref 26–34)
MCHC RBC AUTO-ENTMCNC: 32.7 G/DL (ref 30–36.5)
MCV RBC AUTO: 87.3 FL (ref 80–99)
MONOCYTES # BLD: 0.5 K/UL (ref 0–1)
MONOCYTES NFR BLD: 16 % (ref 5–13)
NEUTS SEG # BLD: 0.8 K/UL (ref 1.8–8)
NEUTS SEG NFR BLD: 26 % (ref 32–75)
NRBC # BLD: 0 K/UL (ref 0–0.01)
NRBC BLD-RTO: 0 PER 100 WBC
PLATELET # BLD AUTO: 246 K/UL (ref 150–400)
PMV BLD AUTO: 10.8 FL (ref 8.9–12.9)
POTASSIUM SERPL-SCNC: 3.7 MMOL/L (ref 3.5–5.1)
RBC # BLD AUTO: 3.61 M/UL (ref 3.8–5.2)
RBC MORPH BLD: ABNORMAL
SERVICE CMNT-IMP: NORMAL
SODIUM SERPL-SCNC: 140 MMOL/L (ref 136–145)
WBC # BLD AUTO: 3.2 K/UL (ref 3.6–11)

## 2021-07-05 PROCEDURE — 96372 THER/PROPH/DIAG INJ SC/IM: CPT

## 2021-07-05 PROCEDURE — 85025 COMPLETE CBC W/AUTO DIFF WBC: CPT

## 2021-07-05 PROCEDURE — 74011000250 HC RX REV CODE- 250: Performed by: SURGERY

## 2021-07-05 PROCEDURE — 77030036554

## 2021-07-05 PROCEDURE — 36415 COLL VENOUS BLD VENIPUNCTURE: CPT

## 2021-07-05 PROCEDURE — 80048 BASIC METABOLIC PNL TOTAL CA: CPT

## 2021-07-05 PROCEDURE — 96376 TX/PRO/DX INJ SAME DRUG ADON: CPT

## 2021-07-05 PROCEDURE — 74011250637 HC RX REV CODE- 250/637: Performed by: SURGERY

## 2021-07-05 PROCEDURE — 74240 X-RAY XM UPR GI TRC 1CNTRST: CPT

## 2021-07-05 PROCEDURE — 74011250636 HC RX REV CODE- 250/636: Performed by: SURGERY

## 2021-07-05 PROCEDURE — 99218 HC RM OBSERVATION: CPT

## 2021-07-05 PROCEDURE — 82962 GLUCOSE BLOOD TEST: CPT

## 2021-07-05 PROCEDURE — C9113 INJ PANTOPRAZOLE SODIUM, VIA: HCPCS | Performed by: SURGERY

## 2021-07-05 PROCEDURE — 74011000636 HC RX REV CODE- 636: Performed by: SURGERY

## 2021-07-05 RX ORDER — HYDROMORPHONE HYDROCHLORIDE 2 MG/1
2-4 TABLET ORAL
Status: DISCONTINUED | OUTPATIENT
Start: 2021-07-05 | End: 2021-07-07 | Stop reason: HOSPADM

## 2021-07-05 RX ADMIN — IOHEXOL 100 ML: 350 INJECTION, SOLUTION INTRAVENOUS at 11:01

## 2021-07-05 RX ADMIN — MORPHINE SULFATE 4 MG: 2 INJECTION, SOLUTION INTRAMUSCULAR; INTRAVENOUS at 00:01

## 2021-07-05 RX ADMIN — HYDROMORPHONE HYDROCHLORIDE 4 MG: 2 TABLET ORAL at 16:32

## 2021-07-05 RX ADMIN — ONDANSETRON 4 MG: 2 INJECTION INTRAMUSCULAR; INTRAVENOUS at 06:28

## 2021-07-05 RX ADMIN — HYDROMORPHONE HYDROCHLORIDE 2 MG: 2 TABLET ORAL at 12:24

## 2021-07-05 RX ADMIN — SODIUM CHLORIDE, POTASSIUM CHLORIDE, SODIUM LACTATE AND CALCIUM CHLORIDE 100 ML/HR: 600; 310; 30; 20 INJECTION, SOLUTION INTRAVENOUS at 06:28

## 2021-07-05 RX ADMIN — ONDANSETRON 4 MG: 2 INJECTION INTRAMUSCULAR; INTRAVENOUS at 21:50

## 2021-07-05 RX ADMIN — SODIUM CHLORIDE, POTASSIUM CHLORIDE, SODIUM LACTATE AND CALCIUM CHLORIDE 100 ML/HR: 600; 310; 30; 20 INJECTION, SOLUTION INTRAVENOUS at 15:54

## 2021-07-05 RX ADMIN — ENOXAPARIN SODIUM 40 MG: 100 INJECTION SUBCUTANEOUS at 10:49

## 2021-07-05 RX ADMIN — ACETAMINOPHEN 650 MG: 325 TABLET ORAL at 03:42

## 2021-07-05 RX ADMIN — SODIUM CHLORIDE 40 MG: 9 INJECTION INTRAMUSCULAR; INTRAVENOUS; SUBCUTANEOUS at 09:17

## 2021-07-05 RX ADMIN — FOLIC ACID: 5 INJECTION, SOLUTION INTRAMUSCULAR; INTRAVENOUS; SUBCUTANEOUS at 09:33

## 2021-07-05 RX ADMIN — Medication 10 ML: at 21:50

## 2021-07-05 RX ADMIN — ONDANSETRON 4 MG: 2 INJECTION INTRAMUSCULAR; INTRAVENOUS at 15:54

## 2021-07-05 RX ADMIN — HYDROMORPHONE HYDROCHLORIDE 4 MG: 2 TABLET ORAL at 22:35

## 2021-07-05 NOTE — PROGRESS NOTES
Progress Note    Patient: Coby Lara MRN: [id number]  SSN: [id number]    YOB: 1965  Age: 64 y.o. Sex: female      Admit Date: 2021    Subjective:     Patient complains of right-sided abdominal pain, worse with walking; she has been tolerating liquids (abdominal pain not worsen with intake of liquids. Objective:     Visit Vitals  BP 97/60 (BP 1 Location: Right upper arm, BP Patient Position: At rest)   Pulse 63   Temp 98.2 °F (36.8 °C)   Resp 16   Ht 5' 8\" (1.727 m)   Wt 246 lb 14.6 oz (112 kg)   SpO2 97%   BMI 37.54 kg/m²       Temp (24hrs), Av.2 °F (36.8 °C), Min:97.9 °F (36.6 °C), Max:98.4 °F (36.9 °C)      Physical Exam:    ABDOMEN: Obese, non-distended, soft. Wounds dry & intact. Right-sided abdominal pain with palpation; no mass or hernia.     Data Review: VS, I/O's, Labs, UGI series    Lab Review:   Recent Results (from the past 24 hour(s))   METABOLIC PANEL, BASIC    Collection Time: 21 12:15 AM   Result Value Ref Range    Sodium 140 136 - 145 mmol/L    Potassium 3.7 3.5 - 5.1 mmol/L    Chloride 106 97 - 108 mmol/L    CO2 26 21 - 32 mmol/L    Anion gap 8 5 - 15 mmol/L    Glucose 73 65 - 100 mg/dL    BUN 5 (L) 6 - 20 MG/DL    Creatinine 0.63 0.55 - 1.02 MG/DL    BUN/Creatinine ratio 8 (L) 12 - 20      GFR est AA >60 >60 ml/min/1.73m2    GFR est non-AA >60 >60 ml/min/1.73m2    Calcium 8.6 8.5 - 10.1 MG/DL   CBC WITH AUTOMATED DIFF    Collection Time: 21 12:15 AM   Result Value Ref Range    WBC 3.2 (L) 3.6 - 11.0 K/uL    RBC 3.61 (L) 3.80 - 5.20 M/uL    HGB 10.3 (L) 11.5 - 16.0 g/dL    HCT 31.5 (L) 35.0 - 47.0 %    MCV 87.3 80.0 - 99.0 FL    MCH 28.5 26.0 - 34.0 PG    MCHC 32.7 30.0 - 36.5 g/dL    RDW 13.1 11.5 - 14.5 %    PLATELET 359 753 - 575 K/uL    MPV 10.8 8.9 - 12.9 FL    NRBC 0.0 0  WBC    ABSOLUTE NRBC 0.00 0.00 - 0.01 K/uL    NEUTROPHILS 26 (L) 32 - 75 %    LYMPHOCYTES 56 (H) 12 - 49 %    MONOCYTES 16 (H) 5 - 13 %    EOSINOPHILS 2 0 - 7 % BASOPHILS 0 0 - 1 %    IMMATURE GRANULOCYTES 0 0.0 - 0.5 %    ABS. NEUTROPHILS 0.8 (L) 1.8 - 8.0 K/UL    ABS. LYMPHOCYTES 1.8 0.8 - 3.5 K/UL    ABS. MONOCYTES 0.5 0.0 - 1.0 K/UL    ABS. EOSINOPHILS 0.1 0.0 - 0.4 K/UL    ABS. BASOPHILS 0.0 0.0 - 0.1 K/UL    ABS. IMM. GRANS. 0.0 0.00 - 0.04 K/UL    DF SMEAR SCANNED      RBC COMMENTS NORMOCYTIC, NORMOCHROMIC     GLUCOSE, POC    Collection Time: 07/05/21  6:05 AM   Result Value Ref Range    Glucose (POC) 79 65 - 117 mg/dL    Performed by Nicholas Bolden   PCT          Assessment:     Hospital Problems  Date Reviewed: 6/14/2021        Codes Class Noted POA    Nausea and vomiting ICD-10-CM: R11.2  ICD-9-CM: 787.01  7/4/2021 Unknown            No leak or stricture on CT, UGI series. No incisional hernia or signs of wound infection. Plan/Recommendations/Medical Decision Making:     Bariatric liquids as tolerated. Oral analgesics. K-Pad to abdominal wall. Ambulation, out of bed in chair.

## 2021-07-05 NOTE — PROGRESS NOTES
Problem: Falls - Risk of  Goal: *Absence of Falls  Description: Document Nieves Suma Fall Risk and appropriate interventions in the flowsheet.   Outcome: Progressing Towards Goal  Note: Fall Risk Interventions:            Medication Interventions: Patient to call before getting OOB, Teach patient to arise slowly                   Problem: Patient Education: Go to Patient Education Activity  Goal: Patient/Family Education  Outcome: Progressing Towards Goal

## 2021-07-05 NOTE — PROGRESS NOTES
Patient requesting to be seen by Dr Markos Anguiano. Patient is concerned about abdominal pain of unknown origin and concerned about being discharged and returning to hospital with same problem. Julia RUIBE paged x2, no answer from answering service. Perfect serve sent to Julia URIBE re:persistent abd pain, which is not new and is reason for admission. Spoke with Julia URIBE who offered reassurance, update, and encouragement by phone & RN relayed to patient. Patient had loose BM after oral contrast.    Patient walked 3 laps after conversation with Julia URIBE.    Bedside shift change report given to Jose Angel (oncoming nurse) by Cleo Pierre (offgoing nurse). Report included the following information SBAR, Kardex, Intake/Output, MAR and Recent Results.

## 2021-07-05 NOTE — PROGRESS NOTES
SASHA PLAN:  RUR-N/A  Disposition-Home with family  Transportation by daughter  F/U with PCP/Specialist    Observation notice provided in writing to patient and/or caregiver as well as verbal explanation of the policy. Patients who are in outpatient status also receive the Observation notice. Patient has received notice and or patient representative has received via secure email, fax, or certified mail based on patient representative's preference. Patient did not voice any discharge needs. He is a [employer] Employee and she verified her demographics information. MANJU Lee, RN,CRM. Care Management Interventions  PCP Verified by CM: Yes  Palliative Care Criteria Met (RRAT>21 & CHF Dx)?: No  Mode of Transport at Discharge:  Other (see comment) (Private car)  Transition of Care Consult (CM Consult): Discharge Planning  MyChart Signup: No  Discharge Durable Medical Equipment: No  Health Maintenance Reviewed: Yes  Physical Therapy Consult: No  Occupational Therapy Consult: No  Speech Therapy Consult: No  Current Support Network: Lives Alone  Confirm Follow Up Transport: Family  Discharge Location  Discharge Placement: Home with family assistance

## 2021-07-06 LAB
ANION GAP SERPL CALC-SCNC: 9 MMOL/L (ref 5–15)
BASOPHILS # BLD: 0 K/UL (ref 0–0.1)
BASOPHILS NFR BLD: 1 % (ref 0–1)
BUN SERPL-MCNC: 2 MG/DL (ref 6–20)
BUN/CREAT SERPL: 4 (ref 12–20)
CALCIUM SERPL-MCNC: 8.8 MG/DL (ref 8.5–10.1)
CHLORIDE SERPL-SCNC: 105 MMOL/L (ref 97–108)
CO2 SERPL-SCNC: 26 MMOL/L (ref 21–32)
CREAT SERPL-MCNC: 0.5 MG/DL (ref 0.55–1.02)
DIFFERENTIAL METHOD BLD: ABNORMAL
EOSINOPHIL # BLD: 0.1 K/UL (ref 0–0.4)
EOSINOPHIL NFR BLD: 2 % (ref 0–7)
ERYTHROCYTE [DISTWIDTH] IN BLOOD BY AUTOMATED COUNT: 13.1 % (ref 11.5–14.5)
GLUCOSE SERPL-MCNC: 64 MG/DL (ref 65–100)
HCT VFR BLD AUTO: 32.4 % (ref 35–47)
HGB BLD-MCNC: 10.6 G/DL (ref 11.5–16)
IMM GRANULOCYTES # BLD AUTO: 0 K/UL (ref 0–0.04)
IMM GRANULOCYTES NFR BLD AUTO: 0 % (ref 0–0.5)
LYMPHOCYTES # BLD: 2.3 K/UL (ref 0.8–3.5)
LYMPHOCYTES NFR BLD: 64 % (ref 12–49)
MCH RBC QN AUTO: 29 PG (ref 26–34)
MCHC RBC AUTO-ENTMCNC: 32.7 G/DL (ref 30–36.5)
MCV RBC AUTO: 88.5 FL (ref 80–99)
MONOCYTES # BLD: 0.5 K/UL (ref 0–1)
MONOCYTES NFR BLD: 14 % (ref 5–13)
NEUTS SEG # BLD: 0.7 K/UL (ref 1.8–8)
NEUTS SEG NFR BLD: 19 % (ref 32–75)
NRBC # BLD: 0 K/UL (ref 0–0.01)
NRBC BLD-RTO: 0 PER 100 WBC
PLATELET # BLD AUTO: 239 K/UL (ref 150–400)
PMV BLD AUTO: 11.4 FL (ref 8.9–12.9)
POTASSIUM SERPL-SCNC: 3.5 MMOL/L (ref 3.5–5.1)
RBC # BLD AUTO: 3.66 M/UL (ref 3.8–5.2)
RBC MORPH BLD: ABNORMAL
SODIUM SERPL-SCNC: 140 MMOL/L (ref 136–145)
WBC # BLD AUTO: 3.6 K/UL (ref 3.6–11)

## 2021-07-06 PROCEDURE — 85025 COMPLETE CBC W/AUTO DIFF WBC: CPT

## 2021-07-06 PROCEDURE — 96376 TX/PRO/DX INJ SAME DRUG ADON: CPT

## 2021-07-06 PROCEDURE — C9113 INJ PANTOPRAZOLE SODIUM, VIA: HCPCS | Performed by: SURGERY

## 2021-07-06 PROCEDURE — 74011250636 HC RX REV CODE- 250/636: Performed by: SURGERY

## 2021-07-06 PROCEDURE — 99224 PR SBSQ OBSERVATION CARE/DAY 15 MINUTES: CPT | Performed by: SURGERY

## 2021-07-06 PROCEDURE — 96375 TX/PRO/DX INJ NEW DRUG ADDON: CPT

## 2021-07-06 PROCEDURE — 80048 BASIC METABOLIC PNL TOTAL CA: CPT

## 2021-07-06 PROCEDURE — 74011250637 HC RX REV CODE- 250/637: Performed by: SURGERY

## 2021-07-06 PROCEDURE — 36415 COLL VENOUS BLD VENIPUNCTURE: CPT

## 2021-07-06 PROCEDURE — 99218 HC RM OBSERVATION: CPT

## 2021-07-06 PROCEDURE — 74011000250 HC RX REV CODE- 250: Performed by: SURGERY

## 2021-07-06 PROCEDURE — 96372 THER/PROPH/DIAG INJ SC/IM: CPT

## 2021-07-06 RX ADMIN — Medication 10 ML: at 14:00

## 2021-07-06 RX ADMIN — Medication 10 ML: at 22:30

## 2021-07-06 RX ADMIN — HYDROMORPHONE HYDROCHLORIDE 4 MG: 2 TABLET ORAL at 03:49

## 2021-07-06 RX ADMIN — FOLIC ACID: 5 INJECTION, SOLUTION INTRAMUSCULAR; INTRAVENOUS; SUBCUTANEOUS at 13:03

## 2021-07-06 RX ADMIN — HYDROMORPHONE HYDROCHLORIDE 2 MG: 2 TABLET ORAL at 10:42

## 2021-07-06 RX ADMIN — DIPHENHYDRAMINE HYDROCHLORIDE 12.5 MG: 50 INJECTION INTRAMUSCULAR; INTRAVENOUS at 22:38

## 2021-07-06 RX ADMIN — HYDROMORPHONE HYDROCHLORIDE 2 MG: 2 TABLET ORAL at 16:46

## 2021-07-06 RX ADMIN — ENOXAPARIN SODIUM 40 MG: 100 INJECTION SUBCUTANEOUS at 10:54

## 2021-07-06 RX ADMIN — SODIUM CHLORIDE 40 MG: 9 INJECTION INTRAMUSCULAR; INTRAVENOUS; SUBCUTANEOUS at 10:47

## 2021-07-06 RX ADMIN — SODIUM CHLORIDE, POTASSIUM CHLORIDE, SODIUM LACTATE AND CALCIUM CHLORIDE 100 ML/HR: 600; 310; 30; 20 INJECTION, SOLUTION INTRAVENOUS at 23:35

## 2021-07-06 NOTE — CONSULTS
[address]    Name:  Edward Trammell  MR#:  [id number]  :  1965  ACCOUNT #:  [de-identified]  DATE OF SERVICE:  2021    REASON FOR ADMISSION:  Abdominal discomfort following laparoscopic gastric bypass. REASON FOR CONSULTATION:  Lymphocytosis, leukopenia. HISTORY OF PRESENT ILLNESS:  The patient is a very pleasant 31-year-old woman with a history of hypertension as well as increased BMI, refractory to conservative management. She underwent laparoscopic gastric bypass surgery approximately three weeks ago and has had some expected postoperative hurdles. She was admitted several days ago for abdominal discomfort and reduced p.o. intake. She is doing much better now and has been able to tolerate a liquid diet and tolerate some pureed foods today. Over the course of her admission, she was noted to have a rising lymphocyte count along with declining white blood cell count, and our service was asked to see for further evaluation and treatment. Interestingly, on review of her historical labs in the [facility] system, she had on 07/10/2019, a lymphocyte percent of 66 with a white count of 4.1. Further before that in  and , her lymphocyte-neutrophil ratio was normal.  She denies any enlarged lymph nodes in her neck and in the groin. PAST MEDICAL HISTORY:  1. Hypertension. 2.  Impaired glucose tolerance. 3.  Vitamin D deficiency. 4.  Hyperlipidemia. 5.  Allergic rhinitis. 6.  Herniated cervical disk. 7.  Status post cholecystectomy. 8.  Previous D and C.  9.  Prior rotator cuff tendinitis. 10.  GERD. ALLERGIES:  MULTIPLE, SEE CHART. CURRENT MEDICATIONS:  1. Lovenox 40 mg subcu. 2.  Cozaar 50 mg daily. 3.  Protonix 40 mg once daily. 4.  Inderal LA 80 mg once daily. SOCIAL HISTORY:  She is . She has three children. 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 She is a never smoker.     FAMILY HISTORY:  Uncle with prostate cancer. REVIEW OF SYSTEMS:  GENERAL:  No fevers or chills. HEENT:  No auditory or visual change. LYMPHATIC:  No lumps or bumps in neck, underarm, or groin. CARDIOVASCULAR:  No chest pain or palpitations. PULMONARY:  No cough or shortness of breath. GASTROINTESTINAL:  As above. GENITOURINARY:  No dysuria or incontinence. SKIN:  No rash or changing mole. MUSCULOSKELETAL:  No red or swollen joints. NEUROLOGIC:  No irritability or syncope. PHYSICAL EXAMINATION:  VITAL SIGNS:  /52, pulse 69, temperature 98.4, sating 97% on room air. GENERAL:  Pleasant, in no acute distress. HEENT:  Sclerae anicteric. Oropharynx clear. NECK:  Supple. No lymphadenopathy or thyromegaly. HEART:  Regular rate and rhythm without murmur, rub, or gallop. LUNGS:  Clear to auscultation bilaterally. ABDOMEN:  Nontender and nondistended. Normoactive bowel sounds. No hepatosplenomegaly. EXTREMITIES:  No clubbing, cyanosis, or edema. PSYCH:  Normal mood and affect. Alert and oriented x3. ASSESSMENT AND PLAN:  A 59-year-old woman with a history of hypertension and increased body mass index, having failed conservative management, underwent laparoscopic gastric bypass approximately three weeks ago, now admitted with right-sided abdominal pain and reduced p.o. intake, improving since admission. Noted to have declining white blood cell count, but also lymphocytosis. 1.  Lymphocytosis:  I am considering she may have undiagnosed chronic lymphocytic leukemia. I have spoken with her about this possibility as well as other diagnoses, and we will draw blood tomorrow morning for flow cytometry as a diagnostic test of choice for MARY KAY, along with B12 level, folate level, copper level, hepatitis C serology, hepatitis B serology, and we will follow daily. I have given her my name and contact information as she is felt to be safe for discharge before all of her lab are resulted. Thank you for consult.       TREY GUEVARA Jennifer Santana MD      [id number]  D:  07/06/2021 17:43  T:  07/06/2021 19:55  JOB #:  [id number]

## 2021-07-06 NOTE — PROGRESS NOTES
Progress Note    Patient: Juvencio Grant MRN: [id number]  SSN: [id number]    YOB: 1965  Age: 64 y.o. Sex: female      Admit Date: 2021      Subjective:     Patient notes ongoing right-sided abdominal pain, at 12 mm trocar scar, worse with changing positions; binder seem to help yesterday. She took in 40 ounces of liquids yesterday and sat in the chair. She walked 3 laps in the emlendez. Objective:     Visit Vitals  /78 (BP 1 Location: Right upper arm, BP Patient Position: At rest)   Pulse 67   Temp 98.3 °F (36.8 °C)   Resp 16   Ht 5' 8\" (1.727 m)   Wt 246 lb 14.6 oz (112 kg)   SpO2 97%   BMI 37.54 kg/m²       Temp (24hrs), Av.5 °F (36.9 °C), Min:98.3 °F (36.8 °C), Max:98.8 °F (37.1 °C)      Physical Exam:    ABDOMEN: Obese, nondistended, soft. Laparoscopic wounds healed without signs of infection. Right sided pain with palpation.     Data Review: Vital signs, ins/outs, labs    Lab Review:   Recent Results (from the past 12 hour(s))   METABOLIC PANEL, BASIC    Collection Time: 21  3:52 AM   Result Value Ref Range    Sodium 140 136 - 145 mmol/L    Potassium 3.5 3.5 - 5.1 mmol/L    Chloride 105 97 - 108 mmol/L    CO2 26 21 - 32 mmol/L    Anion gap 9 5 - 15 mmol/L    Glucose 64 (L) 65 - 100 mg/dL    BUN 2 (L) 6 - 20 MG/DL    Creatinine 0.50 (L) 0.55 - 1.02 MG/DL    BUN/Creatinine ratio 4 (L) 12 - 20      GFR est AA >60 >60 ml/min/1.73m2    GFR est non-AA >60 >60 ml/min/1.73m2    Calcium 8.8 8.5 - 10.1 MG/DL   CBC WITH AUTOMATED DIFF    Collection Time: 21  3:52 AM   Result Value Ref Range    WBC 3.6 3.6 - 11.0 K/uL    RBC 3.66 (L) 3.80 - 5.20 M/uL    HGB 10.6 (L) 11.5 - 16.0 g/dL    HCT 32.4 (L) 35.0 - 47.0 %    MCV 88.5 80.0 - 99.0 FL    MCH 29.0 26.0 - 34.0 PG    MCHC 32.7 30.0 - 36.5 g/dL    RDW 13.1 11.5 - 14.5 %    PLATELET 615 274 - 524 K/uL    MPV 11.4 8.9 - 12.9 FL    NRBC 0.0 0  WBC    ABSOLUTE NRBC 0.00 0.00 - 0.01 K/uL    NEUTROPHILS 19 (L) 32 - 75 % LYMPHOCYTES 64 (H) 12 - 49 %    MONOCYTES 14 (H) 5 - 13 %    EOSINOPHILS 2 0 - 7 %    BASOPHILS 1 0 - 1 %    IMMATURE GRANULOCYTES 0 0.0 - 0.5 %    ABS. NEUTROPHILS 0.7 (L) 1.8 - 8.0 K/UL    ABS. LYMPHOCYTES 2.3 0.8 - 3.5 K/UL    ABS. MONOCYTES 0.5 0.0 - 1.0 K/UL    ABS. EOSINOPHILS 0.1 0.0 - 0.4 K/UL    ABS. BASOPHILS 0.0 0.0 - 0.1 K/UL    ABS. IMM. GRANS. 0.0 0.00 - 0.04 K/UL    DF SMEAR SCANNED      RBC COMMENTS NORMOCYTIC, NORMOCHROMIC           Assessment:     Hospital Problems  Date Reviewed: 6/14/2021        Codes Class Noted POA    Nausea and vomiting ICD-10-CM: R11.2  ICD-9-CM: 787.01  7/4/2021 Unknown            Intake of liquids is better. She desires to move to puréed textures again. She has relative leukopenia with neutropenia, lymphocytosis. This has progressed since readmission. Plan/Recommendations/Medical Decision Making:     Bariatric puréed texture diet. Analgesics, antiemetics as needed. Abdominal binder as needed. Out of bed in chair, walking. Hematology consult.

## 2021-07-06 NOTE — CONSULTS
Patient seen, chart reviewed, note dictated. Very pleasant 63 y/o woman with a h/o HTN and increased BMI having failed conservative management. Underwent lap gastric bypass ~ 3 weeks ago. Now admitted with right sided pain and reduced po intake. Noted to have declining WBC, but also lymphocytosis. 1. Lymphocytosis: I am concerned she may have undiagnosed CLL. I have spoken with her about this possibility as well as other and will draw blood tomorrow AM for flow cytometry (the diagnostic test of choice for CLL), along with B-12, folate, copper, HCV Ab, Hep B Ag/Ab/core Ab and will follow daily. I have given her may name and contact information if she is felt to be safe for discharge before all of her labs are resulted. Thank you for consult.   [id number]    Luna Mcgregor MD  Hem/Onc

## 2021-07-07 ENCOUNTER — PATIENT OUTREACH (OUTPATIENT)
Dept: OTHER | Age: 56
End: 2021-07-07

## 2021-07-07 VITALS
RESPIRATION RATE: 16 BRPM | SYSTOLIC BLOOD PRESSURE: 123 MMHG | OXYGEN SATURATION: 97 % | BODY MASS INDEX: 37.42 KG/M2 | DIASTOLIC BLOOD PRESSURE: 83 MMHG | WEIGHT: 246.91 LBS | HEIGHT: 68 IN | TEMPERATURE: 98.4 F | HEART RATE: 70 BPM

## 2021-07-07 LAB
ANION GAP SERPL CALC-SCNC: 9 MMOL/L (ref 5–15)
BASOPHILS # BLD: 0 K/UL (ref 0–0.1)
BASOPHILS NFR BLD: 1 % (ref 0–1)
BUN SERPL-MCNC: 2 MG/DL (ref 6–20)
BUN/CREAT SERPL: 4 (ref 12–20)
CALCIUM SERPL-MCNC: 8.8 MG/DL (ref 8.5–10.1)
CHLORIDE SERPL-SCNC: 105 MMOL/L (ref 97–108)
CO2 SERPL-SCNC: 27 MMOL/L (ref 21–32)
CREAT SERPL-MCNC: 0.49 MG/DL (ref 0.55–1.02)
DIFFERENTIAL METHOD BLD: ABNORMAL
EOSINOPHIL # BLD: 0.1 K/UL (ref 0–0.4)
EOSINOPHIL NFR BLD: 3 % (ref 0–7)
ERYTHROCYTE [DISTWIDTH] IN BLOOD BY AUTOMATED COUNT: 13 % (ref 11.5–14.5)
FOLATE SERPL-MCNC: 23 NG/ML (ref 5–21)
GLUCOSE SERPL-MCNC: 74 MG/DL (ref 65–100)
HBV SURFACE AB SER QL: NONREACTIVE
HBV SURFACE AB SER-ACNC: 6.99 MIU/ML
HBV SURFACE AG SER QL: <0.1 INDEX
HBV SURFACE AG SER QL: NEGATIVE
HCT VFR BLD AUTO: 31.9 % (ref 35–47)
HGB BLD-MCNC: 10.5 G/DL (ref 11.5–16)
IMM GRANULOCYTES # BLD AUTO: 0 K/UL (ref 0–0.04)
IMM GRANULOCYTES NFR BLD AUTO: 0 % (ref 0–0.5)
LYMPHOCYTES # BLD: 1.8 K/UL (ref 0.8–3.5)
LYMPHOCYTES NFR BLD: 58 % (ref 12–49)
MCH RBC QN AUTO: 28.9 PG (ref 26–34)
MCHC RBC AUTO-ENTMCNC: 32.9 G/DL (ref 30–36.5)
MCV RBC AUTO: 87.9 FL (ref 80–99)
MONOCYTES # BLD: 0.4 K/UL (ref 0–1)
MONOCYTES NFR BLD: 13 % (ref 5–13)
NEUTS SEG # BLD: 0.8 K/UL (ref 1.8–8)
NEUTS SEG NFR BLD: 25 % (ref 32–75)
NRBC # BLD: 0 K/UL (ref 0–0.01)
NRBC BLD-RTO: 0 PER 100 WBC
PLATELET # BLD AUTO: 230 K/UL (ref 150–400)
PMV BLD AUTO: 11.3 FL (ref 8.9–12.9)
POTASSIUM SERPL-SCNC: 3.3 MMOL/L (ref 3.5–5.1)
RBC # BLD AUTO: 3.63 M/UL (ref 3.8–5.2)
RBC MORPH BLD: ABNORMAL
SODIUM SERPL-SCNC: 141 MMOL/L (ref 136–145)
VIT B12 SERPL-MCNC: >2000 PG/ML (ref 193–986)
WBC # BLD AUTO: 3.1 K/UL (ref 3.6–11)

## 2021-07-07 PROCEDURE — 99224 PR SBSQ OBSERVATION CARE/DAY 15 MINUTES: CPT | Performed by: SURGERY

## 2021-07-07 PROCEDURE — 99218 HC RM OBSERVATION: CPT

## 2021-07-07 PROCEDURE — 88185 FLOWCYTOMETRY/TC ADD-ON: CPT

## 2021-07-07 PROCEDURE — 96376 TX/PRO/DX INJ SAME DRUG ADON: CPT

## 2021-07-07 PROCEDURE — 85025 COMPLETE CBC W/AUTO DIFF WBC: CPT

## 2021-07-07 PROCEDURE — 74011250637 HC RX REV CODE- 250/637: Performed by: SURGERY

## 2021-07-07 PROCEDURE — 86704 HEP B CORE ANTIBODY TOTAL: CPT

## 2021-07-07 PROCEDURE — 74011250636 HC RX REV CODE- 250/636: Performed by: SURGERY

## 2021-07-07 PROCEDURE — 36415 COLL VENOUS BLD VENIPUNCTURE: CPT

## 2021-07-07 PROCEDURE — 87340 HEPATITIS B SURFACE AG IA: CPT

## 2021-07-07 PROCEDURE — 82607 VITAMIN B-12: CPT

## 2021-07-07 PROCEDURE — 82746 ASSAY OF FOLIC ACID SERUM: CPT

## 2021-07-07 PROCEDURE — 86803 HEPATITIS C AB TEST: CPT

## 2021-07-07 PROCEDURE — C9113 INJ PANTOPRAZOLE SODIUM, VIA: HCPCS | Performed by: SURGERY

## 2021-07-07 PROCEDURE — 96372 THER/PROPH/DIAG INJ SC/IM: CPT

## 2021-07-07 PROCEDURE — 86706 HEP B SURFACE ANTIBODY: CPT

## 2021-07-07 PROCEDURE — 88184 FLOWCYTOMETRY/ TC 1 MARKER: CPT

## 2021-07-07 PROCEDURE — 74011000250 HC RX REV CODE- 250: Performed by: SURGERY

## 2021-07-07 PROCEDURE — 80048 BASIC METABOLIC PNL TOTAL CA: CPT

## 2021-07-07 PROCEDURE — 82525 ASSAY OF COPPER: CPT

## 2021-07-07 RX ORDER — HYDROMORPHONE HYDROCHLORIDE 2 MG/1
2-4 TABLET ORAL
Qty: 25 TABLET | Refills: 0 | Status: SHIPPED | OUTPATIENT
Start: 2021-07-07 | End: 2021-07-14

## 2021-07-07 RX ADMIN — Medication 10 ML: at 07:28

## 2021-07-07 RX ADMIN — ENOXAPARIN SODIUM 40 MG: 100 INJECTION SUBCUTANEOUS at 13:13

## 2021-07-07 RX ADMIN — HYDROMORPHONE HYDROCHLORIDE 2 MG: 2 TABLET ORAL at 04:34

## 2021-07-07 RX ADMIN — SODIUM CHLORIDE, POTASSIUM CHLORIDE, SODIUM LACTATE AND CALCIUM CHLORIDE 100 ML/HR: 600; 310; 30; 20 INJECTION, SOLUTION INTRAVENOUS at 09:36

## 2021-07-07 RX ADMIN — SODIUM CHLORIDE 40 MG: 9 INJECTION INTRAMUSCULAR; INTRAVENOUS; SUBCUTANEOUS at 08:41

## 2021-07-07 RX ADMIN — HYDROMORPHONE HYDROCHLORIDE 2 MG: 2 TABLET ORAL at 08:41

## 2021-07-07 NOTE — PROGRESS NOTES
I have reviewed discharge instructions with the patient. The patient verbalized understanding. Discharge medications reviewed with patient and appropriate educational materials and side effects teaching were provided. Patient signed discharge paperwork. Patient's IV was taken out.

## 2021-07-07 NOTE — PROGRESS NOTES
Briefly spoke with patient. Patient at [facility], admitted to observation status for nausea and vomiting. She is doing better, still has the pain on her side but her surgeon feels it the stitches \"pulling\". Let her know I would follow up with her after discharge.

## 2021-07-07 NOTE — PROGRESS NOTES
Bedside shift change report given to Miya Ramos RN (oncoming nurse) by Marcella Gómez RN (offgoing nurse). Report included the following information SBAR, Kardex, Intake/Output, MAR and Recent Results.

## 2021-07-07 NOTE — DISCHARGE INSTRUCTIONS
[employer] [facility]  Bariatric Surgery Discharge Instructions         Future Appointments   Date Time Provider Jana Carmita   7/15/2021  3:40 PM JANIE Zamora AMB   7/29/2021  3:20 PM JANIE Zamora AMB   9/15/2021  9:00 AM MD JB Vasquez BS AMB   Follow-up with Dr. Maira Harmon from [facility] next week      Contact Information:    [employer] [facility], [address] [facility], [address]  [phone]    After Hours and Weekends  [phone] On Call Surgeon    Non Emergent Medical Needs  Call during office hours or send a message via My Chart   (messages returned during business hours)    DIET    Please remember that you are on ONLY LIQUIDS for the first 2 weeks after surgery. Do not advance to the next phase until advised by your surgeon or Nurse Practitioner. Refer to the Bariatric Handbook for detailed information. TO PREVENT DEHYDRATION:  consume 48-64 ounces of liquids daily. At least 48 ounces of that should come from water, Crystal Light, sugar free popsicles, sugar free gelatin or other calorie-free, sugar-free, caffeine free and noncarbonated beverages. Do not drink with a straw.  Sip, sip, sip throughout the day   Main priority is to stay hydrated   Aim for 60 grams of protein every day. Most of your protein will come from shakes. Refer to the Bariatric Handbook for detailed information.    Add additional protein supplements to meet protein needs (protein powder, clear protein such as protein water, non-fat dry milk powder, NO protein bars at this at this stage)     MEDICATIONS & VITAMINS     Pre-surgery medications should be reviewed with your Bariatric provider and taken as prescribed    Take no more than 2 pills at a time and wait 15-20 minutes between pills       Pain Medication  The first few days home, you may require narcotic pain medication to manage your pain. Take this medication only as prescribed. If your pain is mild to moderate, try taking Acetaminophen (Tylenol) 500 mg 1-2 tablets every 8 hours or as directed by your provider. Avoid taking antiinflammatory medications (NSAID'S) such as Ibuprofen (Motrin, Advil) or Naproxen (Aleve). These medications can be harmful to your stomach and cause bleeding and ulcers. There is a complete list of NSAID medications to AVOID in your handbook. Abdominal support (Spanx or body shaper) and heat (heating pad on low setting) are very helpful in managing pain after surgery. Acid Reducing (\"heartburn/reflux\") Medication   Acid reducing medicine should have been prescribed at your pre-surgery visit. It is recommended you take this medication every day even if you have no symptoms of reflux or heartburn. If you were previously on a medication for reflux/heartburn you should continue the medication daily. *It is common to experience reflux or heartburn after sleeve gastrectomy. These symptoms can usually be managed with medication, diet and behavior changes. In most cases symptoms improve or resolve after a few weeks to a couple of months. Nausea Medication  You should have been prescribed medication for nausea at your pre-surgery visit. If you are experiencing nausea, please take the medication as prescribed to try and get relief. If the nausea medication is not effective, please call your surgeon's office. Constipation   Constipation can be caused by pain medication and reduced food and water intake. Drink at least 64 oz. fluid. OK to use OTC medications such as Benefiber, Milk of Magnesia, Dulcolax, Miralax, senna       Vitamins   Calcium Citrate with Vitamin D-3 - Take 1200--1500 mg  each day. Divide doses throughout the day. Do not take more than 600 mg at one time. Take at least 2 hours before or after your multivitamin and/or iron supplement.   Multivitamin containing Iron - 2 multivitamins with 100% Daily Value of Iron, Folic Acid and Thiamine   Vitamin D-3 - Take 3000 IU  per day  Vitamin B-12 - Oral or Sublingual: 350-500 mcg/day OR 1000 mcg Monthly intramuscular shot        ACTIVITY     Be active. Sit up as much as possible. Walk often. Walking and/or foot exercises will help prevent blood clots.  Continue to sip liquids throughout the day   Continue to use your incentive spirometer 4 to 5 times per day   Keep your incisions clean and dry to prevent infection.  Showering is ok. No submersion in water for 2 weeks (No tubs, pools, etc.)   Weight lifting restrictions:  10 lbs. for the first 2 weeks, 20 lbs. for the next 4 to 6 weeks    TOP REASONS TO CONTACT YOUR SURGEONS'S OFFICE     You have severe pain or discomfort unrelieved by pain medication.  You have been vomiting for more than 24 hours. Call sooner if you are unable to drink any fluids.  Temperature rises above 101 degrees.  You have persistent nausea and/or vomiting.  You are unable to swallow liquids    Increased swelling, redness, or drainage from your incision sites.

## 2021-07-07 NOTE — PROGRESS NOTES
Tolerated over 40 ounces of liquids yesterday. Also tolerating puréed food. Did 7 labs yesterday. Right-sided pain is controlled. Appreciate hematology/oncology findings and recommendations. Will discharge to home on puréed diet. She will follow-up with us next Wednesday. She will follow-up with Dr. Zurdo Sanchez next week as well for update on CLL work-up.

## 2021-07-07 NOTE — PROGRESS NOTES
Hematology-Oncology Progress Note      Levonia Aschoff  1965  [id number]  7/7/2021      Subjective:     Still with right sided pain. Able to tolerate a portion of her breakfast. No new complaints. B-12 and folate normal.     Allergies: Doxylamine succinate, Pcn [penicillins], Ambien [zolpidem], Aspirin, Celebrex [celecoxib], Erythromycin, Milk prot-turm-pepper-pineappl, Neurontin [gabapentin], Nsaids (non-steroidal anti-inflammatory drug), and Pineapple  Current Facility-Administered Medications   Medication Dose Route Frequency Provider Last Rate Last Admin    HYDROmorphone (DILAUDID) tablet 2-4 mg  2-4 mg Oral Q4H PRN Rolo Gonzales MD   2 mg at 07/07/21 0434    0.9% sodium chloride 1,000 mL with mvi (adult no. 4 with vit K) 10 mL, thiamine 453 mg, folic acid 1 mg infusion   IntraVENous Q24H Nilsa Lara  mL/hr at 07/06/21 1303 New Bag at 07/06/21 1303    sodium chloride (NS) flush 5-40 mL  5-40 mL IntraVENous Q8H Nilsa Lara MD   10 mL at 07/07/21 0728    sodium chloride (NS) flush 5-40 mL  5-40 mL IntraVENous PRN Nilsa Lara MD        lactated Ringers infusion  100 mL/hr IntraVENous CONTINUOUS Nilsa Lara  mL/hr at 07/06/21 2335 100 mL/hr at 07/06/21 2335    acetaminophen (TYLENOL) tablet 650 mg  650 mg Oral Q6H PRN Nilsa Lara MD   650 mg at 07/05/21 0342    morphine injection 2-4 mg  2-4 mg IntraVENous Q4H PRN Nilsa Lara MD   4 mg at 07/05/21 0001    naloxone (NARCAN) injection 0.4 mg  0.4 mg IntraVENous PRN Nilsa Lara MD        [facility]ron [facility] injection 4 mg  4 mg IntraVENous Q4H PRN Nilsa Lara MD   4 mg at 07/05/21 2150    diphenhydrAMINE (BENADRYL) injection 12.5 mg  12.5 mg IntraVENous Q6H PRN Nilsa Lara MD   12.5 mg at 07/06/21 2238    enoxaparin (LOVENOX) injection 40 mg  40 mg SubCUTAneous Q24H Nilsa Lara MD   40 mg at 07/06/21 1054    pantoprazole (PROTONIX) 40 mg in 0.9% sodium chloride 10 mL injection  40 mg IntraVENous DAILY Jayson Schlatter, MD   40 mg at 07/06/21 1047    losartan (COZAAR) tablet 50 mg  50 mg Oral DAILY Jayson Schlatter, MD        propranolol LA (INDERAL LA) capsule 80 mg  80 mg Oral DAILY Jayson Schlatter, MD        hydroCHLOROthiazide (HYDRODIURIL) tablet 12.5 mg  12.5 mg Oral DAILY Jayson Schlatter, MD         Objective:     Patient Vitals for the past 24 hrs:   BP Temp Pulse Resp SpO2   07/07/21 0806 123/83 98.4 °F (36.9 °C) 70 16 97 %   07/07/21 0408 132/76 98.1 °F (36.7 °C) 70 16 95 %   07/06/21 2228 (!) 155/91 98.9 °F (37.2 °C) 67 15 98 %   07/06/21 1454 (!) 102/52 98.4 °F (36.9 °C) 69 17 97 %   07/06/21 0900 119/73  70         Gen: NAD  HEENT: PERRL, Sclerae anicteric  Ext: No c/c/e    Available labs reviewed:  Labs:    Recent Results (from the past 24 hour(s))   METABOLIC PANEL, BASIC    Collection Time: 07/07/21  4:24 AM   Result Value Ref Range    Sodium 141 136 - 145 mmol/L    Potassium 3.3 (L) 3.5 - 5.1 mmol/L    Chloride 105 97 - 108 mmol/L    CO2 27 21 - 32 mmol/L    Anion gap 9 5 - 15 mmol/L    Glucose 74 65 - 100 mg/dL    BUN 2 (L) 6 - 20 MG/DL    Creatinine 0.49 (L) 0.55 - 1.02 MG/DL    BUN/Creatinine ratio 4 (L) 12 - 20      GFR est AA >60 >60 ml/min/1.73m2    GFR est non-AA >60 >60 ml/min/1.73m2    Calcium 8.8 8.5 - 10.1 MG/DL   CBC WITH AUTOMATED DIFF    Collection Time: 07/07/21  4:24 AM   Result Value Ref Range    WBC 3.1 (L) 3.6 - 11.0 K/uL    RBC 3.63 (L) 3.80 - 5.20 M/uL    HGB 10.5 (L) 11.5 - 16.0 g/dL    HCT 31.9 (L) 35.0 - 47.0 %    MCV 87.9 80.0 - 99.0 FL    MCH 28.9 26.0 - 34.0 PG    MCHC 32.9 30.0 - 36.5 g/dL    RDW 13.0 11.5 - 14.5 %    PLATELET 637 461 - 295 K/uL    MPV 11.3 8.9 - 12.9 FL    NRBC 0.0 0  WBC    ABSOLUTE NRBC 0.00 0.00 - 0.01 K/uL    NEUTROPHILS 25 (L) 32 - 75 %    LYMPHOCYTES 58 (H) 12 - 49 %    MONOCYTES 13 5 - 13 %    EOSINOPHILS 3 0 - 7 %    BASOPHILS 1 0 - 1 %    IMMATURE GRANULOCYTES 0 0.0 - 0.5 %    ABS.  NEUTROPHILS 0.8 (L) 1.8 - 8.0 K/UL    ABS. LYMPHOCYTES 1.8 0.8 - 3.5 K/UL    ABS. MONOCYTES 0.4 0.0 - 1.0 K/UL    ABS. EOSINOPHILS 0.1 0.0 - 0.4 K/UL    ABS. BASOPHILS 0.0 0.0 - 0.1 K/UL    ABS. IMM. GRANS. 0.0 0.00 - 0.04 K/UL    DF SMEAR SCANNED      RBC COMMENTS NORMOCYTIC, NORMOCHROMIC     HEP B SURFACE AG    Collection Time: 07/07/21  4:24 AM   Result Value Ref Range    Hepatitis B surface Ag <0.10 Index    Hep B surface Ag Interp. Negative NEG     HEP B SURFACE AB    Collection Time: 07/07/21  4:24 AM   Result Value Ref Range    Hepatitis B surface Ab 6.99 mIU/mL    Hep B surface Ab Interp. NONREACTIVE NR     VITAMIN B12    Collection Time: 07/07/21  4:24 AM   Result Value Ref Range    Vitamin B12 >2,000 (H) 193 - 986 pg/mL   FOLATE    Collection Time: 07/07/21  4:24 AM   Result Value Ref Range    Folate 23.0 (H) 5.0 - 21.0 ng/mL         Assessment and Plan     63 y/o woman with increased BMI s/p gastric bypass after having failed conservative interventions, now admitted with abdominal pain ~3 weeks post-op, asked to see for leukopenia with flip of neutrophil/lymphocyte ratio concerning for CLL. 1. Leukopenia/lymphocytosis: flow cytometry may take up to one week to come back. Okay from my standpoint for discharge if cleared by the primary service. ANC of 0.8 noted, however, I do not feel growth factors are warranted at this time. She has my name and phone number and agrees to f/u with me as an outpatient to discuss her lab results. I will continue to see daily as long as she is here. Thank you for allowing us to participate in the care of this very pleasant patient.     Vesta Petersen MD  Hematology/Oncology  Phone [phone]

## 2021-07-08 ENCOUNTER — PATIENT OUTREACH (OUTPATIENT)
Dept: OTHER | Age: 56
End: 2021-07-08

## 2021-07-08 LAB
HBV CORE AB SERPL QL IA: NEGATIVE
HCV AB S/CO SERPL IA: 0.1 S/CO RATIO (ref 0–0.9)
HCV AB SERPL QL IA: NORMAL

## 2021-07-08 NOTE — PROGRESS NOTES
Attempt to reach patient for follow up. Discreet VM left with contact information. Observation status: 7/4-7/7 at [address] [place] [address] for nausea and vomiting. Per chart review, patient has follow up with her surgeon on 7/15.

## 2021-07-09 LAB — COPPER SERPL-MCNC: 164 UG/DL (ref 80–158)

## 2021-07-11 LAB — FLOW CYTOMETRY: NORMAL

## 2021-07-12 ENCOUNTER — TELEPHONE (OUTPATIENT)
Dept: SURGERY | Age: 56
End: 2021-07-12

## 2021-07-12 ENCOUNTER — TELEPHONE (OUTPATIENT)
Dept: INTERNAL MEDICINE CLINIC | Age: 56
End: 2021-07-12

## 2021-07-12 NOTE — TELEPHONE ENCOUNTER
Patient called wanting to speak to Dr. Rico Diaz. She states she has surgery June 14 and 17. She was recently in the hospital and they did not give her any BP meds. She states she has not taken them since she got home. She states when she does take them she gets dizzy. She states it is urgent to talk to Dr. Rico Diaz because she returns to work on Monday. She states she does not have a blood pressure cuff at home. Dr. Shirin Maza first available appointment is Tuesday July 20.   CB: [phone]

## 2021-07-12 NOTE — TELEPHONE ENCOUNTER
I calld the patient and I left her a voice mail to call me back in regards to her 3 week post op call.

## 2021-07-12 NOTE — TELEPHONE ENCOUNTER
Bariatric Post-Operative Phone Calls: Week 3    Diet:Question of any nausea and/or vomiting. Question of tolerance to diet advancement from liquids to solids. Protein intake (goal is 60 grams of protein daily)   Poor____Fair____Good_X___Great____     Comment:_____40 grams no more than 42 grams a day per patient__________________________________________________      ______________________________________________________________________    Hydration:Less than 32 ounces of water daily is fair to poor (Goal is 64 ounces per day)   Poor____ Fair__X__ Good____Great____    Comment:__per patient only about 20 ounces of her liquid intake is water, patient counting pre made slim fast shake as water for hydration instructed to get atleast 48 ounces of water in with her other liquids daily__also drinking Diet V8 splash__________________________________________________________    ______________________________________________________________________      Ambulation:( walking at least 3 x week, for at least 30 minutes)   Poor______ Fair______ Good___X___     Great______ Comment:__________________________________________________    ______________________________________________________________________      Urine Color: Question of any odor and color(should be polo, pale, and clear) Dark______ Amber______ Pale_X_____      Clear______ Comment:___________________________________________________                           ________________________________________________________________    Bowel movements: Question of any constipation- haven't had any bowel movements for more than 3 days. This could be related to protein intake and/or narcotic pain medication usage.      Comment:                                                                                            Moving bowels without difficulty taking colace                                  Pain: Left sided abdominal pain is normal (should be less than 3)         Question if pain medication is helpful. 10___ 9___ 8___ 7___ 6___ 5___ 4_X__ 3___     2___1___0___Comment:____intermittent better than last ER admission 7/4/21 has dilaudid if needed taking tylenol_____per patient she was previously at about 40% feeling better she is now at 90% and wants discuss returning to work next week at follow up appointment Thursday ________________________________________    ______________________________________________________________________      Incision: (No redness, pain, swelling or fever) Healing Well____X__     Healed______Redness_________ Pain_________     Swelling_________ Fever__________(greater than 101 needs evaluation)    Comment:____________________________________________________________    ______________________________________________________________________  Use of incentive spirometer: Yes____       No  X         Next Appointment:____7/15/21__________                 Support Group: Yes______No__X____    Additional Comments:  patient also questioning Flow cytometry results from 7/721 states she can't seem to pull up in [employer]. Discussed with NP patient updated and informed of results. Patient agreed to return call if any other questions or concerns. ____________________________________________________________    ____________________________________________________________________      If more than one parameter is not met or considered poor, nurse needs to discuss with provider recommend for patient to be seen in the office as soon as possible or refer to the provider for follow-up. Reinforce to patient to use bariatric educational booklet as guide. It is appropriate to refer patient to the nutritionist to discuss more in detail of diet and nutrition.

## 2021-07-12 NOTE — TELEPHONE ENCOUNTER
Patient would like a call back to discuss her lab results patient states she cannot pull them up through her my chart.

## 2021-07-12 NOTE — TELEPHONE ENCOUNTER
----- Message from Concepción Zavala LPN sent at 8/26/5513  9:02 AM EDT -----  Regarding: 3 week post op call    ----- Message -----  From: Flavio Will LPN  Sent: 3/15/7825  To: Concepción Zavala LPN  Subject: FW: Discharge Phone Call                         3 week   ----- Message -----  From: Renu Hernandez  Sent: 6/21/2021   8:02 AM EDT  To: Concepción Zavala LPN, Pete Busby LPN  Subject: Discharge Phone Call                             Good morning,    Please call MsTyson Prasad for her discharge phone call. Alicia Merino, please put in a reminder for her 3 week call. Thank you! Elvia

## 2021-07-13 NOTE — TELEPHONE ENCOUNTER
Corona Teran MD  You 15 hours ago (5:05 PM)   Mya Jury a message to patient. What are her current blood pressure readings? Which antihypertensives is she on currently?

## 2021-07-15 ENCOUNTER — VIRTUAL VISIT (OUTPATIENT)
Dept: SURGERY | Age: 56
End: 2021-07-15
Payer: COMMERCIAL

## 2021-07-15 VITALS — BODY MASS INDEX: 36.53 KG/M2 | WEIGHT: 241 LBS | HEIGHT: 68 IN

## 2021-07-15 DIAGNOSIS — K91.2 POSTOPERATIVE INTESTINAL MALABSORPTION: ICD-10-CM

## 2021-07-15 DIAGNOSIS — E66.01 SEVERE OBESITY (BMI 35.0-39.9) WITH COMORBIDITY (HCC): ICD-10-CM

## 2021-07-15 DIAGNOSIS — Z09 FOLLOW-UP EXAMINATION AFTER ABDOMINAL SURGERY: Primary | ICD-10-CM

## 2021-07-15 PROCEDURE — 99024 POSTOP FOLLOW-UP VISIT: CPT | Performed by: NURSE PRACTITIONER

## 2021-07-15 NOTE — PROGRESS NOTES
1. Have you been to the ER, urgent care clinic since your last visit? Hospitalized since your last visit? 7/4/21 ED  Nausea and vomiting.6/27/21 ED    2. Have you seen or consulted any other health care providers outside of the [address] [place] [address] since your last visit? Include any pap smears or colon screening.  no

## 2021-07-15 NOTE — LETTER
NOTIFICATION OF RETURN TO WORK / SCHOOL    7/16/2021 2:11 PM    Ms. [address] [facility] [address]  . To Whom It May Concern:    Luigi Lima was under the care of Luci Cooper from 6/14/21 to present. She will be able to return to work/school on 7/19/21 with regular duties and/or activities . If there are questions or concerns please have the patient contact our office.     Sincerely,      Brian Napier NP

## 2021-07-16 ENCOUNTER — PATIENT OUTREACH (OUTPATIENT)
Dept: OTHER | Age: 56
End: 2021-07-16

## 2021-07-16 ENCOUNTER — TELEPHONE (OUTPATIENT)
Dept: SURGERY | Age: 56
End: 2021-07-16

## 2021-07-16 NOTE — TELEPHONE ENCOUNTER
Patient would like a call back from nurse to discuss her [place] paperwork. Patient stated that [employer] has released her to go back to work on Monday and patient needs the forms submitted as soon as possible to her employer so she can return on Monday. Patient would like a call back from nurse to confirm information.

## 2021-07-16 NOTE — PROGRESS NOTES
I was in the office while conducting this encounter. Consent:  She and/or her healthcare decision maker is aware that this patient-initiated Telehealth encounter is a billable service, with coverage as determined by her insurance carrier. She is aware that she may receive a bill and has provided verbal consent to proceed: No - Not billable    This virtual visit was conducted via [employer]. Pursuant to the emergency declaration under the [facility], [facility] waiver authority and the [employer] Act, this Virtual  Visit was conducted to reduce the patient's risk of exposure to COVID-19 and provide continuity of care for an established patient. Services were provided through a video synchronous discussion virtually to substitute for in-person clinic visit. Due to this being a TeleHealth evaluation, many elements of the physical examination are unable to be assessed. Total Time: minutes: 21-30 minutes. Chief Complaint   Patient presents with    Surgical Follow-up     4 weeks s/p gastric bypass down 33.2 pounds lost 10 lbs [phone]. [address] [place] [address] for week status post laparoscopic gastric bypass for treatment of morbid obesity. Postoperative course was complicated by some ischemia of the Yelena limb. Postoperative day 4 she returned to the operating room with concern for an intra-abdominal process because she had increased left upper quadrant abdominal pain, tachycardia and leukocytosis. Intraoperative findings:  FINDINGS:  1.  Mild dilation of Yelena limb with healthy serosa and mesentery. 2.  No signs of mechanical small-bowel obstruction. 3.  No signs of peritoneal inflammation/rind. 4.  Patent gastrojejunal anastomosis on endoscopy with patchy mucosal ischemia with necrosis extending 30 cm distal to the gastrojejunostomy. 5.  No insufflation air leak on endoscopy.     She reports she is doing much better and wants to go back to work on Monday. She said no fever, chills, chest pain or shortness of breath  Still with some mild right-sided abdominal pain but improving. Pain is mostly with activity. She is voiding without difficulty  Bowels are moving most days, small amounts without of Colace  She had some intermittent nausea and noted that the bariatric vitamins were making her very nauseated. She did switch to Flintstones complete as well as a separate B12 supplement. She said she follow the list in the book. Water intake: 48 ounces per day  Protein intake: About 30 g/day using the Slim fast product. She is done okay with soups. She has been doing mostly bariatric liquids and a few puréed things. She said she does get full quickly but does seem to do better with warm fluids such as soup. She does have a note unflavored protein powder but she has not been using it. Visit Vitals  Ht 5' 8\" (1.727 m)   Wt 241 lb (109.3 kg)   LMP  (LMP Unknown)   BMI 36.64 kg/m²     Appears well  Speech is clear breathing is unlabored  Mucous membranes appear moist  Abdomen appears soft and incisions appear clean, dry and intact without erythema or induration    ICD-10-CM ICD-9-CM    1. Follow-up examination after abdominal surgery  Z09 V67.09    2. Postoperative intestinal malabsorption  K91.2 579.3    3. Severe obesity (BMI 35.0-39. 9) with comorbidity ([facility] [address]  E66.01 278.01    4. BMI 36.0-36.9,adult  Z68.36 V85.36      4-week status post laparoscopic gastric bypass with postoperative complications to include reoperation on postoperative day four with findings listed above  Seems to be improving overall. With regard to diet I have asked her to take it slow and we can continue to advance really sticking with soft stage I. She can try to incorporate some very soft cooked vegetables or fruits. I encouraged her to use soups since she seems to tolerate those a little bit better. She can add her unflavored protein powder to soup. Continue with her Slim fast shakes in addition to the unflavored protein powder. She is asking if she can have some puréed tuna salad or crab which is reasonable if she wants to put it in a food chopper or processor. Reviewed vitamins and she will continue daily  She has some Zofran as needed for nausea  I cautioned her about return to work on Monday but she is quite anxious to get back and says she think she will do better at work. She was reminded to take her breaks and her priority is staying hydrated. We will touch base with her next week with an update and see how she is doing  She has regular follow-up in 2 weeks, sooner if necessary  She may need eventual endoscopy based on the findings at the reoperation as she is at risk for stricture/ulcer  Continue acid suppression omeprazole 20 mg twice daily  RTW 7/19/21  Analisa Parham verbalized understanding and questions were answered to the best of my knowledge and ability. Diet and activity  educational materials were provided.

## 2021-07-16 NOTE — PROGRESS NOTES
Follow up phone call to patient, two pt identifiers verified. Discussed patient's goals:   Goals      Completes all follow-up appointments within 30 days of ED visit       Educate and encourage importance of FU for prevention of complications or disease;   Assess the patient's relationship with a PCP and next FU visit scheduled;  o All follow ups have been made   Discuss importance of adherence to treatment plan and follow up visits;   Identify any barriers in transportation or access to FU appointments.  Assist patient with making FU appointments as needed;    It's also a good idea to know your test results.  Keep a list of the medicines you take. 7/15: Had f/up yesterday with her surgeon. Weight loss is going well. She plans to go back to work on 7/19. 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  Demonstrates no return to ED or red flags within 30 days      · Assess patient knowledge of how to contact the provider for questions if needed;  · Educate patient on importance of monitoring for any red flags;  · Review importance of reporting any changes, red flags to the provider and/or PCP;  · Assess patient's knowledge of discharge instructions and any restrictions;  · Educate patient when to call 911;  · Assess for any barriers with safety at home  · Discuss use of nurse access line and location of number on front of insurance card. · Supportive Resources:  · [employer] - # 865-831-8885  · [facility] 24/7 (virtual visits 24/7)  · [employer] # [phone] PW: [facility] for associates  · Nurse Access line 24/7 # [phone]  · [employer] (www.[employer])  · [address] [phone]  · [address] [facility] [phone]  · [employer]  [facility]   · BS Workday  · IT - [phone]  · MyChart Help - 1- [phone]  · Associate Services for advice and direction, by calling [phone] and pressing 1  7/16: No red flags.             Patient's primary care provider relationship reviewed with patient and modified, as applicable. Readiness to Change: []  Pre-contemplative    []  Contemplative  []  Preparation               [x]  Action                  []  Maintenance    Barriers/Challenges to Care: []  Decline in memory    []  Language barrier     []  Emotional                  []  Limited mobility  []  Lack of motivation     [] Vision, hearing or cognitive impairment []  Knowledge [] Financial Barriers []  Lack of support  []  Pain []  Other [x]  None    Key pt activities to achieve better health:   [x]  Weight loss  []  Improved diabetic control  []  Decreased cholesterol levels  []  Decreased blood pressure  []    []    Upcoming appointments:   Future Appointments   Date Time Provider Jana Carmita   7/29/2021  3:20 PM Kaylin Thompson NP [facility] BS AMB   9/15/2021  9:00 AM Tia Bettencourt MD [facility] AMB     Plan for next call: Call in 4 weeks, 8/13.

## 2021-07-16 NOTE — PATIENT INSTRUCTIONS
Continue to use your protein powder and or shakes every day to meet the 60 grams / day protein goal (minimum)    Liquids are still a priority and aim for at least 64 oz water or other approved clear liquid every day     Use your unflavored protein powder in soups     You can slowly advance your diet to the soft stage 1 and ok to add blended tuna or crab (with a little light phillips)    In addition to everything on the Bariatric Liquid diet, you may   add these foods to your diet. Protein - include with every meal   Egg or egg substitute     Low fat or fat-free cottage cheese     Low fat or fat-free yogurt    Low fat Greek yogurt    Fat-free, 1% milk, or Lactaid milk    Low-fat or vegetarian refried beans    Well-cooked beans and lentils   Fat-free or 2% reduced-fat cheese    Hummus    Low fat soup     Snacks/Other Options:   Sugar free fudgsicles    Sugar free cocoa    No sugar added pudding     Fruits and Vegetables   Applesauce (no sugar added)   Canned fruit (no sugar added)   Fresh soft peeled fruits (melons, banana, avocado, berries)   Any soft cooked vegetables    Mashed potatoes, Sweet potatoes, baked potatoes (no skin)    Condiments   Fat free non-stick spray   Herbs and spices   Lite butter, margarine, canola oil, olive oil   Reduced-fat or fat-free phillips   Reduced-fat or fat-free salad dressing   Reduced-fat or fat-free cream cheese   Reduced-fat or fat-free sour cream   Lemon juice   Salt, pepper, mustard, ketchup, salsa    Prepare food to the appropriate texture. Sample Meal Plan:  Soft and Mushy    Breakfast ½ cup plain oatmeal with protein powder. Add cinnamon, nutmeg, Splenda brown sugar as desired for flavor 20-25 grams protein   Snack (optional) High protein gelatin (recipe on www.[employer]. com) 10 grams protein   Lunch ½ cup low fat cottage cheese or [place] yogurt with soft fruit 10 - 15 grams protein    Snack (optional) High protein pudding or high protein popsicle (recipe on www.[employer]. com) 10 grams protein   Dinner ¼ cup low-fat well cooked beans with low-fat cheese sprinkled on top  ¼ cup no sugar added applesauce (can sprinkle protein powder) 5-8 grams protein  5-10  grams protein     Key Points   Continue to drink 48-64 ounces of low calorie, non-carbonated, sugar free beverages between meals.  Eat 3 meals per day   Measure each meal to HALF cup per meal   Aim for 60 grams of protein every day. Try food sources of protein first.    Continue to supplement with protein shakes/powder to meet protein goals.  Take small bites. Try eating with smaller utensils (baby spoon, cocktail fork).  Chew food thoroughly   Allow about 30 minutes to eat a meal.  Eating too fast may cause nausea or vomiting.  Stop eating as soon as you feel full. Overeating may stretch your stomach's capacity and prevent desired weight loss.  Do not drink liquids during meals and 30 minutes after meals. Drinking with meals may cause nausea or vomiting.  Add one new food at a time   Take vitamins daily   No lifting greater than 20 lbs.  You can do light jogging and walking.  You may go into a pool. What to do if you are constipated: You may  take Milk of Magnesia. Take 2 Tablespoons followed by 16 oz of water then 2 hours later take another 2 tablespoons. If  milk of magnesia does not work then take [place] or Miralax over the counter. Keep in mind that the Benefiber or Miralax may take a day or two to work. If all of the above do not work try a Fleets enema and follow the directions on the box. If you are not able to tolerate liquids or soft foods. Please call our office  [phone]  If you have vomiting and persistent epigastric pain or chest pain. You should call our office, the doctor on-call or go to the emergency room.

## 2021-07-16 NOTE — TELEPHONE ENCOUNTER
I called the patient back and she said she needs her return to work form filled out and sent back for [employer] and a letter for [employer] letting them know she is returning to work on Monday 7/19/21. Information faxed to both with confirmation. Pt in agreement.

## 2021-07-21 ENCOUNTER — HOSPITAL ENCOUNTER (OUTPATIENT)
Dept: INFUSION THERAPY | Age: 56
Discharge: HOME | End: 2021-07-21
Payer: COMMERCIAL

## 2021-07-21 VITALS
HEART RATE: 70 BPM | TEMPERATURE: 98.1 F | DIASTOLIC BLOOD PRESSURE: 77 MMHG | RESPIRATION RATE: 16 BRPM | SYSTOLIC BLOOD PRESSURE: 130 MMHG | OXYGEN SATURATION: 100 %

## 2021-07-21 PROCEDURE — 96360 HYDRATION IV INFUSION INIT: CPT

## 2021-07-21 PROCEDURE — 74011000250 HC RX REV CODE- 250: Performed by: NURSE PRACTITIONER

## 2021-07-21 PROCEDURE — 96365 THER/PROPH/DIAG IV INF INIT: CPT

## 2021-07-21 PROCEDURE — 96361 HYDRATE IV INFUSION ADD-ON: CPT

## 2021-07-21 PROCEDURE — 74011250636 HC RX REV CODE- 250/636: Performed by: NURSE PRACTITIONER

## 2021-07-21 PROCEDURE — 96375 TX/PRO/DX INJ NEW DRUG ADDON: CPT

## 2021-07-21 PROCEDURE — C9113 INJ PANTOPRAZOLE SODIUM, VIA: HCPCS | Performed by: NURSE PRACTITIONER

## 2021-07-21 RX ORDER — ONDANSETRON 2 MG/ML
4 INJECTION INTRAMUSCULAR; INTRAVENOUS ONCE
Status: COMPLETED | OUTPATIENT
Start: 2021-07-21 | End: 2021-07-21

## 2021-07-21 RX ORDER — ONDANSETRON 2 MG/ML
4 INJECTION INTRAMUSCULAR; INTRAVENOUS
Status: DISCONTINUED | OUTPATIENT
Start: 2021-07-21 | End: 2021-07-22 | Stop reason: HOSPADM

## 2021-07-21 RX ADMIN — FOLIC ACID: 5 INJECTION, SOLUTION INTRAMUSCULAR; INTRAVENOUS; SUBCUTANEOUS at 14:12

## 2021-07-21 RX ADMIN — SODIUM CHLORIDE 40 MG: 9 INJECTION INTRAMUSCULAR; INTRAVENOUS; SUBCUTANEOUS at 13:17

## 2021-07-21 RX ADMIN — ONDANSETRON 4 MG: 2 INJECTION, SOLUTION INTRAMUSCULAR; INTRAVENOUS at 13:21

## 2021-07-21 RX ADMIN — SODIUM CHLORIDE, SODIUM LACTATE, POTASSIUM CHLORIDE, AND CALCIUM CHLORIDE 1000 ML: 600; 310; 30; 20 INJECTION, SOLUTION INTRAVENOUS at 13:12

## 2021-07-21 NOTE — PROGRESS NOTES
[address] [facility] Visit Note    1703 Patient arrives for IV Hydration without acute problems. Please see [facility] for complete assessment and education provided. Prior to treatment, patient was screened for COVID 19. Patient denies any signs or symptoms of COVID. Denies any known physical contact with anyone diagnosed with, or with pending or positive COVID test. Denies a pending or positive COVID test himself. Vital signs stable throughout and prior to discharge. Patient tolerated procedure well and was discharged without incident. Patient is aware of no further [facility] appointments and to follow up with referring provider for any further questions or concerns. Medications verified by Varinder Wren RN via [employer]:    1. Lactated Ringers 1 L  2. Goody Bag 1 L  3. Zofran 4 mg IVP  4.    Protonix 40 mg IVP    Vital signs:   Patient Vitals for the past 12 hrs:   Temp Pulse Resp BP SpO2   07/21/21 1523  70 16 130/77 100 %   07/21/21 1258 98.1 °F (36.7 °C) 87 18 139/84 100 %

## 2021-07-22 ENCOUNTER — PATIENT MESSAGE (OUTPATIENT)
Dept: SURGERY | Age: 56
End: 2021-07-22

## 2021-07-22 DIAGNOSIS — R13.19 INTERMITTENT DYSPHAGIA: ICD-10-CM

## 2021-07-22 DIAGNOSIS — R11.0 NAUSEA: Primary | ICD-10-CM

## 2021-07-22 DIAGNOSIS — K91.2 POSTOPERATIVE INTESTINAL MALABSORPTION: ICD-10-CM

## 2021-07-22 RX ORDER — METOCLOPRAMIDE 5 MG/1
5 TABLET ORAL 4 TIMES DAILY
Qty: 40 TABLET | Refills: 0 | Status: SHIPPED | OUTPATIENT
Start: 2021-07-22 | End: 2021-07-29

## 2021-07-22 NOTE — TELEPHONE ENCOUNTER
4 weeks s/p gastric bypass complicated by reoperation at 4 days post op with  Postoperative course was complicated by some ischemia of the Yelena limb. Persistent nausea and feeling of 'something in throat\". Liquids \"just sit\" and \"feels heavy\". Felt better after OPIC hydration yesterday. Today has tolerated about 10 oz water. Tried a popcicle and \"it just sat\"   Pain is better  No fever or chills, chest pain or shortness of breath. Will refer back to [facility] for anytime today, tomorrow or Saturday.   She is working tomorrow and would prefer today or Saturday   Start Reglan 5 mg qid   Suspect will need EGD and will d/w Benita King MD    If cannot tolerate po will need to come in to be evaluated asap

## 2021-07-22 NOTE — TELEPHONE ENCOUNTER
----- Message from Eladia Sun LPN sent at 4/14/4473 10:40 AM EDT -----  Regarding: FW: Non-Urgent Medical Question  Contact: [phone]    ----- Message -----  From: Myron Parham  Sent: 7/22/2021   9:37 AM EDT  To: [facility] Nurse Hunter  Subject: RE: Non-Urgent Medical Question                  Only doing clear. . I tried twice to drink protein on yesterday and i could not get it down due to the nausea which is different because i was able to tolerate it prior to the pureed crab. . I had a few sips of broth on yesterday but again nausea was heavy. This morning i had a few sips of water and i had a popsicle and bam here comes heavy nausea  I will push my fluids today. Is there a stronger nausea pill. I have not had any protein powder in anything for while . ..its the nausea and the lump i feel in my throat after i drink. .. After an hour or so it passes but as soon as I drink its the same again. ..  But again no stomach pain and yesterday before IV i was worse

## 2021-07-23 RX ORDER — ACETAMINOPHEN 500 MG
500-1000 TABLET ORAL
Status: DISCONTINUED | OUTPATIENT
Start: 2021-07-24 | End: 2021-07-26 | Stop reason: HOSPADM

## 2021-07-23 RX ORDER — ONDANSETRON 2 MG/ML
4 INJECTION INTRAMUSCULAR; INTRAVENOUS ONCE
Status: COMPLETED | OUTPATIENT
Start: 2021-07-24 | End: 2021-07-24

## 2021-07-23 RX ORDER — SODIUM CHLORIDE, SODIUM LACTATE, POTASSIUM CHLORIDE, CALCIUM CHLORIDE 600; 310; 30; 20 MG/100ML; MG/100ML; MG/100ML; MG/100ML
1000 INJECTION, SOLUTION INTRAVENOUS CONTINUOUS
Status: DISPENSED | OUTPATIENT
Start: 2021-07-24 | End: 2021-07-24

## 2021-07-23 NOTE — TELEPHONE ENCOUNTER
I spoke with Dr. Cathleen Frank with regard to your symptoms. He did agree with an upper endoscopy. We have sent over a referral request to Dr. Meneses Guess  office. His office should be giving you a call to set up a date and time for the endoscopy. This is an outpatient procedure and you will need a . Hopefully they can take care of you next week. You are scheduled for fluids tomorrow morning but if something changes over the weekend please call the on-call provider or come to the [facility] emergency room.

## 2021-07-24 ENCOUNTER — HOSPITAL ENCOUNTER (OUTPATIENT)
Dept: INFUSION THERAPY | Age: 56
Discharge: HOME | End: 2021-07-24
Payer: COMMERCIAL

## 2021-07-24 VITALS
HEART RATE: 77 BPM | SYSTOLIC BLOOD PRESSURE: 143 MMHG | DIASTOLIC BLOOD PRESSURE: 91 MMHG | RESPIRATION RATE: 16 BRPM | OXYGEN SATURATION: 100 % | TEMPERATURE: 97.1 F

## 2021-07-24 PROCEDURE — 96365 THER/PROPH/DIAG IV INF INIT: CPT

## 2021-07-24 PROCEDURE — 74011250636 HC RX REV CODE- 250/636: Performed by: NURSE PRACTITIONER

## 2021-07-24 PROCEDURE — 96361 HYDRATE IV INFUSION ADD-ON: CPT

## 2021-07-24 PROCEDURE — 96375 TX/PRO/DX INJ NEW DRUG ADDON: CPT

## 2021-07-24 PROCEDURE — 96374 THER/PROPH/DIAG INJ IV PUSH: CPT

## 2021-07-24 PROCEDURE — 96360 HYDRATION IV INFUSION INIT: CPT

## 2021-07-24 PROCEDURE — C9113 INJ PANTOPRAZOLE SODIUM, VIA: HCPCS | Performed by: NURSE PRACTITIONER

## 2021-07-24 PROCEDURE — 74011000250 HC RX REV CODE- 250: Performed by: NURSE PRACTITIONER

## 2021-07-24 RX ADMIN — PANTOPRAZOLE SODIUM 40 MG: 40 INJECTION, POWDER, FOR SOLUTION INTRAVENOUS at 08:06

## 2021-07-24 RX ADMIN — SODIUM CHLORIDE, POTASSIUM CHLORIDE, SODIUM LACTATE AND CALCIUM CHLORIDE 1000 ML: 600; 310; 30; 20 INJECTION, SOLUTION INTRAVENOUS at 09:49

## 2021-07-24 RX ADMIN — FOLIC ACID: 5 INJECTION, SOLUTION INTRAMUSCULAR; INTRAVENOUS; SUBCUTANEOUS at 08:45

## 2021-07-24 RX ADMIN — ONDANSETRON 4 MG: 2 INJECTION INTRAMUSCULAR; INTRAVENOUS at 08:05

## 2021-07-24 NOTE — PROGRESS NOTES
Outpatient Infusion Center Short Visit Progress Note    0800 Patient admitted to [facility] for Bariatric Hydration ambulatory in stable condition. Assessment completed. No new concerns voiced. Covid Screening      1. Do you have any symptoms of COVID-19? SOB, coughing, fever, or generally not feeling well ? NO  2. Have you been exposed to COVID-19 recently? NO  3. Have you had any recent contact with family/friend that has a pending COVID test? NO    Vital Signs:  Visit Vitals  BP (!) 143/91 (BP 1 Location: Left arm, BP Patient Position: Sitting)   Pulse 77   Temp 97.1 °F (36.2 °C)   Resp 16   LMP  (LMP Unknown)   SpO2 100%         Peripheral IV 07/24/21 Anterior;Proximal;Right Forearm (Active)   Site Assessment Clean, dry, & intact 07/24/21 0800   Phlebitis Assessment 0 07/24/21 0800   Infiltration Assessment 0 07/24/21 0800   Dressing Status New 07/24/21 0800   Dressing Type Transparent 07/24/21 0800   Hub Color/Line Status Blue;Flushed; Infusing 07/24/21 0800   Action Taken Open ports on tubing capped 07/24/21 0800   Alcohol Cap Used Yes 07/24/21 0800       Medications:  Medications Administered     0.9% sodium chloride 1,000 mL with mvi (adult no. 4 with vit K) 10 mL, thiamine 884 mg, folic acid 1 mg infusion     Admin Date  07/24/2021 Action  New Bag Dose   Rate  999 mL/hr Route  IntraVENous Administered By  Mikki REBOLLEDO          lactated Ringers infusion 1,000 mL     Admin Date  07/24/2021 Action  New Bag Dose  1,000 mL Route  IntraVENous Administered By  Mikki REBOLLEDO          ondansetron [facility]) injection 4 mg     Admin Date  07/24/2021 Action  Given Dose  4 mg Route  IntraVENous Administered By  Mikki REBOLLEDO          pantoprazole (PROTONIX) 40 mg in 0.9% sodium chloride 10 mL injection     Admin Date  07/24/2021 Action  Given Dose  40 mg Route  IntraVENous Administered By  Mikki REBOLLEDO              Patient PIV flushed and removed, bandage placed over site. Patient tolerated treatment well. Patient discharged from [facility] ambulatory in no distress at 1105. Patient aware of next appointment.     Future Appointments   Date Time Provider Jana Carmita   7/29/2021  3:20 PM JANIE Torres BS AMB   8/3/2021  2:00 PM MD JB Cuello AMB   9/15/2021  9:00 AM MD JB Cuello BS AMB

## 2021-07-26 ENCOUNTER — HOSPITAL ENCOUNTER (INPATIENT)
Age: 56
LOS: 1 days | Discharge: HOME | DRG: 394 | End: 2021-07-29
Attending: EMERGENCY MEDICINE | Admitting: SURGERY
Payer: COMMERCIAL

## 2021-07-26 ENCOUNTER — TELEPHONE (OUTPATIENT)
Dept: SURGERY | Age: 56
End: 2021-07-26

## 2021-07-26 DIAGNOSIS — K91.89 POSTOPERATIVE SURGICAL COMPLICATION INVOLVING DIGESTIVE SYSTEM ASSOCIATED WITH DIGESTIVE SYSTEM PROCEDURE, UNSPECIFIED COMPLICATION: ICD-10-CM

## 2021-07-26 DIAGNOSIS — R11.2 INTRACTABLE NAUSEA AND VOMITING: Primary | ICD-10-CM

## 2021-07-26 PROBLEM — K31.1 GASTRIC OUTLET OBSTRUCTION: Status: ACTIVE | Noted: 2021-07-26

## 2021-07-26 LAB
ALBUMIN SERPL-MCNC: 3.3 G/DL (ref 3.5–5)
ALBUMIN/GLOB SERPL: 0.7 {RATIO} (ref 1.1–2.2)
ALP SERPL-CCNC: 76 U/L (ref 45–117)
ALT SERPL-CCNC: 23 U/L (ref 12–78)
ANION GAP SERPL CALC-SCNC: 14 MMOL/L (ref 5–15)
APPEARANCE UR: ABNORMAL
AST SERPL-CCNC: 24 U/L (ref 15–37)
BACTERIA URNS QL MICRO: NEGATIVE /HPF
BASOPHILS # BLD: 0 K/UL (ref 0–0.1)
BASOPHILS NFR BLD: 1 % (ref 0–1)
BILIRUB SERPL-MCNC: 0.5 MG/DL (ref 0.2–1)
BILIRUB UR QL: NEGATIVE
BUN SERPL-MCNC: 4 MG/DL (ref 6–20)
BUN/CREAT SERPL: 6 (ref 12–20)
CALCIUM SERPL-MCNC: 9.6 MG/DL (ref 8.5–10.1)
CHLORIDE SERPL-SCNC: 103 MMOL/L (ref 97–108)
CO2 SERPL-SCNC: 21 MMOL/L (ref 21–32)
COLOR UR: ABNORMAL
COMMENT, HOLDF: NORMAL
CREAT SERPL-MCNC: 0.71 MG/DL (ref 0.55–1.02)
DIFFERENTIAL METHOD BLD: ABNORMAL
EOSINOPHIL # BLD: 0 K/UL (ref 0–0.4)
EOSINOPHIL NFR BLD: 1 % (ref 0–7)
EPITH CASTS URNS QL MICRO: ABNORMAL /LPF
ERYTHROCYTE [DISTWIDTH] IN BLOOD BY AUTOMATED COUNT: 13.9 % (ref 11.5–14.5)
GLOBULIN SER CALC-MCNC: 4.5 G/DL (ref 2–4)
GLUCOSE SERPL-MCNC: 64 MG/DL (ref 65–100)
GLUCOSE UR STRIP.AUTO-MCNC: NEGATIVE MG/DL
HCT VFR BLD AUTO: 38.6 % (ref 35–47)
HGB BLD-MCNC: 12.4 G/DL (ref 11.5–16)
HGB UR QL STRIP: NEGATIVE
IMM GRANULOCYTES # BLD AUTO: 0 K/UL (ref 0–0.04)
IMM GRANULOCYTES NFR BLD AUTO: 0 % (ref 0–0.5)
KETONES UR QL STRIP.AUTO: >80 MG/DL
LEUKOCYTE ESTERASE UR QL STRIP.AUTO: ABNORMAL
LIPASE SERPL-CCNC: 155 U/L (ref 73–393)
LYMPHOCYTES # BLD: 1.4 K/UL (ref 0.8–3.5)
LYMPHOCYTES NFR BLD: 32 % (ref 12–49)
MCH RBC QN AUTO: 28.6 PG (ref 26–34)
MCHC RBC AUTO-ENTMCNC: 32.1 G/DL (ref 30–36.5)
MCV RBC AUTO: 88.9 FL (ref 80–99)
MONOCYTES # BLD: 0.6 K/UL (ref 0–1)
MONOCYTES NFR BLD: 14 % (ref 5–13)
NEUTS SEG # BLD: 2.2 K/UL (ref 1.8–8)
NEUTS SEG NFR BLD: 52 % (ref 32–75)
NITRITE UR QL STRIP.AUTO: NEGATIVE
NRBC # BLD: 0 K/UL (ref 0–0.01)
NRBC BLD-RTO: 0 PER 100 WBC
PH UR STRIP: 5.5 [PH] (ref 5–8)
PLATELET # BLD AUTO: 247 K/UL (ref 150–400)
PMV BLD AUTO: 12.1 FL (ref 8.9–12.9)
POTASSIUM SERPL-SCNC: 3.7 MMOL/L (ref 3.5–5.1)
PROT SERPL-MCNC: 7.8 G/DL (ref 6.4–8.2)
PROT UR STRIP-MCNC: ABNORMAL MG/DL
RBC # BLD AUTO: 4.34 M/UL (ref 3.8–5.2)
RBC #/AREA URNS HPF: ABNORMAL /HPF (ref 0–5)
SAMPLES BEING HELD,HOLD: NORMAL
SODIUM SERPL-SCNC: 138 MMOL/L (ref 136–145)
SP GR UR REFRACTOMETRY: 1.02 (ref 1–1.03)
UR CULT HOLD, URHOLD: NORMAL
UROBILINOGEN UR QL STRIP.AUTO: 1 EU/DL (ref 0.2–1)
WBC # BLD AUTO: 4.3 K/UL (ref 3.6–11)
WBC URNS QL MICRO: ABNORMAL /HPF (ref 0–4)

## 2021-07-26 PROCEDURE — 36415 COLL VENOUS BLD VENIPUNCTURE: CPT

## 2021-07-26 PROCEDURE — 74011250636 HC RX REV CODE- 250/636: Performed by: EMERGENCY MEDICINE

## 2021-07-26 PROCEDURE — 81001 URINALYSIS AUTO W/SCOPE: CPT

## 2021-07-26 PROCEDURE — 0D768ZZ DILATION OF STOMACH, VIA NATURAL OR ARTIFICIAL OPENING ENDOSCOPIC: ICD-10-PCS | Performed by: INTERNAL MEDICINE

## 2021-07-26 PROCEDURE — 74011000250 HC RX REV CODE- 250: Performed by: SURGERY

## 2021-07-26 PROCEDURE — 96374 THER/PROPH/DIAG INJ IV PUSH: CPT

## 2021-07-26 PROCEDURE — 99218 HC RM OBSERVATION: CPT

## 2021-07-26 PROCEDURE — 0DB98ZX EXCISION OF DUODENUM, VIA NATURAL OR ARTIFICIAL OPENING ENDOSCOPIC, DIAGNOSTIC: ICD-10-PCS | Performed by: INTERNAL MEDICINE

## 2021-07-26 PROCEDURE — 96361 HYDRATE IV INFUSION ADD-ON: CPT

## 2021-07-26 PROCEDURE — 96375 TX/PRO/DX INJ NEW DRUG ADDON: CPT

## 2021-07-26 PROCEDURE — 74011000250 HC RX REV CODE- 250: Performed by: EMERGENCY MEDICINE

## 2021-07-26 PROCEDURE — C9113 INJ PANTOPRAZOLE SODIUM, VIA: HCPCS | Performed by: EMERGENCY MEDICINE

## 2021-07-26 PROCEDURE — 83690 ASSAY OF LIPASE: CPT

## 2021-07-26 PROCEDURE — 0D7A8ZZ DILATION OF JEJUNUM, VIA NATURAL OR ARTIFICIAL OPENING ENDOSCOPIC: ICD-10-PCS | Performed by: INTERNAL MEDICINE

## 2021-07-26 PROCEDURE — 85025 COMPLETE CBC W/AUTO DIFF WBC: CPT

## 2021-07-26 PROCEDURE — 96376 TX/PRO/DX INJ SAME DRUG ADON: CPT

## 2021-07-26 PROCEDURE — 74011250636 HC RX REV CODE- 250/636: Performed by: SURGERY

## 2021-07-26 PROCEDURE — 99284 EMERGENCY DEPT VISIT MOD MDM: CPT

## 2021-07-26 PROCEDURE — 80053 COMPREHEN METABOLIC PANEL: CPT

## 2021-07-26 RX ORDER — HYDROCODONE BITARTRATE AND ACETAMINOPHEN 10; 325 MG/1; MG/1
1 TABLET ORAL
Status: DISCONTINUED | OUTPATIENT
Start: 2021-07-26 | End: 2021-07-29

## 2021-07-26 RX ORDER — SODIUM CHLORIDE 9 MG/ML
125 INJECTION, SOLUTION INTRAVENOUS CONTINUOUS
Status: DISCONTINUED | OUTPATIENT
Start: 2021-07-26 | End: 2021-07-29 | Stop reason: HOSPADM

## 2021-07-26 RX ORDER — HYDROMORPHONE HYDROCHLORIDE 1 MG/ML
0.5 INJECTION, SOLUTION INTRAMUSCULAR; INTRAVENOUS; SUBCUTANEOUS
Status: DISCONTINUED | OUTPATIENT
Start: 2021-07-26 | End: 2021-07-29 | Stop reason: HOSPADM

## 2021-07-26 RX ORDER — ONDANSETRON 2 MG/ML
4 INJECTION INTRAMUSCULAR; INTRAVENOUS
Status: DISCONTINUED | OUTPATIENT
Start: 2021-07-26 | End: 2021-07-29 | Stop reason: HOSPADM

## 2021-07-26 RX ORDER — HYDROCODONE BITARTRATE AND ACETAMINOPHEN 5; 325 MG/1; MG/1
1 TABLET ORAL
Status: DISCONTINUED | OUTPATIENT
Start: 2021-07-26 | End: 2021-07-29

## 2021-07-26 RX ORDER — ACETAMINOPHEN 325 MG/1
650 TABLET ORAL
Status: DISCONTINUED | OUTPATIENT
Start: 2021-07-26 | End: 2021-07-29 | Stop reason: HOSPADM

## 2021-07-26 RX ORDER — NALOXONE HYDROCHLORIDE 0.4 MG/ML
0.4 INJECTION, SOLUTION INTRAMUSCULAR; INTRAVENOUS; SUBCUTANEOUS AS NEEDED
Status: DISCONTINUED | OUTPATIENT
Start: 2021-07-26 | End: 2021-07-29 | Stop reason: HOSPADM

## 2021-07-26 RX ADMIN — SODIUM CHLORIDE 40 MG: 9 INJECTION, SOLUTION INTRAMUSCULAR; INTRAVENOUS; SUBCUTANEOUS at 16:43

## 2021-07-26 RX ADMIN — SODIUM CHLORIDE 125 ML/HR: 9 INJECTION, SOLUTION INTRAVENOUS at 19:52

## 2021-07-26 RX ADMIN — SODIUM CHLORIDE 10 MG: 9 INJECTION INTRAMUSCULAR; INTRAVENOUS; SUBCUTANEOUS at 22:00

## 2021-07-26 RX ADMIN — FOLIC ACID: 5 INJECTION, SOLUTION INTRAMUSCULAR; INTRAVENOUS; SUBCUTANEOUS at 19:52

## 2021-07-26 RX ADMIN — SODIUM CHLORIDE 1000 ML: 9 INJECTION, SOLUTION INTRAVENOUS at 16:00

## 2021-07-26 RX ADMIN — PROMETHAZINE HYDROCHLORIDE 25 MG: 25 INJECTION INTRAMUSCULAR; INTRAVENOUS at 16:43

## 2021-07-26 RX ADMIN — ONDANSETRON 4 MG: 2 INJECTION INTRAMUSCULAR; INTRAVENOUS at 20:33

## 2021-07-26 NOTE — ED NOTES
Verbal shift change report given to Yue NOGUERA (oncoming nurse) by Kira Mims (offgoing nurse). Report included the following information SBAR, Kardex, ED Summary, [facility] and Recent Results.

## 2021-07-26 NOTE — TELEPHONE ENCOUNTER
Patient called stating she needs to speak with nurse regarding not feeling well and may need to be admitted.

## 2021-07-26 NOTE — PROGRESS NOTES
Pt is a 63 yo female sp Gastric bypass Dr Asaf Archuleta,  Hx from ER and my call to Dr Asaf Archuleta. 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 Pt contact with Surgery office today, sent pt to ER for admit. Plan NPO,  IVF,  Banana bag, and GI consult, pt was to see Dr Rebeca Basurto soon anyway, likely need EGD, and ? Dilation if stricture.

## 2021-07-26 NOTE — TELEPHONE ENCOUNTER
I called the patient back and she said she just read the my chart message I sent her, she said her sister is there with er and will bring her to the ED. Pt in agreement.

## 2021-07-26 NOTE — TELEPHONE ENCOUNTER
I called the patient back and I left her a voice mail to call me back. I called bed placement after speaking to Shara Session, NP who was going to have patient direct admitted to [facility] but there are no beds, so will advise her to go to the ED. Will await callback.

## 2021-07-26 NOTE — ED TRIAGE NOTES
Pt sent for direct admission, pt with hx gastric bypass and is not able to keep any fluids down, pt c/o n/v x 2 weeks, pt hospitalized a week ago for the same

## 2021-07-26 NOTE — ED PROVIDER NOTES
Pt is a 63 yo female with recent gastric bypass per Dr Noe Ibarra in June 2021 who presents with intractable nausea and dec PO intake. Pt reports she was recently admitted and is on omeprazole, reglan and zofran at home with minimal relief. Reports Dr Noe Ibarra thinks she may have a stricture. No new abdominal pain. Pt was at work today and nausea worsened and she felt like she was going to pass out. She called general surgery clinic and was told she was going to be directly admitted but needed to come to the ER as no beds were available. Past Medical History:   Diagnosis Date    Allergic rhinitis 5/27/2010    Arthritis     Baker cyst, left 10/2019    Dr. Oliver Macdonald cyst, right     Cervical disc herniation 01/2010    C3-4, C4-5, C5-6. Hemangioma body of C5. Dr. Dolores Mcgill.  Chest pain 08/2011, 02/2012    Dr. Keith Ayala. Dr. Hernan Chowdary; denies CP as of 3/27/14    Chronic lower back pain 2010, 03/2016    thoracic DDD and spondylosis. DDD L3-S1. Dr. Rondall Harada. Lajoyce Pulse. Dr. Cole Cantu, [facility]. Dr. Jorge A Godoy.  Chronic meniscal tear of knee     Chronic pain     lower back and knees    Esophagitis, reflux 4/3/2011    Gallstone     Gastritis 5/27/2010    Dr. Scott Smalls.  Gastrointestinal food allergy 03/2014    Multiple. Dr. Jacquelyn Tanner.    Heart palpitations 02/2012    DUE TO PAC'S AND PVC'S. Dr. Phu Butler.  Heel spur, left 02/2019    Hiatal hernia 4/3/2011    HTN (hypertension) 1987    Impaired glucose tolerance 10/15/2010    Incidental lung nodule 01/08/13    LLL 3.9 mm, RML 3.1 mm;  as of 3/27/14 per pt stable w/o growth    Insomnia 1990s    Iron deficiency anemia 5/27/2010    Irritable bowel syndrome (IBS) 03/2014    Dr. Dana Chatterjee.    Knee pain 2012    Right. due to severe OA. / chipped bone     Left foot pain 08/2019    Dr. Jennifer Doll.  Metatarsal bone fracture 1990    Left fifth.     Migraine 2/22/2011    Dr. James Kasper obesity ([facility] [address]     Peroneal tendonitis of lower leg 08/07/2019    Left. Dr. Meet Carlson    Pre-diabetes     Radiculopathy, cervical 01/2010    Left. Dr. Era Byrne.  Shoulder pain, right 2012    due to [address] X 2. Dr. Hannah Leung.  Thyroid nodule 2011    6 nodules- Dr. Garnet Sacks    Toe fracture, left 2008    fifth toe.  Triangular fibrocartilage complex tear 2012    Dr. Jenna Jauregui. Left. Past Surgical History:   Procedure Laterality Date    COLONOSCOPY N/A 3/10/2021    COLONOSCOPY,EGD performed by Ruth Logan [facility] at [facility] ENDOSCOPY    [address]    x 1    HX CHOLECYSTECTOMY  1987    OPEN    HX COLONOSCOPY  03/31/14    Dr. Jhonny Ogden; 03/31/14    due to abnormal PAP.  HX DILATION AND CURETTAGE  1990s    due to miscarriage.  HX ENDOSCOPY      HX HEART CATHETERIZATION  2018    no stents    HX KNEE ARTHROSCOPY Right     HX LAP GASTRIC BYPASS  06/14/2021    lap gastric bypass with hiatal hernia repair by Dr. Kris Smith at [address] [place] [address] Right 06/2016    LUMBAR L4-5, L5-S1 ELLY.   Dr. Rangel [address] Osteopathy         Family History:   Problem Relation Age of Onset    Diabetes Mother     Heart Disease Mother 58        2 stents    Hypertension Mother     Stroke Mother     Diabetes Father     Hypertension Father     Stroke Maternal Grandmother     Cancer Maternal Uncle         prostate    Diabetes Sister     No Known Problems Sister     No Known Problems Sister     Anesth Problems Neg Hx        Social History     Socioeconomic History    Marital status:      Spouse name: Not on file    Number of children: 3    Years of education: Not on file    Highest education level: Not on file   Occupational History    Occupation: Medical assistant     Employer: [employer]   Tobacco Use    Smoking status: Never Smoker    Smokeless tobacco: Never Used   Vaping Use    Vaping Use: Never used Substance and Sexual Activity    Alcohol use: No    Drug use: Never    Sexual activity: Yes     Partners: Male   Other Topics Concern    Not on file   Social History Narrative    In the home alone     3 adult children     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      Social Determinants of Health     Financial Resource Strain:     Difficulty of Paying Living Expenses:    Food Insecurity:     Worried About Running Out of Food in the Last Year:     [address] in the Last Year:    Transportation Needs:     Lack of Transportation (Medical):  Lack of Transportation (Non-Medical):    Physical Activity:     Days of Exercise per Week:     Minutes of Exercise per Session:    Stress:     Feeling of Stress :    Social Connections:     Frequency of Communication with Friends and Family:     Frequency of Social Gatherings with Friends and Family:     Attends [employer] Services:     Active Member of Clubs or Organizations:     Attends Club or Organization Meetings:     Marital Status:    Intimate Partner Violence:     Fear of Current or Ex-Partner:     Emotionally Abused:     Physically Abused:     Sexually Abused: ALLERGIES: Doxylamine succinate, Pcn [penicillins], Ambien [zolpidem], Aspirin, Celebrex [celecoxib], Erythromycin, Milk prot-turm-pepper-pineappl, Neurontin [gabapentin], Nsaids (non-steroidal anti-inflammatory drug), and Pineapple    Review of Systems   Constitutional: Positive for appetite change and fatigue. Negative for chills and fever. HENT: Negative for drooling and nosebleeds. Eyes: Negative for pain and itching. Respiratory: Negative for choking and stridor. Cardiovascular: Negative for leg swelling. Gastrointestinal: Positive for nausea. Negative for abdominal distention and rectal pain. Endocrine: Negative for heat intolerance and polyphagia. Genitourinary: Negative for enuresis and genital sores.    Musculoskeletal: Negative for arthralgias and joint swelling. Skin: Negative for color change. Allergic/Immunologic: Negative for immunocompromised state. Neurological: Negative for tremors and speech difficulty. Hematological: Negative for adenopathy. Psychiatric/Behavioral: Negative for dysphoric mood and sleep disturbance. Vitals:    21 1150 21 1700   BP: (!) 137/93 127/85   Pulse: 81    Temp: 98.4 °F (36.9 °C)    SpO2: 99% 100%            Physical Exam  Vitals and nursing note reviewed. Constitutional:       General: She is in acute distress. Appearance: She is well-developed. She is not toxic-appearing or diaphoretic. HENT:      Head: Normocephalic. Nose: Nose normal.   Eyes:      Conjunctiva/sclera: Conjunctivae normal.   Cardiovascular:      Rate and Rhythm: Normal rate and regular rhythm. Heart sounds: Normal heart sounds. Pulmonary:      Effort: Pulmonary effort is normal. No respiratory distress. Breath sounds: Normal breath sounds. Abdominal:      General: There is no distension. Palpations: Abdomen is soft. Tenderness: There is no guarding or rebound. Hernia: No hernia is present. Musculoskeletal:         General: No deformity. Normal range of motion. Cervical back: Normal range of motion and neck supple. Skin:     General: Skin is warm and dry. Neurological:      Mental Status: She is alert and oriented to person, place, and time. Coordination: Coordination normal.   Psychiatric:         Mood and Affect: Mood normal.         Behavior: Behavior normal.          MDM  Number of Diagnoses or Management Options  Intractable nausea and vomiting  Postoperative surgical complication involving digestive system associated with digestive system procedure, unspecified complication  Diagnosis management comments: Pt with intractable nausea at home s/p gastric bypass surgery per Dr Oran Apgar. Recommended ED visit for admission for symptom control. Discussed with Dr Mac Cespedes. Procedures    Patient is being admitted to the hospital.  The results of their tests and reasons for their admission have been discussed with them and/or available family. They convey agreement and understanding for the need to be admitted and for their admission diagnosis.

## 2021-07-27 LAB
COVID-19 RAPID TEST, COVR: NOT DETECTED
SOURCE, COVRS: NORMAL

## 2021-07-27 PROCEDURE — 87635 SARS-COV-2 COVID-19 AMP PRB: CPT

## 2021-07-27 PROCEDURE — 99218 HC RM OBSERVATION: CPT

## 2021-07-27 PROCEDURE — 99219 PR INITIAL OBSERVATION CARE/DAY 50 MINUTES: CPT | Performed by: SURGERY

## 2021-07-27 PROCEDURE — 74011000250 HC RX REV CODE- 250: Performed by: SURGERY

## 2021-07-27 PROCEDURE — C9113 INJ PANTOPRAZOLE SODIUM, VIA: HCPCS | Performed by: SURGERY

## 2021-07-27 PROCEDURE — 74011250636 HC RX REV CODE- 250/636: Performed by: SURGERY

## 2021-07-27 PROCEDURE — 96376 TX/PRO/DX INJ SAME DRUG ADON: CPT

## 2021-07-27 RX ORDER — ENOXAPARIN SODIUM 100 MG/ML
40 INJECTION SUBCUTANEOUS EVERY 24 HOURS
Status: DISCONTINUED | OUTPATIENT
Start: 2021-07-27 | End: 2021-07-29 | Stop reason: HOSPADM

## 2021-07-27 RX ADMIN — ONDANSETRON 4 MG: 2 INJECTION INTRAMUSCULAR; INTRAVENOUS at 12:51

## 2021-07-27 RX ADMIN — SODIUM CHLORIDE 10 MG: 9 INJECTION INTRAMUSCULAR; INTRAVENOUS; SUBCUTANEOUS at 16:06

## 2021-07-27 RX ADMIN — SODIUM CHLORIDE 125 ML/HR: 9 INJECTION, SOLUTION INTRAVENOUS at 09:37

## 2021-07-27 RX ADMIN — SODIUM CHLORIDE 10 MG: 9 INJECTION INTRAMUSCULAR; INTRAVENOUS; SUBCUTANEOUS at 22:04

## 2021-07-27 RX ADMIN — SODIUM CHLORIDE 10 MG: 9 INJECTION INTRAMUSCULAR; INTRAVENOUS; SUBCUTANEOUS at 04:23

## 2021-07-27 RX ADMIN — SODIUM CHLORIDE 10 MG: 9 INJECTION INTRAMUSCULAR; INTRAVENOUS; SUBCUTANEOUS at 10:37

## 2021-07-27 RX ADMIN — SODIUM CHLORIDE 40 MG: 9 INJECTION INTRAMUSCULAR; INTRAVENOUS; SUBCUTANEOUS at 17:10

## 2021-07-27 RX ADMIN — ONDANSETRON 4 MG: 2 INJECTION INTRAMUSCULAR; INTRAVENOUS at 17:17

## 2021-07-27 NOTE — ROUTINE PROCESS
TRANSFER - OUT REPORT:    Verbal report given to POORNIMA Holder(name) on [facility]  being transferred to (unit) for routine progression of care       Report consisted of patients Situation, Background, Assessment and   Recommendations(SBAR). Information from the following report(s) SBAR, Kardex, ED Summary, MAR and Med Rec Status was reviewed with the receiving nurse. Lines:   Peripheral IV 07/26/21 Right Antecubital (Active)   Site Assessment Clean, dry, & intact 07/26/21 1550   Phlebitis Assessment 0 07/26/21 1550   Infiltration Assessment 0 07/26/21 1550   Dressing Status Clean, dry, & intact 07/26/21 1550        Opportunity for questions and clarification was provided.       Patient transported with:   [employer]

## 2021-07-27 NOTE — PROGRESS NOTES
T.O.C:   Pt expected to d/c to home   Family to provide transport at d/c   Emergency Contact: Romero Hogde, daughter, [phone]         PCP: First and Last name:  Rosie Avila MD     Name of Practice:    Are you a current patient: Yes/No:  Yes   Approximate date of last visit:  May 27   Can you participate in a virtual visit with your PCP:  yes                                      Transition of Care Plan:    Pt expected to d/c to home with family to provide transport. Pt's daughter is emergency contact (Romero Hodge [phone])  Pt lives alone but states she has supportive friends and family to call on if needed. Pt will follow up with Dr Kimber Urrutia and Dr Francine Larson after d/c. Pt lives in zip code covered by [facility]. Information given and explained. Observation notice provided in writing to patient and/or caregiver as well as verbal explanation of the policy. Patients who are in outpatient status also receive the Observation notice. Patient has received notice and or patient representative has received via secure email, fax, or certified mail based on patient representative's preference.            Marybeth Thakur RN

## 2021-07-27 NOTE — H&P
Surgery History and Physical    Subjective:      Pt is a 63 yo female sp Gastric bypass Dr Belle Ríos,  Hx from ER and my call to Dr Belle Ríos. 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 Pt contact with Surgery office today, sent pt to ER for admit. Plan NPO,  IVF,  Banana bag, and GI consult, pt was to see Dr Sury Kong soon anyway, likely need EGD, and ? Dilation if stricture. Past Medical History:   Diagnosis Date    Allergic rhinitis 5/27/2010    Arthritis     Baker cyst, left 10/2019    Dr. Rebecca Schmid cyst, right     Cervical disc herniation 01/2010    C3-4, C4-5, C5-6. Hemangioma body of C5. Dr. Erling Fleischer.  Chest pain 08/2011, 02/2012    Dr. Joey Otero. Dr. Ray Fairbanks; denies CP as of 3/27/14    Chronic lower back pain 2010, 03/2016    thoracic DDD and spondylosis. DDD L3-S1. Dr. Alysha Ponce. Ti Yang. Dr. Sonja Junior, [facility]. Dr. Joo Morales.  Chronic meniscal tear of knee     Chronic pain     lower back and knees    Esophagitis, reflux 4/3/2011    Gallstone     Gastritis 5/27/2010    Dr. Glory Martinez.  Gastrointestinal food allergy 03/2014    Multiple. Dr. José Rodriguez.    Heart palpitations 02/2012    DUE TO PAC'S AND PVC'S. Dr. Junior Rojas.  Heel spur, left 02/2019    Hiatal hernia 4/3/2011    HTN (hypertension) 1987    Impaired glucose tolerance 10/15/2010    Incidental lung nodule 01/08/13    LLL 3.9 mm, RML 3.1 mm;  as of 3/27/14 per pt stable w/o growth    Insomnia 1990s    Iron deficiency anemia 5/27/2010    Irritable bowel syndrome (IBS) 03/2014    Dr. Althea Galeano.    Knee pain 2012    Right. due to severe OA. / chipped bone     Left foot pain 08/2019    Dr. Marylene Prows.  Metatarsal bone fracture 1990    Left fifth.     Migraine 2/22/2011     Joo Willson    Morbid obesity ([facility] [address]     Peroneal tendonitis of lower leg 08/07/2019    Left. Dr. Siobhan Willams    Pre-diabetes     Radiculopathy, cervical 01/2010    Left. Dr. Bambi Robin.  Shoulder pain, right 2012    due to [address] X 2. Dr. Anum Grider.  Thyroid nodule 2011    6 nodules- Dr. Shireen Mayorga    Toe fracture, left 2008    fifth toe.  Triangular fibrocartilage complex tear 2012    Dr. Bhavin Solano. Left. Past Surgical History:   Procedure Laterality Date    COLONOSCOPY N/A 3/10/2021    COLONOSCOPY,EGD performed by Reynold Alpers, [facility] at [facility] ENDOSCOPY    [address]    x [address]    HX CHOLECYSTECTOMY  1987    OPEN    HX COLONOSCOPY  03/31/14    Dr. Marylou Ramsey; 03/31/14    due to abnormal PAP.  HX DILATION AND CURETTAGE  1990s    due to miscarriage.  HX ENDOSCOPY      HX HEART CATHETERIZATION  2018    no stents    HX KNEE ARTHROSCOPY Right     HX LAP GASTRIC BYPASS  06/14/2021    lap gastric bypass with [facility] Coleman [facility]    [address] Right 06/2016    LUMBAR L4-5, L5-S1 ELLY. Dr. Bam Parrish      Family History   Problem Relation Age of Onset    Diabetes Mother     Heart Disease Mother 58        2 stents    Hypertension Mother     Stroke Mother     Diabetes Father     Hypertension Father     Stroke Maternal Grandmother     Cancer Maternal Uncle         prostate    Diabetes Sister     No Known Problems Sister     No Known Problems Sister     Anesth Problems Neg Hx      Social History     Tobacco Use    Smoking status: Never Smoker    Smokeless tobacco: Never Used   Substance Use Topics    Alcohol use: No      Prior to Admission medications    Medication Sig Start Date End Date Taking? Authorizing Provider   metoclopramide HCl (REGLAN) 5 mg tablet Take 1 Tablet by mouth four (4) times daily for 10 days.  Indications: nausea, regurgitation 7/22/21 21  Devang Small, JANIE   acetaminophen (Tylenol Extra Strength) 500 mg tablet Take 500 mg by mouth every six (6) hours as needed for Pain. Provider, Historical   ondansetron (ZOFRAN ODT) 4 mg disintegrating tablet Take 1 Tab by mouth every eight (8) hours as needed for Nausea or Vomiting. 21   Lizeth Brumfield NP   docusate sodium (COLACE) 100 mg capsule Take 100 mg by mouth as needed for Constipation. Provider, Historical   omeprazole (PRILOSEC) 20 mg capsule TAKE 1 CAPSULE BY MOUTH TWICE DAILY  Indications: heartburn 20   Glee Apo R, DO      Allergies   Allergen Reactions    Doxylamine Succinate Swelling     12.5-25 MG  HANDS, FACE/PERIORAL, FEET    Pcn [Penicillins] Hives    Ambien [Zolpidem] Nausea and Vomiting and Other (comments)     5 mg with Ativan 0.5 mg   TOO GROGGY, SLEPT 24 HOURS  7.5 MG FORGOT SHE WAS SLEEP EATING    Aspirin Nausea Only    Celebrex [Celecoxib] Other (comments)     TIGHT SQUEEZING IN CHEST  CHEST ACHED    Erythromycin Nausea and Vomiting    Milk Prot-Turm-Pepper-Pineappl Other (comments)     Multiple food allergies    Neurontin [Gabapentin] Other (comments)     300 mg  SEVERE LEG PAIN  EYE TWITCHING    Nsaids (Non-Steroidal Anti-Inflammatory Drug) Other (comments)     GI irritation      Pineapple Unknown (comments)       Review of Systems   Respiratory: Negative. Gastrointestinal: Positive for abdominal pain, nausea and vomiting. All other systems reviewed and are negative. Objective:     Patient Vitals for the past 8 hrs:   BP Temp Pulse Resp SpO2   21 0343 131/76 97.6 °F (36.4 °C) 72 16 98 %       Temp (24hrs), Av.2 °F (36.8 °C), Min:97.6 °F (36.4 °C), Max:98.9 °F (37.2 °C)      Physical Exam  Vitals and nursing note reviewed. Constitutional:       Appearance: Normal appearance. She is obese. She is not ill-appearing. Cardiovascular:      Rate and Rhythm: Normal rate.    Pulmonary:      Effort: Pulmonary effort is normal. Abdominal:      Comments: Obese but soft minimal subjective tenderness no evidence of peritoneal signs signs guarding or acute surgical findings   Skin:     General: Skin is warm and dry. Neurological:      General: No focal deficit present. Mental Status: She is alert and oriented to person, place, and time. Psychiatric:         Mood and Affect: Mood normal.         Behavior: Behavior normal.         Thought Content: Thought content normal.         Judgment: Judgment normal.         Assessment:     Active Problems:    Essential hypertension (6/3/2015)      Obesity, Class III, BMI 40-49.9 (morbid obesity) ([facility] [address] (6/1/2018)      Gastric outlet obstruction (7/26/2021)      Nausea & vomiting (7/26/2021)        Plan:      Now with intractable N/V and inability to  Maintain hydration despite outpt infusions. Pt contact with Surgery office today, sent pt to ER for admit. Plan NPO,  IVF,  Banana bag, and GI consult, pt was to see Dr Rebecca Murdock soon anyway, likely need EGD, and ? Dilation if stricture.      Banana bag given, further plans and recommendations per Dr. Luz Witt on follow-up today    GI consult ordered and pending    FACE TO FACE TIME: (Review of imaging, hx, records, EMR, discussion with pt and providers, and  pt exam)                                   42   minutes    Signed By: Omar López MD   [facility]    July 27, 2021

## 2021-07-27 NOTE — CONSULTS
[address]  [address]  [address], [address]  [phone]                     GI CONSULTATION NOTE      NAME:  Sin Mary   :   1965   MRN:   [id number]     Consult Date: 2021     Chief Complaint: Gastric outlet obstruction    History of Present Illness:  Patient is a 64 y.o. who is seen in consultation at the request of Dr. Rommie Holter for the above-mentioned problem/s. She is s/p  laparoscopic gastric bypass 21 with post-op course complicated by increased left upper quadrant pain, tachycardia, and leukocytosis concerning for ischemia of the Yelena limb. She returned to the OR postop day 4 and findings included:  1.  Mild dilation of Yelena limb with healthy serosa and mesentery. 2.  No signs of mechanical small-bowel obstruction. 3.  No signs of peritoneal inflammation/rind. 4.  Patent gastrojejunal anastomosis on endoscopy with patchy mucosal ischemia with necrosis extending 30 cm distal to the gastrojejunostomy. 5.  No insufflation air leak on endoscopy. She did get better, tolerated pureed diet and was able to return to work, however, last two weeks, notes difficulty swallowing. Feels worsening of sx started after having pureed imitation crab meat. Pureed food was sticking in mid chest area and causing significant pressure. She was taken off pureed and put back on liquids only but continues to have issues. Sips of water feel like they go down \"about half way\" then she feels pressure in mid chest and a lump in her throat (indicates upper neck) that can last for hours after. Has only been able to tolerate ~1/4 of fluid intake needed. She does report history of 3 known thyroid nodules but typically does not have difficulty swallowing because of these. Reports a constant sensation of nausea, no vomiting s/p gastric bypass. She is also describing an LUQ \"gnawing\" pain which was present prior to her gastric bypass surgery.   She was seen by Dr. Mirtha Mccartney but no definitive diagnosis was found. This pain did eventually improve but recurred and has been consistent since onset of above. Bowel habits have been WNL. CT A/P with 7/4/21 (RLQ abdominal pain, post gastric bypass, hx mucosal ischemia at anastomosis): No evidence of acute process  EGD 3/10/21: small sliding 1 cm HH; erythema in the body and antrum: mild bile staining of  mucosa is noted. GEJ bx normal; mild reflux o/w normal  CLN 3/10/21: Small IH; random bx negative      PMH:  Past Medical History:   Diagnosis Date    Allergic rhinitis 5/27/2010    Arthritis     Baker cyst, left 10/2019    Dr. Aniceto Yusuf cyst, right     Cervical disc herniation 01/2010    C3-4, C4-5, C5-6. Hemangioma body of C5. Dr. Johanna Meredith.  Chest pain 08/2011, 02/2012    Dr. Antionette Brownlee. Dr. Whitney Baumgarten; denies CP as of 3/27/14    Chronic lower back pain 2010, 03/2016    thoracic DDD and spondylosis. DDD L3-S1. Dr. Jarek Aguilera. Abhinav Shorten. Dr. Mal Nice, [facility]. Dr. Sandeep Abreu.  Chronic meniscal tear of knee     Chronic pain     lower back and knees    Esophagitis, reflux 4/3/2011    Gallstone     Gastritis 5/27/2010    Dr. Tia Coleman.  Gastrointestinal food allergy 03/2014    Multiple. Dr. Jakub Vargas.    Heart palpitations 02/2012    DUE TO PAC'S AND PVC'S. Dr. Parsons November.  Heel spur, left 02/2019    Hiatal hernia 4/3/2011    HTN (hypertension) 1987    Impaired glucose tolerance 10/15/2010    Incidental lung nodule 01/08/13    LLL 3.9 mm, RML 3.1 mm;  as of 3/27/14 per pt stable w/o growth    Insomnia 1990s    Iron deficiency anemia 5/27/2010    Irritable bowel syndrome (IBS) 03/2014    Dr. Nelson Archibald.    Knee pain 2012    Right. due to severe OA. / chipped bone     Left foot pain 08/2019    Dr. Olga Jacobo.  Metatarsal bone fracture 1990    Left fifth.  Migraine 2/22/2011    Dr. Miriam Adam [facility])     Peroneal tendonitis of lower leg 08/07/2019    Left. Dr. José Miguel Ortiz    Pre-diabetes     Radiculopathy, cervical 01/2010    Left. Dr. Veda Redd.  Shoulder pain, right 2012    due to [address] X 2. Dr. Mary Kay Lau.  Thyroid nodule 2011    6 nodules- Dr. Connie Reyes    Toe fracture, left 2008    fifth toe.  Triangular fibrocartilage complex tear 2012    Dr. Jeremias Hudson. Left. PSH:  Past Surgical History:   Procedure Laterality Date    COLONOSCOPY N/A 3/10/2021    COLONOSCOPY,EGD performed by [facility]ian [facility] ENDOSCOPY    [address]    x 1    [address]    HX COLONOSCOPY  03/31/14    Dr. Tai Coelho; 03/31/14    due to abnormal PAP.  HX DILATION AND CURETTAGE  1990s    due to miscarriage.  HX ENDOSCOPY      HX HEART CATHETERIZATION  2018    no stents    HX KNEE ARTHROSCOPY Right     HX LAP GASTRIC BYPASS  06/14/2021    lap gastric bypass with hiatal hernia repair by Dr. Zulema Hunter at [facility] Right 06/2016    LUMBAR L4-5, L5-S1 ELLY. Dr. Kristin Braun       Allergies: Allergies   Allergen Reactions    Doxylamine Succinate Swelling     12.5-25 MG  HANDS, FACE/PERIORAL, FEET    Pcn [Penicillins] Hives    Ambien [Zolpidem] Nausea and Vomiting and Other (comments)     5 mg with Ativan 0.5 mg   TOO GROGGY, SLEPT 24 HOURS  7.5 MG FORGOT SHE WAS SLEEP EATING    Aspirin Nausea Only    Celebrex [Celecoxib] Other (comments)     TIGHT SQUEEZING IN CHEST  CHEST ACHED    Erythromycin Nausea and Vomiting    Milk Prot-Turm-Pepper-Pineappl Other (comments)     Multiple food allergies    Neurontin [Gabapentin] Other (comments)     300 mg  SEVERE LEG PAIN  EYE TWITCHING    Nsaids (Non-Steroidal Anti-Inflammatory Drug) Other (comments)     GI irritation      Pineapple Unknown (comments)       Home Medications:  Prior to Admission Medications   Prescriptions Last Dose Informant Patient Reported? Taking? acetaminophen (Tylenol Extra Strength) 500 mg tablet   Yes No   Sig: Take 500 mg by mouth every six (6) hours as needed for Pain. docusate sodium (COLACE) 100 mg capsule   Yes No   Sig: Take 100 mg by mouth as needed for Constipation. metoclopramide HCl (REGLAN) 5 mg tablet   No No   Sig: Take 1 Tablet by mouth four (4) times daily for 10 days. Indications: nausea, regurgitation   omeprazole (PRILOSEC) 20 mg capsule   No No   Sig: TAKE 1 CAPSULE BY MOUTH TWICE DAILY  Indications: heartburn   ondansetron (ZOFRAN ODT) 4 mg disintegrating tablet   No No   Sig: Take 1 Tab by mouth every eight (8) hours as needed for Nausea or Vomiting.       Facility-Administered Medications: None       Hospital Medications:  Current Facility-Administered Medications   Medication Dose Route Frequency    enoxaparin (LOVENOX) injection 40 mg  40 mg SubCUTAneous Q24H    0.9% sodium chloride infusion  125 mL/hr IntraVENous CONTINUOUS    HYDROcodone-acetaminophen (NORCO) 5-325 mg per tablet 1 Tablet  1 Tablet Oral Q4H PRN    HYDROmorphone (PF) (DILAUDID) injection 0.5 mg  0.5 mg IntraVENous Q2H PRN    ondansetron (ZOFRAN) injection 4 mg  4 mg IntraVENous Q4H PRN    acetaminophen (TYLENOL) tablet 650 mg  650 mg Oral Q6H PRN    HYDROcodone-acetaminophen (NORCO)  mg tablet 1 Tablet  1 Tablet Oral Q4H PRN    naloxone (NARCAN) injection 0.4 mg  0.4 mg IntraVENous PRN    pantoprazole (PROTONIX) 40 mg in 0.9% sodium chloride 10 mL injection  40 mg IntraVENous DAILY    prochlorperazine (COMPAZINE) with saline injection 10 mg  10 mg IntraVENous Q6H PRN       Social History:  Social History     Tobacco Use    Smoking status: Never Smoker    Smokeless tobacco: Never Used   Substance Use Topics    Alcohol use: No       Family History:  Family History   Problem Relation Age of Onset    Diabetes Mother     Heart Disease Mother 58        2 stents    Hypertension Mother     Stroke Mother     Diabetes Father     Hypertension Father     Stroke Maternal Grandmother     Cancer Maternal Uncle         prostate    Diabetes Sister     No Known Problems Sister     No Known Problems Sister     Anesth Problems Neg Hx        Review of Systems:    Constitutional: negative fever, negative chills, negative weight loss  Eyes:   negative visual changes  ENT:   negative sore throat, tongue or lip swelling  Respiratory:  negative cough, negative dyspnea  Cards:  negative for chest pain, palpitations, lower extremity edema  GI:   See HPI  :  negative for frequency, dysuria  Integument:  negative for rash and pruritus  Heme:  negative for easy bruising and gum/nose bleeding  Musculoskel: negative for myalgias,  back pain and muscle weakness  Neuro: negative for headaches, dizziness, vertigo  Psych:  negative for feelings of anxiety, depression      Objective:     Patient Vitals for the past 8 hrs:   BP Temp Pulse Resp SpO2   07/27/21 0828 129/81 98.6 °F (37 °C) 76 16 97 %   07/27/21 0343 131/76 97.6 °F (36.4 °C) 72 16 98 %     No intake/output data recorded. No intake/output data recorded. PHYSICAL EXAM:  General appearance: cooperative, no distress  Skin: No jaundice, pallor, rashes   HEENT: Sclerae anicteric. Cardiovascular: Regular rate and rhythm. No murmurs, gallops, or rubs. Respiratory: Comfortable breathing. Clear breath sounds with no wheezes, rales, or rhonchi. GI: Abdomen nondistended, soft, and mildly tender LUQ. Normal active bowel sounds. No enlargement of the liver or spleen. No masses palpable. Rectal: Deferred     Neurological: Gross memory appears intact. Patient is alert and oriented. Psychiatric: Mood  appropriate with good judgement. No anxiety or agitation.       Data Review     Recent Labs     07/26/21  1218   WBC 4.3   HGB 12.4   HCT 38.6        Recent Labs     07/26/21  1218      K 3.7      CO2 21   BUN 4*   CREA 0.71   GLU 64*   CA 9.6     Recent Labs     07/26/21  1218   AP 76   TP 7.8 ALB 3.3*   GLOB 4.5*   LPSE 155     No results for input(s): INR, PTP, APTT, INREXT in the last 72 hours. Imaging studies reviewed    Assessment / Plan :     Ms. Collin De La Cruz presents with liquid and puréed dysphagia, globus sensation significant nausea concerning for gastric outlet obstruction/GEJ stricture s/p gastric bypass. Previous  EGD 7/28 with dilation/stenting stricture as appropriate  Rapid Covid  N. p.o. after midnight  Hold Lovenox  Continue supportive care per surgery     Patient C/Paradise Hanson 1106 Problem List:   Active Problems:    Essential hypertension (6/3/2015)      Obesity, Class III, BMI 40-49.9 (morbid obesity) ([facility] [address] (6/1/2018)      Gastric outlet obstruction (7/26/2021)      Nausea & vomiting (7/26/2021)

## 2021-07-28 ENCOUNTER — ANESTHESIA (OUTPATIENT)
Dept: ENDOSCOPY | Age: 56
DRG: 394 | End: 2021-07-28
Payer: COMMERCIAL

## 2021-07-28 ENCOUNTER — ANESTHESIA EVENT (OUTPATIENT)
Dept: ENDOSCOPY | Age: 56
DRG: 394 | End: 2021-07-28
Payer: COMMERCIAL

## 2021-07-28 PROCEDURE — 77030018712 HC DEV BLLN INFL BSC -B: Performed by: INTERNAL MEDICINE

## 2021-07-28 PROCEDURE — C1726 CATH, BAL DIL, NON-VASCULAR: HCPCS | Performed by: INTERNAL MEDICINE

## 2021-07-28 PROCEDURE — C9113 INJ PANTOPRAZOLE SODIUM, VIA: HCPCS | Performed by: SURGERY

## 2021-07-28 PROCEDURE — 74011250636 HC RX REV CODE- 250/636: Performed by: ANESTHESIOLOGY

## 2021-07-28 PROCEDURE — 74011000250 HC RX REV CODE- 250: Performed by: ANESTHESIOLOGY

## 2021-07-28 PROCEDURE — 77030021593 HC FCPS BIOP ENDOSC BSC -A: Performed by: INTERNAL MEDICINE

## 2021-07-28 PROCEDURE — 2709999900 HC NON-CHARGEABLE SUPPLY: Performed by: INTERNAL MEDICINE

## 2021-07-28 PROCEDURE — 76040000019: Performed by: INTERNAL MEDICINE

## 2021-07-28 PROCEDURE — 76060000031 HC ANESTHESIA FIRST 0.5 HR: Performed by: INTERNAL MEDICINE

## 2021-07-28 PROCEDURE — 74011000250 HC RX REV CODE- 250: Performed by: SURGERY

## 2021-07-28 PROCEDURE — 74011250636 HC RX REV CODE- 250/636: Performed by: SURGERY

## 2021-07-28 PROCEDURE — 99231 SBSQ HOSP IP/OBS SF/LOW 25: CPT | Performed by: SURGERY

## 2021-07-28 PROCEDURE — 96376 TX/PRO/DX INJ SAME DRUG ADON: CPT

## 2021-07-28 PROCEDURE — 99218 HC RM OBSERVATION: CPT

## 2021-07-28 PROCEDURE — 88305 TISSUE EXAM BY PATHOLOGIST: CPT

## 2021-07-28 RX ORDER — NALOXONE HYDROCHLORIDE 0.4 MG/ML
0.4 INJECTION, SOLUTION INTRAMUSCULAR; INTRAVENOUS; SUBCUTANEOUS
Status: DISCONTINUED | OUTPATIENT
Start: 2021-07-28 | End: 2021-07-28 | Stop reason: HOSPADM

## 2021-07-28 RX ORDER — ATROPINE SULFATE 0.1 MG/ML
0.5 INJECTION INTRAVENOUS
Status: DISCONTINUED | OUTPATIENT
Start: 2021-07-28 | End: 2021-07-28 | Stop reason: HOSPADM

## 2021-07-28 RX ORDER — EPINEPHRINE 0.1 MG/ML
1 INJECTION INTRACARDIAC; INTRAVENOUS
Status: DISCONTINUED | OUTPATIENT
Start: 2021-07-28 | End: 2021-07-28 | Stop reason: HOSPADM

## 2021-07-28 RX ORDER — DEXTROMETHORPHAN/PSEUDOEPHED 2.5-7.5/.8
1.2 DROPS ORAL
Status: DISCONTINUED | OUTPATIENT
Start: 2021-07-28 | End: 2021-07-28 | Stop reason: HOSPADM

## 2021-07-28 RX ORDER — SODIUM CHLORIDE 0.9 % (FLUSH) 0.9 %
5-40 SYRINGE (ML) INJECTION EVERY 8 HOURS
Status: DISCONTINUED | OUTPATIENT
Start: 2021-07-28 | End: 2021-07-29 | Stop reason: HOSPADM

## 2021-07-28 RX ORDER — SODIUM CHLORIDE 9 MG/ML
INJECTION, SOLUTION INTRAVENOUS
Status: DISCONTINUED | OUTPATIENT
Start: 2021-07-28 | End: 2021-07-28 | Stop reason: HOSPADM

## 2021-07-28 RX ORDER — MIDAZOLAM HYDROCHLORIDE 1 MG/ML
.25-1 INJECTION, SOLUTION INTRAMUSCULAR; INTRAVENOUS
Status: DISCONTINUED | OUTPATIENT
Start: 2021-07-28 | End: 2021-07-28 | Stop reason: HOSPADM

## 2021-07-28 RX ORDER — PROPOFOL 10 MG/ML
INJECTION, EMULSION INTRAVENOUS AS NEEDED
Status: DISCONTINUED | OUTPATIENT
Start: 2021-07-28 | End: 2021-07-28 | Stop reason: HOSPADM

## 2021-07-28 RX ORDER — SODIUM CHLORIDE 0.9 % (FLUSH) 0.9 %
5-40 SYRINGE (ML) INJECTION AS NEEDED
Status: DISCONTINUED | OUTPATIENT
Start: 2021-07-28 | End: 2021-07-29 | Stop reason: HOSPADM

## 2021-07-28 RX ORDER — FENTANYL CITRATE 50 UG/ML
100 INJECTION, SOLUTION INTRAMUSCULAR; INTRAVENOUS
Status: DISCONTINUED | OUTPATIENT
Start: 2021-07-28 | End: 2021-07-28 | Stop reason: HOSPADM

## 2021-07-28 RX ORDER — ONDANSETRON 2 MG/ML
INJECTION INTRAMUSCULAR; INTRAVENOUS AS NEEDED
Status: DISCONTINUED | OUTPATIENT
Start: 2021-07-28 | End: 2021-07-28 | Stop reason: HOSPADM

## 2021-07-28 RX ORDER — FLUMAZENIL 0.1 MG/ML
0.2 INJECTION INTRAVENOUS
Status: DISCONTINUED | OUTPATIENT
Start: 2021-07-28 | End: 2021-07-28 | Stop reason: HOSPADM

## 2021-07-28 RX ORDER — LIDOCAINE HYDROCHLORIDE 20 MG/ML
INJECTION, SOLUTION EPIDURAL; INFILTRATION; INTRACAUDAL; PERINEURAL AS NEEDED
Status: DISCONTINUED | OUTPATIENT
Start: 2021-07-28 | End: 2021-07-28 | Stop reason: HOSPADM

## 2021-07-28 RX ORDER — SODIUM CHLORIDE 9 MG/ML
50 INJECTION, SOLUTION INTRAVENOUS CONTINUOUS
Status: DISPENSED | OUTPATIENT
Start: 2021-07-28 | End: 2021-07-28

## 2021-07-28 RX ADMIN — ONDANSETRON 4 MG: 2 INJECTION INTRAMUSCULAR; INTRAVENOUS at 22:41

## 2021-07-28 RX ADMIN — PROPOFOL 50 MG: 10 INJECTION, EMULSION INTRAVENOUS at 11:04

## 2021-07-28 RX ADMIN — SODIUM CHLORIDE 10 MG: 9 INJECTION INTRAMUSCULAR; INTRAVENOUS; SUBCUTANEOUS at 03:50

## 2021-07-28 RX ADMIN — PROPOFOL 50 MG: 10 INJECTION, EMULSION INTRAVENOUS at 11:02

## 2021-07-28 RX ADMIN — SODIUM CHLORIDE 40 MG: 9 INJECTION INTRAMUSCULAR; INTRAVENOUS; SUBCUTANEOUS at 18:12

## 2021-07-28 RX ADMIN — SODIUM CHLORIDE 10 MG: 9 INJECTION INTRAMUSCULAR; INTRAVENOUS; SUBCUTANEOUS at 12:16

## 2021-07-28 RX ADMIN — ONDANSETRON HYDROCHLORIDE 4 MG: 2 INJECTION, SOLUTION INTRAMUSCULAR; INTRAVENOUS at 11:10

## 2021-07-28 RX ADMIN — LIDOCAINE HYDROCHLORIDE 100 MG: 20 INJECTION, SOLUTION EPIDURAL; INFILTRATION; INTRACAUDAL; PERINEURAL at 10:59

## 2021-07-28 RX ADMIN — ONDANSETRON 4 MG: 2 INJECTION INTRAMUSCULAR; INTRAVENOUS at 07:28

## 2021-07-28 RX ADMIN — PROPOFOL 75 MG: 10 INJECTION, EMULSION INTRAVENOUS at 11:00

## 2021-07-28 RX ADMIN — PROPOFOL 50 MG: 10 INJECTION, EMULSION INTRAVENOUS at 11:06

## 2021-07-28 RX ADMIN — SODIUM CHLORIDE: 900 INJECTION, SOLUTION INTRAVENOUS at 10:35

## 2021-07-28 RX ADMIN — Medication 10 ML: at 14:00

## 2021-07-28 RX ADMIN — PROPOFOL 50 MG: 10 INJECTION, EMULSION INTRAVENOUS at 11:03

## 2021-07-28 RX ADMIN — SODIUM CHLORIDE 10 MG: 9 INJECTION INTRAMUSCULAR; INTRAVENOUS; SUBCUTANEOUS at 18:12

## 2021-07-28 RX ADMIN — PROPOFOL 100 MG: 10 INJECTION, EMULSION INTRAVENOUS at 10:59

## 2021-07-28 NOTE — PERIOP NOTES
TRANSFER - OUT REPORT:    Verbal report given to [facility] RN(name) on [facility]  being transferred to [facility](unit) for routine post - op       Report consisted of patients Situation, Background, Assessment and   Recommendations(SBAR). Information from the following report(s) SBAR and Procedure Summary was reviewed with the receiving nurse. Lines:   Peripheral IV 07/28/21 Left Antecubital (Active)   Site Assessment Clean, dry, & intact 07/28/21 0817   Phlebitis Assessment 0 07/28/21 0817   Infiltration Assessment 0 07/28/21 0817   Dressing Status Clean, dry, & intact 07/28/21 0817   Dressing Type Transparent;Tape 07/28/21 0817   Hub Color/Line Status Pink; Infusing 07/28/21 0817   Action Taken Open ports on tubing capped 07/28/21 0817   Alcohol Cap Used Yes 07/28/21 0817        Opportunity for questions and clarification was provided.

## 2021-07-28 NOTE — PERIOP NOTES

## 2021-07-28 NOTE — PERIOP NOTES
CRE balloon dilatation of the esophagus   10 mm Balloon inflated to 3 ATMs and held for 20 seconds. 11 mm Balloon inflated to 5 ATMs and held for 60 seconds. No subcutaneous crepitus of the chest or cervical region was noted post dilatation.

## 2021-07-28 NOTE — PROCEDURES
[address] [facility]  [address] [facility] [address], [address]  [phone]                            NAME:  Austin Garnett   :   1965   MRN:   [id number]     Date/Time:  2021 11:13 AM    Esophagogastroduodenoscopy (EGD) Procedure Note    :  Sherry Barraza MD    Staff: Endoscopy Technician-1: Reyna Lemos  Endoscopy RN-1: Mohinder Medina RN     Implants: none    Referring Provider:  Sindy Quinteros MD    Anethesia/Sedation:  MAC anesthesia    Procedure Details     After infom consent was obtained for the procedure, with all risks and benefits of procedure explained the patient was taken to the endoscopy suite and placed in the left lateral decubitus position. Following sequential administration of sedation as per above, the [id number] gastroscope was inserted into the mouth and advanced under direct vision to proximal jejunum. A careful inspection was made as the gastroscope was withdrawn, including a retroflexed view of the proximal stomach; findings and interventions are described below. Findings:  Esophagus:normal  Stomach: Yelena-en-Y gastric bypass anatomy was noted. Gastric pouch was normal.  Gastrojejunal anastomosis was stenosed with internal diameter of about 9 mm. This was traversed without dilation. Anastomotic ulcer was noted on one third of the circumference. Ulcer bed was clean and no stigmata of bleeding or visible vessels were noted. Staples were noted at the anastomosis. Duodenum/jejunum:Erythema, congestion, friability and superficial ulcers 3 to 4 mm in size were noted in the jejunum distal to the anastomosis and extending about 4 inches distal to the anastomosis. Jejunum distal to this area was unremarkable. Therapies: Anastomotic dilation with 10 mm - 12 mm sized balloon with maximal dilation up to 11 mm for 1 minute was performed successfully.   biopsy of jejunum immediately distal to gastro-jejunal anastomosis    Specimens: none           EBL: None    Complications:   None; patient tolerated the procedure well. Impression:    See Postoperative diagnosis above    Recommendations:  -Continue acid suppression. , -Follow symptoms. , -Clear liquid diet and advance as tolerated.   -Repeat EGD with dilation in 2-4 weeks if symptoms persist    Discharge disposition: Continue care per primary team    Awilda Gautam MD

## 2021-07-28 NOTE — ANESTHESIA PREPROCEDURE EVALUATION
Relevant Problems   CARDIOVASCULAR   (+) Essential hypertension      GASTROINTESTINAL   (+) Hiatal hernia      ENDOCRINE   (+) Left lateral epicondylitis   (+) Morbid obesity (HCC)   (+) Obesity, Class III, BMI 40-49.9 (morbid obesity) ([facility] [address]      HEMATOLOGY   (+) Iron deficiency anemia       Anesthetic History     PONV          Review of Systems / Medical History  Patient summary reviewed, nursing notes reviewed and pertinent labs reviewed    Pulmonary  Within defined limits                 Neuro/Psych   Within defined limits           Cardiovascular  Within defined limits  Hypertension              Exercise tolerance: >4 METS     GI/Hepatic/Renal     GERD          Comments: gastric outlet obstruction/GEJ stricture s/p gastric bypass Endo/Other      Hypothyroidism: well controlled  Obesity and arthritis     Other Findings            Physical Exam    Airway  Mallampati: II  TM Distance: > 6 cm  Neck ROM: normal range of motion   Mouth opening: Normal     Cardiovascular  Regular rate and rhythm,  S1 and S2 normal,  no murmur, click, rub, or gallop             Dental  No notable dental hx       Pulmonary  Breath sounds clear to auscultation               Abdominal  GI exam deferred       Other Findings            Anesthetic Plan    ASA: 3  Anesthesia type: MAC          Induction: Intravenous  Anesthetic plan and risks discussed with: Patient

## 2021-07-28 NOTE — PROGRESS NOTES
[address] [facility] [address]  [address] [facility] [address], [address] [facility]                     GI PROGRESS NOTE    Patient Name: Ignacio Estrada      : 1965      MRN: [id number]  Admit Date: 2021  Today's Date: 2021    Subjective:     Reports falling when getting OOB to go to [place] early this am. Says she saw her IV bleeding, felt dizzy, and passed  out - RN was with her the entire time. She does not think she hit her head. Regained consciouness quickly and is feeling back to baseline now. No other complaints. Objective:     Blood pressure 131/78, pulse 72, temperature 98.3 °F (36.8 °C), resp. rate 16, height 5' 8\" (1.727 m), SpO2 98 %. Physical Exam:  General appearance: cooperative, no distress, appears stated age   Skin: Extremities and face reveal no rashes. a. HEENT: Sclerae anicteric. Cardiovascular: Regular rate and rhythm. Respiratory: Comfortable breathing. Clear breath sounds   GI: Abdomen nondistended, soft, and nontender. Normal active bowel sounds. No enlargement of the liver or spleen. No masses palpable. Rectal: Deferred    Neurological:  Patient is alert and oriented. Psychiatric: Mood appropriate with good judgement. No anxiety or agitation. Data Review:    Recent Results (from the past 24 hour(s))   COVID-19 RAPID TEST    Collection Time: 21  5:26 PM   Result Value Ref Range    Specimen source Nasopharyngeal      COVID-19 rapid test Not detected NOTD           Assessment / Plan :     Ms. Debby Berg presents with liquid and puréed dysphagia, globus sensation significant nausea concerning for gastric outlet obstruction/GEJ stricture s/p gastric bypass. Had fall overnight - per RN notes was lowered to the floor without injury and revived with ammonia. Was A&O x 3 immediately on regaining consciousness.  Is currently WNL  CT A/P with 21 (RLQ abdominal pain, post gastric bypass, hx mucosal ischemia at anastomosis): No evidence of acute process  EGD 3/10/21: small sliding 1 cm HH; erythema in the body and antrum: mild bile staining of  mucosa is noted. GEJ bx normal; mild reflux o/w normal  CLN 3/10/21: Small IH; random bx negative    EGD today 7/28 with dilation/stenting stricture as appropriate  N. p.o. pending EGD results   Hold Lovenox  Continue supportive care per surgery        Patient C/Paradise Hanson 1106 Problem List:   Active Problems:    Essential hypertension (6/3/2015)      Obesity, Class III, BMI 40-49.9 (morbid obesity) ([facility] [address] (6/1/2018)      Gastric outlet obstruction (7/26/2021)      Nausea & vomiting (7/26/2021)

## 2021-07-28 NOTE — PROGRESS NOTES
0800: Bedside and Verbal shift change report given to RAFFI (oncoming nurse) by Arian Lei (offgoing nurse). Report included the following information SBAR, Kardex, Intake/Output, MAR, Accordion and Recent Results. 0800: Pt refused bed alarm. Encouraged pt to utilize call bell to call for assistance. Pt verbalizes that she will call for staff for assistance when getting out of bed.

## 2021-07-28 NOTE — PROGRESS NOTES
She has started sipping thin liquids without problem. Endoscopy findings noted. Areas of ulceration, narrowing likely resulting from ischemia of the proximal Yelena limb. Appreciate gastroenterology dilation. She will likely need additional dilation as outpatient. Continue liquids, follow intake to ensure she will able to remain rehydrated after discharge (anticipate tomorrow morning). Continue antiemetics as needed, acid suppression.

## 2021-07-28 NOTE — PROGRESS NOTES
Sunni Listen Fortune  1965  [id number]    Situation:  Verbal report received from: Asmita SWARTZ  Procedure: Procedure(s):  ESOPHAGOGASTRODUODENOSCOPY (EGD)  ESOPHAGOGASTRODUODENAL (EGD) BIOPSY  ESOPHAGEAL DILATION    Background:    Preoperative diagnosis: Dysphagia  Postoperative diagnosis: Anastamotic Stricture/Ulcer  Jejunitis    :  Dr. Kathy Yusuf  Assistant(s): Endoscopy Technician-1: Jyoti HERMAN  Endoscopy RN-1: Corrine Ruiz RN    Specimens:   ID Type Source Tests Collected by Time Destination   1 : Jejunal Limb bx Preservative   Lynnetta Gaucher, MD 7/28/2021 1105 Pathology     H. Pylori  no    Assessment:      Anesthesia gave intra-procedure sedation and medications, see anesthesia flow sheet yes    Intravenous fluids: NS@ KVO     Vital signs stable     Abdominal assessment: round and soft     Recommendation:  Discharge patient per MD order.   Return to floor    Permission to share finding with family or friend yes

## 2021-07-28 NOTE — PROGRESS NOTES
Bedside and Verbal shift change report given to DEVIN Hollingsworth RN (oncoming nurse) by Cullen Eng. Nadine Alcala RN (offgoing nurse). Report included the following information SBAR, Kardex and ED Summary. 0400 Patient was ambulating to the bathroom to remove her bra and freshen up. Patient pointed out to the nurse that she had blood coming from her IV site. Patient attempted to grab the wall, as she passed out. RN in the room caught the patient and slowly lowered her to the floor, called for additional help, put pressure on the site, and broke an ammonia ampule. With assistance x2 we helped the patient back to bed and assessed. Patient denies pain, only complains of feeling dizzy. [address] witnessed fall occurred on 7/28/21 (Date) at 36 (Time). The answers to the following questions summarize the fall: In the patient's own words,:  · What were you attempting to do when you fell? Go to the bathroom to take my bra off  · Do you know why you fell? No, I just saw blood and remember waking up to the ammonia   · Do you have any pain/discomfort or any other complaints? No, just dizzy feeling  · Which part of your body made contact with the floor or other object? I grabbed the wall, and was sitting on the floor. Nurse:  Steve Mcdaniel Was this an assisted fall? yes   Was fall witnessed? Yes     By Whom? Kaya Dodson RN   If witnessed, what part of the body made contact with the floor or other object? Assisted Pt to the floor. Pt was sitting.  Patients mental status after the fall/when found: Alert and oriented and Other Dizzy   Any apparent injury:  No apparent injury   Immediate interventions for injury/suspected injury? No interventions needed   Patient assisted back to bed? Assist X2   Name of provider notified and time, any comments? Eh.Ruthy [id number]        Name of family member notified and time: n/a        Immediate VS and physical assessment documented in flow sheets.     Neuro assessment every hour x 4 (for potential head injury or unwitnessed fall) documented in flow sheets.       Chris Lucero RN

## 2021-07-29 VITALS
RESPIRATION RATE: 18 BRPM | HEART RATE: 76 BPM | DIASTOLIC BLOOD PRESSURE: 83 MMHG | SYSTOLIC BLOOD PRESSURE: 135 MMHG | TEMPERATURE: 98.2 F | OXYGEN SATURATION: 97 % | BODY MASS INDEX: 36.64 KG/M2 | HEIGHT: 68 IN

## 2021-07-29 PROCEDURE — 74011250636 HC RX REV CODE- 250/636: Performed by: SURGERY

## 2021-07-29 PROCEDURE — 74011000250 HC RX REV CODE- 250: Performed by: SURGERY

## 2021-07-29 PROCEDURE — 74011250637 HC RX REV CODE- 250/637: Performed by: SURGERY

## 2021-07-29 PROCEDURE — 96376 TX/PRO/DX INJ SAME DRUG ADON: CPT

## 2021-07-29 PROCEDURE — 99231 SBSQ HOSP IP/OBS SF/LOW 25: CPT | Performed by: SURGERY

## 2021-07-29 PROCEDURE — 96375 TX/PRO/DX INJ NEW DRUG ADDON: CPT

## 2021-07-29 PROCEDURE — 99218 HC RM OBSERVATION: CPT

## 2021-07-29 PROCEDURE — 65410000002 HC RM PRIVATE OB

## 2021-07-29 RX ORDER — SUCRALFATE 1 G/1
1 TABLET ORAL
Status: DISCONTINUED | OUTPATIENT
Start: 2021-07-29 | End: 2021-07-29 | Stop reason: HOSPADM

## 2021-07-29 RX ORDER — DEXAMETHASONE SODIUM PHOSPHATE 10 MG/ML
10 INJECTION INTRAMUSCULAR; INTRAVENOUS ONCE
Status: COMPLETED | OUTPATIENT
Start: 2021-07-29 | End: 2021-07-29

## 2021-07-29 RX ORDER — PANTOPRAZOLE SODIUM 40 MG/1
40 TABLET, DELAYED RELEASE ORAL 2 TIMES DAILY
Status: DISCONTINUED | OUTPATIENT
Start: 2021-07-29 | End: 2021-07-29 | Stop reason: HOSPADM

## 2021-07-29 RX ORDER — PANTOPRAZOLE SODIUM 40 MG/1
40 TABLET, DELAYED RELEASE ORAL 2 TIMES DAILY
Qty: 60 TABLET | Refills: 1 | Status: SHIPPED | OUTPATIENT
Start: 2021-07-29 | End: 2021-10-01 | Stop reason: SDUPTHER

## 2021-07-29 RX ORDER — PROCHLORPERAZINE MALEATE 10 MG
5 TABLET ORAL
Qty: 30 TABLET | Refills: 1 | Status: SHIPPED | OUTPATIENT
Start: 2021-07-29 | End: 2021-08-05

## 2021-07-29 RX ORDER — SUCRALFATE 1 G/1
1 TABLET ORAL
Qty: 120 TABLET | Refills: 0 | Status: SHIPPED | OUTPATIENT
Start: 2021-07-29 | End: 2021-08-03

## 2021-07-29 RX ORDER — HYOSCYAMINE SULFATE 0.12 MG/1
0.12 TABLET SUBLINGUAL EVERY 6 HOURS
Status: DISCONTINUED | OUTPATIENT
Start: 2021-07-29 | End: 2021-07-29 | Stop reason: HOSPADM

## 2021-07-29 RX ADMIN — DEXAMETHASONE SODIUM PHOSPHATE 10 MG: 10 INJECTION, SOLUTION INTRAMUSCULAR; INTRAVENOUS at 09:41

## 2021-07-29 RX ADMIN — SODIUM CHLORIDE 10 MG: 9 INJECTION INTRAMUSCULAR; INTRAVENOUS; SUBCUTANEOUS at 00:41

## 2021-07-29 RX ADMIN — HYOSCYAMINE SULFATE 0.12 MG: 0.12 TABLET ORAL; SUBLINGUAL at 11:34

## 2021-07-29 RX ADMIN — SODIUM CHLORIDE 10 MG: 9 INJECTION INTRAMUSCULAR; INTRAVENOUS; SUBCUTANEOUS at 06:23

## 2021-07-29 RX ADMIN — PANTOPRAZOLE SODIUM 40 MG: 40 TABLET, DELAYED RELEASE ORAL at 09:41

## 2021-07-29 RX ADMIN — SODIUM CHLORIDE 10 MG: 9 INJECTION INTRAMUSCULAR; INTRAVENOUS; SUBCUTANEOUS at 14:16

## 2021-07-29 NOTE — PROGRESS NOTES
Bedside and Verbal shift change report given to DEVIN Villalba RN (oncoming nurse) by JOSIAH Rey RN (offgoing nurse). Report included the following information SBAR, Kardex and OR Summary.

## 2021-07-29 NOTE — DISCHARGE INSTRUCTIONS
PATIENT DISCHARGE INSTRUCTIONS      PATIENT DISCHARGE INSTRUCTIONS    Leopoldo Tello / [id number] : 1965    Admitted 2021 Discharged: 2021       · It is important that you take the medication exactly as they are prescribed. · Keep your medication in the bottles provided by the pharmacist and keep a list of the medication names, dosages, and times to be taken in your wallet. · Do not take other medications without consulting your doctor. What to do at Home    Recommended Diet: Bariatric liquids, advance to puréed texture as tolerated. Recommended Activity: Activity as tolerated; plan return to work . If you experience any of the following symptoms (nausea, vomiting, inability to tolerate liquids) please follow up with general surgery. Call [phone] to schedule surgery follow-up for 10-14 days after hospital discharge.

## 2021-07-29 NOTE — PROGRESS NOTES
Progress Note    Patient: Mary Ann Goldstein MRN: [id number]  SSN: [id number]    YOB: 1965  Age: 64 y.o. Sex: female      Admit Date: 2021    1 Day Post-Op    Procedure:  Procedure(s):  ESOPHAGOGASTRODUODENOSCOPY (EGD)  ESOPHAGOGASTRODUODENAL (EGD) BIOPSY  ESOPHAGEAL DILATION    Subjective:     Patient complains of nausea, satiety with liquids-only able to take in approximately 20 ounces yesterday afternoon/evening. No regurgitation. Objective:     Visit Vitals  /71 (BP 1 Location: Right upper arm, BP Patient Position: At rest)   Pulse 81   Temp 98 °F (36.7 °C)   Resp 16   Ht 5' 8\" (1.727 m)   SpO2 96%   BMI 36.64 kg/m²       Temp (24hrs), Av.4 °F (36.9 °C), Min:98 °F (36.7 °C), Max:98.9 °F (37.2 °C)      Physical Exam:    ABDOMEN: Non-distended. Data Review: VS, I/O's      Assessment:     Hospital Problems  Date Reviewed: 2021        Codes Class Noted POA    Gastric outlet obstruction ICD-10-CM: K31.1  ICD-9-CM: 537.0  2021 Unknown        Nausea & vomiting ICD-10-CM: R11.2  ICD-9-CM: 787.01  2021 Unknown        Obesity, Class III, BMI 40-49.9 (morbid obesity) ([facility] ICD-10-CM: E66.01  ICD-9-CM: 278.01  2018 Yes        Essential hypertension ICD-10-CM: I10  ICD-9-CM: 401.9  6/3/2015 Yes              Plan/Recommendations/Medical Decision Making:     Add one-time dose of Decadron. Change Protonix to oral, twice daily; add sucralfate. Clear liquids as tolerated we reviewed her daily goals for volume; she needs to be drinking 4 ounces per hour consistently before we will be ready for discharge. Will reevaluate later today and assess her progress.

## 2021-07-29 NOTE — PROGRESS NOTES
[address]  [address]  [place], [address]  [phone]                     GI PROGRESS NOTE    Patient Name: Juvencio Grant      : 1965      MRN: [id number]  Admit Date: 2021  Today's Date: 2021    Subjective:     Still c/o nausea and feels fluid \"hang up\" in upper chest. No worsened chest/abdominal pain. Still low[po fluid intake; items on CLD tray not appealing to her    Objective:     Blood pressure 110/71, pulse 81, temperature 98 °F (36.7 °C), resp. rate 16, height 5' 8\" (1.727 m), SpO2 96 %. Physical Exam:  General appearance: cooperative, no distress  Skin: Extremities and face reveal no rashes  HEENT: Sclerae anicteric. Cardiovascular: Regular rate and rhythm. Respiratory: Comfortable breathing. Clear breath sounds   GI: Abdomen nondistended, soft, and nontender. Normal active bowel sounds. Rectal: Deferred    Neurological:  Patient is alert and oriented. Psychiatric:  No anxiety or agitation. Data Review:    No results found for this or any previous visit (from the past 24 hour(s)). Assessment / Plan :     Ms. Destinee Hneley presents with liquid and puréed dysphagia, globus sensation significant nausea concerning for gastric outlet obstruction/GEJ stricture s/p gastric bypass. CT A/P with 21 (RLQ abdominal pain, post gastric bypass, hx mucosal ischemia at anastomosis): No evidence of acute process  EGD 3/10/21: small sliding 1 cm HH; erythema in the body and antrum: mild bile staining of  mucosa is noted. GEJ bx normal; mild reflux o/w normal  CLN 3/10/21: Small IH; random bx negative    EGD 21:  Yelena-en-Y gastric bypass anatomy was noted. Gastric pouch was normal.  Gastrojejunal anastomosis was stenosed with internal diameter of about 9 mm. This was traversed without dilation. Anastomotic ulcer was noted on one third of the circumference. Ulcer bed was clean and no stigmata of bleeding or visible vessels were noted. Staples were noted at the anastomosis. Erythema, congestion, friability and superficial ulcers 3 to 4 mm in size were noted in the jejunum distal to the anastomosis and extending about 4 inches distal to the anastomosis. Jejunum distal to this area was unremarkable. Therapies: Anastomotic dilation with 10 mm - 12 mm sized balloon with maximal dilation up to 11 mm for 1 minute was performed successfully. Biopsy of jejunum immediately distal to gastro-jejunal anastomosis    Does not recognize significant change: still nausea, fluid not going down smoothly   Diet per surgery  Continue acid suppression: Increase pantoprazole to BID  Plan to repeat EGD with dilation as outpatient with perisistent  symptoms (2-4 weeks)  Paris Diego will signoff.  Please do not hesitate to call with other GI issues this admission             Patient C/[facility] [address] Problem List:   Active Problems:    Essential hypertension (6/3/2015)      Obesity, Class III, BMI 40-49.9 (morbid obesity) ([facility] (6/1/2018)      Gastric outlet obstruction (7/26/2021)      Nausea & vomiting (7/26/2021)

## 2021-07-30 ENCOUNTER — PATIENT OUTREACH (OUTPATIENT)
Dept: OTHER | Age: 56
End: 2021-07-30

## 2021-07-30 NOTE — PROGRESS NOTES
Care Transitions Outreach Attempt    Call within 2 business days of discharge: Yes   Attempted to reach patient for transitions of care follow up. Unable to reach patient. Patient: Neo Hinson Patient : 1965 MRN: [id number]    Last Discharge [address]       Complaint Diagnosis Description Type Department Provider    21 Referral / Consult Intractable nausea and vomiting . .. ED to Hosp-Admission (Discharged) (ADMIT) Emery Harris MD; Val Menezse. .. Was this an external facility discharge?  No Discharge Facility: [facility]      Noted following upcoming appointments from discharge chart review:   [address] follow up appointment(s):   Future Appointments   Date Time Provider Jana Vizcarra   8/3/2021  2:00 PM MD JB Vasquez BS AMB   9/15/2021  9:00 AM MD JB Vasquez BS AMB     [facility] follow up appointment(s): unknown

## 2021-07-30 NOTE — ANESTHESIA POSTPROCEDURE EVALUATION
Post-Anesthesia Evaluation and Assessment    Patient: Brenda Nix MRN: [id number]  SSN: [id number]    YOB: 1965  Age: 64 y.o. Sex: female      I have evaluated the patient and they are stable and ready for discharge from the PACU. Cardiovascular Function/Vital Signs  Visit Vitals  /83 (BP 1 Location: Right upper arm, BP Patient Position: At rest)   Pulse 76   Temp 36.8 °C (98.2 °F)   Resp 18   Ht 5' 8\" (1.727 m)   SpO2 97%   BMI 36.64 kg/m²       Patient is status post MAC anesthesia for Procedure(s):  ESOPHAGOGASTRODUODENOSCOPY (EGD)  ESOPHAGOGASTRODUODENAL (EGD) BIOPSY  ESOPHAGEAL DILATION. Nausea/Vomiting: None    Postoperative hydration reviewed and adequate. Pain:  Pain Scale 1: Numeric (0 - 10) (07/29/21 0941)  Pain Intensity 1: 0 (07/29/21 0941)   Managed    Neurological Status: At baseline    Mental Status, Level of Consciousness: Alert and  oriented to person, place, and time    Pulmonary Status:   O2 Device: None (Room air) (07/29/21 0941)   Adequate oxygenation and airway patent    Complications related to anesthesia: None    Post-anesthesia assessment completed. No concerns    Signed By: Rex Ferraro MD     July 30, 2021              Procedure(s):  ESOPHAGOGASTRODUODENOSCOPY (EGD)  ESOPHAGOGASTRODUODENAL (EGD) BIOPSY  ESOPHAGEAL DILATION.     MAC    <BSHSIANPOST>    INITIAL Post-op Vital signs:   Vitals Value Taken Time   /83 07/29/21 0941   Temp 36.8 °C (98.2 °F) 07/29/21 0941   Pulse 76 07/29/21 0941   Resp 18 07/29/21 0941   SpO2 97 % 07/29/21 0941

## 2021-08-02 ENCOUNTER — PATIENT OUTREACH (OUTPATIENT)
Dept: OTHER | Age: 56
End: 2021-08-02

## 2021-08-02 NOTE — PROGRESS NOTES
Attempt to reach patient for follow up. Discreet VM left with contact information. To send lost to follow up letter. Will attempt to reach again on 8/4.

## 2021-08-02 NOTE — DISCHARGE SUMMARY
Physician Discharge Summary     Patient ID:  Elyse Ríos  [id number]  64 y.o.  1965    Allergies: Doxylamine succinate, Pcn [penicillins], Ambien [zolpidem], Aspirin, Carafate [sucralfate], Celebrex [celecoxib], Erythromycin, Milk prot-turm-pepper-pineappl, Neurontin [gabapentin], Nsaids (non-steroidal anti-inflammatory drug), and Pineapple    Admit Date: 7/26/2021    Discharge Date: 7/29/2021    * Admission Diagnoses: Nausea & vomiting [R11.2]; Gastric outlet obstruction [K31.1]    * Discharge Diagnoses:    Hospital Problems as of 7/29/2021 Date Reviewed: 6/14/2021        Codes Class Noted - Resolved POA    Gastric outlet obstruction ICD-10-CM: K31.1  ICD-9-CM: 537.0  7/26/2021 - Present Unknown        Nausea & vomiting ICD-10-CM: R11.2  ICD-9-CM: 787.01  7/26/2021 - Present Unknown        Obesity, Class III, BMI 40-49.9 (morbid obesity) ([facility] ICD-10-CM: E66.01  ICD-9-CM: 278.01  6/1/2018 - Present Yes        Essential hypertension ICD-10-CM: I10  ICD-9-CM: 401.9  6/3/2015 - Present Yes               Admission Condition: Fair    * Discharge Condition: good    * Procedures: Procedure(s):  ESOPHAGOGASTRODUODENOSCOPY (EGD)  ESOPHAGOGASTRODUODENAL (EGD) BIOPSY  ESOPHAGEAL DILATION    * Hospital Course:   She was admitted and resuscitated; pain, nausea, controlled. She underwent upper endoscopy with dilation of small stricture. Multiple small mucosal ulcerations were present in the Yelena limb, consistent with prior intraoperative upper endoscopy suggesting mucosal sloughing secondary to an ischemic process. Her diet was advanced and tolerated. Consults: Gastroenterology    Significant Diagnostic Studies: endoscopy: gastroscopy: Anastomotic stricture, Yelena limb ulceration    * Disposition: Home    Discharge Medications:   Discharge Medication List as of 7/29/2021  4:02 PM      START taking these medications    Details   pantoprazole (PROTONIX) 40 mg tablet Take 1 Tablet by mouth two (2) times a day. , Normal, Disp-60 Tablet, R-1      prochlorperazine (Compazine) 10 mg tablet Take 0.5 Tablets by mouth every six (6) hours as needed for Nausea or Vomiting for up to 7 days. , Normal, Disp-30 Tablet, R-1      sucralfate (CARAFATE) 1 gram tablet Take 1 Tablet by mouth Before breakfast, lunch, dinner and at bedtime., Normal, Disp-120 Tablet, R-0         CONTINUE these medications which have NOT CHANGED    Details   acetaminophen (Tylenol Extra Strength) 500 mg tablet Take 500 mg by mouth every six (6) hours as needed for Pain., Historical Med      ondansetron (ZOFRAN ODT) 4 mg disintegrating tablet Take 1 Tab by mouth every eight (8) hours as needed for Nausea or Vomiting., Normal, Disp-30 Tab, R-0      docusate sodium (COLACE) 100 mg capsule Take 100 mg by mouth as needed for Constipation. , Historical Med         STOP taking these medications       metoclopramide HCl (REGLAN) 5 mg tablet Comments:   Reason for Stopping:         omeprazole (PRILOSEC) 20 mg capsule Comments:   Reason for Stopping:               * Follow-up Care/Patient Instructions: Activity: Activity as tolerated  Diet: Bariatric liquids, advance as tolerated  Wound Care: None needed    Follow-up Information     Follow up With Specialties Details Why Contact Info    Elizabeth Gilliland MD Internal Medicine   [facility] [address]  [address] [address] [phone]        In 2 weeks            Future Appointments   Date Time Provider Jana Carmita   8/3/2021  2:00 [facility]   9/15/2021  9:00 AM [facility]     General surgery follow-up appointment 2 weeks after discharge to assess tolerance of diet, possible need for further endoscopic dilation.     Signed:  Mehnaz Hernandez MD  8/2/2021  10:02 AM

## 2021-08-02 NOTE — LETTER
8/2/2021 12:20 PM    Ms. [address] [facility] [address]          Dear Ms. Parham,     I have been trying to reach you for a follow up call for services with our [facility]. Your wellbeing is very important to us. With continued partnership in the [facility] program, we hope to work with you to optimize your health and increase your quality of life. I look forward to continuing to work with you. Please contact me at your convenience and we can schedule a time that works best for you. Sincerely,      Anastasiya Muñoz RN, [address] [place], [place] [address]yFaye[address] [phone]   [phone]  [email or web address]  [address] [facility] email: [email or web address]

## 2021-08-03 ENCOUNTER — OFFICE VISIT (OUTPATIENT)
Dept: INTERNAL MEDICINE CLINIC | Age: 56
End: 2021-08-03
Payer: COMMERCIAL

## 2021-08-03 VITALS
SYSTOLIC BLOOD PRESSURE: 124 MMHG | BODY MASS INDEX: 34.86 KG/M2 | OXYGEN SATURATION: 99 % | DIASTOLIC BLOOD PRESSURE: 86 MMHG | WEIGHT: 230 LBS | HEART RATE: 80 BPM | RESPIRATION RATE: 16 BRPM | HEIGHT: 68 IN | TEMPERATURE: 98.1 F

## 2021-08-03 DIAGNOSIS — Z51.81 ENCOUNTER FOR MEDICATION MONITORING: Primary | ICD-10-CM

## 2021-08-03 DIAGNOSIS — E86.0 DEHYDRATION: ICD-10-CM

## 2021-08-03 DIAGNOSIS — I10 ESSENTIAL HYPERTENSION: ICD-10-CM

## 2021-08-03 DIAGNOSIS — E66.01 OBESITY, CLASS III, BMI 40-49.9 (MORBID OBESITY) (HCC): ICD-10-CM

## 2021-08-03 DIAGNOSIS — E78.00 HIGH CHOLESTEROL: ICD-10-CM

## 2021-08-03 DIAGNOSIS — K29.50 OTHER CHRONIC GASTRITIS WITHOUT HEMORRHAGE: ICD-10-CM

## 2021-08-03 DIAGNOSIS — R42 DIZZINESSES: ICD-10-CM

## 2021-08-03 LAB
ANION GAP SERPL CALC-SCNC: 12 MMOL/L (ref 5–15)
BUN SERPL-MCNC: 6 MG/DL (ref 6–20)
BUN/CREAT SERPL: 9 (ref 12–20)
CALCIUM SERPL-MCNC: 9.7 MG/DL (ref 8.5–10.1)
CHLORIDE SERPL-SCNC: 103 MMOL/L (ref 97–108)
CO2 SERPL-SCNC: 24 MMOL/L (ref 21–32)
CREAT SERPL-MCNC: 0.65 MG/DL (ref 0.55–1.02)
GLUCOSE SERPL-MCNC: 82 MG/DL (ref 65–100)
POTASSIUM SERPL-SCNC: 3.6 MMOL/L (ref 3.5–5.1)
SODIUM SERPL-SCNC: 139 MMOL/L (ref 136–145)

## 2021-08-03 PROCEDURE — 99214 OFFICE O/P EST MOD 30 MIN: CPT | Performed by: INTERNAL MEDICINE

## 2021-08-03 NOTE — PROGRESS NOTES
Adeel Gaspar is a 64 y.o. 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 Patient underwent laparoscopic gastric bypass on 6/14 with Dr. Harish Dumont. Intraoperative endoscopy revealed mucosal sloughing/necrosis which was conservatively managed. Few weeks back patient had persistent nausea, vomiting and pain and was diagnosed with gastric outlet obstruction. She had endoscopy which revealed multiple small mucosal ulcerations. S/p upper endoscopy with dilation of small stricture. She has a history of hypertension. After the surgery, she is lost more than 50 pounds of weight. Currently she is normotensive and not on antihypertensive medications. Past Medical History:   Diagnosis Date    Allergic rhinitis 5/27/2010    Arthritis     Baker cyst, left 10/2019    Dr. Vipul Estrella cyst, right     Cervical disc herniation 01/2010    C3-4, C4-5, C5-6. Hemangioma body of C5. Dr. Anitra James.  Chest pain 08/2011, 02/2012    Dr. Lance Arteaga. Dr. Varun Desir; denies CP as of 3/27/14    Chronic lower back pain 2010, 03/2016    thoracic DDD and spondylosis. DDD L3-S1. Dr. Korey Delgado. Ivana Clayton. Dr. Guerda Severino, [facility] [place]. Vinh Braden.  Chronic meniscal tear of knee     Chronic pain     lower back and knees    Esophagitis, reflux 4/3/2011    Gallstone     Gastritis 5/27/2010    Dr. Zane Ortiz.  Gastrointestinal food allergy 03/2014    Multiple. Dr. Masoud Bravo.    Heart palpitations 02/2012    DUE TO PAC'S AND PVC'S. Dr. Dennie Conch.     Heel spur, left 02/2019    Hiatal hernia 4/3/2011    HTN (hypertension) 1987    Impaired glucose tolerance 10/15/2010    Incidental lung nodule 01/08/13    LLL 3.9 mm, RML 3.1 mm;  as of 3/27/14 per pt stable w/o growth    Insomnia 1990s    Iron deficiency anemia 5/27/2010    Irritable bowel syndrome (IBS) 03/2014    Dr. Timoteo Bolaños.    Knee pain 2012 Right.  due to severe OA. / chipped bone     Left foot pain 08/2019    Dr. Gabriela Willams.  Metatarsal bone fracture 1990    Left fifth.  Migraine 2/22/2011    Dr. Malaika Gama Morbid obesity [facility])     Peroneal tendonitis of lower leg 08/07/2019    Left. Dr. Gabriela Willams    Pre-diabetes     Radiculopathy, cervical 01/2010    Left. Dr. Jose Alfredo Singleton.  Shoulder pain, right 2012    due to [address] X 2. Dr. Pietro Lundborg.  Thyroid nodule 2011    6 nodules- Dr. Saranya Mcguire    Toe fracture, left 2008    fifth toe.  Triangular fibrocartilage complex tear 2012    Dr. Ty Hi. Left. Past Surgical History:   Procedure Laterality Date    COLONOSCOPY N/A 3/10/2021    COLONOSCOPY,EGD performed by Jess Ryan MD at [facility] ENDOSCOPY    [address]    x 1    HX CHOLECYSTECTOMY  1987    OPEN    HX COLONOSCOPY  03/31/14    Dr. Stephen Puga; 03/31/14    due to abnormal PAP.  HX DILATION AND CURETTAGE  1990s    due to miscarriage.  HX ENDOSCOPY      HX HEART CATHETERIZATION  2018    no stents    HX KNEE ARTHROSCOPY Right     HX LAP GASTRIC BYPASS  06/14/2021    lap gastric bypass with hiatal hernia repair by Dr. Su Cosby at [facility]    [address] Right 06/2016    LUMBAR L4-5, L5-S1 ELLY.   Dr. Yann Bryan     Allergies   Allergen Reactions    Doxylamine Succinate Swelling     12.5-25 MG  HANDS, FACE/PERIORAL, FEET    Pcn [Penicillins] Hives    Ambien [Zolpidem] Nausea and Vomiting and Other (comments)     5 mg with Ativan 0.5 mg   TOO GROGGY, SLEPT 24 HOURS  7.5 MG FORGOT SHE WAS SLEEP EATING    Aspirin Nausea Only    Carafate [Sucralfate] Nausea and Vomiting    Celebrex [Celecoxib] Other (comments)     TIGHT SQUEEZING IN CHEST  CHEST ACHED    Erythromycin Nausea and Vomiting    Milk Prot-Turm-Pepper-Pineappl Other (comments)     Multiple food allergies    Neurontin [Gabapentin] Other (comments) 300 mg  SEVERE LEG PAIN  EYE TWITCHING    Nsaids (Non-Steroidal Anti-Inflammatory Drug) Other (comments)     GI irritation      Pineapple Unknown (comments)     Current Outpatient Medications   Medication Sig Dispense Refill    pantoprazole (PROTONIX) 40 mg tablet Take 1 Tablet by mouth two (2) times a day. 60 Tablet 1    prochlorperazine (Compazine) 10 mg tablet Take 0.5 Tablets by mouth every six (6) hours as needed for Nausea or Vomiting for up to 7 days. 30 Tablet 1    acetaminophen (Tylenol Extra Strength) 500 mg tablet Take 500 mg by mouth every six (6) hours as needed for Pain.  ondansetron (ZOFRAN ODT) 4 mg disintegrating tablet Take 1 Tab by mouth every eight (8) hours as needed for Nausea or Vomiting. 30 Tab 0    docusate sodium (COLACE) 100 mg capsule Take 100 mg by mouth as needed for Constipation. 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      Social Determinants of Health     Financial Resource Strain:     Difficulty of Paying Living Expenses:    Food Insecurity:     Worried About [address] [employer] in the Last Year:     [address]  in the Last Year:    Transportation Needs:     Lack of Transportation (Medical):      Lack of Transportation (Non-Medical):    Physical Activity:     Days of Exercise per Week:     Minutes of Exercise per Session:    Stress:     Feeling of Stress : Social Connections:     Frequency of Communication with Friends and Family:     Frequency of Social Gatherings with Friends and Family:     Attends Yarsanism Services:     Active Member of Clubs or Organizations:     Attends Club or Organization Meetings:     Marital Status:      Family History   Problem Relation Age of Onset    Diabetes Mother     Heart Disease Mother 58        2 stents    Hypertension Mother     Stroke Mother     Diabetes Father     Hypertension Father     Stroke Maternal Grandmother     Cancer Maternal Uncle         prostate    Diabetes Sister     No Known Problems Sister     No Known Problems Sister     Anesth Problems Neg Hx        Review of Systems  A ten system review of constitutional, cardiovascular, respiratory, musculoskeletal, endocrine, skin, SHEENT, genitourinary, psychiatric and neurologic systems was obtained and is unremarkable with the exception of weight loss    Objective:  Visit Vitals  /86 (BP 1 Location: Left arm, BP Patient Position: Sitting, BP Cuff Size: Adult)   Pulse 80   Temp 98.1 °F (36.7 °C) (Oral)   Resp 16   Ht 5' 8\" (1.727 m)   Wt 230 lb (104.3 kg)   LMP  (LMP Unknown)   SpO2 99%   BMI 34.97 kg/m²     Physical Exam:   General appearance - alert, well appearing, and in no distress  Mental status - alert, oriented to person, place, and time  EYE-MARJORIE, EOMI, fundi normal, corneas normal, no foreign bodies  ENT-ENT exam normal, no neck nodes or sinus tenderness  Nose - normal and patent, no erythema, discharge or polyps  Mouth - mucous membranes moist, pharynx normal without lesions  Neck - supple, no significant adenopathy   Chest - clear to auscultation, no wheezes, rales or rhonchi, symmetric air entry   Heart - normal rate, regular rhythm, normal S1, S2, no murmurs, rubs, clicks or gallops   Abdomen - soft, nontender, nondistended, no masses or organomegaly  Lymph- no adenopathy palpable  Ext-peripheral pulses normal, no pedal edema, no clubbing or cyanosis  Skin-Warm and dry. no hyperpigmentation, vitiligo, or suspicious lesions  Neuro -alert, oriented, normal speech, no focal findings or movement disorder noted  Musculoskeletal- FROM, no bony abnormalities, no point tenderness    Lab Review:  Results for orders placed or performed during the hospital encounter of 07/26/21   URINE CULTURE HOLD SAMPLE    Specimen: Serum; Urine   Result Value Ref Range    Urine culture hold        Urine on hold in Microbiology dept for 2 days. If unpreserved urine is submitted, it cannot be used for addtional testing after 24 hours, recollection will be required. COVID-19 RAPID TEST   Result Value Ref Range    Specimen source Nasopharyngeal      COVID-19 rapid test Not detected NOTD     CBC WITH AUTOMATED DIFF   Result Value Ref Range    WBC 4.3 3.6 - 11.0 K/uL    RBC 4.34 3.80 - 5.20 M/uL    HGB 12.4 11.5 - 16.0 g/dL    HCT 38.6 35.0 - 47.0 %    MCV 88.9 80.0 - 99.0 FL    MCH 28.6 26.0 - 34.0 PG    MCHC 32.1 30.0 - 36.5 g/dL    RDW 13.9 11.5 - 14.5 %    PLATELET 413 907 - 338 K/uL    MPV 12.1 8.9 - 12.9 FL    NRBC 0.0 0  WBC    ABSOLUTE NRBC 0.00 0.00 - 0.01 K/uL    NEUTROPHILS 52 32 - 75 %    LYMPHOCYTES 32 12 - 49 %    MONOCYTES 14 (H) 5 - 13 %    EOSINOPHILS 1 0 - 7 %    BASOPHILS 1 0 - 1 %    IMMATURE GRANULOCYTES 0 0.0 - 0.5 %    ABS. NEUTROPHILS 2.2 1.8 - 8.0 K/UL    ABS. LYMPHOCYTES 1.4 0.8 - 3.5 K/UL    ABS. MONOCYTES 0.6 0.0 - 1.0 K/UL    ABS. EOSINOPHILS 0.0 0.0 - 0.4 K/UL    ABS. BASOPHILS 0.0 0.0 - 0.1 K/UL    ABS. IMM.  GRANS. 0.0 0.00 - 0.04 K/UL    DF AUTOMATED     METABOLIC PANEL, COMPREHENSIVE   Result Value Ref Range    Sodium 138 136 - 145 mmol/L    Potassium 3.7 3.5 - 5.1 mmol/L    Chloride 103 97 - 108 mmol/L    CO2 21 21 - 32 mmol/L    Anion gap 14 5 - 15 mmol/L    Glucose 64 (L) 65 - 100 mg/dL    BUN 4 (L) 6 - 20 MG/DL    Creatinine 0.71 0.55 - 1.02 MG/DL    BUN/Creatinine ratio 6 (L) 12 - 20      GFR est AA >60 >60 ml/min/1.73m2 GFR est non-AA >60 >60 ml/min/1.73m2    Calcium 9.6 8.5 - 10.1 MG/DL    Bilirubin, total 0.5 0.2 - 1.0 MG/DL    ALT (SGPT) 23 12 - 78 U/L    AST (SGOT) 24 15 - 37 U/L    Alk. phosphatase 76 45 - 117 U/L    Protein, total 7.8 6.4 - 8.2 g/dL    Albumin 3.3 (L) 3.5 - 5.0 g/dL    Globulin 4.5 (H) 2.0 - 4.0 g/dL    A-G Ratio 0.7 (L) 1.1 - 2.2     LIPASE   Result Value Ref Range    Lipase 155 73 - 393 U/L   SAMPLES BEING HELD   Result Value Ref Range    SAMPLES BEING HELD 1RED     COMMENT        Add-on orders for these samples will be processed based on acceptable specimen integrity and analyte stability, which may vary by analyte. URINALYSIS W/MICROSCOPIC   Result Value Ref Range    Color YELLOW/STRAW      Appearance CLOUDY (A) CLEAR      Specific gravity 1.023 1.003 - 1.030      pH (UA) 5.5 5.0 - 8.0      Protein TRACE (A) NEG mg/dL    Glucose Negative NEG mg/dL    Ketone >80 (A) NEG mg/dL    Bilirubin Negative NEG      Blood Negative NEG      Urobilinogen 1.0 0.2 - 1.0 EU/dL    Nitrites Negative NEG      Leukocyte Esterase TRACE (A) NEG      WBC 0-4 0 - 4 /hpf    RBC 0-5 0 - 5 /hpf    Epithelial cells FEW FEW /lpf    Bacteria Negative NEG /hpf        Documenation Review:    Assessment/Plan:    Diagnoses and all orders for this visit:    1. Encounter for medication monitoring  -     METABOLIC PANEL, BASIC; Future    2. Other chronic gastritis without hemorrhage    3. Essential hypertension    4. High cholesterol    5. Obesity, Class III, BMI 40-49.9 (morbid obesity) ([facility] [address]    6. Dizzinesses  -     METABOLIC PANEL, BASIC; Future    7. Dehydration  -     METABOLIC PANEL, BASIC; Future      Patient has been complaining of lightheadedness, dizziness and dehydration today. She still has some nausea but it is much better than before. She did take a Zofran for her nausea this morning. Will check BMP. History of hypertension however after surgery blood pressures are normotensive.   She does not require to be on any antihypertensives for now. Given mucosal ulcers needs to continue PPI for now. Continue to follow-up with Dr. Dawan Schrader. ICD-10-CM ICD-9-CM    1. Encounter for medication monitoring  I16.85 S73.74 METABOLIC PANEL, BASIC   2. Other chronic gastritis without hemorrhage  K29.50 535.10    3. Essential hypertension  I10 401.9    4. High cholesterol  E78.00 272.0    5. Obesity, Class III, BMI 40-49.9 (morbid obesity) ([facility])  E66.01 278.01    6. Dizzinesses  L19 571.5 METABOLIC PANEL, BASIC   7. Dehydration  U63.0 552.55 METABOLIC PANEL, BASIC               I have reviewed with the patient details of the assessment and plan and all questions were answered. Relevent patient education was performed. Verbal and/or written instructions (see AVS) provided. The most recent lab findings were reviewed with the patient. Plan was discussed with patient who verbally expressed understanding. An After Visit Summary was printed and given to the patient.     Darrius Livingston MD

## 2021-08-03 NOTE — PROGRESS NOTES
Identified pt with two pt identifiers(name and ). Reviewed record in preparation for visit and have obtained necessary documentation. Chief Complaint   Patient presents with    Hypertension        Vitals:    21 0939   BP: 124/86   Pulse: 80   Resp: 16   Temp: 98.1 °F (36.7 °C)   TempSrc: Oral   SpO2: 99%   Weight: 230 lb (104.3 kg)   Height: 5' 8\" (1.727 m)   PainSc:   0 - No pain       Health Maintenance Due   Topic    COVID-19 Vaccine (1)    Shingrix Vaccine Age 50> (1 of 2)    Breast Cancer Screen Mammogram     PAP AKA CERVICAL CYTOLOGY        Coordination of Care Questionnaire:  :   1) Have you been to an emergency room, urgent care, or hospitalized since your last visit? If yes, where when, and reason for visit? yes   2021    2. Have seen or consulted any other health care provider since your last visit? If yes, where when, and reason for visit? YES      Patient is accompanied by self I have received verbal consent from [facility] to discuss any/all medical information while they are present in the room.

## 2021-08-03 NOTE — PATIENT INSTRUCTIONS
Starting a Weight Loss Plan: Care Instructions  Overview     If you're thinking about losing weight, it can be hard to know where to start. Your doctor can help you set up a weight loss plan that best meets your needs. You may want to take a class on nutrition or exercise, or you could join a weight loss support group. If you have questions about how to make changes to your eating or exercise habits, ask your doctor about seeing a registered dietitian or an exercise specialist.  It can be a big challenge to lose weight. But you don't have to make huge changes at once. Make small changes, and stick with them. When those changes become habit, add a few more changes. If you don't think you're ready to make changes right now, try to pick a date in the future. Make an appointment to see your doctor to discuss whether the time is right for you to start a plan. Follow-up care is a key part of your treatment and safety. Be sure to make and go to all appointments, and call your doctor if you are having problems. It's also a good idea to know your test results and keep a list of the medicines you take. How can you care for yourself at home? · Set realistic goals. Many people expect to lose much more weight than is likely. A weight loss of 5% to 10% of your body weight may be enough to improve your health. · Get family and friends involved to provide support. Talk to them about why you are trying to lose weight, and ask them to help. They can help by participating in exercise and having meals with you, even if they may be eating something different. · Find what works best for you. If you do not have time or do not like to cook, a program that offers meal replacement bars or shakes may be better for you. Or if you like to prepare meals, finding a plan that includes daily menus and recipes may be best.  · Ask your doctor about other health professionals who can help you achieve your weight loss goals.   ? A dietitian can help you make healthy changes in your diet. ? An exercise specialist or  can help you develop a safe and effective exercise program.  ? A counselor or psychiatrist can help you cope with issues such as depression, anxiety, or family problems that can make it hard to focus on weight loss. · Consider joining a support group for people who are trying to lose weight. Your doctor can suggest groups in your area. Where can you learn more? Go to http://www.gray.com/  Enter U357 in the search box to learn more about \"Starting a Weight Loss Plan: Care Instructions. \"  Current as of: September 23, 2020               Content Version: 12.8  © 7204-5905 [employer], [employer]. Care instructions adapted under license by [employer] (which disclaims liability or warranty for this information). If you have questions about a medical condition or this instruction, always ask your healthcare professional. [address] any warranty or liability for your use of this information.

## 2021-08-04 ENCOUNTER — HOSPITAL ENCOUNTER (OUTPATIENT)
Dept: INFUSION THERAPY | Age: 56
Discharge: HOME | End: 2021-08-04
Payer: COMMERCIAL

## 2021-08-04 ENCOUNTER — PATIENT OUTREACH (OUTPATIENT)
Dept: OTHER | Age: 56
End: 2021-08-04

## 2021-08-04 VITALS
HEART RATE: 69 BPM | RESPIRATION RATE: 16 BRPM | TEMPERATURE: 98.5 F | SYSTOLIC BLOOD PRESSURE: 137 MMHG | OXYGEN SATURATION: 99 % | DIASTOLIC BLOOD PRESSURE: 87 MMHG

## 2021-08-04 PROCEDURE — 74011000250 HC RX REV CODE- 250: Performed by: NURSE PRACTITIONER

## 2021-08-04 PROCEDURE — 74011250636 HC RX REV CODE- 250/636: Performed by: NURSE PRACTITIONER

## 2021-08-04 PROCEDURE — 96360 HYDRATION IV INFUSION INIT: CPT

## 2021-08-04 PROCEDURE — 96375 TX/PRO/DX INJ NEW DRUG ADDON: CPT

## 2021-08-04 PROCEDURE — C9113 INJ PANTOPRAZOLE SODIUM, VIA: HCPCS | Performed by: NURSE PRACTITIONER

## 2021-08-04 PROCEDURE — 96361 HYDRATE IV INFUSION ADD-ON: CPT

## 2021-08-04 PROCEDURE — 96374 THER/PROPH/DIAG INJ IV PUSH: CPT

## 2021-08-04 RX ORDER — ACETAMINOPHEN 500 MG
500-1000 TABLET ORAL
Status: DISCONTINUED | OUTPATIENT
Start: 2021-08-04 | End: 2021-08-05 | Stop reason: HOSPADM

## 2021-08-04 RX ORDER — ONDANSETRON 2 MG/ML
4 INJECTION INTRAMUSCULAR; INTRAVENOUS ONCE
Status: COMPLETED | OUTPATIENT
Start: 2021-08-04 | End: 2021-08-04

## 2021-08-04 RX ADMIN — ONDANSETRON 4 MG: 2 INJECTION, SOLUTION INTRAMUSCULAR; INTRAVENOUS at 13:47

## 2021-08-04 RX ADMIN — FOLIC ACID: 5 INJECTION, SOLUTION INTRAMUSCULAR; INTRAVENOUS; SUBCUTANEOUS at 14:48

## 2021-08-04 RX ADMIN — SODIUM CHLORIDE 40 MG: 9 INJECTION INTRAMUSCULAR; INTRAVENOUS; SUBCUTANEOUS at 13:47

## 2021-08-04 RX ADMIN — SODIUM CHLORIDE, SODIUM LACTATE, POTASSIUM CHLORIDE, AND CALCIUM CHLORIDE 1000 ML: 600; 310; 30; 20 INJECTION, SOLUTION INTRAVENOUS at 13:41

## 2021-08-04 NOTE — PROGRESS NOTES
Attempt to reach patient for follow up on 7/4 admission to [address] [place] [address]. Discreet VM left with contact information (3rd attempt). Lost to follow up letter sent on 8/2. Will attempt to reach her again in a week, 8/11. Per chart review, patient is scheduled for an infusion for dehydration today, 8/4.

## 2021-08-04 NOTE — PROGRESS NOTES
[address] [facility] Visit Note    1300 Patient arrives for Hydration without acute problems. Please see [facility] for complete assessment and education provided. Prior to treatment, patient was screened for COVID 19. Patient denies any signs or symptoms of COVID. Denies any known physical contact with anyone diagnosed with, or with pending or positive COVID test. Denies a pending or positive COVID test himself. Vital signs stable throughout and prior to discharge. Patient tolerated procedure well and was discharged without incident. Patient is aware of no additional [facility] appointments and to follow up with referring provider for questions or concerns. Medications verified by Mariel So RN via [employer]:    1. Lactated Ringers, 1 L  2. NS with MVI, vit K, thiamine, Folic acid, 1L  3. Zofran 4 mg IVP  4.    Protonix 40 mg IVP    Vital signs:   Patient Vitals for the past 12 hrs:   Temp Pulse Resp BP SpO2   08/04/21 1552  69 16 137/87 99 %   08/04/21 1305 98.5 °F (36.9 °C) 81 16 123/86 98 %

## 2021-08-06 ENCOUNTER — HOSPITAL ENCOUNTER (OUTPATIENT)
Dept: PREADMISSION TESTING | Age: 56
Discharge: HOME | End: 2021-08-06
Payer: COMMERCIAL

## 2021-08-06 ENCOUNTER — TELEPHONE (OUTPATIENT)
Dept: SURGERY | Age: 56
End: 2021-08-06

## 2021-08-06 ENCOUNTER — TRANSCRIBE ORDER (OUTPATIENT)
Dept: REGISTRATION | Age: 56
End: 2021-08-06

## 2021-08-06 DIAGNOSIS — Z01.812 PRE-PROCEDURE LAB EXAM: Primary | ICD-10-CM

## 2021-08-06 DIAGNOSIS — Z01.812 PRE-PROCEDURE LAB EXAM: ICD-10-CM

## 2021-08-06 PROCEDURE — U0005 INFEC AGEN DETEC AMPLI PROBE: HCPCS

## 2021-08-06 NOTE — TELEPHONE ENCOUNTER
Completed form faxed to [address] [place] [address] with confirmation, she said Elizabethjackson Saxena will be faxing me a form as well. Pt in agreement.

## 2021-08-07 LAB
SARS-COV-2, XPLCVT: NOT DETECTED
SOURCE, COVRS: NORMAL

## 2021-08-11 ENCOUNTER — ANESTHESIA (OUTPATIENT)
Dept: ENDOSCOPY | Age: 56
End: 2021-08-11
Payer: COMMERCIAL

## 2021-08-11 ENCOUNTER — HOSPITAL ENCOUNTER (OUTPATIENT)
Age: 56
Setting detail: OUTPATIENT SURGERY
Discharge: HOME | End: 2021-08-11
Attending: INTERNAL MEDICINE | Admitting: INTERNAL MEDICINE
Payer: COMMERCIAL

## 2021-08-11 ENCOUNTER — ANESTHESIA EVENT (OUTPATIENT)
Dept: ENDOSCOPY | Age: 56
End: 2021-08-11
Payer: COMMERCIAL

## 2021-08-11 ENCOUNTER — DOCUMENTATION ONLY (OUTPATIENT)
Dept: SURGERY | Age: 56
End: 2021-08-11

## 2021-08-11 VITALS
WEIGHT: 227.5 LBS | DIASTOLIC BLOOD PRESSURE: 88 MMHG | SYSTOLIC BLOOD PRESSURE: 127 MMHG | HEIGHT: 68 IN | OXYGEN SATURATION: 99 % | HEART RATE: 74 BPM | TEMPERATURE: 97.7 F | BODY MASS INDEX: 34.48 KG/M2 | RESPIRATION RATE: 12 BRPM

## 2021-08-11 PROCEDURE — 76060000031 HC ANESTHESIA FIRST 0.5 HR: Performed by: INTERNAL MEDICINE

## 2021-08-11 PROCEDURE — 2709999900 HC NON-CHARGEABLE SUPPLY: Performed by: INTERNAL MEDICINE

## 2021-08-11 PROCEDURE — 76040000019: Performed by: INTERNAL MEDICINE

## 2021-08-11 PROCEDURE — 74011000250 HC RX REV CODE- 250: Performed by: NURSE ANESTHETIST, CERTIFIED REGISTERED

## 2021-08-11 PROCEDURE — 74011250636 HC RX REV CODE- 250/636: Performed by: NURSE ANESTHETIST, CERTIFIED REGISTERED

## 2021-08-11 RX ORDER — SODIUM CHLORIDE 0.9 % (FLUSH) 0.9 %
5-40 SYRINGE (ML) INJECTION AS NEEDED
Status: DISCONTINUED | OUTPATIENT
Start: 2021-08-11 | End: 2021-08-11 | Stop reason: HOSPADM

## 2021-08-11 RX ORDER — EPINEPHRINE 0.1 MG/ML
1 INJECTION INTRACARDIAC; INTRAVENOUS
Status: DISCONTINUED | OUTPATIENT
Start: 2021-08-11 | End: 2021-08-11 | Stop reason: HOSPADM

## 2021-08-11 RX ORDER — SODIUM CHLORIDE 9 MG/ML
INJECTION, SOLUTION INTRAVENOUS
Status: DISCONTINUED | OUTPATIENT
Start: 2021-08-11 | End: 2021-08-11 | Stop reason: HOSPADM

## 2021-08-11 RX ORDER — PROPOFOL 10 MG/ML
INJECTION, EMULSION INTRAVENOUS AS NEEDED
Status: DISCONTINUED | OUTPATIENT
Start: 2021-08-11 | End: 2021-08-11 | Stop reason: HOSPADM

## 2021-08-11 RX ORDER — UREA 10 %
100 LOTION (ML) TOPICAL DAILY
COMMUNITY

## 2021-08-11 RX ORDER — SODIUM CHLORIDE 9 MG/ML
50 INJECTION, SOLUTION INTRAVENOUS CONTINUOUS
Status: DISCONTINUED | OUTPATIENT
Start: 2021-08-11 | End: 2021-08-11 | Stop reason: HOSPADM

## 2021-08-11 RX ORDER — FLUMAZENIL 0.1 MG/ML
0.2 INJECTION INTRAVENOUS
Status: DISCONTINUED | OUTPATIENT
Start: 2021-08-11 | End: 2021-08-11 | Stop reason: HOSPADM

## 2021-08-11 RX ORDER — GLYCOPYRROLATE 0.2 MG/ML
INJECTION INTRAMUSCULAR; INTRAVENOUS AS NEEDED
Status: DISCONTINUED | OUTPATIENT
Start: 2021-08-11 | End: 2021-08-11 | Stop reason: HOSPADM

## 2021-08-11 RX ORDER — NALOXONE HYDROCHLORIDE 0.4 MG/ML
0.4 INJECTION, SOLUTION INTRAMUSCULAR; INTRAVENOUS; SUBCUTANEOUS
Status: DISCONTINUED | OUTPATIENT
Start: 2021-08-11 | End: 2021-08-11 | Stop reason: HOSPADM

## 2021-08-11 RX ORDER — DEXTROMETHORPHAN/PSEUDOEPHED 2.5-7.5/.8
1.2 DROPS ORAL
Status: DISCONTINUED | OUTPATIENT
Start: 2021-08-11 | End: 2021-08-11 | Stop reason: HOSPADM

## 2021-08-11 RX ORDER — LIDOCAINE HYDROCHLORIDE 20 MG/ML
INJECTION, SOLUTION EPIDURAL; INFILTRATION; INTRACAUDAL; PERINEURAL AS NEEDED
Status: DISCONTINUED | OUTPATIENT
Start: 2021-08-11 | End: 2021-08-11 | Stop reason: HOSPADM

## 2021-08-11 RX ORDER — ATROPINE SULFATE 0.1 MG/ML
0.5 INJECTION INTRAVENOUS
Status: DISCONTINUED | OUTPATIENT
Start: 2021-08-11 | End: 2021-08-11 | Stop reason: HOSPADM

## 2021-08-11 RX ORDER — SODIUM CHLORIDE 0.9 % (FLUSH) 0.9 %
5-40 SYRINGE (ML) INJECTION EVERY 8 HOURS
Status: DISCONTINUED | OUTPATIENT
Start: 2021-08-11 | End: 2021-08-11 | Stop reason: HOSPADM

## 2021-08-11 RX ORDER — MULTIVITAMIN
1 TABLET ORAL DAILY
COMMUNITY

## 2021-08-11 RX ADMIN — PROPOFOL 100 MG: 10 INJECTION, EMULSION INTRAVENOUS at 12:41

## 2021-08-11 RX ADMIN — PROPOFOL 50 MG: 10 INJECTION, EMULSION INTRAVENOUS at 12:44

## 2021-08-11 RX ADMIN — LIDOCAINE HYDROCHLORIDE 100 MG: 20 INJECTION, SOLUTION EPIDURAL; INFILTRATION; INTRACAUDAL; PERINEURAL at 12:41

## 2021-08-11 RX ADMIN — GLYCOPYRROLATE 0.2 MG: 0.2 INJECTION, SOLUTION INTRAMUSCULAR; INTRAVENOUS at 12:41

## 2021-08-11 RX ADMIN — SODIUM CHLORIDE: 900 INJECTION, SOLUTION INTRAVENOUS at 12:24

## 2021-08-11 NOTE — ANESTHESIA PREPROCEDURE EVALUATION
Relevant Problems   CARDIOVASCULAR   (+) Essential hypertension      GASTROINTESTINAL   (+) Hiatal hernia      ENDOCRINE   (+) Left lateral epicondylitis   (+) Obesity, Class III, BMI 40-49.9 (morbid obesity) ([facility] [address]      HEMATOLOGY   (+) Iron deficiency anemia       Anesthetic History     PONV          Review of Systems / Medical History  Patient summary reviewed, nursing notes reviewed and pertinent labs reviewed    Pulmonary  Within defined limits                 Neuro/Psych         Headaches    Comments: Chronic pain (G89.29)  Low back pain  Lumbar radiculopathy   Cardiovascular    Hypertension              Exercise tolerance: >4 METS     GI/Hepatic/Renal     GERD          Comments: gastric outlet obstruction/GEJ stricture s/p gastric bypass Endo/Other      Hypothyroidism: well controlled  Obesity and arthritis     Other Findings              Physical Exam    Airway  Mallampati: II  TM Distance: > 6 cm  Neck ROM: normal range of motion   Mouth opening: Normal     Cardiovascular  Regular rate and rhythm,  S1 and S2 normal,  no murmur, click, rub, or gallop  Rhythm: regular  Rate: normal         Dental    Dentition: Full upper dentures     Pulmonary  Breath sounds clear to auscultation               Abdominal  GI exam deferred       Other Findings            Anesthetic Plan    ASA: 3  Anesthesia type: MAC          Induction: Intravenous  Anesthetic plan and risks discussed with: Patient

## 2021-08-11 NOTE — H&P
[address]  217 [facility] 140 [facility], [address]  [phone]                                History and Physical     NAME: Kimber Sylvester   :  1965   MRN:  [id number]     HPI:  The patient was seen and examined. Past Surgical History:   Procedure Laterality Date    COLONOSCOPY N/A 3/10/2021    COLONOSCOPY,EGD performed by Avila Machuca MD at [facility] ENDOSCOPY    [address]    x 1    HX CHOLECYSTECTOMY      OPEN    HX COLONOSCOPY  14    Dr. Yariel Grajeda; 14    due to abnormal PAP.  HX DILATION AND CURETTAGE      due to miscarriage.  HX ENDOSCOPY      HX HEART CATHETERIZATION      no stents    HX KNEE ARTHROSCOPY Right     HX LAP GASTRIC BYPASS  2021    lap gastric bypass with hiatal hernia repair by Dr. Hannah Pena at [facility]    [address] [place] [address] Right 2016    LUMBAR L4-5, L5-S1 ELLY. Dr. Randa Sellers     Past Medical History:   Diagnosis Date    Allergic rhinitis 2010    Arthritis     Baker cyst, left 10/2019    Dr. Rebecca Schmid cyst, right     Cervical disc herniation 2010    C3-4, C4-5, C5-6. Hemangioma body of C5. Dr. Erling Fleischer.  Chest pain 2011, 2012    Dr. Joey Otero. Dr. Ray Fairbanks; denies CP as of 3/27/14    Chronic lower back pain , 2016    thoracic DDD and spondylosis. DDD L3-S1. Dr. Alysha Ponce. Ti [place]. [place] Sonja Junior[place] [facility] [place]oo [place].  Chronic meniscal tear of knee     Chronic pain     lower back and knees    Esophagitis, reflux 4/3/2011    Gallstone     Gastritis 2010    Dr. Glory Martinez.  Gastrointestinal food allergy 2014    Multiple. Dr. José Rodriguez.    Heart palpitations 2012    DUE TO PAC'S AND PVC'S. Dr. Junior Rojas.     Heel spur, left 2019    Hiatal hernia 4/3/2011    HTN (hypertension) 1987    Impaired glucose tolerance 10/15/2010    Incidental lung nodule 01/08/13    LLL 3.9 mm, RML 3.1 mm;  as of 3/27/14 per pt stable w/o growth    Insomnia 1990s    Iron deficiency anemia 5/27/2010    Irritable bowel syndrome (IBS) 03/2014    Dr. Damien Chowdary.    Knee pain 2012    Right. due to severe OA. / chipped bone     Left foot pain 08/2019    Dr. Blanca Dugan.  Metatarsal bone fracture 1990    Left fifth.  Migraine 2/22/2011    Dr. Giovanny Polo Morbid obesity [facility])     Peroneal tendonitis of lower leg 08/07/2019    Left. Dr. Blanca Dugan    Pre-diabetes     Radiculopathy, cervical 01/2010    Left. Dr. Manuela Cardenas.  Shoulder pain, right 2012    due to [address] X 2. Dr. Pat Paul.  Thyroid nodule 2011    6 nodules- Dr. Hitchcock Chi    Toe fracture, left 2008    fifth toe.  Triangular fibrocartilage complex tear 2012    Dr. Deann Glynn. Left.      Social History     Tobacco Use    Smoking status: Never Smoker    Smokeless tobacco: Never Used   Vaping Use    Vaping Use: Never used   Substance Use Topics    Alcohol use: No    Drug use: Never     Allergies   Allergen Reactions    Doxylamine Succinate Swelling     12.5-25 MG  HANDS, FACE/PERIORAL, FEET    Pcn [Penicillins] Hives    Ambien [Zolpidem] Nausea and Vomiting and Other (comments)     5 mg with Ativan 0.5 mg   TOO GROGGY, SLEPT 24 HOURS  7.5 MG FORGOT SHE WAS SLEEP EATING    Aspirin Nausea Only    Carafate [Sucralfate] Nausea and Vomiting    Celebrex [Celecoxib] Other (comments)     TIGHT SQUEEZING IN CHEST  CHEST ACHED    Erythromycin Nausea and Vomiting    Milk Prot-Turm-Pepper-Pineappl Other (comments)     Multiple food allergies    Neurontin [Gabapentin] Other (comments)     300 mg  SEVERE LEG PAIN  EYE TWITCHING    Nsaids (Non-Steroidal Anti-Inflammatory Drug) Other (comments)     GI irritation      Pineapple Unknown (comments)     Family History   Problem Relation Age of Onset    Diabetes Mother     Heart Disease Mother 58        2 stents    Hypertension Mother     Stroke Mother     Diabetes Father     Hypertension Father     Stroke Maternal Grandmother     Cancer Maternal Uncle         prostate    Diabetes Sister     No Known Problems Sister     No Known Problems Sister     Anesth Problems Neg Hx      Current Facility-Administered Medications   Medication Dose Route Frequency    0.9% sodium chloride infusion  50 mL/hr IntraVENous CONTINUOUS    sodium chloride (NS) flush 5-40 mL  5-40 mL IntraVENous Q8H    sodium chloride (NS) flush 5-40 mL  5-40 mL IntraVENous PRN    naloxone (NARCAN) injection 0.4 mg  0.4 mg IntraVENous Multiple    flumazeniL (ROMAZICON) 0.1 mg/mL injection 0.2 mg  0.2 mg IntraVENous Multiple    simethicone (MYLICON) 12DW/9.4CZ oral drops 80 mg  1.2 mL Oral Multiple    atropine injection 0.5 mg  0.5 mg IntraVENous ONCE PRN    EPINEPHrine (ADRENALIN) 0.1 mg/mL syringe 1 mg  1 mg Endoscopically ONCE PRN     Facility-Administered Medications Ordered in Other Encounters   Medication Dose Route Frequency    0.9% sodium chloride infusion   IntraVENous CONTINUOUS         PHYSICAL EXAM:  General: WD, WN. Alert, cooperative, no acute distress    HEENT: NC, Atraumatic. PERRLA, EOMI. Anicteric sclerae. Lungs:  CTA Bilaterally. No Wheezing/Rhonchi/Rales. Heart:  Regular  rhythm,  No murmur, No Rubs, No Gallops  Abdomen: Soft, Non distended, Non tender. +Bowel sounds, no HSM  Extremities: No c/c/e  Neurologic:  CN 2-12 gi, Alert and oriented X 3. No acute neurological distress   Psych:   Good insight. Not anxious nor agitated. The heart, lungs and mental status were satisfactory for the administration of MAC sedation and for the procedure. Mallampati score: 3     The patient was counseled at length about the risks of vamshi Covid-19 in the anaya-operative and post-operative states including the recovery window of their procedure.   The patient was made aware that vamshi Covid-19 after a surgical procedure may worsen their prognosis for recovering from the virus and lend to a higher morbidity and or mortality risk. The patient was given the options of postponing their procedure. All of the risks, benefits, and alternatives were discussed. The patient does  wish to proceed with the procedure.       Assessment:   · Gastric anastomotic stricture    Plan:   · Endoscopic procedure  · MAC sedation   ·

## 2021-08-11 NOTE — PROCEDURES
118 [facility].  217 [facility] 140 [place]  [place], [address]  [phone]                            NAME:  Emani Lehman   :   1965   MRN:   [id number]     Date/Time:  2021 12:57 PM    Esophagogastroduodenoscopy (EGD) Procedure Note    :  Jef Cuello MD    Staff: Endoscopy Technician-1: Shelia Coffey  Endoscopy RN-1: Paradise Lundberg RN     Implants: none    Referring Provider:  Jacoby Morrison MD    Anethesia/Sedation:  MAC anesthesia    Procedure Details     After infom consent was obtained for the procedure, with all risks and benefits of procedure explained the patient was taken to the endoscopy suite and placed in the left lateral decubitus position. Following sequential administration of sedation as per above, the [id number] gastroscope was inserted into the mouth and advanced under direct vision to proximal jejunum. A careful inspection was made as the gastroscope was withdrawn, including a retroflexed view of the proximal stomach; findings and interventions are described below. Findings:  Esophagus:normal  Stomach: Yelena-en-Y gastric bypass anatomy was noted. Gastric anastomosis was stenosed and suture material as well as clips were noted. A large ulcer with clean base was noted at the anastomosis and measured about 30 mm x 25 mm in size. No bleeding or stigmata were noted. Gastrojejunal anastomosis was stenosed with the internal diameter of 12 mm. This was traversed without dilation. Duodenum/jejunum:Patchy erythema, congestion and ulceration (2-3 mm) with black discoloration were noted in the proximal 4 cm of jejunum. Distal to this area the jejunum appeared normal.      Therapies:  none    Specimens: none           EBL: None    Complications:   None; patient tolerated the procedure well. Impression:    See Postoperative diagnosis above    Recommendations:  -Continue acid suppression. ,   -Follow symptoms.   -EGD with dilation in 4 weeks  -Follow up with Dr Filiberto Cannon    Discharge disposition:  Home in the company of  when able to ambulate    Kandis Salcedo MD

## 2021-08-11 NOTE — DISCHARGE INSTRUCTIONS
118 [facility].  [address]  [id number]  1965    DISCOMFORT:  Sore throat- throat lozenges or warm salt water gargle  redness at IV site- apply warm compress to area; if redness or soreness persist- contact your physician  Gaseous discomfort- walking, belching will help relieve any discomfort    DIET  You may eat and drink after you leave. You may resume your regular diet - however -  remember your colon is empty and a heavy meal will produce gas. Avoid these foods:  vegetables, fried / greasy foods, carbonated drinks   You may not drink alcoholic beverages for at least 12 hours    ACTIVITY  You may resume your normal daily activities   Spend the remainder of the day resting -  avoid any strenuous activity. You may not operate a vehicle for 12 hours  You may not engage in an occupation involving machinery or appliances for rest of today  Avoid making any critical decisions for at least 24 hour    CALL M.D. ANY SIGN OF   Increasing pain, nausea, vomiting  Abdominal distension (swelling)  New increased bleeding (oral or rectal)  Fever (chills)  Pain in chest area  Bloody discharge from nose or mouth  Shortness of breath    Follow-up Instructions:   Call Dr. Ant Membreno for any questions or problems. If we took a biopsy please call the office within 2 weeks to discuss your pathology results. Telephone # [phone]       ENDOSCOPY FINDINGS:   dysphagia         Learning About Coronavirus [phone])  Coronavirus [phone]): Overview  What is coronavirus (COVID-19)? The coronavirus disease (COVID-19) is caused by a virus. It is an illness that was first found in [place], [place], in December 2019. It has since spread worldwide. The virus can cause fever, cough, and trouble breathing. In severe cases, it can cause pneumonia and make it hard to breathe without help.  It can cause death.  Coronaviruses are a large group of viruses. They cause the common cold. They also cause more serious illnesses like Middle East respiratory syndrome (MERS) and severe acute respiratory syndrome (SARS). COVID-19 is caused by a novel coronavirus. That means it's a new type that has not been seen in people before. This virus spreads person-to-person through droplets from coughing and sneezing. It can also spread when you are close to someone who is infected. And it can spread when you touch something that has the virus on it, such as a doorknob or a tabletop. What can you do to protect yourself from coronavirus (COVID-19)? The best way to protect yourself from getting sick is to:  · Avoid areas where there is an outbreak. · Avoid contact with people who may be infected. · Wash your hands often with soap or alcohol-based hand sanitizers. · Avoid crowds and try to stay at least 6 feet away from other people. · Wash your hands often, especially after you cough or sneeze. Use soap and water, and scrub for at least 20 seconds. If soap and water aren't available, use an alcohol-based hand . · Avoid touching your mouth, nose, and eyes. What can you do to avoid spreading the virus to others? To help avoid spreading the virus to others:  · Cover your mouth with a tissue when you cough or sneeze. Then throw the tissue in the trash. · Use a disinfectant to clean things that you touch often. · Stay home if you are sick or have been exposed to the virus. Don't go to school, work, or public areas. And don't use public transportation. · If you are sick:  ? Leave your home only if you need to get medical care. But call the doctor's office first so they know you're coming. And wear a face mask, if you have one.  ? If you have a face mask, wear it whenever you're around other people. It can help stop the spread of the virus when you cough or sneeze. ? Clean and disinfect your home every day.  Use household  and disinfectant wipes or sprays. Take special care to clean things that you grab with your hands. These include doorknobs, remote controls, phones, and handles on your refrigerator and microwave. And don't forget countertops, tabletops, bathrooms, and computer keyboards. When to call for help  Call 911 anytime you think you may need emergency care. For example, call if:  · You have severe trouble breathing. (You can't talk at all.)  · You have constant chest pain or pressure. · You are severely dizzy or lightheaded. · You are confused or can't think clearly. · Your face and lips have a blue color. · You pass out (lose consciousness) or are very hard to wake up. Call your doctor now if you develop symptoms such as:  · Shortness of breath. · Fever. · Cough. If you need to get care, call ahead to the doctor's office for instructions before you go. Make sure you wear a face mask, if you have one, to prevent exposing other people to the virus. Where can you get the latest information? The following health organizations are tracking and studying this virus. Their websites contain the most up-to-date information. Emily Aron also learn what to do if you think you may have been exposed to the virus. · U.S. Centers for Disease Control and Prevention (CDC): The CDC provides updated news about the disease and travel advice. The website also tells you how to prevent the spread of infection. www.cdc.gov  · World Health Organization [facility]): WHO offers information about the virus outbreaks. WHO also has travel advice. www.who.int  Current as of: April 1, 2020               Content Version: 12.4  © 6343-0885 [employer], [employer]orporated. Care instructions adapted under license by your healthcare professional. If you have questions about a medical condition or this instruction, always ask your healthcare professional. [address] any warranty or liability for your use of this information.

## 2021-08-11 NOTE — ROUTINE PROCESS
Donna Mireles [facility]  1965  [id number]    Situation:  Verbal report received from: Whitney  Procedure: Procedure(s):  ESOPHAGOGASTRODUODENOSCOPY (EGD) with DIL    Background:    Preoperative diagnosis: Dysphagia  Postoperative diagnosis: dysphagia    :  Dr. Victor Hugo Arrieta  Assistant(s): Endoscopy Technician-1: Oma Bingham  Endoscopy RN-1: Bart Heath RN    Specimens: * No specimens in log *  H. Pylori  no    Assessment:  Intra-procedure medications   Anesthesia gave intra-procedure sedation and medications, see anesthesia flow sheet yes    Intravenous fluids: NS@ KVO     Vital signs stable   Abdominal assessment: round and soft     Recommendation:  Discharge patient per MD order.   Family or Friend daughter in car  Permission to share finding with family or friend yes

## 2021-08-11 NOTE — ANESTHESIA POSTPROCEDURE EVALUATION
Procedure(s):  ESOPHAGOGASTRODUODENOSCOPY (EGD) with DIL. MAC    Anesthesia Post Evaluation        Patient location during evaluation: PACU  Patient participation: complete - patient participated  Level of consciousness: awake and alert  Pain management: adequate  Airway patency: patent  Anesthetic complications: no  Cardiovascular status: acceptable  Respiratory status: acceptable  Hydration status: acceptable  Comments: Seen, no complaints   Post anesthesia nausea and vomiting:  none  Final Post Anesthesia Temperature Assessment:  Normothermia (36.0-37.5 degrees C)      INITIAL Post-op Vital signs:   Vitals Value Taken Time   /88 08/11/21 1315   Temp 36.5 °C (97.7 °F) 08/11/21 1258   Pulse 75 08/11/21 1318   Resp 18 08/11/21 1318   SpO2 100 % 08/11/21 1307   Vitals shown include unvalidated device data.

## 2021-08-13 ENCOUNTER — PATIENT OUTREACH (OUTPATIENT)
Dept: OTHER | Age: 56
End: 2021-08-13

## 2021-08-13 NOTE — PROGRESS NOTES
Attempt to reach patient for follow up. Discreet VM left with contact information. Lost to follow up letter sent on 8/2. Resolving current episode for case management due to patient lost to follow up. Patient has not been reached after repeated calls and letters. Final call made today to attempt to contact and discreet message left on voicemail. Letter sent to patient notifying completion of services due to unable to reach. This writer's contact information and information regarding program services included in materials sent. Goals updated to reflect current status as appropriate. Will remain available should patient request re-initiation of case management or transitions of care services. Per chart review, patient has follow up appointments scheduled and is having necessary procedures.

## 2021-08-17 ENCOUNTER — VIRTUAL VISIT (OUTPATIENT)
Dept: SURGERY | Age: 56
End: 2021-08-17
Payer: COMMERCIAL

## 2021-08-17 VITALS — BODY MASS INDEX: 34.25 KG/M2 | WEIGHT: 226 LBS | HEIGHT: 68 IN

## 2021-08-17 DIAGNOSIS — Z09 SURGICAL FOLLOWUP: ICD-10-CM

## 2021-08-17 DIAGNOSIS — K28.9 ANASTOMOTIC ULCER: ICD-10-CM

## 2021-08-17 DIAGNOSIS — E66.01 MORBID OBESITY (HCC): Primary | ICD-10-CM

## 2021-08-17 PROCEDURE — 99024 POSTOP FOLLOW-UP VISIT: CPT | Performed by: NURSE PRACTITIONER

## 2021-08-17 RX ORDER — METOCLOPRAMIDE 10 MG/1
10 TABLET ORAL
Qty: 40 TABLET | Refills: 0 | Status: SHIPPED | OUTPATIENT
Start: 2021-08-17 | End: 2021-08-27

## 2021-08-17 NOTE — PROGRESS NOTES
I was in the office while conducting this encounter. Consent:  She and/or her healthcare decision maker is aware that this patient-initiated Telehealth encounter is a billable service, with coverage as determined by her insurance carrier. She is aware that she may receive a bill and has provided verbal consent to proceed: Yes    This virtual visit was conducted via D[employer]. me. Pursuant to the emergency declaration under the [facility] [place] [address] waiver authority and the [employer] Act, this Virtual  Visit was conducted to reduce the patient's risk of exposure to COVID-19 and provide continuity of care for an established patient. Services were provided through a video synchronous discussion virtually to substitute for in-person clinic visit. Due to this being a TeleHealth evaluation, many elements of the physical examination are unable to be assessed. Total Time: minutes: 11-20 minutes. 8 weeks status post gastric bypass. Pt reports doing well on liquids and soft foods. .      EGD done 8/11;     Findings:  Esophagus:normal  Stomach: Yelena-en-Y gastric bypass anatomy was noted. Gastric anastomosis was stenosed and suture material as well as clips were noted. A large ulcer with clean base was noted at the anastomosis and measured about 30 mm x 25 mm in size. No bleeding or stigmata were noted. Gastrojejunal anastomosis was stenosed with the internal diameter of 12 mm. This was traversed without dilation. Duodenum/jejunum:Patchy erythema, congestion and ulceration (2-3 mm) with black discoloration were noted in the proximal 4 cm of jejunum. Distal to this area the jejunum appeared normal.  She is taking bariatric vitamins without issue.      Total weight loss since surgery 48.2bs  Weight loss since last visit 15lbs  Visit Vitals  Ht 5' 8\" (1.727 m)   Wt 226 lb (102.5 kg)   LMP  (LMP Unknown)   BMI 34.36 kg/m² Ms. Harvey Alexis has a reminder for a \"due or due soon\" health maintenance. I have asked that she contact her primary care provider for follow-up on this health maintenance. Physical Examination: General appearance - alert, well appearing, a  Chest -no respiratory distress    Abdomen -incisions healed per patient    A/P    Doing well 8 weeks status post laparoscopic Gastric Bypass  Continue PPI  Continue soft food phase 1. Endoscopy scheduled for 3 weeks  Follow-up with me in 4 weeks  Focus on increasing clear liquids to 48 ounces daily. Encourage patient to try and eat 50 to 60 g of protein daily. Refilled Reglan. Patient cannot tolerate Carafate. Ptverbalized understanding and questions were answered to the best of my knowledge and ability.           Dania Hunter NP

## 2021-08-17 NOTE — PROGRESS NOTES
1. Have you been to the ER, urgent care clinic since your last visit? Hospitalized since your last visit? no    2. Have you seen or consulted any other health care providers outside of the [address] [place] [address] since your last visit? Include any pap smears or colon screening.  no

## 2021-09-10 ENCOUNTER — TRANSCRIBE ORDER (OUTPATIENT)
Dept: REGISTRATION | Age: 56
End: 2021-09-10

## 2021-09-10 ENCOUNTER — HOSPITAL ENCOUNTER (OUTPATIENT)
Dept: PREADMISSION TESTING | Age: 56
Discharge: HOME | End: 2021-09-10
Payer: COMMERCIAL

## 2021-09-10 DIAGNOSIS — Z01.812 PRE-PROCEDURE LAB EXAM: ICD-10-CM

## 2021-09-10 DIAGNOSIS — Z01.812 PRE-PROCEDURE LAB EXAM: Primary | ICD-10-CM

## 2021-09-10 PROCEDURE — U0005 INFEC AGEN DETEC AMPLI PROBE: HCPCS

## 2021-09-11 LAB
SARS-COV-2, XPLCVT: NOT DETECTED
SOURCE, COVRS: NORMAL

## 2021-09-15 ENCOUNTER — HOSPITAL ENCOUNTER (OUTPATIENT)
Age: 56
Setting detail: OUTPATIENT SURGERY
Discharge: HOME | End: 2021-09-15
Attending: INTERNAL MEDICINE | Admitting: INTERNAL MEDICINE
Payer: COMMERCIAL

## 2021-09-15 ENCOUNTER — ANESTHESIA (OUTPATIENT)
Dept: ENDOSCOPY | Age: 56
End: 2021-09-15
Payer: COMMERCIAL

## 2021-09-15 ENCOUNTER — ANESTHESIA EVENT (OUTPATIENT)
Dept: ENDOSCOPY | Age: 56
End: 2021-09-15
Payer: COMMERCIAL

## 2021-09-15 VITALS
TEMPERATURE: 98.9 F | WEIGHT: 220 LBS | BODY MASS INDEX: 33.34 KG/M2 | RESPIRATION RATE: 19 BRPM | HEIGHT: 68 IN | HEART RATE: 78 BPM | OXYGEN SATURATION: 98 % | SYSTOLIC BLOOD PRESSURE: 143 MMHG | DIASTOLIC BLOOD PRESSURE: 94 MMHG

## 2021-09-15 PROCEDURE — 76040000019: Performed by: INTERNAL MEDICINE

## 2021-09-15 PROCEDURE — 74011000250 HC RX REV CODE- 250: Performed by: NURSE ANESTHETIST, CERTIFIED REGISTERED

## 2021-09-15 PROCEDURE — 2709999900 HC NON-CHARGEABLE SUPPLY: Performed by: INTERNAL MEDICINE

## 2021-09-15 PROCEDURE — C1726 CATH, BAL DIL, NON-VASCULAR: HCPCS | Performed by: INTERNAL MEDICINE

## 2021-09-15 PROCEDURE — 76060000031 HC ANESTHESIA FIRST 0.5 HR: Performed by: INTERNAL MEDICINE

## 2021-09-15 PROCEDURE — 77030039825 HC MSK NSL PAP SUPERNO2VA VYRM -B: Performed by: NURSE ANESTHETIST, CERTIFIED REGISTERED

## 2021-09-15 PROCEDURE — 74011250636 HC RX REV CODE- 250/636: Performed by: NURSE ANESTHETIST, CERTIFIED REGISTERED

## 2021-09-15 PROCEDURE — 77030018712 HC DEV BLLN INFL BSC -B: Performed by: INTERNAL MEDICINE

## 2021-09-15 RX ORDER — SODIUM CHLORIDE 9 MG/ML
50 INJECTION, SOLUTION INTRAVENOUS CONTINUOUS
Status: DISCONTINUED | OUTPATIENT
Start: 2021-09-15 | End: 2021-09-15 | Stop reason: HOSPADM

## 2021-09-15 RX ORDER — SODIUM CHLORIDE 0.9 % (FLUSH) 0.9 %
5-40 SYRINGE (ML) INJECTION EVERY 8 HOURS
Status: DISCONTINUED | OUTPATIENT
Start: 2021-09-15 | End: 2021-09-15 | Stop reason: HOSPADM

## 2021-09-15 RX ORDER — EPINEPHRINE 0.1 MG/ML
1 INJECTION INTRACARDIAC; INTRAVENOUS
Status: DISCONTINUED | OUTPATIENT
Start: 2021-09-15 | End: 2021-09-15 | Stop reason: HOSPADM

## 2021-09-15 RX ORDER — PROPOFOL 10 MG/ML
INJECTION, EMULSION INTRAVENOUS AS NEEDED
Status: DISCONTINUED | OUTPATIENT
Start: 2021-09-15 | End: 2021-09-15 | Stop reason: HOSPADM

## 2021-09-15 RX ORDER — FLUMAZENIL 0.1 MG/ML
0.2 INJECTION INTRAVENOUS
Status: DISCONTINUED | OUTPATIENT
Start: 2021-09-15 | End: 2021-09-15 | Stop reason: HOSPADM

## 2021-09-15 RX ORDER — LIDOCAINE HYDROCHLORIDE 20 MG/ML
INJECTION, SOLUTION EPIDURAL; INFILTRATION; INTRACAUDAL; PERINEURAL AS NEEDED
Status: DISCONTINUED | OUTPATIENT
Start: 2021-09-15 | End: 2021-09-15 | Stop reason: HOSPADM

## 2021-09-15 RX ORDER — ATROPINE SULFATE 0.1 MG/ML
0.5 INJECTION INTRAVENOUS
Status: DISCONTINUED | OUTPATIENT
Start: 2021-09-15 | End: 2021-09-15 | Stop reason: HOSPADM

## 2021-09-15 RX ORDER — NALOXONE HYDROCHLORIDE 0.4 MG/ML
0.4 INJECTION, SOLUTION INTRAMUSCULAR; INTRAVENOUS; SUBCUTANEOUS
Status: DISCONTINUED | OUTPATIENT
Start: 2021-09-15 | End: 2021-09-15 | Stop reason: HOSPADM

## 2021-09-15 RX ORDER — SODIUM CHLORIDE 0.9 % (FLUSH) 0.9 %
5-40 SYRINGE (ML) INJECTION AS NEEDED
Status: DISCONTINUED | OUTPATIENT
Start: 2021-09-15 | End: 2021-09-15 | Stop reason: HOSPADM

## 2021-09-15 RX ORDER — DEXTROMETHORPHAN/PSEUDOEPHED 2.5-7.5/.8
1.2 DROPS ORAL
Status: DISCONTINUED | OUTPATIENT
Start: 2021-09-15 | End: 2021-09-15 | Stop reason: HOSPADM

## 2021-09-15 RX ORDER — GLYCOPYRROLATE 0.2 MG/ML
INJECTION INTRAMUSCULAR; INTRAVENOUS AS NEEDED
Status: DISCONTINUED | OUTPATIENT
Start: 2021-09-15 | End: 2021-09-15 | Stop reason: HOSPADM

## 2021-09-15 RX ORDER — SODIUM CHLORIDE 9 MG/ML
INJECTION, SOLUTION INTRAVENOUS
Status: DISCONTINUED | OUTPATIENT
Start: 2021-09-15 | End: 2021-09-15 | Stop reason: HOSPADM

## 2021-09-15 RX ADMIN — GLYCOPYRROLATE 0.2 MG: 0.2 INJECTION, SOLUTION INTRAMUSCULAR; INTRAVENOUS at 15:35

## 2021-09-15 RX ADMIN — PROPOFOL 50 MG: 10 INJECTION, EMULSION INTRAVENOUS at 15:51

## 2021-09-15 RX ADMIN — PROPOFOL 50 MG: 10 INJECTION, EMULSION INTRAVENOUS at 15:54

## 2021-09-15 RX ADMIN — LIDOCAINE HYDROCHLORIDE 100 MG: 20 INJECTION, SOLUTION EPIDURAL; INFILTRATION; INTRACAUDAL; PERINEURAL at 15:46

## 2021-09-15 RX ADMIN — PROPOFOL 50 MG: 10 INJECTION, EMULSION INTRAVENOUS at 15:48

## 2021-09-15 RX ADMIN — PROPOFOL 100 MG: 10 INJECTION, EMULSION INTRAVENOUS at 15:47

## 2021-09-15 RX ADMIN — SODIUM CHLORIDE: 900 INJECTION, SOLUTION INTRAVENOUS at 15:30

## 2021-09-15 NOTE — DISCHARGE INSTRUCTIONS
118 [facility].  [address]  [id number]  1965    DISCOMFORT:  Sore throat- throat lozenges or warm salt water gargle  redness at IV site- apply warm compress to area; if redness or soreness persist- contact your physician  Gaseous discomfort- walking, belching will help relieve any discomfort    DIET  You may eat and drink after you leave. You may resume your regular diet - however -  remember your colon is empty and a heavy meal will produce gas. Avoid these foods:  vegetables, fried / greasy foods, carbonated drinks   You may not drink alcoholic beverages for at least 12 hours    ACTIVITY  You may resume your normal daily activities   Spend the remainder of the day resting -  avoid any strenuous activity. You may not operate a vehicle for 12 hours  You may not engage in an occupation involving machinery or appliances for rest of today  Avoid making any critical decisions for at least 24 hour    CALL M.D. ANY SIGN OF   Increasing pain, nausea, vomiting  Abdominal distension (swelling)  New increased bleeding (oral or rectal)  Fever (chills)  Pain in chest area  Bloody discharge from nose or mouth  Shortness of breath    Follow-up Instructions:   Call Dr. Yariel Cummings for any questions or problems. If we took a biopsy please call the office within 2 weeks to discuss your pathology results. Telephone # [phone]       ENDOSCOPY FINDINGS:   Anastamotic Stricture-Dilated       Post-procedure recommendations:   -Soft diet  -EGD with dilation in 2-4 weeks    Learning About Coronavirus (COVID-19)  Coronavirus (COVID-19): Overview  What is coronavirus (MWLBA-38)? The coronavirus disease (COVID-19) is caused by a virus. It is an illness that was first found in [place], [place], in December 2019. It has since spread worldwide. The virus can cause fever, cough, and trouble breathing.  In severe cases, it can cause pneumonia and make it hard to breathe without help. It can cause death. Coronaviruses are a large group of viruses. They cause the common cold. They also cause more serious illnesses like Middle East respiratory syndrome (MERS) and severe acute respiratory syndrome (SARS). COVID-19 is caused by a novel coronavirus. That means it's a new type that has not been seen in people before. This virus spreads person-to-person through droplets from coughing and sneezing. It can also spread when you are close to someone who is infected. And it can spread when you touch something that has the virus on it, such as a doorknob or a tabletop. What can you do to protect yourself from coronavirus (COVID-19)? The best way to protect yourself from getting sick is to:  · Avoid areas where there is an outbreak. · Avoid contact with people who may be infected. · Wash your hands often with soap or alcohol-based hand sanitizers. · Avoid crowds and try to stay at least 6 feet away from other people. · Wash your hands often, especially after you cough or sneeze. Use soap and water, and scrub for at least 20 seconds. If soap and water aren't available, use an alcohol-based hand . · Avoid touching your mouth, nose, and eyes. What can you do to avoid spreading the virus to others? To help avoid spreading the virus to others:  · Cover your mouth with a tissue when you cough or sneeze. Then throw the tissue in the trash. · Use a disinfectant to clean things that you touch often. · Stay home if you are sick or have been exposed to the virus. Don't go to school, work, or public areas. And don't use public transportation. · If you are sick:  ? Leave your home only if you need to get medical care. But call the doctor's office first so they know you're coming. And wear a face mask, if you have one.  ? If you have a face mask, wear it whenever you're around other people.  It can help stop the spread of the virus when you cough or sneeze. ? Clean and disinfect your home every day. Use household  and disinfectant wipes or sprays. Take special care to clean things that you grab with your hands. These include doorknobs, remote controls, phones, and handles on your refrigerator and microwave. And don't forget countertops, tabletops, bathrooms, and computer keyboards. When to call for help  Call 911 anytime you think you may need emergency care. For example, call if:  · You have severe trouble breathing. (You can't talk at all.)  · You have constant chest pain or pressure. · You are severely dizzy or lightheaded. · You are confused or can't think clearly. · Your face and lips have a blue color. · You pass out (lose consciousness) or are very hard to wake up. Call your doctor now if you develop symptoms such as:  · Shortness of breath. · Fever. · Cough. If you need to get care, call ahead to the doctor's office for instructions before you go. Make sure you wear a face mask, if you have one, to prevent exposing other people to the virus. Where can you get the latest information? The following health organizations are tracking and studying this virus. Their websites contain the most up-to-date information. Betina Wilson also learn what to do if you think you may have been exposed to the virus. · U.S. Centers for Disease Control and Prevention (CDC): The CDC provides updated news about the disease and travel advice. The website also tells you how to prevent the spread of infection. www.cdc.gov  · World Health Organization [facility]): WHO offers information about the virus outbreaks. WHO also has travel advice. www.who.int  Current as of: April 1, 2020               Content Version: 12.4  © 7000-8116 [employer].    Care instructions adapted under license by your healthcare professional. If you have questions about a medical condition or this instruction, always ask your healthcare professional. [employer] disclaims any warranty or liability for your use of this information.

## 2021-09-15 NOTE — ANESTHESIA POSTPROCEDURE EVALUATION
Procedure(s):  ENDOSCOPY (EGD) WITH DILATION   :-  ESOPHAGEAL DILATION. MAC    Anesthesia Post Evaluation        Patient participation: complete - patient participated  Level of consciousness: awake  Pain management: adequate  Airway patency: patent  Anesthetic complications: no  Cardiovascular status: hemodynamically stable  Respiratory status: acceptable  Hydration status: acceptable  Comments: The patient is ready for PACU discharge. Grisel Parker DO                   Post anesthesia nausea and vomiting:  controlled      INITIAL Post-op Vital signs:   Vitals Value Taken Time   BP     Temp     Pulse 82 09/15/21 1602   Resp 19 09/15/21 1602   SpO2 100 % 09/15/21 1602   Vitals shown include unvalidated device data.

## 2021-09-15 NOTE — ANESTHESIA PREPROCEDURE EVALUATION
Relevant Problems   CARDIOVASCULAR   (+) Essential hypertension      GASTROINTESTINAL   (+) Hiatal hernia      ENDOCRINE   (+) Left lateral epicondylitis   (+) Obesity, Class III, BMI 40-49.9 (morbid obesity) ([facility] [address]      HEMATOLOGY   (+) Iron deficiency anemia       Anesthetic History     PONV          Review of Systems / Medical History  Patient summary reviewed, nursing notes reviewed and pertinent labs reviewed    Pulmonary  Within defined limits                 Neuro/Psych   Within defined limits           Cardiovascular  Within defined limits  Hypertension              Exercise tolerance: >4 METS     GI/Hepatic/Renal     GERD          Comments: gastric outlet obstruction/GEJ stricture s/p gastric bypass Endo/Other      Hypothyroidism: well controlled  Obesity and arthritis     Other Findings              Physical Exam    Airway  Mallampati: II  TM Distance: > 6 cm  Neck ROM: normal range of motion   Mouth opening: Normal     Cardiovascular  Regular rate and rhythm,  S1 and S2 normal,  no murmur, click, rub, or gallop             Dental  No notable dental hx       Pulmonary  Breath sounds clear to auscultation               Abdominal  GI exam deferred       Other Findings            Anesthetic Plan    ASA: 3  Anesthesia type: MAC          Induction: Intravenous  Anesthetic plan and risks discussed with: Patient

## 2021-09-15 NOTE — H&P
[address]  [address] [place]  [facility], [address]  [phone]                                History and Physical     NAME: Brendon Turner   :  1965   MRN:  [id number]     HPI:  The patient was seen and examined. Past Surgical History:   Procedure Laterality Date    COLONOSCOPY N/A 3/10/2021    COLONOSCOPY,EGD performed by Curry Garvey MD at \[facility]\"" ENDOSCOPY    [facility]    x 1    HX CHOLECYSTECTOMY      OPEN    HX COLONOSCOPY  14    Dr. Rafat Huerta; 14    due to abnormal PAP.  HX DILATION AND CURETTAGE      due to miscarriage.  HX ENDOSCOPY      HX HEART CATHETERIZATION      no stents    HX KNEE ARTHROSCOPY Right     HX LAP GASTRIC BYPASS  2021    lap gastric bypass with hiatal hernia repair by Dr. Deng Roberto at [address] Right 2016    LUMBAR L4-5, L5-S1 ELLY. Dr. Vani Ashton     Past Medical History:   Diagnosis Date    Allergic rhinitis 2010    Arthritis     Baker cyst, left 10/2019    Dr. Richardson Ko cyst, right     Cervical disc herniation 2010    C3-4, C4-5, C5-6. Hemangioma body of C5. Dr. Fidel Shaw.  Chest pain 2011, 2012    Dr. Kristine Lyles. Dr. Carmen Rivera; denies CP as of 3/27/14    Chronic lower back pain , 2016    thoracic DDD and spondylosis. DDD L3-S1. Dr. Ethan Mata. Shahida Villafuerte. Dr. Gasper Lewis, [facility] [place] [place]. Dr. Sherin Lin.  Chronic meniscal tear of knee     Chronic pain     lower back and knees    Esophagitis, reflux 4/3/2011    Gallstone     Gastritis 2010    Dr. Bravo Cancer.  Gastrointestinal food allergy 2014    Multiple. Dr. Rodri Diop.    Heart palpitations 2012    DUE TO PAC'S AND PVC'S. Dr. Slava Blake.     Heel spur, left 2019    Hiatal hernia 4/3/2011    HTN (hypertension)     Impaired glucose tolerance 10/15/2010    Incidental lung nodule 01/08/13    LLL 3.9 mm, RML 3.1 mm;  as of 3/27/14 per pt stable w/o growth    Insomnia 1990s    Iron deficiency anemia 5/27/2010    Irritable bowel syndrome (IBS) 03/2014    Dr. Radha Lester.    Knee pain 2012    Right. due to severe OA. / chipped bone     Left foot pain 08/2019    Dr. Meet Carlson.  Metatarsal bone fracture 1990    Left fifth.  Migraine 2/22/2011    Dr. Tim Kilgore Morbid obesity [facility])     Peroneal tendonitis of lower leg 08/07/2019    Left. Dr. Meet Carlson    Pre-diabetes     Radiculopathy, cervical 01/2010    Left. Dr. Era Byrne.  Shoulder pain, right 2012    due to [address] X 2. Dr. Hannah Leung.  Thyroid nodule 2011    6 nodules- Dr. Garnet Sacks    Toe fracture, left 2008    fifth toe.  Triangular fibrocartilage complex tear 2012    Dr. Jenna Jauregui. Left.      Social History     Tobacco Use    Smoking status: Never Smoker    Smokeless tobacco: Never Used   Vaping Use    Vaping Use: Never used   Substance Use Topics    Alcohol use: No    Drug use: Never     Allergies   Allergen Reactions    Doxylamine Succinate Swelling     12.5-25 MG  HANDS, FACE/PERIORAL, FEET    Pcn [Penicillins] Hives    Ambien [Zolpidem] Nausea and Vomiting and Other (comments)     5 mg with Ativan 0.5 mg   TOO GROGGY, SLEPT 24 HOURS  7.5 MG FORGOT SHE WAS SLEEP EATING    Aspirin Nausea Only    Carafate [Sucralfate] Nausea and Vomiting    Celebrex [Celecoxib] Other (comments)     TIGHT SQUEEZING IN CHEST  CHEST ACHED    Erythromycin Nausea and Vomiting    Milk Prot-Turm-Pepper-Pineappl Other (comments)     Multiple food allergies    Neurontin [Gabapentin] Other (comments)     300 mg  SEVERE LEG PAIN  EYE TWITCHING    Nsaids (Non-Steroidal Anti-Inflammatory Drug) Other (comments)     GI irritation      Pineapple Unknown (comments)     Family History   Problem Relation Age of Onset    Diabetes Mother     Heart Disease Mother 58        2 stents    Hypertension Mother     Stroke Mother     Diabetes Father     Hypertension Father     Stroke Maternal Grandmother     Cancer Maternal Uncle         prostate    Diabetes Sister     No Known Problems Sister     No Known Problems Sister     Anesth Problems Neg Hx      Current Facility-Administered Medications   Medication Dose Route Frequency    0.9% sodium chloride infusion  50 mL/hr IntraVENous CONTINUOUS    sodium chloride (NS) flush 5-40 mL  5-40 mL IntraVENous Q8H    sodium chloride (NS) flush 5-40 mL  5-40 mL IntraVENous PRN    naloxone (NARCAN) injection 0.4 mg  0.4 mg IntraVENous Multiple    flumazeniL (ROMAZICON) 0.1 mg/mL injection 0.2 mg  0.2 mg IntraVENous Multiple    simethicone (MYLICON) 23AY/2.3XH oral drops 80 mg  1.2 mL Oral Multiple    atropine injection 0.5 mg  0.5 mg IntraVENous ONCE PRN    EPINEPHrine (ADRENALIN) 0.1 mg/mL syringe 1 mg  1 mg Endoscopically ONCE PRN     Facility-Administered Medications Ordered in Other Encounters   Medication Dose Route Frequency    0.9% sodium chloride infusion   IntraVENous CONTINUOUS    glycopyrrolate (ROBINUL) injection   IntraVENous PRN         PHYSICAL EXAM:  General: WD, WN. Alert, cooperative, no acute distress    HEENT: NC, Atraumatic. PERRLA, EOMI. Anicteric sclerae. Lungs:  CTA Bilaterally. No Wheezing/Rhonchi/Rales. Heart:  Regular  rhythm,  No murmur, No Rubs, No Gallops  Abdomen: Soft, Non distended, Non tender. +Bowel sounds, no HSM  Extremities: No c/c/e  Neurologic:  CN 2-12 gi, Alert and oriented X 3. No acute neurological distress   Psych:   Good insight. Not anxious nor agitated. The heart, lungs and mental status were satisfactory for the administration of MAC sedation and for the procedure. Mallampati score: 3     The patient was counseled at length about the risks of vamshi Covid-19 in the anaya-operative and post-operative states including the recovery window of their procedure.   The patient was made aware that vamshi Covid-19 after a surgical procedure may worsen their prognosis for recovering from the virus and lend to a higher morbidity and or mortality risk. The patient was given the options of postponing their procedure. All of the risks, benefits, and alternatives were discussed. The patient does  wish to proceed with the procedure.       Assessment:   · Gastric anastomotic ulcer    Plan:   · Endoscopic procedure  · MAC sedation   ·

## 2021-09-15 NOTE — PERIOP NOTES
CRE balloon dilatation of the esophagus   12 mm Balloon inflated to 3 ATMs and held for 60 seconds. No subcutaneous crepitus of the chest or cervical region was noted post dilatation.

## 2021-09-15 NOTE — PROGRESS NOTES
Donna Mireles [facility]  1965  [id number]    Situation:  Verbal report received from: Soila SWARTZ  Procedure: Procedure(s):  ENDOSCOPY (EGD) WITH DILATION   :-  ESOPHAGEAL DILATION    Background:    Preoperative diagnosis: GERD;NAUSEA;PAIN ABDOMINAL  Postoperative diagnosis: Anastamotic Stricture-Dilated    :  Dr. Victor Hugo Arrieta  Assistant(s): Endoscopy Technician-1: Oma Nettles RN-1: Martita Alejo RN    Specimens: * No specimens in log *  H. Pylori  no    Assessment:      Anesthesia gave intra-procedure sedation and medications, see anesthesia flow sheet yes    Intravenous fluids: NS@ KVO     Vital signs stable     Abdominal assessment: round and soft     Recommendation:  Discharge patient per MD order.     Family   Permission to share finding with family or friend yes

## 2021-09-15 NOTE — PROCEDURES
118 [facility].  217 [facility] [address], [address]  [phone]                            NAME:  Ignacio Estrada   :   1965   MRN:   [id number]     Date/Time:  9/15/2021 3:57 PM    Esophagogastroduodenoscopy (EGD) Procedure Note    :  Feli Headley MD    Staff: Endoscopy Technician-1: Rose Marion  Endoscopy RN-1: Walter Maciel RN     Implants: none    Referring Provider:  Javed Ramos MD    Anethesia/Sedation:  MAC anesthesia    Procedure Details     After infom consent was obtained for the procedure, with all risks and benefits of procedure explained the patient was taken to the endoscopy suite and placed in the left lateral decubitus position. Following sequential administration of sedation as per above, the [id number] gastroscope was inserted into the mouth and advanced under direct vision to second portion of the duodenum. A careful inspection was made as the gastroscope was withdrawn, including a retroflexed view of the proximal stomach; findings and interventions are described below. Findings:  Esophagus:normal  Stomach: Yelena-en-Y gastric bypass anatomy was noted. Gastric pouch was normal.  Gastrojejunal anastomosis revealed erythema and congestion. Previously noted ulcer has healed. Suture material was present. Stricture was noted at the anastomosis and scope was traversed after dilation. Duodenum/jejunum:normal      Therapies: Dilation of the gastro jejunal anastomotic stricture was performed using CRE balloon 12 mm to 15 mm. Maximal dilation to 12 mm for 1 minute was performed due to mucosal breaks and hence balloon was not inflated to higher sizes. Post dilation inspection of the area revealed no immediate complications. Specimens: none           EBL: None    Complications:   None; patient tolerated the procedure well.            Impression:    See Postoperative diagnosis above    Recommendations:  -Continue acid suppression. , -Follow symptoms. , -Soft diet, -EGD with dilation in 2-4 weeks    Discharge disposition:  Home in the company of  when able to ambulate    Sean Moseley MD

## 2021-09-15 NOTE — PERIOP NOTES

## 2021-10-01 ENCOUNTER — VIRTUAL VISIT (OUTPATIENT)
Dept: SURGERY | Age: 56
End: 2021-10-01
Payer: COMMERCIAL

## 2021-10-01 VITALS — BODY MASS INDEX: 32.28 KG/M2 | WEIGHT: 213 LBS | HEIGHT: 68 IN

## 2021-10-01 DIAGNOSIS — Z09 SURGICAL FOLLOWUP: ICD-10-CM

## 2021-10-01 DIAGNOSIS — E66.01 MORBID OBESITY (HCC): Primary | ICD-10-CM

## 2021-10-01 DIAGNOSIS — K28.9 ANASTOMOTIC ULCER: ICD-10-CM

## 2021-10-01 PROCEDURE — 99024 POSTOP FOLLOW-UP VISIT: CPT | Performed by: NURSE PRACTITIONER

## 2021-10-01 RX ORDER — METOCLOPRAMIDE 10 MG/1
10 TABLET ORAL
Qty: 40 TABLET | Refills: 0 | Status: SHIPPED | OUTPATIENT
Start: 2021-10-01 | End: 2021-10-11

## 2021-10-01 RX ORDER — PANTOPRAZOLE SODIUM 40 MG/1
40 TABLET, DELAYED RELEASE ORAL 2 TIMES DAILY
Qty: 60 TABLET | Refills: 1 | Status: SHIPPED | OUTPATIENT
Start: 2021-10-01 | End: 2022-02-18 | Stop reason: SDUPTHER

## 2021-10-01 NOTE — PATIENT INSTRUCTIONS
Upper GI Endoscopy: Before Your Procedure  What is an upper GI endoscopy? An upper gastrointestinal (or GI) endoscopy is a test that allows your doctor to look at the inside of your esophagus, stomach, and the first part of your small intestine, called the duodenum. The esophagus is the tube that carries food to your stomach. The doctor uses a thin, lighted tube that bends. It is called an endoscope, or scope. The doctor puts the tip of the scope in your mouth and gently moves it down your throat. The scope is a flexible video camera. The doctor looks at a monitor (like a TV set or a computer screen) as he or she moves the scope. A doctor may do this procedure to look for ulcers, tumors, infection, or bleeding. It also can be used to look for signs of acid backing up into your esophagus. This is called gastroesophageal reflux disease, or GERD. The doctor can use the scope to take a sample of tissue for study (a biopsy). The doctor also can use the scope to take out growths or stop bleeding. Follow-up care is a key part of your treatment and safety. Be sure to make and go to all appointments, and call your doctor if you are having problems. It's also a good idea to know your test results and keep a list of the medicines you take. How do you prepare for the procedure? Procedures can be stressful. This information will help you understand what you can expect. And it will help you safely prepare for your procedure. Preparing for the procedure    · Do not eat or drink anything for 6 to 8 hours before the test. An empty stomach helps your doctor see your stomach clearly during the test. It also reduces your chances of vomiting. If you vomit, there is a small risk that the vomit could enter your lungs.  (This is called aspiration.) If the test is done in an emergency, a tube may be inserted through your nose or mouth to empty your stomach.     · Do not take sucralfate (Carafate) or antacids on the day of the test. These medicines can make it hard for your doctor to see your upper GI tract.     · If your doctor tells you to, stop taking iron supplements 7 to 14 days before the test.     · Be sure you have someone to take you home. Anesthesia and pain medicine will make it unsafe for you to drive or get home on your own.     · Understand exactly what procedure is planned, along with the risks, benefits, and other options. · Tell your doctor ALL the medicines, vitamins, supplements, and herbal remedies you take. Some may increase the risk of problems during your procedure. Your doctor will tell you if you should stop taking any of them before the procedure and how soon to do it.     · If you take aspirin or some other blood thinner, ask your doctor if you should stop taking it before your procedure. Make sure that you understand exactly what your doctor wants you to do. These medicines increase the risk of bleeding.     · Make sure your doctor and the hospital have a copy of your advance directive. If you don't have one, you may want to prepare one. It lets others know your health care wishes. It's a good thing to have before any type of surgery or procedure. What happens on the day of the procedure? · Follow the instructions exactly about when to stop eating and drinking. If you don't, your procedure may be canceled. If your doctor told you to take your medicines on the day of the procedure, take them with only a sip of water.     · Take a bath or shower before you come in for your procedure. Do not apply lotions, perfumes, deodorants, or nail polish.     · Take off all jewelry and piercings. And take out contact lenses, if you wear them. At the hospital or surgery center   · Bring a picture ID.     · The test may take 15 to 30 minutes.     · The doctor may spray medicine on the back of your throat to numb it. You also will get medicine to prevent pain and to relax you.     · You will lie on your left side.  The doctor will put the scope in your mouth and toward the back of your throat. The doctor will tell you when to swallow. This helps the scope move down your throat. You will be able to breathe normally. The doctor will move the scope down your esophagus into your stomach. The doctor also may look at the duodenum.     · If your doctor wants to take a sample of tissue for a biopsy, he or she may use small surgical tools, which are put into the scope, to cut off some tissue. You will not feel a biopsy, if one is taken. The doctor also can use the tools to stop bleeding or to do other treatments, if needed.     · You will stay at the hospital or surgery center for 1 to 2 hours until the medicine you were given wears off. What happens after an upper GI endoscopy? · After the test, you may belch and feel bloated for a while.     · You may have a tickling, dry throat or mouth. You may feel a bit hoarse, and you may have a mild sore throat. These symptoms may last several days. Throat lozenges and warm saltwater gargles can help relieve the throat symptoms.     · Don't drive or operate machinery for 12 hours after the test.     · Your doctor will tell you when you can go back to your usual diet and activities.     · Don't drink alcohol for 12 to 24 hours after the test.   When should you call your doctor? · You have questions or concerns.     · You don't understand how to prepare for your procedure.     · You become ill before the procedure (such as fever, flu, or a cold).     · You need to reschedule or have changed your mind about having the procedure. Where can you learn more? Go to http://www.gray.com/  Enter P790 in the search box to learn more about \"Upper GI Endoscopy: Before Your Procedure. \"  Current as of: February 10, 2021               Content Version: 13.0  © 8817-8007 Healthwise, [employer].    Care instructions adapted under license by [employer] (which disclaims liability or warranty for this information). If you have questions about a medical condition or this instruction, always ask your healthcare professional. [place] [address] any warranty or liability for your use of this information.

## 2021-10-01 NOTE — PROGRESS NOTES
I was in the office while conducting this encounter. Consent:  She and/or her healthcare decision maker is aware that this patient-initiated Telehealth encounter is a billable service, with coverage as determined by her insurance carrier. She is aware that she may receive a bill and has provided verbal consent to proceed: Yes    This virtual visit was conducted via [employer]. Pursuant to the emergency declaration under the [facility] [place] [address] [facility] authority and the [employer] Act, this Virtual  Visit was conducted to reduce the patient's risk of exposure to COVID-19 and provide continuity of care for an established patient. Services were provided through a video synchronous discussion virtually to substitute for in-person clinic visit. Due to this being a TeleHealth evaluation, many elements of the physical examination are unable to be assessed. Total Time: minutes: 11-20 minutes. HISTORY OF Gavin Christianson is a 64 y.o. female with previous malabsorptive Gastric bypass. 3 months ago. She has lost a total of 61 pounds since surgery. She  has lost 13 pounds since the last ov. Body mass index is 32.39 kg/m². . Occasional nausea with certain textures. Denies acid reflux/heartburn. she is taking Protonix. She is also taking Reglan which has been helpful. She is requesting a refill on both. .  Drinking  20-32 ounces of water daily. 60+ protein intake daily. + BM's. She had an endoscopy on 9/15/2021 by Dr. Bryant Bolds:  Results:  Findings:  Esophagus:normal  Stomach: Yelena-en-Y gastric bypass anatomy was noted. Gastric pouch was normal.  Gastrojejunal anastomosis revealed erythema and congestion. Previously noted ulcer has healed. Suture material was present. Stricture was noted at the anastomosis and scope was traversed after dilation. Duodenum/jejunum:normal  She is feeling better with this dilation. She is able to tolerate some very soft puréed foods. She is scheduled for another endoscopy next week. Dietary recall     Breakfast- shake  Lunch- shake  Dinner- tuna, oversteamed veg, applesauce    She is snacking between meals; applesauce. She states that after eating the foods above sometime she feels that they still have a hard time going down. Vitamins:  MVI : yes  Calcium : yes  B-Vit 12: yes  Vit D: Yes        Ms. Parham has a reminder for a \"due or due soon\" health maintenance. I have asked that she contact her primary care provider for follow-up on this health maintenance. COMORBIDITY     SLEEP APNEA                 NO        GERD  (req.meds)            YES  HYPERLIPIDEMIA            NO  HYPERTENSION              NO         DIABETES                         NO           Current Outpatient Medications:     multivitamin with iron (FLINTSTONES) chewable tablet, Take 1 Tablet by mouth daily. , Disp: , Rfl:     cyanocobalamin (Vitamin B-12) 100 mcg tablet, Take 100 mcg by mouth daily. , Disp: , Rfl:     calcium-cholecalciferol, D3, (CALTRATE 600+D) tablet, Take 1 Tablet by mouth daily. , Disp: , Rfl:     pantoprazole (PROTONIX) 40 mg tablet, Take 1 Tablet by mouth two (2) times a day., Disp: 60 Tablet, Rfl: 1    docusate sodium (COLACE) 100 mg capsule, Take 100 mg by mouth as needed for Constipation. , Disp: , Rfl:     ondansetron (ZOFRAN ODT) 4 mg disintegrating tablet, Take 1 Tab by mouth every eight (8) hours as needed for Nausea or Vomiting. (Patient not taking: Reported on 10/1/2021), Disp: 30 Tab, Rfl: 0      Visit Vitals  Ht 5' 8\" (1.727 m)   Wt 213 lb (96.6 kg)   LMP  (LMP Unknown)   BMI 32.39 kg/m²     HPI    Review of Systems   Respiratory: Negative for shortness of breath. Cardiovascular: Negative for chest pain. Gastrointestinal: Negative for abdominal pain, heartburn (takes Protonix), nausea and vomiting. Neurological: Negative for dizziness and headaches.        Physical Exam  HENT:      Mouth/Throat:      Mouth: Mucous membranes are moist.   Pulmonary:      Effort: No respiratory distress. Abdominal:      Tenderness: There is no abdominal tenderness (per patient). Neurological:      Mental Status: She is alert and oriented to person, place, and time. LANETTE and Joseph Escalante is a 64 y.o. female with previous malabsorptive Gastric bypass. 3 months ago. She has lost a total of 61 pounds since surgery. She  has lost 13 pounds since the last ov. Body mass index is 32.39 kg/m². . Occasional nausea with certain textures. Denies acid reflux/heartburn. she is taking Protonix. She is also taking Reglan which has been helpful. She is requesting a refill on both. .  Drinking  20-32 ounces of water daily. 60+ protein intake daily. + BM's. She had an endoscopy on 9/15/2021 by Dr. Castellanos Mend:  Results:  Findings:  Esophagus:normal  Stomach: Yelena-en-Y gastric bypass anatomy was noted. Gastric pouch was normal.  Gastrojejunal anastomosis revealed erythema and congestion. Previously noted ulcer has healed. Suture material was present. Stricture was noted at the anastomosis and scope was traversed after dilation. Duodenum/jejunum:normal  She is feeling better with this dilation. She is able to tolerate some very soft puréed foods. She is scheduled for another endoscopy next week. Advised patient regard to diet that is high-protein, low-fat, low-sugar, limited carbohydrates. Strive for 50-60 grams of protein daily. .. . Drink at least 40-64 ounces of water or non-calorie/non-carbonated beverages daily. Continue vitamin regiment daily. Exercise at least 3 days a week with cardiovascular and strength training. Patient to follow up 4 weeks after next endoscopy. Advised to call office if any questions/concerns. Advised patient that after next endoscopy she could try and advance to more soft meats instead of puréed meats.     15Minutes spent face to face with patient, >50 % of time spent counseling.

## 2021-10-01 NOTE — PROGRESS NOTES
1. Have you been to the ER, urgent care clinic since your last visit? Hospitalized since your last visit? no    2. Have you seen or consulted any other health care providers outside of the [address] [place] [address] since your last visit? Include any pap smears or colon screening.  no

## 2021-10-07 ENCOUNTER — OFFICE VISIT (OUTPATIENT)
Dept: INTERNAL MEDICINE CLINIC | Age: 56
End: 2021-10-07
Payer: COMMERCIAL

## 2021-10-07 ENCOUNTER — TRANSCRIBE ORDER (OUTPATIENT)
Dept: REGISTRATION | Age: 56
End: 2021-10-07

## 2021-10-07 ENCOUNTER — HOSPITAL ENCOUNTER (OUTPATIENT)
Dept: PREADMISSION TESTING | Age: 56
Discharge: HOME | End: 2021-10-07
Payer: COMMERCIAL

## 2021-10-07 VITALS
BODY MASS INDEX: 32.1 KG/M2 | WEIGHT: 211.8 LBS | SYSTOLIC BLOOD PRESSURE: 153 MMHG | RESPIRATION RATE: 16 BRPM | DIASTOLIC BLOOD PRESSURE: 82 MMHG | HEIGHT: 68 IN | OXYGEN SATURATION: 100 % | HEART RATE: 79 BPM | TEMPERATURE: 97.3 F

## 2021-10-07 DIAGNOSIS — Z00.00 PHYSICAL EXAM: Primary | ICD-10-CM

## 2021-10-07 DIAGNOSIS — I10 ESSENTIAL HYPERTENSION: ICD-10-CM

## 2021-10-07 DIAGNOSIS — Z01.812 PRE-PROCEDURE LAB EXAM: ICD-10-CM

## 2021-10-07 DIAGNOSIS — Z01.812 PRE-PROCEDURE LAB EXAM: Primary | ICD-10-CM

## 2021-10-07 DIAGNOSIS — E66.9 CLASS 1 OBESITY WITH SERIOUS COMORBIDITY AND BODY MASS INDEX (BMI) OF 32.0 TO 32.9 IN ADULT, UNSPECIFIED OBESITY TYPE: ICD-10-CM

## 2021-10-07 DIAGNOSIS — E53.8 B12 DEFICIENCY: ICD-10-CM

## 2021-10-07 DIAGNOSIS — M54.12 RADICULOPATHY, CERVICAL: ICD-10-CM

## 2021-10-07 DIAGNOSIS — D72.829 LEUKOCYTOSIS, UNSPECIFIED TYPE: ICD-10-CM

## 2021-10-07 DIAGNOSIS — H10.10 ALLERGIC CONJUNCTIVITIS, UNSPECIFIED LATERALITY: ICD-10-CM

## 2021-10-07 DIAGNOSIS — D64.9 ANEMIA, UNSPECIFIED TYPE: ICD-10-CM

## 2021-10-07 DIAGNOSIS — Z12.31 VISIT FOR SCREENING MAMMOGRAM: ICD-10-CM

## 2021-10-07 DIAGNOSIS — Z98.84 HISTORY OF GASTRIC BYPASS: ICD-10-CM

## 2021-10-07 DIAGNOSIS — M62.838 MUSCLE SPASM: ICD-10-CM

## 2021-10-07 DIAGNOSIS — E55.9 VITAMIN D DEFICIENCY: ICD-10-CM

## 2021-10-07 DIAGNOSIS — J30.89 ENVIRONMENTAL AND SEASONAL ALLERGIES: ICD-10-CM

## 2021-10-07 DIAGNOSIS — Z86.39 HISTORY OF DIABETES MELLITUS: ICD-10-CM

## 2021-10-07 DIAGNOSIS — E04.1 THYROID NODULE: ICD-10-CM

## 2021-10-07 LAB
COMMENT, HOLDF: NORMAL
COMMENT, HOLDF: NORMAL
SAMPLES BEING HELD,HOLD: NORMAL
SAMPLES BEING HELD,HOLD: NORMAL

## 2021-10-07 PROCEDURE — U0005 INFEC AGEN DETEC AMPLI PROBE: HCPCS

## 2021-10-07 PROCEDURE — 99396 PREV VISIT EST AGE 40-64: CPT | Performed by: NURSE PRACTITIONER

## 2021-10-07 RX ORDER — FEXOFENADINE HCL 60 MG
60 TABLET ORAL DAILY
Qty: 90 TABLET | Refills: 1 | Status: SHIPPED | OUTPATIENT
Start: 2021-10-07 | End: 2022-01-26

## 2021-10-07 RX ORDER — TIZANIDINE 2 MG/1
2 TABLET ORAL
COMMUNITY
End: 2021-10-07 | Stop reason: SDUPTHER

## 2021-10-07 RX ORDER — ASPIRIN 325 MG
50000 TABLET, DELAYED RELEASE (ENTERIC COATED) ORAL
Qty: 12 CAPSULE | Refills: 1 | Status: SHIPPED | OUTPATIENT
Start: 2021-10-07 | End: 2022-02-18 | Stop reason: SDUPTHER

## 2021-10-07 RX ORDER — FLUTICASONE PROPIONATE 50 MCG
2 SPRAY, SUSPENSION (ML) NASAL DAILY
Qty: 1 EACH | Refills: 5 | Status: SHIPPED | OUTPATIENT
Start: 2021-10-07 | End: 2022-01-26

## 2021-10-07 RX ORDER — ASPIRIN 325 MG
50000 TABLET, DELAYED RELEASE (ENTERIC COATED) ORAL
COMMUNITY
End: 2021-10-07 | Stop reason: SDUPTHER

## 2021-10-07 RX ORDER — TIZANIDINE 2 MG/1
2 TABLET ORAL
Qty: 30 TABLET | Refills: 3 | Status: SHIPPED | OUTPATIENT
Start: 2021-10-09 | End: 2021-10-08 | Stop reason: SDUPTHER

## 2021-10-07 NOTE — PROGRESS NOTES
Chief Complaint   Patient presents with    Complete Physical     fasting labs       SUBJECTIVE:    Vikram Panda is a 64 y.o. female who is here today for a general physical exam, and to get fasting lab work done. The patient states an extensive medical history which includes: Hypertension, prediabetes, gastric bypass, thyroid nodules, vitamin D deficiency, vitamin B12 deficiency, and complains of ongoing issues with allergic conjunctivitis and general allergies. The patient states a history of hypertension, but is currently not taking medication to treat this. She has lost approximately 70 pounds of weight, and has been able to stay off of her blood pressure medication for some time now. However, she occasionally checks her blood pressures at home, and states that most readings are less than 140/90. She also has a history of prediabetes, but again is diet controlled and does not take medication to treat this. She states ongoing issues with allergy symptoms and itchiness to her eyes bilaterally. She has been using an over-the-counter itch relief drops, but states this has not been helpful for her. She has not tried any antihistamines, and has not tried any intranasal steroids to help with her symptoms. The patient also has a history of vitamin D and vitamin B12 deficiency. She currently takes supplements for this, and would like her levels checked today. She also has a history of thyroid nodules which have been stable on repeat ultrasound. Previous labs were reviewed by me prior to patient's arrival today. Current Outpatient Medications   Medication Sig Dispense Refill    tiZANidine (ZANAFLEX) 2 mg tablet Take 2 mg by mouth.  cholecalciferol (VITAMIN D3) (50,000 UNITS /1250 MCG) capsule Take 50,000 Units by mouth every seven (7) days.  pantoprazole (PROTONIX) 40 mg tablet Take 1 Tablet by mouth two (2) times a day.  60 Tablet 1    metoclopramide HCl (REGLAN) 10 mg tablet Take 1 Tablet by mouth Before breakfast, lunch, dinner and at bedtime for 10 days. 40 Tablet 0    multivitamin with iron (FLINTSTONES) chewable tablet Take 1 Tablet by mouth daily.  cyanocobalamin (Vitamin B-12) 100 mcg tablet Take 100 mcg by mouth daily.  calcium-cholecalciferol, D3, (CALTRATE 600+D) tablet Take 1 Tablet by mouth daily.  docusate sodium (COLACE) 100 mg capsule Take 100 mg by mouth as needed for Constipation.  ondansetron (ZOFRAN ODT) 4 mg disintegrating tablet Take 1 Tab by mouth every eight (8) hours as needed for Nausea or Vomiting. (Patient not taking: Reported on 10/1/2021) 30 Tab 0     Past Medical History:   Diagnosis Date    Allergic rhinitis 5/27/2010    Arthritis     Baker cyst, left 10/2019    Dr. Jeannie Farris cyst, right     Cervical disc herniation 01/2010    C3-4, C4-5, C5-6. Hemangioma body of C5. Dr. Angela Mata.  Chest pain 08/2011, 02/2012    Dr. Samira Sheriff. Dr. Chano Watson; denies CP as of 3/27/14    Chronic lower back pain 2010, 03/2016    thoracic DDD and spondylosis. DDD L3-S1. Dr. Salma Stein. Marguerite Boss. [place] Cedrick Alarcon[place] [facility] [place].  Chronic meniscal tear of knee     Chronic pain     lower back and knees    Esophagitis, reflux 4/3/2011    Gallstone     Gastritis 5/27/2010    Dr. Lucinda Gonzalez.  Gastrointestinal food allergy 03/2014    Multiple. Dr. Too Anderson.    Heart palpitations 02/2012    DUE TO PAC'S AND PVC'S. Dr. Malgorzata Harris.  Heel spur, left 02/2019    Hiatal hernia 4/3/2011    HTN (hypertension) 1987    Impaired glucose tolerance 10/15/2010    Incidental lung nodule 01/08/13    LLL 3.9 mm, RML 3.1 mm;  as of 3/27/14 per pt stable w/o growth    Insomnia 1990s    Iron deficiency anemia 5/27/2010    Irritable bowel syndrome (IBS) 03/2014    Dr. Jesús Vilchis.    Knee pain 2012    Right. due to severe OA. / chipped bone     Left foot pain 08/2019    Dr. Rafy Frederick.     Metatarsal bone fracture 1990    Left fifth.  Migraine 2/22/2011    Dr. Terrell Patrick [facility])     Peroneal tendonitis of lower leg 08/07/2019    Left. Dr. Romana Echols    Pre-diabetes     Radiculopathy, cervical 01/2010    Left. Dr. Twyla Turner.  Shoulder pain, right 2012    due to [address] X 2. Dr. Hutchison Husbands.  Thyroid nodule 2011    6 nodules- Dr. Carl Older    Toe fracture, left 2008    fifth toe.  Triangular fibrocartilage complex tear 2012    Dr. Batsheva Bledsoe. Left. Past Surgical History:   Procedure Laterality Date    COLONOSCOPY N/A 3/10/2021    COLONOSCOPY,EGD [facility] END[facility]COPY    [facility]    x 1    HX CHOLECYSTECTOMY  1987    OPEN    HX COLONOSCOPY  03/31/14    Dr. Tex Hernandez; 03/31/14    due to abnormal PAP.  HX DILATION AND CURETTAGE  1990s    due to miscarriage.  HX ENDOSCOPY      HX HEART CATHETERIZATION  2018    no stents    HX KNEE ARTHROSCOPY Right     HX LAP GASTRIC BYPASS  06/14/2021    lap gastric bypass with hiatal hernia repair by Dr. Leticia Gan at [address] Right 06/2016    LUMBAR L4-5, L5-S1 ELLY.   Dr. Melinda Lau     Allergies   Allergen Reactions    Doxylamine Succinate Swelling     12.5-25 MG  HANDS, FACE/PERIORAL, FEET    Pcn [Penicillins] Hives    Ambien [Zolpidem] Nausea and Vomiting and Other (comments)     5 mg with Ativan 0.5 mg   TOO GROGGY, SLEPT 24 HOURS  7.5 MG FORGOT SHE WAS SLEEP EATING    Aspirin Nausea Only    Carafate [Sucralfate] Nausea and Vomiting    Celebrex [Celecoxib] Other (comments)     TIGHT SQUEEZING IN CHEST  CHEST ACHED    Erythromycin Nausea and Vomiting    Milk Prot-Turm-Pepper-Pineappl Other (comments)     Multiple food allergies    Neurontin [Gabapentin] Other (comments)     300 mg  SEVERE LEG PAIN  EYE TWITCHING    Nsaids (Non-Steroidal Anti-Inflammatory Drug) Other (comments) GI irritation      Pineapple Unknown (comments)       REVIEW OF SYSTEMS:                                        POSITIVE= bold text  Negative = regular text    General:                     fever, chills, sweats, generalized weakness, weight loss/gain,                                       loss of appetite   Eyes:                           blurred vision, eye pain, loss of vision, double vision  ENT:                            rhinorrhea, pharyngitis, itching eyes  Respiratory:               cough, sputum production, SOB, WEBER, wheezing, pleuritic pain   Cardiology:                chest pain, palpitations, orthopnea, PND, edema, syncope   Gastrointestinal:       abdominal pain , N/V, diarrhea, dysphagia, constipation, bleeding   Genitourinary:           frequency, urgency, dysuria, hematuria, incontinence   Muskuloskeletal :      arthralgia, myalgia, back pain, neck pain  Hematology:              easy bruising, nose or gum bleeding, lymphadenopathy   Dermatological:         rash, ulceration, pruritis, color change / jaundice  Endocrine:                 hot flashes or polydipsia   Neurological:             headache, dizziness, confusion, focal weakness, paresthesia,                                      Speech difficulties, memory loss, gait difficulty  Psychological:          Feelings of anxiety, depression, agitation        Social History     Socioeconomic History    Marital status:      Spouse name: Not on file    Number of children: 3    Years of education: Not on file    Highest education level: Not on file   Occupational History    Occupation: Medical assistant     Employer: [employer]   Tobacco Use    Smoking status: Never Smoker    Smokeless tobacco: Never Used   Vaping Use    Vaping Use: Never used   Substance and Sexual Activity    Alcohol use: No    Drug use: Never    Sexual activity: Yes     Partners: Male   Social History Narrative    In the home alone     3 adult children     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      Social Determinants of Health     Financial Resource Strain:     Difficulty of Paying Living Expenses:    Food Insecurity:     Worried About Running Out of Food in the Last Year:     [address] in the Last Year:    Transportation Needs:     Lack of Transportation (Medical):  Lack of Transportation (Non-Medical):    Physical Activity:     Days of Exercise per Week:     Minutes of Exercise per Session:    Stress:     Feeling of Stress :    Social Connections:     Frequency of Communication with Friends and Family:     Frequency of Social Gatherings with Friends and Family:     Attends Confucianism Services:     Active Member of Clubs or Organizations:     Attends Club or Organization Meetings:     Marital Status:      Family History   Problem Relation Age of Onset    Diabetes Mother     Heart Disease Mother 58        2 stents    Hypertension Mother     Stroke Mother     Diabetes Father     Hypertension Father     Stroke Maternal Grandmother     Cancer Maternal Uncle         prostate    Diabetes Sister     No Known Problems Sister     No Known Problems Sister     Anesth Problems Neg Hx        OBJECTIVE:     Visit Vitals  LMP  (LMP Unknown)       Constitutional: She appears well nourished, of stated age, and dressed appropriately. Eyes: Sclera anicteric, PERRLA, EOMI  ENT: Nares clear, moist mucous membranes, pharynx clear  Neck: Supple without lymphadenopathy. Thyroid normal, No JVD or bruits  Respiratory: Clear to ascultation X5, normal inspiratory effort, no adventitious breath sounds. Cardiovascular: Regular rate and rhythm, no murmurs, rubs or gallops, PMI not displaced, No thrills, no peripheral edema  Gastrointestinal: Abdomen non-distended, soft, non-tender, bowel sounds normal and active X4. Hematologic: No purpura, petechiae or unexplained bruising  Lymphatic: No lymph node enlargemant.   Integumentary: No unusual rashes or suspicious skin lesions noted. Nails appear normal.  Peripheral Vascular: Normal pulses palpable to PT/DP. Neuro: Non-focal exam, A & O X 3.  Psychiatric: Appropriate affect and demeanor, pleasant and cooperative. Patient's thought content and thought processing appear to be within normal limits. ASSESSMENT/PLAN:     ICD-10-CM ICD-9-CM    1. Physical exam  Z00.00 V70.9    2. Environmental and seasonal allergies  J30.89 477.8 fluticasone propionate (FLONASE) 50 mcg/actuation nasal spray      fexofenadine (ALLEGRA) 60 mg tablet   3. Allergic conjunctivitis, unspecified laterality  H10.10 372.14 fluticasone propionate (FLONASE) 50 mcg/actuation nasal spray      fexofenadine (ALLEGRA) 60 mg tablet   4. Anemia, unspecified type  D64.9 285.9 IRON PROFILE      CBC WITH AUTOMATED DIFF   5. Leukocytosis, unspecified type  D72.829 288.60 CBC WITH AUTOMATED DIFF   6. Thyroid nodule  E04.1 241.0 TSH 3RD GENERATION   7. Essential hypertension  A48 266.0 METABOLIC PANEL, COMPREHENSIVE      LIPID PANEL      URINALYSIS W/ RFLX MICROSCOPIC   8. Vitamin D deficiency  E55.9 268.9 cholecalciferol (VITAMIN D3) (50,000 UNITS /1250 MCG) capsule      VITAMIN D, 25 HYDROXY   9. Radiculopathy, cervical  M54.12 723.4    10. Muscle spasm  M62.838 728.85 tiZANidine (ZANAFLEX) 2 mg tablet   11. B12 deficiency  E53.8 266.2 VITAMIN B12   12. Class 1 obesity with serious comorbidity and body mass index (BMI) of 32.0 to 32.9 in adult, unspecified obesity type  E66.9 278.00     Z68.32 V85.32    13. History of diabetes mellitus  Z86.39 V12.29 HEMOGLOBIN A1C WITH EAG   14. History of gastric bypass  Z98.84 V45.86      1: We will do labs today including: B12, vitamin D, TSH, CMP, hemoglobin A1c, CBC, lipid panel, and urinalysis. 2: I will refill patient's Zanaflex and vitamin D as requested. 3: I will prescribe patient Flonase nasal spray and Allegra 60 mg daily to use due to ongoing allergies and allergic conjunctivitis issues.   4: Patient to continue healthy lifestyle management including: Low-fat/low-cholesterol diet, adequate amounts of fiber water, and exercise as tolerated. 5: Patient to continue all other medications and supplements as directed. 6: Patient to follow-up with me in approximately 3 months, or sooner as needed. Patient states understanding and agrees with plan. Orders Placed This Encounter    tiZANidine (ZANAFLEX) 2 mg tablet    cholecalciferol (VITAMIN D3) (50,000 UNITS /1250 MCG) capsule         ATTENTION:   This medical record was transcribed using an electronic medical records system. Although proofread, it may and can contain electronic and spelling errors. Other human spelling and other errors may be present. Corrections may be executed at a later time. Please feel free to contact us for any clarifications as needed. Signed,  Missy Bowman.  Ramo Baird MSN APRN FNP-BC

## 2021-10-07 NOTE — PROGRESS NOTES
Donna Jackson is a 64 y.o. female    Chief Complaint   Patient presents with    Complete Physical     fasting labs       Visit Vitals  BP (!) 153/82 (BP 1 Location: Left upper arm, BP Patient Position: Sitting, BP Cuff Size: Large adult)   Pulse 79   Temp 97.3 °F (36.3 °C)   Resp 16   Ht 5' 8\" (1.727 m)   Wt 211 lb 12.8 oz (96.1 kg)   LMP  (LMP Unknown)   SpO2 100%   BMI 32.20 kg/m²           1. Have you been to the ER, urgent care clinic since your last visit? Hospitalized since your last visit? NO    2. Have you seen or consulted any other health care providers outside of the [address] [place] [address] since your last visit? Include any pap smears or colon screening.  NO

## 2021-10-08 DIAGNOSIS — M62.838 MUSCLE SPASM: ICD-10-CM

## 2021-10-08 LAB
25(OH)D3 SERPL-MCNC: 62.8 NG/ML (ref 30–100)
ALBUMIN SERPL-MCNC: 3.6 G/DL (ref 3.5–5)
ALBUMIN/GLOB SERPL: 0.9 {RATIO} (ref 1.1–2.2)
ALP SERPL-CCNC: 80 U/L (ref 45–117)
ALT SERPL-CCNC: 20 U/L (ref 12–78)
ANION GAP SERPL CALC-SCNC: 7 MMOL/L (ref 5–15)
APPEARANCE UR: CLEAR
AST SERPL-CCNC: 21 U/L (ref 15–37)
BACTERIA URNS QL MICRO: ABNORMAL /HPF
BASOPHILS # BLD: 0 K/UL (ref 0–0.1)
BASOPHILS NFR BLD: 1 % (ref 0–1)
BILIRUB SERPL-MCNC: 0.4 MG/DL (ref 0.2–1)
BILIRUB UR QL: NEGATIVE
BUN SERPL-MCNC: 6 MG/DL (ref 6–20)
BUN/CREAT SERPL: 12 (ref 12–20)
CALCIUM SERPL-MCNC: 9.9 MG/DL (ref 8.5–10.1)
CAOX CRY URNS QL MICRO: ABNORMAL
CHLORIDE SERPL-SCNC: 105 MMOL/L (ref 97–108)
CHOLEST SERPL-MCNC: 150 MG/DL
CO2 SERPL-SCNC: 28 MMOL/L (ref 21–32)
COLOR UR: ABNORMAL
CREAT SERPL-MCNC: 0.52 MG/DL (ref 0.55–1.02)
DIFFERENTIAL METHOD BLD: ABNORMAL
EOSINOPHIL # BLD: 0 K/UL (ref 0–0.4)
EOSINOPHIL NFR BLD: 1 % (ref 0–7)
EPITH CASTS URNS QL MICRO: ABNORMAL /LPF
ERYTHROCYTE [DISTWIDTH] IN BLOOD BY AUTOMATED COUNT: 14.2 % (ref 11.5–14.5)
EST. AVERAGE GLUCOSE BLD GHB EST-MCNC: 103 MG/DL
GLOBULIN SER CALC-MCNC: 4 G/DL (ref 2–4)
GLUCOSE SERPL-MCNC: 69 MG/DL (ref 65–100)
GLUCOSE UR STRIP.AUTO-MCNC: NEGATIVE MG/DL
HBA1C MFR BLD: 5.2 % (ref 4–5.6)
HCT VFR BLD AUTO: 36.7 % (ref 35–47)
HDLC SERPL-MCNC: 51 MG/DL
HDLC SERPL: 2.9 {RATIO} (ref 0–5)
HGB BLD-MCNC: 12 G/DL (ref 11.5–16)
HGB UR QL STRIP: NEGATIVE
IMM GRANULOCYTES # BLD AUTO: 0 K/UL (ref 0–0.04)
IMM GRANULOCYTES NFR BLD AUTO: 0 % (ref 0–0.5)
IRON SATN MFR SERPL: 16 % (ref 20–50)
IRON SERPL-MCNC: 55 UG/DL (ref 35–150)
KETONES UR QL STRIP.AUTO: 15 MG/DL
LDLC SERPL CALC-MCNC: 79 MG/DL (ref 0–100)
LEUKOCYTE ESTERASE UR QL STRIP.AUTO: ABNORMAL
LYMPHOCYTES # BLD: 1.9 K/UL (ref 0.8–3.5)
LYMPHOCYTES NFR BLD: 49 % (ref 12–49)
MCH RBC QN AUTO: 29.7 PG (ref 26–34)
MCHC RBC AUTO-ENTMCNC: 32.7 G/DL (ref 30–36.5)
MCV RBC AUTO: 90.8 FL (ref 80–99)
MONOCYTES # BLD: 0.4 K/UL (ref 0–1)
MONOCYTES NFR BLD: 10 % (ref 5–13)
MUCOUS THREADS URNS QL MICRO: ABNORMAL /LPF
NEUTS SEG # BLD: 1.4 K/UL (ref 1.8–8)
NEUTS SEG NFR BLD: 39 % (ref 32–75)
NITRITE UR QL STRIP.AUTO: NEGATIVE
NRBC # BLD: 0 K/UL (ref 0–0.01)
NRBC BLD-RTO: 0 PER 100 WBC
PH UR STRIP: 5 [PH] (ref 5–8)
PLATELET # BLD AUTO: 249 K/UL (ref 150–400)
PMV BLD AUTO: 12.3 FL (ref 8.9–12.9)
POTASSIUM SERPL-SCNC: 3.9 MMOL/L (ref 3.5–5.1)
PROT SERPL-MCNC: 7.6 G/DL (ref 6.4–8.2)
PROT UR STRIP-MCNC: NEGATIVE MG/DL
RBC # BLD AUTO: 4.04 M/UL (ref 3.8–5.2)
RBC #/AREA URNS HPF: ABNORMAL /HPF (ref 0–5)
SODIUM SERPL-SCNC: 140 MMOL/L (ref 136–145)
SP GR UR REFRACTOMETRY: 1.02 (ref 1–1.03)
TIBC SERPL-MCNC: 350 UG/DL (ref 250–450)
TRIGL SERPL-MCNC: 100 MG/DL (ref ?–150)
TSH SERPL DL<=0.05 MIU/L-ACNC: 0.65 UIU/ML (ref 0.36–3.74)
UROBILINOGEN UR QL STRIP.AUTO: 0.2 EU/DL (ref 0.2–1)
VIT B12 SERPL-MCNC: >2000 PG/ML (ref 193–986)
VLDLC SERPL CALC-MCNC: 20 MG/DL
WBC # BLD AUTO: 3.7 K/UL (ref 3.6–11)
WBC URNS QL MICRO: ABNORMAL /HPF (ref 0–4)

## 2021-10-08 RX ORDER — TIZANIDINE 2 MG/1
2 TABLET ORAL
Qty: 60 TABLET | Refills: 3 | Status: SHIPPED | OUTPATIENT
Start: 2021-10-08 | End: 2021-10-12 | Stop reason: SDUPTHER

## 2021-10-08 NOTE — PROGRESS NOTES
Thyroid appears to be functioning well. Cholesterol levels are in acceptable range. Hemoglobin A1c is at 5.2% which is good. Kidney function, liver functions, and electrolytes are normal.    Blood count shows no signs of anemia. Vitamin D level is normal.    Vitamin B12 levels are in good range. No signs of a deficiency. Iron levels are okay. Continue current medications and supplements as prescribed. Recheck labs in 6 months.

## 2021-10-08 NOTE — TELEPHONE ENCOUNTER
Last Refill: N/A  Last Visit: 8/3/2021   Next Visit: Visit date not found    Requested Prescriptions     Pending Prescriptions Disp Refills    tiZANidine (ZANAFLEX) 2 mg tablet 60 Tablet 3     Sig: Take 1 Tablet by mouth two (2) times daily as needed for Muscle Spasm(s).

## 2021-10-09 LAB
SARS-COV-2, XPLCVT: NOT DETECTED
SOURCE, COVRS: NORMAL

## 2021-10-11 ENCOUNTER — ANESTHESIA (OUTPATIENT)
Dept: ENDOSCOPY | Age: 56
End: 2021-10-11
Payer: COMMERCIAL

## 2021-10-11 ENCOUNTER — ANESTHESIA EVENT (OUTPATIENT)
Dept: ENDOSCOPY | Age: 56
End: 2021-10-11
Payer: COMMERCIAL

## 2021-10-11 ENCOUNTER — HOSPITAL ENCOUNTER (OUTPATIENT)
Age: 56
Setting detail: OUTPATIENT SURGERY
Discharge: HOME | End: 2021-10-11
Attending: INTERNAL MEDICINE | Admitting: INTERNAL MEDICINE
Payer: COMMERCIAL

## 2021-10-11 VITALS
SYSTOLIC BLOOD PRESSURE: 154 MMHG | HEIGHT: 68 IN | RESPIRATION RATE: 16 BRPM | WEIGHT: 210.7 LBS | BODY MASS INDEX: 31.93 KG/M2 | OXYGEN SATURATION: 100 % | TEMPERATURE: 97.2 F | DIASTOLIC BLOOD PRESSURE: 91 MMHG | HEART RATE: 57 BPM

## 2021-10-11 PROCEDURE — 76060000031 HC ANESTHESIA FIRST 0.5 HR: Performed by: INTERNAL MEDICINE

## 2021-10-11 PROCEDURE — 74011250636 HC RX REV CODE- 250/636: Performed by: NURSE ANESTHETIST, CERTIFIED REGISTERED

## 2021-10-11 PROCEDURE — 2709999900 HC NON-CHARGEABLE SUPPLY: Performed by: INTERNAL MEDICINE

## 2021-10-11 PROCEDURE — 74011250636 HC RX REV CODE- 250/636: Performed by: INTERNAL MEDICINE

## 2021-10-11 PROCEDURE — 76040000019: Performed by: INTERNAL MEDICINE

## 2021-10-11 PROCEDURE — 74011000250 HC RX REV CODE- 250: Performed by: NURSE ANESTHETIST, CERTIFIED REGISTERED

## 2021-10-11 PROCEDURE — C1726 CATH, BAL DIL, NON-VASCULAR: HCPCS | Performed by: INTERNAL MEDICINE

## 2021-10-11 PROCEDURE — 77030018712 HC DEV BLLN INFL BSC -B: Performed by: INTERNAL MEDICINE

## 2021-10-11 RX ORDER — ACETAMINOPHEN 500 MG
650 TABLET ORAL
COMMUNITY
End: 2021-11-11

## 2021-10-11 RX ORDER — SODIUM CHLORIDE 9 MG/ML
INJECTION, SOLUTION INTRAVENOUS
Status: DISCONTINUED | OUTPATIENT
Start: 2021-10-11 | End: 2021-10-11 | Stop reason: HOSPADM

## 2021-10-11 RX ORDER — SODIUM CHLORIDE 9 MG/ML
50 INJECTION, SOLUTION INTRAVENOUS CONTINUOUS
Status: DISCONTINUED | OUTPATIENT
Start: 2021-10-11 | End: 2021-10-11 | Stop reason: HOSPADM

## 2021-10-11 RX ORDER — SODIUM CHLORIDE 0.9 % (FLUSH) 0.9 %
5-40 SYRINGE (ML) INJECTION AS NEEDED
Status: DISCONTINUED | OUTPATIENT
Start: 2021-10-11 | End: 2021-10-11 | Stop reason: HOSPADM

## 2021-10-11 RX ORDER — FLUMAZENIL 0.1 MG/ML
0.2 INJECTION INTRAVENOUS
Status: DISCONTINUED | OUTPATIENT
Start: 2021-10-11 | End: 2021-10-11 | Stop reason: HOSPADM

## 2021-10-11 RX ORDER — EPINEPHRINE 0.1 MG/ML
1 INJECTION INTRACARDIAC; INTRAVENOUS
Status: DISCONTINUED | OUTPATIENT
Start: 2021-10-11 | End: 2021-10-11 | Stop reason: HOSPADM

## 2021-10-11 RX ORDER — PROPOFOL 10 MG/ML
INJECTION, EMULSION INTRAVENOUS AS NEEDED
Status: DISCONTINUED | OUTPATIENT
Start: 2021-10-11 | End: 2021-10-11 | Stop reason: HOSPADM

## 2021-10-11 RX ORDER — ATROPINE SULFATE 0.1 MG/ML
0.5 INJECTION INTRAVENOUS
Status: DISCONTINUED | OUTPATIENT
Start: 2021-10-11 | End: 2021-10-11 | Stop reason: HOSPADM

## 2021-10-11 RX ORDER — SODIUM CHLORIDE 0.9 % (FLUSH) 0.9 %
5-40 SYRINGE (ML) INJECTION EVERY 8 HOURS
Status: DISCONTINUED | OUTPATIENT
Start: 2021-10-11 | End: 2021-10-11 | Stop reason: HOSPADM

## 2021-10-11 RX ORDER — LIDOCAINE HYDROCHLORIDE 20 MG/ML
INJECTION, SOLUTION EPIDURAL; INFILTRATION; INTRACAUDAL; PERINEURAL AS NEEDED
Status: DISCONTINUED | OUTPATIENT
Start: 2021-10-11 | End: 2021-10-11 | Stop reason: HOSPADM

## 2021-10-11 RX ORDER — DEXTROMETHORPHAN/PSEUDOEPHED 2.5-7.5/.8
1.2 DROPS ORAL
Status: DISCONTINUED | OUTPATIENT
Start: 2021-10-11 | End: 2021-10-11 | Stop reason: HOSPADM

## 2021-10-11 RX ORDER — NALOXONE HYDROCHLORIDE 0.4 MG/ML
0.4 INJECTION, SOLUTION INTRAMUSCULAR; INTRAVENOUS; SUBCUTANEOUS
Status: DISCONTINUED | OUTPATIENT
Start: 2021-10-11 | End: 2021-10-11 | Stop reason: HOSPADM

## 2021-10-11 RX ADMIN — LIDOCAINE HYDROCHLORIDE 100 MG: 20 INJECTION, SOLUTION EPIDURAL; INFILTRATION; INTRACAUDAL; PERINEURAL at 15:48

## 2021-10-11 RX ADMIN — PROPOFOL 50 MG: 10 INJECTION, EMULSION INTRAVENOUS at 15:52

## 2021-10-11 RX ADMIN — PROPOFOL 50 MG: 10 INJECTION, EMULSION INTRAVENOUS at 15:54

## 2021-10-11 RX ADMIN — PROPOFOL 100 MG: 10 INJECTION, EMULSION INTRAVENOUS at 15:48

## 2021-10-11 RX ADMIN — PROPOFOL 50 MG: 10 INJECTION, EMULSION INTRAVENOUS at 15:50

## 2021-10-11 RX ADMIN — SODIUM CHLORIDE: 900 INJECTION, SOLUTION INTRAVENOUS at 15:28

## 2021-10-11 NOTE — DISCHARGE INSTRUCTIONS
118 [facility].  [address]  [id number]  1965    DISCOMFORT:  Sore throat- throat lozenges or warm salt water gargle  redness at IV site- apply warm compress to area; if redness or soreness persist- contact your physician  Gaseous discomfort- walking, belching will help relieve any discomfort    DIET  You may eat and drink after you leave. You may resume your regular diet - however -  remember your colon is empty and a heavy meal will produce gas. Avoid these foods:  vegetables, fried / greasy foods, carbonated drinks   You may not drink alcoholic beverages for at least 12 hours    ACTIVITY  You may resume your normal daily activities   Spend the remainder of the day resting -  avoid any strenuous activity. You may not operate a vehicle for 12 hours  You may not engage in an occupation involving machinery or appliances for rest of today  Avoid making any critical decisions for at least 24 hour    CALL M.D. ANY SIGN OF   Increasing pain, nausea, vomiting  Abdominal distension (swelling)  New increased bleeding (oral or rectal)  Fever (chills)  Pain in chest area  Bloody discharge from nose or mouth  Shortness of breath    Follow-up Instructions:   Call Dr. Lamont Bumpers for any questions or problems. If we took a biopsy please call the office within 2 weeks to discuss your pathology results. Telephone # [phone]       ENDOSCOPY FINDINGS:  1. Gastric anatomic stricture        Learning About Coronavirus (COVID-19)  Coronavirus (COVID-19): Overview  What is coronavirus (COVID-19)? The coronavirus disease (COVID-19) is caused by a virus. It is an illness that was first found in [place], [place], in December 2019. It has since spread worldwide. The virus can cause fever, cough, and trouble breathing. In severe cases, it can cause pneumonia and make it hard to breathe without help.  It can cause death. Coronaviruses are a large group of viruses. They cause the common cold. They also cause more serious illnesses like Middle East respiratory syndrome (MERS) and severe acute respiratory syndrome (SARS). COVID-19 is caused by a novel coronavirus. That means it's a new type that has not been seen in people before. This virus spreads person-to-person through droplets from coughing and sneezing. It can also spread when you are close to someone who is infected. And it can spread when you touch something that has the virus on it, such as a doorknob or a tabletop. What can you do to protect yourself from coronavirus (COVID-19)? The best way to protect yourself from getting sick is to:  · Avoid areas where there is an outbreak. · Avoid contact with people who may be infected. · Wash your hands often with soap or alcohol-based hand sanitizers. · Avoid crowds and try to stay at least 6 feet away from other people. · Wash your hands often, especially after you cough or sneeze. Use soap and water, and scrub for at least 20 seconds. If soap and water aren't available, use an alcohol-based hand . · Avoid touching your mouth, nose, and eyes. What can you do to avoid spreading the virus to others? To help avoid spreading the virus to others:  · Cover your mouth with a tissue when you cough or sneeze. Then throw the tissue in the trash. · Use a disinfectant to clean things that you touch often. · Stay home if you are sick or have been exposed to the virus. Don't go to school, work, or public areas. And don't use public transportation. · If you are sick:  ? Leave your home only if you need to get medical care. But call the doctor's office first so they know you're coming. And wear a face mask, if you have one.  ? If you have a face mask, wear it whenever you're around other people. It can help stop the spread of the virus when you cough or sneeze. ? Clean and disinfect your home every day.  Use household  and disinfectant wipes or sprays. Take special care to clean things that you grab with your hands. These include doorknobs, remote controls, phones, and handles on your refrigerator and microwave. And don't forget countertops, tabletops, bathrooms, and computer keyboards. When to call for help  Call 911 anytime you think you may need emergency care. For example, call if:  · You have severe trouble breathing. (You can't talk at all.)  · You have constant chest pain or pressure. · You are severely dizzy or lightheaded. · You are confused or can't think clearly. · Your face and lips have a blue color. · You pass out (lose consciousness) or are very hard to wake up. Call your doctor now if you develop symptoms such as:  · Shortness of breath. · Fever. · Cough. If you need to get care, call ahead to the doctor's office for instructions before you go. Make sure you wear a face mask, if you have one, to prevent exposing other people to the virus. Where can you get the latest information? The following health organizations are tracking and studying this virus. Their websites contain the most up-to-date information. Jennifer Browne also learn what to do if you think you may have been exposed to the virus. · U.S. Centers for Disease Control and Prevention (CDC): The CDC provides updated news about the disease and travel advice. The website also tells you how to prevent the spread of infection. www.cdc.gov  · World Health Organization [facility]): WHO offers information about the virus outbreaks. WHO also has travel advice. www.who.int  Current as of: April 1, 2020               Content Version: 12.4  © 4236-5125 [employer].    Care instructions adapted under license by your healthcare professional. If you have questions about a medical condition or this instruction, always ask your healthcare professional. [address] any warranty or liability for your use of this information.

## 2021-10-11 NOTE — ANESTHESIA PREPROCEDURE EVALUATION
Relevant Problems   CARDIOVASCULAR   (+) Essential hypertension      GASTROINTESTINAL   (+) Hiatal hernia      ENDOCRINE   (+) Left lateral epicondylitis      HEMATOLOGY   (+) Iron deficiency anemia       Anesthetic History     PONV          Review of Systems / Medical History  Patient summary reviewed, nursing notes reviewed and pertinent labs reviewed    Pulmonary  Within defined limits                 Neuro/Psych   Within defined limits           Cardiovascular  Within defined limits  Hypertension              Exercise tolerance: >4 METS     GI/Hepatic/Renal     GERD          Comments: gastric outlet obstruction/GEJ stricture s/p gastric bypass Endo/Other      Hypothyroidism: well controlled  Obesity and arthritis  Pertinent negatives: No morbid obesity   Other Findings              Physical Exam    Airway  Mallampati: II  TM Distance: > 6 cm  Neck ROM: normal range of motion   Mouth opening: Normal     Cardiovascular  Regular rate and rhythm,  S1 and S2 normal,  no murmur, click, rub, or gallop             Dental  No notable dental hx       Pulmonary  Breath sounds clear to auscultation               Abdominal  GI exam deferred       Other Findings            Anesthetic Plan    ASA: 3  Anesthesia type: MAC          Induction: Intravenous  Anesthetic plan and risks discussed with: Patient

## 2021-10-11 NOTE — PERIOP NOTES

## 2021-10-11 NOTE — ANESTHESIA POSTPROCEDURE EVALUATION
Post-Anesthesia Evaluation and Assessment    Patient: Linda Red MRN: [id number]  SSN: [id number]    YOB: 1965  Age: 64 y.o. Sex: female      I have evaluated the patient and they are stable and ready for discharge from the PACU. Cardiovascular Function/Vital Signs  Visit Vitals  BP (!) 147/92   Pulse 71   Temp 36.7 °C (98 °F)   Resp 23   Ht 5' 8\" (1.727 m)   Wt 95.6 kg (210 lb 11.2 oz)   SpO2 98%   Breastfeeding No   BMI 32.04 kg/m²       Patient is status post MAC anesthesia for Procedure(s):  ESOPHAGOGASTRODUODENOSCOPY (EGD) W/DILATION   :-  ESOPHAGEAL DILATION. Nausea/Vomiting: None    Postoperative hydration reviewed and adequate. Pain:  Pain Scale 1: Numeric (0 - 10) (10/11/21 1519)  Pain Intensity 1: 0 (10/11/21 1519)   Managed    Neurological Status: At baseline    Mental Status, Level of Consciousness: Alert and  oriented to person, place, and time    Pulmonary Status:   O2 Device: Nasal cannula (10/11/21 7788)   Adequate oxygenation and airway patent    Complications related to anesthesia: None    Post-anesthesia assessment completed. No concerns    Signed By: Yary Colunga MD     October 11, 2021              Procedure(s):  ESOPHAGOGASTRODUODENOSCOPY (EGD) W/DILATION   :-  ESOPHAGEAL DILATION. MAC    <BSHSIANPOST>    INITIAL Post-op Vital signs:   Vitals Value Taken Time   BP     Temp     Pulse 68 10/11/21 1604   Resp 18 10/11/21 1604   SpO2 97 % 10/11/21 1604   Vitals shown include unvalidated device data.

## 2021-10-11 NOTE — PROCEDURES
118 [facility].  217 [facility] 140 [address]order[address], [address]  [phone]                            NAME:  Eddie Zaman   :   1965   MRN:   [id number]     Date/Time:  10/11/2021 3:59 PM    Esophagogastroduodenoscopy (EGD) Procedure Note    :  Mary Castro MD    Staff: Endoscopy Technician-1: Sharon Soler  Endoscopy RN-1: Maura Zavala     Implants: none    Referring Provider:  Yocasta Romero MD    Anethesia/Sedation:  MAC anesthesia    Procedure Details     After infom consent was obtained for the procedure, with all risks and benefits of procedure explained the patient was taken to the endoscopy suite and placed in the left lateral decubitus position. Following sequential administration of sedation as per above, the [id number] gastroscope was inserted into the mouth and advanced under direct vision to proximal jejunum. A careful inspection was made as the gastroscope was withdrawn, including a retroflexed view of the proximal stomach; findings and interventions are described below. Findings:  Esophagus:normal  Stomach: Yelena-en-Y gastric bypass anatomy was noted. Gastric pouch was normal.  Gastrojejunal anastomosis was strictured. Erythema and congestion was noted. Suture material was noted at the anastomosis. This was traversed after dilation  Duodenum/jejunum:normal      Therapies: Dilation of gastrojejunal anastomotic stricture was performed using CRE balloon 12 to 15 mm with maximal dilation to 13.5 mm for 1 minute. Post dilation stricture was traversed. No immediate complications were noted. Specimens: none           EBL: None    Complications:   None; patient tolerated the procedure well. Impression:    See Postoperative diagnosis above    Recommendations:  -Continue acid suppression. , -Follow symptoms. , -Soft diet  -EGD with dilation in 2-4 weeks    Discharge disposition:  Home in the company of  when able to ambulate    Denzel Mccartney MD

## 2021-10-11 NOTE — H&P
[address]  217 [facility] 140 [facility], [address]  [phone]                                History and Physical     NAME: Ehsan Guo   :  1965   MRN:  [id number]     HPI:  The patient was seen and examined. Past Surgical History:   Procedure Laterality Date    COLONOSCOPY N/A 3/10/2021    COLONOSCOPY,EGD performed by Lukasz James MD at [facility] ENDOSCOPY    [address]    x 1    HX CHOLECYSTECTOMY      OPEN    HX COLONOSCOPY  14    Dr. Claudia Alicia; 14    due to abnormal PAP.  HX DILATION AND CURETTAGE      due to miscarriage.  HX ENDOSCOPY      HX HEART CATHETERIZATION      no stents    HX KNEE ARTHROSCOPY Right     HX LAP GASTRIC BYPASS  2021    lap gastric bypass with hiatal hernia repair by Dr. Rupesh Padilla at [address] Right 2016    LUMBAR L4-5, L5-S1 ELLY. Dr. Joseline Baker     Past Medical History:   Diagnosis Date    Allergic rhinitis 2010    Arthritis     Baker cyst, left 10/2019    Dr. Orville Watson cyst, right     Cervical disc herniation 2010    C3-4, C4-5, C5-6. Hemangioma body of C5. Dr. Delroy Williamson.  Chest pain 2011, 2012    Dr. Dexter Flanagan. Dr. Jaiden Mullins; denies CP as of 3/27/14    Chronic lower back pain , 2016    thoracic DDD and spondylosis. DDD L3-S1. Dr. Xin Tse. [place]. [place] Romina [place] [facility] Dylon Arellano.  Chronic meniscal tear of knee     Chronic pain     lower back and knees    Esophagitis, reflux 4/3/2011    Gallstone     Gastritis 2010    Dr. Chel Venegas.  Gastrointestinal food allergy 2014    Multiple. Dr. John Paul Ordoñez.    Heart palpitations 2012    DUE TO PAC'S AND PVC'S. Dr. Romain Bennett.     Heel spur, left 2019    Hiatal hernia 4/3/2011    HTN (hypertension) 1987    Impaired glucose tolerance 10/15/2010    Incidental lung nodule 01/08/13    LLL 3.9 mm, RML 3.1 mm;  as of 3/27/14 per pt stable w/o growth    Insomnia 1990s    Iron deficiency anemia 5/27/2010    Irritable bowel syndrome (IBS) 03/2014    Dr. Anupam Gomez.    Knee pain 2012    Right. due to severe OA. / chipped bone     Left foot pain 08/2019    Dr. Dion Bauman.  Metatarsal bone fracture 1990    Left fifth.  Migraine 2/22/2011    Dr. Morin Ply Morbid [facility])     Peroneal tendonitis of lower leg 08/07/2019    Left. Dr. Dion Bauman    Pre-diabetes     Radiculopathy, cervical 01/2010    Left. Dr. Danny Herbert.  Shoulder pain, right 2012    due to [address] X 2. Dr. Ryan Crawford.  Thyroid nodule 2011    6 nodules- Dr. Isaak Saldana    Toe fracture, left 2008    fifth toe.  Triangular fibrocartilage complex tear 2012    Dr. Monserrat Johnson. Left.      Social History     Tobacco Use    Smoking status: Never Smoker    Smokeless tobacco: Never Used   Vaping Use    Vaping Use: Never used   Substance Use Topics    Alcohol use: No    Drug use: Never     Allergies   Allergen Reactions    Doxylamine Succinate Swelling     12.5-25 MG  HANDS, FACE/PERIORAL, FEET    Pcn [Penicillins] Hives    Ambien [Zolpidem] Nausea and Vomiting and Other (comments)     5 mg with Ativan 0.5 mg   TOO GROGGY, SLEPT 24 HOURS  7.5 MG FORGOT SHE WAS SLEEP EATING    Aspirin Nausea Only    Carafate [Sucralfate] Nausea and Vomiting    Celebrex [Celecoxib] Other (comments)     TIGHT SQUEEZING IN CHEST  CHEST ACHED    Erythromycin Nausea and Vomiting    Milk Prot-Turm-Pepper-Pineappl Other (comments)     Multiple food allergies    Neurontin [Gabapentin] Other (comments)     300 mg  SEVERE LEG PAIN  EYE TWITCHING    Nsaids (Non-Steroidal Anti-Inflammatory Drug) Other (comments)     GI irritation      Pineapple Unknown (comments)     Family History   Problem Relation Age of Onset    Diabetes Mother     Heart Disease Mother 58        2 stents    Hypertension Mother     Stroke Mother     Diabetes Father     Hypertension Father     Stroke Maternal Grandmother     Cancer Maternal Uncle         prostate    Diabetes Sister     No Known Problems Sister     No Known Problems Sister     Anesth Problems Neg Hx      Current Facility-Administered Medications   Medication Dose Route Frequency    0.9% sodium chloride infusion  50 mL/hr IntraVENous CONTINUOUS    sodium chloride (NS) flush 5-40 mL  5-40 mL IntraVENous Q8H    sodium chloride (NS) flush 5-40 mL  5-40 mL IntraVENous PRN    naloxone (NARCAN) injection 0.4 mg  0.4 mg IntraVENous Multiple    flumazeniL (ROMAZICON) 0.1 mg/mL injection 0.2 mg  0.2 mg IntraVENous Multiple    simethicone (MYLICON) 40MA/1.3YP oral drops 80 mg  1.2 mL Oral Multiple    atropine injection 0.5 mg  0.5 mg IntraVENous ONCE PRN    EPINEPHrine (ADRENALIN) 0.1 mg/mL syringe 1 mg  1 mg Endoscopically ONCE PRN         PHYSICAL EXAM:  General: WD, WN. Alert, cooperative, no acute distress    HEENT: NC, Atraumatic. PERRLA, EOMI. Anicteric sclerae. Lungs:  CTA Bilaterally. No Wheezing/Rhonchi/Rales. Heart:  Regular  rhythm,  No murmur, No Rubs, No Gallops  Abdomen: Soft, Non distended, Non tender. +Bowel sounds, no HSM  Extremities: No c/c/e  Neurologic:  CN 2-12 gi, Alert and oriented X 3. No acute neurological distress   Psych:   Good insight. Not anxious nor agitated. The heart, lungs and mental status were satisfactory for the administration of MAC sedation and for the procedure. Mallampati score: 3     The patient was counseled at length about the risks of vamshi Covid-19 in the anaya-operative and post-operative states including the recovery window of their procedure. The patient was made aware that vamshi Covid-19 after a surgical procedure may worsen their prognosis for recovering from the virus and lend to a higher morbidity and or mortality risk.   The patient was given the options of postponing their procedure. All of the risks, benefits, and alternatives were discussed. The patient does  wish to proceed with the procedure.       Assessment:   · Gastric anastomotic stricture    Plan:   · Endoscopic procedure  · MAC sedation   ·

## 2021-10-12 DIAGNOSIS — M62.838 MUSCLE SPASM: ICD-10-CM

## 2021-10-12 RX ORDER — TIZANIDINE 2 MG/1
2 TABLET ORAL
Qty: 60 TABLET | Refills: 3 | Status: SHIPPED | OUTPATIENT
Start: 2021-10-12

## 2021-10-28 ENCOUNTER — HOSPITAL ENCOUNTER (OUTPATIENT)
Dept: PREADMISSION TESTING | Age: 56
Discharge: HOME | End: 2021-10-28
Payer: COMMERCIAL

## 2021-10-28 PROCEDURE — U0005 INFEC AGEN DETEC AMPLI PROBE: HCPCS

## 2021-10-28 NOTE — PROGRESS NOTES
Discussed with patient. Patient stated she is not diabetic and to have that diagnosis removed from her chart.

## 2021-10-29 LAB
SARS-COV-2, XPLCVT: NOT DETECTED
SOURCE, COVRS: NORMAL

## 2021-11-01 ENCOUNTER — ANESTHESIA EVENT (OUTPATIENT)
Dept: ENDOSCOPY | Age: 56
End: 2021-11-01
Payer: COMMERCIAL

## 2021-11-01 ENCOUNTER — ANESTHESIA (OUTPATIENT)
Dept: ENDOSCOPY | Age: 56
End: 2021-11-01
Payer: COMMERCIAL

## 2021-11-01 ENCOUNTER — HOSPITAL ENCOUNTER (OUTPATIENT)
Age: 56
Setting detail: OUTPATIENT SURGERY
Discharge: HOME | End: 2021-11-01
Attending: INTERNAL MEDICINE | Admitting: INTERNAL MEDICINE
Payer: COMMERCIAL

## 2021-11-01 VITALS
WEIGHT: 202 LBS | SYSTOLIC BLOOD PRESSURE: 146 MMHG | OXYGEN SATURATION: 99 % | RESPIRATION RATE: 15 BRPM | HEIGHT: 68 IN | BODY MASS INDEX: 30.62 KG/M2 | DIASTOLIC BLOOD PRESSURE: 87 MMHG | HEART RATE: 62 BPM | TEMPERATURE: 98.7 F

## 2021-11-01 PROCEDURE — 74011250636 HC RX REV CODE- 250/636: Performed by: NURSE ANESTHETIST, CERTIFIED REGISTERED

## 2021-11-01 PROCEDURE — C1726 CATH, BAL DIL, NON-VASCULAR: HCPCS | Performed by: INTERNAL MEDICINE

## 2021-11-01 PROCEDURE — 76040000019: Performed by: INTERNAL MEDICINE

## 2021-11-01 PROCEDURE — 2709999900 HC NON-CHARGEABLE SUPPLY: Performed by: INTERNAL MEDICINE

## 2021-11-01 PROCEDURE — 74011000250 HC RX REV CODE- 250: Performed by: NURSE ANESTHETIST, CERTIFIED REGISTERED

## 2021-11-01 PROCEDURE — 77030018712 HC DEV BLLN INFL BSC -B: Performed by: INTERNAL MEDICINE

## 2021-11-01 PROCEDURE — 76060000031 HC ANESTHESIA FIRST 0.5 HR: Performed by: INTERNAL MEDICINE

## 2021-11-01 RX ORDER — SODIUM CHLORIDE 9 MG/ML
50 INJECTION, SOLUTION INTRAVENOUS CONTINUOUS
Status: DISCONTINUED | OUTPATIENT
Start: 2021-11-01 | End: 2021-11-01 | Stop reason: HOSPADM

## 2021-11-01 RX ORDER — ATROPINE SULFATE 0.1 MG/ML
0.5 INJECTION INTRAVENOUS
Status: DISCONTINUED | OUTPATIENT
Start: 2021-11-01 | End: 2021-11-01 | Stop reason: HOSPADM

## 2021-11-01 RX ORDER — NALOXONE HYDROCHLORIDE 0.4 MG/ML
0.4 INJECTION, SOLUTION INTRAMUSCULAR; INTRAVENOUS; SUBCUTANEOUS
Status: DISCONTINUED | OUTPATIENT
Start: 2021-11-01 | End: 2021-11-01 | Stop reason: HOSPADM

## 2021-11-01 RX ORDER — SODIUM CHLORIDE 0.9 % (FLUSH) 0.9 %
5-40 SYRINGE (ML) INJECTION AS NEEDED
Status: DISCONTINUED | OUTPATIENT
Start: 2021-11-01 | End: 2021-11-01 | Stop reason: HOSPADM

## 2021-11-01 RX ORDER — DEXTROMETHORPHAN/PSEUDOEPHED 2.5-7.5/.8
1.2 DROPS ORAL
Status: DISCONTINUED | OUTPATIENT
Start: 2021-11-01 | End: 2021-11-01 | Stop reason: HOSPADM

## 2021-11-01 RX ORDER — SODIUM CHLORIDE 0.9 % (FLUSH) 0.9 %
5-40 SYRINGE (ML) INJECTION EVERY 8 HOURS
Status: DISCONTINUED | OUTPATIENT
Start: 2021-11-01 | End: 2021-11-01 | Stop reason: HOSPADM

## 2021-11-01 RX ORDER — LIDOCAINE HYDROCHLORIDE 20 MG/ML
INJECTION, SOLUTION EPIDURAL; INFILTRATION; INTRACAUDAL; PERINEURAL AS NEEDED
Status: DISCONTINUED | OUTPATIENT
Start: 2021-11-01 | End: 2021-11-01 | Stop reason: HOSPADM

## 2021-11-01 RX ORDER — PROPOFOL 10 MG/ML
INJECTION, EMULSION INTRAVENOUS AS NEEDED
Status: DISCONTINUED | OUTPATIENT
Start: 2021-11-01 | End: 2021-11-01 | Stop reason: HOSPADM

## 2021-11-01 RX ORDER — FLUMAZENIL 0.1 MG/ML
0.2 INJECTION INTRAVENOUS
Status: DISCONTINUED | OUTPATIENT
Start: 2021-11-01 | End: 2021-11-01 | Stop reason: HOSPADM

## 2021-11-01 RX ORDER — EPINEPHRINE 0.1 MG/ML
1 INJECTION INTRACARDIAC; INTRAVENOUS
Status: DISCONTINUED | OUTPATIENT
Start: 2021-11-01 | End: 2021-11-01 | Stop reason: HOSPADM

## 2021-11-01 RX ORDER — SODIUM CHLORIDE 9 MG/ML
INJECTION, SOLUTION INTRAVENOUS
Status: DISCONTINUED | OUTPATIENT
Start: 2021-11-01 | End: 2021-11-01 | Stop reason: HOSPADM

## 2021-11-01 RX ADMIN — PROPOFOL 30 MG: 10 INJECTION, EMULSION INTRAVENOUS at 14:44

## 2021-11-01 RX ADMIN — PROPOFOL 30 MG: 10 INJECTION, EMULSION INTRAVENOUS at 14:42

## 2021-11-01 RX ADMIN — PROPOFOL 40 MG: 10 INJECTION, EMULSION INTRAVENOUS at 14:41

## 2021-11-01 RX ADMIN — PROPOFOL 20 MG: 10 INJECTION, EMULSION INTRAVENOUS at 14:49

## 2021-11-01 RX ADMIN — SODIUM CHLORIDE: 900 INJECTION, SOLUTION INTRAVENOUS at 14:20

## 2021-11-01 RX ADMIN — PROPOFOL 80 MG: 10 INJECTION, EMULSION INTRAVENOUS at 14:39

## 2021-11-01 RX ADMIN — LIDOCAINE HYDROCHLORIDE 80 MG: 20 INJECTION, SOLUTION EPIDURAL; INFILTRATION; INTRACAUDAL; PERINEURAL at 14:39

## 2021-11-01 RX ADMIN — PROPOFOL 40 MG: 10 INJECTION, EMULSION INTRAVENOUS at 14:40

## 2021-11-01 RX ADMIN — PROPOFOL 30 MG: 10 INJECTION, EMULSION INTRAVENOUS at 14:46

## 2021-11-01 NOTE — ANESTHESIA PREPROCEDURE EVALUATION
Relevant Problems   No relevant active problems       Anesthetic History     PONV          Review of Systems / Medical History  Patient summary reviewed, nursing notes reviewed and pertinent labs reviewed    Pulmonary  Within defined limits                 Neuro/Psych   Within defined limits           Cardiovascular    Hypertension: well controlled              Exercise tolerance: >4 METS     GI/Hepatic/Renal     GERD           Endo/Other      Hypothyroidism  Morbid obesity and arthritis     Other Findings              Physical Exam    Airway  Mallampati: I  TM Distance: > 6 cm  Neck ROM: normal range of motion   Mouth opening: Normal     Cardiovascular    Rhythm: regular           Dental    Dentition: Upper partial plate     Pulmonary  Breath sounds clear to auscultation               Abdominal         Other Findings            Anesthetic Plan    ASA: 3  Anesthesia type: MAC          Induction: Intravenous  Anesthetic plan and risks discussed with: Patient

## 2021-11-01 NOTE — PROCEDURES
118 [facility].  217 [facility] 140 [address], [address]  [phone]                            NAME:  Pelon Tobias   :   1965   MRN:   [id number]     Date/Time:  2021 2:56 PM    Esophagogastroduodenoscopy (EGD) Procedure Note    :  Dioni Beaulieu MD    Staff: Endoscopy RN-1: Felicia Rod RN  Endoscopy RN-2: Win Moore RN     Implants: none    Referring Provider:  Dani Moyer NP    Anethesia/Sedation:  MAC anesthesia    Procedure Details     After infom consent was obtained for the procedure, with all risks and benefits of procedure explained the patient was taken to the endoscopy suite and placed in the left lateral decubitus position. Following sequential administration of sedation as per above, the [id number] gastroscope was inserted into the mouth and advanced under direct vision to proximal jejunum. A careful inspection was made as the gastroscope was withdrawn, including a retroflexed view of the proximal stomach; findings and interventions are described below. Findings:  Esophagus:normal  Stomach: Yelena-en-Y gastric bypass anatomy was noted. Gastrojejunal anastomosis appeared strictured. Scope could not be traversed without dilation. Suture material was noted. Duodenum/jejunum:normal      Therapies: Dilation of the gastric anastomotic stricture was performed using 12mm to 15 mm CRE balloon dilated to a maximal diameter of 15 mm for 1 minute. Post dilation inspection of the area revealed no complications. Specimens: none           EBL: None    Complications:   None; patient tolerated the procedure well. Impression:    See Postoperative diagnosis above    Recommendations:  -Continue acid suppression. , -Follow symptoms. , -Soft and liquid diet, -EGD with dilation in 2 to 3 weeks    Discharge disposition:  Home in the company of  when able to ambulate    Dioni Beaulieu MD

## 2021-11-01 NOTE — DISCHARGE INSTRUCTIONS
[address]  [address] [place]  [id number]  1965    DISCOMFORT:  Sore throat- throat lozenges or warm salt water gargle  redness at IV site- apply warm compress to area; if redness or soreness persist- contact your physician  Gaseous discomfort- walking, belching will help relieve any discomfort    DIET  You may eat and drink after you leave. You may resume your regular diet - however -  remember your colon is empty and a heavy meal will produce gas. Avoid these foods:  vegetables, fried / greasy foods, carbonated drinks   You may not drink alcoholic beverages for at least 12 hours    ACTIVITY  You may resume your normal daily activities   Spend the remainder of the day resting -  avoid any strenuous activity. You may not operate a vehicle for 12 hours  You may not engage in an occupation involving machinery or appliances for rest of today  Avoid making any critical decisions for at least 24 hour    CALL M.D. ANY SIGN OF   Increasing pain, nausea, vomiting  Abdominal distension (swelling)  New increased bleeding (oral or rectal)  Fever (chills)  Pain in chest area  Bloody discharge from nose or mouth  Shortness of breath    Follow-up Instructions:   Call Dr. Lamont Bumpers for any questions or problems. If we took a biopsy please call the office within 2 weeks to discuss your pathology results. Telephone # [phone]       ENDOSCOPY FINDINGS:   .gastric bypass  anastomotic stricture  dilation       Post-procedure recommendations:   -Continue acid suppression. , -Follow symptoms. , -Soft and liquid diet, -EGD with dilation in 2 to 3 weeks    Learning About Coronavirus (COVID-19)  Coronavirus (COVID-19): Overview  What is coronavirus (COVID-19)? The coronavirus disease (COVID-19) is caused by a virus. It is an illness that was first found in [place], [place], in December 2019.  It has since spread worldwide. The virus can cause fever, cough, and trouble breathing. In severe cases, it can cause pneumonia and make it hard to breathe without help. It can cause death. Coronaviruses are a large group of viruses. They cause the common cold. They also cause more serious illnesses like Middle East respiratory syndrome (MERS) and severe acute respiratory syndrome (SARS). COVID-19 is caused by a novel coronavirus. That means it's a new type that has not been seen in people before. This virus spreads person-to-person through droplets from coughing and sneezing. It can also spread when you are close to someone who is infected. And it can spread when you touch something that has the virus on it, such as a doorknob or a tabletop. What can you do to protect yourself from coronavirus (COVID-19)? The best way to protect yourself from getting sick is to:  · Avoid areas where there is an outbreak. · Avoid contact with people who may be infected. · Wash your hands often with soap or alcohol-based hand sanitizers. · Avoid crowds and try to stay at least 6 feet away from other people. · Wash your hands often, especially after you cough or sneeze. Use soap and water, and scrub for at least 20 seconds. If soap and water aren't available, use an alcohol-based hand . · Avoid touching your mouth, nose, and eyes. What can you do to avoid spreading the virus to others? To help avoid spreading the virus to others:  · Cover your mouth with a tissue when you cough or sneeze. Then throw the tissue in the trash. · Use a disinfectant to clean things that you touch often. · Stay home if you are sick or have been exposed to the virus. Don't go to school, work, or public areas. And don't use public transportation. · If you are sick:  ? Leave your home only if you need to get medical care. But call the doctor's office first so they know you're coming.  And wear a face mask, if you have one.  ? If you have a face mask, wear it whenever you're around other people. It can help stop the spread of the virus when you cough or sneeze. ? Clean and disinfect your home every day. Use household  and disinfectant wipes or sprays. Take special care to clean things that you grab with your hands. These include doorknobs, remote controls, phones, and handles on your refrigerator and microwave. And don't forget countertops, tabletops, bathrooms, and computer keyboards. When to call for help  Call 911 anytime you think you may need emergency care. For example, call if:  · You have severe trouble breathing. (You can't talk at all.)  · You have constant chest pain or pressure. · You are severely dizzy or lightheaded. · You are confused or can't think clearly. · Your face and lips have a blue color. · You pass out (lose consciousness) or are very hard to wake up. Call your doctor now if you develop symptoms such as:  · Shortness of breath. · Fever. · Cough. If you need to get care, call ahead to the doctor's office for instructions before you go. Make sure you wear a face mask, if you have one, to prevent exposing other people to the virus. Where can you get the latest information? The following health organizations are tracking and studying this virus. Their websites contain the most up-to-date information. [place] also learn what to do if you think you may have been exposed to the virus. · U.S. Centers for Disease Control and Prevention (CDC): The CDC provides updated news about the disease and travel advice. The website also tells you how to prevent the spread of infection. www.cdc.gov  · World Health Organization [facility]): WHO offers information about the virus outbreaks. WHO also has travel advice. www.who.int  Current as of: April 1, 2020               Content Version: 12.4  © 5635-7899 Healthwise, Incorporated.    Care instructions adapted under license by your healthcare professional. If you have questions about a medical condition or this instruction, always ask your healthcare professional. [place] [address] any warranty or liability for your use of this information.

## 2021-11-01 NOTE — H&P
[address]  [address] [place]  [place], [address]  [phone]                                History and Physical     NAME: Kiesha Anderson   :  1965   MRN:  [id number]     HPI:  The patient was seen and examined. Past Surgical History:   Procedure Laterality Date    COLONOSCOPY N/A 3/10/2021    COLONOSCOPY,EGD performed by Iqra Redmond MD at [facility] ENDOSCOPY    [address]    x 1    HX CHOLECYSTECTOMY      OPEN    HX COLONOSCOPY  14    Dr. Placido Mccauley; 14    due to abnormal PAP.  HX DILATION AND CURETTAGE      due to miscarriage.  HX ENDOSCOPY      HX HEART CATHETERIZATION      no stents    HX KNEE ARTHROSCOPY Right     HX LAP GASTRIC BYPASS  2021    lap gastric bypass with hiatal hernia repair by Dr. [facility]ahid God[facility] at [facility]    [address] Right 2016    LUMBAR L4-5, L5-S1 ELLY. Dr. Mcghee Signs     Past Medical History:   Diagnosis Date    Allergic rhinitis 2010    Arthritis     Baker cyst, left 10/2019    Dr. Igor Brady cyst, right     Cervical disc herniation 2010    C3-4, C4-5, C5-6. Hemangioma body of C5. Dr. Natalia Campos.  Chest pain 2011, 2012    Dr. Tri Berg. Dr. Rosalina Dickerson; denies CP as of 3/27/14    Chronic lower back pain , 2016    thoracic DDD and spondylosis. DDD L3-S1. Dr. Clotilda Cabot. Lakeisha Antoine. Dr. Chong Ruiz, [facility]. Seb Jaramillo.  Chronic meniscal tear of knee     Chronic pain     lower back and knees    Esophagitis, reflux 4/3/2011    Gallstone     Gastritis 2010    Dr. Adali Silver.  Gastrointestinal food allergy 2014    Multiple. Dr. Franco Downs.    Heart palpitations 2012    DUE TO PAC'S AND PVC'S. Dr. James Simmons.     Heel spur, left 2019    Hiatal hernia 4/3/2011    HTN (hypertension) 1987    Impaired glucose tolerance 10/15/2010    Incidental lung nodule 01/08/13    LLL 3.9 mm, RML 3.1 mm;  as of 3/27/14 per pt stable w/o growth    Insomnia 1990s    Iron deficiency anemia 5/27/2010    Irritable bowel syndrome (IBS) 03/2014    Dr. Crissy Benjamin.    Knee pain 2012    Right. due to severe OA. / chipped bone     Left foot pain 08/2019    Dr. Fior Hanley.  Metatarsal bone fracture 1990    Left fifth.  Migraine 2/22/2011    Dr. Quiñonez Numbers Morbid obesity [facility])     Peroneal tendonitis of lower leg 08/07/2019    Left. Dr. Fior Hanley    Pre-diabetes     Radiculopathy, cervical 01/2010    Left. Dr. Rothman Right.  Shoulder pain, right 2012    due to [address] X 2. Dr. Jhonny Tony.  Thyroid nodule 2011    6 nodules- Dr. Mary Camp    Toe fracture, left 2008    fifth toe.  Triangular fibrocartilage complex tear 2012    Dr. Aron Garcia. Left.      Social History     Tobacco Use    Smoking status: Never Smoker    Smokeless tobacco: Never Used   Vaping Use    Vaping Use: Never used   Substance Use Topics    Alcohol use: No    Drug use: Never     Allergies   Allergen Reactions    Doxylamine Succinate Swelling     12.5-25 MG  HANDS, FACE/PERIORAL, FEET    Pcn [Penicillins] Hives    Ambien [Zolpidem] Nausea and Vomiting and Other (comments)     5 mg with Ativan 0.5 mg   TOO GROGGY, SLEPT 24 HOURS  7.5 MG FORGOT SHE WAS SLEEP EATING    Aspirin Nausea Only    Carafate [Sucralfate] Nausea and Vomiting    Celebrex [Celecoxib] Other (comments)     TIGHT SQUEEZING IN CHEST  CHEST ACHED    Erythromycin Nausea and Vomiting    Milk Prot-Turm-Pepper-Pineappl Other (comments)     Multiple food allergies    Neurontin [Gabapentin] Other (comments)     300 mg  SEVERE LEG PAIN  EYE TWITCHING    Nsaids (Non-Steroidal Anti-Inflammatory Drug) Other (comments)     GI irritation      Pineapple Unknown (comments)     Family History   Problem Relation Age of Onset    Diabetes Mother     Heart Disease Mother 58        2 stents    Hypertension Mother     Stroke Mother     Diabetes Father     Hypertension Father     Stroke Maternal Grandmother     Cancer Maternal Uncle         prostate    Diabetes Sister     No Known Problems Sister     No Known Problems Sister     Anesth Problems Neg Hx      Current Facility-Administered Medications   Medication Dose Route Frequency    0.9% sodium chloride infusion  50 mL/hr IntraVENous CONTINUOUS    sodium chloride (NS) flush 5-40 mL  5-40 mL IntraVENous Q8H    sodium chloride (NS) flush 5-40 mL  5-40 mL IntraVENous PRN    naloxone (NARCAN) injection 0.4 mg  0.4 mg IntraVENous Multiple    flumazeniL (ROMAZICON) 0.1 mg/mL injection 0.2 mg  0.2 mg IntraVENous Multiple    simethicone (MYLICON) 77UJ/7.7UH oral drops 80 mg  1.2 mL Oral Multiple    atropine injection 0.5 mg  0.5 mg IntraVENous ONCE PRN    EPINEPHrine (ADRENALIN) 0.1 mg/mL syringe 1 mg  1 mg Endoscopically ONCE PRN     Facility-Administered Medications Ordered in Other Encounters   Medication Dose Route Frequency    0.9% sodium chloride infusion   IntraVENous CONTINUOUS         PHYSICAL EXAM:  General: WD, WN. Alert, cooperative, no acute distress    HEENT: NC, Atraumatic. PERRLA, EOMI. Anicteric sclerae. Lungs:  CTA Bilaterally. No Wheezing/Rhonchi/Rales. Heart:  Regular  rhythm,  No murmur, No Rubs, No Gallops  Abdomen: Soft, Non distended, Non tender. +Bowel sounds, no HSM  Extremities: No c/c/e  Neurologic:  CN 2-12 gi, Alert and oriented X 3. No acute neurological distress   Psych:   Good insight. Not anxious nor agitated. The heart, lungs and mental status were satisfactory for the administration of MAC sedation and for the procedure. Mallampati score: 2     The patient was counseled at length about the risks of vamshi Covid-19 in the anaya-operative and post-operative states including the recovery window of their procedure.   The patient was made aware that vamshi Covid-19 after a surgical procedure may worsen their prognosis for recovering from the virus and lend to a higher morbidity and or mortality risk. The patient was given the options of postponing their procedure. All of the risks, benefits, and alternatives were discussed. The patient does  wish to proceed with the procedure.       Assessment:   · Gastric anastomotic stricture    Plan:   · Endoscopic procedure  · MAC sedation

## 2021-11-01 NOTE — ANESTHESIA POSTPROCEDURE EVALUATION
Post-Anesthesia Evaluation and Assessment    Patient: Bhavesh Oliver MRN: [id number]  SSN: [id number]    YOB: 1965  Age: 64 y.o. Sex: female      I have evaluated the patient and they are stable and ready for discharge from the PACU. Cardiovascular Function/Vital Signs  Visit Vitals  BP (!) 146/87   Pulse 62   Temp 37.1 °C (98.7 °F)   Resp 15   Ht 5' 8\" (1.727 m)   Wt 91.6 kg (202 lb)   SpO2 99%   Breastfeeding No   BMI 30.71 kg/m²       Patient is status post MAC anesthesia for Procedure(s):  ESOPHAGOGASTRODUODENOSCOPY (EGD) WITH DILATION   :-  ESOPHAGEAL DILATION. Nausea/Vomiting: None    Postoperative hydration reviewed and adequate. Pain:  Pain Scale 1: Numeric (0 - 10) (11/01/21 1520)  Pain Intensity 1: 0 (11/01/21 1520)   Managed    Neurological Status: At baseline    Mental Status, Level of Consciousness: Alert and  oriented to person, place, and time    Pulmonary Status:   O2 Device: None (Room air) (11/01/21 1520)   Adequate oxygenation and airway patent    Complications related to anesthesia: None    Post-anesthesia assessment completed. No concerns    Signed By: Mike Morgan MD     November 1, 2021              Procedure(s):  ESOPHAGOGASTRODUODENOSCOPY (EGD) WITH DILATION   :-  ESOPHAGEAL DILATION. MAC    <BSHSIANPOST>    INITIAL Post-op Vital signs:   Vitals Value Taken Time   /87 11/01/21 1520   Temp 37.1 °C (98.7 °F) 11/01/21 1459   Pulse 61 11/01/21 1520   Resp 17 11/01/21 1520   SpO2 98 % 11/01/21 1520   Vitals shown include unvalidated device data.

## 2021-11-01 NOTE — PERIOP NOTES

## 2021-11-09 ENCOUNTER — TELEPHONE (OUTPATIENT)
Dept: SURGERY | Age: 56
End: 2021-11-09

## 2021-11-09 NOTE — TELEPHONE ENCOUNTER
Called patient to remind them of their up coming virtual appointment on Thursday, November 11th and Inform the patient of our cancellation policy and [employer].

## 2021-11-11 ENCOUNTER — VIRTUAL VISIT (OUTPATIENT)
Dept: SURGERY | Age: 56
End: 2021-11-11
Payer: COMMERCIAL

## 2021-11-11 VITALS — BODY MASS INDEX: 30.1 KG/M2 | WEIGHT: 198.6 LBS | HEIGHT: 68 IN

## 2021-11-11 DIAGNOSIS — K91.2 POSTOPERATIVE INTESTINAL MALABSORPTION: Primary | ICD-10-CM

## 2021-11-11 DIAGNOSIS — K91.89 ANASTOMOTIC STRICTURE OF GASTROJEJUNOSTOMY: ICD-10-CM

## 2021-11-11 DIAGNOSIS — E66.9 OBESITY (BMI 30-39.9): ICD-10-CM

## 2021-11-11 PROCEDURE — 99213 OFFICE O/P EST LOW 20 MIN: CPT | Performed by: NURSE PRACTITIONER

## 2021-11-11 RX ORDER — DEXTROMETHORPHAN HYDROBROMIDE, GUAIFENESIN 5; 100 MG/5ML; MG/5ML
650 LIQUID ORAL EVERY 8 HOURS
COMMUNITY

## 2021-11-11 NOTE — PROGRESS NOTES
I was in the office while conducting this encounter. Consent:  She and/or her healthcare decision maker is aware that this patient-initiated Telehealth encounter is a billable service, with coverage as determined by her insurance carrier. She is aware that she may receive a bill and has provided verbal consent to proceed: Yes    This virtual visit was conducted via [email or web address]. Pursuant to the emergency declaration under the [facility] waiver authority and the [employer] and [employer] Act, this Virtual  Visit was conducted to reduce the patient's risk of exposure to COVID-19 and provide continuity of care for an established patient. Services were provided through a video synchronous discussion virtually to substitute for in-person clinic visit. Due to this being a TeleHealth evaluation, many elements of the physical examination are unable to be assessed. Total Time: minutes: 11-20 minutes. Chief Complaint   Patient presents with    Post OP Follow Up     VVOV: 1 month follow up from pt's surgery- 10/11/2021: Esophagogastroduodenoscopy (Hancocksiri Johnston) W/Dilation-N/A- Dr. Ta Sykes- Pt requested to be put on Gay's schedule- Last seen by Brigette Garcia 10/1/2021       [facility] 5-month status post laparoscopic gastric bypass for treatment of morbid obesity complicated by recurrent stricture at the gastrojejunal anastomosis. She has had serial dilatations multiple times. She thinks she has had 6 upper endoscopies. Her most recent endoscopy with dilatation was a few weeks ago and she was dilated to 15 mm. She is scheduled for repeat endoscopy with dilatation November 22 with Dr. Marco Robledo  she feels well  says her energy level is good  her food tolerance has improved but she had a difficult time knowing when she was full.   She said she would go beyond the point of fullness and become very uncomfortable because she did not get an earlier sensation. She started eating slower which has helped. She tolerates about 2 ounces at a time. Her diet consists mostly of applesauce, eggbeaters with cheese, tuna salad and protein shakes. She says she does okay with string beans, broccoli and cauliflower. She has not tried any raw vegetables. She is fearful she is eating too much. She is fearful of gaining weight. She reports Monday through Friday she does very well with eating and she is very structured. She does 2 protein shakes, \"all of my fluids\" and usually an egg+cheese. No vomiting   Some nausea if overeats   BM every day \"my applesauce keeps me regular\"  Taking vitamins every day   Active   Recent labs were excellent   Fearful of weight gain and \"see myself as fat'       Co-Morbid(s)     Was anti coagulation initiated for presumed / confirmed DVT/PE? NO    Was an incisional hernia noted on exam?       NO      COMORBIDITY     SLEEP APNEA                 NO        GERD  (req.meds)           YES  HYPERLIPIDEMIA           NO  HYPERTENSION             NO       IF YES, # OF HTN MEDICATIONS 0  DIABETES                        NO      IF YES, 0 NON-INSULIN   0 INSULIN       Visit Vitals  Ht 5' 8\" (1.727 m)   Wt 198 lb 9.6 oz (90.1 kg)   LMP  (LMP Unknown)   BMI 30.20 kg/m²     Appears well   Speech clear and breathing unlabored       ICD-10-CM ICD-9-CM    1. Postoperative intestinal malabsorption  K91.2 579.3    2. Obesity (BMI 30-39. 9)  E66.9 278.00    3.  Anastomotic stricture of gastrojejunostomy  K91.89 997.49      5 months s/p gastric bypass complicated by chronic gastrojejunal stricture most recently dilated to 15 mm  Has another appointment with GI 11/22/2021 for EGD with possible dilatation  Reviewed bariatric diet, portions, soft texture, recognizing feelings of fullness and slowing down for meals  Referred to dietitian for additional support  Labs were reviewed again with patient and continue bariatric vitamins  Follow-up in 3 months  Support group information provided  [facility] verbalized understanding and questions were answered to the best of my knowledge and ability. Diet and activity  educational materials were provided.

## 2021-11-11 NOTE — Clinical Note
She could use some additional support. She is 5 months gastric bypass complicated by recurrent GJ stricture. She is doing well overall but needs some reassurance from a dietary standpoint.   Thanks JAYDEN

## 2021-11-11 NOTE — PROGRESS NOTES
1. Have you been to the ER, urgent care clinic since your last visit? Hospitalized since your last visit? No    2. Have you seen or consulted any other health care providers outside of the [address] [place] [address] since your last visit? Include any pap smears or colon screening.  No

## 2021-11-11 NOTE — PATIENT INSTRUCTIONS
Plan on follow-up in February. You can call [phone] for an appointment. You can also request in my chart. If you would like to make an appointment with one of the bariatric dietitians, please call the bariatric line at [phone]. Appointments are offered in person and virtual at no charge. Continue with all your bariatric vitamins. Your labs are excellent so keep doing what you are doing. Allow at least 30 minutes for a meal.  Is okay to stay with 2 ounce portions since that is what you seem to tolerate. Continue your protein shakes twice a day and drink your water. [employer] Support [facility]   2nd Thursday of each Month from 6-7 pm   The next one is 11/11/21 6-7 pm   You can access the link at [email or web address]  Go to the Calendar tab and click on the date and it should go right to the link     Additional Resources:  Siobhan Malhotra:  [email or web address]    Offering online support groups and pre-recorded videos  o Every Wednesday evening at 7:00 pm   [employer] page  [address], Advice, and Motivation from fellow patients  Bariatric Pal: [email or web address]  [employer] has launched a podcast!  Hosted by Anil Mcmillan, our podcast will cover topics about obesity, pre-weight loss surgery, post-weight loss surgery, food addiction, emotional eating, myths about weight loss surgery, and more. Each episode will feature an expert in the field of weight loss and bariatric surgery who can provide a deeper insight into these topics.   AC[employer]:  [employer]   Offering discounted exercise programs (8 Week programs, On-Demand/Virtual)   See flyer   [address] at [email or web address] or [phone]) [phone]

## 2021-11-17 ENCOUNTER — HOSPITAL ENCOUNTER (OUTPATIENT)
Dept: PREADMISSION TESTING | Age: 56
Discharge: HOME | End: 2021-11-17
Attending: INTERNAL MEDICINE
Payer: COMMERCIAL

## 2021-11-17 ENCOUNTER — TRANSCRIBE ORDER (OUTPATIENT)
Dept: REGISTRATION | Age: 56
End: 2021-11-17

## 2021-11-17 DIAGNOSIS — Z01.812 PRE-PROCEDURE LAB EXAM: Primary | ICD-10-CM

## 2021-11-17 DIAGNOSIS — Z01.812 PRE-PROCEDURE LAB EXAM: ICD-10-CM

## 2021-11-17 PROCEDURE — U0005 INFEC AGEN DETEC AMPLI PROBE: HCPCS

## 2021-11-18 LAB
SARS-COV-2, XPLCVT: NOT DETECTED
SOURCE, COVRS: NORMAL

## 2021-11-22 ENCOUNTER — ANESTHESIA EVENT (OUTPATIENT)
Dept: ENDOSCOPY | Age: 56
End: 2021-11-22
Payer: COMMERCIAL

## 2021-11-22 ENCOUNTER — CLINICAL SUPPORT (OUTPATIENT)
Dept: SURGERY | Age: 56
End: 2021-11-22

## 2021-11-22 ENCOUNTER — ANESTHESIA (OUTPATIENT)
Dept: ENDOSCOPY | Age: 56
End: 2021-11-22
Payer: COMMERCIAL

## 2021-11-22 ENCOUNTER — HOSPITAL ENCOUNTER (OUTPATIENT)
Age: 56
Setting detail: OUTPATIENT SURGERY
Discharge: HOME | End: 2021-11-22
Attending: INTERNAL MEDICINE | Admitting: INTERNAL MEDICINE
Payer: COMMERCIAL

## 2021-11-22 VITALS
SYSTOLIC BLOOD PRESSURE: 141 MMHG | RESPIRATION RATE: 17 BRPM | BODY MASS INDEX: 29.63 KG/M2 | WEIGHT: 195.5 LBS | HEART RATE: 62 BPM | HEIGHT: 68 IN | TEMPERATURE: 98.9 F | OXYGEN SATURATION: 98 % | DIASTOLIC BLOOD PRESSURE: 88 MMHG

## 2021-11-22 DIAGNOSIS — E66.01 MORBID OBESITY (HCC): Primary | ICD-10-CM

## 2021-11-22 PROCEDURE — 74011000250 HC RX REV CODE- 250: Performed by: ANESTHESIOLOGY

## 2021-11-22 PROCEDURE — 77030018712 HC DEV BLLN INFL BSC -B: Performed by: INTERNAL MEDICINE

## 2021-11-22 PROCEDURE — C1726 CATH, BAL DIL, NON-VASCULAR: HCPCS | Performed by: INTERNAL MEDICINE

## 2021-11-22 PROCEDURE — 74011250636 HC RX REV CODE- 250/636: Performed by: ANESTHESIOLOGY

## 2021-11-22 PROCEDURE — 76060000031 HC ANESTHESIA FIRST 0.5 HR: Performed by: INTERNAL MEDICINE

## 2021-11-22 PROCEDURE — 76040000019: Performed by: INTERNAL MEDICINE

## 2021-11-22 PROCEDURE — 2709999900 HC NON-CHARGEABLE SUPPLY: Performed by: INTERNAL MEDICINE

## 2021-11-22 RX ORDER — SODIUM CHLORIDE 0.9 % (FLUSH) 0.9 %
5-40 SYRINGE (ML) INJECTION AS NEEDED
Status: DISCONTINUED | OUTPATIENT
Start: 2021-11-22 | End: 2021-11-22 | Stop reason: HOSPADM

## 2021-11-22 RX ORDER — ATROPINE SULFATE 0.1 MG/ML
0.5 INJECTION INTRAVENOUS
Status: DISCONTINUED | OUTPATIENT
Start: 2021-11-22 | End: 2021-11-22 | Stop reason: HOSPADM

## 2021-11-22 RX ORDER — FLUMAZENIL 0.1 MG/ML
0.2 INJECTION INTRAVENOUS
Status: DISCONTINUED | OUTPATIENT
Start: 2021-11-22 | End: 2021-11-22 | Stop reason: HOSPADM

## 2021-11-22 RX ORDER — SODIUM CHLORIDE 0.9 % (FLUSH) 0.9 %
5-40 SYRINGE (ML) INJECTION EVERY 8 HOURS
Status: DISCONTINUED | OUTPATIENT
Start: 2021-11-22 | End: 2021-11-22 | Stop reason: HOSPADM

## 2021-11-22 RX ORDER — SODIUM CHLORIDE 9 MG/ML
INJECTION, SOLUTION INTRAVENOUS
Status: DISCONTINUED | OUTPATIENT
Start: 2021-11-22 | End: 2021-11-22 | Stop reason: HOSPADM

## 2021-11-22 RX ORDER — NALOXONE HYDROCHLORIDE 0.4 MG/ML
0.4 INJECTION, SOLUTION INTRAMUSCULAR; INTRAVENOUS; SUBCUTANEOUS
Status: DISCONTINUED | OUTPATIENT
Start: 2021-11-22 | End: 2021-11-22 | Stop reason: HOSPADM

## 2021-11-22 RX ORDER — PROPOFOL 10 MG/ML
INJECTION, EMULSION INTRAVENOUS AS NEEDED
Status: DISCONTINUED | OUTPATIENT
Start: 2021-11-22 | End: 2021-11-22 | Stop reason: HOSPADM

## 2021-11-22 RX ORDER — EPINEPHRINE 0.1 MG/ML
1 INJECTION INTRACARDIAC; INTRAVENOUS
Status: DISCONTINUED | OUTPATIENT
Start: 2021-11-22 | End: 2021-11-22 | Stop reason: HOSPADM

## 2021-11-22 RX ORDER — SODIUM CHLORIDE 9 MG/ML
50 INJECTION, SOLUTION INTRAVENOUS CONTINUOUS
Status: DISCONTINUED | OUTPATIENT
Start: 2021-11-22 | End: 2021-11-22 | Stop reason: HOSPADM

## 2021-11-22 RX ORDER — LIDOCAINE HYDROCHLORIDE 20 MG/ML
INJECTION, SOLUTION EPIDURAL; INFILTRATION; INTRACAUDAL; PERINEURAL AS NEEDED
Status: DISCONTINUED | OUTPATIENT
Start: 2021-11-22 | End: 2021-11-22 | Stop reason: HOSPADM

## 2021-11-22 RX ORDER — DEXTROMETHORPHAN/PSEUDOEPHED 2.5-7.5/.8
1.2 DROPS ORAL
Status: DISCONTINUED | OUTPATIENT
Start: 2021-11-22 | End: 2021-11-22 | Stop reason: HOSPADM

## 2021-11-22 RX ADMIN — PROPOFOL 25 MG: 10 INJECTION, EMULSION INTRAVENOUS at 10:29

## 2021-11-22 RX ADMIN — LIDOCAINE HYDROCHLORIDE 50 MG: 20 INJECTION, SOLUTION EPIDURAL; INFILTRATION; INTRACAUDAL; PERINEURAL at 10:22

## 2021-11-22 RX ADMIN — PROPOFOL 100 MG: 10 INJECTION, EMULSION INTRAVENOUS at 10:22

## 2021-11-22 RX ADMIN — PROPOFOL 50 MG: 10 INJECTION, EMULSION INTRAVENOUS at 10:25

## 2021-11-22 RX ADMIN — SODIUM CHLORIDE: 900 INJECTION, SOLUTION INTRAVENOUS at 10:15

## 2021-11-22 NOTE — PROGRESS NOTES
Marci Khan Fortune  1965  [id number]    Situation:  Verbal report received from: Jacquie procedural RN  Procedure: Procedure(s):  ESOPHAGOGASTRODUODENOSCOPY (EGD)   :-  ESOPHAGEAL DILATION    Background:    Preoperative diagnosis: Change in bowel habits [R19.4]  Gastroesophageal reflux disease, unspecified whether esophagitis present [K21.9]  Nausea [R11.0]  Obesity, unspecified classification, unspecified obesity type, unspecified whether serious comorbidity present [E66.9]  Postoperative diagnosis: Gastric anastamosis stricture    :  Dr. Susan Cowan. Mate[place]  Assistant(s): Endoscopy Technician-1: Velia Dupree  Endoscopy RN-1: Asaf Correia RN    Specimens: * No specimens in log *  H. Pylori  no    Assessment:    Anesthesia gave intra-procedure sedation and medications, see anesthesia flow sheet yes    Intravenous fluids: NS@ KVO     Vital signs stable     Abdominal assessment: round and soft     Recommendation:  Discharge patient per MD order.     Family :  Daughter in waiting room   Permission to share finding with family or friend no

## 2021-11-22 NOTE — H&P
[address]  217 [facility] 140 [facility], [address]  [phone]                                History and Physical     NAME: Geena Bradford   :  1965   MRN:  [id number]     HPI:  The patient was seen and examined. Past Surgical History:   Procedure Laterality Date    COLONOSCOPY N/A 3/10/2021    COLONOSCOPY,EGD performed by Lexa Angulo MD at [facility] ENDOSCOPY    [address]    x 1    HX CHOLECYSTECTOMY      OPEN    HX COLONOSCOPY  14    Dr. Nola Mandel; 14    due to abnormal PAP.  HX DILATION AND CURETTAGE      due to miscarriage.  HX ENDOSCOPY      HX HEART CATHETERIZATION      no stents    HX KNEE ARTHROSCOPY Right     HX LAP GASTRIC BYPASS  2021    lap gastric bypass with hiatal hernia repair by Dr. Alberto Bone at [facility]    [address] Right 2016    LUMBAR L4-5, L5-S1 ELLY. Dr. Alyse Lamb     Past Medical History:   Diagnosis Date    Allergic rhinitis 2010    Arthritis     Baker cyst, left 10/2019    Dr. Washburn Police cyst, right     Cervical disc herniation 2010    C3-4, C4-5, C5-6. Hemangioma body of C5. Dr. Rothman Right.  Chest pain 2011, 2012    Dr. Jet Yeager. Dr. Tomás Looney; denies CP as of 3/27/14    Chronic lower back pain , 2016    thoracic DDD and spondylosis. DDD L3-S1. Dr. Gail Garcia. Gennaro Dimas. Dr. Annette Mcgowan, [facility]. Dr. Charlotte Pettit.  Chronic meniscal tear of knee     Chronic pain     lower back and knees    Esophagitis, reflux 4/3/2011    Gallstone     Gastritis 2010    Dr. Quinn Scheuermann.  Gastrointestinal food allergy 2014    Multiple. Dr. Hannah Hodgson.    Heart palpitations 2012    DUE TO PAC'S AND PVC'S. Dr. Donovan Lim.     Heel spur, left 2019    Hiatal hernia 4/3/2011    HTN (hypertension) 1987    Impaired glucose tolerance 10/15/2010    Incidental lung nodule 01/08/13    LLL 3.9 mm, RML 3.1 mm;  as of 3/27/14 per pt stable w/o growth    Insomnia 1990s    Iron deficiency anemia 5/27/2010    Irritable bowel syndrome (IBS) 03/2014    Dr. Bart Arguello.    Knee pain 2012    Right. due to severe OA. / chipped bone     Left foot pain 08/2019    Dr. Flaco Pastrana.  Metatarsal bone fracture 1990    Left fifth.  Migraine 2/22/2011    Dr. Michael Thomas Morbid obesity [facility])     Peroneal tendonitis of lower leg 08/07/2019    Left. Dr. Flaco Pastrana    Pre-diabetes     Radiculopathy, cervical 01/2010    Left. Dr. Amanda Villatoro.  Shoulder pain, right 2012    due to [address] X 2. Dr. Rosa Rod.  Thyroid nodule 2011    6 nodules- Dr. Mark Anthony Coppola    Toe fracture, left 2008    fifth toe.  Triangular fibrocartilage complex tear 2012    Dr. Isabella Rodriguez. Left.      Social History     Tobacco Use    Smoking status: Never Smoker    Smokeless tobacco: Never Used   Vaping Use    Vaping Use: Never used   Substance Use Topics    Alcohol use: No    Drug use: Never     Allergies   Allergen Reactions    Doxylamine Succinate Swelling     12.5-25 MG  HANDS, FACE/PERIORAL, FEET    Pcn [Penicillins] Hives    Ambien [Zolpidem] Nausea and Vomiting and Other (comments)     5 mg with Ativan 0.5 mg   TOO GROGGY, SLEPT 24 HOURS  7.5 MG FORGOT SHE WAS SLEEP EATING    Aspirin Nausea Only    Carafate [Sucralfate] Nausea and Vomiting    Celebrex [Celecoxib] Other (comments)     TIGHT SQUEEZING IN CHEST  CHEST ACHED    Erythromycin Nausea and Vomiting    Milk Prot-Turm-Pepper-Pineappl Other (comments)     Multiple food allergies    Neurontin [Gabapentin] Other (comments)     300 mg  SEVERE LEG PAIN  EYE TWITCHING    Nsaids (Non-Steroidal Anti-Inflammatory Drug) Other (comments)     GI irritation      Pineapple Unknown (comments)     Family History   Problem Relation Age of Onset    Diabetes Mother     Heart Disease Mother 58        2 stents    Hypertension Mother     Stroke Mother     Diabetes Father     Hypertension Father     Stroke Maternal Grandmother     Cancer Maternal Uncle         prostate    Diabetes Sister     No Known Problems Sister     No Known Problems Sister     Anesth Problems Neg Hx      Current Facility-Administered Medications   Medication Dose Route Frequency    0.9% sodium chloride infusion  50 mL/hr IntraVENous CONTINUOUS    sodium chloride (NS) flush 5-40 mL  5-40 mL IntraVENous Q8H    sodium chloride (NS) flush 5-40 mL  5-40 mL IntraVENous PRN    naloxone (NARCAN) injection 0.4 mg  0.4 mg IntraVENous Multiple    flumazeniL (ROMAZICON) 0.1 mg/mL injection 0.2 mg  0.2 mg IntraVENous Multiple    simethicone (MYLICON) 68FY/2.3OO oral drops 80 mg  1.2 mL Oral Multiple    atropine injection 0.5 mg  0.5 mg IntraVENous ONCE PRN    EPINEPHrine (ADRENALIN) 0.1 mg/mL syringe 1 mg  1 mg Endoscopically ONCE PRN         PHYSICAL EXAM:  General: WD, WN. Alert, cooperative, no acute distress    HEENT: NC, Atraumatic. PERRLA, EOMI. Anicteric sclerae. Lungs:  CTA Bilaterally. No Wheezing/Rhonchi/Rales. Heart:  Regular  rhythm,  No murmur, No Rubs, No Gallops  Abdomen: Soft, Non distended, Non tender. +Bowel sounds, no HSM  Extremities: No c/c/e  Neurologic:  CN 2-12 gi, Alert and oriented X 3. No acute neurological distress   Psych:   Good insight. Not anxious nor agitated. The heart, lungs and mental status were satisfactory for the administration of MAC sedation and for the procedure. Mallampati score: 2     The patient was counseled at length about the risks of vamshi Covid-19 in the anaya-operative and post-operative states including the recovery window of their procedure. The patient was made aware that vamshi Covid-19 after a surgical procedure may worsen their prognosis for recovering from the virus and lend to a higher morbidity and or mortality risk.   The patient was given the options of postponing their procedure. All of the risks, benefits, and alternatives were discussed. The patient does  wish to proceed with the procedure.       Assessment:   · Gastric anastomotic stricture    Plan:   · Endoscopic procedure  · MAC sedation

## 2021-11-22 NOTE — PROGRESS NOTES
Post-Operative Bariatric Nutrition Counseling (Initial)    Physician/Surgeon: Dr. Paul Parham  Name: Jaja Melgar  : 1965        Reason for visit: Follow up nutrition education and counseling post gastric bypass; recurrent stricture at the gastrojejunal anastomosis- serial dilatations multiple times. ASSESSMENT:  Date of surgery: 2021    Past Medical History: acid reflux  Medications/Supplements:   Prior to Admission medications    Medication Sig Start Date End Date Taking? Authorizing Provider   acetaminophen (TYLENOL) 650 mg TbER Take 650 mg by mouth every eight (8) hours. Provider, Historical   tiZANidine (ZANAFLEX) 2 mg tablet Take 1 Tablet by mouth two (2) times daily as needed for Muscle Spasm(s). 10/12/21   Monique WIGGINS NP   cholecalciferol (VITAMIN D3) (50,000 UNITS /1250 MCG) capsule Take 1 Capsule by mouth every seven (7) days. 10/7/21   Monique WIGGINS NP   fluticasone propionate (FLONASE) 50 mcg/actuation nasal spray 2 Sprays by Both Nostrils route daily. Indications: allergic conjunctivitis 10/7/21   Monique WIGGINS NP   fexofenadine (ALLEGRA) 60 mg tablet Take 1 Tablet by mouth daily. 10/7/21   Monique WIGGINS NP   pantoprazole (PROTONIX) 40 mg tablet Take 1 Tablet by mouth two (2) times a day. 10/1/21   Jeanine Hazel, NP   multivitamin with iron (FLINTSTONES) chewable tablet Take 1 Tablet by mouth daily. Provider, Historical   cyanocobalamin (Vitamin B-12) 100 mcg tablet Take 100 mcg by mouth daily. Provider, Historical   calcium-cholecalciferol, D3, (CALTRATE 600+D) tablet Take 1 Tablet by mouth daily. Provider, Historical   ondansetron (ZOFRAN ODT) 4 mg disintegrating tablet Take 1 Tab by mouth every eight (8) hours as needed for Nausea or Vomiting. Patient not taking: Reported on 2021   Jeanine Hazel, NP   docusate sodium (COLACE) 100 mg capsule Take 100 mg by mouth as needed for Constipation.     Provider, Historical       Pt takes recommended supplements as prescribed:   Multivitamin w/iron   Calcium Citrate: yes    Vitamin D3: prescription   Vitamin B12: yes      Food Allergies/Intolerances: lactose intolerant (since surgery)    Anthropometrics:     Ht: 68 inches   Wt :193 lbs (pt reported)   Pre-op wt: 265 lbs   Net Wt Loss: 27 (%) Goal wt: 150-160 lbs  IBW: 140 lbs  %IBW: 137   BMI: 29  Category: Overweight    Reported wt history: Pt has lost 72 lbs since gastric bypass in past 5 months. Pt stated goal wt 150-160 lbs. Indicated fearful of gaining weight if eating 3 meals a day. Exercise/Physical Actvity:  Was walking but stopped due to cold weather. Reported Diet History: A post op nutrition checklist and 24 hour diet recall were obtained from patient; Consumes 3 meals per day Not over weekends   Includes a lean source of protein with each meal yes   Measures all meals to 1/2-1 cup yes   Limits dining out and orders with special request n/a   Consumes meals over 20-30 minutes \"Working on it\"   Monitors hunger and fullness cues yes   Follows the 30-30-30 rule yes   Sips 48-64 oz of sugar free, calorie free, non-carbonated beverages per day no   Utilizes food journal for self-monitoring no   Attends Bariatric Support Group no       24 Hour Diet Recall  Breakfast  Protein shake- 30g protein (Premier whey powder w/unsweet almond milk)   Lunch  Tuna pouch w/phillips- 17g protein or applesauce   snack  Protein shake-30g   Dinner  Egg- 14g w/cheese or tuna   Snacks     Beverages  Water- 24-32 oz/day , coffee w/cream (sugar free)     Weekend- cheese, protein shake or egg beater w/cheese (1 meal for whole day).  Pt with recent dilation (pt states dilation successful) and is starting to move on to moist meat stage but somewhat hesitant due to past issues with textures     Environmental/Social/Psychological: Afraid of gaining wt; sometimes busy and skips meals      NUTRITION DIAGNOSIS:  Insufficient fluid intake R/T lack of focus on adequate fluid intake (along with stricture issues) AEB pt typically consuming ~24 oz water daily. Disordered eating pattern R/T lack of planning/busy schedule on weekends AEB pt only consuming 1 meal on weekends. NUTRITION INTERVENTION:  Discussed importance of increasing fluid intake with a goal of 64 oz per day- possibly eliminating afternoon protein shake (pt appears to be consuming adequate protein) to allow for increased clear fluids. Recommended slowly advancing to moist diet stage- adding in more variety of foods monitoring for tolerance and chewing food well. Discussed measuring meals to 1/2 cup portion and consuming protein first at meals. NUTRITION MONITORING AND EVALUATION:    1. Increase water to 48 oz per day; decrease to one protein shake per day; protein at each meal and snack  2. Try lactose free Greek yogurt  3. Measure whole meal to 1/2 cup portion- eat protein first at meal  4. Continue to eat slowly-monitor for fullness      Specific tips and techniques to facilitate compliance with above recommendations were provided and discussed. Pt was strongly encouraged to begin making necessary changes now, attend support group, and re-visit the dietitian in one month. If further details are needed contact at [phone].

## 2021-11-22 NOTE — DISCHARGE INSTRUCTIONS
118 [facility].  217 [address]  [id number]  1965    DISCOMFORT:  Sore throat- throat lozenges or warm salt water gargle  redness at IV site- apply warm compress to area; if redness or soreness persist- contact your physician  Gaseous discomfort- walking, belching will help relieve any discomfort    DIET  You may eat and drink after you leave. You may resume your regular diet - however -  remember your colon is empty and a heavy meal will produce gas. Avoid these foods:  vegetables, fried / greasy foods, carbonated drinks   You may not drink alcoholic beverages for at least 12 hours    ACTIVITY  You may resume your normal daily activities   Spend the remainder of the day resting -  avoid any strenuous activity. You may not operate a vehicle for 12 hours  You may not engage in an occupation involving machinery or appliances for rest of today  Avoid making any critical decisions for at least 24 hour    CALL M.D. ANY SIGN OF   Increasing pain, nausea, vomiting  Abdominal distension (swelling)  New increased bleeding (oral or rectal)  Fever (chills)  Pain in chest area  Bloody discharge from nose or mouth  Shortness of breath    Follow-up Instructions:   Call Dr. Calista Peterson for any questions or problems. If we took a biopsy please call the office within 2 weeks to discuss your pathology results. Telephone # [phone]       ENDOSCOPY FINDINGS:  Gastric anastamotic stricture     Learning About Coronavirus (COVID-19)  Coronavirus (COVID-19): Overview  What is coronavirus (COVID-19)? The coronavirus disease (COVID-19) is caused by a virus. It is an illness that was first found in [place], [place], in December 2019. It has since spread worldwide. The virus can cause fever, cough, and trouble breathing. In severe cases, it can cause pneumonia and make it hard to breathe without help.  It can cause death. Coronaviruses are a large group of viruses. They cause the common cold. They also cause more serious illnesses like Middle East respiratory syndrome (MERS) and severe acute respiratory syndrome (SARS). COVID-19 is caused by a novel coronavirus. That means it's a new type that has not been seen in people before. This virus spreads person-to-person through droplets from coughing and sneezing. It can also spread when you are close to someone who is infected. And it can spread when you touch something that has the virus on it, such as a doorknob or a tabletop. What can you do to protect yourself from coronavirus (COVID-19)? The best way to protect yourself from getting sick is to:  · Avoid areas where there is an outbreak. · Avoid contact with people who may be infected. · Wash your hands often with soap or alcohol-based hand sanitizers. · Avoid crowds and try to stay at least 6 feet away from other people. · Wash your hands often, especially after you cough or sneeze. Use soap and water, and scrub for at least 20 seconds. If soap and water aren't available, use an alcohol-based hand . · Avoid touching your mouth, nose, and eyes. What can you do to avoid spreading the virus to others? To help avoid spreading the virus to others:  · Cover your mouth with a tissue when you cough or sneeze. Then throw the tissue in the trash. · Use a disinfectant to clean things that you touch often. · Stay home if you are sick or have been exposed to the virus. Don't go to school, work, or public areas. And don't use public transportation. · If you are sick:  ? Leave your home only if you need to get medical care. But call the doctor's office first so they know you're coming. And wear a face mask, if you have one.  ? If you have a face mask, wear it whenever you're around other people. It can help stop the spread of the virus when you cough or sneeze. ? Clean and disinfect your home every day.  Use household  and disinfectant wipes or sprays. Take special care to clean things that you grab with your hands. These include doorknobs, remote controls, phones, and handles on your refrigerator and microwave. And don't forget countertops, tabletops, bathrooms, and computer keyboards. When to call for help  Call 911 anytime you think you may need emergency care. For example, call if:  · You have severe trouble breathing. (You can't talk at all.)  · You have constant chest pain or pressure. · You are severely dizzy or lightheaded. · You are confused or can't think clearly. · Your face and lips have a blue color. · You pass out (lose consciousness) or are very hard to wake up. Call your doctor now if you develop symptoms such as:  · Shortness of breath. · Fever. · Cough. If you need to get care, call ahead to the doctor's office for instructions before you go. Make sure you wear a face mask, if you have one, to prevent exposing other people to the virus. Where can you get the latest information? The following health organizations are tracking and studying this virus. Their websites contain the most up-to-date information. Marcel Hemphill also learn what to do if you think you may have been exposed to the virus. · U.S. Centers for Disease Control and Prevention (CDC): The CDC provides updated news about the disease and travel advice. The website also tells you how to prevent the spread of infection. www.cdc.gov  · World Health Organization [facility]): WHO offers information about the virus outbreaks. WHO also has travel advice. www.who.int  Current as of: April 1, 2020               Content Version: 12.4  © 6738-2877 Healthwise, Incorporated.    Care instructions adapted under license by your healthcare professional. If you have questions about a medical condition or this instruction, always ask your healthcare professional. [address] any warranty or liability for your use of this information.

## 2021-11-22 NOTE — ANESTHESIA POSTPROCEDURE EVALUATION
Post-Anesthesia Evaluation and Assessment    Patient: Pelon Tobias MRN: [id number]  SSN: [id number]    YOB: 1965  Age: 64 y.o. Sex: female      I have evaluated the patient and they are stable and ready for discharge from the PACU. Cardiovascular Function/Vital Signs  Visit Vitals  BP (!) 141/88   Pulse 62   Temp 37.2 °C (98.9 °F)   Resp 17   Ht 5' 8\" (1.727 m)   Wt 88.7 kg (195 lb 8 oz)   SpO2 98%   Breastfeeding No   BMI 29.73 kg/m²       Patient is status post MAC anesthesia for Procedure(s):  ESOPHAGOGASTRODUODENOSCOPY (EGD)   :-  ESOPHAGEAL DILATION. Nausea/Vomiting: None    Postoperative hydration reviewed and adequate. Pain:  Pain Scale 1: Visual (11/22/21 1040)  Pain Intensity 1: 0 (11/22/21 1481)   Managed    Neurological Status: At baseline    Mental Status, Level of Consciousness: Alert and  oriented to person, place, and time    Pulmonary Status:   O2 Device: None (Room air) (11/22/21 1040)   Adequate oxygenation and airway patent    Complications related to anesthesia: None    Post-anesthesia assessment completed. No concerns    Signed By: Louis Edwards MD     November 22, 2021              Procedure(s):  ESOPHAGOGASTRODUODENOSCOPY (EGD)   :-  ESOPHAGEAL DILATION. MAC    <BSHSIANPOST>    INITIAL Post-op Vital signs:   Vitals Value Taken Time   /88 11/22/21 1055   Temp 37.2 °C (98.9 °F) 11/22/21 1038   Pulse 61 11/22/21 1056   Resp 16 11/22/21 1056   SpO2 98 % 11/22/21 1055   Vitals shown include unvalidated device data.

## 2021-11-22 NOTE — PERIOP NOTES
.Patient has been evaluated by anesthesia pre-procedure. Patient alert and oriented. Vital signs will not be charted by the Endoscopy nurse. All vitals, airway, and loc are monitored by anesthesia staff throughout procedure. CRE balloon dilatation of the esophagus   15 mm Balloon inflated to 3 ATMs and held for 30 seconds. 16 mm Balloon inflated to 4.5 ATMs and held for 60 seconds. No subcutaneous crepitus of the chest or cervical region was noted post dilatation.     .Endoscope will be pre-cleaned at bedside immediately following procedure by [facility]

## 2021-11-22 NOTE — PROCEDURES
118 [facility].  217 [facility] 140 [facility], [address]  [phone]                            NAME:  Nicholas Garcia   :   1965   MRN:   [id number]     Date/Time:  2021 10:34 AM    Esophagogastroduodenoscopy (EGD) Procedure Note    :  Jorge Smith MD    Staff: Endoscopy Technician-1: Maribel Soto  Endoscopy RN-1: Alee Anguiano RN     Implants: none    Referring Provider:  Vignesh Pires NP    Anethesia/Sedation:  MAC anesthesia    Procedure Details     After infom consent was obtained for the procedure, with all risks and benefits of procedure explained the patient was taken to the endoscopy suite and placed in the left lateral decubitus position. Following sequential administration of sedation as per above, the [id number] gastroscope was inserted into the mouth and advanced under direct vision to proximal jejunum. A careful inspection was made as the gastroscope was withdrawn, including a retroflexed view of the proximal stomach; findings and interventions are described below. Findings:  Esophagus: Normal mucosa   Stomach: Yelena-en-Y gastric bypass anatomy was noted. Gastric pouch was slightly enlarged. Stricture was noted at the gastrojejunal anastomosis. Suture material was present. No ulceration was noted. This was traversed without dilation. Duodenum/jejunum:normal      Therapies:   Gastric anastomotic stricture was dilated using CRE balloon 15mm to 18 mm with a maximal dilation of 16.5 mm for 1 minute    Specimens: none           EBL: None    Complications:   None; patient tolerated the procedure well. Impression:    See Postoperative diagnosis above    Recommendations:  -Continue acid suppression. , -Follow symptoms. , -Soft diet. Small frequent meals more often than large meals.     Discharge disposition:  Home in the company of  when able to ambulate    Jorge Smith MD

## 2021-11-23 ENCOUNTER — PATIENT OUTREACH (OUTPATIENT)
Dept: OTHER | Age: 56
End: 2021-11-23

## 2021-11-23 NOTE — PROGRESS NOTES
Patient on report as eligible for Case Management. Left discreet message on voicemail with this CM contact information. Will attempt to contact again to offer [facility] [address] Management services. Patient has EGD at [facility] on 11/22.

## 2021-11-24 ENCOUNTER — PATIENT OUTREACH (OUTPATIENT)
Dept: OTHER | Age: 56
End: 2021-11-24

## 2021-11-24 NOTE — LETTER
11/24/2021 8:25 AM    Ms. [address] [address] [facility] [address]        Dear Ms. Prasad,    My name is Adalgisa Gómez, Associate Care Manager for [facility] and I have been trying to reach you. The Associate Care Management (ACM) program is a free-of-charge confidential service provided to our associates and their family members covered by the [facility]. The program will provide an associate and his/her family with the [facility] expertise to assist in navigating the health care delivery system, provider services, and their overall care needsso as to assure and improve health care interactions and enhance the quality of life. This program is designed to provide you with the opportunity to have a [employer] partner with you for the following services:     1) when you come home from the hospital or emergency room   2) when help is needed to manage your disease   3) when you need assistance coordinating services or appointments  4) when you need additional education, resources or assistance reaching your Be Well Health Program goals/requirements such as Be Well With Diabetes      [facility] is dedicated to empowering the good health of its community and improving the quality of care and care experiences for associates and their families. We are committed to safeguarding patient confidentiality and privacy, assuring that every associate has the respect he or she deserves in managing their health. The information shared with your care manager will not be shared with anyone else aside from those you identify as part of your care team, and will only be used to assist you with any identified care needs. Please contact me if you would like this service provided to you. Sincerely,    Anastasiya Mckenzie RN, Nora Estação [address] [place] [address], [address] [place] [address]  Danyell Serrano [phone]  F [phone]  [email or web address]  [address] [email or web address] email: [email or web address]

## 2021-11-24 NOTE — PROGRESS NOTES
Patient identified as eligible for [address] [place] [address] services. Second telephone outreach attempted. Left discreet voicemail with this CM confidential contact information. Will send UTR letter. Will resolve on 12/22, if no response back from patient for identified health care needs.

## 2021-12-22 ENCOUNTER — PATIENT OUTREACH (OUTPATIENT)
Dept: OTHER | Age: 56
End: 2021-12-22

## 2021-12-22 NOTE — PROGRESS NOTES
Resolving current episode for case management due to patient unable to reach. Patient has not been reached after repeated calls and letters. Letter sent to patient notifying completion of services due to unable to reach. This writer's contact information and information regarding program services included in materials sent. Per chart review, patient has an appointment with her PCP on 1/13/22.

## 2022-01-26 ENCOUNTER — OFFICE VISIT (OUTPATIENT)
Dept: SURGERY | Age: 57
End: 2022-01-26
Payer: COMMERCIAL

## 2022-01-26 ENCOUNTER — HOSPITAL ENCOUNTER (EMERGENCY)
Age: 57
Discharge: LEFT BEFORE DISCHARGE | End: 2022-01-26
Attending: STUDENT IN AN ORGANIZED HEALTH CARE EDUCATION/TRAINING PROGRAM

## 2022-01-26 VITALS
WEIGHT: 177 LBS | HEART RATE: 68 BPM | HEIGHT: 68 IN | DIASTOLIC BLOOD PRESSURE: 76 MMHG | SYSTOLIC BLOOD PRESSURE: 114 MMHG | BODY MASS INDEX: 26.83 KG/M2 | OXYGEN SATURATION: 98 % | RESPIRATION RATE: 20 BRPM | TEMPERATURE: 97.3 F

## 2022-01-26 DIAGNOSIS — R10.12 ABDOMINAL PAIN, LEFT UPPER QUADRANT: Primary | ICD-10-CM

## 2022-01-26 PROCEDURE — 99213 OFFICE O/P EST LOW 20 MIN: CPT | Performed by: SURGERY

## 2022-01-26 NOTE — PROGRESS NOTES
1. Have you been to the ER, urgent care clinic since your last visit? Hospitalized since your last visit?no    2. Have you seen or consulted any other health care providers outside of the [address] [place] [address] since your last visit? Include any pap smears or colon screening.  No

## 2022-01-27 DIAGNOSIS — R10.12 LEFT UPPER QUADRANT ABDOMINAL PAIN: Primary | ICD-10-CM

## 2022-01-27 RX ORDER — PANTOPRAZOLE SODIUM 40 MG/1
40 TABLET, DELAYED RELEASE ORAL DAILY
Qty: 30 TABLET | Refills: 0 | Status: SHIPPED | OUTPATIENT
Start: 2022-01-27 | End: 2022-02-18 | Stop reason: DRUGHIGH

## 2022-01-27 RX ORDER — METOCLOPRAMIDE 10 MG/1
10 TABLET ORAL
Qty: 40 TABLET | Refills: 0 | Status: SHIPPED | OUTPATIENT
Start: 2022-01-27 | End: 2022-02-06

## 2022-01-28 NOTE — PROGRESS NOTES
Subjective:      Alexandria Pires is a 64 y.o. female presents for postop care 7 months following laparoscopic gastric bypass. Postoperative course was complicated by upper abdominal pain, managed through diagnostic laparoscopy, intraoperative upper endoscopy which revealed sloughing of mucosa in efferent portion of Yelena limb. She developed subsequent stricture, which was managed endoscopically with good results and resumption of normal diet. Several days ago, she developed left upper quadrant pain, worse with oral intake, movement, associated with nausea, 8/10 in intensity, improved when sitting, nonradiating. She denies emesis or change in bowel habits. She feels somewhat bloated. She is tolerating liquids. Objective:     Visit Vitals  /76   Pulse 68   Temp 97.3 °F (36.3 °C)   Resp 20   Ht 5' 8\" (1.727 m)   Wt 177 lb (80.3 kg)   LMP  (LMP Unknown)   SpO2 98%   BMI 26.91 kg/m²       General:  alert, fatigued, cooperative, mild distress, appears stated age   Abdomen:  Nondistended, soft. Left upper quadrant, epigastric pain with palpation. No mass or guarding. No hernia. Incision: All well-healed. Assessment:     Left upper quadrant abdominal pain following laparoscopic gastric bypass. Symptoms are not suggestive of recurrent stricture as emesis is not part of her symptom complex. More concerned with partial obstruction, internal hernia. No beds available for direct admission, so she was referred to the emergency department for evaluation, admission with imaging, labs. She agrees with this plan. Plan:     1. Staff transported patient to emergency department for evaluation, imaging, labs, subsequently admission.

## 2022-01-28 NOTE — PATIENT INSTRUCTIONS
Abdominal Pain: Care Instructions  Your Care Instructions     Abdominal pain has many possible causes. Some aren't serious and get better on their own in a few days. Others need more testing and treatment. If your pain continues or gets worse, you need to be rechecked and may need more tests to find out what is wrong. You may need surgery to correct the problem. Don't ignore new symptoms, such as fever, nausea and vomiting, urination problems, pain that gets worse, and dizziness. These may be signs of a more serious problem. Your doctor may have recommended a follow-up visit in the next 8 to 12 hours. If you are not getting better, you may need more tests or treatment. The doctor has checked you carefully, but problems can develop later. If you notice any problems or new symptoms, get medical treatment right away. Follow-up care is a key part of your treatment and safety. Be sure to make and go to all appointments, and call your doctor if you are having problems. It's also a good idea to know your test results and keep a list of the medicines you take. How can you care for yourself at home? · Rest until you feel better. · To prevent dehydration, drink plenty of fluids. Choose water and other clear liquids until you feel better. If you have kidney, heart, or liver disease and have to limit fluids, talk with your doctor before you increase the amount of fluids you drink. · If your stomach is upset, eat mild foods, such as rice, dry toast or crackers, bananas, and applesauce. Try eating several small meals instead of two or three large ones. · Wait until 48 hours after all symptoms have gone away before you have spicy foods, alcohol, and drinks that contain caffeine. · Do not eat foods that are high in fat. · Avoid anti-inflammatory medicines such as aspirin, ibuprofen (Advil, Motrin), and naproxen (Aleve). These can cause stomach upset.  Talk to your doctor if you take daily aspirin for another health problem. When should you call for help? Call 911 anytime you think you may need emergency care. For example, call if:    · You passed out (lost consciousness).     · You pass maroon or very bloody stools.     · You vomit blood or what looks like coffee grounds.     · You have new, severe belly pain. Call your doctor now or seek immediate medical care if:    · Your pain gets worse, especially if it becomes focused in one area of your belly.     · You have a new or higher fever.     · Your stools are black and look like tar, or they have streaks of blood.     · You have unexpected vaginal bleeding.     · You have symptoms of a urinary tract infection. These may include:  ? Pain when you urinate. ? Urinating more often than usual.  ? Blood in your urine.     · You are dizzy or lightheaded, or you feel like you may faint. Watch closely for changes in your health, and be sure to contact your doctor if:    · You are not getting better after 1 day (24 hours). Where can you learn more? Go to http://www.gray.com/  Enter E584 in the search box to learn more about \"Abdominal Pain: Care Instructions. \"  Current as of: July 1, 2021               Content Version: 13.0  © 7504-8209 Healthwise, Incorporated. Care instructions adapted under license by [employer] (which disclaims liability or warranty for this information). If you have questions about a medical condition or this instruction, always ask your healthcare professional. [place] [address] any warranty or liability for your use of this information.

## 2022-02-17 ENCOUNTER — VIRTUAL VISIT (OUTPATIENT)
Dept: SURGERY | Age: 57
End: 2022-02-17
Payer: COMMERCIAL

## 2022-02-17 VITALS — BODY MASS INDEX: 26.07 KG/M2 | HEIGHT: 68 IN | WEIGHT: 172 LBS

## 2022-02-17 DIAGNOSIS — K91.2 POSTOPERATIVE INTESTINAL MALABSORPTION: Primary | ICD-10-CM

## 2022-02-17 DIAGNOSIS — K21.9 CHRONIC GERD: ICD-10-CM

## 2022-02-17 DIAGNOSIS — E55.9 VITAMIN D DEFICIENCY: ICD-10-CM

## 2022-02-17 DIAGNOSIS — R10.12 ABDOMINAL PAIN, LUQ: ICD-10-CM

## 2022-02-17 PROCEDURE — 99213 OFFICE O/P EST LOW 20 MIN: CPT | Performed by: NURSE PRACTITIONER

## 2022-02-17 RX ORDER — CHOLECALCIFEROL (VITAMIN D3) 125 MCG
5 CAPSULE ORAL
COMMUNITY

## 2022-02-17 RX ORDER — METOCLOPRAMIDE 10 MG/1
10 TABLET ORAL
COMMUNITY
End: 2022-02-18 | Stop reason: SDUPTHER

## 2022-02-17 NOTE — PROGRESS NOTES
1. Have you been to the ER, urgent care clinic since your last visit? Hospitalized since your last visit? No    2. Have you seen or consulted any other health care providers outside of the [address] [place] [address] since your last visit? Include any pap smears or colon screening.  No

## 2022-02-18 RX ORDER — ASPIRIN 325 MG
50000 TABLET, DELAYED RELEASE (ENTERIC COATED) ORAL
Qty: 7 CAPSULE | Refills: 3 | Status: SHIPPED | OUTPATIENT
Start: 2022-02-18 | End: 2022-06-24 | Stop reason: SDUPTHER

## 2022-02-18 RX ORDER — PANTOPRAZOLE SODIUM 40 MG/1
40 TABLET, DELAYED RELEASE ORAL 2 TIMES DAILY
Qty: 180 TABLET | Refills: 1 | Status: SHIPPED | OUTPATIENT
Start: 2022-02-18

## 2022-02-18 RX ORDER — METOCLOPRAMIDE 10 MG/1
10 TABLET ORAL
Qty: 270 TABLET | Refills: 0 | Status: SHIPPED | OUTPATIENT
Start: 2022-02-18

## 2022-02-18 NOTE — PROGRESS NOTES
I was in the office while conducting this encounter. Consent:  He and/or his healthcare decision maker is aware that this patient-initiated Telehealth encounter is a billable service, with coverage as determined by his insurance carrier. He is aware that he may receive a bill and has provided verbal consent to proceed: Yes    This virtual visit was conducted via Doxy. me. Pursuant to the emergency declaration under the [facility] waiver authority and the [employer] Act, this Virtual  Visit was conducted to reduce the patient's risk of exposure to COVID-19 and provide continuity of care for an established patient. Services were provided through a video synchronous discussion virtually to substitute for in-person clinic visit. Due to this being a TeleHealth evaluation, many elements of the physical examination are unable to be assessed. Total Time: minutes: 11-20 minutes. Gordon Zuniga, was evaluated through a synchronous (real-time) audio-video encounter. The patient (or guardian if applicable) is aware that this is a billable service, which includes applicable co-pays. This Virtual Visit was conducted with patient's (and/or legal guardian's) consent. The visit was conducted pursuant to the emergency declaration under the [address] [place] [address], [address] [place] [address] waiver authority and the [employer] General Act. Patient identification was verified, and a caregiver was present when appropriate. The patient was located in a state where the provider was licensed to provide care. Chief Complaint   Patient presents with    Follow-up      laparoscopic gastric bypass last wt 177 lbs on 1/26/21  [facility] 8 months s/p laparoscopic gastric bypass.     Postoperative course was complicated by upper abdominal pain, managed through diagnostic laparoscopy, intraoperative upper endoscopy which revealed sloughing of mucosa in efferent portion of Yelena limb. She developed subsequent stricture, which was managed endoscopically with good results and resumption of normal diet. She saw Dr. Kristin Camp last month with c/o left upper quadrant pain, worse with oral intake, movement, associated with nausea, 8/10 in intensity, improved when sitting, nonradiating. She hadd no associated vomiting of bowel changes; some bloating. At that time she was taken to the ER, but was told she would be there for \"hours\" and \"it was awful\". She left and was scheduled to have a CT and they had her as \"self pay\" so she had to reschedule. She is scheduled for CT next week at [facility]. Reports feeling better and no pain \"like that day\". Pain is not as intense and back to tolerating her diet   Taking pantoprazole bid and uses Reglan 2-3 x day depending on her stomach. No vomiting   Bowels are \"skinny and long\"   No fever or chills, chest pain or shortness of breath. Taking vitamins and needs 90 day supply of D   Also requesting 90 days on PPI and Reglan     Visit Vitals  Ht 5' 8\" (1.727 m)   Wt 172 lb (78 kg)   LMP  (LMP Unknown)   BMI 26.15 kg/m²     Looks well   Speech is clear and breathing in unlabored       ICD-10-CM ICD-9-CM    1. Postoperative intestinal malabsorption  K91.2 579.3    2. BMI 26.0-26.9,adult  Z68.26 V85.22    3. Chronic GERD  K21.9 530.81    4. Abdominal pain, LUQ  R10.12 789.02    5.  Vitamin D deficiency  E55.9 268.9 cholecalciferol (VITAMIN D3) (50,000 UNITS /1250 MCG) capsule     8 months s/p gastric bypass   95% excess weight loss   Recent LUQ abdominal pain and CT is next week   Continue acid suppression pantoprazole BID and sent 90 day to pharmacy   reglan 10 mg before meals and ok to use less if doing ok  Vit D level 65 and will decrease prescription to 50K every 14 days   Reassess labs in next few months   Follow up after CT and we can discuss on the phone next Friday   Will determine follow up pending results   [facility] verbalized understanding and questions were answered to the best of my knowledge and ability. Medication and diet  educational materials were provided.

## 2022-02-18 NOTE — PATIENT INSTRUCTIONS
Decrease D to every 14 days as your levels were really good in October     Plan on rechecking vitamin levels in next few months     Follow up with me after the CT and we can talk on Friday 2/25/22 - you call me or send a My Chart message

## 2022-02-24 ENCOUNTER — HOSPITAL ENCOUNTER (OUTPATIENT)
Dept: CT IMAGING | Age: 57
Discharge: HOME | End: 2022-02-24
Attending: SURGERY
Payer: COMMERCIAL

## 2022-02-24 DIAGNOSIS — R10.12 LEFT UPPER QUADRANT ABDOMINAL PAIN: ICD-10-CM

## 2022-02-24 PROCEDURE — 74177 CT ABD & PELVIS W/CONTRAST: CPT

## 2022-02-24 PROCEDURE — 74011000250 HC RX REV CODE- 250: Performed by: SURGERY

## 2022-02-24 PROCEDURE — 74011000636 HC RX REV CODE- 636: Performed by: SURGERY

## 2022-02-24 RX ORDER — BARIUM SULFATE 20 MG/ML
900 SUSPENSION ORAL
Status: COMPLETED | OUTPATIENT
Start: 2022-02-24 | End: 2022-02-24

## 2022-02-24 RX ADMIN — BARIUM SULFATE 900 ML: 21 SUSPENSION ORAL at 17:31

## 2022-02-24 RX ADMIN — IOPAMIDOL 100 ML: 755 INJECTION, SOLUTION INTRAVENOUS at 17:31

## 2022-03-18 PROBLEM — R73.9 HYPERGLYCEMIA: Status: ACTIVE | Noted: 2018-06-01

## 2022-03-18 PROBLEM — E66.01 OBESITY, CLASS III, BMI 40-49.9 (MORBID OBESITY) (HCC): Status: ACTIVE | Noted: 2018-06-01

## 2022-03-18 PROBLEM — G89.29 CHRONIC PAIN OF RIGHT KNEE: Status: ACTIVE | Noted: 2019-09-20

## 2022-03-18 PROBLEM — M25.559 HIP PAIN: Status: ACTIVE | Noted: 2019-07-10

## 2022-03-18 PROBLEM — E66.813 OBESITY, CLASS III, BMI 40-49.9 (MORBID OBESITY) (HCC): Status: ACTIVE | Noted: 2018-06-01

## 2022-03-18 PROBLEM — M25.561 CHRONIC PAIN OF RIGHT KNEE: Status: ACTIVE | Noted: 2019-09-20

## 2022-03-18 PROBLEM — S83.231S: Status: ACTIVE | Noted: 2020-07-12

## 2022-03-18 PROBLEM — R11.2 NAUSEA AND VOMITING: Status: ACTIVE | Noted: 2021-07-04

## 2022-03-19 PROBLEM — M54.50 LOW BACK PAIN: Status: ACTIVE | Noted: 2019-07-10

## 2022-03-19 PROBLEM — M77.32 HEEL SPUR, LEFT: Status: ACTIVE | Noted: 2019-02-01

## 2022-03-19 PROBLEM — Z78.9 ASPIRIN INTOLERANCE: Status: ACTIVE | Noted: 2017-12-07

## 2022-03-19 PROBLEM — M79.672 LEFT FOOT PAIN: Status: ACTIVE | Noted: 2019-08-01

## 2022-03-19 PROBLEM — R39.15 URGENCY OF URINATION: Status: ACTIVE | Noted: 2018-09-06

## 2022-03-19 PROBLEM — M54.16 LUMBAR RADICULOPATHY: Status: ACTIVE | Noted: 2019-07-10

## 2022-03-19 PROBLEM — R11.2 NAUSEA & VOMITING: Status: ACTIVE | Noted: 2021-07-26

## 2022-03-19 PROBLEM — M51.37 DEGENERATION OF LUMBOSACRAL INTERVERTEBRAL DISC: Status: ACTIVE | Noted: 2019-07-10

## 2022-03-19 PROBLEM — M17.0 BILATERAL PRIMARY OSTEOARTHRITIS OF KNEE: Status: ACTIVE | Noted: 2020-06-10

## 2022-03-19 PROBLEM — M51.379 DEGENERATION OF LUMBOSACRAL INTERVERTEBRAL DISC: Status: ACTIVE | Noted: 2019-07-10

## 2022-03-19 PROBLEM — M17.12 PRIMARY OSTEOARTHRITIS OF LEFT KNEE: Status: ACTIVE | Noted: 2019-09-26

## 2022-03-19 PROBLEM — E78.00 HIGH CHOLESTEROL: Status: ACTIVE | Noted: 2018-06-01

## 2022-03-20 PROBLEM — M79.605 PAIN IN BOTH LOWER EXTREMITIES: Status: ACTIVE | Noted: 2019-07-10

## 2022-03-20 PROBLEM — M77.12 LEFT LATERAL EPICONDYLITIS: Status: ACTIVE | Noted: 2020-12-24

## 2022-03-20 PROBLEM — M76.70: Status: ACTIVE | Noted: 2019-08-07

## 2022-03-20 PROBLEM — S83.231A COMPLEX TEAR OF MEDIAL MENISCUS OF RIGHT KNEE AS CURRENT INJURY: Status: ACTIVE | Noted: 2020-06-18

## 2022-03-20 PROBLEM — M79.604 PAIN IN BOTH LOWER EXTREMITIES: Status: ACTIVE | Noted: 2019-07-10

## 2022-03-20 PROBLEM — K31.1 GASTRIC OUTLET OBSTRUCTION: Status: ACTIVE | Noted: 2021-07-26

## 2022-03-20 PROBLEM — S83.203A: Status: ACTIVE | Noted: 2020-07-13

## 2022-03-31 ENCOUNTER — OFFICE VISIT (OUTPATIENT)
Dept: PRIMARY CARE CLINIC | Age: 57
End: 2022-03-31
Payer: COMMERCIAL

## 2022-03-31 VITALS
HEIGHT: 68 IN | SYSTOLIC BLOOD PRESSURE: 144 MMHG | TEMPERATURE: 97.6 F | DIASTOLIC BLOOD PRESSURE: 98 MMHG | BODY MASS INDEX: 26.19 KG/M2 | HEART RATE: 77 BPM | RESPIRATION RATE: 16 BRPM | WEIGHT: 172.8 LBS

## 2022-03-31 DIAGNOSIS — M79.641 PAIN OF RIGHT HAND: ICD-10-CM

## 2022-03-31 DIAGNOSIS — M77.8 TENDONITIS OF WRIST, RIGHT: Primary | ICD-10-CM

## 2022-03-31 DIAGNOSIS — R60.0 EDEMA OF HAND: ICD-10-CM

## 2022-03-31 PROCEDURE — 99203 OFFICE O/P NEW LOW 30 MIN: CPT | Performed by: NURSE PRACTITIONER

## 2022-03-31 RX ORDER — OXYCODONE HYDROCHLORIDE 5 MG/1
10 TABLET ORAL
Qty: 6 TABLET | Refills: 0 | Status: SHIPPED | OUTPATIENT
Start: 2022-03-31 | End: 2022-04-02

## 2022-03-31 RX ORDER — OXYCODONE HCL 10 MG/1
10 TABLET, FILM COATED, EXTENDED RELEASE ORAL EVERY 12 HOURS
Qty: 3 TABLET | Refills: 0 | Status: SHIPPED | OUTPATIENT
Start: 2022-03-31 | End: 2022-03-31

## 2022-03-31 NOTE — PATIENT INSTRUCTIONS
Tenosynovitis of the Wrist: Care Instructions  Your Care Instructions  Tenosynovitis (say \"ten-oh-sin-uh-VY-tus\") means the lining of a tendon is inflamed. This problem usually affects tendons in your thumb and wrist. A tendon is a cord that joins muscle to bone. Tenosynovitis can be caused by an injury. Or it may be caused by repeating a movement over and over, such as when you knit, lift things, or play video games. In rare cases, an infected wound causes it. In most cases, you can recover fully. But if the problem is caused by doing something over and over and you don't stop or change doing that, it may come back. Follow-up care is a key part of your treatment and safety. Be sure to make and go to all appointments, and call your doctor if you are having problems. It's also a good idea to know your test results and keep a list of the medicines you take. How can you care for yourself at home? · Prop up the sore wrist on a pillow when you ice it or anytime you sit or lie down during the next 3 days. Try to keep it above the level of your heart. This will help reduce swelling. · Put ice or cold packs on your wrist for 10 to 20 minutes at a time. Try to do this every 1 to 2 hours for the next 3 days (when you are awake) or until the swelling goes down. Put a thin cloth between the ice pack and your skin. · If your swelling is gone after 2 or 3 days, put a heating pad set on low or a warm cloth on your wrist for 15 to 20 minutes. This can reduce pain. · If your doctor gave you an elastic bandage, keep it on for the next 24 to 36 hours or for as long as your doctor told you. The bandage should be snug. But it should not be tight enough to cause numbness, tingling, or swelling. · If your doctor gave you a splint or brace, wear it as directed. It will protect your wrist until it is better. · Take pain medicines exactly as directed.   ? If the doctor gave you a prescription medicine for pain, take it as prescribed. ? If you are not taking a prescription pain medicine, ask your doctor if you can take an over-the-counter medicine. · If your doctor prescribes antibiotics, take them as directed. Do not stop taking them just because you feel better. You need to take the full course of antibiotics. · Try not to use your injured wrist and hand. · After you are better, do exercises to make the muscles around your tendon stronger. This can prevent the problem from coming back. Follow instructions from your doctor. When should you call for help? Call your doctor now or seek immediate medical care if:    · Your hand or fingers are cool or pale or change colors.     · You have tingling, weakness, or numbness in your hand and fingers.     · Your pain gets worse.     · The tendon may be infected. Signs of infection include:  ? Increased pain and tenderness around the wrist or thumb. ? Swelling or redness of the wrist or thumb. ? A fever. Watch closely for changes in your health, and be sure to contact your doctor if:    · You do not get better as expected. Where can you learn more? Go to http://www.gray.com/  Enter Z348 in the search box to learn more about \"Tenosynovitis of the Wrist: Care Instructions. \"  Current as of: July 1, 2021               Content Version: 13.2  © 2006-2022 Healthwise, [employer] Care instructions adapted under license by [employer] (which disclaims liability or warranty for this information). If you have questions about a medical condition or this instruction, always ask your healthcare professional. [place] [address] any warranty or liability for your use of this information.

## 2022-03-31 NOTE — PROGRESS NOTES
Chief Complaint   Patient presents with    Hand Pain     Pr reports right hand pain that started on Tuesday evening. Her hand is swollen and red with pain radiating to her forearm. Pt reports that she had no injury to her hand.       Visit Vitals  BP (!) 144/98   Pulse 77   Temp 97.6 °F (36.4 °C)   Resp 16   Ht 5' 8\" (1.727 m)   Wt 172 lb 12.8 oz (78.4 kg)   BMI 26.27 kg/m²

## 2022-03-31 NOTE — PROGRESS NOTES
HISTORY OF PRESENT ILLNESS  Alexandria Pires is a 62 y.o. female. Patient with non traumatic right hand pain. She is right handed. Hand and wrist swelling since Tues. 2 days. Watching TV. Felt a stabbing pain at base of thumb. Vigorously Rubbed area. Pain traveled to other side of hand. Over next day developed swelling of wrist and hand. \"veins became pronounced on forearm. \" Red, painful area on dorsal surface right wrist.  Lost  strength next day. Woke out of sleep this am  Pain shooting to elbow. No injury or overuse. No history of gout. No numbness or tingling of fingers. No neck pain or inciting event. Pain 10/10 described as a severe toothache. Limited mobility due to pain. No previous  Injury or surgery. Cannot take NSAIDS. Took tylenol, attempted compression and ice. Cannot put pressure on it. Patient has appointment with Dr Zaida Rust tomorrow. Requesting something for severe pain until appointment. Past Medical History:   Diagnosis Date    Allergic rhinitis 5/27/2010    Arthritis     Baker cyst, left 10/2019    Dr. Hoang Beach cyst, right     Cervical disc herniation 01/2010    C3-4, C4-5, C5-6. Hemangioma body of C5. Dr. Kallie Zavala.  Chest pain 08/2011, 02/2012    Dr. Kat Monzno. Dr. Diane Pepper; denies CP as of 3/27/14    Chronic lower back pain 2010, 03/2016    thoracic DDD and spondylosis. DDD L3-S1. Dr. Abel Araiza. Thong Metcalf. [place] [facility] [place].  Chronic meniscal tear of knee     Chronic pain     lower back and knees    Esophagitis, reflux 4/3/2011    Gallstone     Gastritis 5/27/2010    Dr. Jennifer Ahmadi.  Gastrointestinal food allergy 03/2014    Multiple. Dr. Marco Lange.    Heart palpitations 02/2012    DUE TO PAC'S AND PVC'S. Dr. Ania Chirinos.     Heel spur, left 02/2019    Hiatal hernia 4/3/2011    HTN (hypertension) 1987    Impaired glucose tolerance 10/15/2010    Incidental lung nodule 01/08/13    LLL 3.9 mm, RML 3.1 mm;  as of 3/27/14 per pt stable w/o growth    Insomnia 1990s    Iron deficiency anemia 5/27/2010    Irritable bowel syndrome (IBS) 03/2014    Dr. Carlo Toribio.    Knee pain 2012    Right. due to severe OA. / chipped bone     Left foot pain 08/2019    Dr. Mariah Cheema.  Metatarsal bone fracture 1990    Left fifth.  Migraine 2/22/2011    Dr. Dixie Escobar Morbid obesity [facility])     Peroneal tendonitis of lower leg 08/07/2019    Left. Dr. Mariah Cheema    Pre-diabetes     Radiculopathy, cervical 01/2010    Left. Dr. Goncalves Grade.  Shoulder pain, right 2012    due to [address] X 2. Dr. Johanne Lakhani.  Thyroid nodule 2011    6 nodules- Dr. Rebecca Conley    Toe fracture, left 2008    fifth toe.  Triangular fibrocartilage complex tear 2012    Dr. Jeanne Rm. Left. Past Surgical History:   Procedure Laterality Date    COLONOSCOPY N/A 3/10/2021    COLONOSCOPY,EGD performed by Wagner Briceno MD at [facility] ENDOSCOPY    [address]    x 1    HX CHOLECYSTECTOMY  1987    OPEN    HX COLONOSCOPY  03/31/14    Dr. Gabbie Sandoval; 03/31/14    due to abnormal PAP.  HX DILATION AND CURETTAGE  1990s    due to miscarriage.  HX ENDOSCOPY      HX HEART CATHETERIZATION  2018    no stents    HX KNEE ARTHROSCOPY Right     HX LAP GASTRIC BYPASS  06/14/2021    lap gastric bypass with hiatal hernia [facility]ell [facility]    [address] Right 06/2016    LUMBAR L4-5, L5-S1 ELLY. Dr. Muse Erp       Review of Systems   Constitutional: Negative for fever and malaise/fatigue. Musculoskeletal: Positive for joint pain. Negative for myalgias and neck pain. Skin: Negative for rash. Neurological: Positive for weakness. Negative for tingling and sensory change. All other systems reviewed and are negative. Physical Exam  Vitals and nursing note reviewed.    Constitutional:       General: She is in acute distress. Appearance: Normal appearance. HENT:      Head: Normocephalic and atraumatic. Eyes:      Pupils: Pupils are equal, round, and reactive to light. Cardiovascular:      Rate and Rhythm: Normal rate. Pulses: Normal pulses. Pulmonary:      Effort: Pulmonary effort is normal.   Musculoskeletal:      Right wrist: Swelling, tenderness and bony tenderness present. Decreased range of motion. Normal pulse. Right hand: Tenderness present. No bony tenderness. Decreased range of motion. Decreased strength. Normal sensation. Normal capillary refill. Cervical back: Normal range of motion and neck supple. Comments: Limited exam due to pain. Erythema, mild edema and warmth on right wrist. See photo. Temperature and color of fingers normal. Normal elbow joint mobility. Verbal consent was obtained from the patient to provide photographic documentation in the medical record of current condition. Skin:     Capillary Refill: Capillary refill takes less than 2 seconds. Neurological:      Mental Status: She is alert. I have reviewed the patient's controlled substance prescription history thru the Prescription Monitoring Program, so that the prescription(s) for a controlled substance can be given. ASSESSMENT and PLAN    ICD-10-CM ICD-9-CM    1. Tendonitis of wrist, right  M77.8 727.05 oxyCODONE IR (ROXICODONE) 5 mg immediate release tablet      DISCONTINUED: oxyCODONE ER (OxyCONTIN) 10 mg ER tablet   2. Pain of right hand  M79.641 729.5    3. Edema of hand  R60.0 782.3 oxyCODONE IR (ROXICODONE) 5 mg immediate release tablet      DISCONTINUED: oxyCODONE ER (OxyCONTIN) 10 mg ER tablet     Encounter Diagnoses   Name Primary?     Tendonitis of wrist, right Yes    Pain of right hand     Edema of hand      Orders Placed This Encounter    DISCONTD: oxyCODONE ER (OxyCONTIN) 10 mg ER tablet    oxyCODONE IR (ROXICODONE) 5 mg immediate release tablet   Xray not performed / service unavailable today at this clinic. Ice to area  Immobilize  Medication as directed  Do not drive while taking  Follow with Dr Marguerite Ibrahim tomorrow as scheduled. Signed By: CHRISTOPHER Menchaca     March 31, 2022        If you have worsening symptoms , go to ER.

## 2022-04-07 ENCOUNTER — OFFICE VISIT (OUTPATIENT)
Dept: INTERNAL MEDICINE CLINIC | Age: 57
End: 2022-04-07
Payer: COMMERCIAL

## 2022-04-07 VITALS
SYSTOLIC BLOOD PRESSURE: 114 MMHG | RESPIRATION RATE: 16 BRPM | HEART RATE: 68 BPM | BODY MASS INDEX: 25.91 KG/M2 | OXYGEN SATURATION: 100 % | TEMPERATURE: 98.6 F | HEIGHT: 68 IN | DIASTOLIC BLOOD PRESSURE: 78 MMHG | WEIGHT: 171 LBS

## 2022-04-07 DIAGNOSIS — E55.9 VITAMIN D DEFICIENCY: ICD-10-CM

## 2022-04-07 DIAGNOSIS — R39.9 UTI SYMPTOMS: Primary | ICD-10-CM

## 2022-04-07 DIAGNOSIS — M54.6 ACUTE RIGHT-SIDED THORACIC BACK PAIN: ICD-10-CM

## 2022-04-07 DIAGNOSIS — E78.00 HIGH CHOLESTEROL: ICD-10-CM

## 2022-04-07 DIAGNOSIS — Z98.84 HISTORY OF GASTRIC BYPASS: ICD-10-CM

## 2022-04-07 DIAGNOSIS — R73.03 PREDIABETES: ICD-10-CM

## 2022-04-07 PROCEDURE — 99214 OFFICE O/P EST MOD 30 MIN: CPT | Performed by: NURSE PRACTITIONER

## 2022-04-07 NOTE — PROGRESS NOTES
Kalani Amaya is a 62 y.o. female    Chief Complaint   Patient presents with    Follow-up     follow up       Visit Vitals  /78 (BP 1 Location: Left upper arm, BP Patient Position: Sitting, BP Cuff Size: Small adult)   Pulse 68   Temp 98.6 °F (37 °C)   Resp 16   Ht 5' 8\" (1.727 m)   Wt 171 lb (77.6 kg)   LMP  (LMP Unknown)   SpO2 100%   BMI 26.00 kg/m²           1. Have you been to the ER, urgent care clinic since your last visit? Hospitalized since your last visit? NO    2. Have you seen or consulted any other health care providers outside of the [address] [place] [address] since your last visit? Include any pap smears or colon screening.  NO

## 2022-04-07 NOTE — PROGRESS NOTES
Chief Complaint   Patient presents with    Follow-up     follow up       SUBJECTIVE:    Nadine Jaffe is a 62 y.o. female who is here today for a follow up appointment regarding her history of gastric bypass surgery, prediabetes, history of hypertension, vitamin D deficiency, and states right-sided back pain and possible symptoms of UTI for the past week or two. Patient's blood pressure appears well controlled at this time, and she is currently not taking antihypertensives. Her weight has remained stable, and she denies any significant issues related to her gastric bypass. She is following up with her bariatric surgeon as scheduled. She has a history of prediabetes, and last hemoglobin A1c was approximately 5.2%. Her vitamin B12 and vitamin D levels have remained in normal range. She admits to some urinary frequency and sense of urgency, but denies any burning with urination. She would like a urinalysis done to rule out possible UTI. Patient has clarified today that she has had \"no history of type 2 diabetes,\" but has had a history of \"only prediabetes. \"  I will correct this on her previous exam from last October. Current Outpatient Medications   Medication Sig Dispense Refill    metoclopramide HCl (REGLAN) 10 mg tablet Take 1 Tablet by mouth Before breakfast, lunch, and dinner. 270 Tablet 0    pantoprazole (PROTONIX) 40 mg tablet Take 1 Tablet by mouth two (2) times a day. 180 Tablet 1    cholecalciferol (VITAMIN D3) (50,000 UNITS /1250 MCG) capsule Take 1 Capsule by mouth every fourteen (14) days. 7 Capsule 3    melatonin 5 mg tablet Take 5 mg by mouth nightly. Taking 2-3 tabs  At bedtime      acetaminophen (TYLENOL) 650 mg TbER Take 650 mg by mouth every eight (8) hours.  tiZANidine (ZANAFLEX) 2 mg tablet Take 1 Tablet by mouth two (2) times daily as needed for Muscle Spasm(s). 60 Tablet 3    multivitamin with iron (FLINTSTONES) chewable tablet Take 1 Tablet by mouth daily.  cyanocobalamin (Vitamin B-12) 100 mcg tablet Take 100 mcg by mouth daily.  calcium-cholecalciferol, D3, (CALTRATE 600+D) tablet Take 1 Tablet by mouth daily.  ondansetron (ZOFRAN ODT) 4 mg disintegrating tablet Take 1 Tab by mouth every eight (8) hours as needed for Nausea or Vomiting. 30 Tab 0    docusate sodium (COLACE) 100 mg capsule Take 100 mg by mouth as needed for Constipation. Past Medical History:   Diagnosis Date    Allergic rhinitis 5/27/2010    Arthritis     Baker cyst, left 10/2019    Dr. Dallin Rodriguez cyst, right     Cervical disc herniation 01/2010    C3-4, C4-5, C5-6. Hemangioma body of C5. Dr. Zay Lewis.  Chest pain 08/2011, 02/2012    Dr. Nilesh Kramer. Dr. Erick Gomez; denies CP as of 3/27/14    Chronic lower back pain 2010, 03/2016    thoracic DDD and spondylosis. DDD L3-S1. Dr. Allison Fowler. Malu Clark. Dr. Aakash Edwards, [facility]. Dr. Nghia Awan.  Chronic meniscal tear of knee     Chronic pain     lower back and knees    Esophagitis, reflux 4/3/2011    Essential hypertension 6/3/2015    Gallstone     Gastritis 5/27/2010    Dr. Awais Jay.  Gastrointestinal food allergy 03/2014    Multiple. Dr. Shania Aldrich.    Heart palpitations 02/2012    DUE TO PAC'S AND PVC'S. Dr. Agnes Duke.  Heel spur, left 02/2019    Hiatal hernia 4/3/2011    HTN (hypertension) 1987    Impaired glucose tolerance 10/15/2010    Incidental lung nodule 01/08/13    LLL 3.9 mm, RML 3.1 mm;  as of 3/27/14 per pt stable w/o growth    Insomnia 1990s    Iron deficiency anemia 5/27/2010    Irritable bowel syndrome (IBS) 03/2014    Dr. Nick Dubois.    Knee pain 2012    Right. due to severe OA. / chipped bone     Left foot pain 08/2019    Dr. Anthony Guan.  Metatarsal bone fracture 1990    Left fifth.  Migraine 2/22/2011    Dr. Neo Boyer [facility])     Peroneal tendonitis of lower leg 08/07/2019    Left.   Dr. Ban Aparicio Radiculopathy, cervical 01/2010    Left. Dr. John Hernandez.  Shoulder pain, right 2012    due to [address] X 2. Dr. Jena Kam.  Thyroid nodule 2011    6 nodules- Dr. Nickerson Coffee    Toe fracture, left 2008    fifth toe.  Triangular fibrocartilage complex tear 2012    Dr. Jose Lancaster. Left. Past Surgical History:   Procedure Laterality Date    COLONOSCOPY N/A 3/10/2021    COLONOSCOPY,EGD performed by Bjorn Christensen [facility] at [facility] ENDOSCOPY    [address]    x 1    HX CHOLECYSTECTOMY  1987    OPEN    HX COLONOSCOPY  03/31/14    Dr. Halle Galloway; 03/31/14    due to abnormal PAP.  HX DILATION AND CURETTAGE  1990s    due to miscarriage.  HX ENDOSCOPY      HX HEART CATHETERIZATION  2018    no stents    HX KNEE ARTHROSCOPY Right     HX LAP GASTRIC BYPASS  06/14/2021    lap gastric bypass with [facility] repair [facility]    [address] Right 06/2016    LUMBAR L4-5, L5-S1 ELLY.    General Chamber     Allergies   Allergen Reactions    Doxylamine Succinate Swelling     12.5-25 MG  HANDS, FACE/PERIORAL, FEET    Pcn [Penicillins] Hives    Ambien [Zolpidem] Nausea and Vomiting and Other (comments)     5 mg with Ativan 0.5 mg   TOO GROGGY, SLEPT 24 HOURS  7.5 MG FORGOT SHE WAS SLEEP EATING    Aspirin Nausea Only    Carafate [Sucralfate] Nausea and Vomiting    Celebrex [Celecoxib] Other (comments)     TIGHT SQUEEZING IN CHEST  CHEST ACHED    Erythromycin Nausea and Vomiting    Milk Prot-Turm-Pepper-Pineappl Other (comments)     Multiple food allergies    Neurontin [Gabapentin] Other (comments)     300 mg  SEVERE LEG PAIN  EYE TWITCHING    Nsaids (Non-Steroidal Anti-Inflammatory Drug) Other (comments)     GI irritation      Pineapple Unknown (comments)       REVIEW OF SYSTEMS:                                        POSITIVE= bold text  Negative = regular text    General: fever, chills, sweats, generalized weakness, weight loss/gain,                                       loss of appetite   Eyes:                           blurred vision, eye pain, loss of vision, double vision  ENT:                            rhinorrhea, pharyngitis   Respiratory:               cough, sputum production, SOB, WEBER, wheezing, pleuritic pain   Cardiology:                chest pain, palpitations, orthopnea, PND, edema, syncope   Gastrointestinal:       abdominal pain , N/V, diarrhea, dysphagia, constipation, bleeding   Genitourinary:           frequency, urgency, dysuria, hematuria, incontinence   Muskuloskeletal :      arthralgia, myalgia, back pain  Hematology:              easy bruising, nose or gum bleeding, lymphadenopathy   Dermatological:         rash, ulceration, pruritis, color change / jaundice  Endocrine:                 hot flashes or polydipsia   Neurological:             headache, dizziness, confusion, focal weakness, paresthesia,                                      Speech difficulties, memory loss, gait difficulty  Psychological:          Feelings of anxiety, depression, agitation        Social History     Socioeconomic History    Marital status:     Number of children: 3   Occupational History    Occupation: Medical assistant     Employer: [employer]   Tobacco Use    Smoking status: Never Smoker    Smokeless tobacco: Never Used   Vaping Use    Vaping Use: Never used   Substance and Sexual Activity    Alcohol use: No    Drug use: Never    Sexual activity: Yes     Partners: Male   Social History Narrative    In the home alone     3 adult children     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      Family History   Problem Relation Age of Onset    Diabetes Mother     Heart Disease Mother 58        2 stents    Hypertension Mother     Stroke Mother     Diabetes Father     Hypertension Father     Stroke Maternal Grandmother     Cancer Maternal Uncle         prostate    Diabetes Sister     No Known Problems Sister     No Known Problems Sister     Anesth Problems Neg Hx        OBJECTIVE:     Visit Vitals  /78 (BP 1 Location: Left upper arm, BP Patient Position: Sitting, BP Cuff Size: Small adult)   Pulse 68   Temp 98.6 °F (37 °C)   Resp 16   Ht 5' 8\" (1.727 m)   Wt 171 lb (77.6 kg)   LMP  (LMP Unknown)   SpO2 100%   BMI 26.00 kg/m²       Constitutional: She appears well nourished, of stated age, and dressed appropriately. Eyes: Sclera anicteric, PERRLA, EOMI  Neck: Supple without lymphadenopathy. Thyroid normal, No JVD or bruits  Respiratory: Clear to ascultation X5, normal inspiratory effort, no adventitious breath sounds. Cardiovascular: Regular rate and rhythm, no murmurs, rubs or gallops, PMI not displaced, No thrills, no peripheral edema  Back: No CVA tenderness. Integumentary: No unusual rashes or suspicious skin lesions noted. Neuro: Non-focal exam, A & O X 3.  Psychiatric: Appropriate affect and demeanor, pleasant and cooperative. Patient's thought content and thought processing appear to be within normal limits. ASSESSMENT/PLAN:     ICD-10-CM ICD-9-CM    1. UTI symptoms  R39.9 788.99 URINALYSIS W/ REFLEX CULTURE      URINALYSIS W/ REFLEX CULTURE   2. Acute right-sided thoracic back pain  M54.6 724.1    3. Prediabetes  R73.03 790.29 HEMOGLOBIN A1C WITH EAG   4. Vitamin D deficiency  E55.9 268.9    5. High cholesterol  E78.00 272.0 LIPID PANEL   6. History of gastric bypass  Z98.84 V45.86 CBC W/O DIFF      METABOLIC PANEL, COMPREHENSIVE     1: Repeat labs including: CBC, CMP, lipid panel, and hemoglobin A1c when fasting. 2: We will perform urinalysis with reflex to culture today in office. 3: Patient to continue current medications and supplements as prescribed. 4: Continue healthy lifestyle management. 5: Patient to follow-up with me in approximately 6 months, or sooner as needed. Patient states understanding and agrees with plan.     Orders Placed This Encounter    URINALYSIS W/ REFLEX CULTURE    CBC W/O DIFF    METABOLIC PANEL, COMPREHENSIVE    LIPID PANEL    HEMOGLOBIN A1C WITH EAG         ATTENTION:   This medical record was transcribed using an electronic medical records system. Although proofread, it may and can contain electronic and spelling errors. Other human spelling and other errors may be present. Corrections may be executed at a later time. Please feel free to contact us for any clarifications as needed. Follow-up and Dispositions    · Return in about 6 months (around 10/7/2022) for Follow up. Signed,  Oriana Mcconnell.  Rakel Gonsalez, MSN APRN FNP-BC

## 2022-04-08 LAB
APPEARANCE UR: CLEAR
BACTERIA URNS QL MICRO: NEGATIVE /HPF
BILIRUB UR QL: NEGATIVE
COLOR UR: NORMAL
EPITH CASTS URNS QL MICRO: NORMAL /LPF
GLUCOSE UR STRIP.AUTO-MCNC: NEGATIVE MG/DL
HGB UR QL STRIP: NEGATIVE
HYALINE CASTS URNS QL MICRO: NORMAL /LPF (ref 0–5)
KETONES UR QL STRIP.AUTO: NEGATIVE MG/DL
LEUKOCYTE ESTERASE UR QL STRIP.AUTO: NEGATIVE
NITRITE UR QL STRIP.AUTO: NEGATIVE
PH UR STRIP: 5.5 [PH] (ref 5–8)
PROT UR STRIP-MCNC: NEGATIVE MG/DL
RBC #/AREA URNS HPF: NORMAL /HPF (ref 0–5)
SP GR UR REFRACTOMETRY: 1.03 (ref 1–1.03)
UA: UC IF INDICATED,UAUC: NORMAL
UROBILINOGEN UR QL STRIP.AUTO: 1 EU/DL (ref 0.2–1)
WBC URNS QL MICRO: NORMAL /HPF (ref 0–4)

## 2022-04-12 ENCOUNTER — TELEPHONE (OUTPATIENT)
Dept: SURGERY | Age: 57
End: 2022-04-12

## 2022-04-21 ENCOUNTER — OFFICE VISIT (OUTPATIENT)
Dept: ORTHOPEDIC SURGERY | Age: 57
End: 2022-04-21
Payer: COMMERCIAL

## 2022-04-21 VITALS
SYSTOLIC BLOOD PRESSURE: 110 MMHG | HEART RATE: 62 BPM | TEMPERATURE: 97.5 F | DIASTOLIC BLOOD PRESSURE: 73 MMHG | HEIGHT: 68 IN | OXYGEN SATURATION: 100 % | BODY MASS INDEX: 25.91 KG/M2 | WEIGHT: 171 LBS

## 2022-04-21 DIAGNOSIS — M17.0 BILATERAL PRIMARY OSTEOARTHRITIS OF KNEE: Primary | ICD-10-CM

## 2022-04-21 PROCEDURE — 20610 DRAIN/INJ JOINT/BURSA W/O US: CPT | Performed by: ORTHOPAEDIC SURGERY

## 2022-04-21 RX ORDER — BETAMETHASONE SODIUM PHOSPHATE AND BETAMETHASONE ACETATE 3; 3 MG/ML; MG/ML
6 INJECTION, SUSPENSION INTRA-ARTICULAR; INTRALESIONAL; INTRAMUSCULAR; SOFT TISSUE ONCE
Status: COMPLETED | OUTPATIENT
Start: 2022-04-21 | End: 2022-04-21

## 2022-04-21 RX ADMIN — BETAMETHASONE SODIUM PHOSPHATE AND BETAMETHASONE ACETATE 6 MG: 3; 3 INJECTION, SUSPENSION INTRA-ARTICULAR; INTRALESIONAL; INTRAMUSCULAR; SOFT TISSUE at 15:06

## 2022-04-21 NOTE — LETTER
4/21/2022    Patient: Raoul Simmons   YOB: 1965   Date of Visit: 4/21/2022     [address]ANIE [address] 70  [facility]  Via In Basket    Dear Cheri Chaudhary NP,      Thank you for referring Ms. Leeanne Parham to [facility] for evaluation. My notes for this consultation are attached. If you have questions, please do not hesitate to call me. I look forward to following your patient along with you.       Sincerely,    Delgado Goldman, DO

## 2022-04-21 NOTE — PROGRESS NOTES
4/21/2022      CC: right knee pain    HPI:      This is a 62y.o. year old female who presents for a follow up visit. The patient was last seen and diagnosed with right knee oa. The patient's treatments since the most recent visit have comprised of injections. The patient has had good but temporary relief of the chief complaint. PMH:  Past Medical History:   Diagnosis Date    Allergic rhinitis 5/27/2010    Arthritis     Baker cyst, left 10/2019    Dr. Michelle Grande cyst, right     Cervical disc herniation 01/2010    C3-4, C4-5, C5-6. Hemangioma body of C5. Dr. Nicholas Araiza.  Chest pain 08/2011, 02/2012    Dr. Tiburcio Burnette. Dr. Jennifer Chirinos; denies CP as of 3/27/14    Chronic lower back pain 2010, 03/2016    thoracic DDD and spondylosis. DDD L3-S1. Dr. Arelis Steel. [place]. Dr. Guanaco Moreau, [facility]. Dr. Sujatha Talbot.  Chronic meniscal tear of knee     Chronic pain     lower back and knees    Esophagitis, reflux 4/3/2011    Essential hypertension 6/3/2015    Gallstone     Gastritis 5/27/2010    Dr. Ryan Rincon.  Gastrointestinal food allergy 03/2014    Multiple. Dr. Jeremias Triana.    Heart palpitations 02/2012    DUE TO PAC'S AND PVC'S. Dr. Emily Morrison.  Heel spur, left 02/2019    Hiatal hernia 4/3/2011    HTN (hypertension) 1987    Impaired glucose tolerance 10/15/2010    Incidental lung nodule 01/08/13    LLL 3.9 mm, RML 3.1 mm;  as of 3/27/14 per pt stable w/o growth    Insomnia 1990s    Iron deficiency anemia 5/27/2010    Irritable bowel syndrome (IBS) 03/2014    Dr. Jamie Schroeder.    Knee pain 2012    Right. due to severe OA. / chipped bone     Left foot pain 08/2019    Dr. Ricky Crawford.  Metatarsal bone fracture 1990    Left fifth.  Migraine 2/22/2011    Dr. Tommie Moe [facility])     Peroneal tendonitis of lower leg 08/07/2019    Left. Dr. Ricky Crawford    Pre-diabetes     Radiculopathy, cervical 01/2010    Left.   Dr. Chapa  Wang.  Shoulder pain, right 2012    due to [address] X 2. Dr. Ame Irene.  Thyroid nodule 2011    6 nodules- Dr. Juve Alicia    Toe fracture, left 2008    fifth toe.  Triangular fibrocartilage complex tear 2012    Dr. Letty Jesus. Left. PSxHx:  Past Surgical History:   Procedure Laterality Date    COLONOSCOPY N/A 3/10/2021    COLONOSCOPY,EGD performed by Cesar [facility]ardo MD at [facility] ENDOSCOPY    [address]    x 1    HX CHOLECYSTECTOMY  1987    OPEN    HX COLONOSCOPY  03/31/14    Dr. Jean Pierre Zelaya; 03/31/14    due to abnormal PAP.  HX DILATION AND CURETTAGE  1990s    due to miscarriage.  HX ENDOSCOPY      HX HEART CATHETERIZATION  2018    no stents    HX KNEE ARTHROSCOPY Right     HX LAP GASTRIC BYPASS  06/14/2021    lap gastric bypass with hiatal hernia repair by Dr. Lexie Ayala at [address] [place] [address]    [address] [place] [address] Right 06/2016    LUMBAR L4-5, L5-S1 ELLY. Dr. Mil Gonzalez:    Current Outpatient Medications:     metoclopramide HCl (REGLAN) 10 mg tablet, Take 1 Tablet by mouth Before breakfast, lunch, and dinner., Disp: 270 Tablet, Rfl: 0    pantoprazole (PROTONIX) 40 mg tablet, Take 1 Tablet by mouth two (2) times a day., Disp: 180 Tablet, Rfl: 1    cholecalciferol (VITAMIN D3) (50,000 UNITS /1250 MCG) capsule, Take 1 Capsule by mouth every fourteen (14) days. , Disp: 7 Capsule, Rfl: 3    melatonin 5 mg tablet, Take 5 mg by mouth nightly. Taking 2-3 tabs  At bedtime, Disp: , Rfl:     acetaminophen (TYLENOL) 650 mg TbER, Take 650 mg by mouth every eight (8) hours. , Disp: , Rfl:     tiZANidine (ZANAFLEX) 2 mg tablet, Take 1 Tablet by mouth two (2) times daily as needed for Muscle Spasm(s). , Disp: 60 Tablet, Rfl: 3    multivitamin with iron (FLINTSTONES) chewable tablet, Take 1 Tablet by mouth daily. , Disp: , Rfl:     cyanocobalamin (Vitamin B-12) 100 mcg tablet, Take 100 mcg by mouth daily. , Disp: , Rfl:     calcium-cholecalciferol, D3, (CALTRATE 600+D) tablet, Take 1 Tablet by mouth daily. , Disp: , Rfl:     ondansetron (ZOFRAN ODT) 4 mg disintegrating tablet, Take 1 Tab by mouth every eight (8) hours as needed for Nausea or Vomiting., Disp: 30 Tab, Rfl: 0    docusate sodium (COLACE) 100 mg capsule, Take 100 mg by mouth as needed for Constipation. , Disp: , Rfl:     Current Facility-Administered Medications:     betamethasone (CELESTONE) injection 6 mg, 6 mg, Intra artICUlar, ONCE, Raf Carr, DO    All:  Allergies   Allergen Reactions    Doxylamine Succinate Swelling     12.5-25 MG  HANDS, FACE/PERIORAL, FEET    Pcn [Penicillins] Hives    Ambien [Zolpidem] Nausea and Vomiting and Other (comments)     5 mg with Ativan 0.5 mg   TOO GROGGY, SLEPT 24 HOURS  7.5 MG FORGOT SHE WAS SLEEP EATING    Aspirin Nausea Only    Carafate [Sucralfate] Nausea and Vomiting    Celebrex [Celecoxib] Other (comments)     TIGHT SQUEEZING IN CHEST  CHEST ACHED    Erythromycin Nausea and Vomiting    Milk Prot-Turm-Pepper-Pineappl Other (comments)     Multiple food allergies    Neurontin [Gabapentin] Other (comments)     300 mg  SEVERE LEG PAIN  EYE TWITCHING    Nsaids (Non-Steroidal Anti-Inflammatory Drug) Other (comments)     GI irritation      Pineapple Unknown (comments)       Social Hx:  Social History     Socioeconomic History    Marital status:     Number of children: 3   Occupational History    Occupation: Medical assistant     Employer: [employer]   Tobacco Use    Smoking status: Never Smoker    Smokeless tobacco: Never Used   Vaping Use    Vaping Use: Never used   Substance and Sexual Activity    Alcohol use: No    Drug use: Never    Sexual activity: Yes     Partners: Male   Social History Narrative    In the home alone     3 adult children     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        Family Hx:  Family History   Problem Relation Age of Onset    Diabetes Mother     Heart Disease Mother 58        2 stents    Hypertension Mother     Stroke Mother     Diabetes Father     Hypertension Father     Stroke Maternal Grandmother     Cancer Maternal Uncle         prostate    Diabetes Sister     No Known Problems Sister     No Known Problems Sister     Anesth Problems Neg Hx          Review of Systems:       General: Denies headache, lethargy, fever, weight loss  Ears/Nose/Throat: Denies ear discharge, drainage, nosebleeds, hoarse voice, dental problems  Cardiovascular: Denies chest pain, shortness of breath  Lungs: Denies chest pain, breathing problems, wheezing, pneumonia  Stomach: Denies stomach pain, heartburn, constipation, irritable bowel  Skin: Denies rash, sores, open wounds  Musculoskeletal: right knee pain  Genitourinary: Denies dysuria, hematuria, polyuria  Gastrointestinal: Denies constipation, obstipation, diarrhea  Neurological: Denies changes in sight, smell, hearing, taste, seizures. Denies loss of consciousness.   Psychiatric: Denies depression, sleep pattern changes, anxiety, change in personality  Endocrine: Denies mood swings, heat or cold intolerance  Hematologic/Lymphatic: Denies anemia, purpura, petechia  Allergic/Immunologic: Denies swelling of throat, pain or swelling at lymph nodes      Physical Examination:    Visit Vitals  /73 (BP 1 Location: Left upper arm, BP Patient Position: Sitting, BP Cuff Size: Large adult)   Pulse 62   Temp 97.5 °F (36.4 °C) (Tympanic)   Ht 5' 8\" (1.727 m)   Wt 171 lb (77.6 kg)   SpO2 100%   BMI 26.00 kg/m²        General: AOX3, no apparent distress  Psychiatric: mood and affect appropriate  Lungs: breathing is symmetric and unlabored bilaterally  Heart: regular rate and rhythm  Abdomen: no guarding  Head: normocephalic, atraumatic  Skin: No significant abnormalities, good turgor  Sensation intact to light touch: C5-T1 dermatomes  Muscular exam: 5/5 strength in all major muscle groups unless noted in specialty exam.    Extremities        Left lower extremity:  No gross deformity. No restriction to range of motion of the hip, knee, ankle. Muscle bulk is appropriate without wasting. Sensation is intact to light touch in the L1-S1 dermatomes. Capillary refill is less than 2 seconds in the fingers. Strength testing is 5/5 at the major muscle groups of the hip, knee, ankle. Right lower extremity: Knee is noted to have a mild effusion. Medial joint line tenderness to palpation with a negative deformity. Patellar crepitus with range of motion is noted. Range of motion is 0-1 25. Sensation is intact to light touch L1-S1 dermatomes. Knee flexion and extension strength is 5/5 with Tibialis anterior, EHL, FHL being 5/5 as well. No other gross deformity or deficit is noted. Diagnostics:    Pertinent Diagnostics: none today    Assessment: right knee osteoarthritis  Plan: This patient and I had a long discussion regarding her treatment options, she has done very well recently, and its been quite sometime since she was last seen for this particular issue. In general, she did well with her injection in the past, she would like to proceed with injection again. She stated her understanding and action. Ms. Sukhdeep Paul has a reminder for a \"due or due soon\" health maintenance. I have asked that she contact her primary care provider for follow-up on this health maintenance. Date of Procedure: 4/21/2022  PROCEDURE NOTE: Right knee injection of Celestone    Consent was obtained from the patient. The correct site was identified after confirmation with the patient. Following identification and confirmation of the correct site with the patient, the superolateral right knee was prepped in the normal sterile fashion. A local anesthetic of 1% lidocaine without epinephrine was then administered to the local tissues.   Following, an injection of a mixture of  6 mg Celestone and 1% lidocaine without epinephrine was administered to the right knee. The needle was then withdrawn and the injection site dressed with a sterile bandage at the conclusion. The procedure was well tolerated by the patient. No immediate adverse reactions were noted. Post injection instructions were given.

## 2022-04-21 NOTE — PROGRESS NOTES
Identified pt with two pt identifiers (name and ). Reviewed chart in preparation for visit and have obtained necessary documentation. Jamari Moseley is a 62 y.o. female  Chief Complaint   Patient presents with    Joint Or Tendon Injection     RT knee     Visit Vitals  /73 (BP 1 Location: Left upper arm, BP Patient Position: Sitting, BP Cuff Size: Large adult)   Pulse 62   Temp 97.5 °F (36.4 °C) (Tympanic)   Ht 5' 8\" (1.727 m)   Wt 171 lb (77.6 kg)   LMP  (LMP Unknown)   SpO2 100%   BMI 26.00 kg/m²     1. Have you been to the ER, urgent care clinic since your last visit? Hospitalized since your last visit? No    2. Have you seen or consulted any other health care providers outside of the [address] [place] [address] since your last visit? Include any pap smears or colon screening.  No

## 2022-04-29 ENCOUNTER — TELEPHONE (OUTPATIENT)
Dept: INTERNAL MEDICINE CLINIC | Age: 57
End: 2022-04-29

## 2022-04-29 NOTE — TELEPHONE ENCOUNTER
----- Message from Shelbie Keita sent at 4/28/2022 10:52 AM EDT -----  Subject: Message to Provider    QUESTIONS  Information for Provider? Patient is needing a call back from Aris Mak for a personal matter. She needs a call back asap   ---------------------------------------------------------------------------  --------------  CALL BACK INFO  What is the best way for the office to contact you? OK to leave message on   voicemail  Preferred Call Back Phone Number? [phone]  ---------------------------------------------------------------------------  --------------  SCRIPT ANSWERS  Relationship to Patient?  Self

## 2022-06-21 DIAGNOSIS — R73.02 IMPAIRED GLUCOSE TOLERANCE: ICD-10-CM

## 2022-06-21 DIAGNOSIS — Z98.84 HISTORY OF GASTRIC BYPASS: Primary | ICD-10-CM

## 2022-06-22 LAB
25(OH)D3 SERPL-MCNC: 40.7 NG/ML (ref 30–100)
ALBUMIN SERPL-MCNC: 3.6 G/DL (ref 3.5–5)
ALBUMIN/GLOB SERPL: 1.1 {RATIO} (ref 1.1–2.2)
ALP SERPL-CCNC: 111 U/L (ref 45–117)
ALT SERPL-CCNC: 71 U/L (ref 12–78)
ANION GAP SERPL CALC-SCNC: 5 MMOL/L (ref 5–15)
AST SERPL-CCNC: 47 U/L (ref 15–37)
BILIRUB SERPL-MCNC: 0.4 MG/DL (ref 0.2–1)
BUN SERPL-MCNC: 11 MG/DL (ref 6–20)
BUN/CREAT SERPL: 20 (ref 12–20)
CALCIUM SERPL-MCNC: 9.5 MG/DL (ref 8.5–10.1)
CHLORIDE SERPL-SCNC: 109 MMOL/L (ref 97–108)
CHOLEST SERPL-MCNC: 153 MG/DL
CO2 SERPL-SCNC: 29 MMOL/L (ref 21–32)
COMMENT, HOLDF: NORMAL
CREAT SERPL-MCNC: 0.55 MG/DL (ref 0.55–1.02)
ERYTHROCYTE [DISTWIDTH] IN BLOOD BY AUTOMATED COUNT: 13.6 % (ref 11.5–14.5)
EST. AVERAGE GLUCOSE BLD GHB EST-MCNC: 114 MG/DL
GLOBULIN SER CALC-MCNC: 3.4 G/DL (ref 2–4)
GLUCOSE SERPL-MCNC: 82 MG/DL (ref 65–100)
HBA1C MFR BLD: 5.6 % (ref 4–5.6)
HCT VFR BLD AUTO: 38.4 % (ref 35–47)
HDLC SERPL-MCNC: 80 MG/DL
HDLC SERPL: 1.9 {RATIO} (ref 0–5)
HGB BLD-MCNC: 11.8 G/DL (ref 11.5–16)
LDLC SERPL CALC-MCNC: 63.6 MG/DL (ref 0–100)
LIPASE SERPL-CCNC: 58 U/L (ref 73–393)
MCH RBC QN AUTO: 29.4 PG (ref 26–34)
MCHC RBC AUTO-ENTMCNC: 30.7 G/DL (ref 30–36.5)
MCV RBC AUTO: 95.5 FL (ref 80–99)
NRBC # BLD: 0 K/UL (ref 0–0.01)
NRBC BLD-RTO: 0 PER 100 WBC
PLATELET # BLD AUTO: 250 K/UL (ref 150–400)
PMV BLD AUTO: 11 FL (ref 8.9–12.9)
POTASSIUM SERPL-SCNC: 4.4 MMOL/L (ref 3.5–5.1)
PROT SERPL-MCNC: 7 G/DL (ref 6.4–8.2)
RBC # BLD AUTO: 4.02 M/UL (ref 3.8–5.2)
SAMPLES BEING HELD,HOLD: NORMAL
SODIUM SERPL-SCNC: 143 MMOL/L (ref 136–145)
TRIGL SERPL-MCNC: 47 MG/DL (ref ?–150)
VLDLC SERPL CALC-MCNC: 9.4 MG/DL
WBC # BLD AUTO: 3.7 K/UL (ref 3.6–11)

## 2022-06-23 ENCOUNTER — VIRTUAL VISIT (OUTPATIENT)
Dept: SURGERY | Age: 57
End: 2022-06-23
Payer: COMMERCIAL

## 2022-06-23 VITALS
DIASTOLIC BLOOD PRESSURE: 79 MMHG | WEIGHT: 166 LBS | HEART RATE: 66 BPM | BODY MASS INDEX: 25.24 KG/M2 | SYSTOLIC BLOOD PRESSURE: 124 MMHG

## 2022-06-23 DIAGNOSIS — K91.89 ANASTOMOTIC STRICTURE OF GASTROJEJUNOSTOMY: ICD-10-CM

## 2022-06-23 DIAGNOSIS — K21.9 CHRONIC GERD: ICD-10-CM

## 2022-06-23 DIAGNOSIS — E55.9 VITAMIN D DEFICIENCY: ICD-10-CM

## 2022-06-23 DIAGNOSIS — K91.2 POSTOPERATIVE INTESTINAL MALABSORPTION: Primary | ICD-10-CM

## 2022-06-23 DIAGNOSIS — R13.19 INTERMITTENT DYSPHAGIA: ICD-10-CM

## 2022-06-23 PROCEDURE — 99213 OFFICE O/P EST LOW 20 MIN: CPT | Performed by: NURSE PRACTITIONER

## 2022-06-23 NOTE — Clinical Note
NOTIFICATION RETURN TO WORK / SCHOOL    6/24/2022 2:49 PM    Ms. [address] [address] [facility] [address]      To Whom It May Concern:    Jamari Moseley is currently under the care of Luci Cooper. She will return to work/school on: ***    If there are questions or concerns please have the patient contact our office.         Sincerely,      Rudy Trujillo NP

## 2022-06-23 NOTE — PROGRESS NOTES
1. Have you been to the ER, urgent care clinic since your last visit? Hospitalized since your last visit? No    2. Have you seen or consulted any other health care providers outside of the [address] [place] [address] since your last visit? Include any pap smears or colon screening.  No

## 2022-06-23 NOTE — LETTER
6/24/2022 2:49 PM    Ms.  [address] [facility] [address]              Sincerely,      Kim Gant, NP

## 2022-06-24 RX ORDER — ASPIRIN 325 MG
50000 TABLET, DELAYED RELEASE (ENTERIC COATED) ORAL
Qty: 7 CAPSULE | Refills: 3 | Status: SHIPPED | OUTPATIENT
Start: 2022-06-24

## 2022-06-24 RX ORDER — LANOLIN ALCOHOL/MO/W.PET/CERES
325 CREAM (GRAM) TOPICAL
Qty: 90 TABLET | Refills: 3 | Status: SHIPPED | OUTPATIENT
Start: 2022-06-24

## 2022-06-24 NOTE — PATIENT INSTRUCTIONS
Vitamin regimen:    If you are going to continue on the gummy multivitamin, please add an iron supplement and I have sent one to your pharmacy. Take 1/day. Continue the vitamin D 50,000 units 1 every 2 weeks    Stay on daily B12    Take the pantoprazole once daily since you are having some issues currently with your stomach and I have sent an upper endoscopy request over to the GI office. They should be contacting you with regard to a date and time. Please follow-up after your endoscopy    If you would like to make an appointment with one of the bariatric dietitians, please call the bariatric line at [phone]. Appointments are offered in person and virtual at no charge. Avoid ALL NSAID'S, including:  Advil, Motrin, Aleve, BC Powders, Excedrin, Naproxen, Ibuprofen, Goodies, Voltaren, Mobic, Diclofenac, etc.      If you are given a new prescription for pain ask if it is a NSAID (non-steroidal antiinflammatory drug). If it is, you should not take after having gastric bypass.

## 2022-06-24 NOTE — PROGRESS NOTES
I was in the office while conducting this encounter. Consent:  He and/or his healthcare decision maker is aware that this patient-initiated Telehealth encounter is a billable service, with coverage as determined by his insurance carrier. He is aware that he may receive a bill and has provided verbal consent to proceed: Yes    This virtual visit was conducted via Doxy. me. Pursuant to the emergency declaration under the [address] [place] [address], [facility], [facility] waiver authority and the [employer] Act, this Virtual  Visit was conducted to reduce the patient's risk of exposure to COVID-19 and provide continuity of care for an established patient. Services were provided through a video synchronous discussion virtually to substitute for in-person clinic visit. Due to this being a TeleHealth evaluation, many elements of the physical examination are unable to be assessed. Total Time: minutes: 11-20 minutes. Gordon Zuniga, was evaluated through a synchronous (real-time) audio-video encounter. The patient (or guardian if applicable) is aware that this is a billable service, which includes applicable co-pays. This Virtual Visit was conducted with patient's (and/or legal guardian's) consent. The visit was conducted pursuant to the emergency declaration under the [address] [place] [address], [address] [place] [address] waiver authority and the [employer] Act. Patient identification was verified, and a caregiver was present when appropriate. The patient was located in a state where the provider was licensed to provide care. Chief Complaint   Patient presents with   Colin Obesity      Annual visit, S/P Laparoscopic Yelena-en-Y gastric bypass, 6/14/21 [phone] (Mobile)       Joe Parham 1 year status post laparoscopic gastric bypass for treatment of morbid obesity.   Postoperative course has been complicated by gastrojejunal ulcer and stricture. She said she is concerned that she has another stricture as she is back to feeling that \"stuck sensation\". She is not been taking the pantoprazole because she does not feel like she really has heartburn but she does have some regurgitation. She said she has been feeling the stuck sensation for about 2 months. Denies NSAIDs, tobacco and alcohol  No hematemesis  No melena  She said she eats the same thing every day and has no issues with it. Protein intake is about 60 g/day  Fluids she said she is good and gets at least 64 ounces a day  She is okay with eating very much  She is pleased with her weight loss but would like to lose another 10 pounds to \"be normal\". Vitamins include a multivitamin gummy (no iron),  vitamin D 50,000 once a week and B12 daily  She is active and says her energy is good      Co-Morbid(s)     Was anti coagulation initiated for presumed / confirmed DVT/PE? NO    Was an incisional hernia noted on exam?       NO      COMORBIDITY     SLEEP APNEA                 NO        GERD  (req.meds)           YES  HYPERLIPIDEMIA           NO  HYPERTENSION             NO       IF YES, # OF HTN MEDICATIONS 0  DIABETES                        NO      IF YES, 0 NON-INSULIN   0 INSULIN     Visit Vitals  /79   Pulse 66   Wt 166 lb (75.3 kg)   LMP  (LMP Unknown)   BMI 25.24 kg/m²     Saw her briefly via Doxy and then we lost audio so I called her on the phone  She appeared well speech was clear  Mucous membranes appear moist    ICD-10-CM ICD-9-CM    1. Postoperative intestinal malabsorption  K91.2 579.3 REFERRAL TO GASTROENTEROLOGY   2. Anastomotic stricture of gastrojejunostomy  K91.89 997.49 REFERRAL TO GASTROENTEROLOGY   3. Chronic GERD  K21.9 530.81 REFERRAL TO GASTROENTEROLOGY   4. BMI 25.0-25.9,adult  Z68.25 V85.21    5.  Intermittent dysphagia  R13.19 787.29 REFERRAL TO GASTROENTEROLOGY     1 year status post laparoscopic gastric bypass for treatment of morbid obesity complicated by gastrojejunal ulcer and chronic anastomotic stricture  Feels as if she has restructured and will send her for upper endoscopy with possible dilatation if indicated with Dr. Swenson Stage  I have asked her to resume pantoprazole at least once per day in the interim  She is reluctant to change her vitamin regimen so I have asked her to add ferrous sulfate 325 mg a day to her vitamin regimen as her multi has no iron  Plan on follow-up after the endoscopy  Continue with diet  Dietitian is available as well support group  Continue with protein supplementation  [facility] verbalized understanding and questions were answered to the best of my knowledge and ability. Diet, EGD and vitamins  educational materials were provided.

## 2022-06-27 ENCOUNTER — DOCUMENTATION ONLY (OUTPATIENT)
Dept: SURGERY | Age: 57
End: 2022-06-27

## 2022-08-03 ENCOUNTER — HOSPITAL ENCOUNTER (OUTPATIENT)
Age: 57
Setting detail: OUTPATIENT SURGERY
Discharge: HOME | End: 2022-08-03
Attending: INTERNAL MEDICINE | Admitting: INTERNAL MEDICINE
Payer: COMMERCIAL

## 2022-08-03 ENCOUNTER — ANESTHESIA EVENT (OUTPATIENT)
Dept: ENDOSCOPY | Age: 57
End: 2022-08-03
Payer: COMMERCIAL

## 2022-08-03 ENCOUNTER — ANESTHESIA (OUTPATIENT)
Dept: ENDOSCOPY | Age: 57
End: 2022-08-03
Payer: COMMERCIAL

## 2022-08-03 VITALS
RESPIRATION RATE: 15 BRPM | BODY MASS INDEX: 25.46 KG/M2 | DIASTOLIC BLOOD PRESSURE: 87 MMHG | TEMPERATURE: 98.2 F | OXYGEN SATURATION: 100 % | HEART RATE: 66 BPM | HEIGHT: 68 IN | SYSTOLIC BLOOD PRESSURE: 132 MMHG | WEIGHT: 168 LBS

## 2022-08-03 PROCEDURE — 74011250636 HC RX REV CODE- 250/636: Performed by: NURSE ANESTHETIST, CERTIFIED REGISTERED

## 2022-08-03 PROCEDURE — 76040000019: Performed by: INTERNAL MEDICINE

## 2022-08-03 PROCEDURE — 77030018712 HC DEV BLLN INFL BSC -B: Performed by: INTERNAL MEDICINE

## 2022-08-03 PROCEDURE — C1726 CATH, BAL DIL, NON-VASCULAR: HCPCS | Performed by: INTERNAL MEDICINE

## 2022-08-03 PROCEDURE — 76060000031 HC ANESTHESIA FIRST 0.5 HR: Performed by: INTERNAL MEDICINE

## 2022-08-03 PROCEDURE — 2709999900 HC NON-CHARGEABLE SUPPLY: Performed by: INTERNAL MEDICINE

## 2022-08-03 PROCEDURE — 74011000250 HC RX REV CODE- 250: Performed by: NURSE ANESTHETIST, CERTIFIED REGISTERED

## 2022-08-03 RX ORDER — DEXTROMETHORPHAN/PSEUDOEPHED 2.5-7.5/.8
1.2 DROPS ORAL
Status: DISCONTINUED | OUTPATIENT
Start: 2022-08-03 | End: 2022-08-03 | Stop reason: HOSPADM

## 2022-08-03 RX ORDER — EPINEPHRINE 0.1 MG/ML
1 INJECTION INTRACARDIAC; INTRAVENOUS
Status: DISCONTINUED | OUTPATIENT
Start: 2022-08-03 | End: 2022-08-03 | Stop reason: HOSPADM

## 2022-08-03 RX ORDER — SODIUM CHLORIDE 9 MG/ML
INJECTION, SOLUTION INTRAVENOUS
Status: DISCONTINUED | OUTPATIENT
Start: 2022-08-03 | End: 2022-08-03 | Stop reason: HOSPADM

## 2022-08-03 RX ORDER — NALOXONE HYDROCHLORIDE 0.4 MG/ML
0.4 INJECTION, SOLUTION INTRAMUSCULAR; INTRAVENOUS; SUBCUTANEOUS
Status: DISCONTINUED | OUTPATIENT
Start: 2022-08-03 | End: 2022-08-03 | Stop reason: HOSPADM

## 2022-08-03 RX ORDER — LIDOCAINE HYDROCHLORIDE 20 MG/ML
INJECTION, SOLUTION EPIDURAL; INFILTRATION; INTRACAUDAL; PERINEURAL AS NEEDED
Status: DISCONTINUED | OUTPATIENT
Start: 2022-08-03 | End: 2022-08-03 | Stop reason: HOSPADM

## 2022-08-03 RX ORDER — FLUMAZENIL 0.1 MG/ML
0.2 INJECTION INTRAVENOUS
Status: DISCONTINUED | OUTPATIENT
Start: 2022-08-03 | End: 2022-08-03 | Stop reason: HOSPADM

## 2022-08-03 RX ORDER — SODIUM CHLORIDE 0.9 % (FLUSH) 0.9 %
5-40 SYRINGE (ML) INJECTION AS NEEDED
Status: DISCONTINUED | OUTPATIENT
Start: 2022-08-03 | End: 2022-08-03 | Stop reason: HOSPADM

## 2022-08-03 RX ORDER — ATROPINE SULFATE 0.1 MG/ML
0.5 INJECTION INTRAVENOUS
Status: DISCONTINUED | OUTPATIENT
Start: 2022-08-03 | End: 2022-08-03 | Stop reason: HOSPADM

## 2022-08-03 RX ORDER — PROPOFOL 10 MG/ML
INJECTION, EMULSION INTRAVENOUS AS NEEDED
Status: DISCONTINUED | OUTPATIENT
Start: 2022-08-03 | End: 2022-08-03 | Stop reason: HOSPADM

## 2022-08-03 RX ORDER — SODIUM CHLORIDE 9 MG/ML
50 INJECTION, SOLUTION INTRAVENOUS CONTINUOUS
Status: DISCONTINUED | OUTPATIENT
Start: 2022-08-03 | End: 2022-08-03 | Stop reason: HOSPADM

## 2022-08-03 RX ORDER — SODIUM CHLORIDE 0.9 % (FLUSH) 0.9 %
5-40 SYRINGE (ML) INJECTION EVERY 8 HOURS
Status: DISCONTINUED | OUTPATIENT
Start: 2022-08-03 | End: 2022-08-03 | Stop reason: HOSPADM

## 2022-08-03 RX ADMIN — PROPOFOL 100 MG: 10 INJECTION, EMULSION INTRAVENOUS at 16:23

## 2022-08-03 RX ADMIN — PROPOFOL 50 MG: 10 INJECTION, EMULSION INTRAVENOUS at 16:05

## 2022-08-03 RX ADMIN — LIDOCAINE HYDROCHLORIDE 40 MG: 20 INJECTION, SOLUTION EPIDURAL; INFILTRATION; INTRACAUDAL; PERINEURAL at 16:23

## 2022-08-03 RX ADMIN — PROPOFOL 100 MG: 10 INJECTION, EMULSION INTRAVENOUS at 16:25

## 2022-08-03 RX ADMIN — SODIUM CHLORIDE: 900 INJECTION, SOLUTION INTRAVENOUS at 16:18

## 2022-08-03 NOTE — PERIOP NOTES
Initial RN admission and assessment performed and documented in Endoscopy navigator. Patient evaluated by anesthesia in pre-procedure holding. All procedural vital signs, airway assessment, and level of consciousness information monitored and recorded by anesthesia staff on the anesthesia record. CRE balloon dilatation of anastomosis,   inflated to 15 ATMs and held for 60 seconds. Report received from CRNA post procedure. Patient transported to recovery area by RN. Endoscope was pre-cleaned at bedside immediately following procedure by LOIS Zelaya.

## 2022-08-03 NOTE — PROCEDURES
[address]  [address] [place], [address]  [phone]                            NAME:  Donna Jackson   :   1965   MRN:   [id number]     Date/Time:  8/3/2022 4:37 PM    Esophagogastroduodenoscopy (EGD) Procedure Note    :  Zhanna Guerrero MD    Staff: Endoscopy Technician-1: Chelsea Lepe  Endoscopy RN-1: Raford Nissen, RN     Implants: none    Referring Provider:  Acosta Ang NP    Anethesia/Sedation:  MAC anesthesia    Procedure Details     After infom consent was obtained for the procedure, with all risks and benefits of procedure explained the patient was taken to the endoscopy suite and placed in the left lateral decubitus position. Following sequential administration of sedation as per above, the [id number] gastroscope was inserted into the mouth and advanced under direct vision to proximal jejunum. A careful inspection was made as the gastroscope was withdrawn, including a retroflexed view of the proximal stomach; findings and interventions are described below. Findings:  Esophagus:normal  Stomach: Yelena-en-Y gastric bypass anatomy was noted. Few superficial erosions were noted in the gastric pouch. Gastrojejunal anastomosis showed stenosis. This was traversed without dilation. Suture material was present at anastomosis. No ulceration was noted. Duodenum/jejunum:normal      Therapies: Gastric anastomotic dilation was performed using CRE balloon 12 mm to 15 mm with maximal dilation at 15 mm for 1 minute. Post dilation inspection of the area revealed no complications    Specimens: none           EBL: None    Complications:   None; patient tolerated the procedure well. Impression:    See Postoperative diagnosis above    Recommendations:  -Continue acid suppression. , -Follow symptoms. , -Soft diet in small portions  -May need repeat EGD and dilation if symptoms donot improve    Discharge disposition:  Home in the company of  when able to ambulate    iDoni Beaulieu MD

## 2022-08-03 NOTE — ANESTHESIA POSTPROCEDURE EVALUATION
Procedure(s):  ESOPHAGOGASTRODUODENOSCOPY (EGD)  ESOPHAGEAL DILATION. MAC    Anesthesia Post Evaluation        Patient location during evaluation: PACU  Patient participation: complete - patient participated  Level of consciousness: awake and alert  Pain management: adequate  Airway patency: patent  Anesthetic complications: no  Cardiovascular status: acceptable  Respiratory status: acceptable  Hydration status: acceptable  Comments: I have seen and evaluated the patient and is ready for discharge. Bethany Ordonez MD    Post anesthesia nausea and vomiting:  none      INITIAL Post-op Vital signs: No vitals data found for the desired time range.

## 2022-08-03 NOTE — H&P
[address]  [address] [facility] [address] [facility], [address]  [phone]                                History and Physical     NAME: Cresencio Baltazar   :  1965   MRN:  [id number]     HPI:  The patient was seen and examined. Past Surgical History:   Procedure Laterality Date    COLONOSCOPY N/A 3/10/2021    COLONOSCOPY,EGD performed by Charissa Lara MD at [address] [facility]    x 1    [address] [facility] [place]    HX COLONOSCOPY  14    Dr. Anupam Gomez. HX COLPOSCOPY  ; 14    due to abnormal PAP. HX DILATION AND CURETTAGE      due to miscarriage. HX ENDOSCOPY      HX HEART CATHETERIZATION      no stents    HX KNEE ARTHROSCOPY Right     HX LAP GASTRIC BYPASS  2021    lap gastric bypass with hiatal hernia repair by Dr. Meghna Rivera at [facility]    HX ORTHOPAEDIC Right 2016    LUMBAR L4-5, L5-S1 ELLY. Dr. Bradly Pedro     HX [address] [facility]     Past Medical History:   Diagnosis Date    Allergic rhinitis 2010    Arthritis     Villavicencio cyst, left 10/2019    Dr. Julia Rivera cyst, right     Cervical disc herniation 2010    C3-4, C4-5, C5-6. Hemangioma body of C5. Dr. Danny Herbert. Chest pain 2011, 2012    Dr. Janine López. Dr. Benito Meyer; denies CP as of 3/27/14    Chronic lower back pain , 2016    thoracic DDD and spondylosis. DDD L3-S1. Dr. Isael Walsh. Joselito Coppola. Dr. Clair Garay, [facility]. Joanna Chung. Chronic meniscal tear of knee     Chronic pain     lower back and knees    Esophagitis, reflux 4/3/2011    Essential hypertension 6/3/2015    Gallstone     Gastritis 2010    Dr. Jamila Mota. Gastrointestinal food allergy 2014    Multiple. Dr. Mitali Elizabeth. Heart palpitations 2012    DUE TO PAC'S AND PVC'S. Dr. Rissa Johnson.     Heel spur, left 2019    Hiatal hernia 4/3/2011    HTN (hypertension) 1987    Impaired glucose tolerance 10/15/2010    Incidental lung nodule 01/08/13    LLL 3.9 mm, RML 3.1 mm;  as of 3/27/14 per pt stable w/o growth    Insomnia 1990s    Iron deficiency anemia 5/27/2010    Irritable bowel syndrome (IBS) 03/2014    Dr. Crissy Bnejamin. Knee pain 2012    Right. due to severe OA. / chipped bone     Left foot pain 08/2019    Dr. Fior Hanley. Metatarsal bone fracture 1990    Left fifth. Migraine 2/22/2011    Dr. Reyna Blackwood    Morbid obesity [facility])     Peroneal tendonitis of lower leg 08/07/2019    Left. Dr. Fior Hanley    Pre-diabetes     Radiculopathy, cervical 01/2010    Left. Dr. Rothman Right. Shoulder pain, right 2012    due to [address] X 2. Dr. Jhonny Tony. Thyroid nodule 2011    6 nodules- Dr. Mary Camp    Toe fracture, left 2008    fifth toe. Triangular fibrocartilage complex tear 2012    Dr. Aron Garcia. Left.      Social History     Tobacco Use    Smoking status: Never    Smokeless tobacco: Never   Vaping Use    Vaping Use: Never used   Substance Use Topics    Alcohol use: No    Drug use: Never     Allergies   Allergen Reactions    Doxylamine Succinate Swelling     12.5-25 MG  HANDS, FACE/PERIORAL, FEET    Pcn [Penicillins] Hives    Ambien [Zolpidem] Nausea and Vomiting and Other (comments)     5 mg with Ativan 0.5 mg   TOO GROGGY, SLEPT 24 HOURS  7.5 MG FORGOT SHE WAS SLEEP EATING    Aspirin Nausea Only    Carafate [Sucralfate] Nausea and Vomiting    Celebrex [Celecoxib] Other (comments)     TIGHT SQUEEZING IN CHEST  CHEST ACHED    Erythromycin Nausea and Vomiting    Milk Prot-Turm-Pepper-Pineappl Other (comments)     Multiple food allergies    Neurontin [Gabapentin] Other (comments)     300 mg  SEVERE LEG PAIN  EYE TWITCHING    Nsaids (Non-Steroidal Anti-Inflammatory Drug) Other (comments)     GI irritation      Pineapple Unknown (comments)     Family History   Problem Relation Age of Onset    Diabetes Mother     Heart Disease Mother 58        2 stents    Hypertension Mother     Stroke Mother Diabetes Father     Hypertension Father     Stroke Maternal Grandmother     Cancer Maternal Uncle         prostate    Diabetes Sister     No Known Problems Sister     No Known Problems Sister     Anesth Problems Neg Hx      Current Facility-Administered Medications   Medication Dose Route Frequency    0.9% sodium chloride infusion  50 mL/hr IntraVENous CONTINUOUS    sodium chloride (NS) flush 5-40 mL  5-40 mL IntraVENous Q8H    sodium chloride (NS) flush 5-40 mL  5-40 mL IntraVENous PRN    naloxone (NARCAN) injection 0.4 mg  0.4 mg IntraVENous Multiple    flumazeniL (ROMAZICON) 0.1 mg/mL injection 0.2 mg  0.2 mg IntraVENous Multiple    simethicone (MYLICON) 43YT/0.3MY oral drops 80 mg  1.2 mL Oral Multiple    atropine injection 0.5 mg  0.5 mg IntraVENous ONCE PRN    EPINEPHrine (ADRENALIN) 0.1 mg/mL syringe 1 mg  1 mg Endoscopically ONCE PRN         PHYSICAL EXAM:  General: WD, WN. Alert, cooperative, no acute distress    HEENT: NC, Atraumatic. PERRLA, EOMI. Anicteric sclerae. Lungs:  CTA Bilaterally. No Wheezing/Rhonchi/Rales. Heart:  Regular  rhythm,  No murmur, No Rubs, No Gallops  Abdomen: Soft, Non distended, Non tender. +Bowel sounds, no HSM  Extremities: No c/c/e  Neurologic:  CN 2-12 gi, Alert and oriented X 3. No acute neurological distress   Psych:   Good insight. Not anxious nor agitated. The heart, lungs and mental status were satisfactory for the administration of MAC sedation and for the procedure. Mallampati score: 2     The patient was counseled at length about the risks of vamshi Covid-19 in the anaya-operative and post-operative states including the recovery window of their procedure. The patient was made aware that vamshi Covid-19 after a surgical procedure may worsen their prognosis for recovering from the virus and lend to a higher morbidity and or mortality risk. The patient was given the options of postponing their procedure.  All of the risks, benefits, and alternatives were discussed. The patient does  wish to proceed with the procedure.       Assessment:   Gastric anastomotic stricture    Plan:   Endoscopic procedure  MAC sedation

## 2022-08-03 NOTE — PERIOP NOTES
Pt dc'd home in stable condition with valuables and belongings. Discharge instructions reviewed and opportunity for questions and clarification offered. Pt dc'd home in company of family.

## 2022-10-28 ENCOUNTER — CLINICAL SUPPORT (OUTPATIENT)
Dept: SURGERY | Age: 57
End: 2022-10-28

## 2022-10-28 DIAGNOSIS — Z71.3 NUTRITIONAL COUNSELING: Primary | ICD-10-CM

## 2022-10-28 NOTE — PROGRESS NOTES
Post-Operative Bariatric Nutrition Counseling (subsequent)    Physician/Surgeon: Dr. Bro Fresh  Name: Aretha Henderson                   : 1965                                    Reason for visit: Follow up nutrition education and counseling post gastric bypass; recurrent stricture at the gastrojejunal anastomosis- serial dilatations multiple times. ASSESSMENT:  Date of surgery: 2021     Past Medical History: acid reflux  Medications/Supplements:   Prior to Admission medications    Medication Sig Start Date End Date Taking? Authorizing Provider   ferrous sulfate 325 mg (65 mg iron) tablet Take 1 Tablet by mouth Daily (before breakfast). 22   Daylin Giang NP   cholecalciferol (VITAMIN D3) (50,000 UNITS /1250 MCG) capsule Take 1 Capsule by mouth every fourteen (14) days. 22   Daylin Giang NP   metoclopramide HCl (REGLAN) 10 mg tablet Take 1 Tablet by mouth Before breakfast, lunch, and dinner. Patient not taking: Reported on 2022   Daylin Giang NP   pantoprazole (PROTONIX) 40 mg tablet Take 1 Tablet by mouth two (2) times a day. 22   Daylin Giang NP   melatonin 5 mg tablet Take 5 mg by mouth nightly. Taking 2-3 tabs  At bedtime  Patient not taking: Reported on 2022    Provider, Historical   acetaminophen (TYLENOL) 650 mg TbER Take 650 mg by mouth every eight (8) hours. Provider, Historical   tiZANidine (ZANAFLEX) 2 mg tablet Take 1 Tablet by mouth two (2) times daily as needed for Muscle Spasm(s). Patient not taking: Reported on 2022 10/12/21   Jesenia WIGGINS NP   multivitamin with iron (FLINTSTONES) chewable tablet Take 1 Tablet by mouth daily. Provider, Historical   cyanocobalamin (VITAMIN B12) 100 mcg tablet Take 100 mcg by mouth daily. Provider, Historical   calcium-cholecalciferol, D3, (CALTRATE 600+D) tablet Take 1 Tablet by mouth daily.     Provider, Historical   ondansetron (ZOFRAN ODT) 4 mg disintegrating tablet Take 1 Tab by mouth every eight (8) hours as needed for Nausea or Vomiting. Patient not taking: Reported on 6/23/2022 5/18/21   Micah Dalton NP   docusate sodium (COLACE) 100 mg capsule Take 100 mg by mouth as needed for Constipation. Provider, Historical       Pt takes recommended supplements as prescribed: yes      Anthropometrics:     Ht: 68 inches   Wt :176 lbs (pt reported)          Pre-op wt: 265 lbs   Net Wt Loss: 33 (%)     Pt stated Goal wt: 155 lbs  IBW: 140 lbs    %IBW: 125        BMI: 26           Category: Overweight      Exercise/Physical Actvity: None    Reported Diet History: A post op nutrition checklist and 24 hour diet recall were obtained from patient;       Consumes 3 meals per day no   Includes a lean source of protein with each meal no   Measures all meals to 1/2-1 cup No- \"eye balls it\"   Limits dining out and orders with special request unsure   Consumes meals over 20-30 minutes 15-20 min   Monitors hunger and fullness cues Yes, but states overeating   Follows the 30-30-30 rule Few sips when eating   Sips 48-64 oz of sugar free, calorie free, non-carbonated beverages per day yes   Utilizes food journal for self-monitoring no   Attends Bariatric Support Group no       24 Hour Diet Recall  Breakfast  Protein shake, banana or hash browns (cafeteria) or 8 pk pbutter crackers or skips   Lunch  Tunafish, crackers or pork rinds   Snack  Protein shake or pbutter crackers    Dinner  Peas or veggie medley or turnip greens   Snacks     Beverages  Coffee w/reg cream/splenda (24 oz full fat cream in coffee- ~400 kcal), water, gatorade zero       NUTRITION DIAGNOSIS:  Disordered eating pattern R/T lack of planning/busy schedule  AEB pt not eating balanced, portioned meals (no protein at some meals, not measuring meals, snacking frequently)       Excessive calorie/sugar intake R/T not reading food labels AEB pt consuming excessive calories from large amounts of full fat/high sugar coffee creamer      NUTRITION MONITORING AND EVALUATION:    Pt 16 months post gastric bypass. Has recently noted weight gain- low weight of 163 lbs. Per food recall pt sometimes skipping meals and not having protein at each meal (imbalanced meals). Snacking several times during the day due to feeling \"shakey/sweaty\". Noted pt consuming 24 oz coffee mixed with 24 oz high sugar, high fat creamer- likely experiencing dumping and contributing to >400 kcal/day. Not measuring meals- unsure of correct volume and notes overeating at times (feeling sick). Currently no exercise. NUTRITION INTERVENTION:  Discussed importance of eating 3 balanced meals a day to include protein and measuring meals to 1 cup measure. Recommended eliminating high fat/high sugar foods to avoid dumping and excess calories. Encouraged pt to begin a consistent exercise routine. GOALS:    Measure meals to 1 cup per meal- protein, fruit/veggie  Protein at each meal, eating protein first  Use fat free sugar free creamer in coffee  Eat 3 meals a day, no skipping      Specific tips and techniques to facilitate compliance with above recommendations were provided and discussed. Pt was strongly encouraged to begin making necessary changes now, attend support group, and re-visit the dietitian in one month. If further details are desired please feel free to contact me at [phone]. This phone number was also provided to the patient for any further questions or concerns.              Marcia Davidson RD

## 2022-11-11 ENCOUNTER — OFFICE VISIT (OUTPATIENT)
Dept: ORTHOPEDIC SURGERY | Age: 57
End: 2022-11-11
Payer: COMMERCIAL

## 2022-11-11 VITALS
TEMPERATURE: 96.3 F | SYSTOLIC BLOOD PRESSURE: 127 MMHG | OXYGEN SATURATION: 100 % | DIASTOLIC BLOOD PRESSURE: 89 MMHG | HEART RATE: 65 BPM | BODY MASS INDEX: 25.54 KG/M2 | HEIGHT: 68 IN

## 2022-11-11 DIAGNOSIS — M17.0 BILATERAL PRIMARY OSTEOARTHRITIS OF KNEE: Primary | ICD-10-CM

## 2022-11-11 PROCEDURE — 20610 DRAIN/INJ JOINT/BURSA W/O US: CPT | Performed by: ORTHOPAEDIC SURGERY

## 2022-11-11 RX ORDER — BETAMETHASONE SODIUM PHOSPHATE AND BETAMETHASONE ACETATE 3; 3 MG/ML; MG/ML
6 INJECTION, SUSPENSION INTRA-ARTICULAR; INTRALESIONAL; INTRAMUSCULAR; SOFT TISSUE ONCE
Status: COMPLETED | OUTPATIENT
Start: 2022-11-11 | End: 2022-11-11

## 2022-11-11 RX ADMIN — BETAMETHASONE SODIUM PHOSPHATE AND BETAMETHASONE ACETATE 6 MG: 3; 3 INJECTION, SUSPENSION INTRA-ARTICULAR; INTRALESIONAL; INTRAMUSCULAR; SOFT TISSUE at 07:18

## 2022-11-11 NOTE — LETTER
11/11/2022    Patient: Tara Florence   YOB: 1965   Date of Visit: 11/11/2022     Oneyda Francois NP  [address]  [address]  Via In Basket    Dear Oneyda Francois NP,      Thank you for referring Ms. Leeanne Parham to [facility] for evaluation. My notes for this consultation are attached. If you have questions, please do not hesitate to call me. I look forward to following your patient along with you.       Sincerely,    Emilie Willson, DO

## 2022-11-11 NOTE — PROGRESS NOTES
Identified pt with two pt identifiers (name and ). Reviewed chart in preparation for visit and have obtained necessary documentation. Monique Juarez is a 62 y.o. female  Chief Complaint   Patient presents with    Joint Or Tendon Injection     RT Knee     Visit Vitals  /89 (BP 1 Location: Right arm, BP Patient Position: Sitting, BP Cuff Size: Adult)   Pulse 65   Temp (!) 96.3 °F (35.7 °C) (Tympanic)   Ht 5' 8\" (1.727 m)   LMP  (LMP Unknown)   SpO2 100%   BMI 25.54 kg/m²     1. Have you been to the ER, urgent care clinic since your last visit? Hospitalized since your last visit? No    2. Have you seen or consulted any other health care providers outside of the [address] [place] [address] since your last visit? Include any pap smears or colon screening.  No

## 2022-11-11 NOTE — PROGRESS NOTES
11/11/2022      CC: right knee pain    HPI:      This is a 62y.o. year old female who presents for a follow up visit. The patient was last seen and diagnosed with right knee osteoarthritis. The patient's treatments since the most recent visit have comprised of knee arthroscopy, weight loss, injections. The patient has had 6 months relief of the chief complaint. PMH:  Past Medical History:   Diagnosis Date    Allergic rhinitis 5/27/2010    Arthritis     Villavicencio cyst, left 10/2019    Dr. Ella Benitez cyst, right     Cervical disc herniation 01/2010    C3-4, C4-5, C5-6. Hemangioma body of C5. Dr. Leoncio Parrish. Chest pain 08/2011, 02/2012    Dr. Shwetha Quintero. Dr. Anil Jessica; denies CP as of 3/27/14    Chronic lower back pain 2010, 03/2016    thoracic DDD and spondylosis. DDD L3-S1. Dr. Mayda Simmons. Tyrell Skinner. Dr. Summer Medina, [facility]. Dr. Errol Tilley. Chronic meniscal tear of knee     Chronic pain     lower back and knees    Esophagitis, reflux 4/3/2011    Essential hypertension 6/3/2015    Gallstone     Gastritis 5/27/2010    Dr. Ian Walton. Gastrointestinal food allergy 03/2014    Multiple. Dr. Edison Acosta. Heart palpitations 02/2012    DUE TO PAC'S AND PVC'S. Dr. Kellee Davalos. Heel spur, left 02/2019    Hiatal hernia 4/3/2011    HTN (hypertension) 1987    Impaired glucose tolerance 10/15/2010    Incidental lung nodule 01/08/13    LLL 3.9 mm, RML 3.1 mm;  as of 3/27/14 per pt stable w/o growth    Insomnia 1990s    Iron deficiency anemia 5/27/2010    Irritable bowel syndrome (IBS) 03/2014    Dr. Tony Feliciano. Knee pain 2012    Right. due to severe OA. / chipped bone     Left foot pain 08/2019    Dr. Diana Nelson. Metatarsal bone fracture 1990    Left fifth. Migraine 2/22/2011    Dr. Kirti Griffith    Morbid obesity [facility])     Peroneal tendonitis of lower leg 08/07/2019    Left. Dr. Diana Nelson    Pre-diabetes     Radiculopathy, cervical 01/2010    Left.   Dr. Leann Torres Wang. Shoulder pain, right 2012    due to [address] X 2. Dr. Felipe Roe. Thyroid nodule 2011    6 nodules- Dr. Juju Brown    Toe fracture, left 2008    fifth toe. Triangular fibrocartilage complex tear 2012    Dr. Lee Higgins. Left. PSxHx:  Past Surgical History:   Procedure Laterality Date    COLONOSCOPY N/A 3/10/2021    COLONOSCOPY,EGD performed by Shara Wade MD at [address] [place] [address]    x 1    [facility]    HX COLONOSCOPY  03/31/14    Dr. Delores Doe. HX COLPOSCOPY  1980s; 03/31/14    due to abnormal PAP. HX DILATION AND CURETTAGE  1990s    due to miscarriage. HX ENDOSCOPY      HX HEART CATHETERIZATION  2018    no stents    HX KNEE ARTHROSCOPY Right     HX LAP GASTRIC BYPASS  06/14/2021    lap gastric bypass with hiatal hernia repair by Dr. Damon Sawyer at [facility]    HX ORTHOPAEDIC Right 06/2016    LUMBAR L4-5, L5-S1 ELLY. Dr. David [place]     HX TONSILLECTOMY  1991       Meds:    Current Outpatient Medications:     ferrous sulfate 325 mg (65 mg iron) tablet, Take 1 Tablet by mouth Daily (before breakfast). , Disp: 90 Tablet, Rfl: 3    cholecalciferol (VITAMIN D3) (50,000 UNITS /1250 MCG) capsule, Take 1 Capsule by mouth every fourteen (14) days. , Disp: 7 Capsule, Rfl: 3    pantoprazole (PROTONIX) 40 mg tablet, Take 1 Tablet by mouth two (2) times a day., Disp: 180 Tablet, Rfl: 1    acetaminophen (TYLENOL) 650 mg TbER, Take 650 mg by mouth every eight (8) hours. , Disp: , Rfl:     multivitamin with iron (FLINTSTONES) chewable tablet, Take 1 Tablet by mouth daily. , Disp: , Rfl:     cyanocobalamin (VITAMIN B12) 100 mcg tablet, Take 100 mcg by mouth daily. , Disp: , Rfl:     calcium-cholecalciferol, D3, (CALTRATE 600+D) tablet, Take 1 Tablet by mouth daily. , Disp: , Rfl:     docusate sodium (COLACE) 100 mg capsule, Take 100 mg by mouth as needed for Constipation. , Disp: , Rfl:     metoclopramide HCl (REGLAN) 10 mg tablet, Take 1 Tablet by mouth Before breakfast, lunch, and dinner. (Patient not taking: No sig reported), Disp: 270 Tablet, Rfl: 0    melatonin 5 mg tablet, Take 5 mg by mouth nightly. Taking 2-3 tabs  At bedtime (Patient not taking: No sig reported), Disp: , Rfl:     tiZANidine (ZANAFLEX) 2 mg tablet, Take 1 Tablet by mouth two (2) times daily as needed for Muscle Spasm(s). (Patient not taking: No sig reported), Disp: 60 Tablet, Rfl: 3    ondansetron (ZOFRAN ODT) 4 mg disintegrating tablet, Take 1 Tab by mouth every eight (8) hours as needed for Nausea or Vomiting.  (Patient not taking: No sig reported), Disp: 30 Tab, Rfl: 0    Current Facility-Administered Medications:     betamethasone (CELESTONE) injection 6 mg, 6 mg, Intra artICUlar, ONCE, Raf Carr, DO    All:  Allergies   Allergen Reactions    Doxylamine Succinate Swelling     12.5-25 MG  HANDS, FACE/PERIORAL, FEET    Pcn [Penicillins] Hives    Ambien [Zolpidem] Nausea and Vomiting and Other (comments)     5 mg with Ativan 0.5 mg   TOO GROGGY, SLEPT 24 HOURS  7.5 MG FORGOT SHE WAS SLEEP EATING    Aspirin Nausea Only    Carafate [Sucralfate] Nausea and Vomiting    Celebrex [Celecoxib] Other (comments)     TIGHT SQUEEZING IN CHEST  CHEST ACHED    Erythromycin Nausea and Vomiting    Milk Prot-Turm-Pepper-Pineappl Other (comments)     Multiple food allergies    Neurontin [Gabapentin] Other (comments)     300 mg  SEVERE LEG PAIN  EYE TWITCHING    Nsaids (Non-Steroidal Anti-Inflammatory Drug) Other (comments)     GI irritation      Pineapple Unknown (comments)       Social Hx:  Social History     Socioeconomic History    Marital status:     Number of children: 3   Occupational History    Occupation: Medical assistant     Employer: [employer]   Tobacco Use    Smoking status: Never    Smokeless tobacco: Never   Vaping Use    Vaping Use: Never used   Substance and Sexual Activity    Alcohol use: No    Drug use: Never    Sexual activity: Yes     Partners: Male   Social History Narrative    In the home alone     3 adult children     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        Family Hx:  Family History   Problem Relation Age of Onset    Diabetes Mother     Heart Disease Mother 58        2 stents    Hypertension Mother     Stroke Mother     Diabetes Father     Hypertension Father     Stroke Maternal Grandmother     Cancer Maternal Uncle         prostate    Diabetes Sister     No Known Problems Sister     No Known Problems Sister     Anesth Problems Neg Hx          Review of Systems:       General: Denies headache, lethargy, fever, weight loss  Ears/Nose/Throat: Denies ear discharge, drainage, nosebleeds, hoarse voice, dental problems  Cardiovascular: Denies chest pain, shortness of breath  Lungs: Denies chest pain, breathing problems, wheezing, pneumonia  Stomach: Denies stomach pain, heartburn, constipation, irritable bowel  Skin: Denies rash, sores, open wounds  Musculoskeletal: right knee pain  Genitourinary: Denies dysuria, hematuria, polyuria  Gastrointestinal: Denies constipation, obstipation, diarrhea  Neurological: Denies changes in sight, smell, hearing, taste, seizures. Denies loss of consciousness.   Psychiatric: Denies depression, sleep pattern changes, anxiety, change in personality  Endocrine: Denies mood swings, heat or cold intolerance  Hematologic/Lymphatic: Denies anemia, purpura, petechia  Allergic/Immunologic: Denies swelling of throat, pain or swelling at lymph nodes      Physical Examination:    Visit Vitals  /89 (BP 1 Location: Right arm, BP Patient Position: Sitting, BP Cuff Size: Adult)   Pulse 65   Temp (!) 96.3 °F (35.7 °C) (Tympanic)   Ht 5' 8\" (1.727 m)   SpO2 100%   BMI 25.54 kg/m²        General: AOX3, no apparent distress  Psychiatric: mood and affect appropriate  Lungs: breathing is symmetric and unlabored bilaterally  Heart: regular rate and rhythm  Abdomen: no guarding  Head: normocephalic, atraumatic  Skin: No significant abnormalities, good turgor  Sensation intact to light touch: C5-T1 dermatomes  Muscular exam: 5/5 strength in all major muscle groups unless noted in specialty exam.    Extremities        Left lower extremity:  No gross deformity. No restriction to range of motion of the hip, knee, ankle. Muscle bulk is appropriate without wasting. Sensation is intact to light touch in the L1-S1 dermatomes. Capillary refill is less than 2 seconds in the fingers. Strength testing is 5/5 at the major muscle groups of the hip, knee, ankle. Right lower extremity: Knee is noted to have a mild effusion. Medial joint line tenderness to palpation with a negative deformity. Patellar crepitus with range of motion is noted. Range of motion is 0-125. Sensation is intact to light touch L1-S1 dermatomes. Knee flexion and extension strength is 5/5 with Tibialis anterior, EHL, FHL being 5/5 as well. No other gross deformity or deficit is noted. Diagnostics:    Pertinent Diagnostics: none today    Assessment: Right knee Osteoarthritis  Plan: This patient I had a long discussion regarding treatment options. We went over the nonoperative options, continued medications, injections of multiple types, bracing, physical therapy, maintenance of ideal body weight. Patient has elected to proceed with injections. Ms. Kimberly Hernadez has a reminder for a \"due or due soon\" health maintenance. I have asked that she contact her primary care provider for follow-up on this health maintenance. Date of Procedure: 11/11/2022  PROCEDURE NOTE: Right knee injection of Celestone    Consent was obtained from the patient. The correct site was identified after confirmation with the patient. Following identification and confirmation of the correct site with the patient, the superolateral right knee was prepped in the normal sterile fashion. A local anesthetic of 1% lidocaine without epinephrine was then administered to the local tissues. Following, an injection of a mixture of  6 mg Celestone and 1% lidocaine without epinephrine was administered to the right knee. The needle was then withdrawn and the injection site dressed with a sterile bandage at the conclusion. The procedure was well tolerated by the patient. No immediate adverse reactions were noted. Post injection instructions were given.

## 2022-12-02 ENCOUNTER — CLINICAL SUPPORT (OUTPATIENT)
Dept: SURGERY | Age: 57
End: 2022-12-02

## 2022-12-02 DIAGNOSIS — Z71.3 NUTRITIONAL COUNSELING: Primary | ICD-10-CM

## 2022-12-02 NOTE — PROGRESS NOTES
Post-Operative Bariatric Nutrition Counseling (subsequent)    Physician/Surgeon: Dr. Ella López  Name: Rosie Machuca                   : 1965                                    Reason for visit: Follow up nutrition education and counseling post gastric bypass; recurrent stricture at the gastrojejunal anastomosis- serial dilatations multiple times. ASSESSMENT:  Date of surgery: 2021     Past Medical History: acid reflux  Medications/Supplements:   Prior to Admission medications    Medication Sig Start Date End Date Taking? Authorizing Provider   ferrous sulfate 325 mg (65 mg iron) tablet Take 1 Tablet by mouth Daily (before breakfast). 22   Arlyn Sterling NP   cholecalciferol (VITAMIN D3) (50,000 UNITS /1250 MCG) capsule Take 1 Capsule by mouth every fourteen (14) days. 22   Arlyn Sterling NP   metoclopramide HCl (REGLAN) 10 mg tablet Take 1 Tablet by mouth Before breakfast, lunch, and dinner. Patient not taking: No sig reported 22   Arlyn Sterling NP   pantoprazole (PROTONIX) 40 mg tablet Take 1 Tablet by mouth two (2) times a day. 22   Arlyn Sterling NP   melatonin 5 mg tablet Take 5 mg by mouth nightly. Taking 2-3 tabs  At bedtime  Patient not taking: No sig reported    Provider, Historical   acetaminophen (TYLENOL) 650 mg TbER Take 650 mg by mouth every eight (8) hours. Provider, Historical   tiZANidine (ZANAFLEX) 2 mg tablet Take 1 Tablet by mouth two (2) times daily as needed for Muscle Spasm(s). Patient not taking: No sig reported 10/12/21   Coby WIGGINS NP   multivitamin with iron (FLINTSTONES) chewable tablet Take 1 Tablet by mouth daily. Provider, Historical   cyanocobalamin (VITAMIN B12) 100 mcg tablet Take 100 mcg by mouth daily. Provider, Historical   calcium-cholecalciferol, D3, (CALTRATE 600+D) tablet Take 1 Tablet by mouth daily.     Provider, Historical   ondansetron (ZOFRAN ODT) 4 mg disintegrating tablet Take 1 Tab by mouth every eight (8) hours as needed for Nausea or Vomiting. Patient not taking: No sig reported 5/18/21   Karmen Porter NP   docusate sodium (COLACE) 100 mg capsule Take 100 mg by mouth as needed for Constipation. Provider, Historical       Pt takes recommended supplements as prescribed: yes      Anthropometrics:     Ht: 68 inches   Wt : 174 lbs (pt reported)          Pre-op wt: 265 lbs   Net Wt Loss: 34 (%)     Pt stated Goal wt: 155 lbs  IBW: 140 lbs    %IBW: 125        BMI: 26           Category: Overweight      Exercise/Physical Actvity: None    Reported Diet History: A post op nutrition checklist and 24 hour diet recall were obtained from patient;       Consumes 3 meals per day yes   Includes a lean source of protein with each meal yes   Measures all meals to 1/2-1 cup Yes-1 cup   Limits dining out and orders with special request -    Consumes meals over 20-30 minutes yes   Monitors hunger and fullness cues yes   Follows the 30-30-30 rule yes   Sips 48-64 oz of sugar free, calorie free, non-carbonated beverages per day yes   Utilizes food journal for self-monitoring no   Attends Bariatric Support Group no       24 Hour Diet Recall  Breakfast  Sausage/egg/cheese, banana   snack  banana   Lunch  chicken   Dinner  Protein, veggies   Snacks  Rice cake, pbutter   Beverages  Coffee w/sugar free creamer/splenda, water, gatorade zero       Environmental/Social/Psychological: working FT for [employer] and on weekends; taking care of autistic grandson during the week      NUTRITION DIAGNOSIS:  Disordered eating pattern R/T lack of planning/busy schedule  AEB pt not eating balanced, portioned meals (no protein at some meals, not measuring meals, snacking frequently)     [x]  Improved []  No Change    []  Declined   []  Discontinued        Excessive calorie/sugar intake R/T not reading food labels AEB pt consuming excessive calories from large amounts of full fat/high sugar coffee creamer    [x]  Improved []  No Change []  Declined   []  Discontinued         NUTRITION MONITORING AND EVALUATION:  Pt seen for follow up; has made dietary changes over the past month- now eating 3 meals a day with protein at each meal and snack. Measuring meals to 1 cup measure. Reduced coffee and switched from regular, high sugar creamer to sugar free creamer in coffee. Notes getting \"tired\" of eating the same thing- tends to eat certain foods, gets tired of them and then moves on to something else. Goals from last appt:  Measure meals to 1 cup per meal- protein, fruit/veggie        - Goal met; continue with goal    2. Protein at each meal, eating protein first      - Goal met; continue with goal    3. Use fat free sugar free creamer in coffee      - Goal met; continue with goal    4. Eat 3 meals a day, no skipping      - Goal met; continue with goal    NUTRITION INTERVENTION:    Discussed continuing with 3 meals a day-protein at each meal. Recommended trying new high protein recipes for more variety. GOALS:  Try new recipes- baritastic belén, Florentin Balderas, bariatric cookbooks  2. Continue with 3 meals a week- protein at each meal  3. Attend support group        Specific tips and techniques to facilitate compliance with above recommendations were provided and discussed. Pt was strongly encouraged to begin making necessary changes now, attend support group, and re-visit the dietitian in 2 months. If further details are desired please feel free to contact me at [phone]. This phone number was also provided to the patient for any further questions or concerns.              Amador Ayon RD

## 2023-01-06 ENCOUNTER — OFFICE VISIT (OUTPATIENT)
Dept: PRIMARY CARE CLINIC | Age: 58
End: 2023-01-06
Payer: COMMERCIAL

## 2023-01-06 VITALS
SYSTOLIC BLOOD PRESSURE: 130 MMHG | HEART RATE: 67 BPM | BODY MASS INDEX: 27.13 KG/M2 | WEIGHT: 179 LBS | OXYGEN SATURATION: 97 % | TEMPERATURE: 98.3 F | HEIGHT: 68 IN | DIASTOLIC BLOOD PRESSURE: 85 MMHG | RESPIRATION RATE: 16 BRPM

## 2023-01-06 DIAGNOSIS — J01.00 ACUTE NON-RECURRENT MAXILLARY SINUSITIS: Primary | ICD-10-CM

## 2023-01-06 PROCEDURE — 99213 OFFICE O/P EST LOW 20 MIN: CPT | Performed by: NURSE PRACTITIONER

## 2023-01-06 RX ORDER — DOXYCYCLINE 100 MG/1
100 CAPSULE ORAL 2 TIMES DAILY
Qty: 14 CAPSULE | Refills: 0 | Status: SHIPPED | OUTPATIENT
Start: 2023-01-06 | End: 2023-01-13

## 2023-01-06 NOTE — PROGRESS NOTES
Identified pt with two pt identifiers(name and ). Reviewed record in preparation for visit and have obtained necessary documentation. Chief Complaint   Patient presents with    Cold Symptoms     Body aches, cough, drainage and congestion. Started  night. Yellow mucus. Vitals:    23 1306   BP: 130/85   Pulse: 67   Resp: 16   Temp: 98.3 °F (36.8 °C)   TempSrc: Temporal   SpO2: 97%   Weight: 179 lb (81.2 kg)   Height: 5' 8\" (1.727 m)   PainSc:   0 - No pain       Health Maintenance Due   Topic    Cervical cancer screen     Shingles Vaccine (1 of 2)    Breast Cancer Screen Mammogram     COVID-19 Vaccine (3 - Booster for Martinez Peter series)    Flu Vaccine (1)       Coordination of Care Questionnaire:  :   1) Have you been to an emergency room, urgent care, or hospitalized since your last visit? If yes, where when, and reason for visit? no       2. Have seen or consulted any other health care provider since your last visit? If yes, where when, and reason for visit? NO      Patient is accompanied by self I have received verbal consent from [facility] to discuss any/all medical information while they are present in the room. An electronic signature was used to authenticate this note.   -- Prosper Alfonso LPN

## 2023-01-06 NOTE — PROGRESS NOTES
Chief Complaint   Patient presents with    Cold Symptoms     Body aches, cough, drainage and congestion. Started Sunday night. Yellow mucus. HISTORY OF PRESENT ILLNESS  [address] is here for sinus congestion/pain. She reports onset of symptoms 12/15 had flu like symptoms, continued with congestion since that time. She notes past 4-5 days with worsening sinus pressure and headache. Home covid test negative on 12/15. She is taking Dayquil, theraflu, and zyrtec. She notes mild cough, no shortness of breath. She denies recent fever. Review of Systems   Constitutional: Negative. HENT:  Positive for congestion, ear pain and sinus pain. Respiratory:  Positive for cough. Negative for shortness of breath and wheezing. Cardiovascular: Negative. Gastrointestinal: Negative. Physical Exam  Vitals and nursing note reviewed. HENT:      Right Ear: Tympanic membrane normal.      Left Ear: Tympanic membrane normal.      Nose: Congestion present. Right Sinus: Maxillary sinus tenderness present. Left Sinus: Maxillary sinus tenderness present. Comments: No localized LAD     Mouth/Throat:      Pharynx: Oropharynx is clear. No oropharyngeal exudate or posterior oropharyngeal erythema. Cardiovascular:      Rate and Rhythm: Normal rate and regular rhythm. Pulmonary:      Effort: Pulmonary effort is normal. No respiratory distress. Breath sounds: Normal breath sounds. Musculoskeletal:      Cervical back: Neck supple. Neurological:      Mental Status: She is alert. 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 Leon Lynne cyst, right     Cervical disc herniation 01/2010    C3-4, C4-5, C5-6. Hemangioma body of C5. Dr. Amanda Villatoro. Chest pain 08/2011, 02/2012    Dr. Mray Fleming.   Dr. Eagle Castillo; denies CP as of 3/27/14    Chronic lower back pain 2010, 03/2016    thoracic DDD and spondylosis. DDD L3-S1. Dr. Pavan Mcmahan. Mayra Ohms. Dr. Swathi Perkins, [facility]. Dr. [facility]. Chronic meniscal tear of knee     Chronic pain     lower back and knees    Esophagitis, reflux 4/3/2011    Essential hypertension 6/3/2015    Gallstone     Gastritis 5/27/2010    Dr. Rasta Bolden. Gastrointestinal food allergy 03/2014    Multiple. Dr. Torri Coombs. Heart palpitations 02/2012    DUE TO PAC'S AND PVC'S. Dr. Jacques Fay. Heel spur, left 02/2019    Hiatal hernia 4/3/2011    HTN (hypertension) 1987    Impaired glucose tolerance 10/15/2010    Incidental lung nodule 01/08/13    LLL 3.9 mm, RML 3.1 mm;  as of 3/27/14 per pt stable w/o growth    Insomnia 1990s    Iron deficiency anemia 5/27/2010    Irritable bowel syndrome (IBS) 03/2014    Dr. Violeta Lopez. Knee pain 2012    Right. due to severe OA. / chipped bone     Left foot pain 08/2019    Dr. Jordon Arteaga. Metatarsal bone fracture 1990    Left fifth. Migraine 2/22/2011    Dr. Janny Mckeon    Morbid obesity [facility])     Peroneal tendonitis of lower leg 08/07/2019    Left. Dr. Jordon Arteaga    Pre-diabetes     Radiculopathy, cervical 01/2010    Left. Dr. Constanza Mejia. Shoulder pain, right 2012    due to [address] X 2. Dr. Isis Barnett. Thyroid nodule 2011    6 nodules- Dr. Ibarra Mini    Toe fracture, left 2008    fifth toe. Triangular fibrocartilage complex tear 2012    Dr. Олег Lala. Left. Past Surgical History:   Procedure Laterality Date    COLONOSCOPY N/A 3/10/2021    COLONOSCOPY,EGD performed by Kylah Jordan MD at [facility]    x 1    [address]    HX COLONOSCOPY  03/31/14    Dr. Violeta Lopez. HX COLPOSCOPY  1980s; 03/31/14    due to abnormal PAP. HX DILATION AND CURETTAGE  1990s    due to miscarriage.     HX ENDOSCOPY      HX HEART CATHETERIZATION  2018    no stents    HX KNEE ARTHROSCOPY Right     HX LAP GASTRIC BYPASS  06/14/2021    lap gastric bypass with hiatal hernia repair by Dr. Atkins Section at [facility]    HX [address] Right 06/2016    LUMBAR L4-5, L5-S1 ELLY. Dr. Deangelo Chu     HX [address] [place]     /85 (BP 1 Location: Left arm, BP Patient Position: Sitting, BP Cuff Size: Small adult)   Pulse 67   Temp 98.3 °F (36.8 °C) (Temporal)   Resp 16   Ht 5' 8\" (1.727 m)   Wt 179 lb (81.2 kg)   LMP  (LMP Unknown)   SpO2 97%   BMI 27.22 kg/m²      ASSESSMENT and PLAN  1. Acute non-recurrent maxillary sinusitis  -     doxycycline (VIBRAMYCIN) 100 mg capsule; Take 1 Capsule by mouth two (2) times a day for 7 days. , Normal, Disp-14 Capsule, R-0  - Take medication as prescribed;  Add OTC tylenol and/or ibuprofen for fever/discomfort prn, Zrytec-D for sinus congestion/pain  - Push fluids, rest  - Follow up if not better or worsens  CHRISTOPHER Catalan

## 2023-02-08 ENCOUNTER — OFFICE VISIT (OUTPATIENT)
Dept: PRIMARY CARE CLINIC | Age: 58
End: 2023-02-08
Payer: COMMERCIAL

## 2023-02-08 VITALS
DIASTOLIC BLOOD PRESSURE: 92 MMHG | TEMPERATURE: 97.8 F | RESPIRATION RATE: 16 BRPM | BODY MASS INDEX: 27.22 KG/M2 | OXYGEN SATURATION: 97 % | HEIGHT: 68 IN | HEART RATE: 64 BPM | SYSTOLIC BLOOD PRESSURE: 147 MMHG

## 2023-02-08 DIAGNOSIS — J06.9 URI WITH COUGH AND CONGESTION: Primary | ICD-10-CM

## 2023-02-08 DIAGNOSIS — R68.89 FLU-LIKE SYMPTOMS: ICD-10-CM

## 2023-02-08 LAB
FLUAV+FLUBV AG NOSE QL IA.RAPID: NEGATIVE
FLUAV+FLUBV AG NOSE QL IA.RAPID: NEGATIVE
SARS-COV-2 PCR, POC: NEGATIVE
VALID INTERNAL CONTROL?: YES

## 2023-02-08 PROCEDURE — 87635 SARS-COV-2 COVID-19 AMP PRB: CPT

## 2023-02-08 PROCEDURE — 99214 OFFICE O/P EST MOD 30 MIN: CPT

## 2023-02-08 PROCEDURE — 87804 INFLUENZA ASSAY W/OPTIC: CPT

## 2023-02-08 RX ORDER — METHYLPREDNISOLONE 4 MG/1
TABLET ORAL
Qty: 1 DOSE PACK | Refills: 0 | Status: SHIPPED | OUTPATIENT
Start: 2023-02-08

## 2023-02-08 RX ORDER — ALBUTEROL SULFATE 90 UG/1
1 AEROSOL, METERED RESPIRATORY (INHALATION)
Qty: 18 G | Refills: 0 | Status: SHIPPED | OUTPATIENT
Start: 2023-02-08

## 2023-02-08 RX ORDER — PROMETHAZINE HYDROCHLORIDE AND DEXTROMETHORPHAN HYDROBROMIDE 6.25; 15 MG/5ML; MG/5ML
5 SYRUP ORAL
Qty: 120 ML | Refills: 0 | Status: SHIPPED | OUTPATIENT
Start: 2023-02-08 | End: 2023-02-15

## 2023-02-08 NOTE — PROGRESS NOTES
HISTORY OF PRESENT ILLNESS  Jaquan Brock is a 62 y.o. female. Chief Complaint   Patient presents with    Cold Symptoms     Started Friday; cough/congestion/sore throat/headache/ear pain/sinus pressure/chills; no home covid testing; taking tylenol flu and dayquil   Patient reports x 6 days of above symptoms and has been using tylenol flu/cold and dayquil as scheduled without any improvement in symptoms. Denies fevers + chills, no SOB. Recent exposure to grandchildren who have been sick with cold. Review of Systems   Constitutional:  Negative for chills and fever. HENT:  Positive for congestion, ear pain (bilateral) and sinus pain (maxillary). Negative for sore throat. Eyes: Negative. Respiratory:  Negative for shortness of breath. Cardiovascular: Negative. Gastrointestinal: Negative. Genitourinary: Negative. Musculoskeletal: Negative. Skin: Negative. Neurological:  Negative for loss of consciousness. Endo/Heme/Allergies: Negative. Psychiatric/Behavioral: Negative. Physical Exam  Constitutional:       Appearance: Normal appearance. She is well-developed, well-groomed and normal weight. She is not ill-appearing or toxic-appearing. HENT:      Head: Normocephalic. Right Ear: Tympanic membrane normal.      Left Ear: A middle ear effusion is present. Nose: Congestion and rhinorrhea present. Right Sinus: Maxillary sinus tenderness present. Left Sinus: No maxillary sinus tenderness. Mouth/Throat:      Mouth: Mucous membranes are moist.      Pharynx: Oropharynx is clear. No posterior oropharyngeal erythema. Tonsils: No tonsillar exudate. Cardiovascular:      Rate and Rhythm: Normal rate. Pulses: Normal pulses. Heart sounds: Normal heart sounds. Pulmonary:      Effort: Pulmonary effort is normal.      Breath sounds: Normal breath sounds. Musculoskeletal:      Cervical back: Normal range of motion and neck supple.    Skin:     General: Skin is warm. Neurological:      General: No focal deficit present. Mental Status: She is alert and oriented to person, place, and time. 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 Randolph Rahman cyst, right     Cervical disc herniation 01/2010    C3-4, C4-5, C5-6. Hemangioma body of C5. Dr. Roxy Duggan. Chest pain 08/2011, 02/2012    Dr. Sakina Moyer. Dr. Rojas Villalta; denies CP as of 3/27/14    Chronic lower back pain 2010, 03/2016    thoracic DDD and spondylosis. DDD L3-S1. Dr. Harris Husbands. Myles Ward. [place]iranjan [place] [facility] [place]. Chronic meniscal tear of knee     Chronic pain     lower back and knees    Esophagitis, reflux 4/3/2011    Essential hypertension 6/3/2015    Gallstone     Gastritis 5/27/2010    Dr. Dre Canales. Gastrointestinal food allergy 03/2014    Multiple. Dr. Max Colmenares. Heart palpitations 02/2012    DUE TO PAC'S AND PVC'S. Dr. Celena Rivera. Heel spur, left 02/2019    Hiatal hernia 4/3/2011    HTN (hypertension) 1987    Impaired glucose tolerance 10/15/2010    Incidental lung nodule 01/08/13    LLL 3.9 mm, RML 3.1 mm;  as of 3/27/14 per pt stable w/o growth    Insomnia 1990s    Iron deficiency anemia 5/27/2010    Irritable bowel syndrome (IBS) 03/2014    Dr. Jena Dowd. Knee pain 2012    Right. due to severe OA. / chipped bone     Left foot pain 08/2019    Dr. Masoud Hernández. Metatarsal bone fracture 1990    Left fifth. Migraine 2/22/2011    Dr. Merritt Grimm    Morbid obesity [facility])     Peroneal tendonitis of lower leg 08/07/2019    Left. Dr. Masoud Hernández    Pre-diabetes     Radiculopathy, cervical 01/2010    Left. Dr. Roxy Duggan. Shoulder pain, right 2012    due to [address] X 2. Dr. Vicente Grade. Thyroid nodule 2011    6 nodules- Dr. Sridhar Julian    Toe fracture, left 2008    fifth toe.     Triangular fibrocartilage complex tear 2012     Dawna Billy. Left. Past Surgical History:   Procedure Laterality Date    COLONOSCOPY N/A 3/10/2021    COLONOSCOPY,EGD performed by Dagoberto Elizabeth MD at [address] [place] [address] Avenue    x 1    [address] \[facility]\""    HX COLONOSCOPY  03/31/14    Dr. Carrillo Shows. HX COLPOSCOPY  1980s; 03/31/14    due to abnormal PAP. HX DILATION AND CURETTAGE  1990s    due to miscarriage. HX ENDOSCOPY      HX HEART CATHETERIZATION  2018    no stents    HX KNEE ARTHROSCOPY Right     HX LAP GASTRIC BYPASS  06/14/2021    lap gastric bypass with hiatal hernia repair by Dr. Ori Zapien at [facility]    HX ORTHOPAEDIC Right 06/2016    LUMBAR L4-5, L5-S1 ELLY. Dr. Rebeka Lozano     HX [address] [facility]     BP (!) 147/92 (BP 1 Location: Left arm, BP Patient Position: Sitting)   Pulse 64   Temp 97.8 °F (36.6 °C) (Temporal)   Resp 16   Ht 5' 8\" (1.727 m)   LMP  (LMP Unknown)   SpO2 97%   BMI 27.22 kg/m²   Results for orders placed or performed in visit on 02/08/23   AMB POC MALIA INFLUENZA A/B TEST   Result Value Ref Range    VALID INTERNAL CONTROL POC Yes     Influenza A Ag POC Negative Negative    Influenza B Ag POC Negative Negative   POCT COVID-19, SARS-COV-2, PCR   Result Value Ref Range    SARS-COV-2 PCR, POC Negative Negative       ASSESSMENT and PLAN  1. URI with cough and congestion  -     methylPREDNISolone (MEDROL DOSEPACK) 4 mg tablet; Take as directed., Normal, Disp-1 Dose Pack, R-0  -     promethazine-dextromethorphan (PROMETHAZINE-DM) 6.25-15 mg/5 mL syrup; Take 5 mL by mouth every four (4) hours as needed for Cough for up to 7 days. , Normal, Disp-120 mL, R-0 - Discussed with patient concerns of interaction with metoclopramide. Patient reports only taking medication when needed for stomach symptoms and has not taken medications in a while. Advised not to take medication while on cough syrup. Pt in agreement.   -     albuterol (PROVENTIL HFA, VENTOLIN HFA, PROAIR HFA) 90 mcg/actuation inhaler; Take 1 Puff by inhalation every four (4) hours as needed for Cough., Normal, Disp-18 g, R-0  2. Flu-like symptoms  -     AMB POC MALIA INFLUENZA A/B TEST  -     POCT COVID-19, SARS-COV-2, PCR        - Discussed with patient likely viral illness that should resolve on it's own. Recommended rest, push fluids, and take tylenol only for fever or symptom relief as needed. Instructed to seek additional care if does not resolve or symptoms worsens.    Julee Velasquez, NP

## 2023-02-08 NOTE — PROGRESS NOTES
Identified pt with two pt identifiers(name and ). Reviewed record in preparation for visit and have obtained necessary documentation. Chief Complaint   Patient presents with    Cold Symptoms     Started Friday; cough/congestion/sore throat/headache/ear pain/sinus pressure/chills; no home covid testing; taking tylenol flu and dayquil      Vitals:    23 0821   BP: (!) 147/92   Pulse: 64   Resp: 16   Temp: 97.8 °F (36.6 °C)   TempSrc: Temporal   SpO2: 97%   Height: 5' 8\" (1.727 m)   PainSc:   3   PainLoc: Back       Health Maintenance Review: Patient reminded of \"due or due soon\" health maintenance. I have asked the patient to contact his/her primary care provider (PCP) for follow-up on his/her health maintenance. Coordination of Care Questionnaire:  :   1) Have you been to an emergency room, urgent care, or hospitalized since your last visit? If yes, where when, and reason for visit? no       2. Have seen or consulted any other health care provider since your last visit? If yes, where when, and reason for visit? NO      Patient is accompanied by self I have received verbal consent from [facility] to discuss any/all medical information while they are present in the room.

## 2023-03-08 NOTE — PROGRESS NOTES
HISTORY OF PRESENT ILLNESS  Niraj Bowens is a 46 y.o. female presents with Blood Pressure Check; Medication Refill; Medication Evaluation (Gabapentin; caused pain in her legs, and caused her eye to twitch; pt stopped taking. ); Neck Pain (3 bulging disks; fell in April 2017; would like an MRI to have neck looked at to see if it has gotten worse); Stiff Neck; Tingling (left and right arm; occurs more in left arm; happened after she fell. ); Request For New Medication (muscle relaxer); and Chest Pain    Agree with nurse note. Hypertensive pt with family hx of heart disease presents to the office with a BP of 127/82. For BP, she takes Diovan-HCT 80-12.5 mg daily and Inderal 80 mg daily, tolerating well. She has chest pain off and on which she describes as \"squeezing. \" She requests a referral to return to cardiology. R hand dominant pt with hx of intractable migraines, herniated cervical discs, and hx of cervical radiculopathy. In 04/2017, she recalls having landed face forward in [employer] with her neck turned to the L for an unknown amount of time and possibly having someone land on top of her. She is not able to rotate her head to the R as far as to the L without pain. She has BL arm and hand tingling, L>R. She has LUE weakness sometimes. When she lays down, she has neck pain that wakes her up. She applies IcyHot to her neck with some relief. She only takes Tylenol Arthritis for pain. Flexeril 10 mg makes her too sleepy and Skelaxin did not provide relief. She developed leg pain and eye twitching which she attributed to Gabapentin. She stopped taking Gabapentin and the sxs resolved. Celebrex caused her chest to ache. At this time, she declines any new medications for pain and a referral to a chiropractor as she would like to understand why she is hurting first. Patient denies fatigue, diaphoresis, heart racing or skipping beats, nausea. Pt with chronic insomnia.  She has previously tried Ambien 5 mg and [place] [facility] ENCOUNTER   PODIATRY NEW PATIENT / CONSULT NOTE    REQUESTING PROVIDER: Ky Garcia DPM    CHIEF COMPLAINT:   Chief Complaint   Patient presents with   • Office Visit   • Consultation     LEFT FIFTH TOE, HANGNAIL. RIGHT FOOT PAIN       SUBJECTIVE:  HISTORY OF PRESENT ILLNESS:  Lulu Riggs is a 52 year old female seen today for multiple complaints.  Her primary complaint is of a painful 5th digit of the left foot.  She feels that she may have an ingrown toenail.  She also complains of some discomfort associated with the right great toe joint.  Lastly, she states that she did have an ankle fracture of the right ankle many years ago and continues to have some occasional discomfort.  Lastly, she complains of a bony prominence along the posterior aspect of the right heel that is painful in shoe gear.    REVIEW OF SYSTEMS:  Constitutional: Negative for fever and chills.  Skin: Negative for rash.  Neurologic: Feet were negative for changes in sensory or motor function.  Endocrine: Negative for heat or cold intolerance.  Hematologic: Patient on anticoagulants. []  YES    [x]  NO  Extremities:  Edema: none.  Tenderness: none    HISTORIES:  I have reviewed the past medical history, family history, social history, medications and allergies listed in the medical record as well as the nursing notes for this encounter.    OBJECTIVE  PHYSICAL EXAM:  Visit Vitals  LMP 01/25/2013     General:  No apparent distress. Alert and oriented.    Neurological:  Protective sensation: Intact to light touch bilaterally.  Normal tone bilateral lower extremities.  Deep Tendon Reflexes: Achilles and patellar are 2/5 bilaterally.  Babinski: Negative bilaterally.  Clonus: Negative bilaterally.     Vascular:  Pulses:   Dorsalis Pedis 2/4 and Posterior Tibial 2/4.  Right  Dorsalis Pedis 2/4 and Posterior Tibial 2/4. Left  Capillary refill < three seconds bilateral in digits 1-5  Minimal edema of bilateral lower  7.5 mg without relief. She requests a refill of Ambien 10 mg. She declined to be weighed today. Health Maintenance    Pt's most recent colonoscopy was on 03/31/14 with GI, Dr. Dani Mcleod. Written by caty Sapp, as dictated by Dr. Megan Mercer DO.    ROS    Review of Systems negative except as noted above in HPI. ALLERGIES:    Allergies   Allergen Reactions    Pcn [Penicillins] Hives    Celebrex [Celecoxib] Other (comments)     TIGHT SQUEEZING IN CHEST  CHEST ACHED    Erythromycin Nausea and Vomiting    Milk Prot-Turm-Pepper-Pineappl Other (comments)     Multiple food allergies    Neurontin [Gabapentin] Other (comments)     300 mg  SEVERE LEG PAIN  EYE TWITCHING    Nsaids (Non-Steroidal Anti-Inflammatory Drug) Other (comments)     GI irritation         CURRENT MEDICATIONS:    Outpatient Prescriptions Marked as Taking for the 6/19/17 encounter (Office Visit) with Adi Lockhart DO   Medication Sig Dispense Refill    acetaminophen (TYLENOL ARTHRITIS PAIN) 650 mg CR tablet Take 650 mg by mouth every six (6) hours as needed for Pain.  clindamycin (CLEOCIN) 150 mg capsule Take  by mouth every six (6) hours.  zolpidem (AMBIEN) 10 mg tablet TAKE 1 TABLET BY MOUTH EVERY NIGHT AT BEDTIME AS NEEDED FOR INSOMNIA  Indications: SLEEP-ONSET INSOMNIA 30 Tab 5    valsartan-hydroCHLOROthiazide (DIOVAN-HCT) 80-12.5 mg per tablet TAKE 1 TABLET BY MOUTH EVERY DAY FOR BLOOD PRESSURE 90 Tab 0    propranolol LA (INDERAL LA) 80 mg SR capsule TAKE 1 TO 2 CAPSULES BY MOUTH EVERY DAY FOR HIGH BLOOD PRESSURE 180 Cap 0    omeprazole (PRILOSEC) 20 mg capsule TAKE 1 CAPSULE BY MOUTH TWICE DAILY  Indications: HEARTBURN 60 Cap 5       PAST MEDICAL HISTORY:    Past Medical History:   Diagnosis Date    Allergic rhinitis 5/27/2010    Cervical disc herniation 01/2010    C3-4, C4-5, C5-6. Hemangioma body of C5. Dr. Melchor Geller.  Chest pain 08/2011, 02/2012    Dr. Aileen Donahue.    extremities/feet.  Hair growth present at the level of the digits.       Dermatologic:  Skin: Pink, warm, and supple pedal skin. Skin texture to the bilateral lower extremities is within normal limits. No ecchymosis or erythema to either foot or ankle.  No dermatologic changes.  Toenails are unremarkable.  There is a nucleated callus along the lateral aspect of the 5th digit left foot, along the nail fold.  The nail is slightly incurvated but there is no sign of paronychia.    Musculoskeletal:  Range of motion:  [x]  Normal    [] Abnormal  Muscle strength: Graded at 5/5 bilateral lower extremities, dorsiflexion, plantar flexion, inversion and eversion.  Gait analysis: [x]  Normal    [] Abnormal  Contracted and varus rotated 5th digit of the left foot.  There is a dorsal prominence of the 1st metatarsophalangeal joint of the right foot with limited movement, some crepitus, consistent with hallux rigidus.  There is only mild pain with palpation of this area.  Right ankle joint range of motion is generally smooth and pain-free with no crepitus.  No specific area of pain.  There is a bony prominence along the posterior aspect of the right calcaneus that is mildly painful with palpation.  There is no discomfort, gapping, or irregularities with palpation of the Achilles tendon of the right lower extremity.    ASSESSMENT:  1. Ingrowing nail    2. Corns and callosities    3. Hallux rigidus of right foot    4. Pain in right foot    5. Eyad's deformity, right        PLAN:  Orders Placed This Encounter   • TRIM HYPERKERATOTIC SKIN LESION ONE      Clinical and radiographic exam. I reviewed the diagnosis and treatment options with the patient.  The hyperkeratotic lesion in the incurvated nail along the lateral aspect of the 5th digit of the left foot was debrided.  Immediately relieved was encountered.  Did not have x-rays to review today, but I explained that clinically, she has findings consistent with hallux rigidus of the  Lizbeth King; denies CP as of 3/27/14    Chronic lower back pain , 2016    thoracic DDD and spondylosis. DDD L3-S1. Dr. Charles Sanchez. [facility]. Dr. Randal Beverly, [facility]. Dr. Jousé Bermeo.  Esophagitis, reflux 4/3/2011    Gallstone     Gastritis 2010    Dr. Tamela Guerrero.  Gastrointestinal food allergy 2014    Multiple. Dr. Conchita Jin.    Heart palpitations 2012    DUE TO PAC'S AND PVC'S. Dr. Princess Arteaga.  Hiatal hernia 4/3/2011    HTN (hypertension)     Impaired glucose tolerance 10/15/2010    Incidental lung nodule 13    LLL 3.9 mm, RML 3.1 mm;  as of 3/27/14 per pt stable w/o growth    Insomnia     Iron deficiency anemia 2010    Irritable bowel syndrome (IBS) 2014    Dr. Shameka Lester.    Knee pain     Right. due to severe OA. / chipped bone     Metatarsal bone fracture     Left fifth.  Migraine 2011    Dr. Tatiana Gee, cervical 2010    Left. Dr. Fartun Hagan.  Shoulder pain, right     due to [address] X 2. Dr. Dee York.  Thyroid nodule     Dr. Esmer Berkowitz    Toe fracture, left     fifth toe.  Triangular fibrocartilage complex tear     Dr. Eunice Sabillon. Left. PAST SURGICAL HISTORY:    Past Surgical History:   Procedure Laterality Date    HX  SECTION  1995    x 1    HX CHOLECYSTECTOMY      HX COLONOSCOPY  14    Dr. Sarahy Michael; 14    due to abnormal PAP.  HX DILATION AND CURETTAGE      due to miscarriage.  HX GI      EGD.  HX ORTHOPAEDIC Right 2016    LUMBAR L4-5, L5-S1 ELLY.   Dr. Charisse Rushing     [address]:    Family History   Problem Relation Age of Onset    Diabetes Mother     Heart Disease Mother 58     2 stents    Hypertension Mother     Diabetes Father     Stroke Maternal Grandmother     Cancer Maternal Uncle      prostate       SOCIAL HISTORY:    Social History right great toe.  I explained that if the area is not painful, she should avoid any type of treatment including surgery.  She was given the name of a Silipos Achilles pad for the bony prominence of the right heel.  I will have her return as needed.    Return if symptoms worsen or fail to improve, for LEFT FIFTH TOE HANGNAIL/ RIGHT FOOT & ANKLE PAIN.    Instructions provided as documented in the after visit summary.    The patient indicated understanding of the diagnosis and agreed with the plan of care.    Social History    Marital status:      Spouse name: N/A    Number of children: 3    Years of education: N/A     Occupational History    Medical assistant [employer]     Social History Main Topics    Smoking status: Never Smoker    Smokeless tobacco: Never Used    Alcohol use No    Drug use: No    Sexual activity: Yes     Partners: Male     Other Topics Concern    None     Social History Narrative       IMMUNIZATIONS:    Immunization History   Administered Date(s) Administered    Influenza Vaccine 10/30/2015    Influenza Vaccine Whole 10/01/2010    TDAP Vaccine 08/10/2012    Td, Adsorbed 10/09/2016         PHYSICAL EXAMINATION    Vital Signs    Visit Vitals    /82 (BP 1 Location: Left arm, BP Patient Position: Sitting)    Pulse 81    Temp 98.5 °F (36.9 °C) (Oral)    Resp 18    Ht 5' 7.99\" (1.727 m)    SpO2 98%       Weight Metrics 7/20/2016 5/19/2016 3/16/2016 12/16/2015 9/8/2015 6/3/2015 2/4/2015   Weight - 248 lb 12.8 oz 247 lb 265 lb 3.2 oz 267 lb 268 lb 262 lb 1.6 oz   BMI - 37.84 kg/m2 37.56 kg/m2 40.33 kg/m2 40.61 kg/m2 40.76 kg/m2 39.86 kg/m2       General appearance - Well nourished. Well appearing. Well developed. No acute distress. Obese. Head - Normocephalic. Atraumatic. Eyes - pupils equal and reactive. Extraocular eye movements intact. Sclera anicteric. Mildly injected sclera. Ears - Hearing is grossly normal bilaterally. Nose - normal and patent. No polyps noted. No erythema. No discharge. Mouth - mucous membranes with adequate moisture. Posterior pharynx normal with cobblestone appearance. No erythema, white exudate or obstruction. Neck - supple. Midline trachea. No carotid bruits noted bilaterally. No thyromegaly noted. Chest - clear to auscultation bilaterally anteriorly and posteriorly. No wheezes. No rales or rhonchi. Breath sounds are symmetrical bilaterally. Unlabored respirations. Heart - normal rate. Regular rhythm.   Normal S1, S2. No murmur noted. No rubs, clicks or gallops noted. Abdomen - soft and distended. No masses or organomegaly. No rebound, rigidity or guarding. Bowel sounds normal x 4 quadrants. No tenderness noted. Neurological - awake, alert and oriented to person, place, and time and event. Cranial nerves II through XII intact. Clear speech. Muscle strength is +5/5 x 4 extremities. Sensation is intact to light touch bilaterally. Steady gait. Heme/Lymph - peripheral pulses normal x 4 extremities. No peripheral edema is noted. Musculoskeletal - Intact x 4 extremities. Full ROM x 4 extremities. Back and neck pain with movement. Back exam - normal range of motion, decreased cervical rotation to the R, eliciting pain. No pain on palpation of the spinous processes in the cervical, thoracic, lumbar, sacral regions. No CVA tenderness. Rotated to the L in upper thoracic region. Very tight musculature, L>R. Skin - no rashes, erythema, ecchymosis, lacerations, abrasions, suspicious moles noted  Psychological -   normal behavior, dress and thought processes. occ good insight. Good eye contact. Normal affect. Appropriate mood. Normal speech.       DATA REVIEWED    Results for orders placed or performed in visit on 12/08/16   TSH 3RD GENERATION   Result Value Ref Range    TSH 0.641 0.450 - 4.500 uIU/mL   CBC W/O DIFF   Result Value Ref Range    WBC 3.9 3.4 - 10.8 x10E3/uL    RBC 4.49 3.77 - 5.28 x10E6/uL    HGB 13.1 11.1 - 15.9 g/dL    HCT 38.9 34.0 - 46.6 %    MCV 87 79 - 97 fL    MCH 29.2 26.6 - 33.0 pg    MCHC 33.7 31.5 - 35.7 g/dL    RDW 14.8 12.3 - 15.4 %    PLATELET 722 074 - 997 x10E3/uL    NRBC 0 0 - 0 %   VITAMIN D, 25 HYDROXY   Result Value Ref Range    VITAMIN D, 25-HYDROXY 10.4 (L) 30.0 - 100.0 ng/mL   HEMOGLOBIN A1C WITH EAG   Result Value Ref Range    Hemoglobin A1c 6.0 (H) 4.8 - 5.6 %    Estimated average glucose 126 mg/dL   VITAMIN B12 & FOLATE   Result Value Ref Range    Vitamin B12 292 211 - 946 pg/mL    Folate 15.0 >3.0 ng/mL   LIPID PANEL   Result Value Ref Range    Cholesterol, total 218 (H) 100 - 199 mg/dL    Triglyceride 138 0 - 149 mg/dL    HDL Cholesterol 71 >39 mg/dL    VLDL, calculated 28 5 - 40 mg/dL    LDL, calculated 119 (H) 0 - 99 mg/dL   METABOLIC PANEL, COMPREHENSIVE   Result Value Ref Range    Glucose 95 65 - 99 mg/dL    BUN 6 6 - 24 mg/dL    Creatinine 0.70 0.57 - 1.00 mg/dL    GFR est non- >59 mL/min/1.73    GFR est  >59 mL/min/1.73    BUN/Creatinine ratio 9 9 - 23    Sodium 139 136 - 144 mmol/L    Potassium 4.4 3.5 - 5.2 mmol/L    Chloride 99 97 - 106 mmol/L    CO2 28 18 - 29 mmol/L    Calcium 9.2 8.7 - 10.2 mg/dL    Protein, total 7.7 6.0 - 8.5 g/dL    Albumin 4.3 3.5 - 5.5 g/dL    GLOBULIN, TOTAL 3.4 1.5 - 4.5 g/dL    A-G Ratio 1.3 1.1 - 2.5    Bilirubin, total <0.2 0.0 - 1.2 mg/dL    Alk. phosphatase 89 39 - 117 IU/L    AST (SGOT) 26 0 - 40 IU/L    ALT (SGPT) 20 0 - 32 IU/L   URINALYSIS W/ RFLX MICROSCOPIC   Result Value Ref Range    Specific Gravity 1.027 1.005 - 1.030    pH (UA) 5.5 5.0 - 7.5    Color Yellow Yellow    Appearance Clear Clear    Leukocyte Esterase Negative Negative    Protein Negative Negative/Trace    Glucose Negative Negative    Ketone Negative Negative    Blood Negative Negative    Bilirubin Negative Negative    Urobilinogen 0.2 0.2 - 1.0 mg/dL    Nitrites Negative Negative    Microscopic Examination Comment    MICROALBUMIN, UR, RAND W/ MICROALBUMIN/CREA RATIO   Result Value Ref Range    Creatinine, urine 172.8 Not Estab. mg/dL    Microalbumin, urine 3.2 Not Estab. ug/mL    Microalb/Creat ratio (ug/mg creat.) 1.9 0.0 - 30.0 mg/g creat   CVD REPORT   Result Value Ref Range    INTERPRETATION Note        ASSESSMENT and PLAN      ICD-10-CM ICD-9-CM    1. Radiculopathy, cervical M54.12 723.4 acetaminophen (TYLENOL ARTHRITIS PAIN) 650 mg CR tablet    L>R with L arm weakness, BL hand tingling, worse since fall forward in [place] 04/2017   2.  Other chest pain R07.89 786.59 REFERRAL TO CARDIOLOGY   3. Intractable migraine without aura and without status migrainosus G43.019 346.11     stable with Propranolol   4. Essential hypertension I10 401.9    5. Neck pain M54.2 723.1     right with cervical rotation to R, worse since fall in [employer] 04/2017   6. Cervical herniated disc M50.20 722.0     x 3, flared since fall in [employer]   7. Chronic insomnia F51.04 780.52 zolpidem (AMBIEN) 10 mg tablet    due to Night Eating Disorder vs other   8. Obesity, Class II, BMI 35-39.9 ([facility]) E66.9 278.00    9. Family history of heart disease in female family member before age 72 Z80.52 V15.46 REFERRAL TO CARDIOLOGY       Discussed the patient's BMI with her. The BMI follow up plan is as follows: I have counseled this patient on diet and exercise regimens. Decrease carbohydrates (white foods, sweet foods, sweet drinks and alcohol), increase green leafy vegetables and protein (lean meats and beans) with each meal.  Avoid fried foods. Eat 3-5 small meals daily. Do not skip meals. Increase water intake. Increase physical activity to 30 minutes daily for health benefit or 60 minutes daily to prevent weight regain, as tolerated. Get 7-8 hours uninterrupted sleep nightly. Chart reviewed and updated. [facility] reviewed. Ambien 10 mg last filled on 05/16/17 for 30 tablets. Controlled Substance Agreement reviewed and signed. Continue current medications and care. Recommend f/u with previously referred neurologist, Dr. Zainab Lennon. Prescription given to patient during office visit today. Ambien 10 mg Cautioned pt about addictive potential.   Referrals given; patient urged to keep appointments with specialists. Cardiology. Counseled patient on health concerns: Neck care, sleep hygiene, and chest pain. Immunizations noted. Offered empathy, support, legitimation, prayers, partnership to patient. Praised patient for progress.   Advance Care Booklet given at office visit. Discussed with pt today. Declines honoring choices referral.   Follow-up Disposition:  Return in about 3 months (around 9/19/2017) for referral follow up, neck pain, L arm pain, chest pain. Patient was offered a choice/choices in the treatment plan today. Patient expresses understanding of the plan and agrees with recommendations. Patient declines any additional handouts. Patient is satisfied with previous handouts received from our office    More than 40 mins spent face to face with patient and more than 50% of this time spent in counseling and coordinating care. Written by caty Sapp, as dictated by Dr. Megan Mercer DO. Documentation True and Accepted by Suzy Ayala.  Matt Cannon.

## 2023-04-20 ENCOUNTER — OFFICE VISIT (OUTPATIENT)
Dept: INTERNAL MEDICINE CLINIC | Age: 58
End: 2023-04-20
Payer: COMMERCIAL

## 2023-04-20 VITALS
WEIGHT: 198.2 LBS | OXYGEN SATURATION: 100 % | TEMPERATURE: 99 F | DIASTOLIC BLOOD PRESSURE: 96 MMHG | HEART RATE: 73 BPM | BODY MASS INDEX: 30.04 KG/M2 | RESPIRATION RATE: 16 BRPM | HEIGHT: 68 IN | SYSTOLIC BLOOD PRESSURE: 134 MMHG

## 2023-04-20 DIAGNOSIS — M54.2 NECK PAIN: Primary | ICD-10-CM

## 2023-04-20 DIAGNOSIS — M62.838 MUSCLE SPASMS OF NECK: ICD-10-CM

## 2023-04-20 DIAGNOSIS — M62.838 MUSCLE SPASM: ICD-10-CM

## 2023-04-20 PROCEDURE — 99213 OFFICE O/P EST LOW 20 MIN: CPT | Performed by: NURSE PRACTITIONER

## 2023-04-20 RX ORDER — TIZANIDINE 2 MG/1
2 TABLET ORAL
Qty: 30 TABLET | Refills: 1 | Status: SHIPPED | OUTPATIENT
Start: 2023-04-20

## 2023-04-20 RX ORDER — TIZANIDINE 2 MG/1
2 TABLET ORAL
Qty: 60 TABLET | Refills: 3 | OUTPATIENT
Start: 2023-04-20

## 2023-04-20 RX ORDER — HYDROCODONE BITARTRATE AND ACETAMINOPHEN 5; 325 MG/1; MG/1
1 TABLET ORAL
Qty: 10 TABLET | Refills: 0 | Status: SHIPPED | OUTPATIENT
Start: 2023-04-20 | End: 2023-04-23

## 2023-04-20 RX ORDER — METHYLPREDNISOLONE 4 MG/1
TABLET ORAL
Qty: 1 DOSE PACK | Refills: 0 | Status: SHIPPED | OUTPATIENT
Start: 2023-04-20

## 2023-04-20 NOTE — PROGRESS NOTES
No significant abnormality found on x-ray. There is presence of degenerative disc disease/arthritis to neck especially at C4-5 and C5-6.

## 2023-04-20 NOTE — PROGRESS NOTES
Chief Complaint   Patient presents with    Neck Pain       SUBJECTIVE:    Flori Zepeda is a 62 y.o. female who is here today with complaints of sudden onset mid to upper neck pain which started yesterday. She states she is having severe pain with any/all movement of her neck. She denies any trauma or injury recently. She states she has a history of \"bulging discs. \"  She denies any headache, dizziness, or syncope. She has been trying ice, heat, as well as massage without benefit. Current Outpatient Medications   Medication Sig Dispense Refill    methylPREDNISolone (MEDROL DOSEPACK) 4 mg tablet Take as directed. 1 Dose Pack 0    tiZANidine (ZANAFLEX) 2 mg tablet Take 1 Tablet by mouth three (3) times daily as needed for Muscle Spasm(s). Indications: muscle spasm 30 Tablet 1    HYDROcodone-acetaminophen (NORCO) 5-325 mg per tablet Take 1 Tablet by mouth every eight (8) hours as needed for Pain for up to 3 days. Max Daily Amount: 3 Tablets. 10 Tablet 0    ferrous sulfate 325 mg (65 mg iron) tablet Take 1 Tablet by mouth Daily (before breakfast). 90 Tablet 3    metoclopramide HCl (REGLAN) 10 mg tablet Take 1 Tablet by mouth Before breakfast, lunch, and dinner. 270 Tablet 0    pantoprazole (PROTONIX) 40 mg tablet Take 1 Tablet by mouth two (2) times a day. 180 Tablet 1    acetaminophen (TYLENOL) 650 mg TbER Take 1 Tablet by mouth every eight (8) hours. multivitamin with iron (FLINTSTONES) chewable tablet Take 1 Tablet by mouth daily. cyanocobalamin (VITAMIN B12) 100 mcg tablet Take 1 Tablet by mouth daily. calcium-cholecalciferol, D3, (CALTRATE 600+D) tablet Take 1 Tablet by mouth daily. docusate sodium (COLACE) 100 mg capsule Take 1 Capsule by mouth as needed for Constipation. albuterol (PROVENTIL HFA, VENTOLIN HFA, PROAIR HFA) 90 mcg/actuation inhaler Take 1 Puff by inhalation every four (4) hours as needed for Cough.  (Patient not taking: Reported on 4/20/2023) 18 g 0 cholecalciferol (VITAMIN D3) (50,000 UNITS /1250 MCG) capsule Take 1 Capsule by mouth every fourteen (14) days. 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 Qasim Bhardwaj cyst, right     Cervical disc herniation 01/2010    C3-4, C4-5, C5-6. Hemangioma body of C5. Dr. Wendy Henson. Chest pain 08/2011, 02/2012    Dr. Dawn Garcia. Dr. Seb Moscoso; denies CP as of 3/27/14    Chronic lower back pain 2010, 03/2016    thoracic DDD and spondylosis. DDD L3-S1. Dr. Lidia Castillo. Lexie Rivera. Dr. Harshil Johnson, [facility]. Dr. Carl Giang. Chronic meniscal tear of knee     Chronic pain     lower back and knees    Esophagitis, reflux 4/3/2011    Essential hypertension 6/3/2015    Gallstone     Gastritis 5/27/2010    Dr. Paul Angela. Gastrointestinal food allergy 03/2014    Multiple. Dr. Shahzad Burrows. Heart palpitations 02/2012    DUE TO PAC'S AND PVC'S. Dr. Britta Salcedo. Heel spur, left 02/2019    Hiatal hernia 4/3/2011    HTN (hypertension) 1987    Impaired glucose tolerance 10/15/2010    Incidental lung nodule 01/08/13    LLL 3.9 mm, RML 3.1 mm;  as of 3/27/14 per pt stable w/o growth    Insomnia 1990s    Iron deficiency anemia 5/27/2010    Irritable bowel syndrome (IBS) 03/2014    Dr. Bairon Simon. Knee pain 2012    Right. due to severe OA. / chipped bone     Left foot pain 08/2019    Dr. Grant Red. Metatarsal bone fracture 1990    Left fifth. Migraine 2/22/2011    Dr. Villa Andino    Morbid obesity [facility])     Peroneal tendonitis of lower leg 08/07/2019    Left. Dr. Grant Red    Pre-diabetes     Radiculopathy, cervical 01/2010    Left. Dr. Wendy Henson. Shoulder pain, right 2012    due to [address] X 2. Dr. [place] Antonio. Thyroid nodule 2011    6 nodules- Dr. Ferrell Sleigh    Toe fracture, left 2008    fifth toe.     Triangular fibrocartilage complex tear 2012    Dr. Gus Taylor. Left. Past Surgical History:   Procedure Laterality Date    COLONOSCOPY N/A 3/10/2021    COLONOSCOPY,EGD performed by Aidan Singh MD at [address]    x 1    [facility]    HX COLONOSCOPY  03/31/14    Dr. Cher Nicholson. HX COLPOSCOPY  1980s; 03/31/14    due to abnormal PAP. HX DILATION AND CURETTAGE  1990s    due to miscarriage. HX ENDOSCOPY      HX HEART CATHETERIZATION  2018    no stents    HX KNEE ARTHROSCOPY Right     HX LAP GASTRIC BYPASS  06/14/2021    lap gastric bypass with hiatal hernia repair by Dr. Jj Constantino at [facility]    HX ORTHOPAEDIC Right 06/2016    LUMBAR L4-5, L5-S1 ELLY.   Dr. Kimberly Gomez     HX TONSILLECTOMY  1991     Allergies   Allergen Reactions    Doxylamine Succinate Swelling     12.5-25 MG  HANDS, FACE/PERIORAL, FEET    Pcn [Penicillins] Hives    Ambien [Zolpidem] Nausea and Vomiting and Other (comments)     5 mg with Ativan 0.5 mg   TOO GROGGY, SLEPT 24 HOURS  7.5 MG FORGOT SHE WAS SLEEP EATING    Aspirin Nausea Only    Carafate [Sucralfate] Nausea and Vomiting    Celebrex [Celecoxib] Other (comments)     TIGHT SQUEEZING IN CHEST  CHEST ACHED    Erythromycin Nausea and Vomiting    Milk Prot-Turm-Pepper-Pineappl Other (comments)     Multiple food allergies    Neurontin [Gabapentin] Other (comments)     300 mg  SEVERE LEG PAIN  EYE TWITCHING    Nsaids (Non-Steroidal Anti-Inflammatory Drug) Other (comments)     GI irritation      Pineapple Unknown (comments)       REVIEW OF SYSTEMS:                                        POSITIVE= bold text  Negative = regular text    General:                     fever, chills, sweats, generalized weakness, weight loss/gain,                                       loss of appetite   Eyes:                           blurred vision, eye pain, loss of vision, double vision  ENT:                            rhinorrhea, pharyngitis   Respiratory: cough, sputum production, SOB, WEBER, wheezing, pleuritic pain   Cardiology:                chest pain, palpitations, orthopnea, PND, edema, syncope   Gastrointestinal:       abdominal pain , N/V, diarrhea, dysphagia, constipation, bleeding   Genitourinary:           frequency, urgency, dysuria, hematuria, incontinence   Muskuloskeletal :      arthralgia, myalgia, back pain, neck pain  Hematology:              easy bruising, nose or gum bleeding, lymphadenopathy   Dermatological:         rash, ulceration, pruritis, color change / jaundice  Endocrine:                 hot flashes or polydipsia   Neurological:             headache, dizziness, confusion, focal weakness, paresthesia,                                      Speech difficulties, memory loss, gait difficulty  Psychological:          Feelings of anxiety, depression, agitation        Social History     Socioeconomic History    Marital status:     Number of children: 3   Occupational History    Occupation: Medical assistant     Employer: [employer]   Tobacco Use    Smoking status: Never    Smokeless tobacco: Never   Vaping Use    Vaping Use: Never used   Substance and Sexual Activity    Alcohol use: No    Drug use: Never    Sexual activity: Yes     Partners: Male   Social History Narrative    In the home alone     3 adult children     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      Social Determinants of Health     Financial Resource Strain: Low Risk     Difficulty of Paying Living Expenses: Not hard at all   Food Insecurity: No Food Insecurity    Worried About Running Out of Food in the Last Year: Never true    Ran Out of Food in the Last Year: Never true     Family History   Problem Relation Age of Onset    Diabetes Mother     Heart Disease Mother 58        2 stents    Hypertension Mother     Stroke Mother     Diabetes Father     Hypertension Father     Stroke Maternal Grandmother     Cancer Maternal Uncle         prostate    Diabetes Sister     No Known Problems Sister     No Known Problems Sister     Anesth Problems Neg Hx        OBJECTIVE:     Visit Vitals  BP (!) 134/96 (BP 1 Location: Left upper arm, BP Patient Position: Sitting, BP Cuff Size: Adult)   Pulse 73   Temp 99 °F (37.2 °C) (Oral)   Resp 16   Ht 5' 8\" (1.727 m)   Wt 198 lb 3.2 oz (89.9 kg)   LMP  (LMP Unknown)   SpO2 100%   BMI 30.14 kg/m²       Constitutional: She appears well nourished, of stated age, and dressed appropriately. Eyes: Sclera anicteric, PERRLA, EOMI  Respiratory: Clear to ascultation X5, normal inspiratory effort, no adventitious breath sounds. Cardiovascular: Regular rate and rhythm, no murmurs, rubs or gallops, PMI not displaced, No thrills, no peripheral edema  Musculoskeletal: Paraspinal tenderness on palpation to mid and upper cervical areas of neck. Range of motion decreased secondary to pain. Integumentary: No unusual rashes or suspicious skin lesions noted. ASSESSMENT/PLAN:     ICD-10-CM ICD-9-CM    1. Neck pain  M54.2 723.1 XR SPINE CERV PA LAT ODONT 3 V MAX      methylPREDNISolone (MEDROL DOSEPACK) 4 mg tablet      HYDROcodone-acetaminophen (NORCO) 5-325 mg per tablet      2. Muscle spasms of neck  M62.838 728.85 tiZANidine (ZANAFLEX) 2 mg tablet        1: X-ray of cervical spine ordered today. Awaiting radiology results. 2: Patient will be given steroid Dosepak, muscle relaxers, and pain medication. Use as directed. 3: Patient to continue moist heat and ice alternating. 4: Recommended soft cervical collar. 5: Patient to follow-up with me if symptoms not improving. Patient states understanding and agrees with plan. Orders Placed This Encounter    XR SPINE CERV PA LAT ODONT 3 V MAX    methylPREDNISolone (MEDROL DOSEPACK) 4 mg tablet    tiZANidine (ZANAFLEX) 2 mg tablet    HYDROcodone-acetaminophen (NORCO) 5-325 mg per tablet         ATTENTION:   This medical record was transcribed using an electronic medical records system.   Although proofread, it may and can contain electronic and spelling errors. Other human spelling and other errors may be present. Corrections may be executed at a later time. Please feel free to contact us for any clarifications as needed. Signed,  Delfina Damian.  Hair Mcadams MSN APRN FNP-BC

## 2023-04-20 NOTE — TELEPHONE ENCOUNTER
PCP: Shahzad Roblero NP    Last appt: 4/7/2022  Future Appointments   Date Time Provider Jana Vizcarra   4/20/2023  2:30 PM Shahzad Roblero NP [facility] AMB       Requested Prescriptions     Pending Prescriptions Disp Refills    tiZANidine (ZANAFLEX) 2 mg tablet 60 Tablet 3     Sig: Take 1 Tablet by mouth two (2) times daily as needed for Muscle Spasm(s).        Prior labs and Blood pressures:  BP Readings from Last 3 Encounters:   02/08/23 (!) 147/92   01/06/23 130/85   11/11/22 127/89     Lab Results   Component Value Date/Time    Sodium 143 06/21/2022 07:36 AM    Potassium 4.4 06/21/2022 07:36 AM    Chloride 109 (H) 06/21/2022 07:36 AM    CO2 29 06/21/2022 07:36 AM    Anion gap 5 06/21/2022 07:36 AM    Glucose 82 06/21/2022 07:36 AM    BUN 11 06/21/2022 07:36 AM    Creatinine 0.55 06/21/2022 07:36 AM    BUN/Creatinine ratio 20 06/21/2022 07:36 AM    GFR est AA >60 06/21/2022 07:36 AM    GFR est non-AA >60 06/21/2022 07:36 AM    Calcium 9.5 06/21/2022 07:36 AM

## 2023-04-20 NOTE — PROGRESS NOTES
Kenn Nelson is a 62 y.o. female     Chief Complaint   Patient presents with    Neck Pain       Visit Vitals  BP (!) 134/96 (BP 1 Location: Left upper arm, BP Patient Position: Sitting, BP Cuff Size: Adult)   Pulse 73   Temp 99 °F (37.2 °C) (Oral)   Resp 16   Ht 5' 8\" (1.727 m)   Wt 198 lb 3.2 oz (89.9 kg)   LMP  (LMP Unknown)   SpO2 100%   BMI 30.14 kg/m²       Health Maintenance Due   Topic Date Due    Shingles Vaccine (1 of 2) Never done    COVID-19 Vaccine (3 - Booster for Pfizer series) 12/13/2021         1. \"Have you been to the ER, urgent care clinic since your last visit? Hospitalized since your last visit? \" No    2. \"Have you seen or consulted any other health care providers outside of the [address] [place] [address] since your last visit? \" No     3. For patients aged 39-70: Has the patient had a colonoscopy / FIT/ Cologuard? Yes - no Care Gap present      If the patient is female:    4. For patients aged 41-77: Has the patient had a mammogram within the past 2 years? Yes - no Care Gap present      5. For patients aged 21-65: Has the patient had a pap smear?  Yes - no Care Gap present

## 2023-04-26 ENCOUNTER — TELEPHONE (OUTPATIENT)
Dept: INTERNAL MEDICINE CLINIC | Age: 58
End: 2023-04-26

## 2023-04-26 DIAGNOSIS — M54.2 NECK PAIN: ICD-10-CM

## 2023-04-26 RX ORDER — METHYLPREDNISOLONE 4 MG/1
TABLET ORAL
Qty: 1 DOSE PACK | Refills: 0 | Status: SHIPPED | OUTPATIENT
Start: 2023-04-26

## 2023-04-26 NOTE — TELEPHONE ENCOUNTER
Pt requesting another steroid pack for her neck pain. She states she's better but it's still stiff and she's having difficulty turning her head to the left. Additionally she is having difficulty sleeping and wakes up with neck pain. She believes theres still some inflammation in there that would be alleviated by a second steroid pack. Pt uses [facility] on file. Please advise if this is acceptable as she is requesting a call back.

## 2023-05-18 ENCOUNTER — TELEPHONE (OUTPATIENT)
Age: 58
End: 2023-05-18

## 2023-05-26 ENCOUNTER — OFFICE VISIT (OUTPATIENT)
Age: 58
End: 2023-05-26

## 2023-05-26 VITALS
RESPIRATION RATE: 17 BRPM | HEART RATE: 68 BPM | BODY MASS INDEX: 30.01 KG/M2 | DIASTOLIC BLOOD PRESSURE: 85 MMHG | SYSTOLIC BLOOD PRESSURE: 133 MMHG | HEIGHT: 68 IN | OXYGEN SATURATION: 99 % | TEMPERATURE: 97.9 F | WEIGHT: 198 LBS

## 2023-05-26 DIAGNOSIS — M17.0 BILATERAL PRIMARY OSTEOARTHRITIS OF KNEE: Primary | ICD-10-CM

## 2023-05-26 RX ORDER — BETAMETHASONE SODIUM PHOSPHATE AND BETAMETHASONE ACETATE 3; 3 MG/ML; MG/ML
6 INJECTION, SUSPENSION INTRA-ARTICULAR; INTRALESIONAL; INTRAMUSCULAR; SOFT TISSUE ONCE
Status: DISCONTINUED | OUTPATIENT
Start: 2023-05-26 | End: 2023-05-26

## 2023-05-26 RX ORDER — BETAMETHASONE SODIUM PHOSPHATE AND BETAMETHASONE ACETATE 3; 3 MG/ML; MG/ML
12 INJECTION, SUSPENSION INTRA-ARTICULAR; INTRALESIONAL; INTRAMUSCULAR; SOFT TISSUE ONCE
Status: COMPLETED | OUTPATIENT
Start: 2023-05-26 | End: 2023-05-26

## 2023-05-26 RX ADMIN — BETAMETHASONE SODIUM PHOSPHATE AND BETAMETHASONE ACETATE 12 MG: 3; 3 INJECTION, SUSPENSION INTRA-ARTICULAR; INTRALESIONAL; INTRAMUSCULAR; SOFT TISSUE at 13:26

## 2023-05-26 RX ADMIN — BETAMETHASONE SODIUM PHOSPHATE AND BETAMETHASONE ACETATE 12 MG: 3; 3 INJECTION, SUSPENSION INTRA-ARTICULAR; INTRALESIONAL; INTRAMUSCULAR; SOFT TISSUE at 13:25

## 2023-05-26 ASSESSMENT — PATIENT HEALTH QUESTIONNAIRE - PHQ9
1. LITTLE INTEREST OR PLEASURE IN DOING THINGS: 0
2. FEELING DOWN, DEPRESSED OR HOPELESS: 0
SUM OF ALL RESPONSES TO PHQ QUESTIONS 1-9: 0
SUM OF ALL RESPONSES TO PHQ QUESTIONS 1-9: 0
SUM OF ALL RESPONSES TO PHQ9 QUESTIONS 1 & 2: 0
SUM OF ALL RESPONSES TO PHQ QUESTIONS 1-9: 0
SUM OF ALL RESPONSES TO PHQ QUESTIONS 1-9: 0

## 2023-05-26 NOTE — PROGRESS NOTES
Jody Paulino is a 62 y.o. female  Chief Complaint   Patient presents with    Injections     Ht 5' 8\" (1.727 m)   Wt 198 lb (89.8 kg)   BMI 30.11 kg/m²   1. Have you been to the ER, urgent care clinic since your last visit? Hospitalized since your last visit? No    2. Have you seen or consulted any other health care providers outside of the [address] [place] [address] since your last visit? Include any pap smears or colon screening.  No
osteoarthritis of the knee joints, no significant other findings, no other osseus abnormalities, fractures, or dislocations. Ms. Sandy Ruelas has a reminder for a \"due or due soon\" health maintenance. I have asked that she contact her primary care provider for follow-up on this health maintenance. Date of Procedure: 5/26/2023  PROCEDURE NOTE - Bilateral knee injection of Celestone    Consent was obtained from the patient. The correct site was identified after confirmation with the patient. Following identification and confirmation of the correct site with the patient, the superolateral right knee was prepped in the normal sterile fashion. A local anesthetic of 1% lidocaine without epinephrine was then administered to the local tissues. Following, an injection of a mixture of  6 mg Celestone and 1% lidocaine without epinephrine was administered to the right knee. The needle was then withdrawn and the injection site dressed with a sterile bandage at the conclusion. The procedure was well tolerated by the patient. No immediate adverse reactions were noted. Attention was then turned to the left knee. Following identification and confirmation of the correct site with the patient, the superolateral left knee was prepped in the normal sterile fashion. A local anesthetic of 1% lidocaine without epinephrine was then administered to the local tissues. Following, an injection of a mixture of  6 mg Celestone and 1% lidocaine without epinephrine was administered to the left knee. The needle was then withdrawn and the injection site dressed with a sterile bandage at the conclusion. The procedure was well tolerated by the patient. No immediate adverse reactions were noted.   Post injection instructions were given    Diagnosis: Bilateral Knee Osteoarthritis

## 2023-06-27 ENCOUNTER — TELEPHONE (OUTPATIENT)
Age: 58
End: 2023-06-27

## 2023-08-04 ENCOUNTER — TELEMEDICINE (OUTPATIENT)
Age: 58
End: 2023-08-04
Payer: COMMERCIAL

## 2023-08-04 VITALS — HEIGHT: 68 IN | WEIGHT: 185.3 LBS | BODY MASS INDEX: 28.08 KG/M2

## 2023-08-04 DIAGNOSIS — E55.9 VITAMIN D DEFICIENCY: ICD-10-CM

## 2023-08-04 DIAGNOSIS — E16.1 REACTIVE HYPOGLYCEMIA: ICD-10-CM

## 2023-08-04 DIAGNOSIS — E66.01 MORBID OBESITY (HCC): Primary | ICD-10-CM

## 2023-08-04 DIAGNOSIS — D64.9 ANEMIA, UNSPECIFIED TYPE: ICD-10-CM

## 2023-08-04 DIAGNOSIS — E53.8 VITAMIN B12 DEFICIENCY: ICD-10-CM

## 2023-08-04 DIAGNOSIS — K91.2 POSTSURGICAL NONABSORPTION: ICD-10-CM

## 2023-08-04 PROCEDURE — 99213 OFFICE O/P EST LOW 20 MIN: CPT | Performed by: NURSE PRACTITIONER

## 2023-08-04 RX ORDER — PANTOPRAZOLE SODIUM 40 MG/1
40 TABLET, DELAYED RELEASE ORAL
Qty: 90 TABLET | Refills: 1 | Status: SHIPPED | OUTPATIENT
Start: 2023-08-04

## 2023-08-04 RX ORDER — PEDI MV NO.227/FERROUS SULFATE 10 MG
TABLET,CHEWABLE ORAL 2 TIMES DAILY
COMMUNITY

## 2023-08-04 ASSESSMENT — ENCOUNTER SYMPTOMS
NAUSEA: 0
VOMITING: 0
ABDOMINAL PAIN: 0

## 2023-08-04 ASSESSMENT — PATIENT HEALTH QUESTIONNAIRE - PHQ9
SUM OF ALL RESPONSES TO PHQ QUESTIONS 1-9: 0
SUM OF ALL RESPONSES TO PHQ QUESTIONS 1-9: 0
1. LITTLE INTEREST OR PLEASURE IN DOING THINGS: 0
SUM OF ALL RESPONSES TO PHQ9 QUESTIONS 1 & 2: 0
2. FEELING DOWN, DEPRESSED OR HOPELESS: 0
SUM OF ALL RESPONSES TO PHQ QUESTIONS 1-9: 0
SUM OF ALL RESPONSES TO PHQ QUESTIONS 1-9: 0

## 2023-08-04 ASSESSMENT — PAIN SCALES - GENERAL: PAINLEVEL_OUTOF10: 0

## 2023-08-04 NOTE — PATIENT INSTRUCTIONS
Reactive Hypoglycemia    Reactive hypoglycemia is a medical term describing recurrent episodes of symptomatic hypoglycemia (low blood sugar) occurring 2-4 hours after eating a high carbohydrate meal.    There are two kinds of hypoglycemia, FASTING and REACTIVE. The REACTIVE type is more common and happens when the blood sugar falls too low after a meal or snack. FASTING hypoglycemia occurs when the stomach is empty and no food has been eaten for several hours. People with REACTIVE hypoglycemia still produce insulin through their pancreas. Insulin is the hormone that is needed to regulate blood glucose (blood sugar). It's not as efficient as it should be, resulting in too much production of insulin in the blood stream, causing the blood sugar to drop too low. It's the body's inability to handle large amounts of sugar (in some cases even small amounts) that is consumed throughout the day. Some symptoms of REACTIVE hypoglycemia may be:      * SWEATING  * HUNGER   * DROWSINES  * DIFFICULTY SPEAKING   * ANXIETY   * CONFUSION   * NAUSEA   * TREMBLING   * HEADACHES  * DIZZINESS   * IRRITABILITY  * CRAVING SWEETS   * RUNNY NOSE  * DEPRESSION   * FEELING WARM OR FLUSHED   * INABILITY TO CONCENTRATE      In most patients, REACTIVE hypoglycemia is completely avoidable and can be managed with dietary changes. GLYCEMIC INDEX    The GLYCEMIC INDEX (GI) is a measure of the effects of carbohydrates on blood sugar levels. Carbohydrates that break down easily and quickly during digestion, release glucose rapidly into the bloodstream are HIGH GI FOODS. Carbohydrates that break down more slowly have a LOW GI rating. Foods that are LOW GI are high in fiber. Protein rich foods are LOW GI. Foods that are highly processes are usually HIGH GI foods. Most starchy, sugary, snack foods are HIGH GI and should be avoided.     LOW GI (Good choices)  Most vegetables ok (except          potatoes, corn, peas and

## 2023-08-09 DIAGNOSIS — E66.01 MORBID OBESITY (HCC): ICD-10-CM

## 2023-08-09 DIAGNOSIS — E55.9 VITAMIN D DEFICIENCY: ICD-10-CM

## 2023-08-09 DIAGNOSIS — E53.8 VITAMIN B12 DEFICIENCY: ICD-10-CM

## 2023-08-09 DIAGNOSIS — D64.9 ANEMIA, UNSPECIFIED TYPE: ICD-10-CM

## 2023-08-09 DIAGNOSIS — K91.2 POSTSURGICAL NONABSORPTION: ICD-10-CM

## 2023-08-10 LAB
25(OH)D3 SERPL-MCNC: 23.4 NG/ML (ref 30–100)
ALBUMIN SERPL-MCNC: 3.6 G/DL (ref 3.5–5)
ALBUMIN/GLOB SERPL: 1 (ref 1.1–2.2)
ALP SERPL-CCNC: 126 U/L (ref 45–117)
ALT SERPL-CCNC: 39 U/L (ref 12–78)
ANION GAP SERPL CALC-SCNC: 5 MMOL/L (ref 5–15)
AST SERPL-CCNC: 23 U/L (ref 15–37)
BASOPHILS # BLD: 0 K/UL (ref 0–0.1)
BASOPHILS NFR BLD: 1 % (ref 0–1)
BILIRUB SERPL-MCNC: 0.2 MG/DL (ref 0.2–1)
BUN SERPL-MCNC: 11 MG/DL (ref 6–20)
BUN/CREAT SERPL: 16 (ref 12–20)
CALCIUM SERPL-MCNC: 9 MG/DL (ref 8.5–10.1)
CHLORIDE SERPL-SCNC: 105 MMOL/L (ref 97–108)
CO2 SERPL-SCNC: 27 MMOL/L (ref 21–32)
CREAT SERPL-MCNC: 0.7 MG/DL (ref 0.55–1.02)
DIFFERENTIAL METHOD BLD: NORMAL
EOSINOPHIL # BLD: 0 K/UL (ref 0–0.4)
EOSINOPHIL NFR BLD: 1 % (ref 0–7)
ERYTHROCYTE [DISTWIDTH] IN BLOOD BY AUTOMATED COUNT: 12.5 % (ref 11.5–14.5)
FOLATE SERPL-MCNC: 10 NG/ML (ref 5–21)
GLOBULIN SER CALC-MCNC: 3.5 G/DL (ref 2–4)
GLUCOSE SERPL-MCNC: 109 MG/DL (ref 65–100)
HCT VFR BLD AUTO: 37.9 % (ref 35–47)
HGB BLD-MCNC: 12.2 G/DL (ref 11.5–16)
IMM GRANULOCYTES # BLD AUTO: 0 K/UL (ref 0–0.04)
IMM GRANULOCYTES NFR BLD AUTO: 0 % (ref 0–0.5)
IRON SERPL-MCNC: 65 UG/DL (ref 35–150)
LYMPHOCYTES # BLD: 2.1 K/UL (ref 0.8–3.5)
LYMPHOCYTES NFR BLD: 34 % (ref 12–49)
MCH RBC QN AUTO: 29.4 PG (ref 26–34)
MCHC RBC AUTO-ENTMCNC: 32.2 G/DL (ref 30–36.5)
MCV RBC AUTO: 91.3 FL (ref 80–99)
MONOCYTES # BLD: 0.5 K/UL (ref 0–1)
MONOCYTES NFR BLD: 8 % (ref 5–13)
NEUTS SEG # BLD: 3.6 K/UL (ref 1.8–8)
NEUTS SEG NFR BLD: 56 % (ref 32–75)
NRBC # BLD: 0 K/UL (ref 0–0.01)
NRBC BLD-RTO: 0 PER 100 WBC
PLATELET # BLD AUTO: 288 K/UL (ref 150–400)
PMV BLD AUTO: 10.8 FL (ref 8.9–12.9)
POTASSIUM SERPL-SCNC: 4.3 MMOL/L (ref 3.5–5.1)
PROT SERPL-MCNC: 7.1 G/DL (ref 6.4–8.2)
RBC # BLD AUTO: 4.15 M/UL (ref 3.8–5.2)
SODIUM SERPL-SCNC: 137 MMOL/L (ref 136–145)
VIT B12 SERPL-MCNC: 1602 PG/ML (ref 193–986)
WBC # BLD AUTO: 6.2 K/UL (ref 3.6–11)

## 2023-08-25 RX ORDER — ERGOCALCIFEROL 1.25 MG/1
50000 CAPSULE ORAL WEEKLY
Qty: 12 CAPSULE | Refills: 0 | Status: SHIPPED | OUTPATIENT
Start: 2023-08-25

## 2023-12-11 ENCOUNTER — NURSE ONLY (OUTPATIENT)
Age: 58
End: 2023-12-11
Payer: COMMERCIAL

## 2023-12-11 DIAGNOSIS — J10.1 INFLUENZA A: Primary | ICD-10-CM

## 2023-12-11 DIAGNOSIS — R09.89 SYMPTOMS OF UPPER RESPIRATORY INFECTION (URI): ICD-10-CM

## 2023-12-11 LAB
EXP DATE SOLUTION: NORMAL
EXP DATE SWAB: NORMAL
EXPIRATION DATE: NORMAL
INFLUENZA A ANTIGEN, POC: POSITIVE
INFLUENZA B ANTIGEN, POC: NEGATIVE
LOT NUMBER POC: NORMAL
LOT NUMBER SOLUTION: NORMAL
LOT NUMBER SWAB: NORMAL
SARS-COV-2 RNA, POC: NEGATIVE
VALID INTERNAL CONTROL, POC: ABNORMAL

## 2023-12-11 PROCEDURE — 87502 INFLUENZA DNA AMP PROBE: CPT

## 2023-12-11 PROCEDURE — 87635 SARS-COV-2 COVID-19 AMP PRB: CPT

## 2023-12-11 PROCEDURE — 99213 OFFICE O/P EST LOW 20 MIN: CPT

## 2023-12-11 NOTE — PATIENT INSTRUCTIONS
- Tylenol Extra strength 2 tabs or Ibuprofen 3-4 tabs every 6-8 hours for pain or fever.  - OTC Antihistamines like Zyrtec or Claritin  - Saline Sinus Rinses  - Nasacort nasal spray daily  - Humidifier in room at night  - Vicks Vapor rub   - Plenty of Rest and Fluids  - Vitamin C

## 2024-06-06 ENCOUNTER — TELEPHONE (OUTPATIENT)
Age: 59
End: 2024-06-06

## 2024-06-19 DIAGNOSIS — E53.8 VITAMIN B12 DEFICIENCY: ICD-10-CM

## 2024-06-19 DIAGNOSIS — Z00.00 ENCOUNTER FOR PREVENTIVE CARE: ICD-10-CM

## 2024-06-19 DIAGNOSIS — D64.9 ANEMIA, UNSPECIFIED TYPE: ICD-10-CM

## 2024-06-19 DIAGNOSIS — K91.2 POSTSURGICAL NONABSORPTION: ICD-10-CM

## 2024-06-19 DIAGNOSIS — E55.9 VITAMIN D DEFICIENCY: ICD-10-CM

## 2024-06-19 DIAGNOSIS — E66.01 MORBID OBESITY (HCC): Primary | ICD-10-CM

## 2024-06-20 DIAGNOSIS — E66.01 MORBID OBESITY (HCC): Primary | ICD-10-CM

## 2024-06-20 DIAGNOSIS — K91.2 POSTSURGICAL NONABSORPTION: ICD-10-CM

## 2024-06-21 ENCOUNTER — OFFICE VISIT (OUTPATIENT)
Facility: CLINIC | Age: 59
End: 2024-06-21
Payer: COMMERCIAL

## 2024-06-21 VITALS
RESPIRATION RATE: 16 BRPM | HEART RATE: 81 BPM | SYSTOLIC BLOOD PRESSURE: 124 MMHG | DIASTOLIC BLOOD PRESSURE: 78 MMHG | TEMPERATURE: 98.2 F | HEIGHT: 68 IN | OXYGEN SATURATION: 98 % | BODY MASS INDEX: 30.31 KG/M2 | WEIGHT: 200 LBS

## 2024-06-21 DIAGNOSIS — M54.41 ACUTE BILATERAL LOW BACK PAIN WITH BILATERAL SCIATICA: Primary | ICD-10-CM

## 2024-06-21 DIAGNOSIS — M62.830 SPASM OF MUSCLE OF LOWER BACK: ICD-10-CM

## 2024-06-21 DIAGNOSIS — M54.42 ACUTE BILATERAL LOW BACK PAIN WITH BILATERAL SCIATICA: Primary | ICD-10-CM

## 2024-06-21 PROCEDURE — 99213 OFFICE O/P EST LOW 20 MIN: CPT | Performed by: NURSE PRACTITIONER

## 2024-06-21 RX ORDER — METHYLPREDNISOLONE 4 MG/1
TABLET ORAL
Qty: 1 KIT | Refills: 0 | Status: SHIPPED | OUTPATIENT
Start: 2024-06-21

## 2024-06-21 RX ORDER — BACLOFEN 10 MG/1
10 TABLET ORAL 3 TIMES DAILY
Qty: 30 TABLET | Refills: 1 | Status: SHIPPED | OUTPATIENT
Start: 2024-06-21

## 2024-06-21 SDOH — ECONOMIC STABILITY: HOUSING INSECURITY
IN THE LAST 12 MONTHS, WAS THERE A TIME WHEN YOU DID NOT HAVE A STEADY PLACE TO SLEEP OR SLEPT IN A SHELTER (INCLUDING NOW)?: NO

## 2024-06-21 SDOH — ECONOMIC STABILITY: FOOD INSECURITY: WITHIN THE PAST 12 MONTHS, YOU WORRIED THAT YOUR FOOD WOULD RUN OUT BEFORE YOU GOT MONEY TO BUY MORE.: NEVER TRUE

## 2024-06-21 SDOH — ECONOMIC STABILITY: FOOD INSECURITY: WITHIN THE PAST 12 MONTHS, THE FOOD YOU BOUGHT JUST DIDN'T LAST AND YOU DIDN'T HAVE MONEY TO GET MORE.: NEVER TRUE

## 2024-06-21 SDOH — ECONOMIC STABILITY: INCOME INSECURITY: HOW HARD IS IT FOR YOU TO PAY FOR THE VERY BASICS LIKE FOOD, HOUSING, MEDICAL CARE, AND HEATING?: NOT HARD AT ALL

## 2024-06-21 ASSESSMENT — PATIENT HEALTH QUESTIONNAIRE - PHQ9
SUM OF ALL RESPONSES TO PHQ QUESTIONS 1-9: 0
2. FEELING DOWN, DEPRESSED OR HOPELESS: NOT AT ALL
SUM OF ALL RESPONSES TO PHQ QUESTIONS 1-9: 0
1. LITTLE INTEREST OR PLEASURE IN DOING THINGS: NOT AT ALL
SUM OF ALL RESPONSES TO PHQ9 QUESTIONS 1 & 2: 0

## 2024-06-21 NOTE — PROGRESS NOTES
Bella Fairbanks is a 59 y.o. female     Chief Complaint   Patient presents with    Back Pain     Lower back pain x 12 days-pain radiates into L leg-throbbing sensation in buttocks-burning sensation in lower back       /78 (Site: Left Upper Arm, Position: Sitting, Cuff Size: Medium Adult)   Pulse 81   Temp 98.2 °F (36.8 °C) (Oral)   Resp 16   Ht 1.727 m (5' 8\")   Wt 90.7 kg (200 lb)   SpO2 98%   BMI 30.41 kg/m²     Health Maintenance Due   Topic Date Due    Hepatitis B vaccine (1 of 3 - 3-dose series) Never done    COVID-19 Vaccine (1) Never done    Shingles vaccine (1 of 2) Never done         \"Have you been to the ER, urgent care clinic since your last visit?  Hospitalized since your last visit?\"    NO    “Have you seen or consulted any other health care providers outside of [facility] since your last visit?”    NO                     
Although proofread, it may and can contain electronic and spelling errors.  Other human spelling and other errors may be present.  Corrections may be executed at a later time.  Please feel free to contact us for any clarifications as needed.    Signed,  Samuel Arriaza, MSN APRN FNP-BC

## 2024-07-01 ENCOUNTER — PATIENT MESSAGE (OUTPATIENT)
Facility: CLINIC | Age: 59
End: 2024-07-01

## 2024-07-01 DIAGNOSIS — M54.42 ACUTE BILATERAL LOW BACK PAIN WITH BILATERAL SCIATICA: ICD-10-CM

## 2024-07-01 DIAGNOSIS — M54.41 ACUTE BILATERAL LOW BACK PAIN WITH BILATERAL SCIATICA: ICD-10-CM

## 2024-07-01 DIAGNOSIS — M62.830 SPASM OF MUSCLE OF LOWER BACK: ICD-10-CM

## 2024-07-01 RX ORDER — METHYLPREDNISOLONE 4 MG/1
TABLET ORAL
Qty: 1 KIT | Refills: 0 | Status: SHIPPED | OUTPATIENT
Start: 2024-07-01

## 2024-07-01 NOTE — TELEPHONE ENCOUNTER
Vicky Edmond LPN 7/1/2024 1:37 PM EDT      ----- Message -----  From: Bella Fairbanks  Sent: 7/1/2024 1:17 PM EDT  To: *  Subject: refill     could you please send in one more dose pack for my back to [facility] in [place]. it helped tremendously. The pain shooting down my leg is gone however Im still having back pain 3-5 pain level depending on the day and sitting a long time is a chore.     Thanks in advance

## 2024-07-07 NOTE — PROCEDURES
PICC Placement Note    PRE-PROCEDURE VERIFICATION  Correct Procedure: yes  Correct Site:  yes  Temperature: Temp: 98.6 °F (37 °C), Temperature Source: Temp Source: Oral  Recent Labs     06/17/21  0028   BUN 7   CREA 0.58      WBC 14.6*     Allergies: Doxylamine succinate, Pcn [penicillins], Ambien [zolpidem], Aspirin, Celebrex [celecoxib], Erythromycin, Milk prot-turm-pepper-pineappl, Neurontin [gabapentin], and Nsaids (non-steroidal anti-inflammatory drug)  Education materials, including PICC Booklet, for PICC Care given to patient: yes. See Patient Education activity for further details. PROCEDURE DETAIL  A double lumen PICC line was started for vascular access. The following documentation is in addition to the PICC properties in the lines/airways flowsheet :  Lot #: [id number]  Was xylocaine 1% used intradermally:  yes  Catheter Length: 42 (cm)  Vein Selection for PICC:right basilic  Central Line Bundle followed yes  Complication Related to Insertion: none    The placement was verified by ECG/Sapiens technology: The  tip location is on the right side and the tip is in the superior vena cava. See ECG results for PICC tip placement. Report given to nurse Brown. Line is okay to use.     Madi Gaspar RN Please keep the follow up appointment with heme/onc at [facility] (Dr. Resendiz) to discuss anemia (follow up on 7/11/24)  Please take the tizanidine and oxycodone as directed.  Follow up with Dr. Warren for bladder stone evaluation   Follow up with pain management.

## 2024-10-03 ENCOUNTER — NURSE TRIAGE (OUTPATIENT)
Dept: OTHER | Facility: CLINIC | Age: 59
End: 2024-10-03

## 2024-10-03 NOTE — TELEPHONE ENCOUNTER
[facility]    Location of employment: [employer].     Department where injury occurred:[address]     Location of injury (body part involved): right elbow.     Time of injury: 10/2/2024, 1030    Last 4 of SSN: [id number]    Location associate referred to for care: Home Care. Call back for new or worsening s/s.     Subjective: Caller states \"I went to sit down on a stool yesterday. It went out from under me. I landed hard. My wrist and elbow hurt. I was dizzy this morning and aching. The dizziness is better.\"     Current Symptoms: right elbow pain. Back pain, flare up. History of back pain. History of bulging disc L5.  Right wrist pain, resolved. No swelling or bruising noted.  Intermittent dizziness when standing, improving.     Pain Severity: elbow: 2/10; stiff; constant sharp with movement. Headache:4-5/10    Temperature: denies     What has been tried: ACE wrap to right wrist. Tylenol    LMP: NA Pregnant: NA    Recommended disposition: home care dispo regarding elbow triage.   Home Care regarding intermittent dizziness when standing.     Employee injury packet email to employee at personal email: [email or web address] with listing of approved providers and locations. Employee aware they must be seen by one of the panel physicians, not their own PCP. Employee verbalizes understanding.        Care advice provided, caller verbalizes understanding; denies any other questions or concerns.    Home Care      Reason for Disposition   Minor elbow injury   Dizziness caused by sudden or prolonged standing    Protocols used: Elbow Injury-ADULT-[place], Dizziness-ADULT-[place]

## 2024-10-16 DIAGNOSIS — Z00.00 ENCOUNTER FOR PREVENTIVE CARE: ICD-10-CM

## 2024-10-16 DIAGNOSIS — K91.2 POSTSURGICAL NONABSORPTION: ICD-10-CM

## 2024-10-16 DIAGNOSIS — D64.9 ANEMIA, UNSPECIFIED TYPE: ICD-10-CM

## 2024-10-16 DIAGNOSIS — E55.9 VITAMIN D DEFICIENCY: ICD-10-CM

## 2024-10-16 DIAGNOSIS — E66.01 MORBID OBESITY: ICD-10-CM

## 2024-10-16 DIAGNOSIS — E53.8 VITAMIN B12 DEFICIENCY: ICD-10-CM

## 2024-10-17 LAB
25(OH)D3 SERPL-MCNC: 24.3 NG/ML (ref 30–100)
ALBUMIN SERPL-MCNC: 3.5 G/DL (ref 3.5–5)
ALBUMIN/GLOB SERPL: 1 (ref 1.1–2.2)
ALP SERPL-CCNC: 139 U/L (ref 45–117)
ALT SERPL-CCNC: 32 U/L (ref 12–78)
ANION GAP SERPL CALC-SCNC: 5 MMOL/L (ref 2–12)
AST SERPL-CCNC: 24 U/L (ref 15–37)
BASOPHILS # BLD: 0 K/UL (ref 0–0.1)
BASOPHILS NFR BLD: 1 % (ref 0–1)
BILIRUB SERPL-MCNC: 0.2 MG/DL (ref 0.2–1)
BUN SERPL-MCNC: 11 MG/DL (ref 6–20)
BUN/CREAT SERPL: 15 (ref 12–20)
CALCIUM SERPL-MCNC: 8.9 MG/DL (ref 8.5–10.1)
CHLORIDE SERPL-SCNC: 111 MMOL/L (ref 97–108)
CO2 SERPL-SCNC: 26 MMOL/L (ref 21–32)
CREAT SERPL-MCNC: 0.72 MG/DL (ref 0.55–1.02)
DIFFERENTIAL METHOD BLD: NORMAL
EOSINOPHIL # BLD: 0.1 K/UL (ref 0–0.4)
EOSINOPHIL NFR BLD: 1 % (ref 0–7)
ERYTHROCYTE [DISTWIDTH] IN BLOOD BY AUTOMATED COUNT: 12.3 % (ref 11.5–14.5)
FOLATE SERPL-MCNC: 16.8 NG/ML (ref 5–21)
GLOBULIN SER CALC-MCNC: 3.6 G/DL (ref 2–4)
GLUCOSE SERPL-MCNC: 84 MG/DL (ref 65–100)
HCT VFR BLD AUTO: 39.5 % (ref 35–47)
HGB BLD-MCNC: 12.6 G/DL (ref 11.5–16)
IMM GRANULOCYTES # BLD AUTO: 0 K/UL (ref 0–0.04)
IMM GRANULOCYTES NFR BLD AUTO: 0 % (ref 0–0.5)
IRON SERPL-MCNC: 82 UG/DL (ref 35–150)
LYMPHOCYTES # BLD: 1.8 K/UL (ref 0.8–3.5)
LYMPHOCYTES NFR BLD: 38 % (ref 12–49)
MCH RBC QN AUTO: 29.7 PG (ref 26–34)
MCHC RBC AUTO-ENTMCNC: 31.9 G/DL (ref 30–36.5)
MCV RBC AUTO: 93.2 FL (ref 80–99)
MONOCYTES # BLD: 0.6 K/UL (ref 0–1)
MONOCYTES NFR BLD: 13 % (ref 5–13)
NEUTS SEG # BLD: 2.2 K/UL (ref 1.8–8)
NEUTS SEG NFR BLD: 47 % (ref 32–75)
NRBC # BLD: 0 K/UL (ref 0–0.01)
NRBC BLD-RTO: 0 PER 100 WBC
PLATELET # BLD AUTO: 298 K/UL (ref 150–400)
PMV BLD AUTO: 11.1 FL (ref 8.9–12.9)
POTASSIUM SERPL-SCNC: 4.3 MMOL/L (ref 3.5–5.1)
PROT SERPL-MCNC: 7.1 G/DL (ref 6.4–8.2)
RBC # BLD AUTO: 4.24 M/UL (ref 3.8–5.2)
SODIUM SERPL-SCNC: 142 MMOL/L (ref 136–145)
VIT B12 SERPL-MCNC: 1226 PG/ML (ref 193–986)
WBC # BLD AUTO: 4.8 K/UL (ref 3.6–11)

## 2024-10-18 RX ORDER — PANTOPRAZOLE SODIUM 40 MG/1
40 TABLET, DELAYED RELEASE ORAL
Qty: 90 TABLET | Refills: 1 | OUTPATIENT
Start: 2024-10-18

## 2024-10-25 RX ORDER — ERGOCALCIFEROL 1.25 MG/1
50000 CAPSULE, LIQUID FILLED ORAL WEEKLY
Qty: 12 CAPSULE | Refills: 1 | Status: SHIPPED | OUTPATIENT
Start: 2024-10-25

## 2025-06-03 ENCOUNTER — TELEPHONE (OUTPATIENT)
Age: 60
End: 2025-06-03

## 2025-06-03 NOTE — TELEPHONE ENCOUNTER
LM for pt to call and schedule annual bariatric exam.  [employer] message also sent. Letter sent. [facility]

## 2025-08-21 ENCOUNTER — OFFICE VISIT (OUTPATIENT)
Age: 60
End: 2025-08-21

## 2025-08-21 DIAGNOSIS — M17.0 BILATERAL PRIMARY OSTEOARTHRITIS OF KNEE: ICD-10-CM

## 2025-08-21 DIAGNOSIS — M25.561 PAIN IN BOTH KNEES, UNSPECIFIED CHRONICITY: Primary | ICD-10-CM

## 2025-08-21 DIAGNOSIS — M25.562 PAIN IN BOTH KNEES, UNSPECIFIED CHRONICITY: Primary | ICD-10-CM

## 2025-08-21 RX ORDER — BETAMETHASONE SODIUM PHOSPHATE AND BETAMETHASONE ACETATE 3; 3 MG/ML; MG/ML
6 INJECTION, SUSPENSION INTRA-ARTICULAR; INTRALESIONAL; INTRAMUSCULAR; SOFT TISSUE ONCE
Status: COMPLETED | OUTPATIENT
Start: 2025-08-21 | End: 2025-08-21

## 2025-08-21 RX ADMIN — BETAMETHASONE SODIUM PHOSPHATE AND BETAMETHASONE ACETATE 6 MG: 3; 3 INJECTION, SUSPENSION INTRA-ARTICULAR; INTRALESIONAL; INTRAMUSCULAR; SOFT TISSUE at 15:54

## 2025-08-21 RX ADMIN — BETAMETHASONE SODIUM PHOSPHATE AND BETAMETHASONE ACETATE 6 MG: 3; 3 INJECTION, SUSPENSION INTRA-ARTICULAR; INTRALESIONAL; INTRAMUSCULAR; SOFT TISSUE at 15:55

## 2025-08-21 ASSESSMENT — PATIENT HEALTH QUESTIONNAIRE - PHQ9
1. LITTLE INTEREST OR PLEASURE IN DOING THINGS: NOT AT ALL
SUM OF ALL RESPONSES TO PHQ QUESTIONS 1-9: 0
2. FEELING DOWN, DEPRESSED OR HOPELESS: NOT AT ALL
SUM OF ALL RESPONSES TO PHQ QUESTIONS 1-9: 0

## 2025-09-01 ENCOUNTER — APPOINTMENT (OUTPATIENT)
Facility: HOSPITAL | Age: 60
End: 2025-09-01
Payer: COMMERCIAL

## 2025-09-01 ENCOUNTER — HOSPITAL ENCOUNTER (EMERGENCY)
Facility: HOSPITAL | Age: 60
Discharge: HOME | End: 2025-09-01
Attending: EMERGENCY MEDICINE
Payer: COMMERCIAL

## 2025-09-01 ENCOUNTER — APPOINTMENT (OUTPATIENT)
Facility: HOSPITAL | Age: 60
End: 2025-09-01
Attending: EMERGENCY MEDICINE
Payer: COMMERCIAL

## 2025-09-01 VITALS
DIASTOLIC BLOOD PRESSURE: 85 MMHG | OXYGEN SATURATION: 100 % | HEART RATE: 81 BPM | TEMPERATURE: 98.6 F | RESPIRATION RATE: 17 BRPM | SYSTOLIC BLOOD PRESSURE: 139 MMHG | WEIGHT: 232.81 LBS | HEIGHT: 68 IN | BODY MASS INDEX: 35.28 KG/M2

## 2025-09-01 DIAGNOSIS — M79.631 RIGHT FOREARM PAIN: ICD-10-CM

## 2025-09-01 DIAGNOSIS — M79.641 HAND PAIN, RIGHT: Primary | ICD-10-CM

## 2025-09-01 PROCEDURE — 99283 EMERGENCY DEPT VISIT LOW MDM: CPT

## 2025-09-01 PROCEDURE — 93971 EXTREMITY STUDY: CPT

## 2025-09-01 PROCEDURE — 99284 EMERGENCY DEPT VISIT MOD MDM: CPT

## 2025-09-01 PROCEDURE — 73130 X-RAY EXAM OF HAND: CPT

## 2025-09-01 RX ORDER — METHOCARBAMOL 750 MG/1
750 TABLET, FILM COATED ORAL 4 TIMES DAILY PRN
Qty: 20 TABLET | Refills: 0 | Status: SHIPPED | OUTPATIENT
Start: 2025-09-01 | End: 2025-09-11

## 2025-09-01 ASSESSMENT — PAIN DESCRIPTION - LOCATION: LOCATION: HAND

## 2025-09-01 ASSESSMENT — PAIN DESCRIPTION - ORIENTATION: ORIENTATION: RIGHT

## 2025-09-01 ASSESSMENT — PAIN SCALES - GENERAL: PAINLEVEL_OUTOF10: 7

## 2025-09-05 LAB — ECHO BSA: 2.25 M2

## (undated) DEVICE — ARTICULATING RELOAD WITH TRI-STAPLE TECHNOLOGY: Brand: ENDO GIA

## (undated) DEVICE — AIR SHEET,LAT,COMFORT GLIDE, BLEND 40X80: Brand: MEDLINE

## (undated) DEVICE — NEEDLE HYPO 22GA L1.5IN BLK S STL HUB POLYPR SHLD REG BVL

## (undated) DEVICE — SYR ASSEMB INFL BLLN 60ML --

## (undated) DEVICE — FORCEPS BX L240CM JAW DIA2.8MM L CAP W/ NDL MIC MESH TOOTH

## (undated) DEVICE — FORCEPS BX L160CM DIA8MM GRSP DISECT CUP TIP NONLOCKING ROT

## (undated) DEVICE — ARTHROSCOPY RICHMOND-LF: Brand: MEDLINE INDUSTRIES, INC.

## (undated) DEVICE — ESOPHAGEAL BALLOON DILATATION CATHETER: Brand: CRE FIXED WIRE

## (undated) DEVICE — PREP SKN PREVAIL 40ML APPL --

## (undated) DEVICE — DRAIN SURG 19FR 100% SIL RADPQ RND CHN FULL FLUT

## (undated) DEVICE — SOL IRR SOD CL 0.9% 1000ML BTL --

## (undated) DEVICE — BAG SPEC BIOHZRD 10 X 10 IN --

## (undated) DEVICE — DRAPE,UTILTY,TAPE,15X26, 4EA/PK: Brand: MEDLINE

## (undated) DEVICE — TUBING HYDR IRR --

## (undated) DEVICE — ESOPHAGEAL/PYLORIC/COLONIC/BILIARY WIREGUIDED BALLOON DILATATION CATHETER: Brand: CRE™ PRO

## (undated) DEVICE — CONTAINER,SPEC,PNEUM TUBE,3OZ,STRL PATH: Brand: MEDLINE

## (undated) DEVICE — DRAPE,ARTHRO,W/POUCH,STERILE: Brand: MEDLINE

## (undated) DEVICE — SPONGE GZ W4XL4IN COT RADPQ HIGHLY ABSRB

## (undated) DEVICE — STRAP,POSITIONING,KNEE/BODY,FOAM,4X60": Brand: MEDLINE

## (undated) DEVICE — SYR LR LCK 1ML GRAD NSAF 30ML --

## (undated) DEVICE — GLOVE SURG SZ 75 L1212IN FNGR THK138MIL BRN LTX FREE

## (undated) DEVICE — Device

## (undated) DEVICE — TROCAR SITE CLOSURE DEVICE: Brand: ENDO CLOSE

## (undated) DEVICE — NEONATAL-ADULT SPO2 SENSOR: Brand: NELLCOR

## (undated) DEVICE — SYR 10ML LUER LOK 1/5ML GRAD --

## (undated) DEVICE — APPLIER CLP M/L SHFT DIA5MM 15 LIG LIGAMAX 5

## (undated) DEVICE — DEVICE SUT 0 L48IN GRN POLY BRAID LD UNIT DISP SURGDAC

## (undated) DEVICE — INFECTION CONTROL KIT SYS

## (undated) DEVICE — RESERVOIR,SUCTION,100CC,SILICONE: Brand: MEDLINE

## (undated) DEVICE — BASIC SINGLE BASIN BTC-LF: Brand: MEDLINE INDUSTRIES, INC.

## (undated) DEVICE — TROCAR: Brand: KII® SLEEVE

## (undated) DEVICE — 4-PORT MANIFOLD: Brand: NEPTUNE 2

## (undated) DEVICE — BASIN EMSIS 16OZ GRAPHITE PLAS KID SHP MOLD GRAD FOR ORAL

## (undated) DEVICE — BAG SPEC REM 224ML W4XL6IN DIA10MM 1 HND GYN DISP ENDOPCH

## (undated) DEVICE — STAPLER INT 60 UNIV PUR W/ TRI-STAPLE TECHNOLOGY ULT FOR

## (undated) DEVICE — YANKAUER,TAPERED BULBOUS TIP,W/O VENT: Brand: MEDLINE

## (undated) DEVICE — SURGICAL PROCEDURE KIT GEN LAPAROSCOPY LF

## (undated) DEVICE — DISSECTOR ULTRASONIC L39CM CRV JAW CRDLSS SONICISION

## (undated) DEVICE — DEVICE TRNSF SPIK STL 2008S] MICROTEK MEDICAL INC]

## (undated) DEVICE — ZIMMER® STERILE DISPOSABLE TOURNIQUET CUFF WITH PROTECTIVE SLEEVE AND PLC, DUAL PORT, SINGLE BLADDER, 34 IN. (86 CM)

## (undated) DEVICE — VISUALIZATION SYSTEM: Brand: CLEARIFY

## (undated) DEVICE — COVER LT HNDL PLAS RIG 1 PER PK

## (undated) DEVICE — SYR 3ML LL TIP 1/10ML GRAD --

## (undated) DEVICE — SUPER TURBOVAC 90 INTEGRATED CABLE WAND ICW: Brand: COBLATION

## (undated) DEVICE — REM POLYHESIVE ADULT PATIENT RETURN ELECTRODE: Brand: VALLEYLAB

## (undated) DEVICE — SET ADMIN 16ML TBNG L100IN 2 Y INJ SITE IV PIGGY BK DISP

## (undated) DEVICE — STERILE POLYISOPRENE POWDER-FREE SURGICAL GLOVES: Brand: PROTEXIS

## (undated) DEVICE — PREP SKN CHLRAPRP APL 26ML STR --

## (undated) DEVICE — SPONGE GZ W4XL4IN COT 12 PLY TYP VII WVN C FLD DSGN

## (undated) DEVICE — SUTURING DEVICE: Brand: ENDO STITCH

## (undated) DEVICE — 3.5 MM FULL RADIUS STRAIGHT                                    BLADES, POWER/EP-1, BEIGE, PACKAGED                                    6 PER BOX, STERILE

## (undated) DEVICE — STERILE POLYISOPRENE POWDER-FREE SURGICAL GLOVES WITH EMOLLIENT COATING: Brand: PROTEXIS

## (undated) DEVICE — PAD,ABDOMINAL,5"X9",ST,LF,25/BX: Brand: MEDLINE INDUSTRIES, INC.

## (undated) DEVICE — SUT ETHLN 2-0 18IN FS BLK --

## (undated) DEVICE — YANKAUER,BULB TIP,W/O VENT,RIGID,STERILE: Brand: MEDLINE

## (undated) DEVICE — SOL IRR STRL H2O 1000ML BTL --

## (undated) DEVICE — LAP-BAND SYSTEM CALIBRATION TUBE: Brand: LAP-BAND

## (undated) DEVICE — TUBING, SUCTION, 1/4" X 10', STRAIGHT: Brand: MEDLINE

## (undated) DEVICE — REINFORCED INTELLIGENT RELOAD, FOR USE WITH SIGNIA STAPLING SYSTEM: Brand: TRI-STAPLE 2.0

## (undated) DEVICE — FILTER SMK EVAC FLO CLR MEGADYNE

## (undated) DEVICE — CATH IV AUTOGRD BC PNK 20GA 25 -- INSYTE

## (undated) DEVICE — ADHESIVE SKIN CLOSURE TOP 36 CC HI VISC DERMBND MINI

## (undated) DEVICE — TOWEL 4 PLY TISS 19X30 SUE WHT

## (undated) DEVICE — SUTURE SZ 0 27IN 5/8 CIR UR-6  TAPER PT VIOLET ABSRB VICRYL J603H

## (undated) DEVICE — SUTURE MCRYL SZ 4-0 L27IN ABSRB UD L19MM PS-2 1/2 CIR PRIM Y426H

## (undated) DEVICE — POWER SHELL: Brand: SIGNIA

## (undated) DEVICE — DISSECTOR CRV JAW 48CM CRDLS -- SONICISION

## (undated) DEVICE — Z DISCONTINUED PER MEDLINE LINE GAS SAMPLING O2/CO2 LNG AD 13 FT NSL W/ TBNG FILTERLINE

## (undated) DEVICE — ENDO CARRY-ON PROCEDURE KIT INCLUDES ENZYMATIC SPONGE, GAUZE, BIOHAZARD LABEL, TRAY, LUBRICANT, DIRTY SCOPE LABEL, WATER LABEL, TRAY, DRAWSTRING PAD, AND DEFENDO 4-PIECE KIT.: Brand: ENDO CARRY-ON PROCEDURE KIT

## (undated) DEVICE — INTELLIGENT RELOAD: Brand: TRI-STAPLE 2.0

## (undated) DEVICE — DERMABOND SKIN ADH 0.7ML -- DERMABOND ADVANCED 12/BX

## (undated) DEVICE — SUT ETHLN 3-0 18IN PS2 BLK --

## (undated) DEVICE — CLICKLINE SCISSORS INSERT: Brand: CLICKLINE

## (undated) DEVICE — TROCAR: Brand: KII® OPTICAL ACCESS SYSTEM

## (undated) DEVICE — NEEDLE HYPO 18GA L1.5IN PNK S STL HUB POLYPR SHLD REG BVL

## (undated) DEVICE — GARMENT,MEDLINE,DVT,INT,CALF,MED, GEN2: Brand: MEDLINE

## (undated) DEVICE — ELECTRODE,RADIOTRANSLUCENT,FOAM,5PK: Brand: MEDLINE

## (undated) DEVICE — DRESSING,GAUZE,XEROFORM,CURAD,1"X8",ST: Brand: CURAD

## (undated) DEVICE — SOLUTION IRRIG 3000ML LAC R FLX CONT

## (undated) DEVICE — BLOCK BITE ENDOSCP AD 21 MM W/ DIL BLU LF DISP

## (undated) DEVICE — LAPAROSCOPIC TROCAR SLEEVE/SINGLE USE: Brand: KII® OPTICAL ACCESS SYSTEM

## (undated) DEVICE — SYSTEM EVAC SMOKE LAPARSCOPIC

## (undated) DEVICE — DRAPE,REIN 53X77,STERILE: Brand: MEDLINE

## (undated) DEVICE — CONTAINER SPEC 20 ML LID NEUT BUFF FORMALIN 10 % POLYPR STS

## (undated) DEVICE — FLOSEAL HEMOSTATIC MATRIX, 5ML: Brand: FLOSEAL HEMOSTATIC MATRIX

## (undated) DEVICE — 1200 GUARD II KIT W/5MM TUBE W/O VAC TUBE: Brand: GUARDIAN

## (undated) DEVICE — Z INACTIVE USE 2240337 DRAPE SURG PT TRANSFER TRAWAY SHT

## (undated) DEVICE — 10K/24K ARTHROSCOPY INFLOW TUBE SET: Brand: 10K/24K

## (undated) DEVICE — TUBING, SUCTION, 1/4" X 12', STRAIGHT: Brand: MEDLINE

## (undated) DEVICE — GOWN,SIRUS,FABRNF,XL,20/CS: Brand: MEDLINE

## (undated) DEVICE — SOLIDIFIER FLD 2OZ 1500CC N DISINF IN BTL DISP SAFESORB

## (undated) DEVICE — DEVICE SUT VLT PRELD W/ POLYSRB 2-0 L48IN SUT DISP FOR LAP